# Patient Record
Sex: MALE | Race: WHITE | Employment: OTHER | URBAN - METROPOLITAN AREA
[De-identification: names, ages, dates, MRNs, and addresses within clinical notes are randomized per-mention and may not be internally consistent; named-entity substitution may affect disease eponyms.]

---

## 2017-02-02 ENCOUNTER — HOSPITAL ENCOUNTER (OUTPATIENT)
Dept: PREADMISSION TESTING | Age: 77
Discharge: HOME | End: 2017-02-02
Payer: MEDICARE

## 2017-02-02 VITALS
WEIGHT: 198 LBS | DIASTOLIC BLOOD PRESSURE: 75 MMHG | SYSTOLIC BLOOD PRESSURE: 126 MMHG | BODY MASS INDEX: 27.72 KG/M2 | HEART RATE: 74 BPM | TEMPERATURE: 97.6 F | HEIGHT: 71 IN

## 2017-02-02 LAB
ABO + RH BLD: NORMAL
ANION GAP BLD CALC-SCNC: 6 MMOL/L (ref 5–15)
APPEARANCE UR: CLEAR
BACTERIA URNS QL MICRO: NEGATIVE /HPF
BILIRUB UR QL: NEGATIVE
BLOOD GROUP ANTIBODIES SERPL: NORMAL
BUN SERPL-MCNC: 17 MG/DL (ref 6–20)
BUN/CREAT SERPL: 17 (ref 12–20)
CALCIUM SERPL-MCNC: 9 MG/DL (ref 8.5–10.1)
CHLORIDE SERPL-SCNC: 104 MMOL/L (ref 97–108)
CO2 SERPL-SCNC: 30 MMOL/L (ref 21–32)
COLOR UR: ABNORMAL
CREAT SERPL-MCNC: 0.99 MG/DL (ref 0.7–1.3)
EPITH CASTS URNS QL MICRO: ABNORMAL /LPF
ERYTHROCYTE [DISTWIDTH] IN BLOOD BY AUTOMATED COUNT: 15.2 % (ref 11.5–14.5)
EST. AVERAGE GLUCOSE BLD GHB EST-MCNC: 134 MG/DL
GLUCOSE SERPL-MCNC: 93 MG/DL (ref 65–100)
GLUCOSE UR STRIP.AUTO-MCNC: NEGATIVE MG/DL
HBA1C MFR BLD: 6.3 % (ref 4.2–6.3)
HCT VFR BLD AUTO: 46.4 % (ref 36.6–50.3)
HGB BLD-MCNC: 15.3 G/DL (ref 12.1–17)
HGB UR QL STRIP: NEGATIVE
HYALINE CASTS URNS QL MICRO: ABNORMAL /LPF (ref 0–5)
INR PPP: 1 (ref 0.9–1.1)
KETONES UR QL STRIP.AUTO: NEGATIVE MG/DL
LEUKOCYTE ESTERASE UR QL STRIP.AUTO: NEGATIVE
MCH RBC QN AUTO: 30.7 PG (ref 26–34)
MCHC RBC AUTO-ENTMCNC: 33 G/DL (ref 30–36.5)
MCV RBC AUTO: 93 FL (ref 80–99)
NITRITE UR QL STRIP.AUTO: NEGATIVE
PH UR STRIP: 6 [PH] (ref 5–8)
PLATELET # BLD AUTO: 226 K/UL (ref 150–400)
POTASSIUM SERPL-SCNC: 4.3 MMOL/L (ref 3.5–5.1)
PROT UR STRIP-MCNC: ABNORMAL MG/DL
PROTHROMBIN TIME: 10.3 SEC (ref 9–11.1)
RBC # BLD AUTO: 4.99 M/UL (ref 4.1–5.7)
RBC #/AREA URNS HPF: ABNORMAL /HPF (ref 0–5)
SODIUM SERPL-SCNC: 140 MMOL/L (ref 136–145)
SP GR UR REFRACTOMETRY: 1.02 (ref 1–1.03)
SPECIMEN EXP DATE BLD: NORMAL
UA: UC IF INDICATED,UAUC: ABNORMAL
UROBILINOGEN UR QL STRIP.AUTO: 1 EU/DL (ref 0.2–1)
WBC # BLD AUTO: 6.9 K/UL (ref 4.1–11.1)
WBC URNS QL MICRO: ABNORMAL /HPF (ref 0–4)

## 2017-02-02 PROCEDURE — 85610 PROTHROMBIN TIME: CPT | Performed by: ORTHOPAEDIC SURGERY

## 2017-02-02 PROCEDURE — 85027 COMPLETE CBC AUTOMATED: CPT | Performed by: ORTHOPAEDIC SURGERY

## 2017-02-02 PROCEDURE — 81001 URINALYSIS AUTO W/SCOPE: CPT | Performed by: ORTHOPAEDIC SURGERY

## 2017-02-02 PROCEDURE — 93005 ELECTROCARDIOGRAM TRACING: CPT

## 2017-02-02 PROCEDURE — 86900 BLOOD TYPING SEROLOGIC ABO: CPT | Performed by: ORTHOPAEDIC SURGERY

## 2017-02-02 PROCEDURE — 36415 COLL VENOUS BLD VENIPUNCTURE: CPT | Performed by: ORTHOPAEDIC SURGERY

## 2017-02-02 PROCEDURE — 80048 BASIC METABOLIC PNL TOTAL CA: CPT | Performed by: ORTHOPAEDIC SURGERY

## 2017-02-02 PROCEDURE — 83036 HEMOGLOBIN GLYCOSYLATED A1C: CPT | Performed by: ORTHOPAEDIC SURGERY

## 2017-02-02 RX ORDER — GABAPENTIN 300 MG/1
300 CAPSULE ORAL
COMMUNITY
End: 2017-05-22 | Stop reason: ALTCHOICE

## 2017-02-03 PROBLEM — R73.03 PREDIABETES: Status: ACTIVE | Noted: 2017-02-03

## 2017-02-03 LAB
ATRIAL RATE: 73 BPM
BACTERIA SPEC CULT: NORMAL
BACTERIA SPEC CULT: NORMAL
CALCULATED P AXIS, ECG09: 56 DEGREES
CALCULATED R AXIS, ECG10: -72 DEGREES
CALCULATED T AXIS, ECG11: 32 DEGREES
DIAGNOSIS, 93000: NORMAL
P-R INTERVAL, ECG05: 214 MS
Q-T INTERVAL, ECG07: 398 MS
QRS DURATION, ECG06: 124 MS
QTC CALCULATION (BEZET), ECG08: 438 MS
SERVICE CMNT-IMP: NORMAL
VENTRICULAR RATE, ECG03: 73 BPM

## 2017-02-21 ENCOUNTER — OFFICE VISIT (OUTPATIENT)
Dept: INTERNAL MEDICINE CLINIC | Age: 77
End: 2017-02-21

## 2017-02-21 VITALS
HEART RATE: 88 BPM | BODY MASS INDEX: 28.19 KG/M2 | OXYGEN SATURATION: 94 % | HEIGHT: 71 IN | WEIGHT: 201.4 LBS | DIASTOLIC BLOOD PRESSURE: 70 MMHG | RESPIRATION RATE: 16 BRPM | TEMPERATURE: 98.1 F | SYSTOLIC BLOOD PRESSURE: 120 MMHG

## 2017-02-21 DIAGNOSIS — M51.9 LUMBAR DISC DISEASE: ICD-10-CM

## 2017-02-21 DIAGNOSIS — I25.10 ATHEROSCLEROSIS OF NATIVE CORONARY ARTERY OF NATIVE HEART WITHOUT ANGINA PECTORIS: ICD-10-CM

## 2017-02-21 DIAGNOSIS — I10 ESSENTIAL HYPERTENSION, BENIGN: Primary | ICD-10-CM

## 2017-02-21 DIAGNOSIS — R73.03 PREDIABETES: ICD-10-CM

## 2017-02-21 DIAGNOSIS — Z71.89 ADVANCE DIRECTIVE DISCUSSED WITH PATIENT: ICD-10-CM

## 2017-02-21 NOTE — PATIENT INSTRUCTIONS
End of life planning discussed with patient. Patient states that they do have an advance directive and will provide a copy for our office at next visit.

## 2017-02-21 NOTE — PROGRESS NOTES
HISTORY OF PRESENT ILLNESS  Will Lozano is a 68 y.o. male. [facility] is seen today for a follow up of hypertension and other medical problems. He also needs preoperative medical clearance for upcoming back surgery. 1. Lumbar disc disease. He is seeing Dr. Shana Hernandez. He has nerve root compression. He has not improved with conservative treatment although he has had some benefit from Gabapentin. Surgery is scheduled for 3/1/17. 2. CAD native artery native heart without angina. Up to date with cardiology follow up. He apparently saw Dr. Angel cheung at [facility] and was cleared for the procedure. 3. Essential hypertension. Stable on current regimen. 4. Mixed hyperlipidemia, IFG. Up to date with labs. A1c will be part of his preop labs. Assessment:  He is at moderate increased risk for cardiac complications based on his history of coronary disease. He is clinically stable, however. He has had cardiology clearance, therefore, I feel the risks are acceptable. Recommendations:    1. Cardiology consult prn with any symptom. 2. Will review preop labs which are to be done this week. 3. Hospitalist consultation prn with any medical problems. 4. Monitor blood sugars as needed. 5. He will follow up with me prn, otherwise, as previously scheduled in three months. 6. Call if I may help with any post operative medical questions or problems.     MedDATA/gwo     - hold aspirin 7 days prior to surgery    Past Medical History:   Diagnosis Date    Arrhythmia     r/t caffeine    Arthritis     Basal cell carcinoma     BPH (benign prostatic hyperplasia)     CAD (coronary artery disease)     s/p PTCA '92    Calculus of kidney     Chronic pain     BACK    Elevated PSA     Hypercholesterolemia     Melanoma ([address]     MVP (mitral valve prolapse)     Other ill-defined conditions(799.89)     Prostatitis    Prediabetes     Seasonal allergies     Squamous cell carcinoma (HCC)     Systolic murmur     Thyroid disease     HYPOTHYROID    Vision impairment     R EYE, BLOOD CLOT ON RETINA     Past Surgical History:   Procedure Laterality Date    ENDOSCOPY, COLON, DIAGNOSTIC      due 12    HX CHOLECYSTECTOMY  1970    HX HEART CATHETERIZATION  1992    WITH BALLOON    HX LITHOTRIPSY      x2    HX ORTHOPAEDIC      elbow - fx right arm age 3 yrs     Current Outpatient Prescriptions   Medication Sig    gabapentin (NEURONTIN) 300 mg capsule Take 300 mg by mouth daily as needed.  folic acid-vit W5-ROJ F55 (FOLBIC) 2.5-25-2 mg tablet Take 1 Tab by mouth daily.  amLODIPine (NORVASC) 5 mg tablet Take 1 Tab by mouth daily. (Patient taking differently: Take 5 mg by mouth nightly.)    atorvastatin (LIPITOR) 40 mg tablet Take 1 Tab by mouth daily.  aspirin 81 mg chewable tablet Take 81 mg by mouth daily.  finasteride (PROSCAR) 5 mg tablet Take 5 mg by mouth every other day. HS    levothyroxine (SYNTHROID) 50 mcg tablet TAKE 1 TABLET BY MOUTH EVERY DAY BEFORE BREAKFAST    co-enzyme Q-10 (CO Q-10) 100 mg capsule Take 200 mg by mouth daily.  VIT A/VIT C/VIT E/ZINC/COPPER (PRESERVISION AREDS PO) Take 1 Cap by mouth two (2) times a day.  pneumococcal 13 ezekiel conj dip (PREVNAR 13, PF,) 0.5 mL syrg injection 0.5 mL by IntraMUSCular route PRIOR TO DISCHARGE for 1 dose. No current facility-administered medications for this visit. Allergies   Allergen Reactions    Codeine Rash    Pcn [Penicillins] Rash    Sulfa (Sulfonamide Antibiotics) Other (comments)     confusion     Social History     Social History    Marital status:      Spouse name: N/A    Number of children: N/A    Years of education: N/A     Occupational History    Not on file.      Social History Main Topics    Smoking status: Never Smoker    Smokeless tobacco: Never Used    Alcohol use No    Drug use: No    Sexual activity: Not on file     Other Topics Concern    Not on file     Social History Narrative     Family History Problem Relation Age of Onset    Heart Disease Mother      CHF    Heart Disease Father      CAD    Heart Disease Sister      CAD CABG    Lung Disease Sister     Heart Disease Brother      CAD    Pulmonary Fibrosis Brother     Heart Disease Brother      CAD    Stroke Brother 34     cerebral hemorrhage    Cancer Brother      LUNG    High Cholesterol Daughter     Anesth Problems Neg Hx          Review of Systems   Constitutional: Negative for weight loss. Respiratory: Negative. Cardiovascular: Negative for chest pain, palpitations, leg swelling and PND. Musculoskeletal: Positive for back pain. Negative for myalgias. Neurological: Negative for focal weakness. Physical Exam   Constitutional: He is oriented to person, place, and time. He appears well-developed and well-nourished. No distress. HENT:   Head: Normocephalic and atraumatic. Right Ear: External ear normal.   Left Ear: External ear normal.   Eyes: EOM are normal. Pupils are equal, round, and reactive to light. Right eye exhibits no discharge. Left eye exhibits no discharge. Neck: Normal range of motion. Neck supple. Carotid bruit is not present. No thyromegaly present. Cardiovascular: Normal rate, regular rhythm and intact distal pulses. Exam reveals no gallop and no friction rub. Murmur heard. Systolic murmur is present with a grade of 1/6   Pulmonary/Chest: Effort normal and breath sounds normal. No respiratory distress. He has no wheezes. He has no rales. Abdominal: Soft. Bowel sounds are normal. He exhibits no distension and no mass. There is no tenderness. There is no rebound and no guarding. Musculoskeletal: He exhibits no edema or tenderness. Full ortho-neuro per Dr Shana Hernandez   Lymphadenopathy:     He has no cervical adenopathy. Neurological: He is alert and oriented to person, place, and time. Skin: Skin is warm and dry. No rash noted. Psychiatric: He has a normal mood and affect.  His behavior is normal.   Nursing note and vitals reviewed. ASSESSMENT and Mauricio Kapoor was seen today for pre-op exam.    Diagnoses and all orders for this visit:    Essential hypertension, benign- Continue current regimen of prescription and / or OTC medications     Advance directive discussed with patient    Prediabetes- Diet and exercise     Atherosclerosis of native coronary artery of native heart without angina pectoris- See cardiologist as directed. Lumbar disc disease- See orthopedic surgeon as directed     This has been fully explained to the patient, who indicates understanding.

## 2017-02-21 NOTE — MR AVS SNAPSHOT
Visit Information Date & Time Provider Department Dept. Phone Encounter #  
 2/21/2017 11:00 AM Nuria Figueroa, [address] [place] [address] Internal Medicine [phone] [id number] Follow-up Instructions Return in about 3 months (around 5/21/2017). Upcoming Health Maintenance Date Due Pneumococcal 65+ High/Highest Risk (2 of 2 - PPSV23) 1/5/2011 MEDICARE YEARLY EXAM 7/7/2016 GLAUCOMA SCREENING Q2Y 4/1/2017 COLONOSCOPY 9/16/2025 DTaP/Tdap/Td series (2 - Td) 11/18/2026 Allergies as of 2/21/2017  Review Complete On: 2/21/2017 By: Nuria Figueroa MD  
  
 Severity Noted Reaction Type Reactions Codeine  11/24/2009    Rash Pcn [Penicillins]  11/24/2009    Rash  
 Sulfa (Sulfonamide Antibiotics)  11/24/2009    Other (comments)  
 confusion Current Immunizations  Reviewed on 11/14/2016 Name Date Pneumococcal Vaccine (Unspecified Type) 1/5/2006 Td 11/5/2014 Zoster Vaccine, Live 6/1/2014 Not reviewed this visit You Were Diagnosed With   
  
 Codes Comments Advance directive discussed with patient    -  Primary ICD-10-CM: Z71.89 ICD-9-CM: V65.49 Vitals BP Pulse Temp Resp Height(growth percentile) Weight(growth percentile) 120/70 (BP 1 Location: Right arm, BP Patient Position: Sitting) 88 98.1 °F (36.7 °C) (Oral) 16 5' 10.5\" (1.791 m) 201 lb 6.4 oz (91.4 kg) SpO2 BMI Smoking Status 94% 28.49 kg/m2 Never Smoker BMI and BSA Data Body Mass Index Body Surface Area  
 28.49 kg/m 2 2.13 m 2 Preferred Pharmacy Pharmacy Name Phone [id number] [place] [id number] [facility] [phone] Your Updated Medication List  
  
   
This list is accurate as of: 2/21/17 12:02 PM.  Always use your most recent med list. amLODIPine 5 mg tablet Commonly known as:  Melani Gables Take 1 Tab by mouth daily. aspirin 81 mg chewable tablet Take 81 mg by mouth daily. atorvastatin 40 mg tablet Commonly known as:  LIPITOR Take 1 Tab by mouth daily. co-enzyme Q-10 100 mg capsule Commonly known as:  CO Q-10 Take 200 mg by mouth daily. finasteride 5 mg tablet Commonly known as:  PROSCAR Take 5 mg by mouth every other day. HS  
  
 folic acid-vit C4-YFT F23 2.5-25-2 mg tablet Commonly known as:  FOLBIC Take 1 Tab by mouth daily. gabapentin 300 mg capsule Commonly known as:  NEURONTIN Take 300 mg by mouth daily as needed. levothyroxine 50 mcg tablet Commonly known as:  SYNTHROID  
TAKE 1 TABLET BY MOUTH EVERY DAY BEFORE BREAKFAST pneumococcal 13 ezekiel conj dip 0.5 mL Syrg injection Commonly known as:  PREVNAR 13 (PF)  
0.5 mL by IntraMUSCular route PRIOR TO DISCHARGE for 1 dose. PRESERVISION AREDS PO Take 1 Cap by mouth two (2) times a day. Follow-up Instructions Return in about 3 months (around 5/21/2017). Patient Instructions End of life planning discussed with patient. Patient states that they do have an advance directive and will provide a copy for our office at next visit. Introducing [facility] [place] [facility]! Dear Sommer Lomeli: 
Thank you for requesting a [employer] account. Our records indicate that you already have an active [employer] account. You can access your account anytime at [email or web address] Did you know that you can access your hospital and ER discharge instructions at any time in [employer]? You can also review all of your test results from your hospital stay or ER visit. Additional Information If you have questions, please visit the Frequently Asked Questions section of the [employer] website at [email or web address] Remember, [employer] is NOT to be used for urgent needs. For medical emergencies, dial 911. Now available from your iPhone and Android! Please provide this summary of care documentation to your next provider. Your primary care clinician is listed as SRINIVAS KEBEDE. If you have any questions after today's visit, please call [phone].

## 2017-02-22 PROBLEM — M51.9 LUMBAR DISC DISEASE: Status: ACTIVE | Noted: 2017-02-22

## 2017-03-01 ENCOUNTER — ANESTHESIA (OUTPATIENT)
Dept: SURGERY | Age: 77
End: 2017-03-01
Payer: MEDICARE

## 2017-03-01 ENCOUNTER — ANESTHESIA EVENT (OUTPATIENT)
Dept: SURGERY | Age: 77
End: 2017-03-01
Payer: MEDICARE

## 2017-03-01 ENCOUNTER — HOSPITAL ENCOUNTER (OUTPATIENT)
Age: 77
Setting detail: OBSERVATION
Discharge: HOME | End: 2017-03-02
Attending: ORTHOPAEDIC SURGERY | Admitting: ORTHOPAEDIC SURGERY
Payer: MEDICARE

## 2017-03-01 ENCOUNTER — APPOINTMENT (OUTPATIENT)
Dept: GENERAL RADIOLOGY | Age: 77
End: 2017-03-01
Attending: ORTHOPAEDIC SURGERY
Payer: MEDICARE

## 2017-03-01 LAB
ABO + RH BLD: NORMAL
BLOOD GROUP ANTIBODIES SERPL: NORMAL
GLUCOSE BLD STRIP.AUTO-MCNC: 101 MG/DL (ref 65–100)
SERVICE CMNT-IMP: ABNORMAL
SPECIMEN EXP DATE BLD: NORMAL

## 2017-03-01 PROCEDURE — 74011250636 HC RX REV CODE- 250/636: Performed by: ANESTHESIOLOGY

## 2017-03-01 PROCEDURE — 77030026438 HC STYL ET INTUB CARD -A: Performed by: ANESTHESIOLOGY

## 2017-03-01 PROCEDURE — 77030013079 HC BLNKT BAIR HGGR 3M -A: Performed by: ANESTHESIOLOGY

## 2017-03-01 PROCEDURE — 76060000035 HC ANESTHESIA 2 TO 2.5 HR: Performed by: ORTHOPAEDIC SURGERY

## 2017-03-01 PROCEDURE — 77030010507 HC ADH SKN DERMBND J&J -B: Performed by: ORTHOPAEDIC SURGERY

## 2017-03-01 PROCEDURE — 74011250636 HC RX REV CODE- 250/636: Performed by: ORTHOPAEDIC SURGERY

## 2017-03-01 PROCEDURE — 76000 FLUOROSCOPY <1 HR PHYS/QHP: CPT

## 2017-03-01 PROCEDURE — 77030018836 HC SOL IRR NACL ICUM -A: Performed by: ORTHOPAEDIC SURGERY

## 2017-03-01 PROCEDURE — 74011250637 HC RX REV CODE- 250/637: Performed by: ORTHOPAEDIC SURGERY

## 2017-03-01 PROCEDURE — 74011250636 HC RX REV CODE- 250/636

## 2017-03-01 PROCEDURE — 77030008684 HC TU ET CUF COVD -B: Performed by: ANESTHESIOLOGY

## 2017-03-01 PROCEDURE — 77030028271 HC SRGFL HEMSTAT MTRX KT J&J -C: Performed by: ORTHOPAEDIC SURGERY

## 2017-03-01 PROCEDURE — 74011000272 HC RX REV CODE- 272: Performed by: ORTHOPAEDIC SURGERY

## 2017-03-01 PROCEDURE — 76010000171 HC OR TIME 2 TO 2.5 HR INTENSV-TIER 1: Performed by: ORTHOPAEDIC SURGERY

## 2017-03-01 PROCEDURE — 77030004391 HC BUR FLUT MEDT -C: Performed by: ORTHOPAEDIC SURGERY

## 2017-03-01 PROCEDURE — 74011000250 HC RX REV CODE- 250: Performed by: ORTHOPAEDIC SURGERY

## 2017-03-01 PROCEDURE — 82962 GLUCOSE BLOOD TEST: CPT

## 2017-03-01 PROCEDURE — 36415 COLL VENOUS BLD VENIPUNCTURE: CPT | Performed by: NURSE PRACTITIONER

## 2017-03-01 PROCEDURE — 76210000006 HC OR PH I REC 0.5 TO 1 HR: Performed by: ORTHOPAEDIC SURGERY

## 2017-03-01 PROCEDURE — 77030011264 HC ELECTRD BLD EXT COVD -A: Performed by: ORTHOPAEDIC SURGERY

## 2017-03-01 PROCEDURE — 99218 HC RM OBSERVATION: CPT

## 2017-03-01 PROCEDURE — 77030003029 HC SUT VCRL J&J -B: Performed by: ORTHOPAEDIC SURGERY

## 2017-03-01 PROCEDURE — 77030014647 HC SEAL FBRN TISSL BAXT -D: Performed by: ORTHOPAEDIC SURGERY

## 2017-03-01 PROCEDURE — 77030003666 HC NDL SPINAL BD -A: Performed by: ORTHOPAEDIC SURGERY

## 2017-03-01 PROCEDURE — 74011250637 HC RX REV CODE- 250/637: Performed by: NURSE PRACTITIONER

## 2017-03-01 PROCEDURE — 86900 BLOOD TYPING SEROLOGIC ABO: CPT | Performed by: NURSE PRACTITIONER

## 2017-03-01 PROCEDURE — 77030031139 HC SUT VCRL2 J&J -A: Performed by: ORTHOPAEDIC SURGERY

## 2017-03-01 PROCEDURE — 74011000250 HC RX REV CODE- 250

## 2017-03-01 PROCEDURE — 77030032490 HC SLV COMPR SCD KNE COVD -B: Performed by: ORTHOPAEDIC SURGERY

## 2017-03-01 PROCEDURE — 77030019908 HC STETH ESOPH SIMS -A: Performed by: ANESTHESIOLOGY

## 2017-03-01 PROCEDURE — 77030020782 HC GWN BAIR PAWS FLX 3M -B

## 2017-03-01 PROCEDURE — 77030029099 HC BN WAX SSPC -A: Performed by: ORTHOPAEDIC SURGERY

## 2017-03-01 PROCEDURE — 77030012893

## 2017-03-01 RX ORDER — HYDROMORPHONE HYDROCHLORIDE 1 MG/ML
INJECTION, SOLUTION INTRAMUSCULAR; INTRAVENOUS; SUBCUTANEOUS AS NEEDED
Status: DISCONTINUED | OUTPATIENT
Start: 2017-03-01 | End: 2017-03-01 | Stop reason: HOSPADM

## 2017-03-01 RX ORDER — VANCOMYCIN HYDROCHLORIDE 1 G/20ML
INJECTION, POWDER, LYOPHILIZED, FOR SOLUTION INTRAVENOUS AS NEEDED
Status: DISCONTINUED | OUTPATIENT
Start: 2017-03-01 | End: 2017-03-01 | Stop reason: HOSPADM

## 2017-03-01 RX ORDER — FENTANYL CITRATE 50 UG/ML
50 INJECTION, SOLUTION INTRAMUSCULAR; INTRAVENOUS AS NEEDED
Status: DISCONTINUED | OUTPATIENT
Start: 2017-03-01 | End: 2017-03-01 | Stop reason: HOSPADM

## 2017-03-01 RX ORDER — FENTANYL CITRATE 50 UG/ML
25 INJECTION, SOLUTION INTRAMUSCULAR; INTRAVENOUS
Status: DISCONTINUED | OUTPATIENT
Start: 2017-03-01 | End: 2017-03-01 | Stop reason: HOSPADM

## 2017-03-01 RX ORDER — GABAPENTIN 300 MG/1
300 CAPSULE ORAL 3 TIMES DAILY
Status: DISCONTINUED | OUTPATIENT
Start: 2017-03-01 | End: 2017-03-02 | Stop reason: HOSPADM

## 2017-03-01 RX ORDER — AMLODIPINE BESYLATE 5 MG/1
5 TABLET ORAL DAILY
Status: DISCONTINUED | OUTPATIENT
Start: 2017-03-02 | End: 2017-03-01

## 2017-03-01 RX ORDER — PROPOFOL 10 MG/ML
INJECTION, EMULSION INTRAVENOUS AS NEEDED
Status: DISCONTINUED | OUTPATIENT
Start: 2017-03-01 | End: 2017-03-01 | Stop reason: HOSPADM

## 2017-03-01 RX ORDER — FENTANYL CITRATE 50 UG/ML
INJECTION, SOLUTION INTRAMUSCULAR; INTRAVENOUS AS NEEDED
Status: DISCONTINUED | OUTPATIENT
Start: 2017-03-01 | End: 2017-03-01 | Stop reason: HOSPADM

## 2017-03-01 RX ORDER — MIDAZOLAM HYDROCHLORIDE 1 MG/ML
1 INJECTION, SOLUTION INTRAMUSCULAR; INTRAVENOUS AS NEEDED
Status: DISCONTINUED | OUTPATIENT
Start: 2017-03-01 | End: 2017-03-01 | Stop reason: HOSPADM

## 2017-03-01 RX ORDER — FACIAL-BODY WIPES
10 EACH TOPICAL DAILY PRN
Status: DISCONTINUED | OUTPATIENT
Start: 2017-03-03 | End: 2017-03-02 | Stop reason: HOSPADM

## 2017-03-01 RX ORDER — AMLODIPINE BESYLATE 5 MG/1
5 TABLET ORAL
Status: DISCONTINUED | OUTPATIENT
Start: 2017-03-01 | End: 2017-03-02 | Stop reason: HOSPADM

## 2017-03-01 RX ORDER — SODIUM CHLORIDE 9 MG/ML
50 INJECTION, SOLUTION INTRAVENOUS CONTINUOUS
Status: DISCONTINUED | OUTPATIENT
Start: 2017-03-01 | End: 2017-03-01 | Stop reason: HOSPADM

## 2017-03-01 RX ORDER — HYDROMORPHONE HYDROCHLORIDE 1 MG/ML
0.2 INJECTION, SOLUTION INTRAMUSCULAR; INTRAVENOUS; SUBCUTANEOUS
Status: DISCONTINUED | OUTPATIENT
Start: 2017-03-01 | End: 2017-03-01 | Stop reason: HOSPADM

## 2017-03-01 RX ORDER — SODIUM CHLORIDE 9 MG/ML
125 INJECTION, SOLUTION INTRAVENOUS CONTINUOUS
Status: DISPENSED | OUTPATIENT
Start: 2017-03-01 | End: 2017-03-02

## 2017-03-01 RX ORDER — MIDAZOLAM HYDROCHLORIDE 1 MG/ML
INJECTION, SOLUTION INTRAMUSCULAR; INTRAVENOUS AS NEEDED
Status: DISCONTINUED | OUTPATIENT
Start: 2017-03-01 | End: 2017-03-01 | Stop reason: HOSPADM

## 2017-03-01 RX ORDER — ATORVASTATIN CALCIUM 40 MG/1
40 TABLET, FILM COATED ORAL
Status: DISCONTINUED | OUTPATIENT
Start: 2017-03-01 | End: 2017-03-02 | Stop reason: HOSPADM

## 2017-03-01 RX ORDER — ROCURONIUM BROMIDE 10 MG/ML
INJECTION, SOLUTION INTRAVENOUS AS NEEDED
Status: DISCONTINUED | OUTPATIENT
Start: 2017-03-01 | End: 2017-03-01 | Stop reason: HOSPADM

## 2017-03-01 RX ORDER — ATORVASTATIN CALCIUM 40 MG/1
40 TABLET, FILM COATED ORAL DAILY
Status: DISCONTINUED | OUTPATIENT
Start: 2017-03-02 | End: 2017-03-01

## 2017-03-01 RX ORDER — FINASTERIDE 5 MG/1
5 TABLET, FILM COATED ORAL EVERY OTHER DAY
Status: DISCONTINUED | OUTPATIENT
Start: 2017-03-01 | End: 2017-03-02 | Stop reason: HOSPADM

## 2017-03-01 RX ORDER — AMOXICILLIN 250 MG
1 CAPSULE ORAL 2 TIMES DAILY
Status: DISCONTINUED | OUTPATIENT
Start: 2017-03-01 | End: 2017-03-02 | Stop reason: HOSPADM

## 2017-03-01 RX ORDER — LIDOCAINE HYDROCHLORIDE 10 MG/ML
0.1 INJECTION, SOLUTION EPIDURAL; INFILTRATION; INTRACAUDAL; PERINEURAL AS NEEDED
Status: DISCONTINUED | OUTPATIENT
Start: 2017-03-01 | End: 2017-03-01 | Stop reason: HOSPADM

## 2017-03-01 RX ORDER — SODIUM CHLORIDE 0.9 % (FLUSH) 0.9 %
5-10 SYRINGE (ML) INJECTION AS NEEDED
Status: DISCONTINUED | OUTPATIENT
Start: 2017-03-01 | End: 2017-03-01 | Stop reason: HOSPADM

## 2017-03-01 RX ORDER — DIPHENHYDRAMINE HYDROCHLORIDE 50 MG/ML
12.5 INJECTION, SOLUTION INTRAMUSCULAR; INTRAVENOUS AS NEEDED
Status: DISCONTINUED | OUTPATIENT
Start: 2017-03-01 | End: 2017-03-01 | Stop reason: HOSPADM

## 2017-03-01 RX ORDER — BUPIVACAINE HYDROCHLORIDE AND EPINEPHRINE 5; 5 MG/ML; UG/ML
30 INJECTION, SOLUTION EPIDURAL; INTRACAUDAL; PERINEURAL ONCE
Status: COMPLETED | OUTPATIENT
Start: 2017-03-01 | End: 2017-03-01

## 2017-03-01 RX ORDER — POVIDONE-IODINE 10 %
SOLUTION, NON-ORAL TOPICAL AS NEEDED
Status: DISCONTINUED | OUTPATIENT
Start: 2017-03-01 | End: 2017-03-01 | Stop reason: HOSPADM

## 2017-03-01 RX ORDER — SODIUM CHLORIDE 0.9 % (FLUSH) 0.9 %
5-10 SYRINGE (ML) INJECTION EVERY 8 HOURS
Status: DISCONTINUED | OUTPATIENT
Start: 2017-03-02 | End: 2017-03-02 | Stop reason: HOSPADM

## 2017-03-01 RX ORDER — ACETAMINOPHEN 325 MG/1
650 TABLET ORAL EVERY 6 HOURS
Status: DISCONTINUED | OUTPATIENT
Start: 2017-03-01 | End: 2017-03-02 | Stop reason: HOSPADM

## 2017-03-01 RX ORDER — LEVOFLOXACIN 5 MG/ML
INJECTION, SOLUTION INTRAVENOUS AS NEEDED
Status: DISCONTINUED | OUTPATIENT
Start: 2017-03-01 | End: 2017-03-01 | Stop reason: HOSPADM

## 2017-03-01 RX ORDER — NALOXONE HYDROCHLORIDE 0.4 MG/ML
0.4 INJECTION, SOLUTION INTRAMUSCULAR; INTRAVENOUS; SUBCUTANEOUS AS NEEDED
Status: DISCONTINUED | OUTPATIENT
Start: 2017-03-01 | End: 2017-03-02 | Stop reason: HOSPADM

## 2017-03-01 RX ORDER — VANCOMYCIN 1.75 GRAM/500 ML IN 0.9 % SODIUM CHLORIDE INTRAVENOUS
1750 EVERY 12 HOURS
Status: COMPLETED | OUTPATIENT
Start: 2017-03-01 | End: 2017-03-02

## 2017-03-01 RX ORDER — OXYCODONE AND ACETAMINOPHEN 5; 325 MG/1; MG/1
1 TABLET ORAL AS NEEDED
Status: DISCONTINUED | OUTPATIENT
Start: 2017-03-01 | End: 2017-03-01 | Stop reason: HOSPADM

## 2017-03-01 RX ORDER — ONDANSETRON 2 MG/ML
4 INJECTION INTRAMUSCULAR; INTRAVENOUS AS NEEDED
Status: DISCONTINUED | OUTPATIENT
Start: 2017-03-01 | End: 2017-03-01 | Stop reason: HOSPADM

## 2017-03-01 RX ORDER — SODIUM CHLORIDE 0.9 % (FLUSH) 0.9 %
5-10 SYRINGE (ML) INJECTION AS NEEDED
Status: DISCONTINUED | OUTPATIENT
Start: 2017-03-01 | End: 2017-03-02 | Stop reason: HOSPADM

## 2017-03-01 RX ORDER — MIDAZOLAM HYDROCHLORIDE 1 MG/ML
0.5 INJECTION, SOLUTION INTRAMUSCULAR; INTRAVENOUS
Status: DISCONTINUED | OUTPATIENT
Start: 2017-03-01 | End: 2017-03-01 | Stop reason: HOSPADM

## 2017-03-01 RX ORDER — LEVOTHYROXINE SODIUM 50 UG/1
50 TABLET ORAL
Status: DISCONTINUED | OUTPATIENT
Start: 2017-03-02 | End: 2017-03-02 | Stop reason: HOSPADM

## 2017-03-01 RX ORDER — PHENYLEPHRINE HCL IN 0.9% NACL 0.4MG/10ML
SYRINGE (ML) INTRAVENOUS AS NEEDED
Status: DISCONTINUED | OUTPATIENT
Start: 2017-03-01 | End: 2017-03-01 | Stop reason: HOSPADM

## 2017-03-01 RX ORDER — SODIUM CHLORIDE, SODIUM LACTATE, POTASSIUM CHLORIDE, CALCIUM CHLORIDE 600; 310; 30; 20 MG/100ML; MG/100ML; MG/100ML; MG/100ML
75 INJECTION, SOLUTION INTRAVENOUS CONTINUOUS
Status: DISCONTINUED | OUTPATIENT
Start: 2017-03-01 | End: 2017-03-01 | Stop reason: HOSPADM

## 2017-03-01 RX ORDER — HYDROMORPHONE HCL IN 0.9% NACL 15 MG/30ML
PATIENT CONTROLLED ANALGESIA VIAL INTRAVENOUS CONTINUOUS
Status: DISCONTINUED | OUTPATIENT
Start: 2017-03-01 | End: 2017-03-02 | Stop reason: HOSPADM

## 2017-03-01 RX ORDER — ONDANSETRON 2 MG/ML
4 INJECTION INTRAMUSCULAR; INTRAVENOUS
Status: ACTIVE | OUTPATIENT
Start: 2017-03-01 | End: 2017-03-02

## 2017-03-01 RX ORDER — POLYETHYLENE GLYCOL 3350 17 G/17G
17 POWDER, FOR SOLUTION ORAL DAILY
Status: DISCONTINUED | OUTPATIENT
Start: 2017-03-02 | End: 2017-03-02 | Stop reason: HOSPADM

## 2017-03-01 RX ORDER — LIDOCAINE HYDROCHLORIDE 20 MG/ML
INJECTION, SOLUTION EPIDURAL; INFILTRATION; INTRACAUDAL; PERINEURAL AS NEEDED
Status: DISCONTINUED | OUTPATIENT
Start: 2017-03-01 | End: 2017-03-01 | Stop reason: HOSPADM

## 2017-03-01 RX ORDER — ONDANSETRON 2 MG/ML
INJECTION INTRAMUSCULAR; INTRAVENOUS AS NEEDED
Status: DISCONTINUED | OUTPATIENT
Start: 2017-03-01 | End: 2017-03-01 | Stop reason: HOSPADM

## 2017-03-01 RX ORDER — DIPHENHYDRAMINE HCL 12.5MG/5ML
12.5 ELIXIR ORAL
Status: ACTIVE | OUTPATIENT
Start: 2017-03-01 | End: 2017-03-02

## 2017-03-01 RX ORDER — SUCCINYLCHOLINE CHLORIDE 20 MG/ML
INJECTION INTRAMUSCULAR; INTRAVENOUS AS NEEDED
Status: DISCONTINUED | OUTPATIENT
Start: 2017-03-01 | End: 2017-03-01 | Stop reason: HOSPADM

## 2017-03-01 RX ORDER — DEXAMETHASONE SODIUM PHOSPHATE 4 MG/ML
INJECTION, SOLUTION INTRA-ARTICULAR; INTRALESIONAL; INTRAMUSCULAR; INTRAVENOUS; SOFT TISSUE AS NEEDED
Status: DISCONTINUED | OUTPATIENT
Start: 2017-03-01 | End: 2017-03-01 | Stop reason: HOSPADM

## 2017-03-01 RX ORDER — MORPHINE SULFATE 10 MG/ML
2 INJECTION, SOLUTION INTRAMUSCULAR; INTRAVENOUS
Status: DISCONTINUED | OUTPATIENT
Start: 2017-03-01 | End: 2017-03-01 | Stop reason: HOSPADM

## 2017-03-01 RX ORDER — VANCOMYCIN 1.75 GRAM/500 ML IN 0.9 % SODIUM CHLORIDE INTRAVENOUS
1750 ONCE
Status: COMPLETED | OUTPATIENT
Start: 2017-03-01 | End: 2017-03-01

## 2017-03-01 RX ORDER — OXYCODONE HYDROCHLORIDE 5 MG/1
5 TABLET ORAL
Status: DISCONTINUED | OUTPATIENT
Start: 2017-03-01 | End: 2017-03-02 | Stop reason: HOSPADM

## 2017-03-01 RX ORDER — LEVOFLOXACIN 5 MG/ML
500 INJECTION, SOLUTION INTRAVENOUS ONCE
Status: DISCONTINUED | OUTPATIENT
Start: 2017-03-01 | End: 2017-03-01

## 2017-03-01 RX ORDER — SODIUM CHLORIDE 0.9 % (FLUSH) 0.9 %
5-10 SYRINGE (ML) INJECTION EVERY 8 HOURS
Status: DISCONTINUED | OUTPATIENT
Start: 2017-03-01 | End: 2017-03-01 | Stop reason: HOSPADM

## 2017-03-01 RX ORDER — OXYCODONE HYDROCHLORIDE 5 MG/1
10 TABLET ORAL
Status: DISCONTINUED | OUTPATIENT
Start: 2017-03-01 | End: 2017-03-02 | Stop reason: HOSPADM

## 2017-03-01 RX ORDER — CYCLOBENZAPRINE HCL 10 MG
10 TABLET ORAL
Status: DISCONTINUED | OUTPATIENT
Start: 2017-03-01 | End: 2017-03-02 | Stop reason: HOSPADM

## 2017-03-01 RX ADMIN — Medication 80 MCG: at 11:54

## 2017-03-01 RX ADMIN — FINASTERIDE 5 MG: 5 TABLET, FILM COATED ORAL at 23:05

## 2017-03-01 RX ADMIN — LIDOCAINE HYDROCHLORIDE 60 MG: 20 INJECTION, SOLUTION EPIDURAL; INFILTRATION; INTRACAUDAL; PERINEURAL at 11:08

## 2017-03-01 RX ADMIN — ACETAMINOPHEN 650 MG: 325 TABLET, FILM COATED ORAL at 18:44

## 2017-03-01 RX ADMIN — VANCOMYCIN HYDROCHLORIDE 1750 MG: 10 INJECTION, POWDER, LYOPHILIZED, FOR SOLUTION INTRAVENOUS at 23:22

## 2017-03-01 RX ADMIN — ATORVASTATIN CALCIUM 40 MG: 40 TABLET, FILM COATED ORAL at 23:05

## 2017-03-01 RX ADMIN — LEVOFLOXACIN 500 MG: 5 INJECTION, SOLUTION INTRAVENOUS at 11:21

## 2017-03-01 RX ADMIN — Medication 80 MCG: at 11:51

## 2017-03-01 RX ADMIN — FENTANYL CITRATE 100 MCG: 50 INJECTION, SOLUTION INTRAMUSCULAR; INTRAVENOUS at 11:08

## 2017-03-01 RX ADMIN — Medication 40 MCG: at 12:16

## 2017-03-01 RX ADMIN — Medication 80 MCG: at 12:18

## 2017-03-01 RX ADMIN — ONDANSETRON 4 MG: 2 INJECTION INTRAMUSCULAR; INTRAVENOUS at 11:19

## 2017-03-01 RX ADMIN — SODIUM CHLORIDE, SODIUM LACTATE, POTASSIUM CHLORIDE, AND CALCIUM CHLORIDE: 600; 310; 30; 20 INJECTION, SOLUTION INTRAVENOUS at 12:51

## 2017-03-01 RX ADMIN — Medication: at 13:47

## 2017-03-01 RX ADMIN — ROCURONIUM BROMIDE 5 MG: 10 INJECTION, SOLUTION INTRAVENOUS at 11:08

## 2017-03-01 RX ADMIN — ACETAMINOPHEN 650 MG: 325 TABLET, FILM COATED ORAL at 23:05

## 2017-03-01 RX ADMIN — DOCUSATE SODIUM AND SENNOSIDES 1 TABLET: 8.6; 5 TABLET, FILM COATED ORAL at 18:46

## 2017-03-01 RX ADMIN — PROPOFOL 160 MG: 10 INJECTION, EMULSION INTRAVENOUS at 11:08

## 2017-03-01 RX ADMIN — Medication 80 MCG: at 11:37

## 2017-03-01 RX ADMIN — SUCCINYLCHOLINE CHLORIDE 140 MG: 20 INJECTION INTRAMUSCULAR; INTRAVENOUS at 11:09

## 2017-03-01 RX ADMIN — Medication 80 MCG: at 11:24

## 2017-03-01 RX ADMIN — VANCOMYCIN HYDROCHLORIDE 1750 MG: 10 INJECTION, POWDER, LYOPHILIZED, FOR SOLUTION INTRAVENOUS at 11:13

## 2017-03-01 RX ADMIN — AMLODIPINE BESYLATE 5 MG: 5 TABLET ORAL at 23:05

## 2017-03-01 RX ADMIN — HYDROMORPHONE HYDROCHLORIDE 0.25 MG: 1 INJECTION, SOLUTION INTRAMUSCULAR; INTRAVENOUS; SUBCUTANEOUS at 13:09

## 2017-03-01 RX ADMIN — SODIUM CHLORIDE, SODIUM LACTATE, POTASSIUM CHLORIDE, AND CALCIUM CHLORIDE 75 ML/HR: 600; 310; 30; 20 INJECTION, SOLUTION INTRAVENOUS at 10:17

## 2017-03-01 RX ADMIN — Medication 80 MCG: at 11:58

## 2017-03-01 RX ADMIN — FENTANYL CITRATE 25 MCG: 50 INJECTION, SOLUTION INTRAMUSCULAR; INTRAVENOUS at 13:11

## 2017-03-01 RX ADMIN — MIDAZOLAM HYDROCHLORIDE 2 MG: 1 INJECTION, SOLUTION INTRAMUSCULAR; INTRAVENOUS at 10:58

## 2017-03-01 RX ADMIN — DEXAMETHASONE SODIUM PHOSPHATE 8 MG: 4 INJECTION, SOLUTION INTRA-ARTICULAR; INTRALESIONAL; INTRAMUSCULAR; INTRAVENOUS; SOFT TISSUE at 11:19

## 2017-03-01 NOTE — BRIEF OP NOTE
BRIEF OPERATIVE NOTE    Date of Procedure: 3/1/2017   Preoperative Diagnosis: STENOSIS L5-S1  Postoperative Diagnosis: STENOSIS L5-S1    Procedure(s):  L5-S1 LEFT MICRODECOMPRESSION  Surgeon(s) and Role:      Lakisha Rodriguez MD - Primary       Nurse Practitioner: Issac Forman NP    Surgical Staff:  Circ-1: Richard Garcia RN  Circ-Relief: Freddy Payton RN  Scrub RN-1: Edith Kuo RN  Scrub RN-Relief: Lottie Jefferson RN  Nurse Practitioner: Issac Forman NP  Event Time In   Incision Start 1137   Incision Close      Anesthesia: General   Estimated Blood Loss: min  Specimens: * No specimens in log *   Findings: stenosis  Complications:none  Implants: * No implants in log *

## 2017-03-01 NOTE — PERIOP NOTES
TRANSFER - OUT REPORT:    Verbal report given to Radha MARTE on [employer]  being transferred to room 542 for routine post - op       Report consisted of patients Situation, Background, Assessment and   Recommendations(SBAR). Time Pre op antibiotic given:500 levaquin/ vancomycin  Anesthesia Stop time: 1500  Russell Present on Transfer to floor:no  Order for Russell on Chart:no    Information from the following report(s) SBAR, OR Summary, Intake/Output and MAR was reviewed with the receiving nurse. Opportunity for questions and clarification was provided. Is the patient on 02? YES       L/Min 2       Other     Is the patient on a monitor? NO    Is the nurse transporting with the patient? NO    Surgical Waiting Area notified of patient's transfer from PACU? YES      The following personal items collected during your admission accompanied patient upon transfer:   Dental Appliance:    Vision:    Hearing Aid:    Jewelry:    Clothing: Clothing: Other (comment) (clothing bag to pacu)  Other Valuables:  Other Valuables: Brace, Eyeglasses, Other (comment) (back brace, hearing aids, glasses to pacu)  Valuables sent to safe:

## 2017-03-01 NOTE — IP AVS SNAPSHOT
Current Discharge Medication List  
  
Take these medications at their scheduled times Dose & Instructions Dispensing Information Comments Morning Noon Evening Bedtime  
 amLODIPine 5 mg tablet Commonly known as:  Esperanza Malathi Your next dose is: Today, Tomorrow Other:  ____________ Dose:  5 mg Take 1 Tab by mouth daily. Quantity:  90 Tab Refills:  2  
     
   
   
   
  
 atorvastatin 40 mg tablet Commonly known as:  LIPITOR Your next dose is: Today, Tomorrow Other:  ____________ Dose:  40 mg Take 1 Tab by mouth daily. Quantity:  90 Tab Refills:  2 co-enzyme Q-10 100 mg capsule Commonly known as:  CO Q-10 Your next dose is: Today, Tomorrow Other:  ____________ Dose:  200 mg Take 200 mg by mouth daily. Refills:  0  
     
   
   
   
  
 finasteride 5 mg tablet Commonly known as:  PROSCAR Your next dose is: Today, Tomorrow Other:  ____________ Dose:  5 mg Take 5 mg by mouth every other day. HS Refills:  0  
     
   
   
   
  
 folic acid-vit C8-VDH Z37 2.5-25-2 mg tablet Commonly known as:  FOLBIC Your next dose is: Today, Tomorrow Other:  ____________ Dose:  1 Tab Take 1 Tab by mouth daily. Quantity:  90 Tab Refills:  2 PRESERVISION AREDS PO Your next dose is: Today, Tomorrow Other:  ____________ Dose:  1 Cap Take 1 Cap by mouth two (2) times a day. Refills:  0 Take these medications as needed Dose & Instructions Dispensing Information Comments Morning Noon Evening Bedtime  
 gabapentin 300 mg capsule Commonly known as:  NEURONTIN Your next dose is: Today, Tomorrow Other:  ____________ Dose:  300 mg Take 300 mg by mouth daily as needed. Refills:  0 oxyCODONE IR 5 mg immediate release tablet Commonly known as:  Marietta Rice Your next dose is: Today, Tomorrow Other:  ____________ Dose:  5-10 mg Take 1-2 Tabs by mouth every three (3) hours as needed. Max Daily Amount: 80 mg.  
 Quantity:  60 Tab Refills:  0 Take these medications as directed Dose & Instructions Dispensing Information Comments Morning Noon Evening Bedtime  
 levothyroxine 50 mcg tablet Commonly known as:  SYNTHROID Your next dose is: Today, Tomorrow Other:  ____________ TAKE 1 TABLET BY MOUTH EVERY DAY BEFORE BREAKFAST Quantity:  90 Tab Refills:  3 ASK your doctor about these medications Dose & Instructions Dispensing Information Comments Morning Noon Evening Bedtime  
 aspirin 81 mg chewable tablet Your next dose is: Today, Tomorrow Other:  ____________ Dose:  81 mg Take 81 mg by mouth daily. Refills:  0  
     
   
   
   
  
 pneumococcal 13 ezekiel conj dip 0.5 mL Syrg injection Commonly known as:  PREVNAR 13 (PF) Your next dose is: Today, Tomorrow Other:  ____________ Dose:  0.5 mL  
0.5 mL by IntraMUSCular route PRIOR TO DISCHARGE for 1 dose. Quantity:  1 Syringe Refills:  0 Where to Get Your Medications Information about where to get these medications is not yet available ! Ask your nurse or doctor about these medications  
  oxyCODONE IR 5 mg immediate release tablet

## 2017-03-01 NOTE — PROGRESS NOTES
TRANSFER - IN REPORT:    Verbal report received from FRANCISCO, [facility] (name) on creads  being received from PACU (unit) for routine post - op      Report consisted of patients Situation, Background, Assessment and   Recommendations(SBAR). Information from the following report(s) SBAR, Kardex, OR Summary, Intake/Output and MAR was reviewed with the receiving nurse. Opportunity for questions and clarification was provided. Assessment completed upon patients arrival to unit and care assumed. No bed in room. Called bedshop for bed. Will call PACU once bed arrives onto unit. Bed delivered into room. Patient to be transported from PACU to [facility]. Bedside and Verbal shift change report given to Minh (oncoming nurse) by Zoey Guerrero (offgoing nurse). Report included the following information SBAR, Kardex, OR Summary, Intake/Output and MAR.

## 2017-03-01 NOTE — IP AVS SNAPSHOT
[address] 
[phone] Patient: Roland Flores MRN: [id number] CSY:[birth year] You are allergic to the following Allergen Reactions Codeine Rash Pcn (Penicillins) Rash  
    
 Sulfa (Sulfonamide Antibiotics) Other (comments)  
 confusion Recent Documentation Height 1.791 m Emergency Contacts Name Discharge Info Relation Home Work Mobile Jovanna Sawyer DISCHARGE CAREGIVER [3] Spouse [3]   [phone] About your hospitalization You were admitted on:  March 1, 2017 You last received care in the:  [address] [place] [address] You were discharged on:  March 2, 2017 Unit phone number:  [phone] Why you were hospitalized Your primary diagnosis was:  Not on File Your diagnoses also included:  Lumbar Foraminal Stenosis Providers Seen During Your Hospitalizations Provider Role Specialty Primary office phone Clayton Frost MD Attending Provider Orthopedic Surgery [phone] Your Primary Care Physician (PCP) Primary Care Physician Office Phone Office Fax Trista Salomon [phone] Follow-up Information Follow up With Details Comments Contact Info Cisco Clement MD On 3/14/2017 For discharge follow up at 11:00AM  [address] [place] 
[phone] Your Appointments Tuesday March 14, 2017 11:00 AM EDT TRANSITIONAL CARE MANAGEMENT with Cisco Cleemnt MD  
[facility]) [address] [place]  
[phone] Current Discharge Medication List  
  
START taking these medications Dose & Instructions Dispensing Information Comments Morning Noon Evening Bedtime  
 oxyCODONE IR 5 mg immediate release tablet Commonly known as:  Darlene Curtis  
   
 Your next dose is: Today, Tomorrow Other:  _________ Dose:  5-10 mg Take 1-2 Tabs by mouth every three (3) hours as needed. Max Daily Amount: 80 mg.  
 Quantity:  60 Tab Refills:  0 CONTINUE these medications which have CHANGED Dose & Instructions Dispensing Information Comments Morning Noon Evening Bedtime  
 amLODIPine 5 mg tablet Commonly known as:  Rudene Post What changed:  when to take this Your next dose is: Today, Tomorrow Other:  _________ Dose:  5 mg Take 1 Tab by mouth daily. Quantity:  90 Tab Refills:  2 CONTINUE these medications which have NOT CHANGED Dose & Instructions Dispensing Information Comments Morning Noon Evening Bedtime  
 atorvastatin 40 mg tablet Commonly known as:  LIPITOR Your next dose is: Today, Tomorrow Other:  _________ Dose:  40 mg Take 1 Tab by mouth daily. Quantity:  90 Tab Refills:  2 co-enzyme Q-10 100 mg capsule Commonly known as:  CO Q-10 Your next dose is: Today, Tomorrow Other:  _________ Dose:  200 mg Take 200 mg by mouth daily. Refills:  0  
     
   
   
   
  
 finasteride 5 mg tablet Commonly known as:  PROSCAR Your next dose is: Today, Tomorrow Other:  _________ Dose:  5 mg Take 5 mg by mouth every other day. HS Refills:  0  
     
   
   
   
  
 folic acid-vit N2-BYR H14 2.5-25-2 mg tablet Commonly known as:  FOLBIC Your next dose is: Today, Tomorrow Other:  _________ Dose:  1 Tab Take 1 Tab by mouth daily. Quantity:  90 Tab Refills:  2  
     
   
   
   
  
 gabapentin 300 mg capsule Commonly known as:  NEURONTIN Your next dose is: Today, Tomorrow Other:  _________ Dose:  300 mg Take 300 mg by mouth daily as needed. Refills:  0 levothyroxine 50 mcg tablet Commonly known as:  SYNTHROID Your next dose is: Today, Tomorrow Other:  _________ TAKE 1 TABLET BY MOUTH EVERY DAY BEFORE BREAKFAST Quantity:  90 Tab Refills:  3 PRESERVISION AREDS PO Your next dose is: Today, Tomorrow Other:  _________ Dose:  1 Cap Take 1 Cap by mouth two (2) times a day. Refills:  0 ASK your doctor about these medications Dose & Instructions Dispensing Information Comments Morning Noon Evening Bedtime  
 aspirin 81 mg chewable tablet Your next dose is: Today, Tomorrow Other:  _________ Dose:  81 mg Take 81 mg by mouth daily. Refills:  0  
     
   
   
   
  
 pneumococcal 13 ezekiel conj dip 0.5 mL Syrg injection Commonly known as:  PREVNAR 13 (PF) Your next dose is: Today, Tomorrow Other:  _________ Dose:  0.5 mL  
0.5 mL by IntraMUSCular route PRIOR TO DISCHARGE for 1 dose. Quantity:  1 Syringe Refills:  0 Where to Get Your Medications Information on where to get these meds will be given to you by the nurse or doctor. ! Ask your nurse or doctor about these medications  
  oxyCODONE IR 5 mg immediate release tablet Discharge Instructions After Hospital Care Plan:  Discharge Instructions Lumbar Decompression Surgery Dr. Sylvain Baker Patient Name: Argelia Yeh Date of procedure: 3/1/2017 Date of discharge: 3/2/2017 Procedure: Procedure(s): 
L5-S1 LEFT MICRODECOMPRESSION   
 
PCP: Brandy Rivera MD 
 
Follow up appointments 
-Follow up with Dr. Sylvain Baker in 2 weeks. Call [phone] to make an appointment as soon as you get home from the hospital. 
 
[address] [place], [place] [address]y: _________________________   phone: ___________________ The agency will contact you to arrange dates/times for visits.   Please call them if you do not hear from them within 24 hours after you are discharged Physical therapy 3 times a week for 3 weeks Nursing-initial assessment and as needed When to call your Orthopaedic Surgeon: 
-Signs of infection-if your incision is red; continues to have drainage; drainage has a foul odor or if you have a persistent fever over 101 degrees for 24 hours 
-Nausea or vomiting, severe headache 
-Loss of bowel or bladder function, inability to urinate 
-Changes in sensation in your arms or legs (numbness, tingling, loss of color) -Increased weakness-greater than before your surgery 
-Severe pain or pain not relieved by medications 
-Signs of a blood clot in your leg-calf pain, tenderness, redness, swelling of lower leg When to call your Primary Care Physician: 
-Concerns about medical conditions such as diabetes, high blood pressure, asthma, congestive heart failure 
-Call if blood sugars are elevated, persistent headache or dizziness, coughing or congestion, constipation or diarrhea, burning with urination, abnormal heart rate When to call 911 and go to the nearest emergency room: 
-Acute onset of chest pain, shortness of breath, difficulty breathing Activity - You are going home a well person, be as active as possible. Your only exercise should be walking. Start with short frequent walks and increase your walking distance each day. 
-Limit the amount of time you sit to 20-30 minute intervals. Sitting for prolonged periods of time will be uncomfortable for you following surgery. 
-Do NOT lift anything over 5 pounds 
-Do NOT do any straining, twisting or bending 
-When you are in bed, you may lay on your back or on either side. Do NOT lie on your stomach Brace 
-If you have a back brace, you should wear your brace at all times when you are out of bed. Do not wear the brace while in bed or showering. 
-Remember to always wear a cotton t-shirt underneath your brace. -Do not bend or twist when your brace is off Diet 
-Resume usual diet; drink plenty of fluids; eat foods high in fiber 
-It is important to have regular bowel movements. Pain medications may cause constipation. You may want to take a stool softener (such as Senokot-S or Colace) to prevent constipation. 
-If constipation occurs, take a laxative (such as Dulcolax tablets, Milk of Magnesia, or a suppository). Laxatives should only be used if the above preventable measures have failed and you still have not had a bowel movement after three days Driving 
-You may not drive or return to work until instructed by your physician. However, you may ride in the car for short periods of time. Incision Care 
-You may take brief showers but do not run the water run directly onto the wound. After showering or bathing, remove the wet dressing and gently blot the wound dry with a soft towel. 
-Do not rub or apply any lotions or ointments to your incision site.  
-Do not soak or scrub your wound 
-Keep a dry dressing (ABD and paper tape) on your incision and have it changed daily for 14 days after surgery; more often if your incision is draining. Have your caregiver wash their hands thoroughly before changing your dressing. 
-You will have absorbable sutures and steristrips (white tape) on your incision. Leave the steristrips on until they fall off. Showering 
-You may shower in approximately 2 days after your surgery.   
-Leave the dressing on during your shower. Do NOT allow the water to run directly onto your dressing. Once you get out of the shower, put on a dry dressing. 
-Reminder- your brace can be removed while showering. Remember to not bend or twist while your brace is off.   
-Do not take a tub bath. Preventing blood clots 
-You have been given T.E.D. stockings to wear. Continue to wear these for 7 days after your discharge. Put them on in the morning and take them off at night. -They are used to increase your circulation and prevent blood clots from forming in your legs 
-T. E.D. stockings can be machine washed, temperature not to exceed 160° F (71°C) and machine dried for 15 to 20 minutes, temperature not to exceed 250° F (121°C). Pain management 
-Take pain medication as prescribed; decrease the amount you use as your pain lessens 
-Do not wait until you are in extreme pain to take your medication. 
-Avoid alcoholic beverages while taking pain medication Pain Medication Safety DO: 
-Read the Medication Guide  
-Take your medicine exactly as prescribed  
-Store your medicine away from children and in a safe place  
-Flush unused medicine down the toilet  
-Call your healthcare provider for medical advice about side effects. You may report side effects to FDA at 1-408-FDA-1591.  
-Please be aware that many medications contain Tylenol. We do not want you to over medicate so please read the information below as a guide. Do not take more than 4 Grams of Tylenol in a 24 hour period. (There are 1000 milligrams in one Gram) Percocet contains 325 mg of Tylenol per tablet (do not take more than 12 tablets in 24 hours) Lortab contains 500 mg of Tylenol per tablet (do not take more than 8 tablets in 24 hours) Norco contains 325 mg of Tylenol per tablet (do not take more than 12 tablets in 24 hours). DO NOT: 
-Do not give your medicine to others  
-Do not take medicine unless it was prescribed for you  
-Do not stop taking your medicine without talking to your healthcare provider -Do not break, chew, crush, dissolve, or inject your medicine. If you cannot  swallow your medicine whole, talk to your healthcare provider. 
-Do not drink alcohol while taking this medicine 
-Do not take anti-inflammatory medications or aspirin unless instructed by your   physician. **You can resume your aspirin on 3/4/17** Discharge Orders None [employer] Announcement We are excited to announce that we are making your provider's discharge notes available to you in [employer]. You will see these notes when they are completed and signed by the physician that discharged you from your recent hospital stay. If you have any questions or concerns about any information you see in [employer], please call the Health Information Department where you were seen or reach out to your Primary Care Provider for more information about your plan of care. Introducing [facility]! Dear Zohra Moser: 
Thank you for requesting a [employer] account. Our records indicate that you already have an active [employer] account. You can access your account anytime at [email or web address] Did you know that you can access your hospital and ER discharge instructions at any time in [employer]? You can also review all of your test results from your hospital stay or ER visit. Additional Information If you have questions, please visit the Frequently Asked Questions section of the [employer] website at [email or web address] Remember, [employer] is NOT to be used for urgent needs. For medical emergencies, dial 911. Now available from your iPhone and Android! General Information Please provide this summary of care documentation to your next provider. Patient Signature:  ____________________________________________________________ Date:  ____________________________________________________________  
  
Kandace Sleight  Provider Signature: ____________________________________________________________ Date:  ____________________________________________________________

## 2017-03-01 NOTE — OP NOTES
[address] [facility] [address]   OP NOTE       Name:  Lourdes Sanchez   MR#:  [id number]   :  1940   Account #:  [de-identified]    Surgery Date:  2017   Date of Adm:  2017       PREOPERATIVE DIAGNOSIS: Spinal stenosis, foraminal stenosis,   L5-S1, left-sided. 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 PROCEDURES PERFORMED:   1. Partial laminectomy, decompression, medial facetectomy,   foraminotomy at L5.   2. Sacral decompression with sacral laminectomy, left-sided, S1.   3. Use of operating microscope. COMPLICATIONS: None. SURGEON: Neftaly Haines MD    ASSISTANT: Vadim Matias NP    FINDINGS: Significant lateral recess foraminal stenosis and conjoined   nerve root at the left side at L5-S1. ESTIMATED BLOOD LOSS: Minimal.      COMPLICATIONS: None. SPECIMENS REMOVED: None. ANESTHESIA: General.    INDICATION FOR PROCEDURE: The patient is a pleasant male with   history of significant lateral recess and foraminal stenosis, left-sided   L5-S1. After discussing risks and benefits of surgery, he elected to   proceed. He understood the risks, including CSF leak, nerve damage,   infection, footdrop, continued pain, being no better, being worse,   bleeding, need for further surgery, continued pain, need for fusion,   instability, fracture and all other risks inherent to the procedure   including surgical site infection. He elected to proceed. DESCRIPTION OF PROCEDURE: The patient was laid prone on the   operating table, prepped and draped in normal fashion using alcohol   and DuraPrep. After general anesthesia was administered, the back   was prepped and draped in normal fashion. Incision was made, soft tissue dissected down to the L5-S1 interspace.    The high-speed bur was used to do a laminotomy on the left side and   laminectomy only on the left side with a high-speed kevin and Kerrison   punches. The L5 nerve root was seen and found, and it looked like in   the foramina, the S1 nerve root was coming off. It was tough to find the   S1 nerve roots since it was underneath the actual spur in the lateral   recess and foramina, so at this time we removed the sacrum and did a   sacral laminectomy of S1 around the pedicle of S1 and then followed   the S1 nerve root up. In this way, we could visualize as we   decompressed the lateral recess. Then the S1 nerve root was   decompressed all the way up the L5 nerve root, where they were   conjoined. At this point, a foraminotomy was done using foraminotomy   Kerrison punches until a large Harman ball could be placed out the   foramina. The Kerrison punch was used to decompress the lateral   recess and then also the S1 nerve root. The L5-S1 nerve root was   decompressed, the conjoined area was decompressed, and the thecal   sac was decompressed. The wounds were copiously irrigated. Meticulous hemostasis was   maintained. The wound was irrigated with Betadine and bacitracin   solution, and 500 mL of vancomycin was placed in the wound for   infection prophylaxis. A deep drain was placed. The fascia was closed   with #1 Vicryl, and skin was closed with 2-0 Vicryl, 3-0 Vicryl and   Dermabond. There was no complication of the procedure.      I, Dr. Derrek Redmond, did the procedure myself with the help of Kyle Peacock NP.        MD SUZANNE Zhang / YAAKOV   D:  03/01/2017   13:15   T:  03/01/2017   15:25   Job #:  [id number]

## 2017-03-01 NOTE — PROGRESS NOTES
Orthopedic Spine Progress Note  Post Op day: Day of Surgery    2017 1:49 PM     Jose Alberto Hardin    Vital Signs:    Patient Vitals for the past 8 hrs:   BP Temp Pulse Resp SpO2 Height Weight   17 1335 135/57 - 83 16 98 % - -   17 1330 127/65 - 86 13 97 % - -   17 1325 135/57 98.5 °F (36.9 °C) 90 11 95 % - -   17 0953 145/63 97.9 °F (36.6 °C) 76 18 97 % 5' 10.5\" (1.791 m) 89.8 kg (198 lb)     Temp (24hrs), Av.2 °F (36.8 °C), Min:97.9 °F (36.6 °C), Max:98.5 °F (36.9 °C)      Intake/Output:   0701 -  1900  In: 1000 [I.V.:1000]  Out: 100        Pain Control:   Pain Assessment  Pain Scale 1: Numeric (0 - 10)  Pain Intensity 1: 0    LAB:    No results for input(s): HCT, HGB, HCTEXT, HGBEXT in the last 72 hours. Lab Results   Component Value Date/Time    Sodium 140 2017 10:33 AM    Potassium 4.3 2017 10:33 AM    Chloride 104 2017 10:33 AM    CO2 30 2017 10:33 AM    Glucose 93 2017 10:33 AM    BUN 17 2017 10:33 AM    Creatinine 0.99 2017 10:33 AM    Calcium 9.0 2017 10:33 AM       Subjective:  Jose Alberto Hardin is a 68 y.o. male s/p a  Procedure(s):  L5-S1 LEFT MICRODECOMPRESSION   Procedure(s):  L5-S1 LEFT MICRODECOMPRESSION. Tolerating diet. Objective: General: alert, cooperative, no distress. Gastrointestinal:  Soft, non-tender. Neurological: Neurovascular exam within normal limits. Sensation stable. Motor: unchanged C5-T1 and L2-S1. Musculoskeletal:  Carly's sign negative in bilateral lower extremities. Calves soft, supple, non-tender upon palpation or with passive stretch. Skin: Incision - clean, dry and intact. No significant erythema or swelling. Dressing: clean, dry, and intact     PT/OT:   Gait:                      Assessment:    s/p Procedure(s):  L5-S1 LEFT MICRODECOMPRESSION    Active Problems:    * No active hospital problems. *       Plan:     1. Continue PT/OT  2.   Continue established methods of pain control  3. VTE Prophylaxes - TEDS &/or SCDs              Discharge To:   Home tomorrow    Signed By: Liu Morgan MD

## 2017-03-02 VITALS
HEART RATE: 75 BPM | OXYGEN SATURATION: 96 % | SYSTOLIC BLOOD PRESSURE: 152 MMHG | RESPIRATION RATE: 16 BRPM | WEIGHT: 198 LBS | HEIGHT: 71 IN | TEMPERATURE: 98.1 F | BODY MASS INDEX: 27.72 KG/M2 | DIASTOLIC BLOOD PRESSURE: 75 MMHG

## 2017-03-02 LAB
ANION GAP BLD CALC-SCNC: 6 MMOL/L (ref 5–15)
BUN SERPL-MCNC: 12 MG/DL (ref 6–20)
BUN/CREAT SERPL: 13 (ref 12–20)
CALCIUM SERPL-MCNC: 8.8 MG/DL (ref 8.5–10.1)
CHLORIDE SERPL-SCNC: 104 MMOL/L (ref 97–108)
CO2 SERPL-SCNC: 29 MMOL/L (ref 21–32)
CREAT SERPL-MCNC: 0.94 MG/DL (ref 0.7–1.3)
GLUCOSE SERPL-MCNC: 119 MG/DL (ref 65–100)
HGB BLD-MCNC: 14.1 G/DL (ref 12.1–17)
POTASSIUM SERPL-SCNC: 3.9 MMOL/L (ref 3.5–5.1)
SODIUM SERPL-SCNC: 139 MMOL/L (ref 136–145)

## 2017-03-02 PROCEDURE — G8987 SELF CARE CURRENT STATUS: HCPCS

## 2017-03-02 PROCEDURE — G8978 MOBILITY CURRENT STATUS: HCPCS

## 2017-03-02 PROCEDURE — G8988 SELF CARE GOAL STATUS: HCPCS

## 2017-03-02 PROCEDURE — 36415 COLL VENOUS BLD VENIPUNCTURE: CPT | Performed by: ORTHOPAEDIC SURGERY

## 2017-03-02 PROCEDURE — G8980 MOBILITY D/C STATUS: HCPCS

## 2017-03-02 PROCEDURE — 85018 HEMOGLOBIN: CPT | Performed by: ORTHOPAEDIC SURGERY

## 2017-03-02 PROCEDURE — G8989 SELF CARE D/C STATUS: HCPCS

## 2017-03-02 PROCEDURE — 97161 PT EVAL LOW COMPLEX 20 MIN: CPT

## 2017-03-02 PROCEDURE — 80048 BASIC METABOLIC PNL TOTAL CA: CPT | Performed by: ORTHOPAEDIC SURGERY

## 2017-03-02 PROCEDURE — 74011250637 HC RX REV CODE- 250/637: Performed by: ORTHOPAEDIC SURGERY

## 2017-03-02 PROCEDURE — 97116 GAIT TRAINING THERAPY: CPT

## 2017-03-02 PROCEDURE — 99218 HC RM OBSERVATION: CPT

## 2017-03-02 PROCEDURE — 77030012893

## 2017-03-02 PROCEDURE — G8979 MOBILITY GOAL STATUS: HCPCS

## 2017-03-02 PROCEDURE — 97165 OT EVAL LOW COMPLEX 30 MIN: CPT

## 2017-03-02 RX ORDER — OXYCODONE HYDROCHLORIDE 5 MG/1
5-10 TABLET ORAL
Qty: 60 TAB | Refills: 0 | Status: SHIPPED | OUTPATIENT
Start: 2017-03-02 | End: 2017-05-22 | Stop reason: ALTCHOICE

## 2017-03-02 RX ADMIN — LEVOTHYROXINE SODIUM 50 MCG: 50 TABLET ORAL at 07:05

## 2017-03-02 RX ADMIN — DOCUSATE SODIUM AND SENNOSIDES 1 TABLET: 8.6; 5 TABLET, FILM COATED ORAL at 08:23

## 2017-03-02 RX ADMIN — POLYETHYLENE GLYCOL 3350 17 G: 17 POWDER, FOR SOLUTION ORAL at 08:23

## 2017-03-02 RX ADMIN — ACETAMINOPHEN 650 MG: 325 TABLET, FILM COATED ORAL at 04:57

## 2017-03-02 RX ADMIN — GABAPENTIN 300 MG: 300 CAPSULE ORAL at 08:23

## 2017-03-02 NOTE — DISCHARGE INSTRUCTIONS
After Hospital Care Plan:  Discharge Instructions Lumbar Decompression Surgery  Dr. Arteaga [place]     Patient Name: Tresa Charles    Date of procedure: 3/1/2017      Date of discharge: 3/2/2017    Procedure: Procedure(s):  L5-S1 LEFT MICRODECOMPRESSION      PCP: Cory Cortez MD    Follow up appointments  -Follow up with Dr. Arteaga [place] in 2 weeks. Call [phone] to make an appointment as soon as you get home from the hospital.    [address] [place], [place] [address]y: _________________________   phone: ___________________  The agency will contact you to arrange dates/times for visits. Please call them if you do not hear from them within 24 hours after you are discharged  Physical therapy 3 times a week for 3 weeks  Nursing-initial assessment and as needed     When to call your Orthopaedic Surgeon:  -Signs of infection-if your incision is red; continues to have drainage; drainage has a foul odor or if you have a persistent fever over 101 degrees for 24 hours  -Nausea or vomiting, severe headache  -Loss of bowel or bladder function, inability to urinate  -Changes in sensation in your arms or legs (numbness, tingling, loss of color)  -Increased weakness-greater than before your surgery  -Severe pain or pain not relieved by medications  -Signs of a blood clot in your leg-calf pain, tenderness, redness, swelling of lower leg    When to call your Primary Care Physician:  -Concerns about medical conditions such as diabetes, high blood pressure, asthma, congestive heart failure  -Call if blood sugars are elevated, persistent headache or dizziness, coughing or congestion, constipation or diarrhea, burning with urination, abnormal heart rate    When to call 911 and go to the nearest emergency room:  -Acute onset of chest pain, shortness of breath, difficulty breathing    Activity  - You are going home a well person, be as active as possible. Your only exercise should be walking.   Start with short frequent walks and increase your walking distance each day.  -Limit the amount of time you sit to 20-30 minute intervals. Sitting for prolonged periods of time will be uncomfortable for you following surgery.  -Do NOT lift anything over 5 pounds  -Do NOT do any straining, twisting or bending  -When you are in bed, you may lay on your back or on either side. Do NOT lie on your stomach    Brace  -If you have a back brace, you should wear your brace at all times when you are out of bed. Do not wear the brace while in bed or showering.  -Remember to always wear a cotton t-shirt underneath your brace.  -Do not bend or twist when your brace is off    Diet  -Resume usual diet; drink plenty of fluids; eat foods high in fiber  -It is important to have regular bowel movements. Pain medications may cause constipation. You may want to take a stool softener (such as Senokot-S or Colace) to prevent constipation.  -If constipation occurs, take a laxative (such as Dulcolax tablets, Milk of Magnesia, or a suppository). Laxatives should only be used if the above preventable measures have failed and you still have not had a bowel movement after three days    Driving  -You may not drive or return to work until instructed by your physician. However, you may ride in the car for short periods of time. Incision Care  -You may take brief showers but do not run the water run directly onto the wound. After showering or bathing, remove the wet dressing and gently blot the wound dry with a soft towel.  -Do not rub or apply any lotions or ointments to your incision site.   -Do not soak or scrub your wound  -Keep a dry dressing (ABD and paper tape) on your incision and have it changed daily for 14 days after surgery; more often if your incision is draining. Have your caregiver wash their hands thoroughly before changing your dressing.  -You will have absorbable sutures and steristrips (white tape) on your incision.   Leave the steristrips on until they fall off.    Showering  -You may shower in approximately 2 days after your surgery.    -Leave the dressing on during your shower. Do NOT allow the water to run directly onto your dressing. Once you get out of the shower, put on a dry dressing.  -Reminder- your brace can be removed while showering. Remember to not bend or twist while your brace is off.    -Do not take a tub bath. Preventing blood clots  -You have been given T.E.D. stockings to wear. Continue to wear these for 7 days after your discharge. Put them on in the morning and take them off at night.    -They are used to increase your circulation and prevent blood clots from forming in your legs  -T. E.D. stockings can be machine washed, temperature not to exceed 160° F (71°C) and machine dried for 15 to 20 minutes, temperature not to exceed 250° F (121°C). Pain management  -Take pain medication as prescribed; decrease the amount you use as your pain lessens  -Do not wait until you are in extreme pain to take your medication.  -Avoid alcoholic beverages while taking pain medication    Pain Medication Safety  DO:  -Read the Medication Guide   -Take your medicine exactly as prescribed   -Store your medicine away from children and in a safe place   -Flush unused medicine down the toilet   -Call your healthcare provider for medical advice about side effects. You may report side effects to FDA at 7-115-FDA-0753.   -Please be aware that many medications contain Tylenol. We do not want you to over medicate so please read the information below as a guide. Do not take more than 4 Grams of Tylenol in a 24 hour period.   (There are 1000 milligrams in one Gram) Percocet contains 325 mg of Tylenol per tablet (do not take more than 12 tablets in 24 hours)  Lortab contains 500 mg of Tylenol per tablet (do not take more than 8 tablets in 24 hours)  Norco contains 325 mg of Tylenol per tablet (do not take more than 12 tablets in 24 hours). DO NOT:  -Do not give your medicine to others   -Do not take medicine unless it was prescribed for you   -Do not stop taking your medicine without talking to your healthcare provider   -Do not break, chew, crush, dissolve, or inject your medicine. If you cannot  swallow your medicine whole, talk to your healthcare provider.  -Do not drink alcohol while taking this medicine  -Do not take anti-inflammatory medications or aspirin unless instructed by your   physician.     **You can resume your aspirin on 3/4/17**

## 2017-03-02 NOTE — PROGRESS NOTES
physical Therapy EVALUATION/DISCHARGE  Patient: Roland Flores (64 y.o. male)  Date: 3/2/2017  Primary Diagnosis: STENOSIS  STENOSIS  Procedure(s) (LRB):  L5-S1 LEFT MICRODECOMPRESSION (Left) 1 Day Post-Op   Precautions:   Back precautions, corset back brace, limit sitting times to 30-45 minute periods  ASSESSMENT :  Based on the objective data described below, the patient presents with safe, independent functional mobility and balance following back surgery yesterday. Pt states he has no pain, was able to demonstrate modified independence with bed mobility, log roll transfers, and gait without need for ambulatory devices, demonstrating slow, stable gait over level surfaces (300') and steps (4). He required minimum assist to don his back brace; wife observed and can assist if needed at home. Pt was instructed in back, fall, general safety precautions, and car transfers, with his wife present. He is safe to return home today with wife's assistance. He does not require any DME or follow up PT services. Further skilled physical therapy is not indicated at this time. PLAN :  Discharge Recommendations: none  Further Equipment Recommendations for Discharge: none     SUBJECTIVE:   Patient rated pain at 0. Pt sitting up in chair without his back brace. Pt brought brace in. Worked with pt and his wife on proper method of donning back brace. Instructed in back precautions, fall, general safety precautions, and car transfers.     OBJECTIVE DATA SUMMARY:   HISTORY:    Past Medical History:   Diagnosis Date    Arrhythmia     r/t caffeine    Arthritis     Basal cell carcinoma     BPH (benign prostatic hyperplasia)     CAD (coronary artery disease)     s/p PTCA '92    Calculus of kidney     Chronic pain     BACK    Elevated PSA     Hypercholesterolemia     Lumbar foraminal stenosis     Melanoma ([address]     MVP (mitral valve prolapse)     Other ill-defined conditions(799.89)     Prostatitis    Prediabetes     Seasonal allergies     Squamous cell carcinoma (HCC)     Systolic murmur     Thyroid disease     HYPOTHYROID    Vision impairment     R EYE, BLOOD CLOT ON RETINA     Past Surgical History:   Procedure Laterality Date    ENDOSCOPY, COLON, DIAGNOSTIC      due 12    HX CHOLECYSTECTOMY  1970    HX HEART CATHETERIZATION  1992    WITH BALLOON    HX LITHOTRIPSY      x2    HX ORTHOPAEDIC      elbow - fx right arm age 3 yrs     Prior Level of Function/Home Situation: independent       Home Situation  Home Environment: Private residence  # Steps to Enter: 4  Rails to Enter: Yes  One/Two Story Residence: Two story, live on 1st floor  # of Interior Steps: 15  Lift Chair Available: No  Living Alone: No  Support Systems: Spouse/Significant Other/Partner  Patient Expects to be Discharged to[de-identified] Private residence  Current DME Used/Available at Home: Brace/Splint (reacher)  Tub or Shower Type: Shower    EXAMINATION/PRESENTATION/DECISION MAKING:   Critical Behavior:  Neurologic State: Alert, Appropriate for age  Orientation Level: Oriented X4  Cognition: Follows commands, Appropriate safety awareness, Appropriate for age attention/concentration, Appropriate decision making  Safety/Judgement: Awareness of environment, Fall prevention, Good awareness of safety precautions, Home safety, Insight into deficits    Strength:    Strength: Within functional limits                    Tone & Sensation:   Tone: Normal              Sensation: Intact              Range Of Motion:  AROM: Within functional limits           PROM: Within functional limits           Coordination:  Coordination: Within functional limits    Functional Mobility:  Bed Mobility:  Rolling: Independent  Supine to Sit: Modified independent  Sit to Supine: Modified independent  Scooting: Independent  Transfers:  Sit to Stand: Modified independent  Stand to Sit: Modified independent                  Donned back corset brace with minimum assist x 1.  Instructed in proper donning of brace. Wife present to observe. Balance:   Sitting: Intact  Standing: Intact  Ambulation/Gait Training:  Distance (ft): 300 Feet (ft)  Assistive Device: Brace/Splint;Gait belt  Ambulation - Level of Assistance: Contact guard assistance;Assist x1        Gait Abnormalities: Decreased step clearance  Right Side Weight Bearing: Full  Left Side Weight Bearing: Full  Base of Support: Narrowed     Speed/Gaby: Slow  Step Length: Right shortened;Left shortened                         Stairs:  Number of Stairs Trained: 4  Stairs - Level of Assistance: Contact guard assistance;Assist X1   Rail Use: Left          Functional Measure:  Tinetti test:    Sitting Balance: 1  Arises: 1  Attempts to Rise: 2  Immediate Standing Balance: 2  Standing Balance: 2  Nudged: 2  Eyes Closed: 1  Turn 360 Degrees - Continuous/Discontinuous: 1  Turn 360 Degrees - Steady/Unsteady: 1  Sitting Down: 1  Balance Score: 14  Indication of Gait: 1  R Step Length/Height: 1  L Step Length/Height: 1  R Foot Clearance: 1  L Foot Clearance: 1  Step Symmetry: 1  Step Continuity: 1  Path: 2  Trunk: 2  Walking Time: 1  Gait Score: 12  Total Score: 26       Tinetti Test and G-code impairment scale:  Percentage of Impairment CH    0%   CI    1-19% CJ    20-39% CK    40-59% CL    60-79% CM    80-99% CN     100%   Tinetti  Score 0-28 28 23-27 17-22 12-16 6-11 1-5 0       Tinetti Tool Score Risk of Falls  <19 = High Fall Risk  19-24 = Moderate Fall Risk  25-28 = Low Fall Risk  Tinetti ME. Performance-Oriented Assessment of Mobility Problems in Elderly Patients. [place] 66; [id number].  (Scoring Description: PT Bulletin Feb. 10, 1993)    Older adults: Familia Yin et al, 2009; n = 1000 [facility] elderly evaluated with ABC, ASPEN, ADL, and IADL)  · Mean ASPEN score for males aged 69-68 years = 26.21(3.40)  · Mean ASPEN score for females age 69-68 years = 25.16(4.30)  · Mean ASPEN score for males over 80 years = 23.29(6.02)  · Mean ASPEN score for females over 80 years = 17.20(8.32)       G codes: In compliance with CMSs Claims Based Outcome Reporting, the following G-code set was chosen for this patient based on their primary functional limitation being treated: The outcome measure chosen to determine the severity of the functional limitation was the Tinetti Balance Assessment Tool with a score of 26/28 which was correlated with the impairment scale. ? Mobility - Walking and Moving Around:     - CURRENT STATUS: CI - 1%-19% impaired, limited or restricted    - GOAL STATUS: CI - 1%-19% impaired, limited or restricted    - D/C STATUS:  CI - 1%-19% impaired, limited or restricted     Pain:  Pain Scale 1: Numeric (0 - 10)  Pain Intensity 1: 0     Activity Tolerance:   Good - vitals WNL, tolerated activity well, without complaints of pain. Please refer to the flowsheet for vital signs taken during this treatment. After treatment:   [x]   Patient left in no apparent distress sitting up in chair  []   Patient left in no apparent distress in bed  [x]   Call bell left within reach  [x]   Nursing notified  [x]   Wife present  []   Bed alarm activated    COMMUNICATION/EDUCATION:   Communication/Collaboration:  [x]   Fall prevention education was provided and the patient/caregiver indicated understanding. [x]   Patient/family have participated as able and agree with findings and recommendations. []   Patient is unable to participate in plan of care at this time.   Findings and recommendations were discussed with: Registered Nurse    Thank you for this referral.  Krish Romero, PT   Time Calculation: 30 mins

## 2017-03-02 NOTE — PROGRESS NOTES
Discharge instructions provide for pt and his wife, verbalizing understanding. Escorted off unit by volunteer via w/c, carrying all belongings.

## 2017-03-02 NOTE — PROGRESS NOTES
Occupational Therapy EVALUATION/discharge  Patient: Rebeka Beck (89 y.o. male)  Date: 3/2/2017  Primary Diagnosis: STENOSIS  STENOSIS  Procedure(s) (LRB):  L5-S1 LEFT MICRODECOMPRESSION (Left) 1 Day Post-Op   Precautions: quick draw brace,  Back    ASSESSMENT:   Based on the objective data described below, the patient presents with overall IND to [place] with self-care tasks s/p L5-S1 left mircodecompression POD 1. Pt edcuated on back precautions, sitting restriction, brace wear and log roll technique. Pt also provided education and training on AE (reacher, LH bath sponge) and home safety/modification. Pt will have wife assistance at discharge. Provided printed handout of education at bedside. Pt with no further acute OT needs; cleared to return home. Further skilled acute occupational therapy is not indicated at this time. Discharge Recommendations: None  Further Equipment Recommendations for Discharge: reacher, LH bath sponge      SUBJECTIVE:   Patient stated I need your reacher; mine broke.     OBJECTIVE DATA SUMMARY:   HISTORY:   Past Medical History:   Diagnosis Date    Arrhythmia     r/t caffeine    Arthritis     Basal cell carcinoma     BPH (benign prostatic hyperplasia)     CAD (coronary artery disease)     s/p PTCA '92    Calculus of kidney     Chronic pain     BACK    Elevated PSA     Hypercholesterolemia     Lumbar foraminal stenosis     [address] [address]     MVP (mitral valve prolapse)     Other ill-defined conditions(799.89)     Prostatitis    Prediabetes     Seasonal allergies     Squamous cell carcinoma (HCC)     Systolic murmur     Thyroid disease     HYPOTHYROID    Vision impairment     R EYE, BLOOD CLOT ON RETINA     Past Surgical History:   Procedure Laterality Date    ENDOSCOPY, COLON, DIAGNOSTIC      due 12    HX CHOLECYSTECTOMY  1970    HX HEART CATHETERIZATION  1992    WITH BALLOON    HX LITHOTRIPSY      x2    HX ORTHOPAEDIC      elbow - fx right arm age 3 yrs Prior Level of Function/Home Situation: Pt IND, lives with wife  Expanded or extensive additional review of patient history:     Home Situation  Home Environment: Private residence  # Steps to Enter: 4  One/Two Story Residence: Two story, live on 1st floor  # of Interior Steps: 15  Lift Chair Available: No  Living Alone: No  Support Systems: Spouse/Significant Other/Partner  Patient Expects to be Discharged to[de-identified] Private residence  Current DME Used/Available at Home: Brace/Splint (reacher)  Tub or Shower Type: Shower  [x]  Right hand dominant   []  Left hand dominant    EXAMINATION OF PERFORMANCE DEFICITS:  Cognitive/Behavioral Status:  Neurologic State: Alert; Appropriate for age  Orientation Level: Oriented X4  Cognition: Follows commands; Appropriate safety awareness; Appropriate for age attention/concentration; Appropriate decision making  Perception: Appears intact  Perseveration: No perseveration noted  Safety/Judgement: Awareness of environment; Fall prevention;Good awareness of safety precautions; Home safety; Insight into deficits  Skin: appears intact  Edema: none noted in BUEs  Vision/Perceptual:    Tracking: Able to track stimulus in all quadrants w/o difficulty                           Corrective Lenses: Glasses  Range of Motion:    AROM: Within functional limits  PROM: Within functional limits                    Strength:    Strength: Within functional limits              Coordination:  Coordination: Within functional limits  Fine Motor Skills-Upper: Left Intact; Right Intact    Gross Motor Skills-Upper: Left Intact; Right Intact  Tone & Sensation:    Tone: Normal  Sensation: Intact                      Balance:  Sitting: Intact  Standing: Intact    Functional Mobility and Transfers for ADLs:  Bed Mobility:  Supine to Sit: Modified independent  Sit to Supine: Modified independent    Transfers:  Sit to Stand: Modified independent  Stand to Sit: Modified independent  Toilet Transfer : Modified independent    ADL Assessment:  Feeding: Independent    Oral Facial Hygiene/Grooming: Independent    Bathing: Setup;Supervision; Additional time; Adaptive equipment    Upper Body Dressing: Modified independent    Lower Body Dressing: Setup;Supervision; Adaptive equipment; Additional time    Toileting: Modified independent; Adaptive equipment; Additional time                ADL Intervention and task modifications:                                     Cognitive Retraining  Safety/Judgement: Awareness of environment; Fall prevention;Good awareness of safety precautions; Home safety; Insight into deficits       Functional Measure:  Barthel Index:    Bathin  Bladder: 10  Bowels: 10  Groomin  Dressing: 10  Feeding: 10  Mobility: 15  Stairs: 10  Toilet Use: 10  Transfer (Bed to Chair and Back): 15  Total: 100       Barthel and G-code impairment scale:  Percentage of impairment CH  0% CI  1-19% CJ  20-39% CK  40-59% CL  60-79% CM  80-99% CN  100%   Barthel Score 0-100 100 99-80 79-60 59-40 20-39 1-19   0   Barthel Score 0-20 20 17-19 13-16 9-12 5-8 1-4 0      The Barthel ADL Index: Guidelines  1. The index should be used as a record of what a patient does, not as a record of what a patient could do. 2. The main aim is to establish degree of independence from any help, physical or verbal, however minor and for whatever reason. 3. The need for supervision renders the patient not independent. 4. A patient's performance should be established using the best available evidence. Asking the patient, friends/relatives and nurses are the usual sources, but direct observation and common sense are also important. However direct testing is not needed. 5. Usually the patient's performance over the preceding 24-48 hours is important, but occasionally longer periods will be relevant. 6. Middle categories imply that the patient supplies over 50 per cent of the effort. 7. Use of aids to be independent is allowed.     Medhat Patel., Barthel, D.W. (1965). Functional evaluation: the Barthel Index. [address] [facility] (14)2. LOS Fonseca, Roberto Telles., [place], [address] (1999). Measuring the change indisability after inpatient rehabilitation; comparison of the responsiveness of the Barthel Index and Functional Sharp Measure. Journal of Neurology, Neurosurgery, and Psychiatry, 66(4), 394-753. BRETT Arredondo, YENY Savage, & Ellen Smith M.A. (2004.) Assessment of post-stroke quality of life in cost-effectiveness studies: The usefulness of the Barthel Index and the EuroQoL-5D. Quality of Life Research, 13, 118-86         G codes: In compliance with CMSs Claims Based Outcome Reporting, the following G-code set was chosen for this patient based on their primary functional limitation being treated: The outcome measure chosen to determine the severity of the functional limitation was the barthel Index with a score of 100/100 which was correlated with the impairment scale. ? Self Care:     - CURRENT STATUS: CH - 0% impaired, limited or restricted    - GOAL STATUS: CH - 0% impaired, limited or restricted    - D/C STATUS:  CH - 0% impaired, limited or restricted     Occupational Therapy Evaluation Charge Determination   History Examination Decision-Making   LOW Complexity : Brief history review  LOW Complexity : 1-3 performance deficits relating to physical, cognitive , or psychosocial skils that result in activity limitations and / or participation restrictions  LOW Complexity : No comorbidities that affect functional and no verbal or physical assistance needed to complete eval tasks       Based on the above components, the patient evaluation is determined to be of the following complexity level: LOW   Pain:  Pain Scale 1: Numeric (0 - 10)  Pain Intensity 1: 0              Activity Tolerance:   VSS    Please refer to the flowsheet for vital signs taken during this treatment.   After treatment:   []  Patient left in no apparent distress sitting up in chair  [x]  Patient left in no apparent distress in bed  [x]  Call bell left within reach  [x]  Nursing notified  []  Caregiver present  []  Bed alarm activated    COMMUNICATION/EDUCATION:   Communication/Collaboration:  [x]      Home safety education was provided and the patient/caregiver indicated understanding. [x]      Patient/family have participated as able and agree with findings and recommendations. []      Patient is unable to participate in plan of care at this time.   Findings and recommendations were discussed with: Physical Therapist and Registered Nurse    Kassie Yañez OT  Time Calculation: 10 mins

## 2017-03-02 NOTE — PROGRESS NOTES
Care Management Interventions  PCP Verified by CM: Yes  Mode of Transport at Discharge: Other (see comment) (family)  Transition of Care Consult (CM Consult): Discharge Planning  MyChart Signup: No  Discharge Durable Medical Equipment: No  Physical Therapy Consult: Yes  Occupational Therapy Consult: Yes  Current Support Network: Lives with Spouse (Tray Cruz, [phone])  Confirm Follow Up Transport: Family  Plan discussed with Pt/Family/Caregiver: Yes  Discharge Location  Discharge Placement: Home    Chart reviewed for transitions of care, discussed patient during rounds. Patient was admitted for L5-S1 LEFT MICRODECOMPRESSION. He has been evaluated by therapy and they have no recommendations for home care. Cm met with patient to explain role and offer support. Patient confirmed that his wife will be providing transportation home. No other CM needs at this time.   [employer] , [place]

## 2017-03-02 NOTE — DISCHARGE SUMMARY
[address] [place] [address]   [address]  [phone]     Discharge Summary       PATIENT ID: Yaron Thompson  MRN: [id number]   YOB: 1940    DATE OF ADMISSION: 3/1/2017  8:47 AM    DATE OF DISCHARGE: 3/2/2017   PRIMARY CARE PROVIDER: Ozzie Velázquez MD     CONSULTATIONS: None    PROCEDURES/SURGERIES: Procedure(s):  L5-S1 LEFT MICRODECOMPRESSION    History of Present Illness:  Yaron Thompson is a pleasant 68 y.o. male with a prior medical history significant for coronary artery disease, mitral valve prolapse, hypercholesterolemia, BPH, hypothyroidism, basal cell carcinoma, and chronic lumbar stenosis. He presented to Dr. Che Casillas office with complaints of severe and worsening low back pain with radiation to his left leg. His imaging revealed significant lateral recess and foraminal stenosis, left-sided L5-S1. After failing conservative therapy and a discussion of the risks, benefits, alternatives, perioperative course, and potential complications of surgery, he consented to undergo a Procedure(s):  L5-S1 LEFT MICRODECOMPRESSION. Hospital Course:  Yaron Thompson tolerated the procedure well. He was transferred  to the recovery room in stable condition. After a brief stay the patient was then transferred to the Spinal Surgery Unit at [address] [place] [address].  On postoperative day #1, the dressing was clean and dry, he was neurovascularly intact. The patient was afebrile and vital signs were stable. Calves were soft and non-tender bilaterally. The patient was tolerating a regular diet, voiding, and making progress with physical therapy. His drain had 120 cc output overnight and was placed to gravity on the morning of postoperative day #1. His drain was monitored throughout the day and had 20 cc drained in an eight hour shift and his drain was subsequently removed.  Incision c/d/i without surrounding erythema or edema. He reported great improvement in his symptoms from pre-op. He denied any numbness, tingling or weakness. Hemoglobin prior to discharge were   Lab Results   Component Value Date/Time    HGB 14.1 03/02/2017 04:45 AM        Ko Sawyer made satisfactory progress with physical therapy and was discharged to Home in stable condition on postoperative day 1. He was provided with routine postoperative instructions and advised to follow up with Greta Argueta MD in 2 weeks following discharge from the hospital.        Coni Anna 442 / PLAN:      1.  1 Day Post-Op Procedure(s):  L5-S1 LEFT MICRODECOMPRESSION   - Pt made satisfactory progress with PT/OT, cleared for discharge home without any home health or DME needs   - Pain managed with tylenol.    - Tolerating diet without complaints of n/v   - VTE prophylaxis: TEDs & SCDs. Instructed patient to continue TEDs for 1 week following discharge from hospital. Encouraged mobility   -Encouraged Incentive spirometer    2. Coronary Artery Disease   - s/p PTCA in 1992   - Followed by Dr. Felicitas Dukes   - He has MVP as well Last echo showed EF 55-60% and mildly dilated left atrium.    - Continue statin. Can resume aspirin on POD#3    3. BPH    -home finasteride restarted. He is voiding without difficulty.      4. Hypothyroidism   - Continue synthroid         FOLLOW UP APPOINTMENTS:    Follow-up Information     Follow up With Details Comments [address] [place] [address] Janusz Leon MD On 3/14/2017 For discharge follow up at 11:00AM  [address]  [address] [facility]  [phone]             ADDITIONAL CARE RECOMMENDATIONS:      When to call your Orthopaedic Surgeon:  -Signs of infection-if your incision is red; continues to have drainage; drainage has a foul odor or if you have a persistent fever over 101 degrees for 24 hours  -Nausea or vomiting, severe headache  -Loss of bowel or bladder function, inability to urinate  -Changes in sensation in your arms or legs (numbness, tingling, loss of color)  -Increased weakness-greater than before your surgery  -Severe pain or pain not relieved by medications  -Signs of a blood clot in your leg-calf pain, tenderness, redness, swelling of lower leg    When to call your Primary Care Physician:  -Concerns about medical conditions such as diabetes, high blood pressure, asthma, congestive heart failure  -Call if blood sugars are elevated, persistent headache or dizziness, coughing or congestion, constipation or diarrhea, burning with urination, abnormal heart rate    When to call 911 and go to the nearest emergency room:  -Acute onset of chest pain, shortness of breath, difficulty breathing    Activity  - You are going home a well person, be as active as possible. Your only exercise should be walking. Start with short frequent walks and increase your walking distance each day.  -Limit the amount of time you sit to 20-30 minute intervals. Sitting for prolonged periods of time will be uncomfortable for you following surgery.  -Do NOT lift anything over 5 pounds  -Do NOT do any straining, twisting or bending  -When you are in bed, you may lay on your back or on either side. Do NOT lie on your stomach    Brace  -If you have a back brace, you should wear your brace at all times when you are out of bed. Do not wear the brace while in bed or showering.  -Remember to always wear a cotton t-shirt underneath your brace.  -Do not bend or twist when your brace is off    Diet  -Resume usual diet; drink plenty of fluids; eat foods high in fiber  -It is important to have regular bowel movements. Pain medications may cause constipation. You may want to take a stool softener (such as Senokot-S or Colace) to prevent constipation.  -If constipation occurs, take a laxative (such as Dulcolax tablets, Milk of Magnesia, or a suppository).   Laxatives should only be used if the above preventable measures have failed and you still have not had a bowel movement after three days    Driving  -You may not drive or return to work until instructed by your physician. However, you may ride in the car for short periods of time. Incision Care  -You may take brief showers but do not run the water run directly onto the wound. After showering or bathing, remove the wet dressing and gently blot the wound dry with a soft towel.  -Do not rub or apply any lotions or ointments to your incision site.   -Do not soak or scrub your wound  -Keep a dry dressing (ABD and paper tape) on your incision and have it changed daily for 14 days after surgery; more often if your incision is draining. Have your caregiver wash their hands thoroughly before changing your dressing.  -You will have absorbable sutures and steristrips (white tape) on your incision. Leave the steristrips on until they fall off. Showering  -You may shower in approximately 2 days after your surgery.    -Leave the dressing on during your shower. Do NOT allow the water to run directly onto your dressing. Once you get out of the shower, put on a dry dressing.  -Reminder- your brace can be removed while showering. Remember to not bend or twist while your brace is off.    -Do not take a tub bath. Preventing blood clots  -You have been given T.E.D. stockings to wear. Continue to wear these for 7 days after your discharge. Put them on in the morning and take them off at night.    -They are used to increase your circulation and prevent blood clots from forming in your legs  -T. E.D. stockings can be machine washed, temperature not to exceed 160° F (71°C) and machine dried for 15 to 20 minutes, temperature not to exceed 250° F (121°C).     Pain management  -Take pain medication as prescribed; decrease the amount you use as your pain lessens  -Do not wait until you are in extreme pain to take your medication.  -Avoid alcoholic beverages while taking pain medication    Pain Medication Safety  DO:  -Read the Medication Guide   -Take your medicine exactly as prescribed   -Store your medicine away from children and in a safe place   -Flush unused medicine down the toilet   -Call your healthcare provider for medical advice about side effects. You may report side effects to FDA at 0-356-FDA-6376.   -Please be aware that many medications contain Tylenol. We do not want you to over medicate so please read the information below as a guide. Do not take more than 4 Grams of Tylenol in a 24 hour period. (There are 1000 milligrams in one Gram)                                                                                                                                                                                                                                                                                                                                                                  Percocet contains 325 mg of Tylenol per tablet (do not take more than 12 tablets in 24 hours)  Lortab contains 500 mg of Tylenol per tablet (do not take more than 8 tablets in 24 hours)  Norco contains 325 mg of Tylenol per tablet (do not take more than 12 tablets in 24 hours). DO NOT:  -Do not give your medicine to others   -Do not take medicine unless it was prescribed for you   -Do not stop taking your medicine without talking to your healthcare provider   -Do not break, chew, crush, dissolve, or inject your medicine. If you cannot  swallow your medicine whole, talk to your healthcare provider.  -Do not drink alcohol while taking this medicine      DISCHARGE MEDICATIONS:  Current Discharge Medication List      START taking these medications    Details   oxyCODONE IR (ROXICODONE) 5 mg immediate release tablet Take 1-2 Tabs by mouth every three (3) hours as needed.  Max Daily Amount: 80 mg.  Qty: 60 Tab, Refills: 0         CONTINUE these medications which have NOT CHANGED    Details   gabapentin (NEURONTIN) 300 mg capsule Take 300 mg by mouth daily as needed. folic acid-vit R7-DQT Y92 (FOLBIC) 2.5-25-2 mg tablet Take 1 Tab by mouth daily. Qty: 90 Tab, Refills: 2      amLODIPine (NORVASC) 5 mg tablet Take 1 Tab by mouth daily. Qty: 90 Tab, Refills: 2      atorvastatin (LIPITOR) 40 mg tablet Take 1 Tab by mouth daily. Qty: 90 Tab, Refills: 2      aspirin 81 mg chewable tablet Take 81 mg by mouth daily. levothyroxine (SYNTHROID) 50 mcg tablet TAKE 1 TABLET BY MOUTH EVERY DAY BEFORE BREAKFAST  Qty: 90 Tab, Refills: 3      co-enzyme Q-10 (CO Q-10) 100 mg capsule Take 200 mg by mouth daily. VIT A/VIT C/VIT E/ZINC/COPPER (PRESERVISION AREDS PO) Take 1 Cap by mouth two (2) times a day. finasteride (PROSCAR) 5 mg tablet Take 5 mg by mouth every other day. HS      pneumococcal 13 ezekiel conj dip (PREVNAR 13, PF,) 0.5 mL syrg injection 0.5 mL by IntraMUSCular route PRIOR TO DISCHARGE for 1 dose. Qty: 1 Syringe, Refills: 0    Associated Diagnoses: Need for prophylactic vaccination against Streptococcus pneumoniae (pneumococcus)             PHYSICAL EXAMINATION AT DISCHARGE:  General: Pleasant, alert, cooperative, no distress. EENT: EOMI. Anicteric sclerae. Oral mucous moist, oropharynx benign. Resp: CTA bilaterally. No wheezing/rhonchi/rales. No accessory muscle use. CV: Regular rhythm, normal rate, no murmurs, gallops, rubs. No cyanosis or clubbing. No edema appreciated in the extremities. Gastrointestinal:  Soft, non-tender, non-distended. normoactive bowel sounds, no hepatosplenomegaly  Neurological: Follows commands. LOUIS. Speech clear. Sensation intact to light touch. Motor: unchanged C5-T1 and L2-S1. Musculoskeletal:  Calves soft, supple, non-tender upon palpation or with passive stretch. Psych: Good insight. Not anxious nor agitated. Skin: Good turgor. No rashes or lesions. Incision - clean, dry and intact. No significant erythema or swelling.       CHRONIC MEDICAL DIAGNOSES:  Problem List as of 3/2/2017  Date Reviewed: 2/21/2017          Codes Class Noted - Resolved    Lumbar foraminal stenosis (Chronic) ICD-10-CM: M99.83  ICD-9-CM: 724.02  Unknown - Present        Lumbar disc disease ICD-10-CM: M51.9  ICD-9-CM: 722.93  2/22/2017 - Present        Advance directive discussed with patient ICD-10-CM: Z71.89  ICD-9-CM: V65.49  2/21/2017 - Present        Prediabetes ICD-10-CM: R73.03  ICD-9-CM: 790.29  2/3/2017 - Present        BPH with urinary obstruction ICD-10-CM: N40.1, N13.8  ICD-9-CM: 600.01, 599.69  1/9/2016 - Present        Unspecified vitamin D deficiency ICD-10-CM: E55.9  ICD-9-CM: 268.9  6/19/2014 - Present        Hypothyroidism, acquired, autoimmune ICD-10-CM: E03.8  ICD-9-CM: 244.8  6/19/2014 - Present        Skin cancer ICD-10-CM: C44.90  ICD-9-CM: 173.90  5/8/2013 - Present        Macular degeneration ICD-10-CM: H35.30  ICD-9-CM: 362.50  3/19/2012 - Present        Unspecified hearing loss ICD-10-CM: H91.90  ICD-9-CM: 389.9  9/13/2010 - Present        Mixed hyperlipidemia ICD-10-CM: E78.2  ICD-9-CM: 272.2  12/1/2009 - Present        Essential hypertension, benign ICD-10-CM: I10  ICD-9-CM: 401.1  12/1/2009 - Present        Coronary atherosclerosis of native coronary artery ICD-10-CM: I25.10  ICD-9-CM: 414.01  12/1/2009 - Present        Calculus of kidney ICD-10-CM: N20.0  ICD-9-CM: 592.0  12/1/2009 - Present        Insomnia, unspecified ICD-10-CM: G47.00  ICD-9-CM: 780.52  12/1/2009 - Present        RESOLVED: Encounter for long-term (current) use of other medications ICD-10-CM: Z79.899  ICD-9-CM: V58.69  5/8/2013 - 2/3/2017        RESOLVED: IFG (impaired fasting glucose) ICD-10-CM: R73.01  ICD-9-CM: 790.21  3/19/2012 - 2/3/2017        RESOLVED: BPH without obstruction/lower urinary tract symptoms ICD-10-CM: N40.0  ICD-9-CM: 600.00  12/1/2009 - 1/9/2016        RESOLVED: Allergic rhinitis, cause unspecified ICD-10-CM: J30.9  ICD-9-CM: 477.9  12/1/2009 - 2/3/2017              Signed:   Damaris Aleman NP  3/2/2017  1:36 PM

## 2017-03-02 NOTE — PROGRESS NOTES
Orthopedic Spine Progress Note  Post Op day: 1 Day Post-Op    2017 8:11 AM     Guicho Acosta    Vital Signs:    Patient Vitals for the past 8 hrs:   BP Temp Pulse Resp SpO2   17 0441 114/57 98.4 °F (36.9 °C) 70 17 95 %     Temp (24hrs), Av.2 °F (36.8 °C), Min:97.9 °F (36.6 °C), Max:98.5 °F (36.9 °C)      Intake/Output:      1901 -  0700  In: 1250 [P.O.:100; I.V.:1150]  Out: 2375 [Urine:2150; Drains:125]    Pain Control:   Pain Assessment  Pain Scale 1: Numeric (0 - 10)  Pain Intensity 1: 0  Pain Location 1: Back  Pain Orientation 1: Lower, Posterior  Pain Description 1: Aching  Pain Intervention(s) 1: Encouraged PCA    LAB:    Recent Labs      17   0445   HGB  14.1     Lab Results   Component Value Date/Time    Sodium 139 2017 04:45 AM    Potassium 3.9 2017 04:45 AM    Chloride 104 2017 04:45 AM    CO2 29 2017 04:45 AM    Glucose 119 2017 04:45 AM    BUN 12 2017 04:45 AM    Creatinine 0.94 2017 04:45 AM    Calcium 8.8 2017 04:45 AM       Subjective:  Guicho Acosta is a 68 y.o. male s/p a  Procedure(s):  L5-S1 LEFT MICRODECOMPRESSION   Procedure(s):  L5-S1 LEFT MICRODECOMPRESSION. Tolerating diet. Objective: General: alert, cooperative, no distress. Gastrointestinal:  Soft, non-tender. Neurological: Neurovascular exam within normal limits. Sensation stable. Motor: unchanged C5-T1 and L2-S1. Musculoskeletal:  Carly's sign negative in bilateral lower extremities. Calves soft, supple, non-tender upon palpation or with passive stretch. Skin: Incision - clean, dry and intact. No significant erythema or swelling. Dressing: clean, dry, and intact     PT/OT:   Gait:                      Assessment:    s/p Procedure(s):  L5-S1 LEFT MICRODECOMPRESSION    Active Problems:    Lumbar foraminal stenosis ()         Plan:     1. Continue PT/OT  2. Continue established methods of pain control  3.   VTE Prophylaxes - TEDS &/or SCDs   4. Drain to gravity             Discharge To:   Home later today    Signed By: Simon Davis MD

## 2017-03-03 ENCOUNTER — PATIENT OUTREACH (OUTPATIENT)
Dept: INTERNAL MEDICINE CLINIC | Age: 77
End: 2017-03-03

## 2017-03-03 NOTE — PROGRESS NOTES
[address] Follow Up for [facility] Scheduled Admission from 3/1/17 - 3/2/17 for L5-S1 LEFT MICRODECOMPRESSION    Called pt and spoke with wife, on HIPPA. Verified him with 2 identifiers. Says he is doing well. Has only taken 1 percocet since coming home last evening. Denies constipation, BM on 3/2/17. Wound dressed. She was given some supplies to be able to redress incision. Will attempt a shower tomorrow. Is moving around fine. He has a f/u appt with Dr Alexys Falcon on 3/14/17 at 9:20am. The hospital scheduled a f/u appt with Dr Billy Love on 3/14 at 11:30. Pt had a pre-op with Dr Billy Love and had no complications during admission. Pt has a f/u appt scheduled in May as per Dr Richard Bella instructions. NN cancelled his TCM appt for 3/14/17.      Carolina Rowland NP Noted:    History of Present Illness: He presented to Dr. Dayne Sharma office with complaints of severe and worsening low back pain with radiation to his left leg. His imaging revealed significant lateral recess and foraminal stenosis, left-sided L5-S1. After failing conservative therapy and a discussion of the risks, benefits, alternatives, perioperative course, and potential complications of surgery, he consented to undergo a Procedure(s):  L5-S1 LEFT MICRODECOMPRESSION.     Hospital Course: 23[facility] tolerated the procedure well. He was transferred to the recovery room in stable condition. After a brief stay the patient was then transferred to the Spinal Surgery Unit at [address] [place] [address]. On postoperative day #1, the dressing was clean and dry, he was neurovascularly intact. The patient was afebrile and vital signs were stable. Calves were soft and non-tender bilaterally. The patient was tolerating a regular diet, voiding, and making progress with physical therapy. His drain had 120 cc output overnight and was placed to gravity on the morning of postoperative day #1.  His drain was monitored throughout the day and had 20 cc drained in an eight hour shift and his drain was subsequently removed. Incision c/d/i without surrounding erythema or edema. He reported great improvement in his symptoms from pre-op. He denied any numbness, tingling or weakness.     Hemoglobin prior to discharge were         Lab Results   Component Value Date/Time     HGB 14.1 03/02/2017 04:45 AM      Ko Sawyer made satisfactory progress with physical therapy and was discharged to Home in stable condition on postoperative day 1. He was provided with routine postoperative instructions and advised to follow up with Ashely May MD in 2 weeks following discharge from the hospital.         DISCHARGE ASSESSMENT / PLAN:       1. 1 Day Post-Op Procedure(s):  L5-S1 LEFT MICRODECOMPRESSION  - Pt made satisfactory progress with PT/OT, cleared for discharge home without any home health or DME needs  - Pain managed with tylenol.   - Tolerating diet without complaints of n/v  - VTE prophylaxis: TEDs & SCDs. Instructed patient to continue TEDs for 1 week following discharge from hospital. Encouraged mobility  -Encouraged Incentive spirometer     2. Coronary Artery Disease  - s/p PTCA in 1992  - Followed by Dr. Kassi Thompson  - He has MVP as well Last echo showed EF 55-60% and mildly dilated left atrium.   - Continue statin. Can resume aspirin on POD#3     3. BPH   -home finasteride restarted. He is voiding without difficulty.      4.  Hypothyroidism  - Continue synthroid            FOLLOW UP APPOINTMENTS:   Follow-up Information     Follow up With Details Comments Contact Info     Дмитрий Echeverria MD On 3/14/2017 For discharge follow up at 11:00AM  [address] [place], [place] [address]raudel Leon  [address] [address] [address]  [phone]

## 2017-03-06 ENCOUNTER — TELEPHONE (OUTPATIENT)
Dept: INTERNAL MEDICINE CLINIC | Age: 77
End: 2017-03-06

## 2017-03-06 DIAGNOSIS — H91.90 HEARING LOSS, UNSPECIFIED LATERALITY: Primary | ICD-10-CM

## 2017-03-06 NOTE — TELEPHONE ENCOUNTER
Brock Cartwright with [facility] Audiology Dept called requesting order for hearing evaluation to be done by Erin Camacho. Dr Shanita Leung has approved order. Fax to [phone]: Brock Cartwright. Confirmation received.

## 2017-05-22 ENCOUNTER — OFFICE VISIT (OUTPATIENT)
Dept: INTERNAL MEDICINE CLINIC | Age: 77
End: 2017-05-22

## 2017-05-22 VITALS
HEART RATE: 75 BPM | WEIGHT: 206.2 LBS | OXYGEN SATURATION: 98 % | DIASTOLIC BLOOD PRESSURE: 62 MMHG | RESPIRATION RATE: 18 BRPM | TEMPERATURE: 98 F | SYSTOLIC BLOOD PRESSURE: 130 MMHG | HEIGHT: 71 IN | BODY MASS INDEX: 28.87 KG/M2

## 2017-05-22 DIAGNOSIS — I10 ESSENTIAL HYPERTENSION, BENIGN: ICD-10-CM

## 2017-05-22 DIAGNOSIS — R73.01 IFG (IMPAIRED FASTING GLUCOSE): ICD-10-CM

## 2017-05-22 DIAGNOSIS — I25.10 ATHEROSCLEROSIS OF NATIVE CORONARY ARTERY OF NATIVE HEART WITHOUT ANGINA PECTORIS: ICD-10-CM

## 2017-05-22 DIAGNOSIS — R73.03 PREDIABETES: ICD-10-CM

## 2017-05-22 DIAGNOSIS — E06.3 HYPOTHYROIDISM, ACQUIRED, AUTOIMMUNE: ICD-10-CM

## 2017-05-22 DIAGNOSIS — E78.2 MIXED HYPERLIPIDEMIA: Primary | ICD-10-CM

## 2017-05-22 NOTE — MR AVS SNAPSHOT
Visit Information Date & Time Provider Department Dept. Phone Encounter #  
 5/22/2017  8:30 AM Dustin Rubio, [facility] Internal Medicine [phone] [id number] Follow-up Instructions Return in about 6 months (around 11/22/2017) for mwv. Upcoming Health Maintenance Date Due Pneumococcal 65+ High/Highest Risk (2 of 2 - PPSV23) 1/5/2011 MEDICARE YEARLY EXAM 7/7/2016 GLAUCOMA SCREENING Q2Y 4/1/2017 INFLUENZA AGE 9 TO ADULT 8/1/2017 COLONOSCOPY 9/16/2025 DTaP/Tdap/Td series (2 - Td) 11/18/2026 Allergies as of 5/22/2017  Review Complete On: 5/22/2017 By: Dustin Rubio MD  
  
 Severity Noted Reaction Type Reactions Codeine  11/24/2009    Rash Pcn [Penicillins]  11/24/2009    Rash  
 Sulfa (Sulfonamide Antibiotics)  11/24/2009    Other (comments)  
 confusion Current Immunizations  Reviewed on 11/14/2016 Name Date Pneumococcal Vaccine (Unspecified Type) 1/5/2006 Td 11/5/2014 Zoster Vaccine, Live 6/1/2014 Not reviewed this visit You Were Diagnosed With   
  
 Codes Comments Mixed hyperlipidemia    -  Primary ICD-10-CM: G68.3 ICD-9-CM: 272.2 Hypothyroidism, acquired, autoimmune     ICD-10-CM: E03.8 ICD-9-CM: 244.8 Prediabetes     ICD-10-CM: R73.03 
ICD-9-CM: 790.29 IFG (impaired fasting glucose)     ICD-10-CM: R73.01 
ICD-9-CM: 790.21 Essential hypertension, benign     ICD-10-CM: I10 
ICD-9-CM: 401.1 Atherosclerosis of native coronary artery of native heart without angina pectoris     ICD-10-CM: I25.10 ICD-9-CM: 414.01 Vitals BP Pulse Temp Resp Height(growth percentile) Weight(growth percentile) 130/62 (BP 1 Location: Right arm, BP Patient Position: Sitting) 75 98 °F (36.7 °C) (Oral) 18 5' 10.5\" (1.791 m) 206 lb 3.2 oz (93.5 kg) SpO2 BMI Smoking Status 98% 29.17 kg/m2 Never Smoker BMI and BSA Data  Body Mass Index Body Surface Area  
 29.17 kg/m 2 2.16 m 2  
 Preferred Pharmacy Pharmacy Name Phone [id number] [place] [facility] [place] [address] [phone] Your Updated Medication List  
  
   
This list is accurate as of: 5/22/17  9:12 AM.  Always use your most recent med list. amLODIPine 5 mg tablet Commonly known as:  Nataliangozi Maddox Take 1 Tab by mouth daily. aspirin 81 mg chewable tablet Take 81 mg by mouth daily. atorvastatin 40 mg tablet Commonly known as:  LIPITOR Take 1 Tab by mouth daily. co-enzyme Q-10 100 mg capsule Commonly known as:  CO Q-10 Take 200 mg by mouth daily. finasteride 5 mg tablet Commonly known as:  PROSCAR Take 5 mg by mouth every other day. HS  
  
 folic acid-vit P4-GDY N50 2.5-25-2 mg tablet Commonly known as:  FOLBIC Take 1 Tab by mouth daily. levothyroxine 50 mcg tablet Commonly known as:  SYNTHROID  
TAKE 1 TABLET BY MOUTH EVERY DAY BEFORE BREAKFAST pneumococcal 13 ezekiel conj dip 0.5 mL Syrg injection Commonly known as:  PREVNAR 13 (PF)  
0.5 mL by IntraMUSCular route PRIOR TO DISCHARGE for 1 dose. PRESERVISION AREDS PO Take 1 Cap by mouth two (2) times a day. Follow-up Instructions Return in about 6 months (around 11/22/2017) for mwv. Introducing [facility]! Dear Michael Sheikh: 
Thank you for requesting a [employer] account. Our records indicate that you already have an active [employer] account. You can access your account anytime at [email or web address] Did you know that you can access your hospital and ER discharge instructions at any time in [employer]? You can also review all of your test results from your hospital stay or ER visit. Additional Information If you have questions, please visit the Frequently Asked Questions section of the [employer] website at [email or web address] Remember, W[employer] is NOT to be used for urgent needs.  For medical emergencies, dial 911. Now available from your iPhone and Android! Please provide this summary of care documentation to your next provider. Your primary care clinician is listed as SRINIVAS KEBEDE. If you have any questions after today's visit, please call [phone].

## 2017-05-22 NOTE — PROGRESS NOTES
HISTORY OF PRESENT ILLNESS  Aris East is a 68 y.o. male. HPI Urbano Parker is seen today for follow up of hypertension and other problems. 1.  Hypertension. Stable on current regimen. 2.  CAD. Clinically stable. Follows up with cardiology as directed and is asymptomatic. 3.  IFG, hyperlipidemia, hypothyroidism. Due for all routine labs. 4.  Preventive medicine. He sees ophthalmology routinely, we will request records. He is due for Prevnar vaccine. Surgical history notable for having had lumbar diskectomy. The surgery went well and he feels much better. MedDATA/gwo         Review of Systems   Constitutional: Negative for weight loss. Respiratory: Negative. Cardiovascular: Negative for chest pain, palpitations, leg swelling and PND. Musculoskeletal: Negative for myalgias. Neurological: Negative for focal weakness. Physical Exam   Constitutional: No distress. Neck: Carotid bruit is not present. Cardiovascular: Normal rate and regular rhythm. Exam reveals no gallop and no friction rub. No murmur heard. Pulmonary/Chest: Effort normal and breath sounds normal. No respiratory distress. Musculoskeletal: He exhibits no edema. Nursing note and vitals reviewed. ASSESSMENT and PLAN  Ko was seen today for medication evaluation. Diagnoses and all orders for this visit:    Mixed hyperlipidemia  -     LIPID PANEL  -     CBC WITH AUTOMATED DIFF    Hypothyroidism, acquired, autoimmune  -     TSH 3RD GENERATION    Prediabetes  -     METABOLIC PANEL, COMPREHENSIVE    IFG (impaired fasting glucose)  -     HEMOGLOBIN A1C WITH EAG    Essential hypertension, benign- Continue current regimen of prescription and / or OTC medications     Atherosclerosis of native coronary artery of native heart without angina pectoris- See cardiologist as directed.

## 2017-05-31 ENCOUNTER — HOSPITAL ENCOUNTER (OUTPATIENT)
Dept: LAB | Age: 77
Discharge: HOME | End: 2017-05-31
Payer: MEDICARE

## 2017-05-31 PROCEDURE — 84443 ASSAY THYROID STIM HORMONE: CPT

## 2017-05-31 PROCEDURE — 80061 LIPID PANEL: CPT

## 2017-05-31 PROCEDURE — 85025 COMPLETE CBC W/AUTO DIFF WBC: CPT

## 2017-05-31 PROCEDURE — 83036 HEMOGLOBIN GLYCOSYLATED A1C: CPT

## 2017-05-31 PROCEDURE — 36415 COLL VENOUS BLD VENIPUNCTURE: CPT

## 2017-05-31 PROCEDURE — 80053 COMPREHEN METABOLIC PANEL: CPT

## 2017-06-01 LAB
ALBUMIN SERPL-MCNC: 3.9 G/DL (ref 3.5–4.8)
ALBUMIN/GLOB SERPL: 1.6 {RATIO} (ref 1.2–2.2)
ALP SERPL-CCNC: 126 IU/L (ref 39–117)
ALT SERPL-CCNC: 18 IU/L (ref 0–44)
AST SERPL-CCNC: 20 IU/L (ref 0–40)
BASOPHILS # BLD AUTO: 0 X10E3/UL (ref 0–0.2)
BASOPHILS NFR BLD AUTO: 1 %
BILIRUB SERPL-MCNC: 0.4 MG/DL (ref 0–1.2)
BUN SERPL-MCNC: 16 MG/DL (ref 8–27)
BUN/CREAT SERPL: 18 (ref 10–24)
CALCIUM SERPL-MCNC: 8.6 MG/DL (ref 8.6–10.2)
CHLORIDE SERPL-SCNC: 102 MMOL/L (ref 96–106)
CHOLEST SERPL-MCNC: 149 MG/DL (ref 100–199)
CO2 SERPL-SCNC: 22 MMOL/L (ref 18–29)
CREAT SERPL-MCNC: 0.89 MG/DL (ref 0.76–1.27)
EOSINOPHIL # BLD AUTO: 0.3 X10E3/UL (ref 0–0.4)
EOSINOPHIL NFR BLD AUTO: 3 %
ERYTHROCYTE [DISTWIDTH] IN BLOOD BY AUTOMATED COUNT: 14.5 % (ref 12.3–15.4)
EST. AVERAGE GLUCOSE BLD GHB EST-MCNC: 128 MG/DL
GLOBULIN SER CALC-MCNC: 2.5 G/DL (ref 1.5–4.5)
GLUCOSE SERPL-MCNC: 96 MG/DL (ref 65–99)
HBA1C MFR BLD: 6.1 % (ref 4.8–5.6)
HCT VFR BLD AUTO: 44.7 % (ref 37.5–51)
HDLC SERPL-MCNC: 60 MG/DL
HGB BLD-MCNC: 14.7 G/DL (ref 12.6–17.7)
IMM GRANULOCYTES # BLD: 0 X10E3/UL (ref 0–0.1)
IMM GRANULOCYTES NFR BLD: 0 %
LDLC SERPL CALC-MCNC: 75 MG/DL (ref 0–99)
LYMPHOCYTES # BLD AUTO: 1.5 X10E3/UL (ref 0.7–3.1)
LYMPHOCYTES NFR BLD AUTO: 18 %
MCH RBC QN AUTO: 29.4 PG (ref 26.6–33)
MCHC RBC AUTO-ENTMCNC: 32.9 G/DL (ref 31.5–35.7)
MCV RBC AUTO: 89 FL (ref 79–97)
MONOCYTES # BLD AUTO: 0.7 X10E3/UL (ref 0.1–0.9)
MONOCYTES NFR BLD AUTO: 8 %
NEUTROPHILS # BLD AUTO: 6 X10E3/UL (ref 1.4–7)
NEUTROPHILS NFR BLD AUTO: 70 %
PLATELET # BLD AUTO: 260 X10E3/UL (ref 150–379)
POTASSIUM SERPL-SCNC: 4.4 MMOL/L (ref 3.5–5.2)
PROT SERPL-MCNC: 6.4 G/DL (ref 6–8.5)
RBC # BLD AUTO: 5 X10E6/UL (ref 4.14–5.8)
SODIUM SERPL-SCNC: 142 MMOL/L (ref 134–144)
TRIGL SERPL-MCNC: 72 MG/DL (ref 0–149)
TSH SERPL DL<=0.005 MIU/L-ACNC: 2.88 UIU/ML (ref 0.45–4.5)
VLDLC SERPL CALC-MCNC: 14 MG/DL (ref 5–40)
WBC # BLD AUTO: 8.6 X10E3/UL (ref 3.4–10.8)

## 2017-08-23 ENCOUNTER — TELEPHONE (OUTPATIENT)
Dept: INTERNAL MEDICINE CLINIC | Age: 77
End: 2017-08-23

## 2017-08-23 NOTE — TELEPHONE ENCOUNTER
----- Message from [employer] sent at 8/23/2017  8:08 AM EDT -----  Regarding: acs         rx    Arlen Schuler  Male, 68 y.o., 1940  Weight:   206 lb 3.2 oz (93.5 kg)  Phone:   [phone]  PCP:   Linda Owens MD  MRN:   [id number]  MyChart: Active  Next Appt:   11/28/2017    Dr. Sunil Uribe   Received: Today      [facility] Office [facility]                 Pt is requesting a Rx to help treat for \"malaria\". Advised the pt about scheduling an appointment but pt declined. Best contact number [phone].              Attempted to call pt oat both numbers in chart. TCB.

## 2017-08-24 ENCOUNTER — TELEPHONE (OUTPATIENT)
Dept: INTERNAL MEDICINE CLINIC | Age: 77
End: 2017-08-24

## 2017-08-24 NOTE — TELEPHONE ENCOUNTER
----- Message from Francheska Chance sent at 8/23/2017  8:08 AM EDT -----  Regarding: acs         rx    Calvin Garcia  Male, 68 y.o., 1940  Weight:   206 lb 3.2 oz (93.5 kg)  Phone:   [phone]  PCP:   Jimbo Santos MD  MRN:   [id number]  MyChart: Active  Next Appt:   11/28/2017    Dr. Cece Lopez   Received: Today      [facility] [place]                 Pt is requesting a Rx to help treat for \"malaria\". Advised the pt about scheduling an appointment but pt declined. Best contact number [phone].          Pt not available - spoke with pt's wife Alcon Wood (on HIPPA) in regards to pt's call. She states pt is going on a mission trip to [facility] in November - wanted Rx and whatever vaccines he needed before leaving. He went to [facility] yesterday - he was given 2 Rx (antibiotic and something for diarrhea if needed), vaccines for Typhiod, Hep A & TDap. Pt will bring record of this at next visit.

## 2017-10-02 RX ORDER — LEVOTHYROXINE SODIUM 50 UG/1
TABLET ORAL
Qty: 90 TAB | Refills: 1 | Status: SHIPPED | OUTPATIENT
Start: 2017-10-02 | End: 2018-03-26 | Stop reason: SDUPTHER

## 2017-11-15 RX ORDER — FOLIC ACID-PYRIDOXINE-CYANOCOBALAMIN TAB 2.5-25-2 MG 2.5-25-2 MG
TAB ORAL
Qty: 90 TAB | Refills: 2 | Status: SHIPPED | OUTPATIENT
Start: 2017-11-15 | End: 2018-05-30

## 2017-11-15 RX ORDER — AMLODIPINE BESYLATE 5 MG/1
TABLET ORAL
Qty: 90 TAB | Refills: 2 | Status: SHIPPED | OUTPATIENT
Start: 2017-11-15 | End: 2018-09-18 | Stop reason: SDUPTHER

## 2017-11-21 RX ORDER — ATORVASTATIN CALCIUM 40 MG/1
TABLET, FILM COATED ORAL
Qty: 90 TAB | Refills: 2 | Status: SHIPPED | COMMUNITY
Start: 2017-11-21 | End: 2018-09-18 | Stop reason: SDUPTHER

## 2017-11-28 ENCOUNTER — OFFICE VISIT (OUTPATIENT)
Dept: INTERNAL MEDICINE CLINIC | Age: 77
End: 2017-11-28

## 2017-11-28 VITALS
BODY MASS INDEX: 27.85 KG/M2 | OXYGEN SATURATION: 97 % | DIASTOLIC BLOOD PRESSURE: 75 MMHG | WEIGHT: 205.6 LBS | HEART RATE: 73 BPM | RESPIRATION RATE: 18 BRPM | SYSTOLIC BLOOD PRESSURE: 138 MMHG | HEIGHT: 72 IN | TEMPERATURE: 97.6 F

## 2017-11-28 DIAGNOSIS — N13.8 BPH WITH URINARY OBSTRUCTION: ICD-10-CM

## 2017-11-28 DIAGNOSIS — E06.3 HYPOTHYROIDISM, ACQUIRED, AUTOIMMUNE: ICD-10-CM

## 2017-11-28 DIAGNOSIS — R73.01 IFG (IMPAIRED FASTING GLUCOSE): ICD-10-CM

## 2017-11-28 DIAGNOSIS — Z13.31 SCREENING FOR DEPRESSION: ICD-10-CM

## 2017-11-28 DIAGNOSIS — Z00.00 MEDICARE ANNUAL WELLNESS VISIT, SUBSEQUENT: ICD-10-CM

## 2017-11-28 DIAGNOSIS — Z12.5 PROSTATE CANCER SCREENING: ICD-10-CM

## 2017-11-28 DIAGNOSIS — I10 ESSENTIAL HYPERTENSION, BENIGN: ICD-10-CM

## 2017-11-28 DIAGNOSIS — Z23 ENCOUNTER FOR IMMUNIZATION: Primary | ICD-10-CM

## 2017-11-28 DIAGNOSIS — I25.10 ATHEROSCLEROSIS OF NATIVE CORONARY ARTERY OF NATIVE HEART WITHOUT ANGINA PECTORIS: ICD-10-CM

## 2017-11-28 DIAGNOSIS — E78.2 MIXED HYPERLIPIDEMIA: ICD-10-CM

## 2017-11-28 DIAGNOSIS — N40.1 BPH WITH URINARY OBSTRUCTION: ICD-10-CM

## 2017-11-28 NOTE — MR AVS SNAPSHOT
Visit Information Date & Time Provider Department Dept. Phone Encounter #  
 11/28/2017  9:00 AM Tan Bradford, [facility] Internal Medicine [phone] [id number] Follow-up Instructions Return in about 6 months (around 5/28/2018), or if symptoms worsen or fail to improve. Upcoming Health Maintenance Date Due Pneumococcal 65+ High/Highest Risk (2 of 2 - PPSV23) 1/5/2011 GLAUCOMA SCREENING Q2Y 10/19/2018 MEDICARE YEARLY EXAM 11/29/2018 COLONOSCOPY 9/16/2025 DTaP/Tdap/Td series (2 - Td) 11/18/2026 Allergies as of 11/28/2017  Review Complete On: 11/28/2017 By: Tan Bradford MD  
  
 Severity Noted Reaction Type Reactions Codeine  11/24/2009    Rash Pcn [Penicillins]  11/24/2009    Rash  
 Sulfa (Sulfonamide Antibiotics)  11/24/2009    Other (comments)  
 confusion Current Immunizations  Reviewed on 11/14/2016 Name Date Td 11/5/2014 ZZZ-RETIRED (DO NOT USE) Pneumococcal Vaccine (Unspecified Type) 1/5/2006 Zoster Vaccine, Live 6/1/2014 Not reviewed this visit You Were Diagnosed With   
  
 Codes Comments Encounter for immunization    -  Primary ICD-10-CM: H24 ICD-9-CM: V03.89 Prostate cancer screening     ICD-10-CM: Z12.5 ICD-9-CM: V76.44 IFG (impaired fasting glucose)     ICD-10-CM: R73.01 
ICD-9-CM: 790.21   
 BPH with urinary obstruction     ICD-10-CM: N40.1, N13.8 ICD-9-CM: 600.01, 599.69 Hypothyroidism, acquired, autoimmune     ICD-10-CM: E03.8 ICD-9-CM: 244.8 Mixed hyperlipidemia     ICD-10-CM: E78.2 ICD-9-CM: 272.2 Essential hypertension, benign     ICD-10-CM: I10 
ICD-9-CM: 401.1 Atherosclerosis of native coronary artery of native heart without angina pectoris     ICD-10-CM: I25.10 ICD-9-CM: 414.01 Medicare annual wellness visit, subsequent     ICD-10-CM: Z00.00 ICD-9-CM: V70.0 Screening for depression     ICD-10-CM: Z13.89 ICD-9-CM: V79.0 Vitals BP Pulse Temp Resp Height(growth percentile) Weight(growth percentile) 138/75 (BP 1 Location: Right arm, BP Patient Position: Sitting) 73 97.6 °F (36.4 °C) (Oral) 18 5' 11.5\" (1.816 m) 205 lb 9.6 oz (93.3 kg) SpO2 BMI Smoking Status 97% 28.28 kg/m2 Never Smoker BMI and BSA Data Body Mass Index Body Surface Area  
 28.28 kg/m 2 2.17 m 2 Preferred Pharmacy Pharmacy Name Phone [id number] [place] [address] [phone] Your Updated Medication List  
  
   
This list is accurate as of: 11/28/17  9:24 AM.  Always use your most recent med list. amLODIPine 5 mg tablet Commonly known as:  Alyse Spruce TAKE 1 TAB BY MOUTH DAILY. aspirin 81 mg chewable tablet Take 81 mg by mouth daily. atorvastatin 40 mg tablet Commonly known as:  LIPITOR  
TAKE 1 TAB BY MOUTH DAILY. co-enzyme Q-10 100 mg capsule Commonly known as:  CO Q-10 Take 200 mg by mouth daily. finasteride 5 mg tablet Commonly known as:  PROSCAR Take 5 mg by mouth every other day. HS  
  
 FOLBIC 2.5-25-2 mg tablet Generic drug:  folic acid-vit E4-VMA I24 TAKE 1 TABLET BY MOUTH DAILY. levothyroxine 50 mcg tablet Commonly known as:  SYNTHROID  
TAKE 1 TABLET BY MOUTH EVERY DAY BEFORE BREAKFAST * pneumococcal 13 ezekiel conj dip 0.5 mL Syrg injection Commonly known as:  PREVNAR 13 (PF)  
0.5 mL by IntraMUSCular route PRIOR TO DISCHARGE for 1 dose. * pneumococcal 13 ezekiel conj dip 0.5 mL Syrg injection Commonly known as:  PREVNAR-13  
0.5 mL by IntraMUSCular route once for 1 dose. PRESERVISION AREDS PO Take 1 Cap by mouth two (2) times a day. * Notice: This list has 2 medication(s) that are the same as other medications prescribed for you. Read the directions carefully, and ask your doctor or other care provider to review them with you. Prescriptions Printed Refills pneumococcal 13 ezekiel conj dip (PREVNAR-13) 0.5 mL syrg injection 0 Si.5 mL by IntraMUSCular route once for 1 dose. Class: Print Route: IntraMUSCular We Performed the Following CBC WITH AUTOMATED DIFF [34216 CPT(R)] Rosio 68 [[id number] \A Chronology [facility] [place] [facility]\""] HEMOGLOBIN A1C WITH EAG [11437 CPT(R)] LIPID PANEL [68456 CPT(R)] METABOLIC PANEL, COMPREHENSIVE [13018 CPT(R)] TSH 3RD GENERATION [76311 CPT(R)] Follow-up Instructions Return in about 6 months (around 2018), or if symptoms worsen or fail to improve. Patient Instructions Medicare Wellness Visit, Male The best way to live healthy is to have a healthy lifestyle by eating a well-balanced diet, exercising regularly, limiting alcohol and stopping smoking. Regular physical exams and screening tests are another way to keep healthy. Preventive exams provided by your health care provider can find health problems before they become diseases or illnesses. Preventive services including immunizations, screening tests, monitoring and exams can help you take care of your own health. All people over age 72 should have a pneumovax  and and a prevnar shot to prevent pneumonia. These are once in a lifetime unless you and your provider decide differently. All people over 65 should have a yearly flu shot and a tetanus vaccine every 10 years. Screening for diabetes mellitus with a blood sugar test should be done every year. Glaucoma is a disease of the eye due to increased ocular pressure that can lead to blindness and it should be done every year by an eye professional. 
 
Cardiovascular screening tests that check for elevated lipids (fatty part of blood) which can lead to heart disease and strokes should be done every 5 years.  
 
Colorectal screening that evaluates for blood or polyps in your colon should be done yearly as a stool test or every five years as a flexible sigmoidoscope or every 10 years as a colonoscopy up to age 76. Men up to age 76 may need a screening blood test for prostate cancer at certain intervals, depending on their personal and family history. This decision is between the patient and his provider. If you have been a smoker or had family history of abdominal aortic aneurysms, you and your provider may decide to schedule an ultrasound test of your aorta. Hepatitis C screening is also recommended for anyone born between [birth year] through [address]. A shingles vaccine is also recommended once in a lifetime after age 61. Your Medicare Wellness Exam is recommended annually. Here is a list of your current Health Maintenance items with a due date: 
Health Maintenance Due Topic Date Due  Pneumococcal Vaccine (2 of 2 - PPSV23) 01/05/2011 Introducing [facility]! Dear Olga Yoon: 
Thank you for requesting a [employer] account. Our records indicate that you already have an active [employer] account. You can access your account anytime at [email or web address] Did you know that you can access your hospital and ER discharge instructions at any time in [employer]? You can also review all of your test results from your hospital stay or ER visit. Additional Information If you have questions, please visit the Frequently Asked Questions section of the [employer]BT[employer] website at [email or web address] Remember, [employer] is NOT to be used for urgent needs. For medical emergencies, dial 911. Now available from your iPhone and Android! Please provide this summary of care documentation to your next provider. Your primary care clinician is listed as SRINIVAS KEBEDE. If you have any questions after today's visit, please call [phone].

## 2017-11-28 NOTE — PATIENT INSTRUCTIONS
recommended annually.     Here is a list of your current Health Maintenance items with a due date:  Health Maintenance Due   Topic Date Due    Pneumococcal Vaccine (2 of 2 - PPSV23) 01/05/2011

## 2017-11-28 NOTE — PROGRESS NOTES
This is a Subsequent Medicare Annual Wellness Exam (AWV) (Performed 12 months after IPPE or effective date of Medicare Part B enrollment)    I have reviewed the patient's medical history in detail and updated the computerized patient record. History     Past Medical History:   Diagnosis Date    Arrhythmia     r/t caffeine    Arthritis     Basal cell carcinoma     BPH (benign prostatic hyperplasia)     CAD (coronary artery disease)     s/p PTCA '92    Calculus of kidney     Chronic pain     BACK    Elevated PSA     Hypercholesterolemia     Lumbar foraminal stenosis     Melanoma ([address]     MVP (mitral valve prolapse)     Other ill-defined conditions(799.89)     Prostatitis    Prediabetes     Seasonal allergies     Squamous cell carcinoma     Systolic murmur     Thyroid disease     HYPOTHYROID    Vision impairment     R EYE, BLOOD CLOT ON RETINA      Past Surgical History:   Procedure Laterality Date    ENDOSCOPY, COLON, DIAGNOSTIC      due 12    HX CHOLECYSTECTOMY  1970    HX HEART CATHETERIZATION  1992    WITH BALLOON    HX LITHOTRIPSY      x2    HX ORTHOPAEDIC      elbow - fx right arm age 3 yrs     Current Outpatient Prescriptions   Medication Sig Dispense Refill    pneumococcal 13 ezekiel conj dip (PREVNAR-13) 0.5 mL syrg injection 0.5 mL by IntraMUSCular route once for 1 dose. 0.5 mL 0    atorvastatin (LIPITOR) 40 mg tablet TAKE 1 TAB BY MOUTH DAILY. 90 Tab 2    amLODIPine (NORVASC) 5 mg tablet TAKE 1 TAB BY MOUTH DAILY. 90 Tab 2    FOLBIC 2.5-25-2 mg tablet TAKE 1 TABLET BY MOUTH DAILY. 90 Tab 2    levothyroxine (SYNTHROID) 50 mcg tablet TAKE 1 TABLET BY MOUTH EVERY DAY BEFORE BREAKFAST 90 Tab 1    aspirin 81 mg chewable tablet Take 81 mg by mouth daily.  finasteride (PROSCAR) 5 mg tablet Take 5 mg by mouth every other day. HS      co-enzyme Q-10 (CO Q-10) 100 mg capsule Take 200 mg by mouth daily.       VIT A/VIT C/VIT E/ZINC/COPPER (PRESERVISION AREDS PO) Take 1 Cap by mouth two (2) times a day.  pneumococcal 13 ezekiel conj dip (PREVNAR 13, PF,) 0.5 mL syrg injection 0.5 mL by IntraMUSCular route PRIOR TO DISCHARGE for 1 dose. 1 Syringe 0     Allergies   Allergen Reactions    Codeine Rash    Pcn [Penicillins] Rash    Sulfa (Sulfonamide Antibiotics) Other (comments)     confusion     Family History   Problem Relation Age of Onset    Heart Disease Mother      CHF    Heart Disease Father      CAD    Heart Disease Sister      CAD CABG    Lung Disease Sister     Heart Disease Brother      CAD    Pulmonary Fibrosis Brother     Heart Disease Brother      CAD    Stroke Brother 34     cerebral hemorrhage    Cancer Brother      LUNG    High Cholesterol Daughter     Anesth Problems Neg Hx      Social History   Substance Use Topics    Smoking status: Never Smoker    Smokeless tobacco: Never Used    Alcohol use No     Patient Active Problem List   Diagnosis Code    Mixed hyperlipidemia E78.2    Essential hypertension, benign I10    Coronary atherosclerosis of native coronary artery I25.10    Calculus of kidney N20.0    Insomnia, unspecified G47.00    Unspecified hearing loss H91.90    Macular degeneration H35.30    Skin cancer C44.90    Unspecified vitamin D deficiency E55.9    Hypothyroidism, acquired, autoimmune E03.8    BPH with urinary obstruction N40.1, N13.8    Advance directive discussed with patient Z71.89    Lumbar disc disease M51.9    Lumbar foraminal stenosis M99.83    IFG (impaired fasting glucose) R73.01       Depression Risk Factor Screening:     PHQ over the last two weeks 11/28/2017   Little interest or pleasure in doing things Not at all   Feeling down, depressed or hopeless Not at all   Total Score PHQ 2 0     Alcohol Risk Factor Screening: You do not drink alcohol or very rarely. Functional Ability and Level of Safety:   Hearing Loss  The patient wears hearing aids.     Activities of Daily Living  The home contains: no safety equipment. Patient does total self care    Fall RiskFall Risk Assessment, last 12 mths 11/28/2017   Able to walk? Yes   Fall in past 12 months? No       Abuse Screen  Patient is not abused    Cognitive Screening   Evaluation of Cognitive Function:  Has your family/caregiver stated any concerns about your memory: no  Normal    Patient Care Team   Patient Care Team:  Padmaja Andrews MD as PCP - General Terry Liriano, RN as Ambulatory Care Navigator (Internal Medicine)  Felicita Hunt MD as Physician (Internal Medicine)  Walker Shaikh MD as Physician (Dermatology)  Devan Oro MD as Physician (Urology)  Elroy Bhatt MD as Surgeon (Orthopedic Surgery)  Janet Jones MD as Physician (Ophthalmology)  Poonam Tracey MD as Physician (Cardiology)  Davina Leo, RN as Ambulatory Care Navigator (Internal Medicine)  Stephen Dodson MD as Surgeon (Orthopedic Surgery)    Assessment/Plan   Education and counseling provided:  Are appropriate based on today's review and evaluation    Diagnoses and all orders for this visit:    1. Encounter for immunization  -     pneumococcal 13 ezekiel conj dip (PREVNAR-13) 0.5 mL syrg injection; 0.5 mL by IntraMUSCular route once for 1 dose. 2. Prostate cancer screening    3. IFG (impaired fasting glucose)  -     METABOLIC PANEL, COMPREHENSIVE  -     HEMOGLOBIN A1C WITH EAG    4. BPH with urinary obstruction    5. Hypothyroidism, acquired, autoimmune  -     TSH 3RD GENERATION    6. Mixed hyperlipidemia  -     CBC WITH AUTOMATED DIFF  -     LIPID PANEL    7. Essential hypertension, benign    8. Atherosclerosis of native coronary artery of native heart without angina pectoris    9. Medicare annual wellness visit, subsequent    10.  Screening for depression  -     Depression Screen Annual        Health Maintenance Due   Topic Date Due    Pneumococcal 65+ High/Highest Risk (2 of 2 - PPSV23) 01/05/2011

## 2017-11-28 NOTE — PROGRESS NOTES
HISTORY OF PRESENT ILLNESS  Bronson Hunter is a 68 y.o. male. HPI Macey Wilson is seen today for a complete physical examination, Medicare Wellness Visit and follow up of other problems. Preventive medicine. Fully reviewed today. He is due for a complete physical examination and routine screening laboratory studies. Also, due for Prevnar vaccine. He is up to date with colonoscopy, Tdap booster, Zostavax, Pneumovax, and urology follow up. For Medicare Wellness Visit, see attached note. Chronic medical problems are reviewed. 1. Hypertension. Stable on current regimen. 2. Hypothyroidism, hyperlipidemia, IFG. Due for routine labs. For statin medication, this represents high risk medication management. He denies any side effects. 3. CAD, BPH. Up to date with specialist follow up. Regarding medications. He wonders about discontinuing the prescription vitamin supplement, Folvic. I think this is probably based on earlier information that Folate replacement may be helpful in certain cases of coronary disease. This is not really part of standard care at this time so I gave him the okay to stop this. MedDATA/gwo     Current Outpatient Prescriptions   Medication Sig    atorvastatin (LIPITOR) 40 mg tablet TAKE 1 TAB BY MOUTH DAILY.  amLODIPine (NORVASC) 5 mg tablet TAKE 1 TAB BY MOUTH DAILY.  FOLBIC 2.5-25-2 mg tablet TAKE 1 TABLET BY MOUTH DAILY.  levothyroxine (SYNTHROID) 50 mcg tablet TAKE 1 TABLET BY MOUTH EVERY DAY BEFORE BREAKFAST    aspirin 81 mg chewable tablet Take 81 mg by mouth daily.  finasteride (PROSCAR) 5 mg tablet Take 5 mg by mouth every other day. HS    co-enzyme Q-10 (CO Q-10) 100 mg capsule Take 200 mg by mouth daily.  VIT A/VIT C/VIT E/ZINC/COPPER (PRESERVISION AREDS PO) Take 1 Cap by mouth two (2) times a day.  pneumococcal 13 ezekiel conj dip (PREVNAR 13, PF,) 0.5 mL syrg injection 0.5 mL by IntraMUSCular route PRIOR TO DISCHARGE for 1 dose.      No current facility-administered medications for this visit. Review of Systems   Constitutional: Negative for weight loss. Respiratory: Negative. Cardiovascular: Negative for chest pain, palpitations, leg swelling and PND. Gastrointestinal: Negative. Genitourinary: Positive for frequency. Negative for dysuria. Musculoskeletal: Negative for myalgias. Neurological: Negative for focal weakness. Physical Exam   Constitutional: He is oriented to person, place, and time. He appears well-developed and well-nourished. No distress. HENT:   Head: Normocephalic and atraumatic. Right Ear: Tympanic membrane, external ear and ear canal normal.   Left Ear: Tympanic membrane, external ear and ear canal normal.   Eyes: EOM are normal. Pupils are equal, round, and reactive to light. Right eye exhibits no discharge. Left eye exhibits no discharge. Neck: Normal range of motion. Neck supple. Carotid bruit is not present. No thyromegaly present. Cardiovascular: Normal rate, regular rhythm and intact distal pulses. Exam reveals no gallop and no friction rub. Murmur heard. Systolic murmur is present with a grade of 2/6   Pulmonary/Chest: Effort normal and breath sounds normal. No respiratory distress. He has no wheezes. He has no rales. Abdominal: Soft. Bowel sounds are normal. He exhibits no distension and no mass. There is no tenderness. There is no rebound and no guarding. Musculoskeletal: Normal range of motion. He exhibits no edema or tenderness. Lymphadenopathy:     He has no cervical adenopathy. Neurological: He is alert and oriented to person, place, and time. He has normal reflexes. Skin: Skin is warm and dry. No rash noted. Psychiatric: He has a normal mood and affect. His behavior is normal.   Nursing note and vitals reviewed. ASSESSMENT and PLAN  Diagnoses and all orders for this visit:    1.  Encounter for immunization  -     pneumococcal 13 ezekiel conj dip (PREVNAR-13) 0.5 mL syrg injection; 0.5 mL by IntraMUSCular route once for 1 dose. 2. Prostate cancer screening- See urologist as directed     3. IFG (impaired fasting glucose)  -     METABOLIC PANEL, COMPREHENSIVE  -     HEMOGLOBIN A1C WITH EAG    4. BPH with urinary obstruction- See urologist as directed     5. Hypothyroidism, acquired, autoimmune  -     TSH 3RD GENERATION    6. Mixed hyperlipidemia  -     CBC WITH AUTOMATED DIFF  -     LIPID PANEL    7. Essential hypertension, benign- Continue current regimen of prescription and / or OTC medications     8. Atherosclerosis of native coronary artery of native heart without angina pectoris- See cardiologist as directed. 9. Medicare annual wellness visit, subsequent    10.  Screening for depression  -     Depression Screen Annual

## 2017-11-29 ENCOUNTER — HOSPITAL ENCOUNTER (OUTPATIENT)
Dept: LAB | Age: 77
Discharge: HOME | End: 2017-11-29
Payer: MEDICARE

## 2017-11-29 PROCEDURE — 36415 COLL VENOUS BLD VENIPUNCTURE: CPT

## 2017-11-29 PROCEDURE — 80053 COMPREHEN METABOLIC PANEL: CPT

## 2017-11-29 PROCEDURE — 84443 ASSAY THYROID STIM HORMONE: CPT

## 2017-11-29 PROCEDURE — 80061 LIPID PANEL: CPT

## 2017-11-29 PROCEDURE — 83036 HEMOGLOBIN GLYCOSYLATED A1C: CPT

## 2017-11-29 PROCEDURE — 85025 COMPLETE CBC W/AUTO DIFF WBC: CPT

## 2017-11-30 LAB
ALBUMIN SERPL-MCNC: 4.2 G/DL (ref 3.5–4.8)
ALBUMIN/GLOB SERPL: 1.6 {RATIO} (ref 1.2–2.2)
ALP SERPL-CCNC: 109 IU/L (ref 39–117)
ALT SERPL-CCNC: 20 IU/L (ref 0–44)
AST SERPL-CCNC: 19 IU/L (ref 0–40)
BASOPHILS # BLD AUTO: 0 X10E3/UL (ref 0–0.2)
BASOPHILS NFR BLD AUTO: 1 %
BILIRUB SERPL-MCNC: 0.7 MG/DL (ref 0–1.2)
BUN SERPL-MCNC: 18 MG/DL (ref 8–27)
BUN/CREAT SERPL: 18 (ref 10–24)
CALCIUM SERPL-MCNC: 9.3 MG/DL (ref 8.6–10.2)
CHLORIDE SERPL-SCNC: 101 MMOL/L (ref 96–106)
CHOLEST SERPL-MCNC: 180 MG/DL (ref 100–199)
CO2 SERPL-SCNC: 24 MMOL/L (ref 18–29)
CREAT SERPL-MCNC: 0.98 MG/DL (ref 0.76–1.27)
EOSINOPHIL # BLD AUTO: 0.3 X10E3/UL (ref 0–0.4)
EOSINOPHIL NFR BLD AUTO: 4 %
ERYTHROCYTE [DISTWIDTH] IN BLOOD BY AUTOMATED COUNT: 14.6 % (ref 12.3–15.4)
EST. AVERAGE GLUCOSE BLD GHB EST-MCNC: 123 MG/DL
GFR SERPLBLD CREATININE-BSD FMLA CKD-EPI: 74 ML/MIN/1.73
GFR SERPLBLD CREATININE-BSD FMLA CKD-EPI: 86 ML/MIN/1.73
GLOBULIN SER CALC-MCNC: 2.6 G/DL (ref 1.5–4.5)
GLUCOSE SERPL-MCNC: 100 MG/DL (ref 65–99)
HBA1C MFR BLD: 5.9 % (ref 4.8–5.6)
HCT VFR BLD AUTO: 46.5 % (ref 37.5–51)
HDLC SERPL-MCNC: 59 MG/DL
HGB BLD-MCNC: 15.2 G/DL (ref 12.6–17.7)
IMM GRANULOCYTES # BLD: 0 X10E3/UL (ref 0–0.1)
IMM GRANULOCYTES NFR BLD: 0 %
LDLC SERPL CALC-MCNC: 98 MG/DL (ref 0–99)
LYMPHOCYTES # BLD AUTO: 2.2 X10E3/UL (ref 0.7–3.1)
LYMPHOCYTES NFR BLD AUTO: 32 %
MCH RBC QN AUTO: 29.5 PG (ref 26.6–33)
MCHC RBC AUTO-ENTMCNC: 32.7 G/DL (ref 31.5–35.7)
MCV RBC AUTO: 90 FL (ref 79–97)
MONOCYTES # BLD AUTO: 0.6 X10E3/UL (ref 0.1–0.9)
MONOCYTES NFR BLD AUTO: 9 %
NEUTROPHILS # BLD AUTO: 3.8 X10E3/UL (ref 1.4–7)
NEUTROPHILS NFR BLD AUTO: 54 %
PLATELET # BLD AUTO: 232 X10E3/UL (ref 150–379)
POTASSIUM SERPL-SCNC: 4.1 MMOL/L (ref 3.5–5.2)
PROT SERPL-MCNC: 6.8 G/DL (ref 6–8.5)
RBC # BLD AUTO: 5.15 X10E6/UL (ref 4.14–5.8)
SODIUM SERPL-SCNC: 141 MMOL/L (ref 134–144)
TRIGL SERPL-MCNC: 115 MG/DL (ref 0–149)
TSH SERPL DL<=0.005 MIU/L-ACNC: 2.88 UIU/ML (ref 0.45–4.5)
VLDLC SERPL CALC-MCNC: 23 MG/DL (ref 5–40)
WBC # BLD AUTO: 6.9 X10E3/UL (ref 3.4–10.8)

## 2018-03-26 RX ORDER — LEVOTHYROXINE SODIUM 50 UG/1
TABLET ORAL
Qty: 90 TAB | Refills: 1 | Status: SHIPPED | OUTPATIENT
Start: 2018-03-26 | End: 2018-06-04 | Stop reason: SDUPTHER

## 2018-05-23 ENCOUNTER — TELEPHONE (OUTPATIENT)
Dept: INTERNAL MEDICINE CLINIC | Age: 78
End: 2018-05-23

## 2018-05-23 DIAGNOSIS — E55.9 VITAMIN D DEFICIENCY: ICD-10-CM

## 2018-05-23 DIAGNOSIS — E06.3 HYPOTHYROIDISM, ACQUIRED, AUTOIMMUNE: ICD-10-CM

## 2018-05-23 DIAGNOSIS — I10 ESSENTIAL HYPERTENSION, BENIGN: ICD-10-CM

## 2018-05-23 DIAGNOSIS — E78.2 MIXED HYPERLIPIDEMIA: ICD-10-CM

## 2018-05-23 DIAGNOSIS — R73.01 IFG (IMPAIRED FASTING GLUCOSE): Primary | ICD-10-CM

## 2018-05-23 NOTE — TELEPHONE ENCOUNTER
Patient has an appt next Weds, 5/30. Is requesting lab orders be put at  for this Friday. Please let him know if ok.

## 2018-05-25 ENCOUNTER — HOSPITAL ENCOUNTER (OUTPATIENT)
Dept: LAB | Age: 78
Discharge: HOME | End: 2018-05-25
Payer: MEDICARE

## 2018-05-25 PROCEDURE — 85025 COMPLETE CBC W/AUTO DIFF WBC: CPT

## 2018-05-25 PROCEDURE — 82306 VITAMIN D 25 HYDROXY: CPT

## 2018-05-25 PROCEDURE — 80061 LIPID PANEL: CPT

## 2018-05-25 PROCEDURE — 36415 COLL VENOUS BLD VENIPUNCTURE: CPT

## 2018-05-25 PROCEDURE — 80048 BASIC METABOLIC PNL TOTAL CA: CPT

## 2018-05-25 PROCEDURE — 80076 HEPATIC FUNCTION PANEL: CPT

## 2018-05-25 PROCEDURE — 86376 MICROSOMAL ANTIBODY EACH: CPT

## 2018-05-25 PROCEDURE — 84443 ASSAY THYROID STIM HORMONE: CPT

## 2018-05-25 PROCEDURE — 83036 HEMOGLOBIN GLYCOSYLATED A1C: CPT

## 2018-05-26 LAB
25(OH)D3+25(OH)D2 SERPL-MCNC: 28.8 NG/ML (ref 30–100)
ALBUMIN SERPL-MCNC: 4 G/DL (ref 3.5–4.8)
ALP SERPL-CCNC: 112 IU/L (ref 39–117)
ALT SERPL-CCNC: 14 IU/L (ref 0–44)
AST SERPL-CCNC: 15 IU/L (ref 0–40)
BASOPHILS # BLD AUTO: 0 X10E3/UL (ref 0–0.2)
BASOPHILS NFR BLD AUTO: 1 %
BILIRUB DIRECT SERPL-MCNC: 0.18 MG/DL (ref 0–0.4)
BILIRUB SERPL-MCNC: 0.6 MG/DL (ref 0–1.2)
BUN SERPL-MCNC: 18 MG/DL (ref 8–27)
BUN/CREAT SERPL: 18 (ref 10–24)
CALCIUM SERPL-MCNC: 9.2 MG/DL (ref 8.6–10.2)
CHLORIDE SERPL-SCNC: 102 MMOL/L (ref 96–106)
CHOLEST SERPL-MCNC: 172 MG/DL (ref 100–199)
CO2 SERPL-SCNC: 26 MMOL/L (ref 18–29)
CREAT SERPL-MCNC: 0.98 MG/DL (ref 0.76–1.27)
EOSINOPHIL # BLD AUTO: 0.3 X10E3/UL (ref 0–0.4)
EOSINOPHIL NFR BLD AUTO: 5 %
ERYTHROCYTE [DISTWIDTH] IN BLOOD BY AUTOMATED COUNT: 15.1 % (ref 12.3–15.4)
EST. AVERAGE GLUCOSE BLD GHB EST-MCNC: 128 MG/DL
GFR SERPLBLD CREATININE-BSD FMLA CKD-EPI: 74 ML/MIN/1.73
GFR SERPLBLD CREATININE-BSD FMLA CKD-EPI: 85 ML/MIN/1.73
GLUCOSE SERPL-MCNC: 93 MG/DL (ref 65–99)
HBA1C MFR BLD: 6.1 % (ref 4.8–5.6)
HCT VFR BLD AUTO: 46.1 % (ref 37.5–51)
HDLC SERPL-MCNC: 58 MG/DL
HGB BLD-MCNC: 15.3 G/DL (ref 13–17.7)
IMM GRANULOCYTES # BLD: 0 X10E3/UL (ref 0–0.1)
IMM GRANULOCYTES NFR BLD: 1 %
LDLC SERPL CALC-MCNC: 85 MG/DL (ref 0–99)
LYMPHOCYTES # BLD AUTO: 2 X10E3/UL (ref 0.7–3.1)
LYMPHOCYTES NFR BLD AUTO: 31 %
MCH RBC QN AUTO: 29.7 PG (ref 26.6–33)
MCHC RBC AUTO-ENTMCNC: 33.2 G/DL (ref 31.5–35.7)
MCV RBC AUTO: 90 FL (ref 79–97)
MONOCYTES # BLD AUTO: 0.6 X10E3/UL (ref 0.1–0.9)
MONOCYTES NFR BLD AUTO: 9 %
NEUTROPHILS # BLD AUTO: 3.5 X10E3/UL (ref 1.4–7)
NEUTROPHILS NFR BLD AUTO: 53 %
PLATELET # BLD AUTO: 225 X10E3/UL (ref 150–379)
POTASSIUM SERPL-SCNC: 4.3 MMOL/L (ref 3.5–5.2)
PROT SERPL-MCNC: 6.9 G/DL (ref 6–8.5)
RBC # BLD AUTO: 5.15 X10E6/UL (ref 4.14–5.8)
SODIUM SERPL-SCNC: 143 MMOL/L (ref 134–144)
TRIGL SERPL-MCNC: 143 MG/DL (ref 0–149)
TSH SERPL DL<=0.005 MIU/L-ACNC: 4.51 UIU/ML (ref 0.45–4.5)
VLDLC SERPL CALC-MCNC: 29 MG/DL (ref 5–40)
WBC # BLD AUTO: 6.4 X10E3/UL (ref 3.4–10.8)

## 2018-05-30 ENCOUNTER — OFFICE VISIT (OUTPATIENT)
Dept: INTERNAL MEDICINE CLINIC | Age: 78
End: 2018-05-30

## 2018-05-30 VITALS
HEIGHT: 72 IN | OXYGEN SATURATION: 95 % | WEIGHT: 207.2 LBS | TEMPERATURE: 98.4 F | BODY MASS INDEX: 28.06 KG/M2 | RESPIRATION RATE: 18 BRPM | SYSTOLIC BLOOD PRESSURE: 142 MMHG | DIASTOLIC BLOOD PRESSURE: 74 MMHG | HEART RATE: 71 BPM

## 2018-05-30 DIAGNOSIS — I25.10 ATHEROSCLEROSIS OF NATIVE CORONARY ARTERY OF NATIVE HEART WITHOUT ANGINA PECTORIS: ICD-10-CM

## 2018-05-30 DIAGNOSIS — R73.01 IFG (IMPAIRED FASTING GLUCOSE): ICD-10-CM

## 2018-05-30 DIAGNOSIS — E55.9 VITAMIN D DEFICIENCY: ICD-10-CM

## 2018-05-30 DIAGNOSIS — E06.3 HYPOTHYROIDISM, ACQUIRED, AUTOIMMUNE: ICD-10-CM

## 2018-05-30 DIAGNOSIS — I10 ESSENTIAL HYPERTENSION, BENIGN: ICD-10-CM

## 2018-05-30 DIAGNOSIS — E78.2 MIXED HYPERLIPIDEMIA: Primary | ICD-10-CM

## 2018-05-30 RX ORDER — ACETAMINOPHEN 500 MG
2000 TABLET ORAL 2 TIMES DAILY
Qty: 30 CAP | Refills: 11
Start: 2018-05-30 | End: 2021-09-13

## 2018-05-30 NOTE — MR AVS SNAPSHOT
[address] 
[phone] Patient: Amanda Anglin MRN:  [id number] Visit Information Date & Time Provider Department Dept. Phone Encounter #  
 5/30/2018  8:05 AM Trinidad Marr MD [address] [address] Internal Medicine [phone] [id number] Follow-up Instructions Return in about 6 months (around 11/30/2018) for mwv. Upcoming Health Maintenance Date Due Influenza Age 5 to Adult 8/1/2018 MEDICARE YEARLY EXAM 11/29/2018 GLAUCOMA SCREENING Q2Y 5/9/2020 COLONOSCOPY 9/16/2025 DTaP/Tdap/Td series (2 - Td) 11/18/2026 Allergies as of 5/30/2018  Review Complete On: 5/30/2018 By: Trinidad Marr MD  
  
 Severity Noted Reaction Type Reactions Codeine  11/24/2009    Rash Pcn [Penicillins]  11/24/2009    Rash  
 Sulfa (Sulfonamide Antibiotics)  11/24/2009    Other (comments)  
 confusion Current Immunizations  Reviewed on 3/26/2018 Name Date Pneumococcal Conjugate (PCV-13) 3/20/2018 Td 11/5/2014 ZZZ-RETIRED (DO NOT USE) Pneumococcal Vaccine (Unspecified Type) 1/5/2006 Zoster Vaccine, Live 6/1/2014 Not reviewed this visit You Were Diagnosed With   
  
 Codes Comments Mixed hyperlipidemia    -  Primary ICD-10-CM: I93.9 ICD-9-CM: 272.2 IFG (impaired fasting glucose)     ICD-10-CM: R73.01 
ICD-9-CM: 790.21 Hypothyroidism, acquired, autoimmune     ICD-10-CM: E06.3 ICD-9-CM: 244.8 Vitamin D deficiency     ICD-10-CM: E55.9 ICD-9-CM: 268.9 Essential hypertension, benign     ICD-10-CM: I10 
ICD-9-CM: 401.1 Atherosclerosis of native coronary artery of native heart without angina pectoris     ICD-10-CM: I25.10 ICD-9-CM: 414.01 Vitals BP Pulse Temp Resp Height(growth percentile) Weight(growth percentile)  142/74 (BP 1 Location: Right arm, BP Patient Position: Sitting) 71 98.4 °F (36.9 °C) (Oral) 18 5' 11.5\" (1.816 m) 207 lb 3.2 oz (94 kg) SpO2 BMI Smoking Status 95% 28.5 kg/m2 Never Smoker BMI and BSA Data Body Mass Index Body Surface Area 28.5 kg/m 2 2.18 m 2 Preferred Pharmacy Pharmacy Name Phone [id number] [address] [facility] [place], [address] [phone] Your Updated Medication List  
  
   
This list is accurate as of 5/30/18  8:47 AM.  Always use your most recent med list. amLODIPine 5 mg tablet Commonly known as:  Gerardo Adán TAKE 1 TAB BY MOUTH DAILY. aspirin 81 mg chewable tablet Take 81 mg by mouth daily. atorvastatin 40 mg tablet Commonly known as:  LIPITOR  
TAKE 1 TAB BY MOUTH DAILY. Cholecalciferol (Vitamin D3) 2,000 unit Cap capsule Commonly known as:  VITAMIN D3 Take 2,000 Units by mouth two (2) times a day. co-enzyme Q-10 100 mg capsule Commonly known as:  CO Q-10 Take 200 mg by mouth daily. finasteride 5 mg tablet Commonly known as:  PROSCAR Take 5 mg by mouth every other day. HS  
  
 levothyroxine 50 mcg tablet Commonly known as:  SYNTHROID  
TAKE 1 TABLET BY MOUTH EVERY DAY BEFORE BREAKFAST PRESERVISION AREDS PO Take 1 Cap by mouth two (2) times a day. We Performed the Following CBC WITH AUTOMATED DIFF [57929 CPT(R)] HEMOGLOBIN A1C WITH EAG [45402 CPT(R)] LIPID PANEL [06998 CPT(R)] METABOLIC PANEL, COMPREHENSIVE [64693 CPT(R)] TSH 3RD GENERATION [31929 CPT(R)] TSH 3RD GENERATION [51169 CPT(R)] VITAMIN D, 25 HYDROXY [id number] CPT(R)] Follow-up Instructions Return in about 6 months (around 11/30/2018) for mwv. Introducing [facility]! Dear Андрей Pearson: 
Thank you for requesting a [employer] account. Our records indicate that you already have an active [employer] account. You can access your account anytime at [email or web address] Did you know that you can access your hospital and ER discharge instructions at any time in [employer] [facility]? You can also review all of your test results from your hospital stay or ER visit. Additional Information If you have questions, please visit the Frequently Asked Questions section of the [employer] website at [email or web address] Remember, [employer] is NOT to be used for urgent needs. For medical emergencies, dial 911. Now available from your iPhone and Android! Please provide this summary of care documentation to your next provider. Your primary care clinician is listed as SRINIVAS KEBEDE. If you have any questions after today's visit, please call [phone].

## 2018-05-30 NOTE — PROGRESS NOTES
HISTORY OF PRESENT ILLNESS  Ti Penny is a 66 y.o. male. HPI Armstead Sandhoff is seen today for follow up of hyperlipidemia and other problems. 1.  Right hand pain. He has stiffness in the morning. It sounds like arthritis. He declines any further workup of treatment. 2.  Hyperlipidemia, IFG, hypothyroidism. Reviewed labs. Cholesterol and sugar are fine. TSH is slightly elevated. 3.  CAD, BPH. He is up to date with specialist follow up. 4.  Preventive medicine. Up to date with eye exam. A Medicare Wellness Visit in six months. Review of  Systems:   Notable for decreased hearing, new hearing aids are on the way. He also has decreased vision and follows up closely with his eye doctor. Current Outpatient Prescriptions   Medication Sig    Cholecalciferol, Vitamin D3, (VITAMIN D3) 2,000 unit cap capsule Take 2,000 Units by mouth two (2) times a day.  levothyroxine (SYNTHROID) 50 mcg tablet TAKE 1 TABLET BY MOUTH EVERY DAY BEFORE BREAKFAST    atorvastatin (LIPITOR) 40 mg tablet TAKE 1 TAB BY MOUTH DAILY.  amLODIPine (NORVASC) 5 mg tablet TAKE 1 TAB BY MOUTH DAILY.  aspirin 81 mg chewable tablet Take 81 mg by mouth daily.  finasteride (PROSCAR) 5 mg tablet Take 5 mg by mouth every other day. HS    co-enzyme Q-10 (CO Q-10) 100 mg capsule Take 200 mg by mouth daily.  VIT A/VIT C/VIT E/ZINC/COPPER (PRESERVISION AREDS PO) Take 1 Cap by mouth two (2) times a day. No current facility-administered medications for this visit. Review of Systems   Constitutional: Negative for weight loss. HENT: Positive for hearing loss. Eyes: Positive for blurred vision. Respiratory: Negative. Cardiovascular: Negative for chest pain, palpitations, leg swelling and PND. Musculoskeletal: Negative for myalgias. Neurological: Negative for focal weakness. Physical Exam   Constitutional: No distress. Neck: Carotid bruit is not present. Cardiovascular: Normal rate and regular rhythm.   Exam reveals no gallop and no friction rub. No murmur heard. Pulmonary/Chest: Effort normal and breath sounds normal. No respiratory distress. Musculoskeletal: He exhibits no edema. Nursing note and vitals reviewed. ASSESSMENT and PLAN  Diagnoses and all orders for this visit:    1. Mixed hyperlipidemia  -     LIPID PANEL  -     CBC WITH AUTOMATED DIFF    2. IFG (impaired fasting glucose)  -     METABOLIC PANEL, COMPREHENSIVE  -     HEMOGLOBIN A1C WITH EAG    3. Hypothyroidism, acquired, autoimmune  -     TSH 3RD GENERATION- 3 mos lab only  -     TSH 3RD GENERATION    4. Vitamin D deficiency  -     VITAMIN D, 25 HYDROXY  -     Cholecalciferol, Vitamin D3, (VITAMIN D3) 2,000 unit cap capsule; Take 2,000 Units by mouth two (2) times a day. 5. Essential hypertension, benign- Continue current regimen of prescription and / or OTC medications     6. Atherosclerosis of native coronary artery of native heart without angina pectoris- See cardiologist as directed.

## 2018-05-31 LAB
SPECIMEN STATUS REPORT, ROLRST: NORMAL
THYROPEROXIDASE AB SERPL-ACNC: 36 IU/ML (ref 0–34)

## 2018-06-04 RX ORDER — LEVOTHYROXINE SODIUM 50 UG/1
TABLET ORAL
Qty: 112 TAB | Refills: 1 | Status: SHIPPED | OUTPATIENT
Start: 2018-06-04 | End: 2018-09-18 | Stop reason: SDUPTHER

## 2018-08-27 ENCOUNTER — HOSPITAL ENCOUNTER (OUTPATIENT)
Dept: LAB | Age: 78
Discharge: HOME | End: 2018-08-27
Payer: MEDICARE

## 2018-08-27 PROCEDURE — 84443 ASSAY THYROID STIM HORMONE: CPT

## 2018-08-27 PROCEDURE — 36415 COLL VENOUS BLD VENIPUNCTURE: CPT

## 2018-08-28 LAB — TSH SERPL DL<=0.005 MIU/L-ACNC: 2.12 UIU/ML (ref 0.45–4.5)

## 2018-09-19 RX ORDER — LEVOTHYROXINE SODIUM 50 UG/1
TABLET ORAL
Qty: 112 TAB | Refills: 0 | Status: SHIPPED | OUTPATIENT
Start: 2018-09-19 | End: 2018-12-16 | Stop reason: SDUPTHER

## 2018-09-19 RX ORDER — AMLODIPINE BESYLATE 5 MG/1
TABLET ORAL
Qty: 90 TAB | Refills: 2 | Status: SHIPPED | OUTPATIENT
Start: 2018-09-19 | End: 2019-06-20 | Stop reason: SDUPTHER

## 2018-09-19 RX ORDER — ATORVASTATIN CALCIUM 40 MG/1
TABLET, FILM COATED ORAL
Qty: 90 TAB | Refills: 2 | Status: SHIPPED | OUTPATIENT
Start: 2018-09-19 | End: 2019-06-28 | Stop reason: SDUPTHER

## 2018-11-30 ENCOUNTER — HOSPITAL ENCOUNTER (OUTPATIENT)
Dept: LAB | Age: 78
Discharge: HOME | End: 2018-11-30
Payer: MEDICARE

## 2018-11-30 PROCEDURE — 36415 COLL VENOUS BLD VENIPUNCTURE: CPT

## 2018-11-30 PROCEDURE — 82306 VITAMIN D 25 HYDROXY: CPT

## 2018-11-30 PROCEDURE — 80053 COMPREHEN METABOLIC PANEL: CPT

## 2018-11-30 PROCEDURE — 80061 LIPID PANEL: CPT

## 2018-11-30 PROCEDURE — 84443 ASSAY THYROID STIM HORMONE: CPT

## 2018-11-30 PROCEDURE — 83036 HEMOGLOBIN GLYCOSYLATED A1C: CPT

## 2018-11-30 PROCEDURE — 85025 COMPLETE CBC W/AUTO DIFF WBC: CPT

## 2018-12-01 LAB
25(OH)D3+25(OH)D2 SERPL-MCNC: 44.3 NG/ML (ref 30–100)
ALBUMIN SERPL-MCNC: 4.2 G/DL (ref 3.5–4.8)
ALBUMIN/GLOB SERPL: 1.8 {RATIO} (ref 1.2–2.2)
ALP SERPL-CCNC: 101 IU/L (ref 39–117)
ALT SERPL-CCNC: 15 IU/L (ref 0–44)
AST SERPL-CCNC: 16 IU/L (ref 0–40)
BASOPHILS # BLD AUTO: 0.1 X10E3/UL (ref 0–0.2)
BASOPHILS NFR BLD AUTO: 1 %
BILIRUB SERPL-MCNC: 0.5 MG/DL (ref 0–1.2)
BUN SERPL-MCNC: 28 MG/DL (ref 8–27)
BUN/CREAT SERPL: 24 (ref 10–24)
CALCIUM SERPL-MCNC: 9.2 MG/DL (ref 8.6–10.2)
CHLORIDE SERPL-SCNC: 106 MMOL/L (ref 96–106)
CHOLEST SERPL-MCNC: 180 MG/DL (ref 100–199)
CO2 SERPL-SCNC: 25 MMOL/L (ref 20–29)
CREAT SERPL-MCNC: 1.15 MG/DL (ref 0.76–1.27)
EOSINOPHIL # BLD AUTO: 0.3 X10E3/UL (ref 0–0.4)
EOSINOPHIL NFR BLD AUTO: 4 %
ERYTHROCYTE [DISTWIDTH] IN BLOOD BY AUTOMATED COUNT: 14.7 % (ref 12.3–15.4)
EST. AVERAGE GLUCOSE BLD GHB EST-MCNC: 126 MG/DL
GLOBULIN SER CALC-MCNC: 2.3 G/DL (ref 1.5–4.5)
GLUCOSE SERPL-MCNC: 96 MG/DL (ref 65–99)
HBA1C MFR BLD: 6 % (ref 4.8–5.6)
HCT VFR BLD AUTO: 44.6 % (ref 37.5–51)
HDLC SERPL-MCNC: 57 MG/DL
HGB BLD-MCNC: 15.1 G/DL (ref 13–17.7)
IMM GRANULOCYTES # BLD: 0 X10E3/UL (ref 0–0.1)
IMM GRANULOCYTES NFR BLD: 0 %
LDLC SERPL CALC-MCNC: 107 MG/DL (ref 0–99)
LYMPHOCYTES # BLD AUTO: 2.3 X10E3/UL (ref 0.7–3.1)
LYMPHOCYTES NFR BLD AUTO: 32 %
MCH RBC QN AUTO: 30.1 PG (ref 26.6–33)
MCHC RBC AUTO-ENTMCNC: 33.9 G/DL (ref 31.5–35.7)
MCV RBC AUTO: 89 FL (ref 79–97)
MONOCYTES # BLD AUTO: 0.6 X10E3/UL (ref 0.1–0.9)
MONOCYTES NFR BLD AUTO: 9 %
NEUTROPHILS # BLD AUTO: 3.8 X10E3/UL (ref 1.4–7)
NEUTROPHILS NFR BLD AUTO: 54 %
PLATELET # BLD AUTO: 212 X10E3/UL (ref 150–379)
POTASSIUM SERPL-SCNC: 4.4 MMOL/L (ref 3.5–5.2)
PROT SERPL-MCNC: 6.5 G/DL (ref 6–8.5)
RBC # BLD AUTO: 5.01 X10E6/UL (ref 4.14–5.8)
SODIUM SERPL-SCNC: 146 MMOL/L (ref 134–144)
TRIGL SERPL-MCNC: 81 MG/DL (ref 0–149)
TSH SERPL DL<=0.005 MIU/L-ACNC: 2.8 UIU/ML (ref 0.45–4.5)
VLDLC SERPL CALC-MCNC: 16 MG/DL (ref 5–40)
WBC # BLD AUTO: 7 X10E3/UL (ref 3.4–10.8)

## 2018-12-05 ENCOUNTER — OFFICE VISIT (OUTPATIENT)
Dept: INTERNAL MEDICINE CLINIC | Age: 78
End: 2018-12-05

## 2018-12-05 VITALS
TEMPERATURE: 97.6 F | RESPIRATION RATE: 16 BRPM | OXYGEN SATURATION: 96 % | BODY MASS INDEX: 28.17 KG/M2 | WEIGHT: 208 LBS | HEART RATE: 76 BPM | HEIGHT: 72 IN | SYSTOLIC BLOOD PRESSURE: 130 MMHG | DIASTOLIC BLOOD PRESSURE: 68 MMHG

## 2018-12-05 DIAGNOSIS — I10 ESSENTIAL HYPERTENSION, BENIGN: ICD-10-CM

## 2018-12-05 DIAGNOSIS — N40.1 BPH WITH URINARY OBSTRUCTION: ICD-10-CM

## 2018-12-05 DIAGNOSIS — E06.3 HYPOTHYROIDISM, ACQUIRED, AUTOIMMUNE: ICD-10-CM

## 2018-12-05 DIAGNOSIS — R73.01 IFG (IMPAIRED FASTING GLUCOSE): ICD-10-CM

## 2018-12-05 DIAGNOSIS — I25.10 ATHEROSCLEROSIS OF NATIVE CORONARY ARTERY OF NATIVE HEART WITHOUT ANGINA PECTORIS: ICD-10-CM

## 2018-12-05 DIAGNOSIS — E78.2 MIXED HYPERLIPIDEMIA: ICD-10-CM

## 2018-12-05 DIAGNOSIS — N13.8 BPH WITH URINARY OBSTRUCTION: ICD-10-CM

## 2018-12-05 DIAGNOSIS — Z23 ENCOUNTER FOR IMMUNIZATION: ICD-10-CM

## 2018-12-05 DIAGNOSIS — Z13.31 SCREENING FOR DEPRESSION: ICD-10-CM

## 2018-12-05 DIAGNOSIS — Z00.00 MEDICARE ANNUAL WELLNESS VISIT, SUBSEQUENT: Primary | ICD-10-CM

## 2018-12-05 NOTE — PATIENT INSTRUCTIONS
Medicare Wellness Visit, Male The best way to live healthy is to have a lifestyle where you eat a well-balanced diet, exercise regularly, limit alcohol use, and quit all forms of tobacco/nicotine, if applicable. Regular preventive services are another way to keep healthy. Preventive services (vaccines, screening tests, monitoring & exams) can help personalize your care plan, which helps you manage your own care. Screening tests can find health problems at the earliest stages, when they are easiest to treat. [address] [facility] follows the current, evidence-based guidelines published by the [facility] Mauricio Kerwin ([facility]) when recommending preventive services for our patients. Because we follow these guidelines, sometimes recommendations change over time as research supports it. (For example, a prostate screening blood test is no longer routinely recommended for men with no symptoms.) Of course, you and your doctor may decide to screen more often for some diseases, based on your risk and co-morbidities (chronic disease you are already diagnosed with). Preventive services for you include: - Medicare offers their members a free annual wellness visit, which is time for you and your primary care provider to discuss and plan for your preventive service needs. Take advantage of this benefit every year! 
-All adults over age 72 should receive the recommended pneumonia vaccines. Current USPSTF guidelines recommend a series of two vaccines for the best pneumonia protection.  
-All adults should have a flu vaccine yearly and an ECG.  All adults age 61 and older should receive a shingles vaccine once in their lifetime.   
-All adults age 38-68 who are overweight should have a diabetes screening test once every three years.  
-Other screening tests & preventive services for persons with diabetes include: an eye exam to screen for diabetic retinopathy, a kidney function test, a foot exam, and stricter control over your cholesterol.  
-Cardiovascular screening for adults with routine risk involves an electrocardiogram (ECG) at intervals determined by the provider.  
-Colorectal cancer screening should be done for adults age 54-65 with no increased risk factors for colorectal cancer. There are a number of acceptable methods of screening for this type of cancer. Each test has its own benefits and drawbacks. Discuss with your provider what is most appropriate for you during your annual wellness visit. The different tests include: colonoscopy (considered the best screening method), a fecal occult blood test, a fecal DNA test, and sigmoidoscopy. 
-All adults born between [facility] should be screened once for Hepatitis C. 
-An Abdominal Aortic Aneurysm (AAA) Screening is recommended for men age 73-68 who has ever smoked in their lifetime. Here is a list of your current Health Maintenance items (your personalized list of preventive services) with a due date: There are no preventive care reminders to display for this patient.

## 2018-12-05 NOTE — PROGRESS NOTES
HISTORY OF PRESENT ILLNESS Bree Templeton is a 66 y.o. male. JAK Hubbard is seen today for a Medicare Wellness Visit and to follow up of chronic problems. 1. Preventive medicine. He is due for the shingles vaccine. He is up to date with labs and other vaccinations. 2. Medicare Wellness Visit. See attached note. 3. Chronic problems are reviewed. 4. CAD, up to date with cardiology and not having any symptoms. He did well on a recent stress test. 
5. Hypertension. Stable on current regimen. 6. Hyperlipidemia, IFG, hypothyroidism. Reviewed labs fully. Continue current regimen. 7. BPH. Follows up routinely with urology. Symptoms stable overall. Current Outpatient Medications Medication Sig  varicella-zoster recombinant, PF, (SHINGRIX, PF,) 50 mcg/0.5 mL susr injection 0.5mL by IntraMUSCular route once now and then repeat in 2-6 months  amLODIPine (NORVASC) 5 mg tablet TAKE 1 TAB BY MOUTH DAILY.  levothyroxine (SYNTHROID) 50 mcg tablet TAKE 1 TABLET BY MOUTH EVERY DAY BEFORE BREAKFAST EXCEPT TAKE 2 TABS ON SATURDAY.  atorvastatin (LIPITOR) 40 mg tablet TAKE 1 TAB BY MOUTH DAILY.  Cholecalciferol, Vitamin D3, (VITAMIN D3) 2,000 unit cap capsule Take 2,000 Units by mouth two (2) times a day.  aspirin 81 mg chewable tablet Take 81 mg by mouth daily.  finasteride (PROSCAR) 5 mg tablet Take 5 mg by mouth every other day. HS  
 co-enzyme Q-10 (CO Q-10) 100 mg capsule Take 200 mg by mouth daily.  VIT A/VIT C/VIT E/ZINC/COPPER (PRESERVISION AREDS PO) Take 1 Cap by mouth two (2) times a day. No current facility-administered medications for this visit. Review of Systems Constitutional: Negative for weight loss. HENT: Positive for hearing loss. Respiratory: Negative. Cardiovascular: Negative for chest pain, palpitations, leg swelling and PND. Gastrointestinal: Negative. Genitourinary: Positive for frequency. Musculoskeletal: Negative for myalgias. Neurological: Negative for focal weakness. Physical Exam  
Constitutional: He is oriented to person, place, and time. He appears well-developed and well-nourished. No distress. HENT:  
Head: Normocephalic and atraumatic. Right Ear: External ear normal.  
Left Ear: External ear normal.  
Eyes: EOM are normal. Pupils are equal, round, and reactive to light. Right eye exhibits no discharge. Left eye exhibits no discharge. Neck: Normal range of motion. Neck supple. Carotid bruit is not present. No thyromegaly present. Cardiovascular: Normal rate, regular rhythm, normal heart sounds and intact distal pulses. Exam reveals no gallop and no friction rub. No murmur heard. Pulmonary/Chest: Effort normal and breath sounds normal. No respiratory distress. He has no wheezes. He has no rales. Abdominal: Soft. Bowel sounds are normal. He exhibits no distension and no mass. There is no tenderness. There is no rebound and no guarding. Musculoskeletal: Normal range of motion. He exhibits no edema or tenderness. Lymphadenopathy:  
  He has no cervical adenopathy. Neurological: He is alert and oriented to person, place, and time. He has normal reflexes. Skin: Skin is warm and dry. No rash noted. Psychiatric: He has a normal mood and affect. His behavior is normal.  
Nursing note and vitals reviewed. ASSESSMENT and PLAN Diagnoses and all orders for this visit: 
 
1. Medicare annual wellness visit, subsequent 2. Mixed hyperlipidemia -     METABOLIC PANEL, COMPREHENSIVE 
-     LIPID PANEL 
 
3. IFG (impaired fasting glucose) 
-     HEMOGLOBIN A1C WITH EAG 
 
4. Hypothyroidism, acquired, autoimmune 
-     TSH 3RD GENERATION 5. Essential hypertension, benign- Continue current regimen of prescription and / or OTC medications 6. Atherosclerosis of native coronary artery of native heart without angina pectoris- See cardiologist as directed. 7. BPH with urinary obstruction- See urologist as directed 8. Encounter for immunization 
-     varicella-zoster recombinant, PF, (SHINGRIX, PF,) 50 mcg/0.5 mL susr injection; 0.5mL by IntraMUSCular route once now and then repeat in 2-6 months 9. Screening for depression 
-     Rosio Platt

## 2018-12-16 RX ORDER — LEVOTHYROXINE SODIUM 50 UG/1
TABLET ORAL
Qty: 112 TAB | Refills: 0 | Status: SHIPPED | OUTPATIENT
Start: 2018-12-16 | End: 2019-03-18 | Stop reason: SDUPTHER

## 2019-01-08 ENCOUNTER — TELEPHONE (OUTPATIENT)
Dept: INTERNAL MEDICINE CLINIC | Age: 79
End: 2019-01-08

## 2019-01-08 ENCOUNTER — HOSPITAL ENCOUNTER (EMERGENCY)
Age: 79
Discharge: HOME | End: 2019-01-08
Attending: EMERGENCY MEDICINE
Payer: MEDICARE

## 2019-01-08 ENCOUNTER — APPOINTMENT (OUTPATIENT)
Dept: CT IMAGING | Age: 79
End: 2019-01-08
Attending: PHYSICIAN ASSISTANT
Payer: MEDICARE

## 2019-01-08 VITALS
WEIGHT: 202 LBS | BODY MASS INDEX: 28.28 KG/M2 | HEIGHT: 71 IN | OXYGEN SATURATION: 96 % | RESPIRATION RATE: 15 BRPM | HEART RATE: 96 BPM | TEMPERATURE: 97.9 F | DIASTOLIC BLOOD PRESSURE: 87 MMHG | SYSTOLIC BLOOD PRESSURE: 189 MMHG

## 2019-01-08 DIAGNOSIS — R33.9 URINARY RETENTION: Primary | ICD-10-CM

## 2019-01-08 DIAGNOSIS — N32.89 BLADDER SPASM: ICD-10-CM

## 2019-01-08 DIAGNOSIS — R31.9 HEMATURIA, UNSPECIFIED TYPE: ICD-10-CM

## 2019-01-08 LAB
ANION GAP BLD CALC-SCNC: 17 MMOL/L (ref 10–20)
APPEARANCE UR: ABNORMAL
BACTERIA URNS QL MICRO: NEGATIVE /HPF
BILIRUB UR QL CFM: NEGATIVE
BUN BLD-MCNC: 21 MG/DL (ref 9–20)
CA-I BLD-MCNC: 1.14 MMOL/L (ref 1.12–1.32)
CHLORIDE BLD-SCNC: 101 MMOL/L (ref 98–107)
CO2 BLD-SCNC: 24 MMOL/L (ref 21–32)
COLOR UR: ABNORMAL
CREAT BLD-MCNC: 0.7 MG/DL (ref 0.6–1.3)
EPITH CASTS URNS QL MICRO: ABNORMAL /LPF
GLUCOSE BLD-MCNC: 118 MG/DL (ref 65–100)
GLUCOSE UR STRIP.AUTO-MCNC: NEGATIVE MG/DL
HCT VFR BLD CALC: 45 % (ref 36.6–50.3)
HGB UR QL STRIP: ABNORMAL
KETONES UR QL STRIP.AUTO: 15 MG/DL
LEUKOCYTE ESTERASE UR QL STRIP.AUTO: ABNORMAL
NITRITE UR QL STRIP.AUTO: POSITIVE
PH UR STRIP: 6.5 [PH] (ref 5–8)
POTASSIUM BLD-SCNC: 3.9 MMOL/L (ref 3.5–5.1)
PROT UR STRIP-MCNC: >300 MG/DL
RBC #/AREA URNS HPF: >100 /HPF (ref 0–5)
SERVICE CMNT-IMP: ABNORMAL
SODIUM BLD-SCNC: 137 MMOL/L (ref 136–145)
SP GR UR REFRACTOMETRY: 1.02 (ref 1–1.03)
UR CULT HOLD, URHOLD: NORMAL
UROBILINOGEN UR QL STRIP.AUTO: 2 EU/DL (ref 0.2–1)
WBC URNS QL MICRO: ABNORMAL /HPF (ref 0–4)
YEAST BUDDING URNS QL: PRESENT

## 2019-01-08 PROCEDURE — 81001 URINALYSIS AUTO W/SCOPE: CPT

## 2019-01-08 PROCEDURE — 99283 EMERGENCY DEPT VISIT LOW MDM: CPT

## 2019-01-08 PROCEDURE — 51700 IRRIGATION OF BLADDER: CPT

## 2019-01-08 PROCEDURE — 80047 BASIC METABLC PNL IONIZED CA: CPT

## 2019-01-08 RX ORDER — HYDROCODONE BITARTRATE AND ACETAMINOPHEN 5; 325 MG/1; MG/1
1 TABLET ORAL
Qty: 20 TAB | Refills: 0 | Status: SHIPPED | OUTPATIENT
Start: 2019-01-08 | End: 2019-06-05 | Stop reason: ALTCHOICE

## 2019-01-08 NOTE — ED TRIAGE NOTES
Patient went to a urologist today and had a olivares placed with a drainage bag. Noticed that the urine has not been flowing well and he is having \" bladder spasms. \" Noticed blood in the urine yesterday and passed a clot in the middle of the night. Is supposed to have a CT at [facility] tomorrow

## 2019-01-08 NOTE — TELEPHONE ENCOUNTER
Patient's wife, Abad Belton called to report Mr. Laci Mccormack is having a flank pain, noticed blood in urine last night, passed a \"clot\" this morning. She requested a referral to urologist, advised he would need to come into the office to be evaluated. She states patient has been followed in the past by urology but they have since moved and wanted to know a local urologist. Jeremy  phone number to [facility] but  advised to schedule an appt or go to urgent care, she verbalized understanding.

## 2019-01-09 ENCOUNTER — APPOINTMENT (OUTPATIENT)
Dept: CT IMAGING | Age: 79
End: 2019-01-09
Attending: EMERGENCY MEDICINE
Payer: MEDICARE

## 2019-01-09 ENCOUNTER — HOSPITAL ENCOUNTER (EMERGENCY)
Age: 79
Discharge: HOME | End: 2019-01-09
Attending: EMERGENCY MEDICINE
Payer: MEDICARE

## 2019-01-09 VITALS
HEART RATE: 82 BPM | TEMPERATURE: 97.8 F | SYSTOLIC BLOOD PRESSURE: 173 MMHG | WEIGHT: 202 LBS | OXYGEN SATURATION: 94 % | BODY MASS INDEX: 28.28 KG/M2 | RESPIRATION RATE: 16 BRPM | HEIGHT: 71 IN | DIASTOLIC BLOOD PRESSURE: 71 MMHG

## 2019-01-09 DIAGNOSIS — R31.0 GROSS HEMATURIA: Primary | ICD-10-CM

## 2019-01-09 DIAGNOSIS — R33.9 URINARY RETENTION: ICD-10-CM

## 2019-01-09 PROCEDURE — 51700 IRRIGATION OF BLADDER: CPT

## 2019-01-09 PROCEDURE — 74011000258 HC RX REV CODE- 258: Performed by: RADIOLOGY

## 2019-01-09 PROCEDURE — 99283 EMERGENCY DEPT VISIT LOW MDM: CPT

## 2019-01-09 PROCEDURE — 74011636320 HC RX REV CODE- 636/320: Performed by: RADIOLOGY

## 2019-01-09 PROCEDURE — 74178 CT ABD&PLV WO CNTR FLWD CNTR: CPT

## 2019-01-09 PROCEDURE — 77030029179 HC BAG URIN DRNG SIMS -A

## 2019-01-09 RX ORDER — SODIUM CHLORIDE 0.9 % (FLUSH) 0.9 %
10 SYRINGE (ML) INJECTION
Status: COMPLETED | OUTPATIENT
Start: 2019-01-09 | End: 2019-01-09

## 2019-01-09 RX ADMIN — IOPAMIDOL 100 ML: 612 INJECTION, SOLUTION INTRAVENOUS at 07:23

## 2019-01-09 RX ADMIN — Medication 10 ML: at 07:23

## 2019-01-09 RX ADMIN — SODIUM CHLORIDE 100 ML: 900 INJECTION, SOLUTION INTRAVENOUS at 07:24

## 2019-01-09 NOTE — ED NOTES
The patient was discharged home by [facility] in stable condition. The patient is alert and oriented, in no respiratory distress and discharge vital signs obtained. The patient's diagnosis, condition and treatment were explained. The patient expressed understanding. Prescriptions given. A discharge plan has been developed. A  was not involved in the process. Aftercare instructions were given. Pt ambulatory out of the ED with family.

## 2019-01-09 NOTE — DISCHARGE INSTRUCTIONS
Patient Education        Blood in the Urine: Care Instructions  Your Care Instructions    Blood in the urine, or hematuria, may make the urine look red, brown, or pink. There may be blood every time you urinate or just from time to time. You cannot always see blood in the urine, but it will show up in a urine test.  Blood in the urine may be serious. It should always be checked by a doctor. Your doctor may recommend more tests, including an X-ray, a CT scan, or a cystoscopy (which lets a doctor look inside the urethra and bladder). Blood in the urine can be a sign of another problem. Common causes are bladder infections and kidney stones. An injury to your groin or your genital area can also cause bleeding in the urinary tract. Very hard exercise--such as running a marathon--can cause blood in the urine. Blood in the urine can also be a sign of kidney disease or cancer in the bladder or kidney. Many cases of blood in the urine are caused by a harmless condition that runs in families. This is called benign familial hematuria. It does not need any treatment. Sometimes your urine may look red or brown even though it does not contain blood. For example, not getting enough fluids (dehydration), taking certain medicines, or having a liver problem can change the color of your urine. Eating foods such as beets, rhubarb, or blackberries or foods with red food coloring can make your urine look red or pink. Follow-up care is a key part of your treatment and safety. Be sure to make and go to all appointments, and call your doctor if you are having problems. It's also a good idea to know your test results and keep a list of the medicines you take. When should you call for help? Call your doctor now or seek immediate medical care if:    · You have symptoms of a urinary infection. For example:  ? You have pus in your urine. ? You have pain in your back just below your rib cage. This is called flank pain. ?  You have a fever, chills, or body aches. ? It hurts to urinate. ? You have groin or belly pain.     · You have more blood in your urine.    Watch closely for changes in your health, and be sure to contact your doctor if:    · You have new urination problems.     · You do not get better as expected. Where can you learn more? Go to http://elio-cachorro.info/. Enter L966 in the search box to learn more about \"Blood in the Urine: Care Instructions. \"  Current as of: March 21, 2018  Content Version: 11.8  © 8947-4339 [employer]. Care instructions adapted under license by [employer] (which disclaims liability or warranty for this information). If you have questions about a medical condition or this instruction, always ask your healthcare professional. [address] any warranty or liability for your use of this information. Patient Education        Cystoscopy: Before Your Procedure  What is a cystoscopy? A cystoscopy is a procedure that lets a doctor look inside your bladder and urethra. The urethra is the tube that carries urine from the bladder to outside the body. The doctor uses a thin, lighted tool called a cystoscope. With this tool, he or she can look for kidney or bladder stones. The doctor can also look for tumors, bleeding, or infection. If you are in a clinic and you are awake, you will get gel to numb your urethra. This makes the procedure more comfortable. Then the doctor puts the tube into your urethra and moves it into your bladder. Next, the doctor fills your bladder with liquid. This helps him or her see better. It may cause you to feel pressure in your bladder area for a short time. If you are in the hospital, you may get medicine to make you sleep during the procedure. While you are asleep, the doctor can take samples of tissue. These will be checked for cancer and other problems. This is called a biopsy.  If you have a biopsy, you may have a small amount of blood in your urine for several days. You may also need a catheter. It's a tube that drains urine from your bladder. Your doctor will take it out at your follow-up visit. Follow-up care is a key part of your treatment and safety. Be sure to make and go to all appointments, and call your doctor if you are having problems. It's also a good idea to know your test results and keep a list of the medicines you take. What happens before the procedure?   Preparing for the procedure    · Understand exactly what procedure is planned, along with the risks, benefits, and other options. · Tell your doctors ALL the medicines, vitamins, supplements, and herbal remedies you take. Some of these can increase the risk of bleeding or interact with anesthesia.     · If you take blood thinners, such as warfarin (Coumadin), clopidogrel (Plavix), or aspirin, be sure to talk to your doctor. He or she will tell you if you should stop taking these medicines before your procedure. Make sure that you understand exactly what your doctor wants you to do.     · Your doctor will tell you which medicines to take or stop before your procedure. You may need to stop taking certain medicines a week or more before the procedure. So talk to your doctor as soon as you can.     · If you have an advance directive, let your doctor know. It may include a living will and a durable power of  for health care. Bring a copy to the hospital. If you don't have one, you may want to prepare one. It lets your doctor and loved ones know your health care wishes. Doctors advise that everyone prepare these papers before any type of surgery or procedure. Procedures can be stressful. This information will help you understand what you can expect. And it will help you safely prepare for your procedure. What happens on the day of the procedure? · Follow the instructions exactly about when to stop eating and drinking.  If you don't, your procedure may be canceled. If your doctor told you to take your medicines on the day of your procedure, take them with only a sip of water.     · Take a bath or shower before you come in for your procedure. Do not apply lotions, perfumes, deodorants, or nail polish.     · Take off all jewelry and piercings. And take out contact lenses, if you wear them.    At the hospital or surgery center   · Bring a picture ID.     · You will be asked to empty your bladder just before the procedure.     · You will be kept comfortable and safe by your anesthesia provider. The anesthesia may make you sleep. Or it may just numb the area being worked on.     · In most cases, the cystoscope is in the bladder for less than 10 minutes. But the entire test may take up to 45 minutes or longer. Going home   · Be sure you have someone to drive you home. Anesthesia and pain medicine make it unsafe for you to drive.     · You will be given more specific instructions about recovering from your procedure. They will cover things like diet, wound care, follow-up care, driving, and getting back to your normal routine. When should you call your doctor? · You have questions or concerns.     · You don't understand how to prepare for your procedure.     · You become ill before the procedure (such as fever, flu, or a cold).     · You need to reschedule or have changed your mind about having the procedure. Where can you learn more? Go to http://elio-cachorro.info/. Enter R443 in the search box to learn more about \"Cystoscopy: Before Your Procedure. \"  Current as of: March 21, 2018  Content Version: 11.8  © 8762-4959 [employer]. Care instructions adapted under license by "[employer]" (which disclaims liability or warranty for this information).  If you have questions about a medical condition or this instruction, always ask your healthcare professional. Joanie Brice disclaims any warranty or liability for your use of this information. Patient Education        Urinary Retention: Care Instructions  Your Care Instructions    Urinary retention means that you aren't able to urinate. In men, it is often caused by a blockage of the urinary tract from an enlarged prostate gland. In men and women, it can also be caused by an infection or nerve damage. Or it may be a side effect of a medicine. The doctor may have drained the urine from your bladder. If you still have problems passing urine, you may need to use a catheter at home. This is used to empty your bladder until the problem can be fixed. Your doctor may put a catheter in your bladder before you go home. If so, he or she will tell you when to come back to have the catheter removed. The doctor has checked you closely. But problems can develop later. If you notice any problems or new symptoms, get medical treatment right away. Follow-up care is a key part of your treatment and safety. Be sure to make and go to all appointments, and call your doctor if you are having problems. It's also a good idea to know your test results and keep a list of the medicines you take. How can you care for yourself at home? · Take your medicines exactly as prescribed. Call your doctor if you think you are having a problem with your medicine. You will get more details on the specific medicines your doctor prescribes. · Check with your doctor before you use any over-the-counter medicines. Many cold and allergy medicines, for example, can make this problem worse. Make sure your doctor knows all of the medicines, vitamins, supplements, and herbal remedies you take. · Spread out through the day the amount of fluid you drink. Do not drink a lot at bedtime. · Avoid alcohol and caffeine. · If you have been given a catheter, or if one is already in place, follow the instructions you were given. Always wash your hands before and after you handle the catheter.   When should you call for help? Call your doctor now or seek immediate medical care if:    · You cannot urinate at all, or it is getting harder to urinate.     · You have symptoms of a urinary tract infection. These may include:  ? Pain or burning when you urinate. ? A frequent need to urinate without being able to pass much urine. ? Pain in the flank, which is just below the rib cage and above the waist on either side of the back. ? Blood in your urine. ? A fever.    Watch closely for changes in your health, and be sure to contact your doctor if:    · You have any problems with your catheter.     · You do not get better as expected. Where can you learn more? Go to http://elio-cachorro.info/. Enter M244 in the search box to learn more about \"Urinary Retention: Care Instructions. \"  Current as of: March 21, 2018  Content Version: 11.8  © 3393-6945 [employer]. Care instructions adapted under license by [employer] (which disclaims liability or warranty for this information). If you have questions about a medical condition or this instruction, always ask your healthcare professional. [place] [address] any warranty or liability for your use of this information.

## 2019-01-09 NOTE — ED TRIAGE NOTES
Pt arrives ambulatory from home c/o low back pain 2-3 weeks, reports blood in urine starting Monday afternoon, pt reports he has not been able to void since 0500 yesterday morning; pt saw Dr. Celestina Reeder yesterday afternoon and had a olivares catheter placed, pt reports the catheter was leaking ~1830 last night and he went to [facility] ED where he was irrigated; he was discharged home and around 0400 this AM pt reports the catheter stopped flowing again with associated pain at the bladder; Dr. Celestina Reeder told the pt he had a kidney stone

## 2019-01-09 NOTE — ED PROVIDER NOTES
66 y.o. male with past medical history significant for BPH, urinary retention (followed by Dr. Avani Li urology; currently with olivares cath) presents with complaints of urinary retention and lower abdominal pain. The pt states that \"I saw Dr. Avani Li today and he placed a olivares catheter and scheduled me for a cystoscopy next week. I feel like I am unable to void and I noticed some blood in the urine bag. \"  The pt states that he is scheduled for CT tomorrow as an outpatient. Denies fever, nausea, vomiting, or testicular pain. There are no other acute medical complaints at this time. PCP: MD Jacque Jacob PA-C Past Medical History:  
Diagnosis Date  Arrhythmia   
 r/t caffeine  Arthritis  Basal cell carcinoma  BPH (benign prostatic hyperplasia)  CAD (coronary artery disease) s/p PTCA '92  
 Calculus of kidney  Chronic pain BACK  Elevated PSA  Hypercholesterolemia  Lumbar foraminal stenosis  Melanoma ([address]  MVP (mitral valve prolapse)  Other ill-defined conditions(799.89) Prostatitis  Prediabetes  Seasonal allergies  Squamous cell carcinoma  Systolic murmur  Thyroid disease HYPOTHYROID  Vision impairment   
 R EYE, BLOOD CLOT ON RETINA Past Surgical History:  
Procedure Laterality Date  ENDOSCOPY, COLON, DIAGNOSTIC    
 due [address] [facility] [address] [address] WITH BALLOON  
 HX LITHOTRIPSY    
 x2  
 HX ORTHOPAEDIC    
 elbow - fx right arm age 3 yrs Family History:  
Problem Relation Age of Onset  Heart Disease Mother CHF  Heart Disease Father CAD  Heart Disease Sister CAD CABG  Lung Disease Sister  Heart Disease Brother CAD  Pulmonary Fibrosis Brother  Heart Disease Brother CAD  Stroke Brother 29  
     cerebral hemorrhage  Cancer Brother      LUNG  
  High Cholesterol Daughter  Anesth Problems Neg Hx Social History Socioeconomic History  Marital status:  Spouse name: Not on file  Number of children: Not on file  Years of education: Not on file  Highest education level: Not on file Social Needs  Financial resource strain: Not on file  Food insecurity - worry: Not on file  Food insecurity - inability: Not on file  Transportation needs - medical: Not on file  Transportation needs - non-medical: Not on file Occupational History  Not on file Tobacco Use  Smoking status: Never Smoker  Smokeless tobacco: Never Used Substance and Sexual Activity  Alcohol use: No  
 Drug use: No  
 Sexual activity: Not on file Other Topics Concern  Not on file Social History Narrative  Not on file ALLERGIES: Codeine; Pcn [penicillins]; and Sulfa (sulfonamide antibiotics) Review of Systems Constitutional: Negative for chills, diaphoresis and fever. HENT: Negative for congestion, postnasal drip, rhinorrhea and sore throat. Eyes: Negative for photophobia, discharge, redness and visual disturbance. Respiratory: Negative for cough, chest tightness, shortness of breath and wheezing. Cardiovascular: Negative for chest pain, palpitations and leg swelling. Gastrointestinal: Positive for abdominal pain. Negative for abdominal distention, blood in stool, constipation, diarrhea, nausea and vomiting. Genitourinary: Positive for hematuria. Negative for difficulty urinating, dysuria, frequency and urgency. Musculoskeletal: Negative for arthralgias, back pain, joint swelling and myalgias. Skin: Negative for color change and rash. Neurological: Negative for dizziness, speech difficulty, weakness, light-headedness, numbness and headaches. Psychiatric/Behavioral: Negative for confusion. The patient is not nervous/anxious. All other systems reviewed and are negative. Vitals: 01/08/19 1849 BP: 189/87 Pulse: 96  
Resp: 15 Temp: 97.9 °F (36.6 °C) SpO2: 96% Weight: 91.6 kg (202 lb) Height: 5' 11\" (1.803 m) Physical Exam  
Constitutional: He is oriented to person, place, and time. He appears well-developed and well-nourished. No distress. HENT:  
Head: Normocephalic and atraumatic. Head is without raccoon's eyes, without Sawyer's sign and without laceration. Right Ear: Hearing, tympanic membrane, external ear and ear canal normal. No foreign bodies. Tympanic membrane is not bulging. No hemotympanum. Left Ear: Hearing, tympanic membrane, external ear and ear canal normal. No foreign bodies. Tympanic membrane is not bulging. No hemotympanum. Nose: Nose normal. No mucosal edema or rhinorrhea. Right sinus exhibits no maxillary sinus tenderness and no frontal sinus tenderness. Left sinus exhibits no maxillary sinus tenderness and no frontal sinus tenderness. Mouth/Throat: Uvula is midline, oropharynx is clear and moist and mucous membranes are normal. No tonsillar abscesses. Eyes: Conjunctivae and EOM are normal. Pupils are equal, round, and reactive to light. Right eye exhibits no discharge. Left eye exhibits no discharge. Neck: Normal range of motion. Neck supple. Cardiovascular: Normal rate, regular rhythm and normal heart sounds. Exam reveals no gallop and no friction rub. No murmur heard. Regular rate and rhythm. No murmurs, gallops, rubs, or clicks. Pulmonary/Chest: Effort normal and breath sounds normal. No respiratory distress. He has no wheezes. He has no rales. No stridor or wheezes. No accessory muscle usage. No nasal flaring. Breath Sounds equal bilaterally. Abdominal: Soft. Bowel sounds are normal. He exhibits no distension. There is no tenderness. There is no rebound and no guarding. No abdominal Bruits. No pulsatile mass. No abdominal scars. Active bowel sounds. Genitourinary: Genitourinary Comments: 22 gauge coude catheter in place. Large amount of blood present in urine bag. Musculoskeletal: Normal range of motion. He exhibits no edema, tenderness or deformity. Neurological: He is alert and oriented to person, place, and time. Skin: He is not diaphoretic. Nursing note and vitals reviewed. MDM Number of Diagnoses or Management Options Bladder spasm:  
Hematuria, unspecified type:  
Urinary retention:  
Diagnosis management comments: Pt reports relief of symptoms after flushing catheter and urine now draining into bag. Pt advised to keep outpatient CT appointment for tomorrow. Pt given strict return precautions. Vimal Stafford PA-C Procedures

## 2019-01-09 NOTE — DISCHARGE INSTRUCTIONS
Patient Education        Urinary Retention: Care Instructions  Your Care Instructions    Urinary retention means that you aren't able to urinate. In men, it is often caused by a blockage of the urinary tract from an enlarged prostate gland. In men and women, it can also be caused by an infection or nerve damage. Or it may be a side effect of a medicine. The doctor may have drained the urine from your bladder. If you still have problems passing urine, you may need to use a catheter at home. This is used to empty your bladder until the problem can be fixed. Your doctor may put a catheter in your bladder before you go home. If so, he or she will tell you when to come back to have the catheter removed. The doctor has checked you closely. But problems can develop later. If you notice any problems or new symptoms, get medical treatment right away. Follow-up care is a key part of your treatment and safety. Be sure to make and go to all appointments, and call your doctor if you are having problems. It's also a good idea to know your test results and keep a list of the medicines you take. How can you care for yourself at home? · Take your medicines exactly as prescribed. Call your doctor if you think you are having a problem with your medicine. You will get more details on the specific medicines your doctor prescribes. · Check with your doctor before you use any over-the-counter medicines. Many cold and allergy medicines, for example, can make this problem worse. Make sure your doctor knows all of the medicines, vitamins, supplements, and herbal remedies you take. · Spread out through the day the amount of fluid you drink. Do not drink a lot at bedtime. · Avoid alcohol and caffeine. · If you have been given a catheter, or if one is already in place, follow the instructions you were given. Always wash your hands before and after you handle the catheter. When should you call for help?   Call your doctor now or seek immediate medical care if:    · You cannot urinate at all, or it is getting harder to urinate.     · You have symptoms of a urinary tract infection. These may include:  ? Pain or burning when you urinate. ? A frequent need to urinate without being able to pass much urine. ? Pain in the flank, which is just below the rib cage and above the waist on either side of the back. ? Blood in your urine. ? A fever.    Watch closely for changes in your health, and be sure to contact your doctor if:    · You have any problems with your catheter.     · You do not get better as expected. Where can you learn more? Go to http://elio-cachorro.info/. Enter M244 in the search box to learn more about \"Urinary Retention: Care Instructions. \"  Current as of: March 21, 2018  Content Version: 11.8  © 5127-5493 [email or web address]. Care instructions adapted under license by [employer] (which disclaims liability or warranty for this information). If you have questions about a medical condition or this instruction, always ask your healthcare professional. [place] [address] any warranty or liability for your use of this information. Patient Education        Blood in the Urine: Care Instructions  Your Care Instructions    Blood in the urine, or hematuria, may make the urine look red, brown, or pink. There may be blood every time you urinate or just from time to time. You cannot always see blood in the urine, but it will show up in a urine test.  Blood in the urine may be serious. It should always be checked by a doctor. Your doctor may recommend more tests, including an X-ray, a CT scan, or a cystoscopy (which lets a doctor look inside the urethra and bladder). Blood in the urine can be a sign of another problem. Common causes are bladder infections and kidney stones. An injury to your groin or your genital area can also cause bleeding in the urinary tract.  Very hard exercise--such as running a marathon--can cause blood in the urine. Blood in the urine can also be a sign of kidney disease or cancer in the bladder or kidney. Many cases of blood in the urine are caused by a harmless condition that runs in families. This is called benign familial hematuria. It does not need any treatment. Sometimes your urine may look red or brown even though it does not contain blood. For example, not getting enough fluids (dehydration), taking certain medicines, or having a liver problem can change the color of your urine. Eating foods such as beets, rhubarb, or blackberries or foods with red food coloring can make your urine look red or pink. Follow-up care is a key part of your treatment and safety. Be sure to make and go to all appointments, and call your doctor if you are having problems. It's also a good idea to know your test results and keep a list of the medicines you take. When should you call for help? Call your doctor now or seek immediate medical care if:    · You have symptoms of a urinary infection. For example:  ? You have pus in your urine. ? You have pain in your back just below your rib cage. This is called flank pain. ? You have a fever, chills, or body aches. ? It hurts to urinate. ? You have groin or belly pain.     · You have more blood in your urine.    Watch closely for changes in your health, and be sure to contact your doctor if:    · You have new urination problems.     · You do not get better as expected. Where can you learn more? Go to http://elio-cachorro.info/. Enter D082 in the search box to learn more about \"Blood in the Urine: Care Instructions. \"  Current as of: March 21, 2018  Content Version: 11.8  © 0247-6153 [employer]. Care instructions adapted under license by [employer] (which disclaims liability or warranty for this information).  If you have questions about a medical condition or this instruction, always ask your healthcare professional. [place] [address] any warranty or liability for your use of this information. We hope that we have addressed all of your medical concerns. The examination and treatment you received in the Emergency Department were for an emergent problem and were not intended as complete care. It is important that you follow up with your healthcare provider(s) for ongoing care. If your symptoms worsen or do not improve as expected, and you are unable to reach your usual health care provider(s), you should return to the Emergency Department. Today's healthcare is undergoing tremendous change, and patient satisfaction surveys are one of the many tools to assess the quality of medical care. You may receive a survey from the [employer] organization regarding your experience in the Emergency Department. I hope that your experience has been completely positive, particularly the medical care that I provided. As such, please participate in the survey; anything less than excellent does not meet my expectations or intentions. [facility] and [address] [place] [address] participate in nationally recognized quality of care measures. If your blood pressure is greater than 120/80, as reported below, we urge that you seek medical care to address the potential of high blood pressure, commonly known as hypertension. Hypertension can be hereditary or can be caused by certain medical conditions, pain, stress, or \"white coat syndrome. \"       Please make an appointment with your health care provider(s) for follow up of your Emergency Department visit. VITALS:   Patient Vitals for the past 8 hrs:   Temp Pulse Resp BP SpO2   01/08/19 1849 97.9 °F (36.6 °C) 96 15 189/87 96 %          Thank you for allowing us to provide you with medical care today. We realize that you have many choices for your emergency care needs.   Please choose us in the future for any continued health care needs. Iwona Perry, [facility].   Office: [phone]            Recent Results (from the past 24 hour(s))   URINALYSIS W/MICROSCOPIC    Collection Time: 01/08/19  7:46 PM   Result Value Ref Range    Color RED      Appearance BLOODY (A) CLEAR      Specific gravity 1.023 1.003 - 1.030      pH (UA) 6.5 5.0 - 8.0      Protein >300 (A) NEG mg/dL    Glucose NEGATIVE  NEG mg/dL    Ketone 15 (A) NEG mg/dL    Blood LARGE (A) NEG      Urobilinogen 2.0 (H) 0.2 - 1.0 EU/dL    Nitrites POSITIVE (A) NEG      Leukocyte Esterase SMALL (A) NEG      WBC PENDING /hpf    RBC PENDING /hpf    Epithelial cells PENDING /lpf    Bacteria PENDING /hpf   URINE CULTURE HOLD SAMPLE    Collection Time: 01/08/19  7:46 PM   Result Value Ref Range    Urine culture hold        URINE ON HOLD IN MICROBIOLOGY DEPT FOR 3 DAYS. IF UNPRESERVED URINE IS SUBMITTED, IT CANNOT BE USED FOR ADDITIONAL TESTING AFTER 24 HRS, RECOLLECTION WILL BE REQUIRED. POC CHEM8    Collection Time: 01/08/19  7:47 PM   Result Value Ref Range    Calcium, ionized (POC) 1.14 1.12 - 1.32 mmol/L    Sodium (POC) 137 136 - 145 mmol/L    Potassium (POC) 3.9 3.5 - 5.1 mmol/L    Chloride (POC) 101 98 - 107 mmol/L    CO2 (POC) 24 21 - 32 mmol/L    Anion gap (POC) 17 10 - 20 mmol/L    Glucose (POC) 118 (H) 65 - 100 mg/dL    BUN (POC) 21 (H) 9 - 20 mg/dL    Creatinine (POC) 0.7 0.6 - 1.3 mg/dL    GFRAA, POC >60 >60 ml/min/1.73m2    GFRNA, POC >60 >60 ml/min/1.73m2    Hematocrit (POC) 45 36.6 - 50.3 %    Comment Notified RN or MD immediately by          No results found.

## 2019-01-09 NOTE — ED NOTES
470 manually irrigated olivares 4446 Dr. Courtney Parada @ bedside 
 
0948 after olivares irrigation, pt drained ~400ml pink/red urine on his own into leg bag

## 2019-01-09 NOTE — ED NOTES
Irrigated bladder through preexisting 22 fr coude catheter from the urology office, with an entire bottle of saline in 30-50 ml increments. Entire bottle amount was returned, with large clots initially and then with thinner amounts of clots. Less bloody drainage noted. Afterwards placed a fresh leg bag and the blood-tinged urine appears to be draining well. All \"bladder pain\" has gone now. Patient currently watching TV with his wife

## 2019-01-09 NOTE — ED PROVIDER NOTES
The patient is a 70-year-old male with a past medical history significant for arrhythmia, arthritis, BPH, coronary artery disease, chronic pain, elevated PSA, hypercholesterolemia, melanoma, mitral valve prolapse, recent diagnosis of hematuria and urinary retention, seen by Dr. Lavern Diaz of [place] [facility] who placed a 25 [place] catheter, presents to the ED with a complaint of intermittent episodes of abdominal cramp and bleeding around the catheter this evening. The patient states that his leg bag was last emptied approximately 3 hours ago and has not had urine output since then. The patient was seen at [place] ED for the same symptoms yesterday , and his Russell catheter was irrigated. The patient denies any headache, sore throat, fever, chest pain, shortness of breath, nausea, vomiting, diarrhea, constipation, dizziness, weakness, and numbness. Past Medical History:  
Diagnosis Date  Arrhythmia   
 r/t caffeine  Arthritis  Basal cell carcinoma  BPH (benign prostatic hyperplasia)  CAD (coronary artery disease) s/p PTCA '92  
 Calculus of kidney  Chronic pain BACK  Elevated PSA  Hypercholesterolemia  Lumbar foraminal stenosis  Melanoma ([address]  MVP (mitral valve prolapse)  Other ill-defined conditions(799.89) Prostatitis  Prediabetes  Seasonal allergies  Squamous cell carcinoma  Systolic murmur  Thyroid disease HYPOTHYROID  Vision impairment   
 R EYE, BLOOD CLOT ON RETINA Past Surgical History:  
Procedure Laterality Date  ENDOSCOPY, COLON, DIAGNOSTIC    
 due Alejandra 51 [address] WITH BALLOON  
 HX LITHOTRIPSY    
 x2  
 HX ORTHOPAEDIC    
 elbow - fx right arm age 3 yrs Family History:  
Problem Relation Age of Onset  Heart Disease Mother CHF  Heart Disease Father CAD  Heart Disease Sister CAD CABG  Lung Disease Sister  Heart Disease Brother CAD  Pulmonary Fibrosis Brother  Heart Disease Brother CAD  Stroke Brother 29  
     cerebral hemorrhage  Cancer Brother LUNG  
 High Cholesterol Daughter  Anesth Problems Neg Hx Social History Socioeconomic History  Marital status:  Spouse name: Not on file  Number of children: Not on file  Years of education: Not on file  Highest education level: Not on file Social Needs  Financial resource strain: Not on file  Food insecurity - worry: Not on file  Food insecurity - inability: Not on file  Transportation needs - medical: Not on file  Transportation needs - non-medical: Not on file Occupational History  Not on file Tobacco Use  Smoking status: Never Smoker  Smokeless tobacco: Never Used Substance and Sexual Activity  Alcohol use: No  
 Drug use: No  
 Sexual activity: Not on file Other Topics Concern  Not on file Social History Narrative  Not on file ALLERGIES: Codeine; Pcn [penicillins]; and Sulfa (sulfonamide antibiotics) Review of Systems All other systems reviewed and are negative. Vitals:  
 01/09/19 0550 BP: 174/77 Pulse: 77 Resp: 16 Temp: 98.3 °F (36.8 °C) SpO2: 96% Weight: 91.6 kg (202 lb) Height: 5' 11\" (1.803 m) Physical Exam  
Nursing note and vitals reviewed. CONSTITUTIONAL: Well-appearing; well-nourished; in no apparent distress HEAD: Normocephalic; atraumatic EYES: PERRL; EOM intact; conjunctiva and sclera are clear bilaterally. ENT: No rhinorrhea; normal pharynx with no tonsillar hypertrophy; mucous membranes pink/moist, no erythema, no exudate. NECK: Supple; non-tender; no cervical lymphadenopathy CARD: Normal S1, S2; no murmurs, rubs, or gallops. Regular rate and rhythm.  
RESP: Normal respiratory effort; breath sounds clear and equal bilaterally; no wheezes, rhonchi, or rales. ABD: Normal bowel sounds; non-distended; non-tender; no palpable organomegaly, no masses, no bruits. Back Exam: Normal inspection; no vertebral point tenderness, no CVA tenderness. Normal range of motion. EXT: Normal ROM in all four extremities; non-tender to palpation; no swelling or deformity; distal pulses are normal, no edema. SKIN: Warm; dry; no rash. NEURO:Alert and oriented x 3, coherent, JUANIS-XII grossly intact, sensory and motor are non-focal. 
 exam: circumcised; 25 Frenchangled Russell catheter in place; no penile/scrotal swelling, erythema, tenderness, or mass felt MDM Number of Diagnoses or Management Options Diagnosis management comments: Assessment: 75-year-old male with acute urinary retention and hematuria-suspect blocked Russell catheter. Plan: Lab/ IV fluid/ manually irrigate the Russell catheter/ CT scan of the abdomen and pelvis with and without contrast/ serial exam/ monitor and reevaluate Amount and/or Complexity of Data Reviewed Clinical lab tests: ordered and reviewed Tests in the radiology section of CPT®: ordered and reviewed Tests in the medicine section of CPT®: reviewed and ordered Discussion of test results with the performing providers: yes Decide to obtain previous medical records or to obtain history from someone other than the patient: yes Obtain history from someone other than the patient: yes Review and summarize past medical records: yes Discuss the patient with other providers: yes Independent visualization of images, tracings, or specimens: yes Risk of Complications, Morbidity, and/or Mortality Presenting problems: moderate Diagnostic procedures: moderate Management options: moderate Patient Progress Patient progress: stable Procedures PROGRESS NOTE: 
Pt has been reexamined by Julia Gabriel MD all available results have been reviewed with pt and any available family. The patient is doing much better. He had a bladder scan at approximately 180 cc of urinary residual. He was manually irrigated with approximately 1 L of saline till clear. The patient denies any further discomfort. Awaiting  CT scan of the abdomen and pelvis. Will continue to monitor reevaluate in an hour. Written by Kavin Monte MD,  6:40 AM 
 
CONSULT NOTE: 
Ozzie Archibald MD spoke with Dr. Max Rosen of Urology. Discussed patient's presentation, history, physical assessment, and available diagnostic results. Wants patient discharged home and will follow up in the office for outpatient reevaluation and work-up as deemed appropriate. Pt was given an appointment at 1:30 PM today Progress Note:  
Pt has been reexamined by Kavin Monte MD. Pt is feeling much better. Symptoms have improved. All available results have been reviewed with pt and any available family. Pt understands sx, dx, and tx in ED. Care plan has been outlined and questions have been answered. Pt is ready to go home. Will send home on Hematuria/ Blocked Russell catheter instruction. Outpatient referral with PCP/ Urology as needed. Written by Kavin Monte MD,9:00 AM 
 
.  
. Jimmy Hoover

## 2019-01-09 NOTE — ED NOTES
Pt ambulatory out of ED with discharge instructions and prescriptions in hand given by Dr. Diego Simpson; pt verbalized understanding of discharge paperwork and time allotted for questions. VSS. Pt alert and oriented.

## 2019-01-09 NOTE — ED NOTES
Assumed care, verbal and beside report received from [facility]. Pt resting comfortably in bed,  alert and oriented x 3 with no complaints at this time.

## 2019-01-09 NOTE — ED NOTES
CT cancelled per PA's orders. Urine still draining into leg bag. Wife assisted patient with getting his clothes on and we repositioned the leg bag upon standing. Slight discomfort in pelvis once upright.

## 2019-03-18 RX ORDER — LEVOTHYROXINE SODIUM 50 UG/1
TABLET ORAL
Qty: 112 TAB | Refills: 0 | Status: SHIPPED | OUTPATIENT
Start: 2019-03-18 | End: 2019-06-17 | Stop reason: SDUPTHER

## 2019-06-05 ENCOUNTER — OFFICE VISIT (OUTPATIENT)
Dept: INTERNAL MEDICINE CLINIC | Age: 79
End: 2019-06-05

## 2019-06-05 ENCOUNTER — HOSPITAL ENCOUNTER (OUTPATIENT)
Dept: LAB | Age: 79
Discharge: HOME | End: 2019-06-05
Payer: MEDICARE

## 2019-06-05 VITALS
HEART RATE: 71 BPM | HEIGHT: 71 IN | OXYGEN SATURATION: 95 % | RESPIRATION RATE: 18 BRPM | DIASTOLIC BLOOD PRESSURE: 70 MMHG | SYSTOLIC BLOOD PRESSURE: 130 MMHG | WEIGHT: 205.4 LBS | TEMPERATURE: 97.7 F | BODY MASS INDEX: 28.76 KG/M2

## 2019-06-05 DIAGNOSIS — R73.01 IFG (IMPAIRED FASTING GLUCOSE): ICD-10-CM

## 2019-06-05 DIAGNOSIS — E06.3 HYPOTHYROIDISM, ACQUIRED, AUTOIMMUNE: ICD-10-CM

## 2019-06-05 DIAGNOSIS — M79.641 PAIN IN BOTH HANDS: ICD-10-CM

## 2019-06-05 DIAGNOSIS — E78.2 MIXED HYPERLIPIDEMIA: Primary | ICD-10-CM

## 2019-06-05 DIAGNOSIS — M79.642 PAIN IN BOTH HANDS: ICD-10-CM

## 2019-06-05 DIAGNOSIS — I10 ESSENTIAL HYPERTENSION, BENIGN: ICD-10-CM

## 2019-06-05 DIAGNOSIS — I25.10 ATHEROSCLEROSIS OF NATIVE CORONARY ARTERY OF NATIVE HEART WITHOUT ANGINA PECTORIS: ICD-10-CM

## 2019-06-05 PROCEDURE — 84443 ASSAY THYROID STIM HORMONE: CPT

## 2019-06-05 PROCEDURE — 80053 COMPREHEN METABOLIC PANEL: CPT

## 2019-06-05 PROCEDURE — 83036 HEMOGLOBIN GLYCOSYLATED A1C: CPT

## 2019-06-05 PROCEDURE — 36415 COLL VENOUS BLD VENIPUNCTURE: CPT

## 2019-06-05 PROCEDURE — 80061 LIPID PANEL: CPT

## 2019-06-05 RX ORDER — DICLOFENAC SODIUM 10 MG/G
GEL TOPICAL
Qty: 100 G | Refills: 11 | Status: SHIPPED | OUTPATIENT
Start: 2019-06-05 | End: 2019-12-30

## 2019-06-05 RX ORDER — TAMSULOSIN HYDROCHLORIDE 0.4 MG/1
CAPSULE ORAL
Refills: 11 | COMMUNITY
Start: 2019-04-08 | End: 2022-06-22 | Stop reason: SDUPTHER

## 2019-06-05 NOTE — PROGRESS NOTES
HISTORY OF PRESENT ILLNESS  Piper Sánchez is a 78 y.o. male. HPI Subjective:  Perlita Edge is seen today for follow up of hypertension and other problems. 1. Hypertension, fair readings. Home average is much better, however. 2. BPH. He is back on treatment per urology. 3. Hyperlipidemia, IFG, hypothyroidism. Due for routine labs. Denies medication side effects. 4. CAD. He now sees Dr. Iglesia Parmar at [facility]. 5. Preventive medicine. Wellness visit in six months. Review of Systems:  Notable for 1/10 pain in the left ring finger. He has arthritic changes. Tylenol helps some, but does not get full relief. Reviewed with him the use of Voltaren Gel. But if his symptoms persist would refer to ortho for consideration of a steroid injection. Current Outpatient Medications   Medication Sig    tamsulosin (FLOMAX) 0.4 mg capsule TAKE 1 CAPSULE EVERY DAY 30 MINUTES AFTER THE SAME MEAL EACH DAY    diclofenac (VOLTAREN) 1 % gel Apply  to affected area four (4) times daily as needed for Pain.  levothyroxine (SYNTHROID) 50 mcg tablet TAKE 1 TABLET BY MOUTH EVERY DAY BEFORE BREAKFAST EXCEPT TAKE 2 TABS ON SATURDAY.  amLODIPine (NORVASC) 5 mg tablet TAKE 1 TAB BY MOUTH DAILY.  atorvastatin (LIPITOR) 40 mg tablet TAKE 1 TAB BY MOUTH DAILY.  Cholecalciferol, Vitamin D3, (VITAMIN D3) 2,000 unit cap capsule Take 2,000 Units by mouth two (2) times a day.  aspirin 81 mg chewable tablet Take 81 mg by mouth daily.  finasteride (PROSCAR) 5 mg tablet Take 5 mg by mouth daily. HS    co-enzyme Q-10 (CO Q-10) 100 mg capsule Take 200 mg by mouth daily.  VIT A/VIT C/VIT E/ZINC/COPPER (PRESERVISION AREDS PO) Take 1 Cap by mouth two (2) times a day.  varicella-zoster recombinant, PF, (SHINGRIX, PF,) 50 mcg/0.5 mL susr injection 0.5mL by IntraMUSCular route once now and then repeat in 2-6 months     No current facility-administered medications for this visit.           Review of Systems   Constitutional: Negative for weight loss. Respiratory: Negative. Cardiovascular: Negative for chest pain, palpitations, leg swelling and PND. Genitourinary: Positive for frequency. Musculoskeletal: Positive for joint pain. Negative for myalgias. Neurological: Negative for focal weakness. Physical Exam   Constitutional: No distress. Neck: Carotid bruit is not present. Cardiovascular: Normal rate and regular rhythm. Exam reveals no gallop and no friction rub. No murmur heard. Pulmonary/Chest: Effort normal and breath sounds normal. No respiratory distress. Musculoskeletal: He exhibits no edema. Nursing note and vitals reviewed. ASSESSMENT and PLAN  Diagnoses and all orders for this visit:    1. Mixed hyperlipidemia  -     METABOLIC PANEL, COMPREHENSIVE  -     LIPID PANEL  -     CBC WITH AUTOMATED DIFF    2. IFG (impaired fasting glucose)  -     HEMOGLOBIN A1C WITH EAG    3. Hypothyroidism, acquired, autoimmune  -     TSH 3RD GENERATION    4. Essential hypertension, benign- Continue current regimen of prescription and / or OTC medications     5. Atherosclerosis of native coronary artery of native heart without angina pectoris- See cardiologist as directed. 6. Pain in both hands  -     diclofenac (VOLTAREN) 1 % gel; Apply  to affected area four (4) times daily as needed for Pain.

## 2019-06-06 LAB
ALBUMIN SERPL-MCNC: 4.3 G/DL (ref 3.5–4.8)
ALBUMIN/GLOB SERPL: 1.7 {RATIO} (ref 1.2–2.2)
ALP SERPL-CCNC: 103 IU/L (ref 39–117)
ALT SERPL-CCNC: 25 IU/L (ref 0–44)
AST SERPL-CCNC: 23 IU/L (ref 0–40)
BILIRUB SERPL-MCNC: 0.6 MG/DL (ref 0–1.2)
BUN SERPL-MCNC: 22 MG/DL (ref 8–27)
BUN/CREAT SERPL: 21 (ref 10–24)
CALCIUM SERPL-MCNC: 9.1 MG/DL (ref 8.6–10.2)
CHLORIDE SERPL-SCNC: 103 MMOL/L (ref 96–106)
CHOLEST SERPL-MCNC: 174 MG/DL (ref 100–199)
CO2 SERPL-SCNC: 26 MMOL/L (ref 20–29)
CREAT SERPL-MCNC: 1.06 MG/DL (ref 0.76–1.27)
EST. AVERAGE GLUCOSE BLD GHB EST-MCNC: 128 MG/DL
GLOBULIN SER CALC-MCNC: 2.5 G/DL (ref 1.5–4.5)
GLUCOSE SERPL-MCNC: 90 MG/DL (ref 65–99)
HBA1C MFR BLD: 6.1 % (ref 4.8–5.6)
HDLC SERPL-MCNC: 58 MG/DL
LDLC SERPL CALC-MCNC: 94 MG/DL (ref 0–99)
POTASSIUM SERPL-SCNC: 4.5 MMOL/L (ref 3.5–5.2)
PROT SERPL-MCNC: 6.8 G/DL (ref 6–8.5)
SODIUM SERPL-SCNC: 140 MMOL/L (ref 134–144)
TRIGL SERPL-MCNC: 108 MG/DL (ref 0–149)
TSH SERPL DL<=0.005 MIU/L-ACNC: 2.13 UIU/ML (ref 0.45–4.5)
VLDLC SERPL CALC-MCNC: 22 MG/DL (ref 5–40)

## 2019-06-18 RX ORDER — LEVOTHYROXINE SODIUM 50 UG/1
TABLET ORAL
Qty: 112 TAB | Refills: 0 | Status: SHIPPED | OUTPATIENT
Start: 2019-06-18 | End: 2019-09-13 | Stop reason: SDUPTHER

## 2019-06-21 RX ORDER — AMLODIPINE BESYLATE 5 MG/1
TABLET ORAL
Qty: 90 TAB | Refills: 2 | Status: SHIPPED | OUTPATIENT
Start: 2019-06-21 | End: 2020-03-07 | Stop reason: SDUPTHER

## 2019-06-28 RX ORDER — ATORVASTATIN CALCIUM 40 MG/1
TABLET, FILM COATED ORAL
Qty: 90 TAB | Refills: 2 | Status: ON HOLD | OUTPATIENT
Start: 2019-06-28 | End: 2020-04-14

## 2019-09-13 RX ORDER — LEVOTHYROXINE SODIUM 50 UG/1
TABLET ORAL
Qty: 112 TAB | Refills: 0 | Status: SHIPPED | OUTPATIENT
Start: 2019-09-13 | End: 2019-12-09 | Stop reason: SDUPTHER

## 2019-12-09 RX ORDER — LEVOTHYROXINE SODIUM 50 UG/1
TABLET ORAL
Qty: 112 TAB | Refills: 0 | Status: SHIPPED | OUTPATIENT
Start: 2019-12-09 | End: 2020-03-08

## 2019-12-30 ENCOUNTER — OFFICE VISIT (OUTPATIENT)
Dept: INTERNAL MEDICINE CLINIC | Age: 79
End: 2019-12-30

## 2019-12-30 ENCOUNTER — HOSPITAL ENCOUNTER (OUTPATIENT)
Dept: LAB | Age: 79
Discharge: HOME | End: 2019-12-30
Payer: MEDICARE

## 2019-12-30 VITALS
RESPIRATION RATE: 16 BRPM | SYSTOLIC BLOOD PRESSURE: 115 MMHG | HEART RATE: 81 BPM | WEIGHT: 206.6 LBS | HEIGHT: 71 IN | TEMPERATURE: 98 F | BODY MASS INDEX: 28.92 KG/M2 | DIASTOLIC BLOOD PRESSURE: 65 MMHG | OXYGEN SATURATION: 97 %

## 2019-12-30 DIAGNOSIS — E06.3 HYPOTHYROIDISM, ACQUIRED, AUTOIMMUNE: ICD-10-CM

## 2019-12-30 DIAGNOSIS — Z00.00 MEDICARE ANNUAL WELLNESS VISIT, SUBSEQUENT: Primary | ICD-10-CM

## 2019-12-30 DIAGNOSIS — N13.8 BPH WITH URINARY OBSTRUCTION: ICD-10-CM

## 2019-12-30 DIAGNOSIS — R73.01 IFG (IMPAIRED FASTING GLUCOSE): ICD-10-CM

## 2019-12-30 DIAGNOSIS — E78.2 MIXED HYPERLIPIDEMIA: ICD-10-CM

## 2019-12-30 DIAGNOSIS — I10 ESSENTIAL HYPERTENSION, BENIGN: ICD-10-CM

## 2019-12-30 DIAGNOSIS — N40.1 BPH WITH URINARY OBSTRUCTION: ICD-10-CM

## 2019-12-30 DIAGNOSIS — I25.10 ATHEROSCLEROSIS OF NATIVE CORONARY ARTERY OF NATIVE HEART WITHOUT ANGINA PECTORIS: ICD-10-CM

## 2019-12-30 PROCEDURE — 85025 COMPLETE CBC W/AUTO DIFF WBC: CPT

## 2019-12-30 PROCEDURE — 80061 LIPID PANEL: CPT

## 2019-12-30 PROCEDURE — 36415 COLL VENOUS BLD VENIPUNCTURE: CPT

## 2019-12-30 PROCEDURE — 84443 ASSAY THYROID STIM HORMONE: CPT

## 2019-12-30 PROCEDURE — 80053 COMPREHEN METABOLIC PANEL: CPT

## 2019-12-30 PROCEDURE — 83036 HEMOGLOBIN GLYCOSYLATED A1C: CPT

## 2019-12-30 NOTE — PROGRESS NOTES
HISTORY OF PRESENT ILLNESS  Jose Alberto Hardin is a 78 y.o. male. HPI Subjective:  Maggie Katz is seen today for a wellness visit and follow up of chronic problems. 1. Medicare wellness visit. See attached note. He is due for labs, shingles vaccine and colonoscopy in 2020. He is up to date with urology follow up, pneumonia vaccines and a tetanus booster. He declines the flu vaccine. 2. Chronic problems are reviewed. a. CAD, clinically stable and up to date with cardiology follow up down at [facility].  b. Hypertension, stable on current regimen. c. Hyperlipidemia, IFG, hypothyroidism. He has had his labs drawn this morning. d. BPH, up to date with urology follow up, no recurrent symptoms. Review of Systems:  Notable for a 24 hour gastroenteritis type illness this past weekend. It has resolved without any specific treatment and he feels back to normal at this time. Current Outpatient Medications   Medication Sig    levothyroxine (SYNTHROID) 50 mcg tablet TAKE 1 TABLET BY MOUTH EVERY DAY BEFORE BREAKFAST EXCEPT TAKE 2 TABS ON SATURDAY.  atorvastatin (LIPITOR) 40 mg tablet TAKE 1 TAB BY MOUTH DAILY.  amLODIPine (NORVASC) 5 mg tablet TAKE 1 TAB BY MOUTH DAILY.  tamsulosin (FLOMAX) 0.4 mg capsule TAKE 1 CAPSULE EVERY DAY 30 MINUTES AFTER THE SAME MEAL EACH DAY    varicella-zoster recombinant, PF, (SHINGRIX, PF,) 50 mcg/0.5 mL susr injection 0.5mL by IntraMUSCular route once now and then repeat in 2-6 months (Patient taking differently: 0.5mL by IntraMUSCular route once now and then repeat in 2-6 months NEEDS 2ND.)    Cholecalciferol, Vitamin D3, (VITAMIN D3) 2,000 unit cap capsule Take 2,000 Units by mouth two (2) times a day.  aspirin 81 mg chewable tablet Take 81 mg by mouth daily.  finasteride (PROSCAR) 5 mg tablet Take 5 mg by mouth daily. HS    co-enzyme Q-10 (CO Q-10) 100 mg capsule Take 200 mg by mouth daily.     VIT A/VIT C/VIT E/ZINC/COPPER (PRESERVISION AREDS PO) Take 1 Cap by mouth two (2) times a day. No current facility-administered medications for this visit. Review of Systems   Constitutional: Negative for weight loss. HENT: Positive for hearing loss. Respiratory: Negative. Cardiovascular: Negative for chest pain, palpitations, leg swelling and PND. Gastrointestinal: Positive for diarrhea, nausea and vomiting. Genitourinary: Positive for frequency. Musculoskeletal: Negative for myalgias. Neurological: Negative for focal weakness. Physical Exam  Vitals signs and nursing note reviewed. Constitutional:       General: He is not in acute distress. Appearance: He is well-developed. HENT:      Head: Normocephalic and atraumatic. Right Ear: Tympanic membrane, ear canal and external ear normal.      Left Ear: Tympanic membrane, ear canal and external ear normal.   Eyes:      General:         Right eye: No discharge. Left eye: No discharge. Pupils: Pupils are equal, round, and reactive to light. Neck:      Musculoskeletal: Normal range of motion and neck supple. Thyroid: No thyromegaly. Vascular: No carotid bruit. Cardiovascular:      Rate and Rhythm: Normal rate and regular rhythm. Heart sounds: Normal heart sounds. No murmur. No friction rub. No gallop. Pulmonary:      Effort: Pulmonary effort is normal. No respiratory distress. Breath sounds: Normal breath sounds. No wheezing or rales. Abdominal:      General: Bowel sounds are normal. There is no distension. Palpations: Abdomen is soft. There is no mass. Tenderness: There is no tenderness. There is no guarding or rebound. Musculoskeletal: Normal range of motion. General: No tenderness. Lymphadenopathy:      Cervical: No cervical adenopathy. Skin:     General: Skin is warm and dry. Findings: No rash. Neurological:      Mental Status: He is alert and oriented to person, place, and time.       Deep Tendon Reflexes: Reflexes are normal and symmetric. Psychiatric:         Behavior: Behavior normal.         ASSESSMENT and PLAN  Diagnoses and all orders for this visit:    1. Medicare annual wellness visit, subsequent    2. Mixed hyperlipidemia  -     METABOLIC PANEL, COMPREHENSIVE  -     CBC WITH AUTOMATED DIFF  -     LIPID PANEL    3. IFG (impaired fasting glucose)  -     HEMOGLOBIN A1C WITH EAG    4. Hypothyroidism, acquired, autoimmune  -     TSH 3RD GENERATION    5. Essential hypertension, benign- Continue current regimen of prescription and / or OTC medications     6. Atherosclerosis of native coronary artery of native heart without angina pectoris- See cardiologist as directed.      7. BPH with urinary obstruction- See urologist as directed

## 2019-12-30 NOTE — PATIENT INSTRUCTIONS
Medicare Wellness Visit, Male    The best way to live healthy is to have a lifestyle where you eat a well-balanced diet, exercise regularly, limit alcohol use, and quit all forms of tobacco/nicotine, if applicable. Regular preventive services are another way to keep healthy. Preventive services (vaccines, screening tests, monitoring & exams) can help personalize your care plan, which helps you manage your own care. Screening tests can find health problems at the earliest stages, when they are easiest to treat. [facility] follows the current, evidence-based guidelines published by the [facility] Kerwin ([facility]) when recommending preventive services for our patients. Because we follow these guidelines, sometimes recommendations change over time as research supports it. (For example, a prostate screening blood test is no longer routinely recommended for men with no symptoms). Of course, you and your doctor may decide to screen more often for some diseases, based on your risk and co-morbidities (chronic disease you are already diagnosed with). Preventive services for you include:  - Medicare offers their members a free annual wellness visit, which is time for you and your primary care provider to discuss and plan for your preventive service needs. Take advantage of this benefit every year!  -All adults over age 72 should receive the recommended pneumonia vaccines. Current USPSTF guidelines recommend a series of two vaccines for the best pneumonia protection.   -All adults should have a flu vaccine yearly and tetanus vaccine every 10 years.  -All adults age 48 and older should receive the shingles vaccines (series of two vaccines).        -All adults age 38-68 who are overweight should have a diabetes screening test once every three years.   -Other screening tests & preventive services for persons with diabetes include: an eye exam to screen for diabetic retinopathy, a kidney function test, a foot exam, and stricter control over your cholesterol.   -Cardiovascular screening for adults with routine risk involves an electrocardiogram (ECG) at intervals determined by the provider.   -Colorectal cancer screening should be done for adults age 54-65 with no increased risk factors for colorectal cancer. There are a number of acceptable methods of screening for this type of cancer. Each test has its own benefits and drawbacks. Discuss with your provider what is most appropriate for you during your annual wellness visit. The different tests include: colonoscopy (considered the best screening method), a fecal occult blood test, a fecal DNA test, and sigmoidoscopy.  -All adults born between [facility] should be screened once for Hepatitis C.  -An Abdominal Aortic Aneurysm (AAA) Screening is recommended for men age 73-68 who has ever smoked in their lifetime.      Here is a list of your current Health Maintenance items (your personalized list of preventive services) with a due date:  Health Maintenance Due   Topic Date Due    Shingles Vaccine (1 of 2) 05/03/1990    Pneumococcal Vaccine (2 of 2 - PPSV23) 03/20/2019    Annual Well Visit  12/06/2019

## 2019-12-30 NOTE — PROGRESS NOTES
This is the Subsequent Medicare Annual Wellness Exam, performed 12 months or more after the Initial AWV or the last Subsequent AWV    I have reviewed the patient's medical history in detail and updated the computerized patient record. History     Patient Active Problem List   Diagnosis Code    Mixed hyperlipidemia E78.2    Essential hypertension, benign I10    Coronary atherosclerosis of native coronary artery I25.10    Calculus of kidney N20.0    Insomnia, unspecified G47.00    Unspecified hearing loss H91.90    Macular degeneration H35.30    Skin cancer C44.90    Vitamin D deficiency E55.9    Hypothyroidism, acquired, 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    BPH with urinary obstruction N40.1, N13.8    Advance directive discussed with patient Z71.89    Lumbar disc disease M51.9    Lumbar foraminal stenosis M48.061    IFG (impaired fasting glucose) R73.01     Past Medical History:   Diagnosis Date    Arrhythmia     r/t caffeine    Arthritis     Basal cell carcinoma     BPH (benign prostatic hyperplasia)     CAD (coronary artery disease)     s/p PTCA '92    Calculus of kidney     Chronic pain     BACK    Elevated PSA     Hypercholesterolemia     Lumbar foraminal stenosis     Melanoma ([address]     MVP (mitral valve prolapse)     Other ill-defined conditions(799.89)     Prostatitis    Prediabetes     Seasonal allergies     Squamous cell carcinoma     Systolic murmur     Thyroid disease     HYPOTHYROID    Vision impairment     R EYE, BLOOD CLOT ON RETINA      Past Surgical History:   Procedure Laterality Date    ENDOSCOPY, COLON, DIAGNOSTIC      due 12    HX CHOLECYSTECTOMY  1970    HX HEART CATHETERIZATION  1992    WITH BALLOON    HX LITHOTRIPSY      x2    HX ORTHOPAEDIC      elbow - fx right arm age 3 yrs     Current Outpatient Medications   Medication Sig Dispense Refill    levothyroxine (SYNTHROID) 50 mcg tablet TAKE 1 TABLET BY MOUTH EVERY DAY BEFORE BREAKFAST EXCEPT TAKE 2 TABS ON SATURDAY. 112 Tab 0    atorvastatin (LIPITOR) 40 mg tablet TAKE 1 TAB BY MOUTH DAILY. 90 Tab 2    amLODIPine (NORVASC) 5 mg tablet TAKE 1 TAB BY MOUTH DAILY. 90 Tab 2    tamsulosin (FLOMAX) 0.4 mg capsule TAKE 1 CAPSULE EVERY DAY 30 MINUTES AFTER THE SAME MEAL EACH DAY  11    varicella-zoster recombinant, PF, (SHINGRIX, PF,) 50 mcg/0.5 mL susr injection 0.5mL by IntraMUSCular route once now and then repeat in 2-6 months (Patient taking differently: 0.5mL by IntraMUSCular route once now and then repeat in 2-6 months NEEDS 2ND.) 0.5 mL 1    Cholecalciferol, Vitamin D3, (VITAMIN D3) 2,000 unit cap capsule Take 2,000 Units by mouth two (2) times a day. 30 Cap 11    aspirin 81 mg chewable tablet Take 81 mg by mouth daily.  finasteride (PROSCAR) 5 mg tablet Take 5 mg by mouth daily. HS      co-enzyme Q-10 (CO Q-10) 100 mg capsule Take 200 mg by mouth daily.  VIT A/VIT C/VIT E/ZINC/COPPER (PRESERVISION AREDS PO) Take 1 Cap by mouth two (2) times a day.        Allergies   Allergen Reactions    Codeine Rash    Pcn [Penicillins] Rash    Sulfa (Sulfonamide Antibiotics) Other (comments)     confusion       Family History   Problem Relation Age of Onset    Heart Disease Mother         CHF    Heart Disease Father         CAD    Heart Disease Sister         CAD CABG    Lung Disease Sister     Heart Disease Brother         CAD    Pulmonary Fibrosis Brother     Heart Disease Brother         CAD    Stroke Brother 34        cerebral hemorrhage    Cancer Brother         LUNG    High Cholesterol Daughter     Anesth Problems Neg Hx      Social History     Tobacco Use    Smoking status: Never Smoker    Smokeless tobacco: Never Used   Substance Use Topics    Alcohol use: No       Depression Risk Factor Screening:     3 most recent PHQ Screens 12/30/2019   Little interest or pleasure in doing things Not at all   Feeling down, depressed, irritable, or hopeless Not at all   Total Score PHQ 2 0       Alcohol Risk Factor Screening (MALE > 65): Do you average more 1 drink per night or more than 7 drinks a week: No    In the past three months have you have had more than 4 drinks containing alcohol on one occasion: No      Functional Ability and Level of Safety:   Hearing: The patient wears hearing aids. Activities of Daily Living: The home contains: no safety equipment. Patient does total self care    Ambulation: with no difficulty    Fall Risk:  Fall Risk Assessment, last 12 mths 6/5/2019   Able to walk? Yes   Fall in past 12 months? No       Abuse Screen:  Patient is not abused    Cognitive Screening   Has your family/caregiver stated any concerns about your memory: no      Patient Care Team   Patient Care Team:  Carolyn Marte MD as PCP - General  Carol[facility] Marte [facility] as PCP [facility]  Shruti Mauricio MD as Physician (Internal Medicine)  Sim Goode MD as Physician (Dermatology)  Fabiola Mckoy MD as Physician (Urology)  Brie Jimenez MD as Surgeon (Orthopedic Surgery)  Spencer Reddy MD as Physician (Ophthalmology)  Vinicius Horner MD as Physician (Cardiology)  Brandt Trejo MD as Surgeon (Orthopedic Surgery)    Assessment/Plan   Education and counseling provided:  Are appropriate based on today's review and evaluation    Diagnoses and all orders for this visit:    1.  Medicare annual wellness visit, subsequent        Health Maintenance Due   Topic Date Due    Shingrix Vaccine Age 49> (1 of 2) 05/03/1990    Pneumococcal 65+ years (2 of 2 - PPSV23) 03/20/2019    MEDICARE YEARLY EXAM  12/06/2019

## 2019-12-31 LAB
ALBUMIN SERPL-MCNC: 4.2 G/DL (ref 3.5–4.8)
ALBUMIN/GLOB SERPL: 1.9 {RATIO} (ref 1.2–2.2)
ALP SERPL-CCNC: 97 IU/L (ref 39–117)
ALT SERPL-CCNC: 16 IU/L (ref 0–44)
AST SERPL-CCNC: 19 IU/L (ref 0–40)
BASOPHILS # BLD AUTO: 0 X10E3/UL (ref 0–0.2)
BASOPHILS NFR BLD AUTO: 1 %
BILIRUB SERPL-MCNC: 0.7 MG/DL (ref 0–1.2)
BUN SERPL-MCNC: 19 MG/DL (ref 8–27)
BUN/CREAT SERPL: 17 (ref 10–24)
CALCIUM SERPL-MCNC: 9.3 MG/DL (ref 8.6–10.2)
CHLORIDE SERPL-SCNC: 103 MMOL/L (ref 96–106)
CHOLEST SERPL-MCNC: 188 MG/DL (ref 100–199)
CO2 SERPL-SCNC: 24 MMOL/L (ref 20–29)
CREAT SERPL-MCNC: 1.13 MG/DL (ref 0.76–1.27)
EOSINOPHIL # BLD AUTO: 0.2 X10E3/UL (ref 0–0.4)
EOSINOPHIL NFR BLD AUTO: 4 %
ERYTHROCYTE [DISTWIDTH] IN BLOOD BY AUTOMATED COUNT: 13.4 % (ref 12.3–15.4)
EST. AVERAGE GLUCOSE BLD GHB EST-MCNC: 126 MG/DL
GLOBULIN SER CALC-MCNC: 2.2 G/DL (ref 1.5–4.5)
GLUCOSE SERPL-MCNC: 106 MG/DL (ref 65–99)
HBA1C MFR BLD: 6 % (ref 4.8–5.6)
HCT VFR BLD AUTO: 45.3 % (ref 37.5–51)
HDLC SERPL-MCNC: 58 MG/DL
HGB BLD-MCNC: 15.4 G/DL (ref 13–17.7)
IMM GRANULOCYTES # BLD AUTO: 0 X10E3/UL (ref 0–0.1)
IMM GRANULOCYTES NFR BLD AUTO: 0 %
LDLC SERPL CALC-MCNC: 110 MG/DL (ref 0–99)
LYMPHOCYTES # BLD AUTO: 2 X10E3/UL (ref 0.7–3.1)
LYMPHOCYTES NFR BLD AUTO: 35 %
MCH RBC QN AUTO: 30.2 PG (ref 26.6–33)
MCHC RBC AUTO-ENTMCNC: 34 G/DL (ref 31.5–35.7)
MCV RBC AUTO: 89 FL (ref 79–97)
MONOCYTES # BLD AUTO: 0.5 X10E3/UL (ref 0.1–0.9)
MONOCYTES NFR BLD AUTO: 9 %
NEUTROPHILS # BLD AUTO: 2.9 X10E3/UL (ref 1.4–7)
NEUTROPHILS NFR BLD AUTO: 51 %
PLATELET # BLD AUTO: 227 X10E3/UL (ref 150–450)
POTASSIUM SERPL-SCNC: 4.2 MMOL/L (ref 3.5–5.2)
PROT SERPL-MCNC: 6.4 G/DL (ref 6–8.5)
RBC # BLD AUTO: 5.1 X10E6/UL (ref 4.14–5.8)
SODIUM SERPL-SCNC: 140 MMOL/L (ref 134–144)
TRIGL SERPL-MCNC: 102 MG/DL (ref 0–149)
TSH SERPL DL<=0.005 MIU/L-ACNC: 2.34 UIU/ML (ref 0.45–4.5)
VLDLC SERPL CALC-MCNC: 20 MG/DL (ref 5–40)
WBC # BLD AUTO: 5.7 X10E3/UL (ref 3.4–10.8)

## 2020-01-21 ENCOUNTER — DOCUMENTATION ONLY (OUTPATIENT)
Dept: INTERNAL MEDICINE CLINIC | Age: 80
End: 2020-01-21

## 2020-01-21 NOTE — PROGRESS NOTES
This is the Subsequent Medicare Annual Wellness Exam, performed 12 months or more after the Initial AWV or the last Subsequent AWV I have reviewed the patient's medical history in detail and updated the computerized patient record. History Past Medical History:  
Diagnosis Date  Arrhythmia   
 r/t caffeine  Arthritis  Basal cell carcinoma  BPH (benign prostatic hyperplasia)  CAD (coronary artery disease) s/p PTCA '92  
 Calculus of kidney  Chronic pain BACK  Elevated PSA  Hypercholesterolemia  Lumbar foraminal stenosis  Melanoma ([address]  MVP (mitral valve prolapse)  Other ill-defined conditions(799.89) Prostatitis  Prediabetes  Seasonal allergies  Squamous cell carcinoma  Systolic murmur  Thyroid disease HYPOTHYROID  Vision impairment   
 R EYE, BLOOD CLOT ON RETINA Past Surgical History:  
Procedure Laterality Date  ENDOSCOPY, COLON, DIAGNOSTIC    
 due Alejandra 51 [address] WITH BALLOON  
 HX LITHOTRIPSY    
 x2  
 HX ORTHOPAEDIC    
 elbow - fx right arm age 3 yrs Current Outpatient Medications Medication Sig Dispense Refill  varicella-zoster recombinant, PF, (SHINGRIX, PF,) 50 mcg/0.5 mL susr injection 0.5mL by IntraMUSCular route once now and then repeat in 2-6 months 0.5 mL 1  
 amLODIPine (NORVASC) 5 mg tablet TAKE 1 TAB BY MOUTH DAILY. 90 Tab 2  
 levothyroxine (SYNTHROID) 50 mcg tablet TAKE 1 TABLET BY MOUTH EVERY DAY BEFORE BREAKFAST EXCEPT TAKE 2 TABS ON SATURDAY. 112 Tab 0  
 atorvastatin (LIPITOR) 40 mg tablet TAKE 1 TAB BY MOUTH DAILY. 90 Tab 2  Cholecalciferol, Vitamin D3, (VITAMIN D3) 2,000 unit cap capsule Take 2,000 Units by mouth two (2) times a day. 30 Cap 11  
 aspirin 81 mg chewable tablet Take 81 mg by mouth daily.  finasteride (PROSCAR) 5 mg tablet Take 5 mg by mouth every other day.  HS    
 Blanco Hoover [facility]  MS3  1/21/2020  6:57 AM  co-enzyme Q-10 (CO Q-10) 100 mg capsule Take 200 mg by mouth daily.  VIT A/VIT C/VIT E/ZINC/COPPER (PRESERVISION AREDS PO) Take 1 Cap by mouth two (2) times a day. Allergies Allergen Reactions  Codeine Rash  Pcn [Penicillins] Rash  Sulfa (Sulfonamide Antibiotics) Other (comments)  
  confusion Family History Problem Relation Age of Onset  Heart Disease Mother CHF  Heart Disease Father CAD  Heart Disease Sister CAD CABG  Lung Disease Sister  Heart Disease Brother CAD  Pulmonary Fibrosis Brother  Heart Disease Brother CAD  Stroke Brother 29  
     cerebral hemorrhage  Cancer Brother LUNG  
 High Cholesterol Daughter  Anesth Problems Neg Hx Social History Tobacco Use  Smoking status: Never Smoker  Smokeless tobacco: Never Used Substance Use Topics  Alcohol use: No  
 
Patient Active Problem List  
Diagnosis Code  Mixed hyperlipidemia E78.2  
 Essential hypertension, benign I10  
 Coronary atherosclerosis of native coronary artery I25.10  Calculus of kidney N20.0  Insomnia, unspecified G47.00  
 Unspecified hearing loss H91.90  Macular degeneration H35.30  
 Skin cancer C44.90  Vitamin D deficiency E55.9  Hypothyroidism, acquired, 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  BPH with urinary obstruction N40.1, N13.8  Advance directive discussed with patient Z70.80  Lumbar disc disease M51.9  Lumbar foraminal stenosis M99.83  
 IFG (impaired fasting glucose) R73.01 Depression Risk Factor Screening: PHQ over the last two weeks 12/5/2018 Little interest or pleasure in doing things Not at all Feeling down, depressed, irritable, or hopeless Not at all Total Score PHQ 2 0 Alcohol Risk Factor Screening: You do not drink alcohol or very rarely. Functional Ability and Level of Safety:  
Hearing Loss The patient wears hearing aids. Activities of Daily Living The home contains: no safety equipment. Patient does total self care Fall Risk Fall Risk Assessment, last 12 mths 12/5/2018 Able to walk? Yes Fall in past 12 months? No  
 
 
Abuse Screen Patient is not abused Cognitive Screening Evaluation of Cognitive Function: 
Has your family/caregiver stated any concerns about your memory: no 
Normal 
 
Patient Care Team  
Patient Care Team: 
Gage Dick MD as PCP - Loraine Garza MD as Physician (Internal Medicine) Randi Price MD as Physician (Dermatology) Belinda Duarte MD as Physician (Urology) Alan Hawthorne MD as Surgeon (Orthopedic Surgery) Amy Helms MD as Physician (Ophthalmology) Dorcas Velazquez MD as Physician (Cardiology) Joe Lizarraga MD as Surgeon (Orthopedic Surgery) Assessment/Plan Education and counseling provided: 
Are appropriate based on today's review and evaluation Diagnoses and all orders for this visit: 
 
1. Mixed hyperlipidemia 2. IFG (impaired fasting glucose) 3. Hypothyroidism, acquired, autoimmune 4. Essential hypertension, benign 5. Atherosclerosis of native coronary artery of native heart without angina pectoris 6. Medicare annual wellness visit, subsequent 7. BPH with urinary obstruction 8. Encounter for immunization 
-     varicella-zoster recombinant, PF, (SHINGRIX, PF,) 50 mcg/0.5 mL susr injection; 0.5mL by IntraMUSCular route once now and then repeat in 2-6 months 9. Screening for depression 
-     Rosio Platt Other orders 
-     CBC WITH AUTOMATED DIFF 
-     METABOLIC PANEL, COMPREHENSIVE 
-     LIPID PANEL There are no preventive care reminders to display for this patient.

## 2020-03-07 RX ORDER — AMLODIPINE BESYLATE 5 MG/1
TABLET ORAL
Qty: 90 TAB | Refills: 2 | Status: SHIPPED | OUTPATIENT
Start: 2020-03-07 | End: 2020-04-02

## 2020-03-08 RX ORDER — LEVOTHYROXINE SODIUM 50 UG/1
TABLET ORAL
Qty: 112 TAB | Refills: 0 | Status: SHIPPED | OUTPATIENT
Start: 2020-03-08 | End: 2020-06-08

## 2020-03-25 ENCOUNTER — APPOINTMENT (OUTPATIENT)
Dept: VASCULAR SURGERY | Age: 80
DRG: 234 | End: 2020-03-25
Attending: NURSE PRACTITIONER
Payer: MEDICARE

## 2020-03-25 ENCOUNTER — TELEPHONE (OUTPATIENT)
Dept: INTERNAL MEDICINE CLINIC | Age: 80
End: 2020-03-25

## 2020-03-25 ENCOUNTER — HOSPITAL ENCOUNTER (INPATIENT)
Age: 80
LOS: 8 days | Discharge: HOME HEALTH | DRG: 234 | End: 2020-04-02
Attending: EMERGENCY MEDICINE | Admitting: INTERNAL MEDICINE
Payer: MEDICARE

## 2020-03-25 ENCOUNTER — APPOINTMENT (OUTPATIENT)
Dept: NON INVASIVE DIAGNOSTICS | Age: 80
DRG: 234 | End: 2020-03-25
Attending: NURSE PRACTITIONER
Payer: MEDICARE

## 2020-03-25 ENCOUNTER — APPOINTMENT (OUTPATIENT)
Dept: GENERAL RADIOLOGY | Age: 80
DRG: 234 | End: 2020-03-25
Attending: EMERGENCY MEDICINE
Payer: MEDICARE

## 2020-03-25 DIAGNOSIS — R07.9 CHEST PAIN, UNSPECIFIED: ICD-10-CM

## 2020-03-25 DIAGNOSIS — Z95.1 S/P CABG X 5: ICD-10-CM

## 2020-03-25 DIAGNOSIS — I21.4 NSTEMI (NON-ST ELEVATED MYOCARDIAL INFARCTION) (HCC): Primary | ICD-10-CM

## 2020-03-25 LAB
ALBUMIN SERPL-MCNC: 3.9 G/DL (ref 3.5–5)
ALBUMIN/GLOB SERPL: 1.2 {RATIO} (ref 1.1–2.2)
ALP SERPL-CCNC: 122 U/L (ref 45–117)
ALT SERPL-CCNC: 25 U/L (ref 12–78)
ANION GAP SERPL CALC-SCNC: 9 MMOL/L (ref 5–15)
APPEARANCE UR: CLEAR
ARTERIAL PATENCY WRIST A: YES
AST SERPL-CCNC: 18 U/L (ref 15–37)
ATRIAL RATE: 72 BPM
AV VELOCITY RATIO: 0.57
BACTERIA URNS QL MICRO: NEGATIVE /HPF
BASE EXCESS BLD CALC-SCNC: 1 MMOL/L
BASOPHILS # BLD: 0.1 K/UL (ref 0–0.1)
BASOPHILS NFR BLD: 1 % (ref 0–1)
BDY SITE: NORMAL
BILIRUB SERPL-MCNC: 0.6 MG/DL (ref 0.2–1)
BILIRUB UR QL: NEGATIVE
BNP SERPL-MCNC: 391 PG/ML
BUN SERPL-MCNC: 23 MG/DL (ref 6–20)
BUN/CREAT SERPL: 21 (ref 12–20)
CA-I BLD-SCNC: 1.26 MMOL/L (ref 1.12–1.32)
CALCIUM SERPL-MCNC: 8.7 MG/DL (ref 8.5–10.1)
CALCULATED P AXIS, ECG09: 56 DEGREES
CALCULATED R AXIS, ECG10: -72 DEGREES
CALCULATED T AXIS, ECG11: 82 DEGREES
CHLORIDE SERPL-SCNC: 105 MMOL/L (ref 97–108)
CHOLEST SERPL-MCNC: 161 MG/DL
CO2 SERPL-SCNC: 28 MMOL/L (ref 21–32)
COLOR UR: ABNORMAL
COMMENT, HOLDF: NORMAL
CREAT SERPL-MCNC: 1.07 MG/DL (ref 0.7–1.3)
DIAGNOSIS, 93000: NORMAL
DIFFERENTIAL METHOD BLD: ABNORMAL
ECHO AO ROOT DIAM: 3.46 CM
ECHO AV AREA PEAK VELOCITY: 2.2 CM2
ECHO AV MEAN GRADIENT: 7.2 MMHG
ECHO AV MEAN VELOCITY: 1.3 M/S
ECHO AV PEAK GRADIENT: 12 MMHG
ECHO AV PEAK VELOCITY: 173.29 CM/S
ECHO AV VTI: 44.57 CM
ECHO EST RA PRESSURE: 5 MMHG
ECHO LA AREA 4C: 15.2 CM2
ECHO LA MAJOR AXIS: 5.17 CM
ECHO LA TO AORTIC ROOT RATIO: 1.49
ECHO LA VOL 2C: 49.1 ML (ref 18–58)
ECHO LA VOL 4C: 39.89 ML (ref 18–58)
ECHO LA VOL BP: 50.09 ML (ref 18–58)
ECHO LA VOL/BSA BIPLANE: 23.43 ML/M2 (ref 16–28)
ECHO LA VOLUME INDEX A2C: 22.96 ML/M2 (ref 16–28)
ECHO LA VOLUME INDEX A4C: 18.66 ML/M2 (ref 16–28)
ECHO LV E' LATERAL VELOCITY: 11.65 CM/S
ECHO LV E' SEPTAL VELOCITY: 6.55 CM/S
ECHO LV INTERNAL DIMENSION DIASTOLIC: 5.27 CM (ref 4.2–5.9)
ECHO LV INTERNAL DIMENSION SYSTOLIC: 3.68 CM
ECHO LV IVSD: 1.25 CM (ref 0.6–1)
ECHO LV MASS 2D: 377.9 G (ref 88–224)
ECHO LV MASS INDEX 2D: 176.7 G/M2 (ref 49–115)
ECHO LV POSTERIOR WALL DIASTOLIC: 1.54 CM (ref 0.6–1)
ECHO LVOT DIAM: 2.21 CM
ECHO LVOT PEAK GRADIENT: 3.9 MMHG
ECHO LVOT PEAK VELOCITY: 98.12 CM/S
ECHO MV A VELOCITY: 75.92 CM/S
ECHO MV AREA PHT: 4.1 CM2
ECHO MV E DECELERATION TIME (DT): 184.5 MS
ECHO MV E VELOCITY: 80.26 CM/S
ECHO MV E/A RATIO: 1.06
ECHO MV E/E' LATERAL: 6.89
ECHO MV E/E' RATIO (AVERAGED): 9.57
ECHO MV E/E' SEPTAL: 12.25
ECHO MV PRESSURE HALF TIME (PHT): 53.5 MS
ECHO MV REGURGITANT PEAK GRADIENT: 66.5 MMHG
ECHO MV REGURGITANT PEAK VELOCITY: 407.77 CM/S
ECHO PULMONARY ARTERY SYSTOLIC PRESSURE (PASP): 20 MMHG
ECHO PV MAX VELOCITY: 107.2 CM/S
ECHO PV PEAK GRADIENT: 4.6 MMHG
ECHO RIGHT VENTRICULAR SYSTOLIC PRESSURE (RVSP): 20 MMHG
ECHO RV INTERNAL DIMENSION: 3.57 CM
ECHO RV TAPSE: 2.82 CM (ref 1.5–2)
ECHO TV REGURGITANT MAX VELOCITY: 193.67 CM/S
ECHO TV REGURGITANT PEAK GRADIENT: 15 MMHG
EOSINOPHIL # BLD: 0.3 K/UL (ref 0–0.4)
EOSINOPHIL NFR BLD: 4 % (ref 0–7)
EPITH CASTS URNS QL MICRO: ABNORMAL /LPF
ERYTHROCYTE [DISTWIDTH] IN BLOOD BY AUTOMATED COUNT: 13.4 % (ref 11.5–14.5)
GAS FLOW.O2 O2 DELIVERY SYS: NORMAL L/MIN
GLOBULIN SER CALC-MCNC: 3.2 G/DL (ref 2–4)
GLUCOSE SERPL-MCNC: 110 MG/DL (ref 65–100)
GLUCOSE UR STRIP.AUTO-MCNC: NEGATIVE MG/DL
HCO3 BLD-SCNC: 25.9 MMOL/L (ref 22–26)
HCT VFR BLD AUTO: 47.8 % (ref 36.6–50.3)
HDLC SERPL-MCNC: 62 MG/DL
HDLC SERPL: 2.6 {RATIO} (ref 0–5)
HGB BLD-MCNC: 15.3 G/DL (ref 12.1–17)
HGB UR QL STRIP: NEGATIVE
HYALINE CASTS URNS QL MICRO: ABNORMAL /LPF (ref 0–5)
IMM GRANULOCYTES # BLD AUTO: 0 K/UL (ref 0–0.04)
IMM GRANULOCYTES NFR BLD AUTO: 1 % (ref 0–0.5)
KETONES UR QL STRIP.AUTO: ABNORMAL MG/DL
LDLC SERPL CALC-MCNC: 83.8 MG/DL (ref 0–100)
LEUKOCYTE ESTERASE UR QL STRIP.AUTO: NEGATIVE
LIPID PROFILE,FLP: NORMAL
LVFS 2D: 30.14 %
LYMPHOCYTES # BLD: 3 K/UL (ref 0.8–3.5)
LYMPHOCYTES NFR BLD: 35 % (ref 12–49)
MAGNESIUM SERPL-MCNC: 2.2 MG/DL (ref 1.6–2.4)
MCH RBC QN AUTO: 29.7 PG (ref 26–34)
MCHC RBC AUTO-ENTMCNC: 32 G/DL (ref 30–36.5)
MCV RBC AUTO: 92.6 FL (ref 80–99)
MONOCYTES # BLD: 0.7 K/UL (ref 0–1)
MONOCYTES NFR BLD: 8 % (ref 5–13)
MV DEC SLOPE: 4.35
NEUTS SEG # BLD: 4.5 K/UL (ref 1.8–8)
NEUTS SEG NFR BLD: 51 % (ref 32–75)
NITRITE UR QL STRIP.AUTO: NEGATIVE
NRBC # BLD: 0 K/UL (ref 0–0.01)
NRBC BLD-RTO: 0 PER 100 WBC
O2/TOTAL GAS SETTING VFR VENT: 21 %
P-R INTERVAL, ECG05: 222 MS
PCO2 BLD: 42.3 MMHG (ref 35–45)
PH BLD: 7.39 [PH] (ref 7.35–7.45)
PH UR STRIP: 6.5 [PH] (ref 5–8)
PLATELET # BLD AUTO: 203 K/UL (ref 150–400)
PMV BLD AUTO: 9.1 FL (ref 8.9–12.9)
PO2 BLD: 83 MMHG (ref 80–100)
POTASSIUM SERPL-SCNC: 4.1 MMOL/L (ref 3.5–5.1)
PROT SERPL-MCNC: 7.1 G/DL (ref 6.4–8.2)
PROT UR STRIP-MCNC: ABNORMAL MG/DL
Q-T INTERVAL, ECG07: 412 MS
QRS DURATION, ECG06: 130 MS
QTC CALCULATION (BEZET), ECG08: 451 MS
RBC # BLD AUTO: 5.16 M/UL (ref 4.1–5.7)
RBC #/AREA URNS HPF: ABNORMAL /HPF (ref 0–5)
SAMPLES BEING HELD,HOLD: NORMAL
SAO2 % BLD: 96 % (ref 92–97)
SODIUM SERPL-SCNC: 142 MMOL/L (ref 136–145)
SP GR UR REFRACTOMETRY: 1.02
SPECIMEN TYPE: NORMAL
TOTAL RESP. RATE, ITRR: 20
TRIGL SERPL-MCNC: 76 MG/DL (ref ?–150)
TROPONIN I SERPL-MCNC: 0.09 NG/ML
TROPONIN I SERPL-MCNC: 4.89 NG/ML
TSH SERPL DL<=0.05 MIU/L-ACNC: 1.54 UIU/ML (ref 0.36–3.74)
UA: UC IF INDICATED,UAUC: ABNORMAL
UROBILINOGEN UR QL STRIP.AUTO: 1 EU/DL (ref 0.2–1)
VENTRICULAR RATE, ECG03: 72 BPM
VLDLC SERPL CALC-MCNC: 15.2 MG/DL
WBC # BLD AUTO: 8.6 K/UL (ref 4.1–11.1)
WBC URNS QL MICRO: ABNORMAL /HPF (ref 0–4)

## 2020-03-25 PROCEDURE — 4A023N7 MEASUREMENT OF CARDIAC SAMPLING AND PRESSURE, LEFT HEART, PERCUTANEOUS APPROACH: ICD-10-PCS | Performed by: INTERNAL MEDICINE

## 2020-03-25 PROCEDURE — 74011250637 HC RX REV CODE- 250/637: Performed by: EMERGENCY MEDICINE

## 2020-03-25 PROCEDURE — 77030027138 HC INCENT SPIROMETER -A

## 2020-03-25 PROCEDURE — 021309W BYPASS CORONARY ARTERY, FOUR OR MORE ARTERIES FROM AORTA WITH AUTOLOGOUS VENOUS TISSUE, OPEN APPROACH: ICD-10-PCS | Performed by: INTERNAL MEDICINE

## 2020-03-25 PROCEDURE — 77030010221 HC SPLNT WR POS TELE -B: Performed by: INTERNAL MEDICINE

## 2020-03-25 PROCEDURE — 84484 ASSAY OF TROPONIN QUANT: CPT

## 2020-03-25 PROCEDURE — 93458 L HRT ARTERY/VENTRICLE ANGIO: CPT | Performed by: INTERNAL MEDICINE

## 2020-03-25 PROCEDURE — 93880 EXTRACRANIAL BILAT STUDY: CPT

## 2020-03-25 PROCEDURE — 93922 UPR/L XTREMITY ART 2 LEVELS: CPT

## 2020-03-25 PROCEDURE — 71046 X-RAY EXAM CHEST 2 VIEWS: CPT

## 2020-03-25 PROCEDURE — 82803 BLOOD GASES ANY COMBINATION: CPT

## 2020-03-25 PROCEDURE — 74011636320 HC RX REV CODE- 636/320: Performed by: INTERNAL MEDICINE

## 2020-03-25 PROCEDURE — 81001 URINALYSIS AUTO W/SCOPE: CPT

## 2020-03-25 PROCEDURE — 74011250636 HC RX REV CODE- 250/636: Performed by: INTERNAL MEDICINE

## 2020-03-25 PROCEDURE — 93005 ELECTROCARDIOGRAM TRACING: CPT

## 2020-03-25 PROCEDURE — 77030013744: Performed by: INTERNAL MEDICINE

## 2020-03-25 PROCEDURE — 5A1221Z PERFORMANCE OF CARDIAC OUTPUT, CONTINUOUS: ICD-10-PCS | Performed by: INTERNAL MEDICINE

## 2020-03-25 PROCEDURE — 80053 COMPREHEN METABOLIC PANEL: CPT

## 2020-03-25 PROCEDURE — 93306 TTE W/DOPPLER COMPLETE: CPT

## 2020-03-25 PROCEDURE — 06BQ4ZZ EXCISION OF LEFT SAPHENOUS VEIN, PERCUTANEOUS ENDOSCOPIC APPROACH: ICD-10-PCS | Performed by: INTERNAL MEDICINE

## 2020-03-25 PROCEDURE — 36600 WITHDRAWAL OF ARTERIAL BLOOD: CPT

## 2020-03-25 PROCEDURE — 85025 COMPLETE CBC W/AUTO DIFF WBC: CPT

## 2020-03-25 PROCEDURE — 74011250636 HC RX REV CODE- 250/636: Performed by: NURSE PRACTITIONER

## 2020-03-25 PROCEDURE — 02100Z9 BYPASS CORONARY ARTERY, ONE ARTERY FROM LEFT INTERNAL MAMMARY, OPEN APPROACH: ICD-10-PCS | Performed by: INTERNAL MEDICINE

## 2020-03-25 PROCEDURE — 99285 EMERGENCY DEPT VISIT HI MDM: CPT

## 2020-03-25 PROCEDURE — B2111ZZ FLUOROSCOPY OF MULTIPLE CORONARY ARTERIES USING LOW OSMOLAR CONTRAST: ICD-10-PCS | Performed by: INTERNAL MEDICINE

## 2020-03-25 PROCEDURE — 84443 ASSAY THYROID STIM HORMONE: CPT

## 2020-03-25 PROCEDURE — C1894 INTRO/SHEATH, NON-LASER: HCPCS | Performed by: INTERNAL MEDICINE

## 2020-03-25 PROCEDURE — 74011250637 HC RX REV CODE- 250/637: Performed by: INTERNAL MEDICINE

## 2020-03-25 PROCEDURE — 77030004532 HC CATH ANGI DX IMP BSC -A: Performed by: INTERNAL MEDICINE

## 2020-03-25 PROCEDURE — 77030019569 HC BND COMPR RAD TERU -B: Performed by: INTERNAL MEDICINE

## 2020-03-25 PROCEDURE — 99152 MOD SED SAME PHYS/QHP 5/>YRS: CPT | Performed by: INTERNAL MEDICINE

## 2020-03-25 PROCEDURE — 74011250637 HC RX REV CODE- 250/637: Performed by: NURSE PRACTITIONER

## 2020-03-25 PROCEDURE — 80061 LIPID PANEL: CPT

## 2020-03-25 PROCEDURE — 36415 COLL VENOUS BLD VENIPUNCTURE: CPT

## 2020-03-25 PROCEDURE — 83735 ASSAY OF MAGNESIUM: CPT

## 2020-03-25 PROCEDURE — 65660000000 HC RM CCU STEPDOWN

## 2020-03-25 PROCEDURE — 74011000250 HC RX REV CODE- 250: Performed by: INTERNAL MEDICINE

## 2020-03-25 PROCEDURE — 74011000250 HC RX REV CODE- 250: Performed by: NURSE PRACTITIONER

## 2020-03-25 PROCEDURE — 83880 ASSAY OF NATRIURETIC PEPTIDE: CPT

## 2020-03-25 RX ORDER — PROTAMINE SULFATE 10 MG/ML
500 INJECTION, SOLUTION INTRAVENOUS ONCE
Status: DISPENSED | OUTPATIENT
Start: 2020-03-26 | End: 2020-03-26

## 2020-03-25 RX ORDER — CHLORHEXIDINE GLUCONATE 1.2 MG/ML
15 RINSE ORAL EVERY 12 HOURS
Status: DISCONTINUED | OUTPATIENT
Start: 2020-03-25 | End: 2020-03-26

## 2020-03-25 RX ORDER — SODIUM CHLORIDE 0.9 % (FLUSH) 0.9 %
5-40 SYRINGE (ML) INJECTION AS NEEDED
Status: DISCONTINUED | OUTPATIENT
Start: 2020-03-25 | End: 2020-03-25 | Stop reason: SDUPTHER

## 2020-03-25 RX ORDER — SODIUM CHLORIDE 0.9 % (FLUSH) 0.9 %
5-40 SYRINGE (ML) INJECTION AS NEEDED
Status: DISCONTINUED | OUTPATIENT
Start: 2020-03-25 | End: 2020-03-26

## 2020-03-25 RX ORDER — POTASSIUM CHLORIDE 29.8 MG/ML
20 INJECTION INTRAVENOUS ONCE
Status: DISPENSED | OUTPATIENT
Start: 2020-03-26 | End: 2020-03-26

## 2020-03-25 RX ORDER — MAGNESIUM SULFATE HEPTAHYDRATE 40 MG/ML
2 INJECTION, SOLUTION INTRAVENOUS ONCE
Status: ACTIVE | OUTPATIENT
Start: 2020-03-26 | End: 2020-03-26

## 2020-03-25 RX ORDER — PHENYLEPHRINE 10 MG/250 ML(40 MCG/ML)IN 0.9 % SOD.CHLORIDE INTRAVENOUS
10-100
Status: DISCONTINUED | OUTPATIENT
Start: 2020-03-26 | End: 2020-03-26 | Stop reason: HOSPADM

## 2020-03-25 RX ORDER — HEPARIN SODIUM 1000 [USP'U]/ML
INJECTION, SOLUTION INTRAVENOUS; SUBCUTANEOUS AS NEEDED
Status: DISCONTINUED | OUTPATIENT
Start: 2020-03-25 | End: 2020-03-25 | Stop reason: HOSPADM

## 2020-03-25 RX ORDER — SODIUM CHLORIDE 0.9 % (FLUSH) 0.9 %
5-40 SYRINGE (ML) INJECTION EVERY 8 HOURS
Status: DISCONTINUED | OUTPATIENT
Start: 2020-03-25 | End: 2020-03-26

## 2020-03-25 RX ORDER — LIDOCAINE HYDROCHLORIDE 10 MG/ML
INJECTION INFILTRATION; PERINEURAL AS NEEDED
Status: DISCONTINUED | OUTPATIENT
Start: 2020-03-25 | End: 2020-03-25 | Stop reason: HOSPADM

## 2020-03-25 RX ORDER — MIDAZOLAM HYDROCHLORIDE 1 MG/ML
INJECTION, SOLUTION INTRAMUSCULAR; INTRAVENOUS AS NEEDED
Status: DISCONTINUED | OUTPATIENT
Start: 2020-03-25 | End: 2020-03-25 | Stop reason: HOSPADM

## 2020-03-25 RX ORDER — CARVEDILOL 6.25 MG/1
6.25 TABLET ORAL 2 TIMES DAILY WITH MEALS
Status: DISCONTINUED | OUTPATIENT
Start: 2020-03-25 | End: 2020-03-26

## 2020-03-25 RX ORDER — ALBUMIN HUMAN 50 G/1000ML
25 SOLUTION INTRAVENOUS ONCE
Status: ACTIVE | OUTPATIENT
Start: 2020-03-26 | End: 2020-03-26

## 2020-03-25 RX ORDER — HEPARIN SODIUM 200 [USP'U]/100ML
INJECTION, SOLUTION INTRAVENOUS
Status: COMPLETED | OUTPATIENT
Start: 2020-03-25 | End: 2020-03-25

## 2020-03-25 RX ORDER — ATORVASTATIN CALCIUM 40 MG/1
40 TABLET, FILM COATED ORAL
Status: DISCONTINUED | OUTPATIENT
Start: 2020-03-25 | End: 2020-04-02 | Stop reason: HOSPADM

## 2020-03-25 RX ORDER — FENTANYL CITRATE 50 UG/ML
INJECTION, SOLUTION INTRAMUSCULAR; INTRAVENOUS AS NEEDED
Status: DISCONTINUED | OUTPATIENT
Start: 2020-03-25 | End: 2020-03-25 | Stop reason: HOSPADM

## 2020-03-25 RX ORDER — SODIUM CHLORIDE 0.9 % (FLUSH) 0.9 %
5-40 SYRINGE (ML) INJECTION EVERY 8 HOURS
Status: DISCONTINUED | OUTPATIENT
Start: 2020-03-25 | End: 2020-03-25 | Stop reason: SDUPTHER

## 2020-03-25 RX ORDER — CEFAZOLIN SODIUM/WATER 2 G/20 ML
2 SYRINGE (ML) INTRAVENOUS
Status: DISCONTINUED | OUTPATIENT
Start: 2020-03-26 | End: 2020-03-26

## 2020-03-25 RX ORDER — GUAIFENESIN 100 MG/5ML
162 LIQUID (ML) ORAL
Status: COMPLETED | OUTPATIENT
Start: 2020-03-25 | End: 2020-03-25

## 2020-03-25 RX ORDER — NITROGLYCERIN 20 MG/100ML
16.5 INJECTION INTRAVENOUS CONTINUOUS
Status: DISCONTINUED | OUTPATIENT
Start: 2020-03-26 | End: 2020-03-26

## 2020-03-25 RX ORDER — ENOXAPARIN SODIUM 100 MG/ML
1 INJECTION SUBCUTANEOUS EVERY 12 HOURS
Status: COMPLETED | OUTPATIENT
Start: 2020-03-25 | End: 2020-03-25

## 2020-03-25 RX ORDER — HEPARIN SOD,PORCINE/0.9 % NACL 30K/1000ML
50-1000 INTRAVENOUS SOLUTION INTRAVENOUS AS NEEDED
Status: DISCONTINUED | OUTPATIENT
Start: 2020-03-26 | End: 2020-03-26 | Stop reason: HOSPADM

## 2020-03-25 RX ORDER — GUAIFENESIN 100 MG/5ML
81 LIQUID (ML) ORAL DAILY
Status: DISCONTINUED | OUTPATIENT
Start: 2020-03-25 | End: 2020-03-26

## 2020-03-25 RX ORDER — MUPIROCIN 20 MG/G
OINTMENT TOPICAL 2 TIMES DAILY
Status: DISCONTINUED | OUTPATIENT
Start: 2020-03-25 | End: 2020-03-26

## 2020-03-25 RX ORDER — NITROGLYCERIN 0.4 MG/1
0.4 TABLET SUBLINGUAL
Status: DISCONTINUED | OUTPATIENT
Start: 2020-03-25 | End: 2020-03-26

## 2020-03-25 RX ORDER — DOBUTAMINE HYDROCHLORIDE 200 MG/100ML
0-10 INJECTION INTRAVENOUS
Status: DISCONTINUED | OUTPATIENT
Start: 2020-03-26 | End: 2020-03-28

## 2020-03-25 RX ADMIN — Medication 10 ML: at 21:21

## 2020-03-25 RX ADMIN — CARVEDILOL 6.25 MG: 6.25 TABLET, FILM COATED ORAL at 09:54

## 2020-03-25 RX ADMIN — ATORVASTATIN CALCIUM 40 MG: 40 TABLET, FILM COATED ORAL at 21:21

## 2020-03-25 RX ADMIN — ASPIRIN 81 MG 81 MG: 81 TABLET ORAL at 09:54

## 2020-03-25 RX ADMIN — CHLORHEXIDINE GLUCONATE 15 ML: 1.2 RINSE ORAL at 13:55

## 2020-03-25 RX ADMIN — Medication 10 ML: at 06:32

## 2020-03-25 RX ADMIN — CARVEDILOL 6.25 MG: 6.25 TABLET, FILM COATED ORAL at 17:41

## 2020-03-25 RX ADMIN — ENOXAPARIN SODIUM 90 MG: 100 INJECTION SUBCUTANEOUS at 06:32

## 2020-03-25 RX ADMIN — NITROGLYCERIN 0.4 MG: 0.4 TABLET, ORALLY DISINTEGRATING SUBLINGUAL at 02:35

## 2020-03-25 RX ADMIN — NITROGLYCERIN 0.4 MG: 0.4 TABLET, ORALLY DISINTEGRATING SUBLINGUAL at 04:27

## 2020-03-25 RX ADMIN — ASPIRIN 81 MG 162 MG: 81 TABLET ORAL at 02:35

## 2020-03-25 RX ADMIN — MUPIROCIN: 20 OINTMENT TOPICAL at 17:41

## 2020-03-25 RX ADMIN — ENOXAPARIN SODIUM 90 MG: 100 INJECTION SUBCUTANEOUS at 17:40

## 2020-03-25 RX ADMIN — CHLORHEXIDINE GLUCONATE 15 ML: 1.2 RINSE ORAL at 21:25

## 2020-03-25 NOTE — PROGRESS NOTES
Cardiac Surgery Care Coordinator-  Met with Joaquina Colon, Introduced role of the Cardiac Surgery Care Coordinator. Reviewed plan of care and began pre-op education. Discussed day of surgery expectations for the pt and family. Reinforced sternal precautions and keeping your move in the tube. Encouraged Joaquina Colon to verbalize and offered emotional support. Will place call to his wife and provide update. Spoke to Mrs Sandrita Gordon, provided update and encourage her to verbalize. She is unsure if she will be visiting before surgery tomorrow, she will discuss it with her children. Provided contact number and encouraged her to call with questions. Will update telephonically if she is not at the hospital tomorrow.   Marylee Buddy, RN

## 2020-03-25 NOTE — PROGRESS NOTES
Problem: Pressure Injury - Risk of  Goal: *Prevention of pressure injury  Description: Document Morris Scale and appropriate interventions in the flowsheet. Outcome: Progressing Towards Goal  Note: Pressure Injury Interventions:       Moisture Interventions: Absorbent underpads    Activity Interventions: Increase time out of bed, Pressure redistribution bed/mattress(bed type), PT/OT evaluation         Nutrition Interventions: Document food/fluid/supplement intake                     Problem: Patient Education: Go to Patient Education Activity  Goal: Patient/Family Education  Outcome: Progressing Towards Goal     Problem: Falls - Risk of  Goal: *Absence of Falls  Description: Document Danne Lobe Fall Risk and appropriate interventions in the flowsheet.   Outcome: Progressing Towards Goal  Note: Fall Risk Interventions:            Medication Interventions: Evaluate medications/consider consulting pharmacy, Teach patient to arise slowly                   Problem: Patient Education: Go to Patient Education Activity  Goal: Patient/Family Education  Outcome: Progressing Towards Goal     Problem: Patient Education: Go to Patient Education Activity  Goal: Patient/Family Education  Outcome: Progressing Towards Goal     Problem: Unstable angina/NSTEMI: Day of Admission/Day 1  Goal: Off Pathway (Use only if patient is Off Pathway)  Outcome: Progressing Towards Goal  Goal: Activity/Safety  Outcome: Progressing Towards Goal  Goal: Consults, if ordered  Outcome: Progressing Towards Goal  Goal: Diagnostic Test/Procedures  Outcome: Progressing Towards Goal  Goal: Nutrition/Diet  Outcome: Progressing Towards Goal  Goal: Discharge Planning  Outcome: Progressing Towards Goal  Goal: Medications  Outcome: Progressing Towards Goal  Goal: Respiratory  Outcome: Progressing Towards Goal  Goal: Treatments/Interventions/Procedures  Outcome: Progressing Towards Goal  Goal: Psychosocial  Outcome: Progressing Towards Goal  Goal: *Hemodynamically stable  Outcome: Progressing Towards Goal  Goal: *Optimal pain control at patient's stated goal  Outcome: Progressing Towards Goal  Goal: *Lungs clear or at baseline  Outcome: Progressing Towards Goal

## 2020-03-25 NOTE — ED PROVIDER NOTES
77-year-old male with history of coronary artery disease, high cholesterol, mitral valve prolapse presents with complaints of substernal chest pressure/tightness started 20 minutes prior to arrival.  Pressure started at rest, nonradiating. Denies any associated symptoms including nausea, vomiting, diaphoresis, shortness of breath. Denies any recent illness, cough, fever, chills, URI complaints. Denies any sick contacts. Patient reports he is eating well, going to the bathroom well. No other complaints. No pain medications taken prior to arrival.  Similar pain in 1992 when originally diagnosed with coronary artery disease. Patient reports last stress test 3 years ago was unremarkable. Denies tobacco use, drug use, alcohol use  [facility] [facility]    The history is provided by the patient.         Past Medical History:   Diagnosis Date    Arrhythmia     r/t caffeine    Arthritis     Basal cell carcinoma     BPH (benign prostatic hyperplasia)     CAD (coronary artery disease)     s/p PTCA '92    Calculus of kidney     Chronic pain     BACK    Elevated PSA     Hypercholesterolemia     Lumbar foraminal stenosis     Melanoma ([address]     MVP (mitral valve prolapse)     Other ill-defined conditions(799.89)     Prostatitis    Prediabetes     Seasonal allergies     Squamous cell carcinoma     Systolic murmur     Thyroid disease     HYPOTHYROID    Vision impairment     R EYE, BLOOD CLOT ON RETINA       Past Surgical History:   Procedure Laterality Date    ENDOSCOPY, COLON, DIAGNOSTIC      due 12    HX CHOLECYSTECTOMY  1970    HX HEART CATHETERIZATION  1992    WITH BALLOON    HX LITHOTRIPSY      x2    HX ORTHOPAEDIC      elbow - fx right arm age 3 yrs         Family History:   Problem Relation Age of Onset    Heart Disease Mother         CHF    Heart Disease Father         CAD    Heart Disease Sister         CAD CABG    Lung Disease Sister     Heart Disease Brother         CAD    Pulmonary Fibrosis Brother     Heart Disease Brother         CAD    Stroke Brother 34        cerebral hemorrhage    Cancer Brother         LUNG    High Cholesterol Daughter     Anesth Problems Neg Hx        Social History     Socioeconomic History    Marital status:      Spouse name: Not on file    Number of children: Not on file    Years of education: Not on file    Highest education level: Not on file   Occupational History    Not on file   Social Needs    Financial resource strain: Not on file    Food insecurity     Worry: Not on file     Inability: Not on file   [employer] needs     Medical: Not on file     Non-medical: Not on file   Tobacco Use    Smoking status: Never Smoker    Smokeless tobacco: Never Used   Substance and Sexual Activity    Alcohol use: No    Drug use: No    Sexual activity: Not on file   Lifestyle    Physical activity     Days per week: Not on file     Minutes per session: Not on file    Stress: Not on file   Relationships    Social connections     Talks on phone: Not on file     Gets together: Not on file     Attends Church service: Not on file     Active member of club or organization: Not on file     Attends meetings of clubs or organizations: Not on file     Relationship status: Not on file    Intimate partner violence     Fear of current or ex partner: Not on file     Emotionally abused: Not on file     Physically abused: Not on file     Forced sexual activity: Not on file   Other Topics Concern    Not on file   Social History Narrative    Not on file         ALLERGIES: Codeine; Pcn [penicillins]; and Sulfa (sulfonamide antibiotics)    Review of Systems   Constitutional: Negative for chills and fever. HENT: Negative for congestion, nosebleeds and sore throat. Eyes: Negative for pain and discharge. Respiratory: Negative for cough and shortness of breath. Cardiovascular: Positive for chest pain.  Negative for palpitations. Gastrointestinal: Negative for abdominal pain, constipation, nausea and vomiting. Genitourinary: Negative for decreased urine volume, dysuria, flank pain and urgency. Musculoskeletal: Negative for gait problem and myalgias. Skin: Negative for rash and wound. Neurological: Negative for seizures and syncope. Hematological: Does not bruise/bleed easily. Psychiatric/Behavioral: Negative for confusion, self-injury and suicidal ideas. Vitals:    03/25/20 0213   BP: 148/72   Pulse: 71   Resp: 17   Temp: 98 °F (36.7 °C)   SpO2: 97%   Weight: 93.9 kg (207 lb 0.2 oz)   Height: 5' 11\" (1.803 m)            Physical Exam  Vitals signs and nursing note reviewed. Constitutional:       Appearance: He is well-developed. HENT:      Head: Normocephalic and atraumatic. Eyes:      Pupils: Pupils are equal, round, and reactive to light. Neck:      Musculoskeletal: Normal range of motion and neck supple. Cardiovascular:      Rate and Rhythm: Normal rate and regular rhythm. Heart sounds: Normal heart sounds. Pulmonary:      Effort: Pulmonary effort is normal. No respiratory distress. Breath sounds: Normal breath sounds. No wheezing. Abdominal:      General: Bowel sounds are normal.      Palpations: Abdomen is soft. Tenderness: There is no abdominal tenderness. There is no guarding or rebound. Musculoskeletal: Normal range of motion. Skin:     General: Skin is warm and dry. Neurological:      Mental Status: He is alert and oriented to person, place, and time. Psychiatric:         Behavior: Behavior normal.          MDM  Number of Diagnoses or Management Options  Chest pain, unspecified type:   Diagnosis management comments: 19-year-old male with history of coronary artery disease presents with complaints of chest pressure x20 minutes.   Patient is well-appearing, no acute distress, hemodynamically stable, no respiratory distress, clear to auscultation bilaterally, normal room air oxygen saturation, speaking in complete sentences, no leg swelling. Plan-EKG, cardiac monitor, chest x-ray, CBC/CMP/cardiac enzymes, aspirin, pain control. Labs remarkable for trop 0.09  CXR unremarkable       Amount and/or Complexity of Data Reviewed  Clinical lab tests: ordered and reviewed  Tests in the radiology section of CPT®: ordered and reviewed  Independent visualization of images, tracings, or specimens: yes    Patient Progress  Patient progress: improved         Procedures      ED EKG interpretation:  Rhythm: SR with 1st degree block; and regular . Rate (approx.): 72; Axis: left axis deviation; P wave: prolonged; QRS interval: prolonged RBBB; ST/T wave: normal; no acute ischemia, unchanged from 2/2/17. This EKG was interpreted by Mitzi Vail MD,ED Provider. 3:01 AM  CP free after 1 NTG. Hospitalist Perfect Serve for Admission  3:21 AM    ED Room Number: SER07/07  Patient Name and age:  Chaim Hogan 78 y.o.  male  Working Diagnosis:   1. NSTEMI (non-ST elevated myocardial infarction) ([facility] [address]      COVID-19 Suspicion:  no  Readmission: no  Isolation Requirements:  no  Recommended Level of Care:  telemetry  Code Status:  Full Code  Department:[place] [facility] - [phone]    3:31 AM  Mitzi Vail MD spoke with Dr. Liz Damon, Consult for Hospitalist. Discussed available diagnostic tests and clinical findings. He/She is in agreement with care plans as outlined. He/she agreeable for admission.

## 2020-03-25 NOTE — PROGRESS NOTES
1530 
Bedside shift change report given to [address] [place], [place] [address]za (oncoming nurse) by Jossue Dimas RN (offgoing nurse). Report included the following information SBAR, Kardex, Intake/Output, MAR, Accordion, Recent Results and Cardiac Rhythm NSR, BBB, AVB 1st degree. Bedside shift change report given to 1812 Missy Vieira (oncoming nurse) by [address] [place] [address] Ad (offgoing nurse).  Report included the following information SBAR, Kardex, Intake/Output, MAR, Accordion, Recent Results and Cardiac Rhythm NSR to SB.

## 2020-03-25 NOTE — PROGRESS NOTES
Cardiac Cath Lab Procedure Area Arrival Note:    Eri Shultz arrived to Cardiac Cath Lab, Procedure Area. Patient identifiers verified with NAME and DATE OF BIRTH. Procedure verified with patient. Consent forms verified. Allergies verified. Patient informed of procedure and plan of care. Questions answered with review. Patient voiced understanding of procedure and plan of care. Patient on cardiac monitor, non-invasive blood pressure, SPO2 monitor. On room air and placed on O2 @ 2 lpm via NC.  IV of Normal saline on pump at 75 ml/hr. Patient status doing well without problems. Patient is A&Ox 4. Patient reports no pain. Patient medicated during procedure with orders obtained and verified by Dr. Kaylan Zamarripa. Refer to patients Cardiac Cath Lab PROCEDURE REPORT for vital signs, assessment, status, and response during procedure, printed at end of case. Printed report on chart or scanned into chart.

## 2020-03-25 NOTE — CARDIO/PULMONARY
Cardiac Rehab:  Valve educational folder to the bedside of [facility]. Added CABG and MI education. Mena Pastures in to see [facility] after Dr Jorge Alberto Gates consult and all education done by her. Will continue to follow to educate and enroll in CWP after discharge.  Christiano Leigh RN

## 2020-03-25 NOTE — PROGRESS NOTES
Received consult for ACS. No primary cardiologist on case - called unit to instruct them to consult CAV instead. Will notify attending.

## 2020-03-25 NOTE — PROGRESS NOTES
0730 Bedside shift change report given to Mela Saunders RN (oncoming nurse) by Giselle Singh RN (offgoing nurse). Report included the following information SBAR, Kardex, Intake/Output, MAR and Recent Results. 6711 Pt heart rhythm reviewed by RN. Pt noted to have a BBB and AVB 1st degree. 3693 RN notified of critical troponin 4.89. Dr. Trenton Valero at bedside and notified. 1020 Pt off unit to [facility] [place]. TRANSFER - OUT REPORT:    Verbal report given to [facility], RN (name) on [facility]  being transferred to [address] [place] [address] ([facility]) for ordered procedure       Report consisted of patients Situation, Background, Assessment and   Recommendations(SBAR). Information from the following report(s) SBAR, Kardex, Intake/Output, MAR and Recent Results was reviewed with the receiving nurse. Lines:   Peripheral IV 03/25/20 Left Antecubital (Active)   Site Assessment Clean, dry, & intact 3/25/2020  7:30 AM   Phlebitis Assessment 0 3/25/2020  7:30 AM   Infiltration Assessment 0 3/25/2020  7:30 AM   Dressing Status Clean, dry, & intact 3/25/2020  7:30 AM   Dressing Type Transparent 3/25/2020  7:30 AM   Hub Color/Line Status Pink;Capped 3/25/2020  7:30 AM   Action Taken Open ports on tubing capped 3/25/2020  7:30 AM   Alcohol Cap Used Yes 3/25/2020  7:30 AM        Opportunity for questions and clarification was provided. Patient transported with:   Registered Nurse     TRANSFER - IN REPORT:    Verbal report received from Cherylle Krabbe, [facility] (name) on [facility]  being received from [address] [place] [address] ([facility]) for routine progression of care      Report consisted of patients Situation, Background, Assessment and   Recommendations(SBAR). Information from the following report(s) Intake/Output, MAR and Recent Results was reviewed with the receiving nurse. Opportunity for questions and clarification was provided. Assessment completed upon patients arrival to unit and care assumed. 1440 Pt arrived to unit and tele confirmed with monitor tech. 1530 Bedside shift change report given to Yeimi Moser, RN and Anurag Richards RN (oncoming nurse) by Christian Jara RN (offgoing nurse). Report included the following information SBAR, Kardex, Intake/Output, MAR and Recent Results.

## 2020-03-25 NOTE — PROGRESS NOTES
Hospitalist Progress Note  Emiliano Barron MD  Answering service: [phone] from in house phone        Date of Service:  3/25/2020  NAME:  Monster Landeros  :  1940  MRN:  [id number]  PCP: Radha Guerrero MD    Chief Complaint:   Chief Complaint   Patient presents with    Chest Pain         Admission Summary:     Monster Landeros is a 78 y.o. male who presented with chest pain    Interval history / Subjective:   Patient seen for Follow up of CC:      Assessment & Plan:     1. NTEMI- given presentation concerned for ACS  - on Lovenox full dose  - plans for cardiac cath today  - trend Cardiac enzymes  - Cardiology consult noted, Dr. Natarajan Plan   - Continue with Lipitor.  - Carvedilol 6.25mg bid     2. HTN: uncontrolled bp. cont carvedilol 6.25mg bid. Continue with Amlodipine for now.     3. Hypothyroidism: pending TSH. Continue with same dose of Synthroid for now.     DVT prophy: on lovenox  Code status: full code    Care Plan discussed with: Patient/Family    Disposition: TBD    Hospital Problems  Date Reviewed: 2019          Codes Class Noted POA    NSTEMI (non-ST elevated myocardial infarction) [facility]) ICD-10-CM: I21.4  ICD-9-CM: 410.70  3/25/2020 Unknown                Review of Systems:   A comprehensive review of systems was negative. Physical Examination:     General appearance: alert, cooperative, no distress, appears stated age  Head: Normocephalic, without obvious abnormality, atraumatic  Throat: normal findings: buccal mucosa normal  Lungs: clear to auscultation bilaterally  Heart: regular rate and rhythm  Abdomen: soft, non-tender. Bowel sounds normal. No masses,  no organomegaly  Extremities: extremities normal, atraumatic, no cyanosis or edema  Pulses: 2+ and symmetric  Skin: Skin color, texture, turgor normal. No rashes or lesions  Neurologic: Grossly normal       Vital Signs:    Last 24hrs VS reviewed since prior progress note.  Most recent are:    Visit Vitals  /49 (BP 1 Location: Left arm, BP Patient Position: At rest)   Pulse 61   Temp 98 °F (36.7 °C)   Resp 20   Ht 5' 11\" (1.803 m)   Wt 93.7 kg (206 lb 9.1 oz)   SpO2 95%   BMI 28.81 kg/m²         Intake/Output Summary (Last 24 hours) at 3/25/2020 1330  Last data filed at 3/25/2020 0730  Gross per 24 hour   Intake 0 ml   Output 700 ml   Net -700 ml        Tmax:  Temp (24hrs), Av °F (36.7 °C), Min:97.8 °F (36.6 °C), Max:98.2 °F (36.8 °C)      Data Review:   Data reviewed by myself:  Xr Chest Pa Lat    Result Date: 3/25/2020  CLINICAL HISTORY: Chest pain INDICATION: Chest pain, coronary artery disease COMPARISON: None FINDINGS: PA and lateral views of the chest are obtained. The cardiopericardial silhouette is within normal limits. There is no pleural effusion, pneumothorax or focal consolidation present. IMPRESSION: No acute intrathoracic disease. No results found for: SDES  Lab Results   Component Value Date/Time    Culture result: MRSA NOT PRESENT 2017 09:45 AM    Culture result:  2017 09:45 AM         Screening of patient nares for MRSA is for surveillance purposes and, if positive, to facilitate isolation considerations in high risk settings. It is not intended for automatic decolonization interventions per se as regimens are not sufficiently effective to warrant routine use. Culture result: NO GROWTH 1 DAY 2016 08:26 AM     All Micro Results     None          Labs: reviewed by myself. Recent Labs     20   WBC 8.6   HGB 15.3   HCT 47.8        Recent Labs     20      K 4.1      CO2 28   BUN 23*   CREA 1.07   *   CA 8.7     Recent Labs     20   SGOT 18   ALT 25   *   TBILI 0.6   TP 7.1   ALB 3.9   GLOB 3.2     No results for input(s): INR, PTP, APTT, INREXT in the last 72 hours. No results for input(s): FE, TIBC, PSAT, FERR in the last 72 hours.    No results found for: FOL, RBCF   No results for input(s): PH, PCO2, PO2 in the last 72 hours.   Recent Labs     03/25/20  0900 03/25/20  0218   TROIQ 4.89* 0.09*     Lab Results   Component Value Date/Time    Cholesterol, total 188 12/30/2019 08:43 AM    HDL Cholesterol 58 12/30/2019 08:43 AM    LDL, calculated 110 (H) 12/30/2019 08:43 AM    Triglyceride 102 12/30/2019 08:43 AM    CHOL/HDL Ratio 3.0 09/10/2010 07:52 AM     Lab Results   Component Value Date/Time    Glucose (POC) 118 (H) 01/08/2019 07:47 PM    Glucose (POC) 101 (H) 03/01/2017 10:20 AM    Glucose (POC) 123 (H) 12/11/2014 09:43 AM     Lab Results   Component Value Date/Time    Color RED 01/08/2019 07:46 PM    Appearance BLOODY (A) 01/08/2019 07:46 PM    Specific gravity 1.023 01/08/2019 07:46 PM    Specific gravity 1.015 12/11/2014 10:50 AM    pH (UA) 6.5 01/08/2019 07:46 PM    Protein >300 (A) 01/08/2019 07:46 PM    Glucose NEGATIVE  01/08/2019 07:46 PM    Ketone 15 (A) 01/08/2019 07:46 PM    Bilirubin NEGATIVE  02/02/2017 10:33 AM    Urobilinogen 2.0 (H) 01/08/2019 07:46 PM    Nitrites POSITIVE (A) 01/08/2019 07:46 PM    Leukocyte Esterase SMALL (A) 01/08/2019 07:46 PM    Epithelial cells FEW 01/08/2019 07:46 PM    Bacteria NEGATIVE  01/08/2019 07:46 PM    WBC 20-50 01/08/2019 07:46 PM    RBC >100 (H) 01/08/2019 07:46 PM         Medications Reviewed:     Current Facility-Administered Medications   Medication Dose Route Frequency    nitroglycerin (NITROSTAT) tablet 0.4 mg  0.4 mg SubLINGual Q5MIN PRN    sodium chloride (NS) flush 5-40 mL  5-40 mL IntraVENous Q8H    sodium chloride (NS) flush 5-40 mL  5-40 mL IntraVENous PRN    aspirin chewable tablet 81 mg  81 mg Oral DAILY    [Held by provider] enoxaparin (LOVENOX) injection 90 mg  1 mg/kg SubCUTAneous Q12H    carvediloL (COREG) tablet 6.25 mg  6.25 mg Oral BID WITH MEALS    atorvastatin (LIPITOR) tablet 40 mg  40 mg Oral QHS     ______________________________________________________________________  EXPECTED LENGTH OF STAY: 1d 19h  ACTUAL LENGTH OF STAY:          0                 Opal Prater MD     Patient's emergency contacts:  Extended Emergency Contact Information  Primary Emergency Contact: [facility]  Address: [address] Phone: [phone]  Mobile Phone: [phone]  Relation: Spouse

## 2020-03-25 NOTE — PROGRESS NOTES
1530 
Bedside shift change report given to [address] [place] [address] (oncoming nurse) by Reji Chappell RN (offgoing nurse). Report included the following information SBAR, Kardex, Intake/Output, MAR, Accordion, Recent Results and Cardiac Rhythm NSR, BBB, AVB 1st degree.

## 2020-03-25 NOTE — PROCEDURES
Findings:  1)Severe native distal left main and 3 vessel CAD with cuprit lesion in distal RCA with MAYRA II flow  2)Heavy calcification throughout LAD, proximal RCA  And proximal LCx with ostial LCx aneurysm  3)HIgh syntax score with involvement of Left mainasa well as LAD/diagonal and proximal LCx/OM1 bifurcation involvement  4)LIkely moderate Aortic stenosis with pull back gradient of 25 mm Hg and normal LVEDP. However can not be differentiated from dynamic LVOT obstructions. Needs echo for further assessment,    Recommendation  1)Consideration for CABG with/without AVR  2)MAy reinitiate Heparin/Lovenox per protocol    Access: right radial no issues    Contrast 42 cc.

## 2020-03-25 NOTE — Clinical Note
Mallampati: Class IV - only hard palate visible. ASA: Class 3 - patient with severe systemic disease.

## 2020-03-25 NOTE — H&P
[address] [facility] Adult  Hospitalist Group  History and Physical    Primary Care Provider: Melburn Felty, MD  Date of Service:  3/25/2020    Subjective:     Zaynab Santamaria is a 78 y.o. male with past medical history significant for CAD, hyperlipidemia, hypothyroidism presented emergency room complaining of sudden onset of substernal chest pain/chest tightness that started tonight. Patient said that he was sleeping when he suddenly started feeling a heavy pressure in the center of his chest.  Pain did not radiate. He denies any associated symptoms such as shortness of breath, palpitation, cough, nausea, diaphoresis or epigastric pain. He does admit that in the last several weeks every time that he takes his dog out he experiencing intermittent chest discomfort/pressure which resolved with rest.  Denies dyspnea exertion or decreased exercise tolerance. Today in the emergency room he was found to have an elevated troponin was 0.09. No EKGs abnormality. Patient admitted for further evaluation. Patient received 2 doses of nitroglycerin in the emergency room with improvement of his chest discomfort. Review of Systems:  Review of Systems   Constitutional: Negative for chills, fever, malaise/fatigue and weight loss. HENT: Negative for congestion, ear discharge and hearing loss. Eyes: Negative for blurred vision and double vision. Respiratory: Negative for cough, sputum production, shortness of breath and wheezing. Cardiovascular: Positive for chest pain. Negative for palpitations, orthopnea, leg swelling and PND. Gastrointestinal: Negative for abdominal pain, constipation, diarrhea, melena, nausea and vomiting. Genitourinary: Negative for dysuria, frequency and urgency. Musculoskeletal: Negative for back pain, joint pain and myalgias. Skin: Negative for itching and rash.    Neurological: Negative for dizziness, sensory change, speech change, focal weakness, weakness and headaches. Endo/Heme/Allergies: Negative for polydipsia. Does not bruise/bleed easily. Past Medical History:   Diagnosis Date    Arrhythmia     r/t caffeine    Arthritis     Basal cell carcinoma     BPH (benign prostatic hyperplasia)     CAD (coronary artery disease)     s/p PTCA '92    Calculus of kidney     Chronic pain     BACK    Elevated PSA     Hypercholesterolemia     Lumbar foraminal stenosis     Melanoma ([facility] [address]     MVP (mitral valve prolapse)     Other ill-defined conditions(799.89)     Prostatitis    Prediabetes     Seasonal allergies     Squamous cell carcinoma     Systolic murmur     Thyroid disease     HYPOTHYROID    Vision impairment     R EYE, BLOOD CLOT ON RETINA      Past Surgical History:   Procedure Laterality Date    ENDOSCOPY, COLON, DIAGNOSTIC      due 12    HX CHOLECYSTECTOMY  1970    HX HEART CATHETERIZATION  1992    WITH BALLOON    HX LITHOTRIPSY      x2    HX ORTHOPAEDIC      elbow - fx right arm age 3 yrs     Prior to Admission medications    Medication Sig Start Date End Date Taking? Authorizing Provider   levothyroxine (SYNTHROID) 50 mcg tablet TAKE 1 TABLET BY MOUTH EVERY DAY BEFORE BREAKFAST EXCEPT TAKE 2 TABS ON SATURDAY. 3/8/20  Yes Jennifer Rodriguez MD   amLODIPine (NORVASC) 5 mg tablet TAKE 1 TAB BY MOUTH DAILY. 3/7/20  Yes Jennifer Rodriguez MD   atorvastatin (LIPITOR) 40 mg tablet TAKE 1 TAB BY MOUTH DAILY. 6/28/19  Yes Jennifer Rodriguez MD   tamsulosin (FLOMAX) 0.4 mg capsule TAKE 1 CAPSULE EVERY DAY 30 MINUTES AFTER THE SAME MEAL EACH DAY 4/8/19  Yes Provider, Historical   Cholecalciferol, Vitamin D3, (VITAMIN D3) 2,000 unit cap capsule Take 2,000 Units by mouth two (2) times a day. 5/30/18  Yes Gilma Samuels MD   aspirin 81 mg chewable tablet Take 81 mg by mouth daily. Yes Elvia Katz MD   finasteride (PROSCAR) 5 mg tablet Take 5 mg by mouth daily.  HS   Yes Provider, Historical   co-enzyme Q-10 (CO Q-10) 100 mg capsule Take 200 mg by mouth daily. Yes Provider, Historical   VIT A/VIT C/VIT E/ZINC/COPPER (PRESERVISION AREDS PO) Take 1 Cap by mouth two (2) times a day. Yes Provider, Historical   varicella-zoster recombinant, PF, (SHINGRIX, PF,) 50 mcg/0.5 mL susr injection 0.5mL by IntraMUSCular route once now and then repeat in 2-6 months  Patient taking differently: 0.5mL by IntraMUSCular route once now and then repeat in 2-6 months NEEDS 2ND. 12/5/18   Doris Rodriguez MD     Allergies   Allergen Reactions    Codeine Rash    Pcn [Penicillins] Rash    Sulfa (Sulfonamide Antibiotics) Other (comments)     confusion      Family History   Problem Relation Age of Onset    Heart Disease Mother         CHF    Heart Disease Father         CAD    Heart Disease Sister         CAD CABG    Lung Disease Sister     Heart Disease Brother         CAD    Pulmonary Fibrosis Brother     Heart Disease Brother         CAD    Stroke Brother 34        cerebral hemorrhage    Cancer Brother         LUNG    High Cholesterol Daughter     Anesth Problems Neg Hx         SOCIAL HISTORY:  Patient resides at home with wife. Patient ambulates independently  Smoking history: Never  Alcohol history: None         Objective:       Physical Exam:   Patient Vitals for the past 12 hrs:   Temp Pulse Resp BP SpO2   03/25/20 0458 98.1 °F (36.7 °C) 74 18 153/52 97 %   03/25/20 0418  76 22 150/77 96 %   03/25/20 0400 98.2 °F (36.8 °C) 67 17 141/72 96 %   03/25/20 0345  70 13 146/70 95 %   03/25/20 0330  74 19 147/77 95 %   03/25/20 0310  80 16 163/90 94 %   03/25/20 0250  72 14  94 %   03/25/20 0238  73 15  95 %   03/25/20 0235  72  140/77    03/25/20 0213 98 °F (36.7 °C) 71 17 148/72 97 %     GEN APPEARANCE: Patient resting in bed in NAD  HEENT: Conjunctiva Clear  CVS: RRR, S1, S2; 2/6 systolic murmur. LUNGS: CTAB; No Wheezes;  No Rhonchi: No rales  ABD: Soft; No TTP; + Normoactive BS  EXT: WWP, no edema   Skin exam: No gross lesions noted on exposed skin surfaces  MENTAL STATUS: Answers questions appropriately, responds to commands. NEURO: no focal deficit      Data Review:   Recent Results (from the past 24 hour(s))   CBC WITH AUTOMATED DIFF    Collection Time: 03/25/20  2:18 AM   Result Value Ref Range    WBC 8.6 4.1 - 11.1 K/uL    RBC 5.16 4.10 - 5.70 M/uL    HGB 15.3 12.1 - 17.0 g/dL    HCT 47.8 36.6 - 50.3 %    MCV 92.6 80.0 - 99.0 FL    MCH 29.7 26.0 - 34.0 PG    MCHC 32.0 30.0 - 36.5 g/dL    RDW 13.4 11.5 - 14.5 %    PLATELET 847 355 - 193 K/uL    MPV 9.1 8.9 - 12.9 FL    NRBC 0.0 0  WBC    ABSOLUTE NRBC 0.00 0.00 - 0.01 K/uL    NEUTROPHILS 51 32 - 75 %    LYMPHOCYTES 35 12 - 49 %    MONOCYTES 8 5 - 13 %    EOSINOPHILS 4 0 - 7 %    BASOPHILS 1 0 - 1 %    IMMATURE GRANULOCYTES 1 (H) 0.0 - 0.5 %    ABS. NEUTROPHILS 4.5 1.8 - 8.0 K/UL    ABS. LYMPHOCYTES 3.0 0.8 - 3.5 K/UL    ABS. MONOCYTES 0.7 0.0 - 1.0 K/UL    ABS. EOSINOPHILS 0.3 0.0 - 0.4 K/UL    ABS. BASOPHILS 0.1 0.0 - 0.1 K/UL    ABS. IMM. GRANS. 0.0 0.00 - 0.04 K/UL    DF AUTOMATED     METABOLIC PANEL, COMPREHENSIVE    Collection Time: 03/25/20  2:18 AM   Result Value Ref Range    Sodium 142 136 - 145 mmol/L    Potassium 4.1 3.5 - 5.1 mmol/L    Chloride 105 97 - 108 mmol/L    CO2 28 21 - 32 mmol/L    Anion gap 9 5 - 15 mmol/L    Glucose 110 (H) 65 - 100 mg/dL    BUN 23 (H) 6 - 20 MG/DL    Creatinine 1.07 0.70 - 1.30 MG/DL    BUN/Creatinine ratio 21 (H) 12 - 20      GFR est AA >60 >60 ml/min/1.73m2    GFR est non-AA >60 >60 ml/min/1.73m2    Calcium 8.7 8.5 - 10.1 MG/DL    Bilirubin, total 0.6 0.2 - 1.0 MG/DL    ALT (SGPT) 25 12 - 78 U/L    AST (SGOT) 18 15 - 37 U/L    Alk.  phosphatase 122 (H) 45 - 117 U/L    Protein, total 7.1 6.4 - 8.2 g/dL    Albumin 3.9 3.5 - 5.0 g/dL    Globulin 3.2 2.0 - 4.0 g/dL    A-G Ratio 1.2 1.1 - 2.2     TROPONIN I    Collection Time: 03/25/20  2:18 AM   Result Value Ref Range    Troponin-I, Qt. 0.09 (H) <0.05 ng/mL   SAMPLES BEING HELD    Collection Time: 03/25/20  2:18 AM   Result Value Ref Range    SAMPLES BEING HELD BLUE,RED     COMMENT        Add-on orders for these samples will be processed based on acceptable specimen integrity and analyte stability, which may vary by analyte. Xr Chest Pa Lat    Result Date: 3/25/2020  IMPRESSION: No acute intrathoracic disease. Assessment:     Active Problems:    NSTEMI (non-ST elevated myocardial infarction) ([facility] [address] (3/25/2020)        Plan:     1. NTEMI- given presentation concerned for ACS  - Start lovenox 1mg/kg q12 hours  - trend Cardiac enzymes  - Cardiology consult  - Check lipid panel, THS  - Continue with Lipitor.  - Add carvedilol 6.25mg bid      2. HTN: uncontrolled bp. Will start carvedilol 6.25mg bid. Continue with Amlodipine for now. 3. Hypothyroidism: check TSH now. Continue with same dose of Synthroid for now. DVT prophy: on lovenox  Code status: full code    FUNCTIONAL STATUS PRIOR TO HOSPITALIZATION: independent.      Signed By: Marilee Olivas MD     March 25, 2020

## 2020-03-25 NOTE — PROGRESS NOTES
0730: Bedside and Verbal shift change report given to [address] [place] [address] Connector (oncoming nurse) by Trena Singh RN (offgoing nurse). Report included the following information SBAR, Kardex, Intake/Output, MAR, Recent Results, Med Rec Status and Cardiac Rhythm NSR.

## 2020-03-25 NOTE — ED NOTES
Report given to [facility]. At time of transport patient rates pain 4/10, ED MD notified, 1 additional Nitro given prior to leaving unit. Patient off the floor in NAD, VSS.

## 2020-03-25 NOTE — PROGRESS NOTES
1930: Bedside shift change report given to [employer] Faison (oncoming nurse) by Millicent Sheridan (offgoing nurse). Report included the following information SBAR, Intake/Output, MAR, Accordion, Recent Results, Med Rec Status and Cardiac Rhythm NSR, BBB, 1st AVB. 2015: RT at bedside for PFTs, ABGs    2020: PFT results are as follows per RT Diana:   FEV-1 Actual 1.58L   FEV-1 Predicated 3.02L   FVC 2.41L   Patient Position: Sitting up in bed   Patient Effort: Poor    2200: Pre-Op CHG bath performed    0745: Bedside shift change report given to Abi Cheatham (oncoming nurse) by [employer] Hung (offgoing nurse). Report included the following information SBAR, Intake/Output, MAR, Accordion, Recent Results, Med Rec Status and Cardiac Rhythm NSR, BBB, 1st AVB w/ PVCs.

## 2020-03-25 NOTE — TELEPHONE ENCOUNTER
Darlin Pollack [phone] from [facility]     He is having open heart surgery tomorrow

## 2020-03-25 NOTE — CONSULTS
[facility] [place]: Luis Alberto Clark  [phone]  Requesting/referring provider: Dr. Clifford Patiño MD   Reason for Consult: chest pain, elevated troponin    HPI: Deborah Whittington, a 78y.o. year-old who presents for evaluation of chest pain. Has PMH of CAD (PTCA ), HLD, HTN, hypothyroidism, mitral valve prolapse, prediabetes. He presented with sudden onset of substernal chest pain/chest tightness that started last night. He was a   sleeping when he suddenly started feeling a heavy pressure in the center of his chest. No radiation of pain. No associated SOB, palpitations, cough, nausea or diaphoresis. He also admitted that in the last several weeks every time that he takes his dog out he experiences intermittent chest discomfort/pressure which resolved with rest.  Denied VANN. Initial troponin 0.09 but this AM troponin 4.89. EKG with NSR with bifasicular block and ST depression in  Anterior leads. .  Patient received 2 doses of nitroglycerin in the emergency room with improvement of his chest discomfort. This a.m. on interview, he continues to c/o substernal chest pressure. SH:never smoker, no ETOH  FH: father  age 79 had CAD, sister had CABG and is , 2 brothers with CAD    Pt with chest pain this am and several weeks of mild anginal sx. Ongoing pain now but better than at presentation. Ngt responsive. Hx angioplasty 30 years ago, no trips to cath lab since then. \Followed by Nirav Amador. Assessment/Plan:  1. NSTEMI:  Troponin rising, now 4.89. EKG with ST depression anterior leads. Presented with chest pain and recent exertional chest discomfort typical for ACS. Continues to have chest pain. Did receive 1 dose lovenox this a.m and aspirin. Will proceed this a.m.with [facility] today with Dr. Sergio Merritt (Dr. Sergio Merritt aware of lovenox dose at 6AM). Pt is agreeable to [facility]. Continue coreg,   Resume atorvastatin. 2.Hx of CAD s/p PTCA . Follows with [facility]. ASA, statin, BB   3.  Hx of mitral valve prolapse  4. Hx of prediabetes: A1C 6  12/2019. Will recheck  5. Hx of HTN: bp controlled this a.m.: continue coreg. Home Amlodipine on hold   6. Hx of hypothyroidism: TSH 2.34 12/2019. On synthroid PTA. Soc no tob no etoh  He  has a past medical history of Arrhythmia, Arthritis, Basal cell carcinoma, BPH (benign prostatic hyperplasia), CAD (coronary artery disease), Calculus of kidney, Chronic pain, Elevated PSA, Hypercholesterolemia, Lumbar foraminal stenosis, Melanoma ([address] MVP (mitral valve prolapse), Other ill-defined conditions(799.89), Prediabetes, Seasonal allergies, Squamous cell carcinoma, Systolic murmur, Thyroid disease, and Vision impairment. He also has no past medical history of Adverse effect of anesthesia or Unspecified sleep apnea. Cardiovascular ROS: +Chest pain at rest and with exertion  Respiratory ROS: no cough, shortness of breath, or wheezing  Neurological ROS: no TIA or stroke symptoms  All other systems negative except as above. PE  Vitals:    03/25/20 0454 03/25/20 0458 03/25/20 0708 03/25/20 0903   BP:  153/52 126/49    Pulse:  74 (!) 57 62   Resp:  18 16    Temp:  98.1 °F (36.7 °C) 97.8 °F (36.6 °C)    SpO2:  97% 97%    Weight: 206 lb 9.1 oz (93.7 kg)      Height: 5' 11\" (1.803 m)       Body mass index is 28.81 kg/m².    General appearance - alert, well appearing, and in no distress  Mental status - affect appropriate to mood  Eyes - sclera anicteric, moist mucous membranes  Neck - supple, no significant adenopathy  Lymphatics - no  lymphadenopathy  Chest - clear to auscultation, no wheezes, rales or rhonchi  Heart - normal rate, regular rhythm, normal S1, S2, no murmurs, rubs, clicks or gallops  Abdomen - soft, nontender, nondistended, no masses or organomegaly  Back exam -   Neurological - Alert, LOUIS, no focal deficit  Musculoskeletal - no muscular tenderness noted, LOUIS  Extremities - peripheral pulses normal, no pedal edema  Skin - normal coloration  no rashes    Recent Labs:  Lab Results   Component Value Date/Time    Cholesterol, total 188 12/30/2019 08:43 AM    HDL Cholesterol 58 12/30/2019 08:43 AM    LDL, calculated 110 (H) 12/30/2019 08:43 AM    Triglyceride 102 12/30/2019 08:43 AM    CHOL/HDL Ratio 3.0 09/10/2010 07:52 AM     Lab Results   Component Value Date/Time    Creatinine (POC) 0.7 01/08/2019 07:47 PM    Creatinine 1.07 03/25/2020 02:18 AM     Lab Results   Component Value Date/Time    BUN 23 (H) 03/25/2020 02:18 AM    BUN (POC) 21 (H) 01/08/2019 07:47 PM     Lab Results   Component Value Date/Time    Potassium 4.1 03/25/2020 02:18 AM     Lab Results   Component Value Date/Time    Hemoglobin A1c 6.0 (H) 12/30/2019 08:43 AM     Lab Results   Component Value Date/Time    Hemoglobin (POC) 16.0 12/11/2014 09:43 AM    HGB 15.3 03/25/2020 02:18 AM     Lab Results   Component Value Date/Time    PLATELET 423 90/05/6607 02:18 AM       Reviewed:  Past Medical History:   Diagnosis Date    Arrhythmia     r/t caffeine    Arthritis     Basal cell carcinoma     BPH (benign prostatic hyperplasia)     CAD (coronary artery disease)     s/p PTCA '92    Calculus of kidney     Chronic pain     BACK    Elevated PSA     Hypercholesterolemia     Lumbar foraminal stenosis     Melanoma ([address]     MVP (mitral valve prolapse)     Other ill-defined conditions(799.89)     Prostatitis    Prediabetes     Seasonal allergies     Squamous cell carcinoma     Systolic murmur     Thyroid disease     HYPOTHYROID    Vision impairment     R EYE, BLOOD CLOT ON RETINA     Social History     Tobacco Use   Smoking Status Never Smoker   Smokeless Tobacco Never Used     Social History     Substance and Sexual Activity   Alcohol Use No     Allergies   Allergen Reactions    Codeine Rash    Pcn [Penicillins] Rash    Sulfa (Sulfonamide Antibiotics) Other (comments)     confusion       Current Facility-Administered Medications   Medication Dose Route Frequency    nitroglycerin (NITROSTAT) tablet 0.4 mg  0.4 mg SubLINGual Q5MIN PRN    sodium chloride (NS) flush 5-40 mL  5-40 mL IntraVENous Q8H    sodium chloride (NS) flush 5-40 mL  5-40 mL IntraVENous PRN    aspirin chewable tablet 81 mg  81 mg Oral DAILY    enoxaparin (LOVENOX) injection 90 mg  1 mg/kg SubCUTAneous Q12H    carvediloL (COREG) tablet 6.25 mg  6.25 mg Oral BID WITH MEALS       Vera Linares.  Hyun, BRENNAN  [facility] [place]  [address], [address], [address]

## 2020-03-25 NOTE — ED NOTES
TRANSFER - OUT REPORT:    Verbal report given to Mauricio Suresh RN(name) on [employer]  being transferred to CVSU(unit) for routine progression of care       Report consisted of patients Situation, Background, Assessment and   Recommendations(SBAR). Information from the following report(s) SBAR, Kardex, ED Summary, MAR, Recent Results and Cardiac Rhythm NSR was reviewed with the receiving nurse. Lines:   Peripheral IV 03/25/20 Left Antecubital (Active)   Site Assessment Clean, dry, & intact 3/25/2020  2:17 AM   Phlebitis Assessment 0 3/25/2020  2:17 AM   Infiltration Assessment 0 3/25/2020  2:17 AM   Dressing Status Clean, dry, & intact 3/25/2020  2:17 AM   Dressing Type Tape;Transparent 3/25/2020  2:17 AM   Hub Color/Line Status Pink;Flushed;Patent 3/25/2020  2:17 AM        Opportunity for questions and clarification was provided.       Patient transported with:   Monitor   [facility]

## 2020-03-25 NOTE — PROGRESS NOTES
Reason for Admission:  Patient presented with substernal chest pain/tightness- Cardiology Consultation                     RUR Score:  8% risk of re-admission                    Plan for utilizing home health:  CM offered [facility] for possible NSTEMI if patient qualifies- the patient wants to hold at this time, awaiting to see what his medical POC will be. CM will offer upon discharge once POC identified         PCP: First and Last name:  Dr. Sydney Martinez    Name of Practice: [address] [id number] Internal Medicine, [facility]    Are you a current patient: Yes/No: Yes   Approximate date of last visit: Patient sees PCP approximately every six months- he thinks his last visit was in December                     Current Advanced Directive/Advance Care Plan: Full Code, not on file                          Transition of Care Plan:   Home, possible H2H     The CM met with the patient at bedside in order to introduce the role of CM and assess for patient needs. The patient lives in two-story home with his wife and dog, verified demographics and insurance information, however, the patient does not know if his Medicare A & B is primary of [employer] [facility] PPO- CM sent request to [facility] team to investigate, both he and his wife are retired- anticipate Medicare A & B is primary. The patient endorses being independent with ADLs and mobility, denied use of DME in the home. The patient works part-time completing taxes, denied difficulty affording or accessing medications, has prescription coverage and utilizes [facility] on [address] for prescriptions. The patient's spouse will transport home. The CM offered [facility]- the patient wants to hold decision at this time until his medical POC further determined. CM will follow for transitions of care. Horacio Essex, MSW    15:46 p.m.- CM noted patient to undergo CABG, anticipate home health needs s/p CABG, PT/OT evaluations.      Care Management Interventions  PCP Verified by CM: Yes(Patient verified PCP as Dr. Rosales [place] )  Mode of Transport at Discharge:  Other (see comment)(Patient's spouse will transport home )  Transition of Care Consult (CM Consult): Discharge Planning  MyChart Signup: Yes  Discharge Durable Medical Equipment: No  Health Maintenance Reviewed: Yes  Physical Therapy Consult: No  Occupational Therapy Consult: No  Speech Therapy Consult: No  Current Support Network: Own Home, Lives with Spouse  Confirm Follow Up Transport: Family  Discharge Location  Discharge Placement: Home

## 2020-03-25 NOTE — CONSULTS
[facility]   History and Physical    Subjective:      Javi Chan is a 78 y.o. male who was referred for cardiac evaluation. He has a PMH significant for CAD (previous PTCA) with NSTEMI on this admission, HTN, HLD, Hypothyroid, mitral valve prolapse, Pre-diabetes (A1c of 6 in December), arthritis, BPH. He reports a sensation of chest pressure over the last couple of weeks when he walked the dog. This pressure resolved with rest. Denies accompanied shortness of breath, N/V, diaphoresis, syncope. Last night he developed this chest pressure which he noted as severe and unrelenting. The pressure was substernal and he had his wife bring him to the [place] ED around 2am. His troponin elevated to 4.89 and he was taken to the Cath Lab this morning with Dr. Epi Mcneill. He was found to have 3-vessel CAD. Dr. Rae Callahan was asked to consult for consideration of Coronary Artery Bypass +/- AVR. No smoking or ETOH history. Father  at 71 of CAD and brother  at 72 with CAD. Cardiac Testing    Cardiac catheterization 20 :  Conclusion     Findings:  1)Severe native distal left main and 3 vessel CAD with cuprit lesion in distal RCA with MAYRA II flow  2)Heavy calcification throughout LAD, proximal RCA  And proximal LCx with ostial LCx aneurysm  3)HIgh syntax score with involvement of Left mainasa well as LAD/diagonal and proximal LCx/OM1 bifurcation involvement  4)LIkely moderate Aortic stenosis with pull back gradient of 25 mm Hg and normal LVEDP     Recommendation  1)Consideration for CABG/AVR  2)MAy reinitiate Heparin/Lovenox per protocol     Access: right radial no issues     Contrast 42 cc. Coronary Findings     Diagnostic   Dominance: Right   Left Main   Ost LM to Ost LAD lesion 60% stenosed. .   Left Anterior Descending   Ost LAD to Prox LAD lesion 70% stenosed. .   Prox LAD to Mid LAD lesion 90% stenosed. .   Mid LAD lesion 80% stenosed. Rosario Presume LAD lesion 70% stenosed.  .   Left Circumflex   Prox Cx lesion 90% stenosed. .   Right Coronary Artery   Ost RCA lesion 60% stenosed. .   Dist RCA lesion 95% stenosed. .   Intervention     No interventions have been documented. ECHO: Ordered    Past Medical History:   Diagnosis Date    Arrhythmia     r/t caffeine    Arthritis     Basal cell carcinoma     BPH (benign prostatic hyperplasia)     CAD (coronary artery disease)     s/p PTCA '92    Calculus of kidney     Chronic pain     BACK    Elevated PSA     Hypercholesterolemia     Lumbar foraminal stenosis     Melanoma ([address]     MVP (mitral valve prolapse)     Other ill-defined conditions(799.89)     Prostatitis    Prediabetes     Seasonal allergies     Squamous cell carcinoma     Systolic murmur     Thyroid disease     HYPOTHYROID    Vision impairment     R EYE, BLOOD CLOT ON RETINA     Past Surgical History:   Procedure Laterality Date    ENDOSCOPY, COLON, DIAGNOSTIC      due 12    HX CHOLECYSTECTOMY  1970    HX HEART CATHETERIZATION  1992    WITH BALLOON    HX LITHOTRIPSY      x2    HX ORTHOPAEDIC      elbow - fx right arm age 3 yrs      Social History     Tobacco Use    Smoking status: Never Smoker    Smokeless tobacco: Never Used   Substance Use Topics    Alcohol use: No      Family History   Problem Relation Age of Onset    Heart Disease Mother         CHF    Heart Disease Father         CAD    Heart Disease Sister         CAD CABG    Lung Disease Sister     Heart Disease Brother         CAD    Pulmonary Fibrosis Brother     Heart Disease Brother         CAD    Stroke Brother 34        cerebral hemorrhage    Cancer Brother         LUNG    High Cholesterol Daughter     Anesth Problems Neg Hx      Prior to Admission medications    Medication Sig Start Date End Date Taking?  Authorizing Provider   levothyroxine (SYNTHROID) 50 mcg tablet TAKE 1 TABLET BY MOUTH EVERY DAY BEFORE BREAKFAST EXCEPT TAKE 2 TABS ON SATURDAY. 3/8/20  Yes Sally Rodriguez MD   amLODIPine (NORVASC) 5 mg tablet TAKE 1 TAB BY MOUTH DAILY. 3/7/20  Yes Franky Rodriguez MD   atorvastatin (LIPITOR) 40 mg tablet TAKE 1 TAB BY MOUTH DAILY. 6/28/19  Yes Franky Rodriguez MD   tamsulosin (FLOMAX) 0.4 mg capsule TAKE 1 CAPSULE EVERY DAY 30 MINUTES AFTER THE SAME MEAL EACH DAY 4/8/19  Yes Provider, Historical   Cholecalciferol, Vitamin D3, (VITAMIN D3) 2,000 unit cap capsule Take 2,000 Units by mouth two (2) times a day. 5/30/18  Yes Crispin Leon MD   aspirin 81 mg chewable tablet Take 81 mg by mouth daily. Yes Elvia Katz MD   finasteride (PROSCAR) 5 mg tablet Take 5 mg by mouth daily. HS   Yes Provider, Historical   co-enzyme Q-10 (CO Q-10) 100 mg capsule Take 200 mg by mouth daily. Yes Provider, Historical   VIT A/VIT C/VIT E/ZINC/COPPER (PRESERVISION AREDS PO) Take 1 Cap by mouth two (2) times a day. Yes Provider, Historical   varicella-zoster recombinant, PF, (SHINGRIX, PF,) 50 mcg/0.5 mL susr injection 0.5mL by IntraMUSCular route once now and then repeat in 2-6 months  Patient taking differently: 0.5mL by IntraMUSCular route once now and then repeat in 2-6 months NEEDS 2ND. 12/5/18   Franky Rodriguez MD       Allergies   Allergen Reactions    Codeine Rash    Pcn [Penicillins] Rash    Sulfa (Sulfonamide Antibiotics) Other (comments)     confusion       Review of Systems:   Consititutional: Denies fever or chills. Eyes:  +glasses  ENT:  Denies hearing or swallowing difficulty. CV: +chest pressure, +HTN, +HLD  Resp: Denies dyspnea, productive cough. : Denies dialysis or kidney problems. +BPH  GI: Denies ulcers, esophageal strictures, liver problems. M/S: Denies joint or bone problems, or implanted artificial hardware. Skin: Denies varicose veins, edema. Neuro: Denies strokes, or TIAs. Psych: Denies anxiety or depression. Endocrine: +hypothyroid, +pre-diabetic  Heme/Lymphatic: Denies easy bruising or lymphedema.      Objective:     VS:     Visit Vitals  /59 (BP 1 Location: Left arm, BP Patient Position: At rest)   Pulse 62   Temp 98 °F (36.7 °C)   Resp 20   Ht 5' 11\" (1.803 m)   Wt 206 lb 9.1 oz (93.7 kg)   SpO2 95%   BMI 28.81 kg/m²         Physical Exam:    General appearance: alert, cooperative, no distress  Head: normocephalic, without obvious abnormality; atraumatic  Eyes: conjunctivae/corneas clear; EOM's intact. Nose: nares normal; no drainage. Neck: no carotid bruit and no JVD  Lungs: clear to auscultation bilaterally  Heart: regular rate and rhythm; +murmur  Abdomen: soft, non-tender; bowel sounds normal  Extremities: moves all extremities; no weakness. Skin: Skin color normal; No varicose veins or edema. Neurologic: Grossly normal      Labs:   Recent Labs     03/25/20  0218   WBC 8.6   HGB 15.3   HCT 47.8         K 4.1   BUN 23*   CREA 1.07   *       Diagnostics:   PA and lateral:   CXR Results  (Last 48 hours)               03/25/20 0255  XR CHEST PA LAT Final result    Impression:  IMPRESSION: No acute intrathoracic disease. Narrative:  CLINICAL HISTORY: Chest pain    INDICATION: Chest pain, coronary artery disease       COMPARISON: None       FINDINGS:    PA and lateral views of the chest are obtained. The cardiopericardial silhouette is within normal limits. There is no pleural   effusion, pneumothorax or focal consolidation present. Carotid doppler: ordered    PFTS-FEV1: ordered    EKG: Sinus rhythm with 1st degree AV block with occasional premature ventricular   complexes   Right bundle branch block   Left anterior fascicular block   ** Bifascicular block **     Assessment:     Active Problems:    NSTEMI (non-ST elevated myocardial infarction) ([facility] [address] (3/25/2020)        Plan:   The risk and benefit of surgery were reviewed with patient and family and all questions answered and the patient wishes to proceed. Risk include infection, bleeding, stroke, heart attack, irregular heart rhythm, kidney failure and death.  The patient was given instructions    Surgery is scheduled for TBD. STS Adult Cardiac Surgery Database Version 2.9 RISK SCORES Procedure: Isolated CAB CALCULATE   Risk of Mortality:  1.677%    Renal Failure:  1.022%    Permanent Stroke:  0.870%    Prolonged Ventilation:  5.519%    DSW Infection:  0.144%    Reoperation:  2.304%    Morbidity or Mortality:  8.959%    Short Length of Stay:  51.853%    Long Length of Stay:  2.941%       STS Adult Cardiac Surgery Database Version 2.9 RISK SCORES Procedure: AVR + CAB CALCULATE   Risk of Mortality:  2.362%    Renal Failure:  2.620%    Permanent Stroke:  2.542%    Prolonged Ventilation:  10.633%    DSW Infection:  0.176%    Reoperation:  5.090%    Morbidity or Mortality:  17.425%    Short Length of Stay:  28.195%    Long Length of Stay:  6.849%         Treatment Plan:      1. CAD with NSTEMI (Troponin of 0.09 and 4.89) on this admission. Previous PTCA in 1992 unknown vessel. Now with severe 3-vessel disease. Will need CAB with Dr. Christin Mercado. Cardiac surgery orders and education started. 2. Aortic stenosis:  Echo ordered. Per cardiac cath, this is likely moderate with pull back gradient of 25 mmHg and normal LVEDP. Continue ASA, Statin. Dr. Christin Mercado to review to see if AV should be replaced. 3. HTN:  Controlled on current medications. Continue Coreg    4. HLD:  Continue Statin    5. Pre-diabetes:  A1c in December of 6. Repeat A1C pending. 6. Hypothyroid:  Continue Synthroid. TSH of 2.34 in December    7. Mitral valve prolapse/MR: Echo pending to assess severity    8. BPH: Continue flomax and finasteride    9. Arthritis/Chronic back pain: No current treatment    Further surgical planning per Dr. Miguel Aguero By: Taras Vance NP     March 25, 2020        Saw patient, agree with above  Risk of morbidity and mortality - high  Medical decision making - high complexity    1. CAD with NSTEMI (Troponin of 0.09 and 4.89) on this admission. Previous PTCA    2.  Aortic stenosis:  Echo ordered. Per cardiac cath, this is likely moderate with pull back gradient of 25 mmHg and normal LVEDP. Continue ASA, Statin. Dr. Caridad Glaser to review to see if AV should be replaced. 3. HTN:  Controlled on current medications. Continue Coreg    4. HLD:  Continue Statin    5. Pre-diabetes:  A1c in December of 6. Repeat A1C pending. 6. Hypothyroid:  Continue Synthroid. TSH of 2.34 in December    7. Mitral valve prolapse/MR: Echo pending to assess severity    8. BPH: Continue flomax and finasteride    9.  Arthritis/Chronic back pain: No current treatment

## 2020-03-25 NOTE — PROGRESS NOTES
Consult received NSTEMI with rising troponin and continued chest pain. Proceed to cath lab this a.m. with Dr. Sonia Adames.

## 2020-03-25 NOTE — PROGRESS NOTES
TRANSFER - OUT REPORT:    Verbal report given to Ayleen Whipple RN(name) on Havsjo Delikatesser  being transferred to [facility](unit) for routine progression of care       Report consisted of patients Situation, Background, Assessment and   Recommendations(SBAR). Information from the following report(s) Procedure Summary, Intake/Output, MAR, Accordion, Recent Results, Med Rec Status and Cardiac Rhythm SR was reviewed with the receiving nurse. Lines:   Peripheral IV 03/25/20 Left Antecubital (Active)   Site Assessment Clean, dry, & intact 3/25/2020  7:30 AM   Phlebitis Assessment 0 3/25/2020  7:30 AM   Infiltration Assessment 0 3/25/2020  7:30 AM   Dressing Status Clean, dry, & intact 3/25/2020  7:30 AM   Dressing Type Transparent 3/25/2020  7:30 AM   Hub Color/Line Status Pink;Capped 3/25/2020  7:30 AM   Action Taken Open ports on tubing capped 3/25/2020  7:30 AM   Alcohol Cap Used Yes 3/25/2020  7:30 AM        Opportunity for questions and clarification was provided. Patient transported with:   Monitor  Registered Nurse     [id number] pt transferred via stretcher to room 452, rec'd by Ayleen Whipple RN right radial cath site D&I.   Pt OOB to chair  Set up for lunch

## 2020-03-26 ENCOUNTER — ANESTHESIA (OUTPATIENT)
Dept: CARDIOTHORACIC SURGERY | Age: 80
DRG: 234 | End: 2020-03-26
Payer: MEDICARE

## 2020-03-26 ENCOUNTER — APPOINTMENT (OUTPATIENT)
Dept: GENERAL RADIOLOGY | Age: 80
DRG: 234 | End: 2020-03-26
Attending: PHYSICIAN ASSISTANT
Payer: MEDICARE

## 2020-03-26 ENCOUNTER — ANESTHESIA EVENT (OUTPATIENT)
Dept: CARDIOTHORACIC SURGERY | Age: 80
DRG: 234 | End: 2020-03-26
Payer: MEDICARE

## 2020-03-26 ENCOUNTER — HOSPITAL ENCOUNTER (OUTPATIENT)
Dept: NON INVASIVE DIAGNOSTICS | Age: 80
Discharge: HOME | End: 2020-03-26
Attending: THORACIC SURGERY (CARDIOTHORACIC VASCULAR SURGERY)

## 2020-03-26 PROBLEM — Z95.1 S/P CABG X 5: Status: ACTIVE | Noted: 2020-03-26

## 2020-03-26 LAB
ABO + RH BLD: NORMAL
ADMINISTERED INITIALS, ADMINIT: NORMAL
ALBUMIN SERPL-MCNC: 3.2 G/DL (ref 3.5–5)
ALBUMIN/GLOB SERPL: 1.5 {RATIO} (ref 1.1–2.2)
ALP SERPL-CCNC: 72 U/L (ref 45–117)
ALT SERPL-CCNC: 28 U/L (ref 12–78)
ANION GAP SERPL CALC-SCNC: 6 MMOL/L (ref 5–15)
ANION GAP SERPL CALC-SCNC: 6 MMOL/L (ref 5–15)
APTT PPP: 32 SEC (ref 22.1–32)
APTT PPP: 50 SEC (ref 22.1–32)
ARTERIAL PATENCY WRIST A: ABNORMAL
AST SERPL-CCNC: 63 U/L (ref 15–37)
BASE DEFICIT BLD-SCNC: 1 MMOL/L
BASOPHILS # BLD: 0 K/UL (ref 0–0.1)
BASOPHILS # BLD: 0.1 K/UL (ref 0–0.1)
BASOPHILS NFR BLD: 0 % (ref 0–1)
BASOPHILS NFR BLD: 1 % (ref 0–1)
BDY SITE: ABNORMAL
BILIRUB SERPL-MCNC: 1 MG/DL (ref 0.2–1)
BLD PROD TYP BPU: NORMAL
BLD PROD TYP BPU: NORMAL
BLOOD GROUP ANTIBODIES SERPL: NORMAL
BPU ID: NORMAL
BPU ID: NORMAL
BUN SERPL-MCNC: 15 MG/DL (ref 6–20)
BUN SERPL-MCNC: 16 MG/DL (ref 6–20)
BUN/CREAT SERPL: 15 (ref 12–20)
BUN/CREAT SERPL: 16 (ref 12–20)
CA-I BLD-SCNC: 1.21 MMOL/L (ref 1.12–1.32)
CALCIUM SERPL-MCNC: 8.1 MG/DL (ref 8.5–10.1)
CALCIUM SERPL-MCNC: 8.4 MG/DL (ref 8.5–10.1)
CHLORIDE SERPL-SCNC: 107 MMOL/L (ref 97–108)
CHLORIDE SERPL-SCNC: 111 MMOL/L (ref 97–108)
CO2 SERPL-SCNC: 24 MMOL/L (ref 21–32)
CO2 SERPL-SCNC: 26 MMOL/L (ref 21–32)
CREAT SERPL-MCNC: 0.93 MG/DL (ref 0.7–1.3)
CREAT SERPL-MCNC: 1.06 MG/DL (ref 0.7–1.3)
CROSSMATCH RESULT,%XM: NORMAL
CROSSMATCH RESULT,%XM: NORMAL
D50 ADMINISTERED, D50ADM: 0 ML
D50 ORDER, D50ORD: 0 ML
DIFFERENTIAL METHOD BLD: ABNORMAL
DIFFERENTIAL METHOD BLD: NORMAL
EOSINOPHIL # BLD: 0.1 K/UL (ref 0–0.4)
EOSINOPHIL # BLD: 0.3 K/UL (ref 0–0.4)
EOSINOPHIL NFR BLD: 1 % (ref 0–7)
EOSINOPHIL NFR BLD: 3 % (ref 0–7)
ERYTHROCYTE [DISTWIDTH] IN BLOOD BY AUTOMATED COUNT: 13.5 % (ref 11.5–14.5)
ERYTHROCYTE [DISTWIDTH] IN BLOOD BY AUTOMATED COUNT: 13.8 % (ref 11.5–14.5)
EST. AVERAGE GLUCOSE BLD GHB EST-MCNC: 123 MG/DL
GAS FLOW.O2 O2 DELIVERY SYS: ABNORMAL L/MIN
GAS FLOW.O2 SETTING OXYMISER: 14 BPM
GLOBULIN SER CALC-MCNC: 2.2 G/DL (ref 2–4)
GLSCOM COMMENTS: NORMAL
GLUCOSE BLD STRIP.AUTO-MCNC: 123 MG/DL (ref 65–100)
GLUCOSE BLD STRIP.AUTO-MCNC: 124 MG/DL (ref 65–100)
GLUCOSE BLD STRIP.AUTO-MCNC: 125 MG/DL (ref 65–100)
GLUCOSE BLD STRIP.AUTO-MCNC: 129 MG/DL (ref 65–100)
GLUCOSE SERPL-MCNC: 125 MG/DL (ref 65–100)
GLUCOSE SERPL-MCNC: 138 MG/DL (ref 65–100)
GLUCOSE, GLC: 112 MG/DL
GLUCOSE, GLC: 120 MG/DL
GLUCOSE, GLC: 123 MG/DL
GLUCOSE, GLC: 124 MG/DL
GLUCOSE, GLC: 125 MG/DL
GLUCOSE, GLC: 129 MG/DL
GLUCOSE, GLC: 132 MG/DL
HBA1C MFR BLD: 5.9 % (ref 4–5.6)
HCO3 BLD-SCNC: 25 MMOL/L (ref 22–26)
HCT VFR BLD AUTO: 38.9 % (ref 36.6–50.3)
HCT VFR BLD AUTO: 43 % (ref 36.6–50.3)
HGB BLD-MCNC: 12.6 G/DL (ref 12.1–17)
HGB BLD-MCNC: 13.9 G/DL (ref 12.1–17)
HIGH TARGET, HITG: 130 MG/DL
HIGH TARGET, HITG: 140 MG/DL
IMM GRANULOCYTES # BLD AUTO: 0 K/UL (ref 0–0.04)
IMM GRANULOCYTES # BLD AUTO: 0.1 K/UL (ref 0–0.04)
IMM GRANULOCYTES NFR BLD AUTO: 0 % (ref 0–0.5)
IMM GRANULOCYTES NFR BLD AUTO: 1 % (ref 0–0.5)
INR PPP: 1.1 (ref 0.9–1.1)
INR PPP: 1.2 (ref 0.9–1.1)
INSULIN ADMINSTERED, INSADM: 1.6 UNITS/HOUR
INSULIN ADMINSTERED, INSADM: 1.8 UNITS/HOUR
INSULIN ADMINSTERED, INSADM: 1.9 UNITS/HOUR
INSULIN ADMINSTERED, INSADM: 1.9 UNITS/HOUR
INSULIN ADMINSTERED, INSADM: 2 UNITS/HOUR
INSULIN ADMINSTERED, INSADM: 2.1 UNITS/HOUR
INSULIN ADMINSTERED, INSADM: 2.2 UNITS/HOUR
INSULIN ORDER, INSORD: 1.6 UNITS/HOUR
INSULIN ORDER, INSORD: 1.8 UNITS/HOUR
INSULIN ORDER, INSORD: 1.9 UNITS/HOUR
INSULIN ORDER, INSORD: 1.9 UNITS/HOUR
INSULIN ORDER, INSORD: 2 UNITS/HOUR
INSULIN ORDER, INSORD: 2.1 UNITS/HOUR
INSULIN ORDER, INSORD: 2.2 UNITS/HOUR
LOW TARGET, LOT: 100 MG/DL
LOW TARGET, LOT: 95 MG/DL
LYMPHOCYTES # BLD: 0.8 K/UL (ref 0.8–3.5)
LYMPHOCYTES # BLD: 1.8 K/UL (ref 0.8–3.5)
LYMPHOCYTES NFR BLD: 17 % (ref 12–49)
LYMPHOCYTES NFR BLD: 6 % (ref 12–49)
MAGNESIUM SERPL-MCNC: 2.4 MG/DL (ref 1.6–2.4)
MCH RBC QN AUTO: 29.8 PG (ref 26–34)
MCH RBC QN AUTO: 29.9 PG (ref 26–34)
MCHC RBC AUTO-ENTMCNC: 32.3 G/DL (ref 30–36.5)
MCHC RBC AUTO-ENTMCNC: 32.4 G/DL (ref 30–36.5)
MCV RBC AUTO: 92 FL (ref 80–99)
MCV RBC AUTO: 92.5 FL (ref 80–99)
MINUTES UNTIL NEXT BG, NBG: 60 MIN
MONOCYTES # BLD: 0.7 K/UL (ref 0–1)
MONOCYTES # BLD: 1 K/UL (ref 0–1)
MONOCYTES NFR BLD: 10 % (ref 5–13)
MONOCYTES NFR BLD: 5 % (ref 5–13)
MULTIPLIER, MUL: 0.03
NEUTS SEG # BLD: 11.3 K/UL (ref 1.8–8)
NEUTS SEG # BLD: 7.1 K/UL (ref 1.8–8)
NEUTS SEG NFR BLD: 69 % (ref 32–75)
NEUTS SEG NFR BLD: 87 % (ref 32–75)
NRBC # BLD: 0 K/UL (ref 0–0.01)
NRBC # BLD: 0 K/UL (ref 0–0.01)
NRBC BLD-RTO: 0 PER 100 WBC
NRBC BLD-RTO: 0 PER 100 WBC
O2/TOTAL GAS SETTING VFR VENT: 50 %
ORDER INITIALS, ORDINIT: NORMAL
PCO2 BLD: 47 MMHG (ref 35–45)
PEEP RESPIRATORY: 10 CMH2O
PH BLD: 7.33 [PH] (ref 7.35–7.45)
PIP ISTAT,IPIP: 23
PLATELET # BLD AUTO: 119 K/UL (ref 150–400)
PLATELET # BLD AUTO: 179 K/UL (ref 150–400)
PMV BLD AUTO: 9.2 FL (ref 8.9–12.9)
PMV BLD AUTO: 9.3 FL (ref 8.9–12.9)
PO2 BLD: 40 MMHG (ref 80–100)
POTASSIUM SERPL-SCNC: 3.8 MMOL/L (ref 3.5–5.1)
POTASSIUM SERPL-SCNC: 3.8 MMOL/L (ref 3.5–5.1)
PROT SERPL-MCNC: 5.4 G/DL (ref 6.4–8.2)
PROTHROMBIN TIME: 10.9 SEC (ref 9–11.1)
PROTHROMBIN TIME: 11.8 SEC (ref 9–11.1)
RBC # BLD AUTO: 4.23 M/UL (ref 4.1–5.7)
RBC # BLD AUTO: 4.65 M/UL (ref 4.1–5.7)
SAO2 % BLD: 70 % (ref 92–97)
SERVICE CMNT-IMP: ABNORMAL
SODIUM SERPL-SCNC: 139 MMOL/L (ref 136–145)
SODIUM SERPL-SCNC: 141 MMOL/L (ref 136–145)
SPECIMEN EXP DATE BLD: NORMAL
SPECIMEN TYPE: ABNORMAL
STATUS OF UNIT,%ST: NORMAL
STATUS OF UNIT,%ST: NORMAL
THERAPEUTIC RANGE,PTTT: ABNORMAL SECS (ref 58–77)
THERAPEUTIC RANGE,PTTT: NORMAL SECS (ref 58–77)
TOTAL RESP. RATE, ITRR: 14
UNIT DIVISION, %UDIV: 0
UNIT DIVISION, %UDIV: 0
VENTILATION MODE VENT: ABNORMAL
VT SETTING VENT: 550 ML
WBC # BLD AUTO: 10.2 K/UL (ref 4.1–11.1)
WBC # BLD AUTO: 13.1 K/UL (ref 4.1–11.1)

## 2020-03-26 PROCEDURE — C9132 KCENTRA, PER I.U.: HCPCS | Performed by: THORACIC SURGERY (CARDIOTHORACIC VASCULAR SURGERY)

## 2020-03-26 PROCEDURE — 77030002986 HC SUT PROL J&J -A: Performed by: THORACIC SURGERY (CARDIOTHORACIC VASCULAR SURGERY)

## 2020-03-26 PROCEDURE — 77030018729 HC ELECTRD DEFIB PAD CARD -B

## 2020-03-26 PROCEDURE — 74011000250 HC RX REV CODE- 250: Performed by: NURSE ANESTHETIST, CERTIFIED REGISTERED

## 2020-03-26 PROCEDURE — 77030006689 HC BLD OPHTH BVR BD -A: Performed by: THORACIC SURGERY (CARDIOTHORACIC VASCULAR SURGERY)

## 2020-03-26 PROCEDURE — 77030008771 HC TU NG SALEM SUMP -A: Performed by: ANESTHESIOLOGY

## 2020-03-26 PROCEDURE — 77030013862 HC PNCH AORT GENZ -B: Performed by: THORACIC SURGERY (CARDIOTHORACIC VASCULAR SURGERY)

## 2020-03-26 PROCEDURE — 77030010819: Performed by: THORACIC SURGERY (CARDIOTHORACIC VASCULAR SURGERY)

## 2020-03-26 PROCEDURE — 77030018835 HC SOL IRR LR ICUM -A: Performed by: THORACIC SURGERY (CARDIOTHORACIC VASCULAR SURGERY)

## 2020-03-26 PROCEDURE — 93355 ECHO TRANSESOPHAGEAL (TEE): CPT | Performed by: THORACIC SURGERY (CARDIOTHORACIC VASCULAR SURGERY)

## 2020-03-26 PROCEDURE — 65610000003 HC RM ICU SURGICAL

## 2020-03-26 PROCEDURE — 83036 HEMOGLOBIN GLYCOSYLATED A1C: CPT

## 2020-03-26 PROCEDURE — 85730 THROMBOPLASTIN TIME PARTIAL: CPT

## 2020-03-26 PROCEDURE — 77030026918 HC ADMN ST IV BLD BD -A

## 2020-03-26 PROCEDURE — 77030041524 HC SEALNT FIBRN VITASEAL J&J -C: Performed by: THORACIC SURGERY (CARDIOTHORACIC VASCULAR SURGERY)

## 2020-03-26 PROCEDURE — 83735 ASSAY OF MAGNESIUM: CPT

## 2020-03-26 PROCEDURE — 77030041076 HC DRSG AG OPTICELL MDII -A: Performed by: THORACIC SURGERY (CARDIOTHORACIC VASCULAR SURGERY)

## 2020-03-26 PROCEDURE — 82962 GLUCOSE BLOOD TEST: CPT

## 2020-03-26 PROCEDURE — 77030022199 HC SYS HARV VESL GTNG -G1: Performed by: THORACIC SURGERY (CARDIOTHORACIC VASCULAR SURGERY)

## 2020-03-26 PROCEDURE — 77030003020 HC SUT TICRN COVD -A: Performed by: THORACIC SURGERY (CARDIOTHORACIC VASCULAR SURGERY)

## 2020-03-26 PROCEDURE — 77030041244 HC CBL PACE EXT TEMP REMG -B: Performed by: THORACIC SURGERY (CARDIOTHORACIC VASCULAR SURGERY)

## 2020-03-26 PROCEDURE — 36415 COLL VENOUS BLD VENIPUNCTURE: CPT

## 2020-03-26 PROCEDURE — 77030005402 HC CATH RAD ART LN KT TELE -B

## 2020-03-26 PROCEDURE — 77030012390 HC DRN CHST BTL GTNG -B: Performed by: THORACIC SURGERY (CARDIOTHORACIC VASCULAR SURGERY)

## 2020-03-26 PROCEDURE — 74011250636 HC RX REV CODE- 250/636: Performed by: NURSE ANESTHETIST, CERTIFIED REGISTERED

## 2020-03-26 PROCEDURE — 74011250636 HC RX REV CODE- 250/636: Performed by: ANESTHESIOLOGY

## 2020-03-26 PROCEDURE — 85610 PROTHROMBIN TIME: CPT

## 2020-03-26 PROCEDURE — 74011250636 HC RX REV CODE- 250/636: Performed by: THORACIC SURGERY (CARDIOTHORACIC VASCULAR SURGERY)

## 2020-03-26 PROCEDURE — 74011000258 HC RX REV CODE- 258: Performed by: NURSE ANESTHETIST, CERTIFIED REGISTERED

## 2020-03-26 PROCEDURE — 80053 COMPREHEN METABOLIC PANEL: CPT

## 2020-03-26 PROCEDURE — P9047 ALBUMIN (HUMAN), 25%, 50ML: HCPCS

## 2020-03-26 PROCEDURE — 77030007667 HC INSRT SUT HLD MEDT -B: Performed by: THORACIC SURGERY (CARDIOTHORACIC VASCULAR SURGERY)

## 2020-03-26 PROCEDURE — 82803 BLOOD GASES ANY COMBINATION: CPT

## 2020-03-26 PROCEDURE — C1713 ANCHOR/SCREW BN/BN,TIS/BN: HCPCS | Performed by: THORACIC SURGERY (CARDIOTHORACIC VASCULAR SURGERY)

## 2020-03-26 PROCEDURE — 77030010605 HC BLWR MR MAL MEDT -B: Performed by: THORACIC SURGERY (CARDIOTHORACIC VASCULAR SURGERY)

## 2020-03-26 PROCEDURE — 77030040922 HC BLNKT HYPOTHRM STRY -A

## 2020-03-26 PROCEDURE — 77030014008 HC SPNG HEMSTAT J&J -C: Performed by: THORACIC SURGERY (CARDIOTHORACIC VASCULAR SURGERY)

## 2020-03-26 PROCEDURE — 86900 BLOOD TYPING SEROLOGIC ABO: CPT

## 2020-03-26 PROCEDURE — 77030019702 HC WRP THER MENM -C: Performed by: THORACIC SURGERY (CARDIOTHORACIC VASCULAR SURGERY)

## 2020-03-26 PROCEDURE — 77030002987 HC SUT PROL J&J -B: Performed by: THORACIC SURGERY (CARDIOTHORACIC VASCULAR SURGERY)

## 2020-03-26 PROCEDURE — 77030011640 HC PAD GRND REM COVD -A: Performed by: THORACIC SURGERY (CARDIOTHORACIC VASCULAR SURGERY)

## 2020-03-26 PROCEDURE — 77030020256 HC SOL INJ NACL 0.9%  500ML: Performed by: THORACIC SURGERY (CARDIOTHORACIC VASCULAR SURGERY)

## 2020-03-26 PROCEDURE — 71045 X-RAY EXAM CHEST 1 VIEW: CPT

## 2020-03-26 PROCEDURE — 77030040392 HC DRSG OPTIFOAM MDII -A

## 2020-03-26 PROCEDURE — C1751 CATH, INF, PER/CENT/MIDLINE: HCPCS

## 2020-03-26 PROCEDURE — 77030010938 HC CLP LIG TELE -A: Performed by: THORACIC SURGERY (CARDIOTHORACIC VASCULAR SURGERY)

## 2020-03-26 PROCEDURE — 77030003010 HC SUT SURG STL J&J -B: Performed by: THORACIC SURGERY (CARDIOTHORACIC VASCULAR SURGERY)

## 2020-03-26 PROCEDURE — 77030018846 HC SOL IRR STRL H20 ICUM -A: Performed by: THORACIC SURGERY (CARDIOTHORACIC VASCULAR SURGERY)

## 2020-03-26 PROCEDURE — P9045 ALBUMIN (HUMAN), 5%, 250 ML: HCPCS | Performed by: NURSE ANESTHETIST, CERTIFIED REGISTERED

## 2020-03-26 PROCEDURE — 74011000272 HC RX REV CODE- 272

## 2020-03-26 PROCEDURE — 77030040504 HC DRN WND MDII -B: Performed by: THORACIC SURGERY (CARDIOTHORACIC VASCULAR SURGERY)

## 2020-03-26 PROCEDURE — 74011250636 HC RX REV CODE- 250/636

## 2020-03-26 PROCEDURE — 77030013798 HC KT TRNSDUC PRSSR EDWD -B: Performed by: THORACIC SURGERY (CARDIOTHORACIC VASCULAR SURGERY)

## 2020-03-26 PROCEDURE — 77030008684 HC TU ET CUF COVD -B: Performed by: ANESTHESIOLOGY

## 2020-03-26 PROCEDURE — 74011000250 HC RX REV CODE- 250: Performed by: NURSE PRACTITIONER

## 2020-03-26 PROCEDURE — 74011000250 HC RX REV CODE- 250: Performed by: THORACIC SURGERY (CARDIOTHORACIC VASCULAR SURGERY)

## 2020-03-26 PROCEDURE — 74011000258 HC RX REV CODE- 258: Performed by: PHYSICIAN ASSISTANT

## 2020-03-26 PROCEDURE — 74011000250 HC RX REV CODE- 250

## 2020-03-26 PROCEDURE — 74011250636 HC RX REV CODE- 250/636: Performed by: PHYSICIAN ASSISTANT

## 2020-03-26 PROCEDURE — 77030002996 HC SUT SLK J&J -A: Performed by: THORACIC SURGERY (CARDIOTHORACIC VASCULAR SURGERY)

## 2020-03-26 PROCEDURE — 74011000636 HC RX REV CODE- 636: Performed by: THORACIC SURGERY (CARDIOTHORACIC VASCULAR SURGERY)

## 2020-03-26 PROCEDURE — 77030041238 HC TBNG INSUF MDII -A: Performed by: THORACIC SURGERY (CARDIOTHORACIC VASCULAR SURGERY)

## 2020-03-26 PROCEDURE — 77030006247 HC LD PCMKR MYOCRD BPLR TEMP MEDT -B: Performed by: THORACIC SURGERY (CARDIOTHORACIC VASCULAR SURGERY)

## 2020-03-26 PROCEDURE — 74011250637 HC RX REV CODE- 250/637: Performed by: PHYSICIAN ASSISTANT

## 2020-03-26 PROCEDURE — 77030006920 HC BLD STRNL SAW STRY -B: Performed by: THORACIC SURGERY (CARDIOTHORACIC VASCULAR SURGERY)

## 2020-03-26 PROCEDURE — 76010000117 HC CV SURG 5.5 TO 6 HR: Performed by: THORACIC SURGERY (CARDIOTHORACIC VASCULAR SURGERY)

## 2020-03-26 PROCEDURE — 74011250637 HC RX REV CODE- 250/637: Performed by: INTERNAL MEDICINE

## 2020-03-26 PROCEDURE — 74011000258 HC RX REV CODE- 258: Performed by: THORACIC SURGERY (CARDIOTHORACIC VASCULAR SURGERY)

## 2020-03-26 PROCEDURE — 77030020263 HC SOL INJ SOD CL0.9% LFCR 1000ML: Performed by: THORACIC SURGERY (CARDIOTHORACIC VASCULAR SURGERY)

## 2020-03-26 PROCEDURE — P9035 PLATELET PHERES LEUKOREDUCED: HCPCS

## 2020-03-26 PROCEDURE — 76060000042 HC ANESTHESIA 5.5 TO 6 HR: Performed by: THORACIC SURGERY (CARDIOTHORACIC VASCULAR SURGERY)

## 2020-03-26 PROCEDURE — C9113 INJ PANTOPRAZOLE SODIUM, VIA: HCPCS | Performed by: PHYSICIAN ASSISTANT

## 2020-03-26 PROCEDURE — 94002 VENT MGMT INPAT INIT DAY: CPT

## 2020-03-26 PROCEDURE — 77030037878 HC DRSG MEPILEX >48IN BORD MOLN -B: Performed by: THORACIC SURGERY (CARDIOTHORACIC VASCULAR SURGERY)

## 2020-03-26 PROCEDURE — 74011000250 HC RX REV CODE- 250: Performed by: PHYSICIAN ASSISTANT

## 2020-03-26 PROCEDURE — 77030002933 HC SUT MCRYL J&J -A: Performed by: THORACIC SURGERY (CARDIOTHORACIC VASCULAR SURGERY)

## 2020-03-26 PROCEDURE — P9045 ALBUMIN (HUMAN), 5%, 250 ML: HCPCS | Performed by: PHYSICIAN ASSISTANT

## 2020-03-26 PROCEDURE — 30283B1 TRANSFUSION OF NONAUTOLOGOUS 4-FACTOR PROTHROMBIN COMPLEX CONCENTRATE INTO VEIN, PERCUTANEOUS APPROACH: ICD-10-PCS | Performed by: THORACIC SURGERY (CARDIOTHORACIC VASCULAR SURGERY)

## 2020-03-26 PROCEDURE — 77030002978 HC SUT POLY TELE -A: Performed by: THORACIC SURGERY (CARDIOTHORACIC VASCULAR SURGERY)

## 2020-03-26 PROCEDURE — 74011000272 HC RX REV CODE- 272: Performed by: THORACIC SURGERY (CARDIOTHORACIC VASCULAR SURGERY)

## 2020-03-26 PROCEDURE — 36430 TRANSFUSION BLD/BLD COMPNT: CPT

## 2020-03-26 PROCEDURE — 86923 COMPATIBILITY TEST ELECTRIC: CPT

## 2020-03-26 PROCEDURE — 77030010507 HC ADH SKN DERMBND J&J -B: Performed by: THORACIC SURGERY (CARDIOTHORACIC VASCULAR SURGERY)

## 2020-03-26 PROCEDURE — 77030015409 HC CATH URETH INT BARD -D: Performed by: THORACIC SURGERY (CARDIOTHORACIC VASCULAR SURGERY)

## 2020-03-26 PROCEDURE — 77030002973 HC SUT PLEDG CV SFT OVL TELE -B: Performed by: THORACIC SURGERY (CARDIOTHORACIC VASCULAR SURGERY)

## 2020-03-26 PROCEDURE — 74011636637 HC RX REV CODE- 636/637: Performed by: NURSE ANESTHETIST, CERTIFIED REGISTERED

## 2020-03-26 PROCEDURE — 77030019579 HC CBL PACE DISP REMG -B: Performed by: THORACIC SURGERY (CARDIOTHORACIC VASCULAR SURGERY)

## 2020-03-26 PROCEDURE — 77030026438 HC STYL ET INTUB CARD -A: Performed by: ANESTHESIOLOGY

## 2020-03-26 PROCEDURE — 85025 COMPLETE CBC W/AUTO DIFF WBC: CPT

## 2020-03-26 PROCEDURE — 77030018836 HC SOL IRR NACL ICUM -A: Performed by: THORACIC SURGERY (CARDIOTHORACIC VASCULAR SURGERY)

## 2020-03-26 PROCEDURE — 77030010389 HC WRE ATR PACE AEMC -B: Performed by: THORACIC SURGERY (CARDIOTHORACIC VASCULAR SURGERY)

## 2020-03-26 RX ORDER — SODIUM CHLORIDE 0.9 % (FLUSH) 0.9 %
5-40 SYRINGE (ML) INJECTION EVERY 8 HOURS
Status: DISCONTINUED | OUTPATIENT
Start: 2020-03-26 | End: 2020-03-26 | Stop reason: HOSPADM

## 2020-03-26 RX ORDER — INSULIN GLARGINE 100 [IU]/ML
1-50 INJECTION, SOLUTION SUBCUTANEOUS
Status: ACTIVE | OUTPATIENT
Start: 2020-03-26 | End: 2020-03-27

## 2020-03-26 RX ORDER — OXYCODONE HYDROCHLORIDE 5 MG/1
5 TABLET ORAL AS NEEDED
Status: DISCONTINUED | OUTPATIENT
Start: 2020-03-26 | End: 2020-03-26 | Stop reason: HOSPADM

## 2020-03-26 RX ORDER — POTASSIUM CHLORIDE 29.8 MG/ML
20 INJECTION INTRAVENOUS
Status: ACTIVE | OUTPATIENT
Start: 2020-03-26 | End: 2020-03-27

## 2020-03-26 RX ORDER — HEPARIN SODIUM 1000 [USP'U]/ML
INJECTION, SOLUTION INTRAVENOUS; SUBCUTANEOUS AS NEEDED
Status: DISCONTINUED | OUTPATIENT
Start: 2020-03-26 | End: 2020-03-26 | Stop reason: HOSPADM

## 2020-03-26 RX ORDER — PANTOPRAZOLE SODIUM 40 MG/1
40 TABLET, DELAYED RELEASE ORAL
Status: DISCONTINUED | OUTPATIENT
Start: 2020-03-27 | End: 2020-04-02 | Stop reason: HOSPADM

## 2020-03-26 RX ORDER — MAGNESIUM SULFATE 100 %
4 CRYSTALS MISCELLANEOUS AS NEEDED
Status: DISCONTINUED | OUTPATIENT
Start: 2020-03-26 | End: 2020-04-02 | Stop reason: HOSPADM

## 2020-03-26 RX ORDER — NALOXONE HYDROCHLORIDE 0.4 MG/ML
0.4 INJECTION, SOLUTION INTRAMUSCULAR; INTRAVENOUS; SUBCUTANEOUS AS NEEDED
Status: DISCONTINUED | OUTPATIENT
Start: 2020-03-26 | End: 2020-03-28

## 2020-03-26 RX ORDER — ALBUTEROL SULFATE 0.83 MG/ML
2.5 SOLUTION RESPIRATORY (INHALATION)
Status: DISCONTINUED | OUTPATIENT
Start: 2020-03-26 | End: 2020-03-28

## 2020-03-26 RX ORDER — MORPHINE SULFATE 10 MG/ML
2 INJECTION, SOLUTION INTRAMUSCULAR; INTRAVENOUS
Status: DISCONTINUED | OUTPATIENT
Start: 2020-03-26 | End: 2020-03-26

## 2020-03-26 RX ORDER — SODIUM CHLORIDE 9 MG/ML
INJECTION, SOLUTION INTRAVENOUS
Status: DISCONTINUED | OUTPATIENT
Start: 2020-03-26 | End: 2020-03-26 | Stop reason: HOSPADM

## 2020-03-26 RX ORDER — DIPHENHYDRAMINE HYDROCHLORIDE 50 MG/ML
12.5 INJECTION, SOLUTION INTRAMUSCULAR; INTRAVENOUS AS NEEDED
Status: DISCONTINUED | OUTPATIENT
Start: 2020-03-26 | End: 2020-03-26 | Stop reason: HOSPADM

## 2020-03-26 RX ORDER — CEFAZOLIN SODIUM 1 G/3ML
INJECTION, POWDER, FOR SOLUTION INTRAMUSCULAR; INTRAVENOUS AS NEEDED
Status: DISCONTINUED | OUTPATIENT
Start: 2020-03-26 | End: 2020-03-26 | Stop reason: HOSPADM

## 2020-03-26 RX ORDER — DIPHENHYDRAMINE HYDROCHLORIDE 50 MG/ML
25 INJECTION, SOLUTION INTRAMUSCULAR; INTRAVENOUS
Status: DISCONTINUED | OUTPATIENT
Start: 2020-03-26 | End: 2020-04-02 | Stop reason: HOSPADM

## 2020-03-26 RX ORDER — SODIUM CHLORIDE, SODIUM LACTATE, POTASSIUM CHLORIDE, CALCIUM CHLORIDE 600; 310; 30; 20 MG/100ML; MG/100ML; MG/100ML; MG/100ML
INJECTION, SOLUTION INTRAVENOUS
Status: DISCONTINUED | OUTPATIENT
Start: 2020-03-26 | End: 2020-03-26 | Stop reason: HOSPADM

## 2020-03-26 RX ORDER — HYDROMORPHONE HYDROCHLORIDE 1 MG/ML
1 INJECTION, SOLUTION INTRAMUSCULAR; INTRAVENOUS; SUBCUTANEOUS
Status: DISCONTINUED | OUTPATIENT
Start: 2020-03-26 | End: 2020-03-28

## 2020-03-26 RX ORDER — BACITRACIN 500 UNIT/G
1 PACKET (EA) TOPICAL AS NEEDED
Status: DISCONTINUED | OUTPATIENT
Start: 2020-03-26 | End: 2020-03-28

## 2020-03-26 RX ORDER — LIDOCAINE HYDROCHLORIDE 20 MG/ML
INJECTION, SOLUTION EPIDURAL; INFILTRATION; INTRACAUDAL; PERINEURAL AS NEEDED
Status: DISCONTINUED | OUTPATIENT
Start: 2020-03-26 | End: 2020-03-26 | Stop reason: HOSPADM

## 2020-03-26 RX ORDER — FENTANYL CITRATE 50 UG/ML
50 INJECTION, SOLUTION INTRAMUSCULAR; INTRAVENOUS AS NEEDED
Status: DISCONTINUED | OUTPATIENT
Start: 2020-03-26 | End: 2020-03-26 | Stop reason: HOSPADM

## 2020-03-26 RX ORDER — OXYCODONE HYDROCHLORIDE 5 MG/1
10 TABLET ORAL
Status: DISCONTINUED | OUTPATIENT
Start: 2020-03-26 | End: 2020-04-02 | Stop reason: HOSPADM

## 2020-03-26 RX ORDER — AMIODARONE HYDROCHLORIDE 200 MG/1
400 TABLET ORAL EVERY 12 HOURS
Status: DISCONTINUED | OUTPATIENT
Start: 2020-03-27 | End: 2020-04-02

## 2020-03-26 RX ORDER — ONDANSETRON 2 MG/ML
4 INJECTION INTRAMUSCULAR; INTRAVENOUS AS NEEDED
Status: DISCONTINUED | OUTPATIENT
Start: 2020-03-26 | End: 2020-03-26 | Stop reason: HOSPADM

## 2020-03-26 RX ORDER — SODIUM CHLORIDE 9 MG/ML
25 INJECTION, SOLUTION INTRAVENOUS CONTINUOUS
Status: DISCONTINUED | OUTPATIENT
Start: 2020-03-26 | End: 2020-03-26 | Stop reason: HOSPADM

## 2020-03-26 RX ORDER — ONDANSETRON 2 MG/ML
4 INJECTION INTRAMUSCULAR; INTRAVENOUS
Status: DISCONTINUED | OUTPATIENT
Start: 2020-03-26 | End: 2020-04-02 | Stop reason: HOSPADM

## 2020-03-26 RX ORDER — MIDAZOLAM HYDROCHLORIDE 1 MG/ML
0.5 INJECTION, SOLUTION INTRAMUSCULAR; INTRAVENOUS
Status: DISCONTINUED | OUTPATIENT
Start: 2020-03-26 | End: 2020-03-26 | Stop reason: HOSPADM

## 2020-03-26 RX ORDER — PROTAMINE SULFATE 10 MG/ML
INJECTION, SOLUTION INTRAVENOUS AS NEEDED
Status: DISCONTINUED | OUTPATIENT
Start: 2020-03-26 | End: 2020-03-26 | Stop reason: HOSPADM

## 2020-03-26 RX ORDER — TAMSULOSIN HYDROCHLORIDE 0.4 MG/1
0.4 CAPSULE ORAL DAILY
Status: DISCONTINUED | OUTPATIENT
Start: 2020-03-27 | End: 2020-04-02 | Stop reason: HOSPADM

## 2020-03-26 RX ORDER — LIDOCAINE HYDROCHLORIDE 10 MG/ML
0.1 INJECTION, SOLUTION EPIDURAL; INFILTRATION; INTRACAUDAL; PERINEURAL AS NEEDED
Status: DISCONTINUED | OUTPATIENT
Start: 2020-03-26 | End: 2020-03-26 | Stop reason: HOSPADM

## 2020-03-26 RX ORDER — CEFAZOLIN SODIUM/WATER 2 G/20 ML
2 SYRINGE (ML) INTRAVENOUS
Status: DISCONTINUED | OUTPATIENT
Start: 2020-03-26 | End: 2020-03-26 | Stop reason: HOSPADM

## 2020-03-26 RX ORDER — HYDROMORPHONE HYDROCHLORIDE 1 MG/ML
0.5 INJECTION, SOLUTION INTRAMUSCULAR; INTRAVENOUS; SUBCUTANEOUS
Status: DISCONTINUED | OUTPATIENT
Start: 2020-03-26 | End: 2020-03-28

## 2020-03-26 RX ORDER — MELATONIN
2000 2 TIMES DAILY
Status: DISCONTINUED | OUTPATIENT
Start: 2020-03-26 | End: 2020-04-02 | Stop reason: HOSPADM

## 2020-03-26 RX ORDER — ACETAMINOPHEN 325 MG/1
650 TABLET ORAL ONCE
Status: DISCONTINUED | OUTPATIENT
Start: 2020-03-26 | End: 2020-03-26 | Stop reason: HOSPADM

## 2020-03-26 RX ORDER — SODIUM CHLORIDE 0.9 % (FLUSH) 0.9 %
5-40 SYRINGE (ML) INJECTION EVERY 8 HOURS
Status: DISCONTINUED | OUTPATIENT
Start: 2020-03-26 | End: 2020-03-28

## 2020-03-26 RX ORDER — FINASTERIDE 5 MG/1
5 TABLET, FILM COATED ORAL DAILY
Status: DISCONTINUED | OUTPATIENT
Start: 2020-03-27 | End: 2020-04-02 | Stop reason: HOSPADM

## 2020-03-26 RX ORDER — ALBUMIN HUMAN 50 G/1000ML
12.5 SOLUTION INTRAVENOUS
Status: COMPLETED | OUTPATIENT
Start: 2020-03-26 | End: 2020-03-27

## 2020-03-26 RX ORDER — ROCURONIUM BROMIDE 10 MG/ML
INJECTION, SOLUTION INTRAVENOUS AS NEEDED
Status: DISCONTINUED | OUTPATIENT
Start: 2020-03-26 | End: 2020-03-26 | Stop reason: HOSPADM

## 2020-03-26 RX ORDER — SODIUM CHLORIDE, SODIUM LACTATE, POTASSIUM CHLORIDE, CALCIUM CHLORIDE 600; 310; 30; 20 MG/100ML; MG/100ML; MG/100ML; MG/100ML
125 INJECTION, SOLUTION INTRAVENOUS CONTINUOUS
Status: DISCONTINUED | OUTPATIENT
Start: 2020-03-26 | End: 2020-03-26 | Stop reason: HOSPADM

## 2020-03-26 RX ORDER — SODIUM CHLORIDE, SODIUM LACTATE, POTASSIUM CHLORIDE, CALCIUM CHLORIDE 600; 310; 30; 20 MG/100ML; MG/100ML; MG/100ML; MG/100ML
25 INJECTION, SOLUTION INTRAVENOUS CONTINUOUS
Status: DISCONTINUED | OUTPATIENT
Start: 2020-03-26 | End: 2020-03-26 | Stop reason: HOSPADM

## 2020-03-26 RX ORDER — DEXTROSE MONOHYDRATE 100 MG/ML
0-250 INJECTION, SOLUTION INTRAVENOUS AS NEEDED
Status: DISCONTINUED | OUTPATIENT
Start: 2020-03-26 | End: 2020-03-28

## 2020-03-26 RX ORDER — INSULIN LISPRO 100 [IU]/ML
INJECTION, SOLUTION INTRAVENOUS; SUBCUTANEOUS
Status: DISCONTINUED | OUTPATIENT
Start: 2020-03-27 | End: 2020-03-29

## 2020-03-26 RX ORDER — SODIUM CHLORIDE 0.9 % (FLUSH) 0.9 %
5-40 SYRINGE (ML) INJECTION EVERY 8 HOURS
Status: DISCONTINUED | OUTPATIENT
Start: 2020-03-26 | End: 2020-04-02 | Stop reason: HOSPADM

## 2020-03-26 RX ORDER — MIDAZOLAM HYDROCHLORIDE 1 MG/ML
1 INJECTION, SOLUTION INTRAMUSCULAR; INTRAVENOUS AS NEEDED
Status: DISCONTINUED | OUTPATIENT
Start: 2020-03-26 | End: 2020-03-26 | Stop reason: HOSPADM

## 2020-03-26 RX ORDER — ACETAMINOPHEN 325 MG/1
650 TABLET ORAL EVERY 4 HOURS
Status: DISCONTINUED | OUTPATIENT
Start: 2020-03-26 | End: 2020-03-27

## 2020-03-26 RX ORDER — CHLORHEXIDINE GLUCONATE 1.2 MG/ML
10 RINSE ORAL EVERY 12 HOURS
Status: DISCONTINUED | OUTPATIENT
Start: 2020-03-26 | End: 2020-04-02 | Stop reason: HOSPADM

## 2020-03-26 RX ORDER — DOBUTAMINE HYDROCHLORIDE 200 MG/100ML
INJECTION INTRAVENOUS
Status: DISCONTINUED | OUTPATIENT
Start: 2020-03-26 | End: 2020-03-26 | Stop reason: HOSPADM

## 2020-03-26 RX ORDER — SODIUM CHLORIDE 9 MG/ML
250 INJECTION, SOLUTION INTRAVENOUS AS NEEDED
Status: DISCONTINUED | OUTPATIENT
Start: 2020-03-26 | End: 2020-03-28

## 2020-03-26 RX ORDER — CEFAZOLIN SODIUM/WATER 2 G/20 ML
2 SYRINGE (ML) INTRAVENOUS EVERY 6 HOURS
Status: COMPLETED | OUTPATIENT
Start: 2020-03-27 | End: 2020-03-28

## 2020-03-26 RX ORDER — SUFENTANIL CITRATE 50 UG/ML
INJECTION EPIDURAL; INTRAVENOUS
Status: DISCONTINUED | OUTPATIENT
Start: 2020-03-26 | End: 2020-03-26 | Stop reason: HOSPADM

## 2020-03-26 RX ORDER — SUFENTANIL CITRATE 50 UG/ML
INJECTION EPIDURAL; INTRAVENOUS AS NEEDED
Status: DISCONTINUED | OUTPATIENT
Start: 2020-03-26 | End: 2020-03-26 | Stop reason: HOSPADM

## 2020-03-26 RX ORDER — PROPOFOL 10 MG/ML
INJECTION, EMULSION INTRAVENOUS AS NEEDED
Status: DISCONTINUED | OUTPATIENT
Start: 2020-03-26 | End: 2020-03-26 | Stop reason: HOSPADM

## 2020-03-26 RX ORDER — SUCCINYLCHOLINE CHLORIDE 20 MG/ML
INJECTION INTRAMUSCULAR; INTRAVENOUS AS NEEDED
Status: DISCONTINUED | OUTPATIENT
Start: 2020-03-26 | End: 2020-03-26 | Stop reason: HOSPADM

## 2020-03-26 RX ORDER — MUPIROCIN 20 MG/G
OINTMENT TOPICAL 2 TIMES DAILY
Status: COMPLETED | OUTPATIENT
Start: 2020-03-26 | End: 2020-03-31

## 2020-03-26 RX ORDER — INSULIN LISPRO 100 [IU]/ML
INJECTION, SOLUTION INTRAVENOUS; SUBCUTANEOUS
Status: DISCONTINUED | OUTPATIENT
Start: 2020-03-26 | End: 2020-03-30

## 2020-03-26 RX ORDER — FENTANYL CITRATE 50 UG/ML
25 INJECTION, SOLUTION INTRAMUSCULAR; INTRAVENOUS
Status: DISCONTINUED | OUTPATIENT
Start: 2020-03-26 | End: 2020-03-26 | Stop reason: HOSPADM

## 2020-03-26 RX ORDER — PAPAVERINE HYDROCHLORIDE 30 MG/ML
INJECTION INTRAMUSCULAR; INTRAVENOUS AS NEEDED
Status: DISCONTINUED | OUTPATIENT
Start: 2020-03-26 | End: 2020-03-26 | Stop reason: HOSPADM

## 2020-03-26 RX ORDER — GUAIFENESIN 100 MG/5ML
81 LIQUID (ML) ORAL DAILY
Status: DISCONTINUED | OUTPATIENT
Start: 2020-03-27 | End: 2020-03-31

## 2020-03-26 RX ORDER — FACIAL-BODY WIPES
10 EACH TOPICAL DAILY PRN
Status: DISCONTINUED | OUTPATIENT
Start: 2020-03-26 | End: 2020-03-28

## 2020-03-26 RX ORDER — PHENYLEPHRINE HCL IN 0.9% NACL 0.4MG/10ML
SYRINGE (ML) INTRAVENOUS AS NEEDED
Status: DISCONTINUED | OUTPATIENT
Start: 2020-03-26 | End: 2020-03-26 | Stop reason: HOSPADM

## 2020-03-26 RX ORDER — LEVOTHYROXINE SODIUM 100 UG/1
100 TABLET ORAL
Status: DISCONTINUED | OUTPATIENT
Start: 2020-03-28 | End: 2020-04-02 | Stop reason: HOSPADM

## 2020-03-26 RX ORDER — SODIUM CHLORIDE 9 MG/ML
9 INJECTION, SOLUTION INTRAVENOUS CONTINUOUS
Status: DISCONTINUED | OUTPATIENT
Start: 2020-03-26 | End: 2020-03-28

## 2020-03-26 RX ORDER — POTASSIUM CHLORIDE 29.8 MG/ML
20 INJECTION INTRAVENOUS ONCE
Status: COMPLETED | OUTPATIENT
Start: 2020-03-26 | End: 2020-03-26

## 2020-03-26 RX ORDER — AMOXICILLIN 250 MG
1 CAPSULE ORAL 2 TIMES DAILY
Status: DISCONTINUED | OUTPATIENT
Start: 2020-03-27 | End: 2020-04-02 | Stop reason: HOSPADM

## 2020-03-26 RX ORDER — SODIUM CHLORIDE 450 MG/100ML
10 INJECTION, SOLUTION INTRAVENOUS CONTINUOUS
Status: DISCONTINUED | OUTPATIENT
Start: 2020-03-26 | End: 2020-03-28

## 2020-03-26 RX ORDER — MAGNESIUM SULFATE 1 G/100ML
1 INJECTION INTRAVENOUS AS NEEDED
Status: DISCONTINUED | OUTPATIENT
Start: 2020-03-26 | End: 2020-03-28

## 2020-03-26 RX ORDER — DIPHENHYDRAMINE HCL 25 MG
25 CAPSULE ORAL
Status: DISCONTINUED | OUTPATIENT
Start: 2020-03-26 | End: 2020-04-02 | Stop reason: HOSPADM

## 2020-03-26 RX ORDER — SODIUM CHLORIDE 0.9 % (FLUSH) 0.9 %
5-40 SYRINGE (ML) INJECTION AS NEEDED
Status: DISCONTINUED | OUTPATIENT
Start: 2020-03-26 | End: 2020-03-26 | Stop reason: HOSPADM

## 2020-03-26 RX ORDER — HEPARIN SODIUM 1000 [USP'U]/ML
2000 INJECTION, SOLUTION INTRAVENOUS; SUBCUTANEOUS ONCE
Status: DISCONTINUED | OUTPATIENT
Start: 2020-03-26 | End: 2020-03-26 | Stop reason: HOSPADM

## 2020-03-26 RX ORDER — MORPHINE SULFATE 2 MG/ML
INJECTION, SOLUTION INTRAMUSCULAR; INTRAVENOUS AS NEEDED
Status: DISCONTINUED | OUTPATIENT
Start: 2020-03-26 | End: 2020-03-26 | Stop reason: HOSPADM

## 2020-03-26 RX ORDER — PAPAVERINE HYDROCHLORIDE 30 MG/ML
10 INJECTION INTRAMUSCULAR; INTRAVENOUS ONCE
Status: DISCONTINUED | OUTPATIENT
Start: 2020-03-26 | End: 2020-03-26 | Stop reason: HOSPADM

## 2020-03-26 RX ORDER — POLYETHYLENE GLYCOL 3350 17 G/17G
17 POWDER, FOR SOLUTION ORAL DAILY
Status: DISCONTINUED | OUTPATIENT
Start: 2020-03-27 | End: 2020-04-02 | Stop reason: HOSPADM

## 2020-03-26 RX ORDER — SODIUM CHLORIDE 0.9 % (FLUSH) 0.9 %
5-40 SYRINGE (ML) INJECTION AS NEEDED
Status: DISCONTINUED | OUTPATIENT
Start: 2020-03-26 | End: 2020-03-28

## 2020-03-26 RX ORDER — LANOLIN ALCOHOL/MO/W.PET/CERES
3 CREAM (GRAM) TOPICAL
Status: DISCONTINUED | OUTPATIENT
Start: 2020-03-26 | End: 2020-04-02 | Stop reason: HOSPADM

## 2020-03-26 RX ORDER — LEVOTHYROXINE SODIUM 50 UG/1
50 TABLET ORAL
Status: DISCONTINUED | OUTPATIENT
Start: 2020-03-27 | End: 2020-04-02 | Stop reason: HOSPADM

## 2020-03-26 RX ORDER — LANOLIN ALCOHOL/MO/W.PET/CERES
400 CREAM (GRAM) TOPICAL 2 TIMES DAILY
Status: DISCONTINUED | OUTPATIENT
Start: 2020-03-27 | End: 2020-04-02 | Stop reason: HOSPADM

## 2020-03-26 RX ORDER — ALBUMIN HUMAN 50 G/1000ML
SOLUTION INTRAVENOUS AS NEEDED
Status: DISCONTINUED | OUTPATIENT
Start: 2020-03-26 | End: 2020-03-26 | Stop reason: HOSPADM

## 2020-03-26 RX ORDER — MIDAZOLAM HYDROCHLORIDE 1 MG/ML
1 INJECTION, SOLUTION INTRAMUSCULAR; INTRAVENOUS
Status: DISCONTINUED | OUTPATIENT
Start: 2020-03-26 | End: 2020-03-28

## 2020-03-26 RX ORDER — OXYCODONE HYDROCHLORIDE 5 MG/1
5 TABLET ORAL
Status: DISCONTINUED | OUTPATIENT
Start: 2020-03-26 | End: 2020-04-02 | Stop reason: HOSPADM

## 2020-03-26 RX ORDER — HYDROMORPHONE HYDROCHLORIDE 1 MG/ML
0.2 INJECTION, SOLUTION INTRAMUSCULAR; INTRAVENOUS; SUBCUTANEOUS
Status: DISCONTINUED | OUTPATIENT
Start: 2020-03-26 | End: 2020-03-26 | Stop reason: HOSPADM

## 2020-03-26 RX ADMIN — SODIUM CHLORIDE 2 MG/HR: 900 INJECTION, SOLUTION INTRAVENOUS at 23:58

## 2020-03-26 RX ADMIN — ASPIRIN 81 MG 81 MG: 81 TABLET ORAL at 09:06

## 2020-03-26 RX ADMIN — CARVEDILOL 6.25 MG: 6.25 TABLET, FILM COATED ORAL at 09:06

## 2020-03-26 RX ADMIN — SODIUM CHLORIDE: 900 INJECTION, SOLUTION INTRAVENOUS at 14:33

## 2020-03-26 RX ADMIN — DEXMEDETOMIDINE HYDROCHLORIDE 0.8 MCG/KG/HR: 100 INJECTION, SOLUTION, CONCENTRATE INTRAVENOUS at 22:50

## 2020-03-26 RX ADMIN — MUPIROCIN: 20 OINTMENT TOPICAL at 09:07

## 2020-03-26 RX ADMIN — PROTAMINE SULFATE 200 MG: 10 INJECTION, SOLUTION INTRAVENOUS at 18:34

## 2020-03-26 RX ADMIN — ALBUMIN (HUMAN) 12.5 G: 12.5 INJECTION, SOLUTION INTRAVENOUS at 20:54

## 2020-03-26 RX ADMIN — SODIUM CHLORIDE 1.8 UNITS/HR: 9 INJECTION, SOLUTION INTRAVENOUS at 18:33

## 2020-03-26 RX ADMIN — ROCURONIUM BROMIDE 40 MG: 10 SOLUTION INTRAVENOUS at 14:46

## 2020-03-26 RX ADMIN — PROTHROMBIN, COAGULATION FACTOR VII HUMAN, COAGULATION FACTOR IX HUMAN, COAGULATION FACTOR X HUMAN, PROTEIN C, PROTEIN S HUMAN, AND WATER 1133 INT'L UNITS: KIT at 22:38

## 2020-03-26 RX ADMIN — PROTAMINE SULFATE 50 MG: 10 INJECTION, SOLUTION INTRAVENOUS at 22:38

## 2020-03-26 RX ADMIN — SUFENTANIL CITRATE 0.3 MCG/KG/HR: 50 INJECTION EPIDURAL; INTRAVENOUS at 14:44

## 2020-03-26 RX ADMIN — SODIUM CHLORIDE 9 ML/HR: 900 INJECTION, SOLUTION INTRAVENOUS at 20:15

## 2020-03-26 RX ADMIN — MIDAZOLAM 2 MG: 1 INJECTION INTRAMUSCULAR; INTRAVENOUS at 12:29

## 2020-03-26 RX ADMIN — Medication 10 ML: at 21:04

## 2020-03-26 RX ADMIN — FENTANYL CITRATE 50 MCG: 50 INJECTION INTRAMUSCULAR; INTRAVENOUS at 12:29

## 2020-03-26 RX ADMIN — SUFENTANIL CITRATE 30 MCG: 50 INJECTION EPIDURAL; INTRAVENOUS at 14:36

## 2020-03-26 RX ADMIN — ROCURONIUM BROMIDE 10 MG: 10 SOLUTION INTRAVENOUS at 14:36

## 2020-03-26 RX ADMIN — SODIUM CHLORIDE 10 ML/HR: 450 INJECTION, SOLUTION INTRAVENOUS at 20:15

## 2020-03-26 RX ADMIN — SUFENTANIL CITRATE 20 MCG: 50 INJECTION EPIDURAL; INTRAVENOUS at 18:57

## 2020-03-26 RX ADMIN — POTASSIUM CHLORIDE 20 MEQ: 400 INJECTION, SOLUTION INTRAVENOUS at 21:25

## 2020-03-26 RX ADMIN — AMIODARONE HYDROCHLORIDE 1 MG/MIN: 50 INJECTION, SOLUTION INTRAVENOUS at 18:01

## 2020-03-26 RX ADMIN — ROCURONIUM BROMIDE 50 MG: 10 SOLUTION INTRAVENOUS at 16:01

## 2020-03-26 RX ADMIN — SODIUM CHLORIDE, POTASSIUM CHLORIDE, SODIUM LACTATE AND CALCIUM CHLORIDE: 600; 310; 30; 20 INJECTION, SOLUTION INTRAVENOUS at 14:33

## 2020-03-26 RX ADMIN — Medication 10 ML: at 06:27

## 2020-03-26 RX ADMIN — CHLORHEXIDINE GLUCONATE 15 ML: 1.2 RINSE ORAL at 09:07

## 2020-03-26 RX ADMIN — PHENYLEPHRINE HYDROCHLORIDE 160 MCG/MIN: 10 INJECTION INTRAVENOUS at 14:39

## 2020-03-26 RX ADMIN — MUPIROCIN: 20 OINTMENT TOPICAL at 21:05

## 2020-03-26 RX ADMIN — LIDOCAINE HYDROCHLORIDE 100 MG: 20 INJECTION, SOLUTION EPIDURAL; INFILTRATION; INTRACAUDAL; PERINEURAL at 14:36

## 2020-03-26 RX ADMIN — PHENYLEPHRINE HYDROCHLORIDE 80 MCG: 10 INJECTION INTRAVENOUS at 14:36

## 2020-03-26 RX ADMIN — SUFENTANIL CITRATE 20 MCG: 50 INJECTION EPIDURAL; INTRAVENOUS at 16:15

## 2020-03-26 RX ADMIN — Medication 4 MG: at 19:43

## 2020-03-26 RX ADMIN — ALBUMIN (HUMAN) 12.5 G: 12.5 INJECTION, SOLUTION INTRAVENOUS at 23:20

## 2020-03-26 RX ADMIN — SODIUM CHLORIDE 10 G/HR: 900 INJECTION, SOLUTION INTRAVENOUS at 14:54

## 2020-03-26 RX ADMIN — PHENYLEPHRINE HYDROCHLORIDE 40 MCG/MIN: 10 INJECTION INTRAVENOUS at 16:44

## 2020-03-26 RX ADMIN — SODIUM CHLORIDE 40 MG: 9 INJECTION INTRAMUSCULAR; INTRAVENOUS; SUBCUTANEOUS at 21:04

## 2020-03-26 RX ADMIN — SUFENTANIL CITRATE 10 MCG: 50 INJECTION EPIDURAL; INTRAVENOUS at 18:55

## 2020-03-26 RX ADMIN — SUCCINYLCHOLINE CHLORIDE 140 MG: 20 INJECTION, SOLUTION INTRAMUSCULAR; INTRAVENOUS at 14:36

## 2020-03-26 RX ADMIN — HYDROMORPHONE HYDROCHLORIDE 1 MG: 1 INJECTION, SOLUTION INTRAMUSCULAR; INTRAVENOUS; SUBCUTANEOUS at 20:59

## 2020-03-26 RX ADMIN — CEFAZOLIN 2 G: 330 INJECTION, POWDER, FOR SOLUTION INTRAMUSCULAR; INTRAVENOUS at 19:09

## 2020-03-26 RX ADMIN — ALBUMIN (HUMAN) 250 ML: 12.5 INJECTION, SOLUTION INTRAVENOUS at 19:49

## 2020-03-26 RX ADMIN — CEFAZOLIN 2 G: 330 INJECTION, POWDER, FOR SOLUTION INTRAMUSCULAR; INTRAVENOUS at 16:10

## 2020-03-26 RX ADMIN — PROPOFOL 80 MG: 10 INJECTION, EMULSION INTRAVENOUS at 14:36

## 2020-03-26 RX ADMIN — DESMOPRESSIN ACETATE 28 MCG: 4 SOLUTION INTRAVENOUS at 18:59

## 2020-03-26 RX ADMIN — DEXMEDETOMIDINE HYDROCHLORIDE 0.6 MCG/KG/HR: 100 INJECTION, SOLUTION, CONCENTRATE INTRAVENOUS at 18:01

## 2020-03-26 RX ADMIN — DOBUTAMINE IN DEXTROSE 2 MCG/KG/MIN: 200 INJECTION, SOLUTION INTRAVENOUS at 18:46

## 2020-03-26 RX ADMIN — ROCURONIUM BROMIDE 20 MG: 10 SOLUTION INTRAVENOUS at 17:35

## 2020-03-26 RX ADMIN — HEPARIN SODIUM 37000 UNITS: 1000 INJECTION, SOLUTION INTRAVENOUS; SUBCUTANEOUS at 16:54

## 2020-03-26 NOTE — ANESTHESIA PREPROCEDURE EVALUATION
Relevant Problems   No relevant active problems       Anesthetic History   No history of anesthetic complications            Review of Systems / Medical History  Patient summary reviewed, nursing notes reviewed and pertinent labs reviewed    Pulmonary  Within defined limits                 Neuro/Psych   Within defined limits           Cardiovascular  Within defined limits  Hypertension  Valvular problems/murmurs: aortic stenosis  Angina    Dysrhythmias   CAD    Exercise tolerance: <4 METS     GI/Hepatic/Renal  Within defined limits              Endo/Other  Within defined limits    Hypothyroidism  Arthritis and cancer     Other Findings              Physical Exam    Airway  Mallampati: III  TM Distance: 4 - 6 cm  Neck ROM: normal range of motion   Mouth opening: Normal     Cardiovascular  Regular rate and rhythm,  S1 and S2 normal,  no murmur, click, rub, or gallop             Dental  No notable dental hx       Pulmonary  Breath sounds clear to auscultation               Abdominal  GI exam deferred       Other Findings            Anesthetic Plan    ASA: 4  Anesthesia type: general    Monitoring Plan: Arterial line, BIS, [place], CVP and WILMER    Post procedure ventilation   Induction: Intravenous  Anesthetic plan and risks discussed with: Patient

## 2020-03-26 NOTE — PROGRESS NOTES
Transitions of Care: 8% risk of re-admission                     -Patient is to go to OR today for CABG    -PT/OT following CABG when appropriate   -Cardiac Surgery follow-up    The patient is to go to OR today for CABG with Cardiac Surgery Team. The patient is independent at baseline, lives with his wife. The CM will continue to follow and evaluate for needs s/p CABG, PT/OT when appropriate.  SAMANTHA Finn

## 2020-03-26 NOTE — ANESTHESIA PROCEDURE NOTES
WILMER        Procedure Details: probe placement, image aquisition & interpretation    Risks and benefits discussed with the patient and plans are to proceed    Procedure Note    Performed by: Brianna Reddy MD  Authorized by: Jamir iSmmons MD       Indications: assessment of ascending aorta and assessment of surgical repair  Modalities: CF, CWD, 2D, PWD  Probe Type: epiaortic and multiplane  Insertion: atraumatic  Patient Status: intubated and sedated    Echocardiographic and Doppler Measurements   Aorta  Size  Diam(cm)  Dissection PlaqueThick(mm)  Plaque Mobile    Ascending normal  No 0-3 No    Arch normal  No 0-3 No    Descending normal  No 0-3 No          Valves  Annulus  Stenosis  Area/Grad  Regurg  Leaflet   Morph  Leaflet   Motion    Aortic normal none mean grad = 6mmhg 1+ normal normal    Mitral normal none  2+ normal prolapse, Post    Tricuspid normal none  1+ normal normal          Atria  Size  SEC (smoke)  Thrombus  Tumor  Device    Rt Atrium normal No No No Yes    Lt Atrium normal No No  No     Interatrial Septum Morphology: normal    Interventricular Septum Morphology: normal    Ventricle  Cavity Size  Cavity Dimension Hypertrophy  Thrombus  Gloal FXN  EF    RV normal  No no normal     LV normal   No normal 55-60%       Regional Function  (1 = normal, 2 = mildly hypokinetic, 3 = severely hypokinetic, 4 = akinetic, 5 = dyskinetic) LAV - [place]   ME LAV = 0  ME LAV = 90  ME LAV = 130   Basal Sept:1 Basal Ant:1 Basal Post:1   Mid Sept:1 Mid Ant:1 Mid Post:1   Apical Sept:1 Apical Ant:1 Basal Ant Sept:1   Basal Lat:1 Basal Inf:1 Mid Ant Sept:1   Mid Lat:1 Mid Inf:1    Apical Lat:1 Apical Inf:1        Pericardium: normal    Post Intervention Follow-up Study         Valve  Function  Regurgitation  Area    Aortic       Mitral       Tricuspid       Prosthetic          Comments: Pre: P2 prolapse with anteriorly directed eccentric jet    Epiaortic:    All findings were discussed in detail with Dr. Ruy Juares.

## 2020-03-26 NOTE — PERIOP NOTES
Lesley See updated with Surgery start time and reason for delay of surgery start. . Will continue to update.

## 2020-03-26 NOTE — PERIOP NOTES
Patient arrived to the operating room via stretcher. Patient identity verified with patients name and date of birth consistent with arm band. Patient stated surgical procedure consistent with surgical consent. All wheels locked and patient transferred to the OR table with assistance of four staff members. Avinash Woodard MTRE warming machine on bed. Temperature set at 37 C and adjusted as needed. Safety strap across upper body until time of positioning and after drapes removed prior to transfer. After the induction of anesthesia and intubation placement of a 16fr temperature olivares catheter was attempted without success . At this time Dr. Ninoska March was notified and the Urologist on call was contacted. Dr. Kristin Zavala placed a 16 fr. Councill catheter. After placement urine appeared blood tinged. Urine output and temperature monitored during case by all providers. Patient positioned with assistance of all providers. Head resting in proper alignment on gel doughnut. Gel Shoulder roll placed. Arms tucked as sides. Each arm was wrapped in one step arm protectors. All pressure points/IV lines/stopcocks padded with foam. Thumbs in proper alignment and ulnar padding in place. Picket fence foot positioner used during prep.

## 2020-03-26 NOTE — PERIOP NOTES
TRANSFER - OUT REPORT:    Verbal report given to [employer] on Ronny Ta  being transferred to [facility] for routine post - op       Report consisted of patients Situation, Background, Assessment and   Recommendations(SBAR). Information from the following report(s) OR Summary and Procedure Summary was reviewed with the receiving nurse. Lines:   Double Lumen 03/26/20 Right (Active)       Shania Payor Triple Right Neck (Active)       Peripheral IV 03/25/20 Left Antecubital (Active)   Phlebitis Assessment 0 3/25/2020  8:05 PM   Infiltration Assessment 0 3/25/2020  8:05 PM   Dressing Status Clean, dry, & intact 3/25/2020  8:05 PM   Dressing Type Transparent;Tape 3/25/2020  8:05 PM   Hub Color/Line Status Pink;Capped 3/26/2020  3:47 AM   Action Taken Open ports on tubing capped 3/25/2020  4:31 PM   Alcohol Cap Used Yes 3/26/2020  3:47 AM        Opportunity for questions and clarification was provided. Patient transported with TOM DAVIS [address] INTUBATED SEDATED MONITORED AND ON O2 @10 liters.

## 2020-03-26 NOTE — PROGRESS NOTES
Cardiac Surgery Care Coordinator-  Met with Kesha Ayala and his wife, reviewed role of the Cardiac Surgery Care Coordinator. Reviewed plan of care and began pre-op education. Discussed day of surgery expectations for the pt and family. Instructed pt on the proper use of the incentive spirometer, he is able to pull 2300cc with good effort. Reviewed material in the CABG educational folder including Cardiac Surgery Pathway, sent home the discharge education booklet with Mrs Radames Bradshaw. She stated she would like to get phone updates during the surgery. Reinforced sternal precautions and keeping your move in the tube. Encouraged Kesha Ayala and his wife to verbalize and offered emotional support. 1130- Met with Mr Radames Bradshaw in pre-op holding, reinforced plan of care and encouraged him to verbalize. 1315- Placed call to Mrs. Sawyer, provided update and encouraged her to verbalize. Will continue to update via phone. Marty Glaser RN    1232- Placed call to Mrs Radames Bradshaw, provided update from the OR. Will continue to follow     1630- Placed call to Mrs Radames Bradshaw, provided update from the OR. Reviewed plan of care and offered emotional support.  Marty Glaser RN

## 2020-03-26 NOTE — PROGRESS NOTES
0730 Bedside shift change report given to Sally Juarez RN (oncoming nurse) by Farzaneh Cervantes RN (offgoing nurse). Report included the following information SBAR, Kardex, Intake/Output, MAR and Recent Results. 9524 2nd CHG bath performed. TRANSFER - OUT REPORT:    Verbal report given to Kirstin Javier RN (name) on [employer]  being transferred to OR (unit) for ordered procedure       Report consisted of patients Situation, Background, Assessment and   Recommendations(SBAR). Information from the following report(s) SBAR, Kardex, Intake/Output, MAR and Recent Results was reviewed with the receiving nurse. Lines:   Peripheral IV 03/25/20 Left Antecubital (Active)   Phlebitis Assessment 0 3/25/2020  8:05 PM   Infiltration Assessment 0 3/25/2020  8:05 PM   Dressing Status Clean, dry, & intact 3/25/2020  8:05 PM   Dressing Type Transparent;Tape 3/25/2020  8:05 PM   Hub Color/Line Status Pink;Capped 3/26/2020  3:47 AM   Action Taken Open ports on tubing capped 3/25/2020  4:31 PM   Alcohol Cap Used Yes 3/26/2020  3:47 AM        Opportunity for questions and clarification was provided.       Patient transported with:   Monitor  Registered Nurse

## 2020-03-26 NOTE — ANESTHESIA PROCEDURE NOTES
Central Line and [place] catheter Placement    Start time: 3/26/2020 12:11 PM  End time: 3/26/2020 12:24 PM  Performed by: Brielle Guaman MD  Authorized by: Antonia Mulligan MD     Indications: vascular access, central pressure monitoring and need for vasopressors  Preanesthetic Checklist: patient identified, risks and benefits discussed, anesthesia consent, patient being monitored and timeout performed      Pre-procedure: All elements of maximal sterile barrier technique followed?  Yes    2% Chlorhexidine for cutaneous antisepsis, Hand hygiene performed prior to catheter insertion and Ultrasound guidance    Sterile Ultrasound Technique followed?: Yes            Procedure:   Prep:  Chlorhexidine    Orientation:  Right  Patient position:  Trendelenburg  Catheter type:  Double lumen  Catheter size:  9 Fr  Catheter length:  16 cm  Number of attempts:  1  Successful placement: Yes      Assessment:   Post-procedure:  Catheter secured and sterile dressing applied  Assessment:  Blood return through all ports  Insertion:  Uncomplicated  Patient tolerance:  Patient tolerated the procedure well with no immediate complications  8fr CCO PAC

## 2020-03-26 NOTE — BRIEF OP NOTE
BRIEF OP NOTE    Pre-Op Diagnosis: CAD    Post-Op Diagnosis: CAD      Procedure:   CABG x 5, LIMA to LAD, RSVG to Diag-RI-OM, RSVG to RCA  Left Greater Saphenous Vein EVH    Surgeon: Susanne Carrizales MD    Assistant(s): TOM Zhang    Anesthesia: General     Infusions/Support: Precedex, insulin, lillian, dobut    Estimated Blood Loss: 700    Cell Saver: 750    Specimens: * No specimens in log *    Drains and pacing wires: 2 atrial wires, 1 bipolar ventricular wire, 3 nina drains    Complications: none    Findings: CAD    Implants: * No implants in log *

## 2020-03-26 NOTE — ROUTINE PROCESS
TRANSFER - IN REPORT: 
 
Verbal report received from Sean Rogers, [facility] (name) on Ruddy Magaña  being received from Room 452(unit) for routine progression of care Report consisted of patients Situation, Background, Assessment and  
Recommendations(SBAR). Information from the following report(s) SBAR, Kardex, Procedure Summary, Intake/Output, MAR, Recent Results and Cardiac Rhythm NSR to SB was reviewed with the receiving nurse. Opportunity for questions and clarification was provided. Assessment completed upon patients arrival to unit and care assumed.

## 2020-03-27 ENCOUNTER — APPOINTMENT (OUTPATIENT)
Dept: GENERAL RADIOLOGY | Age: 80
DRG: 234 | End: 2020-03-27
Attending: PHYSICIAN ASSISTANT
Payer: MEDICARE

## 2020-03-27 LAB
ADMINISTERED INITIALS, ADMINIT: NORMAL
ALBUMIN SERPL-MCNC: 3.6 G/DL (ref 3.5–5)
ALBUMIN SERPL-MCNC: 3.7 G/DL (ref 3.5–5)
ALBUMIN/GLOB SERPL: 1.8 {RATIO} (ref 1.1–2.2)
ALBUMIN/GLOB SERPL: 1.9 {RATIO} (ref 1.1–2.2)
ALP SERPL-CCNC: 58 U/L (ref 45–117)
ALP SERPL-CCNC: 63 U/L (ref 45–117)
ALT SERPL-CCNC: 25 U/L (ref 12–78)
ALT SERPL-CCNC: 25 U/L (ref 12–78)
ANION GAP SERPL CALC-SCNC: 5 MMOL/L (ref 5–15)
ANION GAP SERPL CALC-SCNC: 6 MMOL/L (ref 5–15)
APTT PPP: 26.2 SEC (ref 22.1–32)
APTT PPP: 27.8 SEC (ref 22.1–32)
ARTERIAL PATENCY WRIST A: ABNORMAL
ARTERIAL PATENCY WRIST A: ABNORMAL
AST SERPL-CCNC: 58 U/L (ref 15–37)
AST SERPL-CCNC: 67 U/L (ref 15–37)
ATRIAL RATE: 97 BPM
BASE DEFICIT BLD-SCNC: 2 MMOL/L
BASE DEFICIT BLD-SCNC: 3 MMOL/L
BDY SITE: ABNORMAL
BDY SITE: ABNORMAL
BILIRUB SERPL-MCNC: 1.1 MG/DL (ref 0.2–1)
BILIRUB SERPL-MCNC: 1.1 MG/DL (ref 0.2–1)
BLD PROD TYP BPU: NORMAL
BPU ID: NORMAL
BUN SERPL-MCNC: 16 MG/DL (ref 6–20)
BUN SERPL-MCNC: 16 MG/DL (ref 6–20)
BUN/CREAT SERPL: 17 (ref 12–20)
BUN/CREAT SERPL: 18 (ref 12–20)
CA-I BLD-SCNC: 1.18 MMOL/L (ref 1.12–1.32)
CA-I BLD-SCNC: 1.2 MMOL/L (ref 1.12–1.32)
CALCIUM SERPL-MCNC: 8.1 MG/DL (ref 8.5–10.1)
CALCIUM SERPL-MCNC: 8.1 MG/DL (ref 8.5–10.1)
CALCULATED P AXIS, ECG09: 36 DEGREES
CALCULATED R AXIS, ECG10: -90 DEGREES
CALCULATED T AXIS, ECG11: 32 DEGREES
CHLORIDE SERPL-SCNC: 112 MMOL/L (ref 97–108)
CHLORIDE SERPL-SCNC: 113 MMOL/L (ref 97–108)
CO2 SERPL-SCNC: 23 MMOL/L (ref 21–32)
CO2 SERPL-SCNC: 24 MMOL/L (ref 21–32)
CREAT SERPL-MCNC: 0.89 MG/DL (ref 0.7–1.3)
CREAT SERPL-MCNC: 0.96 MG/DL (ref 0.7–1.3)
D50 ADMINISTERED, D50ADM: 0 ML
D50 ORDER, D50ORD: 0 ML
DIAGNOSIS, 93000: NORMAL
ERYTHROCYTE [DISTWIDTH] IN BLOOD BY AUTOMATED COUNT: 13.5 % (ref 11.5–14.5)
ERYTHROCYTE [DISTWIDTH] IN BLOOD BY AUTOMATED COUNT: 13.8 % (ref 11.5–14.5)
FIBRINOGEN PPP-MCNC: 303 MG/DL (ref 200–475)
GAS FLOW.O2 O2 DELIVERY SYS: ABNORMAL L/MIN
GAS FLOW.O2 O2 DELIVERY SYS: ABNORMAL L/MIN
GAS FLOW.O2 SETTING OXYMISER: 14 BPM
GLOBULIN SER CALC-MCNC: 1.9 G/DL (ref 2–4)
GLOBULIN SER CALC-MCNC: 2 G/DL (ref 2–4)
GLSCOM COMMENTS: NORMAL
GLUCOSE BLD STRIP.AUTO-MCNC: 106 MG/DL (ref 65–100)
GLUCOSE BLD STRIP.AUTO-MCNC: 109 MG/DL (ref 65–100)
GLUCOSE BLD STRIP.AUTO-MCNC: 111 MG/DL (ref 65–100)
GLUCOSE BLD STRIP.AUTO-MCNC: 115 MG/DL (ref 65–100)
GLUCOSE BLD STRIP.AUTO-MCNC: 115 MG/DL (ref 65–100)
GLUCOSE BLD STRIP.AUTO-MCNC: 117 MG/DL (ref 65–100)
GLUCOSE BLD STRIP.AUTO-MCNC: 119 MG/DL (ref 65–100)
GLUCOSE BLD STRIP.AUTO-MCNC: 130 MG/DL (ref 65–100)
GLUCOSE BLD STRIP.AUTO-MCNC: 136 MG/DL (ref 65–100)
GLUCOSE BLD STRIP.AUTO-MCNC: 137 MG/DL (ref 65–100)
GLUCOSE BLD STRIP.AUTO-MCNC: 140 MG/DL (ref 65–100)
GLUCOSE BLD STRIP.AUTO-MCNC: 153 MG/DL (ref 65–100)
GLUCOSE BLD STRIP.AUTO-MCNC: 178 MG/DL (ref 65–100)
GLUCOSE BLD STRIP.AUTO-MCNC: 195 MG/DL (ref 65–100)
GLUCOSE BLD STRIP.AUTO-MCNC: 87 MG/DL (ref 65–100)
GLUCOSE BLD STRIP.AUTO-MCNC: 89 MG/DL (ref 65–100)
GLUCOSE BLD STRIP.AUTO-MCNC: 91 MG/DL (ref 65–100)
GLUCOSE BLD STRIP.AUTO-MCNC: 97 MG/DL (ref 65–100)
GLUCOSE BLD STRIP.AUTO-MCNC: 98 MG/DL (ref 65–100)
GLUCOSE SERPL-MCNC: 119 MG/DL (ref 65–100)
GLUCOSE SERPL-MCNC: 123 MG/DL (ref 65–100)
GLUCOSE, GLC: 106 MG/DL
GLUCOSE, GLC: 109 MG/DL
GLUCOSE, GLC: 111 MG/DL
GLUCOSE, GLC: 115 MG/DL
GLUCOSE, GLC: 115 MG/DL
GLUCOSE, GLC: 117 MG/DL
GLUCOSE, GLC: 119 MG/DL
GLUCOSE, GLC: 130 MG/DL
GLUCOSE, GLC: 136 MG/DL
GLUCOSE, GLC: 137 MG/DL
GLUCOSE, GLC: 140 MG/DL
GLUCOSE, GLC: 153 MG/DL
GLUCOSE, GLC: 178 MG/DL
GLUCOSE, GLC: 195 MG/DL
GLUCOSE, GLC: 87 MG/DL
GLUCOSE, GLC: 91 MG/DL
GLUCOSE, GLC: 97 MG/DL
GLUCOSE, GLC: 98 MG/DL
HCO3 BLD-SCNC: 22.6 MMOL/L (ref 22–26)
HCO3 BLD-SCNC: 23 MMOL/L (ref 22–26)
HCT VFR BLD AUTO: 29.2 % (ref 36.6–50.3)
HCT VFR BLD AUTO: 31.7 % (ref 36.6–50.3)
HCT VFR BLD AUTO: 33.8 % (ref 36.6–50.3)
HGB BLD-MCNC: 10.2 G/DL (ref 12.1–17)
HGB BLD-MCNC: 10.8 G/DL (ref 12.1–17)
HGB BLD-MCNC: 9.5 G/DL (ref 12.1–17)
HIGH TARGET, HITG: 130 MG/DL
INR PPP: 1.1 (ref 0.9–1.1)
INR PPP: 1.1 (ref 0.9–1.1)
INSULIN ADMINSTERED, INSADM: 1.1 UNITS/HOUR
INSULIN ADMINSTERED, INSADM: 1.1 UNITS/HOUR
INSULIN ADMINSTERED, INSADM: 1.4 UNITS/HOUR
INSULIN ADMINSTERED, INSADM: 1.5 UNITS/HOUR
INSULIN ADMINSTERED, INSADM: 1.5 UNITS/HOUR
INSULIN ADMINSTERED, INSADM: 1.7 UNITS/HOUR
INSULIN ADMINSTERED, INSADM: 1.7 UNITS/HOUR
INSULIN ADMINSTERED, INSADM: 1.8 UNITS/HOUR
INSULIN ADMINSTERED, INSADM: 1.9 UNITS/HOUR
INSULIN ADMINSTERED, INSADM: 2.1 UNITS/HOUR
INSULIN ADMINSTERED, INSADM: 2.3 UNITS/HOUR
INSULIN ADMINSTERED, INSADM: 2.3 UNITS/HOUR
INSULIN ADMINSTERED, INSADM: 2.4 UNITS/HOUR
INSULIN ADMINSTERED, INSADM: 3.3 UNITS/HOUR
INSULIN ADMINSTERED, INSADM: 4 UNITS/HOUR
INSULIN ADMINSTERED, INSADM: 4.7 UNITS/HOUR
INSULIN ADMINSTERED, INSADM: 5.6 UNITS/HOUR
INSULIN ADMINSTERED, INSADM: 6.8 UNITS/HOUR
INSULIN ORDER, INSORD: 1.1 UNITS/HOUR
INSULIN ORDER, INSORD: 1.1 UNITS/HOUR
INSULIN ORDER, INSORD: 1.4 UNITS/HOUR
INSULIN ORDER, INSORD: 1.5 UNITS/HOUR
INSULIN ORDER, INSORD: 1.5 UNITS/HOUR
INSULIN ORDER, INSORD: 1.7 UNITS/HOUR
INSULIN ORDER, INSORD: 1.7 UNITS/HOUR
INSULIN ORDER, INSORD: 1.8 UNITS/HOUR
INSULIN ORDER, INSORD: 1.9 UNITS/HOUR
INSULIN ORDER, INSORD: 2.1 UNITS/HOUR
INSULIN ORDER, INSORD: 2.3 UNITS/HOUR
INSULIN ORDER, INSORD: 2.3 UNITS/HOUR
INSULIN ORDER, INSORD: 2.4 UNITS/HOUR
INSULIN ORDER, INSORD: 3.3 UNITS/HOUR
INSULIN ORDER, INSORD: 4 UNITS/HOUR
INSULIN ORDER, INSORD: 4.7 UNITS/HOUR
INSULIN ORDER, INSORD: 5.6 UNITS/HOUR
INSULIN ORDER, INSORD: 6.8 UNITS/HOUR
LEFT ABI: 1.26
LEFT ANTERIOR TIBIAL: 169 MMHG
LEFT ARM BP: 132 MMHG
LEFT CCA DIST DIAS: 9.9 CM/S
LEFT CCA DIST SYS: 57 CM/S
LEFT CCA PROX DIAS: 11.3 CM/S
LEFT CCA PROX SYS: 65.2 CM/S
LEFT ECA DIAS: 0 CM/S
LEFT ECA SYS: 122.8 CM/S
LEFT ICA DIST DIAS: 14.6 CM/S
LEFT ICA DIST SYS: 64.2 CM/S
LEFT ICA MID DIAS: 15.7 CM/S
LEFT ICA MID SYS: 64.1 CM/S
LEFT ICA PROX DIAS: 16.8 CM/S
LEFT ICA PROX SYS: 70.7 CM/S
LEFT ICA/CCA SYS: 1.24
LEFT POSTERIOR TIBIAL: 166 MMHG
LEFT TBI: 1.09
LEFT TOE PRESSURE: 146 MMHG
LEFT VERTEBRAL DIAS: 8.04 CM/S
LEFT VERTEBRAL SYS: 38.7 CM/S
LOW TARGET, LOT: 95 MG/DL
MAGNESIUM SERPL-MCNC: 2.2 MG/DL (ref 1.6–2.4)
MAGNESIUM SERPL-MCNC: 2.3 MG/DL (ref 1.6–2.4)
MCH RBC QN AUTO: 29.7 PG (ref 26–34)
MCH RBC QN AUTO: 29.9 PG (ref 26–34)
MCHC RBC AUTO-ENTMCNC: 32.2 G/DL (ref 30–36.5)
MCHC RBC AUTO-ENTMCNC: 32.5 G/DL (ref 30–36.5)
MCV RBC AUTO: 91.8 FL (ref 80–99)
MCV RBC AUTO: 92.2 FL (ref 80–99)
MINUTES UNTIL NEXT BG, NBG: 120 MIN
MINUTES UNTIL NEXT BG, NBG: 120 MIN
MINUTES UNTIL NEXT BG, NBG: 60 MIN
MULTIPLIER, MUL: 0.03
MULTIPLIER, MUL: 0.04
MULTIPLIER, MUL: 0.04
MULTIPLIER, MUL: 0.05
MULTIPLIER, MUL: 0.06
MULTIPLIER, MUL: 0.06
NRBC # BLD: 0 K/UL (ref 0–0.01)
NRBC # BLD: 0 K/UL (ref 0–0.01)
NRBC BLD-RTO: 0 PER 100 WBC
NRBC BLD-RTO: 0 PER 100 WBC
O2/TOTAL GAS SETTING VFR VENT: 0.4 %
O2/TOTAL GAS SETTING VFR VENT: 50 %
ORDER INITIALS, ORDINIT: NORMAL
P-R INTERVAL, ECG05: 220 MS
PCO2 BLD: 39.8 MMHG (ref 35–45)
PCO2 BLD: 40.7 MMHG (ref 35–45)
PEEP RESPIRATORY: 5 CMH2O
PEEP RESPIRATORY: 5 CMH2O
PH BLD: 7.35 [PH] (ref 7.35–7.45)
PH BLD: 7.37 [PH] (ref 7.35–7.45)
PIP ISTAT,IPIP: 19
PLATELET # BLD AUTO: 117 K/UL (ref 150–400)
PLATELET # BLD AUTO: 124 K/UL (ref 150–400)
PMV BLD AUTO: 9.2 FL (ref 8.9–12.9)
PMV BLD AUTO: 9.6 FL (ref 8.9–12.9)
PO2 BLD: 109 MMHG (ref 80–100)
PO2 BLD: 35 MMHG (ref 80–100)
POTASSIUM SERPL-SCNC: 4.1 MMOL/L (ref 3.5–5.1)
POTASSIUM SERPL-SCNC: 4.2 MMOL/L (ref 3.5–5.1)
PRESSURE SUPPORT SETTING VENT: 5 CMH2O
PROT SERPL-MCNC: 5.6 G/DL (ref 6.4–8.2)
PROT SERPL-MCNC: 5.6 G/DL (ref 6.4–8.2)
PROTHROMBIN TIME: 11 SEC (ref 9–11.1)
PROTHROMBIN TIME: 11.2 SEC (ref 9–11.1)
Q-T INTERVAL, ECG07: 396 MS
QRS DURATION, ECG06: 118 MS
QTC CALCULATION (BEZET), ECG08: 502 MS
RBC # BLD AUTO: 3.18 M/UL (ref 4.1–5.7)
RBC # BLD AUTO: 3.44 M/UL (ref 4.1–5.7)
RIGHT ABI: 1.37
RIGHT ANTERIOR TIBIAL: 147 MMHG
RIGHT ARM BP: 134 MMHG
RIGHT CCA DIST DIAS: 9.9 CM/S
RIGHT CCA DIST SYS: 63.1 CM/S
RIGHT CCA PROX DIAS: 7.7 CM/S
RIGHT CCA PROX SYS: 76.1 CM/S
RIGHT ECA DIAS: 0 CM/S
RIGHT ECA SYS: 123.3 CM/S
RIGHT ICA DIST DIAS: 9 CM/S
RIGHT ICA DIST SYS: 33.4 CM/S
RIGHT ICA MID DIAS: 21.2 CM/S
RIGHT ICA MID SYS: 63.1 CM/S
RIGHT ICA PROX DIAS: 21.2 CM/S
RIGHT ICA PROX SYS: 72.9 CM/S
RIGHT ICA/CCA SYS: 1.2
RIGHT POSTERIOR TIBIAL: 183 MMHG
RIGHT TBI: 1.07
RIGHT TOE PRESSURE: 144 MMHG
RIGHT VERTEBRAL DIAS: 7.29 CM/S
RIGHT VERTEBRAL SYS: 29.8 CM/S
SAO2 % BLD: 64 % (ref 92–97)
SAO2 % BLD: 98 % (ref 92–97)
SERVICE CMNT-IMP: ABNORMAL
SERVICE CMNT-IMP: NORMAL
SODIUM SERPL-SCNC: 141 MMOL/L (ref 136–145)
SODIUM SERPL-SCNC: 142 MMOL/L (ref 136–145)
SPECIMEN TYPE: ABNORMAL
SPECIMEN TYPE: ABNORMAL
STATUS OF UNIT,%ST: NORMAL
THERAPEUTIC RANGE,PTTT: ABNORMAL SECS (ref 58–77)
THERAPEUTIC RANGE,PTTT: NORMAL SECS (ref 58–77)
TOTAL RESP. RATE, ITRR: 12
TOTAL RESP. RATE, ITRR: 16
UNIT DIVISION, %UDIV: 0
VENTILATION MODE VENT: ABNORMAL
VENTILATION MODE VENT: ABNORMAL
VENTRICULAR RATE, ECG03: 97 BPM
VT SETTING VENT: 550 ML
WBC # BLD AUTO: 10.3 K/UL (ref 4.1–11.1)
WBC # BLD AUTO: 11.8 K/UL (ref 4.1–11.1)

## 2020-03-27 PROCEDURE — 93005 ELECTROCARDIOGRAM TRACING: CPT

## 2020-03-27 PROCEDURE — 74011250636 HC RX REV CODE- 250/636: Performed by: THORACIC SURGERY (CARDIOTHORACIC VASCULAR SURGERY)

## 2020-03-27 PROCEDURE — 74011000258 HC RX REV CODE- 258: Performed by: PHYSICIAN ASSISTANT

## 2020-03-27 PROCEDURE — 85018 HEMOGLOBIN: CPT

## 2020-03-27 PROCEDURE — 97161 PT EVAL LOW COMPLEX 20 MIN: CPT

## 2020-03-27 PROCEDURE — 71045 X-RAY EXAM CHEST 1 VIEW: CPT

## 2020-03-27 PROCEDURE — P9045 ALBUMIN (HUMAN), 5%, 250 ML: HCPCS | Performed by: PHYSICIAN ASSISTANT

## 2020-03-27 PROCEDURE — 85610 PROTHROMBIN TIME: CPT

## 2020-03-27 PROCEDURE — 74011250637 HC RX REV CODE- 250/637: Performed by: PHYSICIAN ASSISTANT

## 2020-03-27 PROCEDURE — 97530 THERAPEUTIC ACTIVITIES: CPT

## 2020-03-27 PROCEDURE — 82803 BLOOD GASES ANY COMBINATION: CPT

## 2020-03-27 PROCEDURE — 80053 COMPREHEN METABOLIC PANEL: CPT

## 2020-03-27 PROCEDURE — 97535 SELF CARE MNGMENT TRAINING: CPT

## 2020-03-27 PROCEDURE — 36415 COLL VENOUS BLD VENIPUNCTURE: CPT

## 2020-03-27 PROCEDURE — 83735 ASSAY OF MAGNESIUM: CPT

## 2020-03-27 PROCEDURE — 85730 THROMBOPLASTIN TIME PARTIAL: CPT

## 2020-03-27 PROCEDURE — 82962 GLUCOSE BLOOD TEST: CPT

## 2020-03-27 PROCEDURE — 74011636637 HC RX REV CODE- 636/637: Performed by: PHYSICIAN ASSISTANT

## 2020-03-27 PROCEDURE — 65610000003 HC RM ICU SURGICAL

## 2020-03-27 PROCEDURE — P9045 ALBUMIN (HUMAN), 5%, 250 ML: HCPCS | Performed by: THORACIC SURGERY (CARDIOTHORACIC VASCULAR SURGERY)

## 2020-03-27 PROCEDURE — 74011250636 HC RX REV CODE- 250/636: Performed by: PHYSICIAN ASSISTANT

## 2020-03-27 PROCEDURE — 74011000250 HC RX REV CODE- 250: Performed by: PHYSICIAN ASSISTANT

## 2020-03-27 PROCEDURE — 85027 COMPLETE CBC AUTOMATED: CPT

## 2020-03-27 PROCEDURE — 97165 OT EVAL LOW COMPLEX 30 MIN: CPT

## 2020-03-27 RX ORDER — CARVEDILOL 6.25 MG/1
6.25 TABLET ORAL 2 TIMES DAILY WITH MEALS
Status: DISCONTINUED | OUTPATIENT
Start: 2020-03-27 | End: 2020-03-28

## 2020-03-27 RX ORDER — ALBUMIN HUMAN 50 G/1000ML
12.5 SOLUTION INTRAVENOUS ONCE
Status: COMPLETED | OUTPATIENT
Start: 2020-03-27 | End: 2020-03-27

## 2020-03-27 RX ORDER — METOPROLOL TARTRATE 25 MG/1
12.5 TABLET, FILM COATED ORAL EVERY 12 HOURS
Status: DISCONTINUED | OUTPATIENT
Start: 2020-03-27 | End: 2020-03-27

## 2020-03-27 RX ORDER — ACETAMINOPHEN 325 MG/1
650 TABLET ORAL EVERY 4 HOURS
Status: DISCONTINUED | OUTPATIENT
Start: 2020-03-27 | End: 2020-04-02 | Stop reason: HOSPADM

## 2020-03-27 RX ADMIN — TAMSULOSIN HYDROCHLORIDE 0.4 MG: 0.4 CAPSULE ORAL at 16:11

## 2020-03-27 RX ADMIN — MELATONIN 2 TABLET: at 08:37

## 2020-03-27 RX ADMIN — ASPIRIN 81 MG 81 MG: 81 TABLET ORAL at 08:37

## 2020-03-27 RX ADMIN — SENNOSIDES AND DOCUSATE SODIUM 1 TABLET: 8.6; 5 TABLET ORAL at 18:35

## 2020-03-27 RX ADMIN — ALBUMIN (HUMAN) 12.5 G: 12.5 INJECTION, SOLUTION INTRAVENOUS at 01:23

## 2020-03-27 RX ADMIN — Medication 10 ML: at 13:35

## 2020-03-27 RX ADMIN — POLYETHYLENE GLYCOL 3350 17 G: 17 POWDER, FOR SOLUTION ORAL at 08:37

## 2020-03-27 RX ADMIN — SODIUM CHLORIDE 10 ML/HR: 450 INJECTION, SOLUTION INTRAVENOUS at 11:09

## 2020-03-27 RX ADMIN — SENNOSIDES AND DOCUSATE SODIUM 1 TABLET: 8.6; 5 TABLET ORAL at 08:37

## 2020-03-27 RX ADMIN — CHLORHEXIDINE GLUCONATE 10 ML: 1.2 RINSE ORAL at 20:33

## 2020-03-27 RX ADMIN — HYDROMORPHONE HYDROCHLORIDE 0.5 MG: 1 INJECTION, SOLUTION INTRAMUSCULAR; INTRAVENOUS; SUBCUTANEOUS at 04:16

## 2020-03-27 RX ADMIN — ATORVASTATIN CALCIUM 40 MG: 40 TABLET, FILM COATED ORAL at 20:31

## 2020-03-27 RX ADMIN — Medication 2 G: at 13:39

## 2020-03-27 RX ADMIN — ACETAMINOPHEN 650 MG: 325 TABLET ORAL at 20:30

## 2020-03-27 RX ADMIN — Medication 2 G: at 00:33

## 2020-03-27 RX ADMIN — MELATONIN 2 TABLET: at 18:35

## 2020-03-27 RX ADMIN — Medication 1 TABLET: at 08:37

## 2020-03-27 RX ADMIN — DEXMEDETOMIDINE HYDROCHLORIDE 0.5 MCG/KG/HR: 100 INJECTION, SOLUTION, CONCENTRATE INTRAVENOUS at 04:20

## 2020-03-27 RX ADMIN — Medication 10 ML: at 05:12

## 2020-03-27 RX ADMIN — HYDROMORPHONE HYDROCHLORIDE 1 MG: 1 INJECTION, SOLUTION INTRAMUSCULAR; INTRAVENOUS; SUBCUTANEOUS at 02:53

## 2020-03-27 RX ADMIN — OXYCODONE 5 MG: 5 TABLET ORAL at 23:17

## 2020-03-27 RX ADMIN — MUPIROCIN: 20 OINTMENT TOPICAL at 08:31

## 2020-03-27 RX ADMIN — Medication 2 G: at 07:16

## 2020-03-27 RX ADMIN — SODIUM CHLORIDE 9 ML/HR: 900 INJECTION, SOLUTION INTRAVENOUS at 11:09

## 2020-03-27 RX ADMIN — FINASTERIDE 5 MG: 5 TABLET, FILM COATED ORAL at 20:31

## 2020-03-27 RX ADMIN — DOBUTAMINE HYDROCHLORIDE 2 MCG/KG/MIN: 200 INJECTION INTRAVENOUS at 13:35

## 2020-03-27 RX ADMIN — ACETAMINOPHEN 650 MG: 325 TABLET ORAL at 13:39

## 2020-03-27 RX ADMIN — Medication 10 ML: at 21:20

## 2020-03-27 RX ADMIN — MELATONIN 3 MG: at 23:17

## 2020-03-27 RX ADMIN — ACETAMINOPHEN 650 MG: 325 TABLET ORAL at 08:37

## 2020-03-27 RX ADMIN — LEVOTHYROXINE SODIUM 50 MCG: 0.1 TABLET ORAL at 08:45

## 2020-03-27 RX ADMIN — ACETAMINOPHEN 650 MG: 325 TABLET ORAL at 18:35

## 2020-03-27 RX ADMIN — CHLORHEXIDINE GLUCONATE 10 ML: 1.2 RINSE ORAL at 08:32

## 2020-03-27 RX ADMIN — OXYCODONE 5 MG: 5 TABLET ORAL at 09:07

## 2020-03-27 RX ADMIN — ALBUMIN (HUMAN) 12.5 G: 12.5 INJECTION, SOLUTION INTRAVENOUS at 05:25

## 2020-03-27 RX ADMIN — Medication 2 G: at 18:37

## 2020-03-27 RX ADMIN — CARVEDILOL 6.25 MG: 6.25 TABLET, FILM COATED ORAL at 10:11

## 2020-03-27 RX ADMIN — MAGNESIUM OXIDE TAB 400 MG (241.3 MG ELEMENTAL MG) 400 MG: 400 (241.3 MG) TAB at 18:36

## 2020-03-27 RX ADMIN — OXYCODONE 5 MG: 5 TABLET ORAL at 16:11

## 2020-03-27 RX ADMIN — MUPIROCIN: 20 OINTMENT TOPICAL at 18:36

## 2020-03-27 RX ADMIN — SODIUM CHLORIDE 2.1 UNITS/HR: 9 INJECTION, SOLUTION INTRAVENOUS at 13:55

## 2020-03-27 RX ADMIN — PANTOPRAZOLE SODIUM 40 MG: 40 TABLET, DELAYED RELEASE ORAL at 08:37

## 2020-03-27 NOTE — PROGRESS NOTES
Cardiac Surgery Care Coordinator-  Met with [address] jerome Taveras. Reviewed plan of care and discussed goals for the day. [address] Taveras has a good understanding of his plan for the day. Reinforced sternal precautions and encouraged continued use of the incentive spirometer. Will continue to follow for educational and emotional needs. 0900- Placed call to Mrs Radha Gilbert, provided update and encouraged her to verbalize. She stated she will be in later today. 1200- Mrs Radha Gilbert to the bedside, reviewed plan of care and encouraged her to verbalize. She is without questions at this time. She will call for updates.   Manuel Flanagan RN

## 2020-03-27 NOTE — CONSULTS
Requesting Provider: aLw Crook MD - Reason for Consultation: \"Gross hematuria, BPH, difficult cath insertion\"  Pre-existing [facility] Patient:   No                Patient: Eri Shultz MRN: [id number]  SSN: [id number]    YOB: 1940  Age: 78 y.o. Sex: male     Location: [address] [place] [address]       Code Status: Full Code   PCP: Crispin Leon MD  - [phone]   Emergency Contact:  Primary Emergency Contact: Pushpa Bah, Home Phone: [phone]   Race/Rastafari/Language: WHITE OR Stone Ramos / Arvind Satish / Carlos Martínez   Payor: Payor: Diaz Ish / Plan: Avuba Jose y / Product Type: Medicare /    Prior Admission Data: 1/9/19 [facility] EMERGENCY DEPT     Hospitalized:  Hospital Day: 3 - Admitted 3/25/2020  2:05 AM   POD # 1 Day Post-Op Procedure(s):  CORONARY ARTERY BYPASS GRAFTING X 5   WITH LEFT LEG EVH AND LIMA HARVESTING. CARDIOPULMONARY BYPASS. TRANSESOPHAGEAL ECHOCARDIOGRAM AND EPI AORTIC ULTRASOUND BY DR. Magdy Abdul. by Law Crook MD - Blood Loss: * No values recorded between 3/26/2020  4:16 PM and 3/26/2020  8:07 PM * 5 Hr 55 Min 48 Sec     CONSULTANTS  IP CONSULT TO CARDIOLOGY  IP CONSULT TO CARDIAC SURGERY  IP CONSULT TO UROLOGY   ADMISSION DIAGNOSES    ICD-10-CM ICD-9-CM   1. NSTEMI (non-ST elevated myocardial infarction) ([facility] [address] I21.4 410.70   2. Chest pain, unspecified R07.9 786.50         Assessment/Plan:       Gross hematuria, likely secondary to difficult catheter insertion  Hx of BPH  - irrigate olivares PRN  - monitor HGB  - continue daily flomax and finasteride  - have patient f/u with [facility] post discharge     CC: Chest Pain   HPI: [facility] was consulted regarding gross hematuria, BPH, and difficult cath insertion. Patient had olivares placed yesterday in the OR by Dr. Paul Bowden prior to undergoing a CORONARY ARTERY BYPASS GRAFTING X 5. This is a known patient of his at [facility]. Last OV note below.  Patient reports no gross hematuria prior to cardiac procedure yesterday. Hx of gross hematuria in the past that he saw Dr. Ortiz Bains for regarding with no episodes since. Currently on daily ASA    100.1, vss  WBC: wnl  Hgb: 10.2  Cr: wnl  UA: clear  Russell catheter in place draining clear dom UA with minimal blood noted in tubing. Slight old blood noted around the meatus. No active bleeding noted. Irrigated catheter with 100cc of NS. No resistance and produced one small clot. Good UOP: 1825cc  +finasteride, flomax    (01/09/19) CT abd/pelv wwo: IMPRESSION:  1. Large irregular filling defect in the bladder with no early enhancement. This  most likely represents clot, but malignancy cannot be entirely excluded. There  is a 2 mm stone in the left side of the bladder. There is mild left  hydroureteronephrosis likely related to this. No evidence of a ureteral stone. There is bilateral nonobstructive nephrolithiasis in the kidneys. 2. Status post cholecystectomy. 3. Diverticulosis coli. 4. Fat-containing left inguinal hernia.      ==last OV note below===    Alexandro Almendarez is a 78year old male who presents today for \"BPH\". He returns for follow-up. Mr. Meenakshi Louis is here for a six month followup. He is on tamsulosin for BPH, history of hematuria with a negative workup. Hd is alos taking finasteride. No urologicchanges. PAST MEDICAL HISTORY:    Allergies: PENICILLIN (PENICILLIN V POTASSIUM) (Critical)  CODEINE (Critical)  SULFA (SULFADIAZINE) (Critical)  DENIES: Latex, Shellfish, X-Ray Dye, Iodine.      Medications: ICAPS AREDS 2 ORAL CAPSULE (MULTIPLE VITAMINS-MINERALS) 2x daily; Route: ORAL  LEVO-T 50 MCG ORAL TABLET (LEVOTHYROXINE SODIUM) daily; Route: ORAL  AMLODIPINE BESYLATE 5 MG ORAL TABLET (AMLODIPINE BESYLATE) daily; Route: ORAL  FLOMAX 0.4 MG ORAL CAPSULE (TAMSULOSIN HCL) 1 cap every day 30 minutes after the same meal each day; Route: ORAL  VITAMIN D TABLET (CHOLECALCIFEROL TABS) Daily; Route: ORAL  COQ10 CAPSULE (COENZYME Q10 CAPS) Daily; Route: ORAL  ASPIRIN ADULT LOW STRENGTH 81 MG ORAL TABLET CHEWABLE (ASPIRIN) Daily; Route: ORAL  FINASTERIDE 5 MG ORAL TABLET (FINASTERIDE) Daily; Route: ORAL  LIPITOR TABLET (ATORVASTATIN CALCIUM TABS) Daily; Route: ORAL    Problems: BPH w/o urinary obstruction (ICD-600.00) (OIW80-L64.0)  Gross hematuria (ICD-599.71) (IUV04-J34.0)  Retention, urine (ICD-788.20) (YCJ11-A01.9)  Calculus, kidney (ICD-592.0) (RJS85-X04.0)    Illnesses:     Surgeries:       Family History: Kidney Stones. DENIES: Prostate cancer, Kidney cancer, Kidney disease. Social History: . Smoking status: never smoker. Does not drink alcohol. System Review: DENIES: Unexplained Weight Loss, Dry Eyes, Dry Mouth, Leg Swelling, Shortness of Breath, Constipation, Involuntary Urine Loss, Lower Extremity Weakness, Dry Skin, Difficulty Walking, Psychiatric Problems, Impaired Sex Drive, Easy Bleeding, Rash. URINALYSIS from Voided specimen  Urine Dip: pH: 6.0, Bld: Neg, LE: Neg, Nit: Neg, Prot: +, Ket: Neg, Gluc: Neg  Urine Micro not done    IMPRESSION:    1. BPH stable. PLAN: Continue Flomax and finasteride. Followup in a year with a bladder scan.      cc:   Carol Machado MD  Today's Services  E&M Service, Urinalysis Dipstick    Upcoming Orders  Return Office Visit - with Saurav Craig MD in 1 year  Bladder Scan, UA        Electronically signed by Johnie Lomeli on 2019 at 8:54 PM    Electronically signed by Saurav Craig MD on 2019 at 9:02 PM  ________________________________________________________________________        Temp (24hrs), Av.4 °F (36.3 °C), Min:78.1 °F (25.6 °C), Max:101 °F (38.3 °C)    Urinary Status: Hematuria, Russell  Creatinine   Date/Time Value Ref Range Status   2020 03:49 AM 0.96 0.70 - 1.30 MG/DL Final   2020 12:18 AM 0.89 0.70 - 1.30 MG/DL Final   2020 08:25 PM 0.93 0.70 - 1.30 MG/DL Final   2020 12:13 AM 1.06 0.70 - 1.30 MG/DL Final   2020 02:18 AM 1. 07 0.70 - 1.30 MG/DL Final     Current Antimicrobial Therapy (168h ago, onward)    Ordered     Start Stop    03/26/20 2014  ceFAZolin (ANCEF) 2 g/20 mL in sterile water IV syringe  2 g,   IntraVENous,   EVERY 6 HOURS     Note to Pharmacy:  Last dose not to exceed 48 hours from surgery end time. Pharmacy may adjust per renal function. 03/27/20 0100 03/28/20 1259        Key Anti-Platelet Anticoagulant Meds             aspirin 81 mg chewable tablet (Taking) Take 81 mg by mouth daily.         Diet: DIET CLEAR LIQUID - % Diet Eaten: (100% applessauce)     Labs     Lab Results   Component Value Date/Time    WBC 10.3 03/27/2020 03:49 AM    HCT 31.7 (L) 03/27/2020 03:49 AM    PLATELET 782 (L) 24/14/5238 03:49 AM    Sodium 141 03/27/2020 03:49 AM    Potassium 4.1 03/27/2020 03:49 AM    Chloride 112 (H) 03/27/2020 03:49 AM    CO2 23 03/27/2020 03:49 AM    BUN 16 03/27/2020 03:49 AM    Creatinine 0.96 03/27/2020 03:49 AM    Glucose 119 (H) 03/27/2020 03:49 AM    Calcium 8.1 (L) 03/27/2020 03:49 AM    Magnesium 2.2 03/27/2020 03:49 AM    INR 1.1 03/27/2020 03:49 AM     UA:   Lab Results   Component Value Date/Time    Color YELLOW/STRAW 03/25/2020 05:56 PM    Appearance CLEAR 03/25/2020 05:56 PM    Specific gravity 1.020 03/25/2020 05:56 PM    Specific gravity 1.023 01/08/2019 07:46 PM    pH (UA) 6.5 03/25/2020 05:56 PM    Protein TRACE (A) 03/25/2020 05:56 PM    Glucose NEGATIVE  03/25/2020 05:56 PM    Ketone TRACE (A) 03/25/2020 05:56 PM    Bilirubin NEGATIVE  03/25/2020 05:56 PM    Urobilinogen 1.0 03/25/2020 05:56 PM    Nitrites NEGATIVE  03/25/2020 05:56 PM    Leukocyte Esterase NEGATIVE  03/25/2020 05:56 PM    Epithelial cells FEW 03/25/2020 05:56 PM    Bacteria NEGATIVE  03/25/2020 05:56 PM    WBC 0-4 03/25/2020 05:56 PM    RBC 0-5 03/25/2020 05:56 PM     Imaging     Results for orders placed during the hospital encounter of 01/09/19   CT ABD PELV W WO CONT    Narrative EXAM: CT ABD PELV W WO CONT    INDICATION: Hematuria    COMPARISON: 12/11/2014     CONTRAST: 100 mL of Isovue-300. TECHNIQUE:   Before and after the uneventful intravenous administration of contrast, thin  axial images were obtained through the abdomen and pelvis. Coronal and sagittal  reconstructions were generated. Oral contrast was not administered. CT dose  reduction was achieved through use of a standardized protocol tailored for this  examination and automatic exposure control for dose modulation. FINDINGS:   LUNG BASES: Clear. INCIDENTALLY IMAGED HEART AND MEDIASTINUM: Coronary atherosclerotic  calcifications are present. Heart is normal size. LIVER: No mass or biliary dilatation. GALLBLADDER: Status post cholecystectomy. SPLEEN: No mass. PANCREAS: No mass or ductal dilatation. ADRENALS: Unremarkable. KIDNEYS: No mass, calculus, or hydronephrosis. STOMACH: Unremarkable. SMALL BOWEL: No dilatation or wall thickening. COLON: No dilatation or wall thickening. Multiple diverticula are seen in the  colon. APPENDIX: Unremarkable. PERITONEUM: No ascites or pneumoperitoneum. RETROPERITONEUM: No lymphadenopathy or aortic aneurysm. REPRODUCTIVE ORGANS: The prostate and seminal vesicles are unremarkable. URINARY BLADDER: A Russell catheter is in place. Several locules of gas lateral  related to this. There is a 3.2 x 5.5 cm irregular filling defect in the  dependent portion of the bladder. There is no early enhancement. On the later  phases, the density measurement is not reliable given the adjacent high-density  excreted contrast. Small diverticula are seen in the bladder along the right  wall. BONES: No destructive bone lesion. ADDITIONAL COMMENTS: A left inguinal fat-containing hernia present. Impression IMPRESSION:    1. Large irregular filling defect in the bladder with no early enhancement. This  most likely represents clot, but malignancy cannot be entirely excluded. There  is a 2 mm stone in the left side of the bladder.  There is mild left  hydroureteronephrosis likely related to this. No evidence of a ureteral stone. There is bilateral nonobstructive nephrolithiasis in the kidneys. 2. Status post cholecystectomy. 3. Diverticulosis coli. 4. Fat-containing left inguinal hernia.        US Results (most recent):  Results from Abstract encounter on 10/17/13   US RETROPERITONEUM COMP       Cultures     All Micro Results     None           Past History: (Complete 2+/3 areas)     Allergies   Allergen Reactions    Codeine Rash    Pcn [Penicillins] Rash    Sulfa (Sulfonamide Antibiotics) Other (comments)     confusion      Current Facility-Administered Medications   Medication Dose Route Frequency    acetaminophen (TYLENOL) tablet 650 mg  650 mg Oral Q4H    carvediloL (COREG) tablet 6.25 mg  6.25 mg Oral BID WITH MEALS    cholecalciferol (VITAMIN D3) (1000 Units /25 mcg) tablet 2 Tab  2,000 Units Oral BID    finasteride (PROSCAR) tablet 5 mg  5 mg Oral DAILY    levothyroxine (SYNTHROID) tablet 50 mcg  50 mcg Oral Once per day on Sun Mon Tue Wed Thu Fri    tamsulosin (FLOMAX) capsule 0.4 mg  0.4 mg Oral DAILY    mv. min cmb#[id number] (AQUADEKS) chewable tablet 1 Tab  1 Tab Oral DAILY    alteplase (CATHFLO) 1 mg in sterile water (preservative free) 1 mL injection  1 mg InterCATHeter PRN    bacitracin 500 unit/gram packet 1 Packet  1 Packet Topical PRN    sodium chloride (NS) flush 5-40 mL  5-40 mL IntraVENous Q8H    sodium chloride (NS) flush 5-40 mL  5-40 mL IntraVENous PRN    0.45% sodium chloride infusion  10 mL/hr IntraVENous CONTINUOUS    0.9% sodium chloride infusion  9 mL/hr IntraVENous CONTINUOUS    oxyCODONE IR (ROXICODONE) tablet 5 mg  5 mg Oral Q4H PRN    oxyCODONE IR (ROXICODONE) tablet 10 mg  10 mg Oral Q4H PRN    naloxone (NARCAN) injection 0.4 mg  0.4 mg IntraVENous PRN    mupirocin (BACTROBAN) 2 % ointment   Both Nostrils BID    ceFAZolin (ANCEF) 2 g/20 mL in sterile water IV syringe  2 g IntraVENous Q6H    [Held by provider] amiodarone (CORDARONE) tablet 400 mg  400 mg Oral Q12H    ondansetron (ZOFRAN) injection 4 mg  4 mg IntraVENous Q4H PRN    albuterol (PROVENTIL VENTOLIN) nebulizer solution 2.5 mg  2.5 mg Nebulization Q4H PRN    aspirin chewable tablet 81 mg  81 mg Oral DAILY    midazolam (VERSED) injection 1 mg  1 mg IntraVENous Q1H PRN    chlorhexidine (PERIDEX) 0.12 % mouthwash 10 mL  10 mL Oral Q12H    magnesium oxide (MAG-OX) tablet 400 mg  400 mg Oral BID    calcium chloride 1 g in 0.9% sodium chloride 100 mL IVPB  1 g IntraVENous PRN    bisacodyL (DULCOLAX) suppository 10 mg  10 mg Rectal DAILY PRN    senna-docusate (PERICOLACE) 8.6-50 mg per tablet 1 Tab  1 Tab Oral BID    polyethylene glycol (MIRALAX) packet 17 g  17 g Oral DAILY    ELECTROLYTE REPLACEMENT NOTE: Nurse to review Serum Potassium and Magnesuim levels and Initiate Electrolyte Replacement Protocol as needed  1 Each Other PRN    magnesium sulfate 1 g/100 ml IVPB (premix or compounded)  1 g IntraVENous PRN    insulin regular (NOVOLIN R, HUMULIN R) 100 Units in 0.9% sodium chloride 100 mL infusion  1-50 Units/hr IntraVENous TITRATE    glucose chewable tablet 16 g  4 Tab Oral PRN    glucagon (GLUCAGEN) injection 1 mg  1 mg IntraMUSCular PRN    insulin lispro (HUMALOG) injection   SubCUTAneous TIDAC    insulin lispro (HUMALOG) injection   SubCUTAneous AC&HS    insulin glargine (LANTUS) injection 1-50 Units  1-50 Units SubCUTAneous ONCE PRN    dextrose 10% infusion 0-250 mL  0-250 mL IntraVENous PRN    diphenhydrAMINE (BENADRYL) capsule 25 mg  25 mg Oral Q6H PRN    diphenhydrAMINE (BENADRYL) injection 25 mg  25 mg IntraVENous Q6H PRN    melatonin tablet 3 mg  3 mg Oral QHS PRN    HYDROmorphone (PF) (DILAUDID) injection 1 mg  1 mg IntraVENous Q2H PRN    HYDROmorphone (PF) (DILAUDID) injection 0.5 mg  0.5 mg IntraVENous Q2H PRN    pantoprazole (PROTONIX) tablet 40 mg  40 mg Oral ACB    potassium chloride 20 mEq in 50 ml IVPB 20 mEq IntraVENous Q2H PRN    potassium chloride 20 mEq in 50 ml IVPB  20 mEq IntraVENous Q2H PRN    sodium chloride (NS) flush 5-40 mL  5-40 mL IntraVENous Q8H    [START ON 3/28/2020] levothyroxine (SYNTHROID) tablet 100 mcg  100 mcg Oral every Saturday    0.9% sodium chloride infusion 250 mL  250 mL IntraVENous PRN    atorvastatin (LIPITOR) tablet 40 mg  40 mg Oral QHS    DOBUTamine (DOBUTREX) 500 mg/250 mL (2,000 mcg/mL) infusion  0-10 mcg/kg/min IntraVENous TITRATE    dexmedeTOMidine (PRECEDEX) 400 mcg in 0.9% sodium chloride 100 mL infusion  0.1-0.9 mcg/kg/hr IntraVENous TITRATE    PHENYLephrine (MALINA-SYNEPHRINE) 30 mg in 0.9% sodium chloride 250 mL infusion   mcg/min IntraVENous TITRATE    niCARdipine (CARDENE) 25 mg in 0.9% sodium chloride 250 mL infusion  0-15 mg/hr IntraVENous TITRATE    amiodarone (CORDARONE) 375 mg/250 mL D5W infusion  0.5-1 mg/min IntraVENous CONTINUOUS    Prior to Admission medications    Medication Sig Start Date End Date Taking? Authorizing Provider   levothyroxine (SYNTHROID) 50 mcg tablet TAKE 1 TABLET BY MOUTH EVERY DAY BEFORE BREAKFAST EXCEPT TAKE 2 TABS ON SATURDAY. 3/8/20  Yes Toy Rodriguez MD   amLODIPine (NORVASC) 5 mg tablet TAKE 1 TAB BY MOUTH DAILY. 3/7/20  Yes Toy Rodriguez MD   atorvastatin (LIPITOR) 40 mg tablet TAKE 1 TAB BY MOUTH DAILY. 6/28/19  Yes Toy Rodriguez MD   tamsulosin (FLOMAX) 0.4 mg capsule TAKE 1 CAPSULE EVERY DAY 30 MINUTES AFTER THE SAME MEAL EACH DAY 4/8/19  Yes Provider, Historical   Cholecalciferol, Vitamin D3, (VITAMIN D3) 2,000 unit cap capsule Take 2,000 Units by mouth two (2) times a day. 5/30/18  Yes Juliette Garza MD   aspirin 81 mg chewable tablet Take 81 mg by mouth daily. Yes Sterling, MD Elvia   finasteride (PROSCAR) 5 mg tablet Take 5 mg by mouth daily. HS   Yes Provider, Historical   co-enzyme Q-10 (CO Q-10) 100 mg capsule Take 200 mg by mouth daily.    Yes Provider, Historical   VIT A/VIT C/VIT E/ZINC/COPPER (PRESERVISION AREDS PO) Take 1 Cap by mouth two (2) times a day. Yes Provider, Historical   varicella-zoster recombinant, PF, (SHINGRIX, PF,) 50 mcg/0.5 mL susr injection 0.5mL by IntraMUSCular route once now and then repeat in 2-6 months  Patient taking differently: 0.5mL by IntraMUSCular route once now and then repeat in 2-6 months NEEDS 2ND. 12/5/18   Crispin Leon MD        PMHx:  has a past medical history of Arrhythmia, Arthritis, Basal cell carcinoma, BPH (benign prostatic hyperplasia), CAD (coronary artery disease), Calculus of kidney, Chronic pain, Elevated PSA, Hypercholesterolemia, Lumbar foraminal stenosis, Melanoma ([address] MVP (mitral valve prolapse), Other ill-defined conditions(799.89), Prediabetes, Seasonal allergies, Squamous cell carcinoma, Systolic murmur, Thyroid disease, and Vision impairment. He also has no past medical history of Adverse effect of anesthesia or Unspecified sleep apnea. PSurgHx:  has a past surgical history that includes hx cholecystectomy (1970); endoscopy, colon, diagnostic; hx orthopaedic; hx heart catheterization (1992); hx lithotripsy; and hx coronary artery bypass graft (03/26/2020). PSocHx:  reports that he has never smoked. He has never used smokeless tobacco. He reports that he does not drink alcohol or use drugs.    ROS:  (Complete - 10 systems) - DENIES: Weightloss (Constitutional), Dry mouth (ENMT), Chest pain (CV), SOB (Respiratory), Constipation (GI), Weakness (MS), Pallor (Skin), TIA Sx (Neuro), Confusion (Psych), Easy bruising (Heme)    Physical Exam: (Comprehesive - 8+ 1995 Systems)     (1) Constitutional:  FIO2: FIO2 (%): 50 % on SpO2: O2 Sat (%): 96 %  O2 Device: Nasal cannula O2 Flow Rate (L/min): (S) 6 l/min  Patient Vitals for the past 24 hrs:   BP Temp Pulse Resp SpO2 Height Weight   03/27/20 0930   92 27 96 %     03/27/20 0925 118/57  90 27 97 %     03/27/20 0920 (!) 88/31  90 23 91 %     03/27/20 0900   85 26 98 %     03/27/20 0830   82 21 98 %     03/27/20 0800  (!) 101 °F (38.3 °C) 81 23 95 %     03/27/20 0700  (!) 101 °F (38.3 °C) 95 8 98 %     03/27/20 0600  100.4 °F (38 °C) 91 14 96 %     03/27/20 0500  (!) 100.6 °F (38.1 °C) 100 18 98 % 5' 11\" (1.803 m) 93.8 kg (206 lb 12.7 oz)   03/27/20 0400  (!) 100.7 °F (38.2 °C) 93 14 99 %     03/27/20 0300  (!) 100.7 °F (38.2 °C) (!) 101 14 99 %     03/27/20 0200  100.4 °F (38 °C) 93 14 96 %     03/27/20 0100  100.2 °F (37.9 °C) (!) 106 18 98 %     03/27/20 0041   99 14 100 %     03/27/20 0030   96 14 100 %     03/27/20 0000  99 °F (37.2 °C) 93 14 100 %     03/26/20 2345  98.7 °F (37.1 °C) 84 14 100 %     03/26/20 2330  98.3 °F (36.8 °C) 83 14 99 %     03/26/20 2315  98.1 °F (36.7 °C) 93 14 99 %     03/26/20 2300  98 °F (36.7 °C) 80 17 99 %     03/26/20 2245  97.6 °F (36.4 °C) 87 14 98 %     03/26/20 2230  97.3 °F (36.3 °C) 87 14 98 %     03/26/20 2215  96.7 °F (35.9 °C) 84 14 97 %     03/26/20 2200  96.5 °F (35.8 °C) 87 14 96 %     03/26/20 2145  96.1 °F (35.6 °C) 86 14 96 %     03/26/20 2130  95.9 °F (35.5 °C) 89 14 95 %     03/26/20 2115  95.6 °F (35.3 °C) 86 14 95 %     03/26/20 2100  95.3 °F (35.2 °C) 80 14 95 %     03/26/20 2045  95.5 °F (35.3 °C) 79 14 93 %     03/26/20 2031 133/50  79 12 93 %     03/26/20 2030  95.6 °F (35.3 °C) 73 12 94 %     03/26/20 2029   74 12 94 %     03/26/20 2015  (!) 78.1 °F (25.6 °C) 72 12 95 %     03/26/20 1345 108/63  (!) 56 18 96 %     03/26/20 1300 112/64  64 18 97 %     03/26/20 1255 110/63  (!) 59 18 97 %     03/26/20 1250 112/60  (!) 59 18 97 %     03/26/20 1240 109/60  60 18 98 %     03/26/20 1237 111/67  60 18 96 %     03/26/20 1124 113/75 97.8 °F (36.6 °C) 63 17 94 % 5' 11\" (1.803 m) 91.4 kg (201 lb 8 oz)       Date 03/26/20 0700 - 03/27/20 0659 03/27/20 0700 - 03/28/20 0659   Shift 8527-2574 9673-6029 24 Hour Total 4513-6966 1319-7124 24 Hour Total   INTAKE   P.O.    300  300     P. O.    300  300   I. V.(mL/kg/hr)  8847. 7(3.1) 3485.7(1.5) 113.7  113.7     Precedex Volume  163.5 163.5 0  0     Cardene Volume  79 79 20  20     Insulin Volume  18.2 18.2 4.9  4.9     DOBUTamine Volume  160.4 160.4 31.8  31.8     Phenylephrine Volume  19.4 19.4        Volume (human prothrombin complex [facility]) 4-factor 1,133 Int'l Units (KCENTRA) infusion)  40 40        Volume (lactated Ringers infusion)  1100 1100        Volume (0.9% sodium chloride infusion)  600 600        Volume (0.45% sodium chloride infusion)  97.5 97.5 30  30     Volume (0.9% sodium chloride infusion)  87.8 87.8 27  27     Volume (potassium chloride 20 mEq in 50 ml IVPB)  50 50        Volume (protamine 50 mg in 0.9% sodium chloride 50 mL IVPB)  50 50        Volume (albumin human 5% (BUMINATE) solution 12.5 g)  250 250        Volume (albumin human 5% (BUMINATE) solution 12.5 g)  750 750        Volume (ceFAZolin (ANCEF) 2 g/20 mL in sterile water IV syringe)  20 20      Blood  450 450        Volume (TRANSFUSE PLATELETS)  509 707      Other  30 30        Irrigation Volume Input (mL) (Urinary Catheter 03/26/20 Other (Comment); [place])  30 30      Shift Total(mL/kg)  [id number]. 7(42.3) 3965. 7(42.3) 413.7(4.4)  413.7(4.4)   OUTPUT   Urine(mL/kg/hr) 750(0.7) 1075(1) 1825(0.8) 125  125     Urine Output         Urine Output (mL) (Urinary Catheter 03/26/20 Other (Comment); [place])  775 775 125  125   Emesis/NG output  0 0        Output (ml) ([REMOVED] Orogastric Tube 03/26/20)  0 0      Drains  1310 1310 80  80     Output (ml) (Uma Meo #3 03/26/20 Anterior Chest)  1310 1310 80  80   Shift Total(mL/kg) 750(8.2) 2385(25.4) 3135(33.4) 205(2.2)  205(2.2)   NET -750 1580.7 830.7 208.7  208.7   Weight (kg) 91.4 93.8 93.8 93.8 93.8 93.8      (2) ENMT:   moist mucous membranes, normal sinuses   (3) Respiratory:  breathing easily, no distress, chest tube in place   (4) GI:  no abdominal masses, tenderness   (5) :   olivares catheter in place draining clear dom UA with minimal blood noted in tubing. Slight old blood noted around the meatus.  No active bleeding noted   (6) Lymphatic:  no adenopathy, neck supple   (7) Muscloskeletal:  no gross deformity, normal ROM   (8) Skin:  no rash, warm & dry   (9) Neuro:  no focal deficits, normal speech      Signed By: Kristi Roberts NP  - March 27, 2020

## 2020-03-27 NOTE — PROGRESS NOTES
ANGELY:      -Home with home health SN and  ? PT/OT - met with patient and FOC obtained and referral made to [address] for SN    - Patient likely to transfer out of [facility] to [facility] over the weekend with an anticipated discharge possibly on Monday    - Family to transport    - Follow-up with Cardiac surgery    SAMANTHA Mena

## 2020-03-27 NOTE — DIABETES MGMT
[facility]  CLINICAL NURSE SPECIALIST CONSULT  [facility] DI[facility]    Presentation   Manuel Dockery is a 78 y.o. male admitted  for CABG x5 POD1. Current clinical course has been uncomplicated. PMH: CAD/BPH/pre-diabetes/ hypothyroid    Patient has known PRE-diabetes: A1C-5.9%  Consulted by Provider for advanced diabetes nursing assessment and care, specifically related to   [x] Transitioning off Farzad Deter   [x] Inpatient management strategy  [] Home management assessment  [] Survival skill education    Diabetes-related medical history  Acute complications  NONE  Neurological complications  NONE  Microvascular disease  NONE  Macrovascular disease  CAD  Other associated conditions     hypothyroid    Diabetes medication history: NONE  Drug class Currently in use Discontinued Never used   Biguanide      DDP-4 inhibitor       Sulfonylurea      Thiazolidinedione      GLP-1 RA      SGLT-2 inhibitors      Basal insulin      Bolus insulin        Subjective   Mr. Teodora Bustillos is sitting up in bed using his IS successfully. Spoke with patient and wife re: his diabetes and the wife stated he was above 6 % recently. Patient reports the following home diabetes self-care practices:  Eating pattern  []  Physical activity pattern    Monitoring pattern-doesn't check    Taking medications pattern: N/A  [] Consistent administration  [] Affordable    Social determinants of health impacting diabetes self-management practices   Concerned that you need to know more about how to stay healthy with diabetes    Objective   Physical exam  General Alert, oriented and in no acute distress. Conversant and cooperative. Vital Signs   Visit Vitals  /47   Pulse 92   Temp 100.1 °F (37.8 °C)   Resp 22   Ht 5' 11\" (1.803 m)   Wt 93.8 kg (206 lb 12.7 oz)   SpO2 98%   BMI 28.84 kg/m²   . Skin  Warm and dry. Heart   Regular rate and rhythm.  No murmurs, rubs or gallops  Lungs  Clear without rales or rhonchi  Extremities Diabetic foot exam: deferred; prediabetic. Laboratory  Lab Results   Component Value Date/Time    Hemoglobin A1c 5.9 (H) 03/26/2020 12:13 AM     Lab Results   Component Value Date/Time    LDL, calculated 83.8 03/25/2020 09:00 AM     Lab Results   Component Value Date/Time    Creatinine (POC) 0.7 01/08/2019 07:47 PM    Creatinine 0.96 03/27/2020 03:49 AM     Lab Results   Component Value Date/Time    Sodium 141 03/27/2020 03:49 AM    Potassium 4.1 03/27/2020 03:49 AM    Chloride 112 (H) 03/27/2020 03:49 AM    CO2 23 03/27/2020 03:49 AM    Anion gap 6 03/27/2020 03:49 AM    Glucose 119 (H) 03/27/2020 03:49 AM    BUN 16 03/27/2020 03:49 AM    Creatinine 0.96 03/27/2020 03:49 AM    BUN/Creatinine ratio 17 03/27/2020 03:49 AM    GFR est AA >60 03/27/2020 03:49 AM    GFR est non-AA >60 03/27/2020 03:49 AM    Calcium 8.1 (L) 03/27/2020 03:49 AM    Bilirubin, total 1.1 (H) 03/27/2020 03:49 AM    AST (SGOT) 67 (H) 03/27/2020 03:49 AM    Alk. phosphatase 58 03/27/2020 03:49 AM    Protein, total 5.6 (L) 03/27/2020 03:49 AM    Albumin 3.7 03/27/2020 03:49 AM    Globulin 1.9 (L) 03/27/2020 03:49 AM    A-G Ratio 1.9 03/27/2020 03:49 AM    ALT (SGPT) 25 03/27/2020 03:49 AM     Lab Results   Component Value Date/Time    ALT (SGPT) 25 03/27/2020 03:49 AM       Blood glucose pattern        Assessment and Plan   Nursing Diagnosis Risk for unstable blood glucose pattern   Nursing Intervention Domain 7974 Decision-making Support   Nursing Interventions Examined current inpatient diabetes control   Explored factors facilitating and impeding inpatient management  Identified self-management practices impeding diabetes control  Explored corrective strategies with patient and responsible inpatient provider   Informed patient of rational for insulin strategy while hospitalized  Instructed patient in     Evaluation   Mr. Giuliano Hernandez with pre- diabetes, has achieved inpatient blood glucose target of 100-180mg/dl via insulin drip.   Inpatient blood glucose management has been impacted by  [] Kidney dysfunction  [x] Erratic meal consumption  [] Glucocorticoid use  [x]  _cardiac surgery__________________      Recommendations   Recommend:  Continue insulin infusion per protocol.     When transitioning off insulin infusion tomorrow, POC Q6 hours  IF blood glucose reaches over 200, initiate hyperglycemia management with:    INITIATE Basal insulin   O 0.1 to 0.2  Units/kg/D=10 to 18units    Corrective insulin  O Normal sensitivity        Referral   [] Behavioral health services  [] Inpatient nutrition services  [] Pharmacy services for medication management  [] Diabetes Self-Management Training through [facility] Diabetes [facility] (Phone [phone] to schedule appointment)    Billing Code(s)     [x] 77915 IP subsequent hospital care - 45 minutes      DOE Pace  Access via [employer] [place] [phone]

## 2020-03-27 NOTE — PROGRESS NOTES
[facility] ICU Progress Note    Admit Date: 3/25/2020  POD:  1 Day Post-Op    Procedure:  Procedure(s):  CORONARY ARTERY BYPASS GRAFTING X 5   WITH LEFT LEG EVH AND LIMA HARVESTING. CARDIOPULMONARY BYPASS. TRANSESOPHAGEAL ECHOCARDIOGRAM AND EPI AORTIC ULTRASOUND BY DR. Brenden Delgado. Subjective:   Pt seen with Dr. Johana Keane. Extubated this morning, no complaints currently. Lying in bed. Tmax 101 F, 6 L NC. NSR 80s. On insulin, dobutamine 2, cardene 2. Objective:   Vitals:  Blood pressure 133/50, pulse 81, temperature (!) 101 °F (38.3 °C), resp. rate 23, height 5' 11\" (1.803 m), weight 206 lb 12.7 oz (93.8 kg), SpO2 95 %. Temp (24hrs), Av.4 °F (36.3 °C), Min:78.1 °F (25.6 °C), Max:101 °F (38.3 °C)    EKG/Rhythm: NSR 97, 1st degree AV block. RBBB    Extubation Date / Time:  at 0705    CT Output: 1310 mL (680 mL)    Oxygen Therapy:  Oxygen Therapy  O2 Sat (%): 95 % (20 0800)  Pulse via Oximetry: 82 beats per minute (20 0800)  O2 Device: Nasal cannula (20 0800)  O2 Flow Rate (L/min): 6 l/min (20 0800)  FIO2 (%): 50 % (20 0041)    CXR:   CXR Results  (Last 48 hours)               20 0539  XR CHEST PORT Final result    Impression:  IMPRESSION: No significant interval change. Narrative:  CLINICAL HISTORY: Chest pain    INDICATION: Chest pain, coronary artery disease       COMPARISON: 3/26/2020       FINDINGS:    AP portable semiupright view of the chest is obtained at 4:16 AM. ET tube and NG   tube unchanged. Cheney-La catheter unchanged. Mediastinal and pleural drains. Right basilar atelectasis and effusion not changed. Chronic deformity of the   right clavicle.            20  XR CHEST PORT Final result    Impression:  IMPRESSION: Status post median sternotomy. Small right pleural effusion.        Narrative:  EXAM: XR CHEST PORT       INDICATION: postop heart       COMPARISON: 3/25/2020       FINDINGS: A portable AP radiograph of the chest was obtained at 2007 hours. There is interval median sternotomy. There is an endotracheal tube terminating   at the thoracic inlet, an NG tube tip in the stomach, a pulmonary arterial line   terminating in the right main pulmonary artery, and mediastinal-left chest   tubes. There is a small right pleural effusion. No pneumothorax is shown. Patchy   left basilar atelectasis is noted with otherwise clear lungs. Cardiac,   mediastinal and hilar contours appear stable. Admission Weight: Last Weight   Weight: 207 lb 0.2 oz (93.9 kg) Weight: 206 lb 12.7 oz (93.8 kg)     Intake / Output / Drain:  Current Shift:  07 -  1900  In: 26.3 [I.V.:26.3]  Out: 45 [Urine:25; Drains:20]  Last 24 hrs.:     Intake/Output Summary (Last 24 hours) at 3/27/2020 0827  Last data filed at 3/27/2020 0800  Gross per 24 hour   Intake 4044.66 ml   Output 3240 ml   Net 804.66 ml       EXAM:  General:  Lying in bed, no distress                                         Lungs:   Clear to auscultation bilaterally. Incision:  Dressing c/d/i   Heart:  Regular rate and rhythm, S1, S2 normal, no murmur, click, rub or gallop. Abdomen:   Soft, non-tender. Bowel sounds hypoactive. No masses,  No organomegaly. Extremities:  No edema. PPP. Neurologic:  Gross motor and sensory apparatus intact. Labs:   Recent Labs     20  0655  20  0349   WBC  --   --  10.3   HGB  --   --  10.2*   HCT  --   --  31.7*   PLT  --   --  117*   NA  --   --  141   K  --   --  4.1   BUN  --   --  16   CREA  --   --  0.96   GLU  --   --  119*   GLUCPOC 115*   < >  --    INR  --   --  1.1    < > = values in this interval not displayed. Assessment:     Principal Problem:    S/P CABG x 5 (3/26/2020)      Overview: x 5, LIMA to LAD, RSVG to Diag-RI-OM, RSVG to RCA    Active Problems:    NSTEMI (non-ST elevated myocardial infarction) ([facility] [address] (3/25/2020)         Plan/Recommendations/Medical Decision Makin.  CAD with NSTEMI on this admission s/p CABG: On ASA, statin. Will need Plavix, hold today due to high CT output overnight. Hold BB for hypotension.     2. Atelectasis/leukocytosis: Tmax 101F. Encourage IS. Wean O2 for SpO2 > 92%.      3. Hx HTN: Low this AM. Hold BB. Wean dobutamine for CI > 2, MAPs > 65.      4. HLD:  Continue Statin    5. Anemia secondary to acute blood loss: Monitor H/H, CT output     5. Pre-diabetes: A1c 5.9. Insulin gtt per protocol.      6. Hypothyroid:  Continue Synthroid. TSH of 2.34 in December     7. Mitral valve prolapse/MR/AS: All mild on intra-operative WILMER.      8. BPH: Continue flomax and finasteride. Keep olivares, consult urology for management/irrigation.     9. Arthritis/Chronic back pain: No current treatment    10. Intra-op bradycardia/first degree AV block: [facility] [place]. Hold BB today. Dispo: PT/OT. Wean dobutamine for CI >2, MAP > 65. Remain in CVICU today.      Signed By: TOM Watkins

## 2020-03-27 NOTE — PROGRESS NOTES
Problem: Mobility Impaired (Adult and Pediatric)  Goal: *Acute Goals and Plan of Care (Insert Text)  Description: FUNCTIONAL STATUS PRIOR TO ADMISSION: Patient was independent and active without use of DME. When asked, he reported no falls in the last 12 months. HOME SUPPORT PRIOR TO ADMISSION: The patient lived with his wife but did not require assist.    Physical Therapy Goals  Initiated 3/27/2020  1. Patient will move from supine to sit and sit to supine , scoot up and down and roll side to side in bed with independence within 5 days. 2.  Patient will perform sit to/from stand with independence within 5 days. 3.  Patient will ambulate 300 feet with least restrictive assistive device and independence within 5 days. 4.  Patient will ascend/descend 4 stairs with one handrail(s) with modified independence within 5 days. 5.  Patient will perform cardiac exercises per protocol with independence within 5 days. 6.  Patient will verbally recall and functionally demonstrate mindful-based movements (\"move in the tube\") principles without cues within 5 days. Outcome: Progressing Towards Goal   PHYSICAL THERAPY EVALUATION  Patient: Deborah Whittington (26 y.o. male)  Date: 3/27/2020  Primary Diagnosis: NSTEMI (non-ST elevated myocardial infarction) ([facility] [address] [I21.4]  Procedure(s) (LRB):  CORONARY ARTERY BYPASS GRAFTING X 5   WITH LEFT LEG EVH AND LIMA HARVESTING. CARDIOPULMONARY BYPASS. TRANSESOPHAGEAL ECHOCARDIOGRAM AND EPI AORTIC ULTRASOUND BY DR. Louis Moore. (N/A) 1 Day Post-Op   Precautions:   Fall, Sternal      ASSESSMENT  Based on the objective data described below, the patient presents with generalized weakness, impaired sitting balance and decreased activity tolerance (low BP at rest that dropped after supine to sit but pt not symptomatic) s/p a CABG x 5 POD 1.  Per art line his supine resting BP was 109/50 and BP was taken with the BP cuff in sitting his BP was 88/31, he was returned to supine and in supine his BP was 118/57. He was on 6 liters of 02 during eval and SVO2 dropped with activity (55-36). Pt was educated about move in the tube concept and verbalized understanding and with verbal and tactile cues adhered to it with supine <> sit. Overall he was moving fairly well for mobilizing for the first time since surgery and anticipate steady gains allowing for discharge to home with family. Current Level of Function Impacting Discharge (mobility/balance): mod to max assist X 2. Functional Outcome Measure: The patient scored 1/28 on the Tinetti outcome measure which is indicative of high fall risk. .      Other factors to consider for discharge: NSTEMI     Patient will benefit from skilled therapy intervention to address the above noted impairments. PLAN :  Recommendations and Planned Interventions: bed mobility training, transfer training, gait training, therapeutic exercises, patient and family training/education and therapeutic activities      Frequency/Duration: Patient will be followed by physical therapy:  daily to address goals.     Recommendation for discharge: (in order for the patient to meet his/her long term goals)  Physical therapy at least 2 days/week in the home AND ensure assist and/or supervision for safety with mobility    This discharge recommendation:  A follow-up discussion with the attending provider and/or case management is planned    IF patient discharges home will need the following DME: to be determined (TBD)         SUBJECTIVE:   Patient stated I felt ok, when his BP was dropping in siting at the EOB    OBJECTIVE DATA SUMMARY:   Consult received, chart reviewed, pt cleared by nursing  HISTORY:    Past Medical History:   Diagnosis Date    Arrhythmia     r/t caffeine    Arthritis     Basal cell carcinoma     BPH (benign prostatic hyperplasia)     CAD (coronary artery disease)     s/p PTCA '92    Calculus of kidney     Chronic pain     BACK    Elevated PSA     Hypercholesterolemia     Lumbar foraminal stenosis     Melanoma (HCC)     MVP (mitral valve prolapse)     Other ill-defined conditions(799.89)     Prostatitis    Prediabetes     Seasonal allergies     Squamous cell carcinoma     Systolic murmur     Thyroid disease     HYPOTHYROID    Vision impairment     R EYE, BLOOD CLOT ON RETINA     Past Surgical History:   Procedure Laterality Date    ENDOSCOPY, COLON, DIAGNOSTIC      due 12    HX CHOLECYSTECTOMY  1970    HX CORONARY ARTERY BYPASS GRAFT  03/26/2020    x 5, LIMA to LAD, RSVG to Diag-RI-OM, RSVG to RCA    HX HEART [place]    HX LITHOTRIPSY      x2    HX ORTHOPAEDIC      elbow - fx right arm age 3 yrs       Personal factors and/or comorbidities impacting plan of care: pre diabetes     Home Situation  Home Environment: Private residence  # Steps to Enter: 4  Rails to Enter: Yes  Hand Rails : Bilateral  One/Two Story Residence: Two story, live on 1st floor  Living Alone: No  Support Systems: Spouse/Significant Other/Partner  Patient Expects to be Discharged to[de-identified] Private residence  Current DME Used/Available at Home: Cane, straight  Tub or Shower Type: Shower    EXAMINATION/PRESENTATION/DECISION MAKING:   Critical Behavior:  Neurologic State: Alert  Orientation Level: Oriented X4  Cognition: Appropriate for age attention/concentration  Safety/Judgement: Decreased insight into deficits  Hearing:   Auditory  Auditory Impairment: Hearing aid(s)  Hearing Aids/Status: Bilateral  Skin:  refer to MD and nursing notes  Edema: none noted  Range Of Motion:  AROM: Generally decreased, functional                       Strength:    Strength: Generally decreased, functional                    Tone & Sensation:   Tone: Normal              Sensation: Intact               Coordination:  Coordination: Generally decreased, functional  Vision:   Corrective Lenses: Glasses  Functional Mobility:  Bed Mobility:     Supine to Sit: Maximum assistance;Assist x2  Sit to Supine: Maximum assistance;Assist x2  Scooting: Moderate assistance;Assist x2; Additional time  Transfers:  Sit to Stand: (did not attempt)                          Balance:   Sitting: Impaired; Without support  Sitting - Static: Fair (occasional)  Sitting - Dynamic: Poor (constant support)  Standing: (did not attempt 2* vitals)  Ambulation/Gait Training:                                                      Not assessed   Stairs: Therapeutic Exercises:   Pursed lip breathing     Functional Measure:  Tinetti test:    Sitting Balance: 1(inferred)  Arises: 0  Attempts to Rise: 0  Immediate Standing Balance: 0  Standing Balance: 0  Nudged: 0  Eyes Closed: 0  Turn 360 Degrees - Continuous/Discontinuous: 0  Turn 360 Degrees - Steady/Unsteady: 0  Sitting Down: 0  Balance Score: 1 Balance total score  Indication of Gait: 0  R Step Length/Height: 0  L Step Length/Height: 0  R Foot Clearance: 0  L Foot Clearance: 0  Step Symmetry: 0  Step Continuity: 0  Path: 0  Trunk: 0  Walking Time: 0  Gait Score: 0 Gait total score  Total Score: 1/28 Overall total score         Tinetti Tool Score Risk of Falls  <19 = High Fall Risk  19-24 = Moderate Fall Risk  25-28 = Low Fall Risk  Tinetti ME. Performance-Oriented Assessment of Mobility Problems in Elderly Patients. Matthews 66; [id number].  (Scoring Description: PT Bulletin Feb. 10, 1993)    Older adults: Dre Gray et al, 2009; n = 1000 [facility] elderly evaluated with ABC, ASPEN, ADL, and IADL)  · Mean ASPEN score for males aged 69-68 years = 26.21(3.40)  · Mean ASPEN score for females age 69-68 years = 25.16(4.30)  · Mean ASPEN score for males over 80 years = 23.29(6.02)  · Mean ASPEN score for females over 80 years = 17.20(8.32)           Physical Therapy Evaluation Charge Determination   History Examination Presentation Decision-Making   MEDIUM  Complexity : 1-2 comorbidities / personal factors will impact the outcome/ POC  HIGH Complexity : 4+ Standardized tests and measures addressing body structure, function, activity limitation and / or participation in recreation  MEDIUM Complexity : Evolving with changing characteristics  LOW Complexity : FOTO score of       Based on the above components, the patient evaluation is determined to be of the following complexity level: LOW     Pain Rating:  None rated    Activity Tolerance:   Fair and see assessment   Please refer to the flowsheet for vital signs taken during this treatment. After treatment patient left in no apparent distress:   Supine in bed, Heels elevated for pressure relief, Patient positioned in right sidelying for pressure relief, Call bell within reach, Side rails x 3, and nurse present     COMMUNICATION/EDUCATION:   The patients plan of care was discussed with: Occupational therapist and Registered nurse. Fall prevention education was provided and the patient/caregiver indicated understanding., Patient/family have participated as able in goal setting and plan of care. , and Patient/family agree to work toward stated goals and plan of care.     Thank you for this referral.  Ernie Kearns   Time Calculation: 25 mins

## 2020-03-27 NOTE — PROGRESS NOTES
0745:Bedside shift change report given to Dee Dee Dove (oncoming nurse) by Marlee Mejia (offgoing nurse). Report included the following information SBAR, Kardex, ED Summary, OR Summary, Procedure Summary, Intake/Output, MAR, Accordion, Med Rec Status, Cardiac Rhythm NSR , 1st av block, BBB, Alarm Parameters , Quality Measures and Dual Neuro Assessment. 0815: STAND passed. 0900: Pt spoke with wife via phone. Urology NP called- updated to status per RN- stated she would come and assess olivares catheter. 0920: PT and RN at , working with patient to ambulate to chair. Upon sitting at side of bed, pt asymptomatic, but hypotension noted and decrease in SV02. Temporary increase in NC 02. NIBP checked. MAP 48. Pt replaced supine in bed. Upon supine, VSS, will monitor. 1145: Urology NP at - assessed urine/olivares. Irrigated olivares cathter with approx 60 ml saline. 1 small clot formation noted- removed. NP stated no additional needs at this time. 1700- pt hemodynamics stable off DOBUTamine; de-lined    1830: Pt able to ambulate few steps to sit in chair. Hemodynamics stable with this ambulation. Eating while up in chair-(no flatus pass - remains on clear liquid diet). 2000:Bedside shift change report given to Marlee Mejia (oncoming nurse) by Dee Dee Dove (offgoing nurse). Report included the following information SBAR, Intake/Output, MAR, Recent Results, Med Rec Status, Cardiac Rhythm NSR 1st av block, BBB, Alarm Parameters , Quality Measures and Dual Neuro Assessment.

## 2020-03-27 NOTE — ANESTHESIA POSTPROCEDURE EVALUATION
Post-Anesthesia Evaluation and Assessment    Patient: Bronson Hunter MRN: [id number]  SSN: [id number]    YOB: 1940  Age: 78 y.o. Sex: male      I have evaluated the patient and they are stable and ready for discharge from the PACU. Cardiovascular Function/Vital Signs  Visit Vitals  /50   Pulse 91   Temp 38 °C (100.4 °F)   Resp 14   Ht 5' 11\" (1.803 m)   Wt 93.8 kg (206 lb 12.7 oz)   SpO2 96%   BMI 28.84 kg/m²       Patient is status post General anesthesia for Procedure(s):  CORONARY ARTERY BYPASS GRAFTING X 5   WITH LEFT LEG EVH AND LIMA HARVESTING. CARDIOPULMONARY BYPASS. TRANSESOPHAGEAL ECHOCARDIOGRAM AND EPI AORTIC ULTRASOUND BY DR. Omid Timmons. .    Nausea/Vomiting: None    Postoperative hydration reviewed and adequate. Pain:  Pain Scale 1: Adult Nonverbal Pain Scale (03/27/20 0600)  Pain Intensity 1: 0 (03/26/20 1124)   Managed    Neurological Status:   Neuro  Neurologic State: Eyes open to voice; Eyes open to pain (03/27/20 0000)  Orientation Level: Unable to verbalize (03/27/20 0000)  Cognition: Follows commands(squeezes hands and opens eyes ) (03/27/20 0000)  Speech: Intubated (03/26/20 2015)  Assessment L Pupil: Round;Sluggish (03/26/20 2015)  Size L Pupil (mm): 2 (03/26/20 2015)  Assessment R Pupil: Round;Sluggish (03/26/20 2015)  Size R Pupil (mm): 2 (03/26/20 2015)  LUE Motor Response: Purposeful (03/27/20 0000)  LLE Motor Response: No movement to any stimulus (03/26/20 2015)  RUE Motor Response: Purposeful (03/27/20 0000)  RLE Motor Response: No movement to any stimulus (03/26/20 2015)   Sedated    Mental Status, Level of Consciousness: Sedated    Pulmonary Status:   O2 Device: Ventilator (03/27/20 0600)   Adequate oxygenation and airway patent    Complications related to anesthesia: None    Post-anesthesia assessment completed.      Signed By: Geno Curling, MD     March 27, 2020              Procedure(s):  CORONARY ARTERY BYPASS GRAFTING X 5   WITH LEFT LEG EVH AND LIMA HARVESTING. CARDIOPULMONARY BYPASS. TRANSESOPHAGEAL ECHOCARDIOGRAM AND EPI AORTIC ULTRASOUND BY DR. Surendra Parson. [facility]    <BSHSIANPOST>    Vitals Value Taken Time   BP     Temp     Pulse 94 3/27/2020  6:16 AM   Resp 17 3/27/2020  6:16 AM   SpO2 96 % 3/27/2020  6:16 AM   Vitals shown include unvalidated device data.

## 2020-03-27 NOTE — PROGRESS NOTES
Orders received, chart reviewed and patient evaluated by physical therapy. Pending progression with skilled acute physical therapy, recommend:  Physical therapy at least 2 days/week in the home and to be determined. Recommend with nursing patient to complete as able in order to maintain strength, endurance and independence: OOB to chair 3x/day as tolerated, was orthostatic first time supine to sit and was not symptomatic, had to return to supine to manage drop in BP. Thank you for your assistance. Full evaluation to follow.  Jake العلي, PT

## 2020-03-27 NOTE — OP NOTES
[facility]  OPERATIVE REPORT    Name:  Betty Bui  MR#:  [id number]  :  1940  ACCOUNT #:  [de-identified]  DATE OF SERVICE:  2020    PREOPERATIVE DIAGNOSES:  Inability to place Russell catheter, benign prostatic hypertrophy. POSTOPERATIVE DIAGNOSES:  Inability to place Russell catheter, benign prostatic hypertrophy. PROCEDURE PERFORMED:  Intraoperative Russell catheter placement. SURGEON:  Saurav Craig MD    ASSISTANT:  OR staff. ANESTHESIA:  General.    COMPLICATIONS:  None. SPECIMENS REMOVED:  None. IMPLANTS:  None. ESTIMATED BLOOD LOSS:  0.    INDICATIONS:  The patient underwent general anesthesia to undergo cardiac surgery, nursing staff unable to place Russell. Urology was consulted intraoperatively. DESCRIPTION OF PROCEDURE:  The patient lying supine under general anesthesia, sterilely prepped and draped of the genitalia. Followed by instillation of sterile lubricant transurethrally, I placed a 16-Frisian Russell catheter without difficulty and it was connected to urinary drainage bag after visualizing clear dom urine. No complications.       Gibran Hernandez MD RN/BRIGETTE_GRNCURLYK_PIERCE/BRIGETTE_TOBY_DENNY  D:  2020 12:08  T:  2020 15:36  JOB #:  [id number]

## 2020-03-27 NOTE — PROGRESS NOTES
Problem: Pressure Injury - Risk of  Goal: *Prevention of pressure injury  Description: Document Morris Scale and appropriate interventions in the flowsheet. Outcome: Progressing Towards Goal  Note: Pressure Injury Interventions:  Sensory Interventions: Assess changes in LOC, Assess need for specialty bed, Discuss PT/OT consult with provider, Float heels, Check visual cues for pain, Keep linens dry and wrinkle-free, Maintain/enhance activity level, Minimize linen layers, Monitor skin under medical devices, Pad between skin to skin, Pressure redistribution bed/mattress (bed type), Sit a 90-degree angle/use footstool if needed    Moisture Interventions: Absorbent underpads, Internal/External urinary devices, Maintain skin hydration (lotion/cream), Minimize layers, Moisture barrier    Activity Interventions: Pressure redistribution bed/mattress(bed type)    Mobility Interventions: Float heels, Pressure redistribution bed/mattress (bed type), PT/OT evaluation, Turn and reposition approx. every two hours(pillow and wedges)    Nutrition Interventions: Document food/fluid/supplement intake    Friction and Shear Interventions: Lift sheet                Problem: Discharge Planning  Goal: *Discharge to safe environment  Description: See CM notes. Margert Dakin, MSW   Outcome: Progressing Towards Goal     Problem: Falls - Risk of  Goal: *Absence of Falls  Description: Document Sukumar Sol Fall Risk and appropriate interventions in the flowsheet. Outcome: Progressing Towards Goal  Note: Fall Risk Interventions:            Medication Interventions: Evaluate medications/consider consulting pharmacy    Elimination Interventions:  Toileting schedule/hourly rounds              Problem: Non-Violent Restraints  Goal: *Removal from restraints as soon as assessed to be safe  Outcome: Progressing Towards Goal  Goal: *No harm/injury to patient while restraints in use  Outcome: Progressing Towards Goal  Goal: *Patient's dignity will be maintained  Outcome: Progressing Towards Goal  Goal: Non-violent Restaints:Standard Interventions  Outcome: Progressing Towards Goal  Goal: Non-violent Restraints:Patient Interventions  Outcome: Progressing Towards Goal     Problem: Ventilator Management  Goal: *Adequate oxygenation and ventilation  Outcome: Progressing Towards Goal  Goal: *Patient maintains clear airway/free of aspiration  Outcome: Progressing Towards Goal  Goal: *Absence of infection signs and symptoms  Outcome: Progressing Towards Goal  Goal: *Normal spontaneous ventilation  Outcome: Progressing Towards Goal

## 2020-03-27 NOTE — PROGRESS NOTES
2015: Pt arrived on unit. TRANSFER - IN REPORT:  Verbal report received from Narendra Grvoer, [facility], CRNA(name) on [employer]  being received from OR(unit) for routine post - op    Report consisted of patients Situation, Background, Assessment and   Recommendations(SBAR). Information from the following report(s) SBAR, Kardex, OR Summary, Procedure Summary, Intake/Output, MAR, Accordion, Recent Results, Med Rec Status, Cardiac Rhythm NSR w/ PVCs and Alarm Parameters  was reviewed with the receiving nurse. Opportunity for questions and clarification was provided. Assessment completed upon patients arrival to unit and care assumed. CI > 2; SBP < 120 s/t fragility of heart tissue. 2025: CXR completed at bedside, no changes needed per Angelina Degroot  2029: CT output 70 ml in 15 minutes- increased PEEP to 10 per Angelina Degroot  2034: Tidal volume changed to 550 by Avani Cane, RT based on pt's weight. 2045: Placed warmer on patient. 2121: Paged Dr. Ninoska March to update on patient status. Pt continues to have high chest tube output (50-90 ml q15m), PTT 50, platelets 885, CI 7.2-24, increased PEEP to 10, started warming patient d/t hypothermia (95.3)  2143: Spoke with Dr. Ninoska March and updated on above. Orders received for 50 mg protamine, K-centra ( call pharmacy for dosing), and platelets. 2200: Platelet transfusion initiated. 2300: Turned patient to R side, pt responding to commands at this time. 2320: Second albumin given d/t CVP 5, CI 1.5; MAP 61  2347: Phenylephrine off -   0000: Pt remains intubated as he continue to ooze out of his CT (30-50 ml q15m); pt remains on PEEP 10. Had to start nicardipine as SVR/SBP elevated with increased dobutamine. 0245: CT drainage has slowed. Decreased PEEP to 8. Will continue to monitor. 0400: CT bleeding has slowed. Decreased PEEP to 5. Will continue to monitor. 0500: FiO2 decreased to 40% and vent half rated. Will continue to monitor.    0: Spoke with Dr. Ninoska March and updated on patient status- low PAD, low CVP, and Hgb (10.2). Orders received for another albumin (4th total). 3037: Pt placed on CPAP. Will continue to monitor. 6544: Pt unable to tolerate CPAP setting. Returned to 1/2 rate (7 BPM). 36: Dr. Todd Caraballo at bedside. Updated on patient status. Relayed information given by wife last night in regards to history of bleeding olivares last year that required frequent irrigation and remained in place for over a week. Dr. Todd Caraballo does not want olivares removed. 2176: Placed on SBT  0700: ABG obtained. 2404: Extubated to 6L NC. AO x 4, following commands, and strong productive cough post extubation. 0745: Bedside shift change report given to Faheem Warner RN (oncoming nurse) by Corwin Robles RN (offgoing nurse). Report included the following information SBAR, Kardex, OR Summary, Procedure Summary, Intake/Output, MAR, Accordion, Recent Results, Med Rec Status and Cardiac Rhythm NSR w/ BBB and 1st degree AVB.

## 2020-03-27 NOTE — PROGRESS NOTES
Problem: Self Care Deficits Care Plan (Adult)  Goal: *Acute Goals and Plan of Care (Insert Text)  Description: FUNCTIONAL STATUS PRIOR TO ADMISSION: Patient was independent and active without use of DME.     HOME SUPPORT: The patient lived with wife but did not require assist.    Occupational Therapy Goals  Initiated 3/27/2020  1. Patient will perform ADLs standing 5 mins without fatigue or LOB with supervision/set-up within 5 day(s). 2.  Patient will perform lower body ADLs with supervision/set-up within 5 day(s). 3.  Patient will perform gathering ADL items high and low 2/2 with supervision/set-up within 5 day(s). 4.  Patient will perform toilet transfers with supervision/set-up within 5 day(s). 5.  Patient will perform all aspects of toileting with supervision/set-up within 5 day(s). 6.  Patient will participate in cardiac/sternal upper extremity therapeutic exercise/activities to increase independence with ADLs with supervision/set-up for 5 minutes within 5 day(s). Outcome: Progressing Towards Goal   OCCUPATIONAL THERAPY EVALUATION  Patient: Joaquina Colon (55 y.o. male)  Date: 3/27/2020  Primary Diagnosis: NSTEMI (non-ST elevated myocardial infarction) ([facility] [address] [I21.4]  Procedure(s) (LRB):  CORONARY ARTERY BYPASS GRAFTING X 5   WITH LEFT LEG EVH AND LIMA HARVESTING. CARDIOPULMONARY BYPASS. TRANSESOPHAGEAL ECHOCARDIOGRAM AND EPI AORTIC ULTRASOUND BY DR. Peter Houston. (N/A) 1 Day Post-Op   Precautions: Fall, Sternal    ASSESSMENT  Based on the objective data described below, the patient presents with generalized weakness, decreased endurance (6L NC O2), impaired sitting balance, and decreased activity tolerance s/p CABG x 5 POD 1 with patient's BP labile (MAPs 82-48 seated EOB) and SVO2 dropping with activity (55-36). Patient demonstrated fair understanding of PLB strategy for energy conservation and of \"move in the tube\" precautions, however patient could use reinforcement.  Patient also verbally educated on importance for BUE cardiac exercises to prevent scar tissue formation however patient did not perform exercises today due to low MAP/SVO2 drop respectively. Anticipate good functional progression in therapy and ability for patient to return home with [facility] services. Patient is verbalizing and is demonstrating understanding of mindful-based movements (\"move in the tube\") principles of keeping UEs proximal to ribcage to prevent lateral pull on the sternum during load-bearing activities with verbal cues required for compliance. Current Level of Function Impacting Discharge (ADLs/self-care): MOD A-MAX A for LB ADLs     Functional Outcome Measure: The patient scored 40/100 on the Barthel Index outcome measure which is indicative of 60% ADL impairment (however patient limited by lines & leads, recent extubation, and vital signs at this time). Other factors to consider for discharge: lives with spouse     Patient will benefit from skilled therapy intervention to address the above noted impairments. PLAN :  Recommendations and Planned Interventions: self care training, functional mobility training, therapeutic exercise, balance training, therapeutic activities, endurance activities, patient education, home safety training, and family training/education    Frequency/Duration: Patient will be followed by occupational therapy 5 times a week to address goals. Recommendation for discharge: (in order for the patient to meet his/her long term goals)  Occupational therapy at least 2 days/week in the home AND ensure assist and/or supervision for safety with IADL tasks     This discharge recommendation:  Has been made in collaboration with the attending provider and/or case management    IF patient discharges home will need the following DME: TBD       SUBJECTIVE:   Patient stated I feel fine (however patient's MAP 48-patient with asymptomatic hypotension).     OBJECTIVE DATA SUMMARY:   HISTORY:   Past Medical History:   Diagnosis Date    Arrhythmia     r/t caffeine    Arthritis     Basal cell carcinoma     BPH (benign prostatic hyperplasia)     CAD (coronary artery disease)     s/p PTCA '92    Calculus of kidney     Chronic pain     BACK    Elevated PSA     Hypercholesterolemia     Lumbar foraminal stenosis     Melanoma (HCC)     MVP (mitral valve prolapse)     Other ill-defined conditions(799.89)     Prostatitis    Prediabetes     Seasonal allergies     Squamous cell carcinoma     Systolic murmur     Thyroid disease     HYPOTHYROID    Vision impairment     R EYE, BLOOD CLOT ON RETINA     Past Surgical History:   Procedure Laterality Date    ENDOSCOPY, COLON, DIAGNOSTIC      due 12    HX CHOLECYSTECTOMY  1970    HX CORONARY ARTERY BYPASS GRAFT  03/26/2020    x 5, LIMA to LAD, RSVG to Diag-RI-OM, RSVG to RCA    HX [address]    HX LITHOTRIPSY      x2    HX ORTHOPAEDIC      elbow - fx right arm age 3 yrs       Expanded or extensive additional review of patient history:     Home Situation  Home Environment: Private residence  # Steps to Enter: 4  Rails to Enter: Yes  Hand Rails : Bilateral  One/Two Story Residence: Two story, live on 1st floor  Living Alone: No  Support Systems: Spouse/Significant Other/Partner  Patient Expects to be Discharged to[de-identified] Private residence  Current DME Used/Available at Home: Cane, straight  Tub or Shower Type: Shower    EXAMINATION OF PERFORMANCE DEFICITS:  Cognitive/Behavioral Status:  Neurologic State: Alert  Orientation Level: Oriented X4  Cognition: Appropriate for age attention/concentration  Perception: Appears intact  Perseveration: No perseveration noted  Safety/Judgement: Decreased insight into deficits    Skin: chest tube, olivares, temporary pacer, [place], additional lines & leads    Edema: BLE    Hearing:   Auditory  Auditory Impairment: Hearing aid(s)  Hearing Aids/Status: Bilateral    Vision/Perceptual:                                Corrective Lenses: Glasses    Range of Motion:  BUE  AROM: Generally decreased, functional                         Strength:  BUE  Strength: Generally decreased, functional                Coordination:  Coordination: Generally decreased, functional  Fine Motor Skills-Upper: Left Intact; Right Intact    Gross Motor Skills-Upper: Left Intact; Right Intact    Tone & Sensation:  BUE  Tone: Normal  Sensation: Intact                      Balance:  Sitting: Impaired; Without support  Sitting - Static: Fair (occasional)  Sitting - Dynamic: Poor (constant support)  Standing: (did not attempt 2* vitals)    Functional Mobility and Transfers for ADLs:  Bed Mobility:  Supine to Sit: Maximum assistance;Assist x2; Additional time  Sit to Supine: Maximum assistance;Assist x2; Additional time  Scooting: Moderate assistance;Assist x2; Additional time    Transfers:  Sit to Stand: (did not attempt)    ADL Assessment:  Feeding: Setup;Supervision(infer in bed)    Oral Facial Hygiene/Grooming: Setup;Supervision(infer in bed)    Bathing: Moderate assistance(infer A for BLE and posterior periarea)    Upper Body Dressing: Setup;Supervision(infer 2* BUE ROM and cues for bimanual overhead reaching)    Lower Body Dressing: Moderate assistance(A for R sock)    Toileting: Maximum assistance(olivares; infer A for bowel pericare and clothing management)                ADL Intervention and task modifications:                           Lower Body Dressing Assistance  Socks: Moderate assistance         Cognitive Retraining  Safety/Judgement: Decreased insight into deficits    Patient instructed and educated on mindful movement principles based on Move in The Tube concept to include maintaining bilateral elbows close to rib cage when performing any load-bearing activity such as getting in/out of bed, pushing up from a chair, opening a door, or lifting a box.   Patient was given a handout with diagrams of each correct/incorrect method of performing each of the above tasks. Patient instructed on the ability to utilize upper extremities outside the tube when doing any non-load bearing activity such as washing hair/body, brushing teeth, retrieving clothing items, or scratching your back. Patient encouraged to also perform upper extremity exercises \"outside of the tube\" to prevent scar tissue formation around sternal incision site. Patient instructed no asymmetrical reaching over head to ensure B UEs when shoulders >90* i.e. reaching in cabinets and dressing. Instruction on upper body dressing techniques of over head, then arms through to decrease pain and unilateral shoulder flexion >90*. Instruction on the benefits of utilizing B UEs during functional tasks i.e. opening the fridge, stepping into the tub. May have to adjust home setup to increase ease with items closer to waist height to prevent deep bending and the automatic  of asymmetrical UE WB/pushing for stabilization during bending. Benefit to don clothing tailor sitting and don all clothing while sitting prior to standing. Patient demonstrated lower body dressing seated with Moderate Assistance. Increase activity tolerance for home, work, and sexual intercourse by pacing self with increasing the arm exercises, sitting duration, frequency OOB, walking, standing, and ADLs. Instructed and indicated understanding of s/s of too much activity, how to respond to s/s safely. Functional Measure:  Barthel Index:    Bathin  Bladder: 0  Bowels: 5  Groomin  Dressin  Feeding: 10  Mobility: 0  Stairs: 0  Toilet Use: 5  Transfer (Bed to Chair and Back): 10  Total: 40/100        The Barthel ADL Index: Guidelines  1. The index should be used as a record of what a patient does, not as a record of what a patient could do. 2. The main aim is to establish degree of independence from any help, physical or verbal, however minor and for whatever reason.   3. The need for supervision renders the patient not independent. 4. A patient's performance should be established using the best available evidence. Asking the patient, friends/relatives and nurses are the usual sources, but direct observation and common sense are also important. However direct testing is not needed. 5. Usually the patient's performance over the preceding 24-48 hours is important, but occasionally longer periods will be relevant. 6. Middle categories imply that the patient supplies over 50 per cent of the effort. 7. Use of aids to be independent is allowed. Elaine Freedman., Barthel, D.W. (1943). Functional evaluation: the Barthel Index. [address] [facility] (14)2. Sindi Graham kimberly АЛЕКСАНДР RodríguezF, Zeyad Carbone., Pastor Vang., [place], [address] (1999). Measuring the change indisability after inpatient rehabilitation; comparison of the responsiveness of the Barthel Index and Functional [place] Measure. Journal of Neurology, Neurosurgery, and Psychiatry, 66(4), 464-676. David Solis NStaceyJ.BRITTANY, YENY Savage, & Ean Maloney M.A. (2004.) Assessment of post-stroke quality of life in cost-effectiveness studies: The usefulness of the Barthel Index and the EuroQoL-5D. Quality of Life Research, 15, 420-81        Occupational Therapy Evaluation Charge Determination   History Examination Decision-Making   LOW Complexity : Brief history review  MEDIUM Complexity : 3-5 performance deficits relating to physical, cognitive , or psychosocial skils that result in activity limitations and / or participation restrictions MEDIUM Complexity : Patient may present with comorbidities that affect occupational performnce.  Miniml to moderate modification of tasks or assistance (eg, physical or verbal ) with assesment(s) is necessary to enable patient to complete evaluation       Based on the above components, the patient evaluation is determined to be of the following complexity level: LOW     Activity Tolerance:   Fair and requires rest breaks; hypotension  Please refer to the flowsheet for vital signs taken during this treatment. After treatment patient left in no apparent distress:    Supine in bed and Call bell within reach    COMMUNICATION/EDUCATION:   The patients plan of care was discussed with: Physical therapist and Registered nurse. Home safety education was provided and the patient/caregiver indicated understanding., Patient/family have participated as able in goal setting and plan of care. , and Patient/family agree to work toward stated goals and plan of care. This patients plan of care is appropriate for delegation to [facility].     Thank you for this referral.  Lizet Cortez  Time Calculation: 29 mins

## 2020-03-28 ENCOUNTER — APPOINTMENT (OUTPATIENT)
Dept: GENERAL RADIOLOGY | Age: 80
DRG: 234 | End: 2020-03-28
Attending: PHYSICIAN ASSISTANT
Payer: MEDICARE

## 2020-03-28 LAB
ADMINISTERED INITIALS, ADMINIT: NORMAL
ALBUMIN SERPL-MCNC: 3.3 G/DL (ref 3.5–5)
ALBUMIN/GLOB SERPL: 1.3 {RATIO} (ref 1.1–2.2)
ALP SERPL-CCNC: 56 U/L (ref 45–117)
ALT SERPL-CCNC: 19 U/L (ref 12–78)
ANION GAP SERPL CALC-SCNC: 3 MMOL/L (ref 5–15)
AST SERPL-CCNC: 52 U/L (ref 15–37)
BILIRUB SERPL-MCNC: 0.7 MG/DL (ref 0.2–1)
BUN SERPL-MCNC: 24 MG/DL (ref 6–20)
BUN/CREAT SERPL: 25 (ref 12–20)
CALCIUM SERPL-MCNC: 8.6 MG/DL (ref 8.5–10.1)
CARB RATIO, CHOR: 15
CARB RATIO, CHOR: 15
CARBOHYDRATE EATEN, CHO: 60 G
CARBOHYDRATE EATEN, CHO: 60 G
CHLORIDE SERPL-SCNC: 109 MMOL/L (ref 97–108)
CO2 SERPL-SCNC: 27 MMOL/L (ref 21–32)
CREAT SERPL-MCNC: 0.97 MG/DL (ref 0.7–1.3)
D50 ADMINISTERED, D50ADM: 0 ML
D50 ORDER, D50ORD: 0 ML
ERYTHROCYTE [DISTWIDTH] IN BLOOD BY AUTOMATED COUNT: 13.7 % (ref 11.5–14.5)
GLOBULIN SER CALC-MCNC: 2.5 G/DL (ref 2–4)
GLSCOM COMMENTS: NORMAL
GLUCOSE BLD STRIP.AUTO-MCNC: 101 MG/DL (ref 65–100)
GLUCOSE BLD STRIP.AUTO-MCNC: 101 MG/DL (ref 65–100)
GLUCOSE BLD STRIP.AUTO-MCNC: 107 MG/DL (ref 65–100)
GLUCOSE BLD STRIP.AUTO-MCNC: 108 MG/DL (ref 65–100)
GLUCOSE BLD STRIP.AUTO-MCNC: 115 MG/DL (ref 65–100)
GLUCOSE BLD STRIP.AUTO-MCNC: 117 MG/DL (ref 65–100)
GLUCOSE BLD STRIP.AUTO-MCNC: 129 MG/DL (ref 65–100)
GLUCOSE BLD STRIP.AUTO-MCNC: 133 MG/DL (ref 65–100)
GLUCOSE BLD STRIP.AUTO-MCNC: 143 MG/DL (ref 65–100)
GLUCOSE BLD STRIP.AUTO-MCNC: 144 MG/DL (ref 65–100)
GLUCOSE BLD STRIP.AUTO-MCNC: 164 MG/DL (ref 65–100)
GLUCOSE BLD STRIP.AUTO-MCNC: 172 MG/DL (ref 65–100)
GLUCOSE BLD STRIP.AUTO-MCNC: 182 MG/DL (ref 65–100)
GLUCOSE BLD STRIP.AUTO-MCNC: 91 MG/DL (ref 65–100)
GLUCOSE BLD STRIP.AUTO-MCNC: 92 MG/DL (ref 65–100)
GLUCOSE BLD STRIP.AUTO-MCNC: 93 MG/DL (ref 65–100)
GLUCOSE BLD STRIP.AUTO-MCNC: 94 MG/DL (ref 65–100)
GLUCOSE BLD STRIP.AUTO-MCNC: 98 MG/DL (ref 65–100)
GLUCOSE BLD STRIP.AUTO-MCNC: 99 MG/DL (ref 65–100)
GLUCOSE BLD STRIP.AUTO-MCNC: 99 MG/DL (ref 65–100)
GLUCOSE SERPL-MCNC: 90 MG/DL (ref 65–100)
GLUCOSE, GLC: 101 MG/DL
GLUCOSE, GLC: 101 MG/DL
GLUCOSE, GLC: 107 MG/DL
GLUCOSE, GLC: 108 MG/DL
GLUCOSE, GLC: 115 MG/DL
GLUCOSE, GLC: 117 MG/DL
GLUCOSE, GLC: 129 MG/DL
GLUCOSE, GLC: 133 MG/DL
GLUCOSE, GLC: 143 MG/DL
GLUCOSE, GLC: 144 MG/DL
GLUCOSE, GLC: 164 MG/DL
GLUCOSE, GLC: 172 MG/DL
GLUCOSE, GLC: 182 MG/DL
GLUCOSE, GLC: 91 MG/DL
GLUCOSE, GLC: 92 MG/DL
GLUCOSE, GLC: 93 MG/DL
GLUCOSE, GLC: 94 MG/DL
GLUCOSE, GLC: 98 MG/DL
GLUCOSE, GLC: 99 MG/DL
GLUCOSE, GLC: 99 MG/DL
HCT VFR BLD AUTO: 28.6 % (ref 36.6–50.3)
HGB BLD-MCNC: 9.3 G/DL (ref 12.1–17)
HIGH TARGET, HITG: 130 MG/DL
INSULIN ADMINSTERED, INSADM: 1 UNITS/HOUR
INSULIN ADMINSTERED, INSADM: 1 UNITS/HOUR
INSULIN ADMINSTERED, INSADM: 1.2 UNITS/HOUR
INSULIN ADMINSTERED, INSADM: 1.4 UNITS/HOUR
INSULIN ADMINSTERED, INSADM: 1.7 UNITS/HOUR
INSULIN ADMINSTERED, INSADM: 1.8 UNITS/HOUR
INSULIN ADMINSTERED, INSADM: 1.9 UNITS/HOUR
INSULIN ADMINSTERED, INSADM: 1.9 UNITS/HOUR
INSULIN ADMINSTERED, INSADM: 2 UNITS/HOUR
INSULIN ADMINSTERED, INSADM: 2 UNITS/HOUR
INSULIN ADMINSTERED, INSADM: 2.3 UNITS/HOUR
INSULIN ADMINSTERED, INSADM: 2.6 UNITS/HOUR
INSULIN ADMINSTERED, INSADM: 3.2 UNITS/HOUR
INSULIN ADMINSTERED, INSADM: 3.3 UNITS/HOUR
INSULIN ADMINSTERED, INSADM: 3.7 UNITS/HOUR
INSULIN ADMINSTERED, INSADM: 4.8 UNITS/HOUR
INSULIN ADMINSTERED, INSADM: 5 UNITS/HOUR
INSULIN ADMINSTERED, INSADM: 5 UNITS/HOUR
INSULIN BOLUS ADMINISTERED, INSBOLADM: 4 UNITS/HOUR
INSULIN BOLUS ADMINISTERED, INSBOLADM: 4 UNITS/HOUR
INSULIN BOLUS ORDERED, INSBOLORD: 4 UNITS/HOUR
INSULIN BOLUS ORDERED, INSBOLORD: 4 UNITS/HOUR
INSULIN ORDER, INSORD: 1 UNITS/HOUR
INSULIN ORDER, INSORD: 1 UNITS/HOUR
INSULIN ORDER, INSORD: 1.2 UNITS/HOUR
INSULIN ORDER, INSORD: 1.4 UNITS/HOUR
INSULIN ORDER, INSORD: 1.7 UNITS/HOUR
INSULIN ORDER, INSORD: 1.8 UNITS/HOUR
INSULIN ORDER, INSORD: 1.9 UNITS/HOUR
INSULIN ORDER, INSORD: 1.9 UNITS/HOUR
INSULIN ORDER, INSORD: 2 UNITS/HOUR
INSULIN ORDER, INSORD: 2 UNITS/HOUR
INSULIN ORDER, INSORD: 2.3 UNITS/HOUR
INSULIN ORDER, INSORD: 2.6 UNITS/HOUR
INSULIN ORDER, INSORD: 3.2 UNITS/HOUR
INSULIN ORDER, INSORD: 3.3 UNITS/HOUR
INSULIN ORDER, INSORD: 3.7 UNITS/HOUR
INSULIN ORDER, INSORD: 4.8 UNITS/HOUR
INSULIN ORDER, INSORD: 5 UNITS/HOUR
INSULIN ORDER, INSORD: 5 UNITS/HOUR
LOW TARGET, LOT: 95 MG/DL
MAGNESIUM SERPL-MCNC: 2.3 MG/DL (ref 1.6–2.4)
MCH RBC QN AUTO: 30.1 PG (ref 26–34)
MCHC RBC AUTO-ENTMCNC: 32.5 G/DL (ref 30–36.5)
MCV RBC AUTO: 92.6 FL (ref 80–99)
MINUTES UNTIL NEXT BG, NBG: 120 MIN
MINUTES UNTIL NEXT BG, NBG: 60 MIN
MULTIPLIER, MUL: 0.03
MULTIPLIER, MUL: 0.03
MULTIPLIER, MUL: 0.04
MULTIPLIER, MUL: 0.05
MULTIPLIER, MUL: 0.06
MULTIPLIER, MUL: 0.06
MULTIPLIER, MUL: 0.07
MULTIPLIER, MUL: 0.07
NRBC # BLD: 0 K/UL (ref 0–0.01)
NRBC BLD-RTO: 0 PER 100 WBC
ORDER INITIALS, ORDINIT: NORMAL
PLATELET # BLD AUTO: 120 K/UL (ref 150–400)
PMV BLD AUTO: 9.8 FL (ref 8.9–12.9)
POTASSIUM SERPL-SCNC: 3.9 MMOL/L (ref 3.5–5.1)
PROT SERPL-MCNC: 5.8 G/DL (ref 6.4–8.2)
RBC # BLD AUTO: 3.09 M/UL (ref 4.1–5.7)
SERVICE CMNT-IMP: ABNORMAL
SERVICE CMNT-IMP: NORMAL
SODIUM SERPL-SCNC: 139 MMOL/L (ref 136–145)
WBC # BLD AUTO: 16.1 K/UL (ref 4.1–11.1)

## 2020-03-28 PROCEDURE — 74011250637 HC RX REV CODE- 250/637: Performed by: PHYSICIAN ASSISTANT

## 2020-03-28 PROCEDURE — 74011636637 HC RX REV CODE- 636/637: Performed by: THORACIC SURGERY (CARDIOTHORACIC VASCULAR SURGERY)

## 2020-03-28 PROCEDURE — 82962 GLUCOSE BLOOD TEST: CPT

## 2020-03-28 PROCEDURE — 83735 ASSAY OF MAGNESIUM: CPT

## 2020-03-28 PROCEDURE — 80053 COMPREHEN METABOLIC PANEL: CPT

## 2020-03-28 PROCEDURE — 71045 X-RAY EXAM CHEST 1 VIEW: CPT

## 2020-03-28 PROCEDURE — 85027 COMPLETE CBC AUTOMATED: CPT

## 2020-03-28 PROCEDURE — 74011636637 HC RX REV CODE- 636/637: Performed by: PHYSICIAN ASSISTANT

## 2020-03-28 PROCEDURE — 65660000001 HC RM ICU INTERMED STEPDOWN

## 2020-03-28 PROCEDURE — 97110 THERAPEUTIC EXERCISES: CPT

## 2020-03-28 PROCEDURE — 74011250636 HC RX REV CODE- 250/636: Performed by: PHYSICIAN ASSISTANT

## 2020-03-28 PROCEDURE — 36415 COLL VENOUS BLD VENIPUNCTURE: CPT

## 2020-03-28 PROCEDURE — 97530 THERAPEUTIC ACTIVITIES: CPT

## 2020-03-28 RX ORDER — INSULIN GLARGINE 100 [IU]/ML
22 INJECTION, SOLUTION SUBCUTANEOUS ONCE
Status: COMPLETED | OUTPATIENT
Start: 2020-03-28 | End: 2020-03-28

## 2020-03-28 RX ORDER — CLOPIDOGREL BISULFATE 75 MG/1
75 TABLET ORAL DAILY
Status: DISCONTINUED | OUTPATIENT
Start: 2020-03-28 | End: 2020-04-02 | Stop reason: HOSPADM

## 2020-03-28 RX ORDER — POTASSIUM CHLORIDE 750 MG/1
20 TABLET, FILM COATED, EXTENDED RELEASE ORAL
Status: COMPLETED | OUTPATIENT
Start: 2020-03-28 | End: 2020-03-28

## 2020-03-28 RX ORDER — LANOLIN ALCOHOL/MO/W.PET/CERES
1 CREAM (GRAM) TOPICAL
Status: DISCONTINUED | OUTPATIENT
Start: 2020-03-29 | End: 2020-04-02 | Stop reason: HOSPADM

## 2020-03-28 RX ORDER — SODIUM CHLORIDE 0.9 % (FLUSH) 0.9 %
5-40 SYRINGE (ML) INJECTION AS NEEDED
Status: DISCONTINUED | OUTPATIENT
Start: 2020-03-28 | End: 2020-04-02 | Stop reason: HOSPADM

## 2020-03-28 RX ORDER — CARVEDILOL 3.12 MG/1
3.12 TABLET ORAL 2 TIMES DAILY WITH MEALS
Status: DISCONTINUED | OUTPATIENT
Start: 2020-03-28 | End: 2020-03-29

## 2020-03-28 RX ORDER — SODIUM CHLORIDE 0.9 % (FLUSH) 0.9 %
5-40 SYRINGE (ML) INJECTION EVERY 8 HOURS
Status: DISCONTINUED | OUTPATIENT
Start: 2020-03-28 | End: 2020-04-02 | Stop reason: HOSPADM

## 2020-03-28 RX ADMIN — ACETAMINOPHEN 650 MG: 325 TABLET ORAL at 19:02

## 2020-03-28 RX ADMIN — Medication 2 G: at 07:04

## 2020-03-28 RX ADMIN — ATORVASTATIN CALCIUM 40 MG: 40 TABLET, FILM COATED ORAL at 20:50

## 2020-03-28 RX ADMIN — Medication 1 TABLET: at 08:43

## 2020-03-28 RX ADMIN — Medication 10 ML: at 07:04

## 2020-03-28 RX ADMIN — PANTOPRAZOLE SODIUM 40 MG: 40 TABLET, DELAYED RELEASE ORAL at 07:04

## 2020-03-28 RX ADMIN — MUPIROCIN: 20 OINTMENT TOPICAL at 17:59

## 2020-03-28 RX ADMIN — ASPIRIN 81 MG 81 MG: 81 TABLET ORAL at 08:43

## 2020-03-28 RX ADMIN — POLYETHYLENE GLYCOL 3350 17 G: 17 POWDER, FOR SOLUTION ORAL at 08:41

## 2020-03-28 RX ADMIN — CLOPIDOGREL BISULFATE 75 MG: 75 TABLET ORAL at 13:21

## 2020-03-28 RX ADMIN — MELATONIN 2 TABLET: at 17:52

## 2020-03-28 RX ADMIN — MAGNESIUM OXIDE TAB 400 MG (241.3 MG ELEMENTAL MG) 400 MG: 400 (241.3 MG) TAB at 08:43

## 2020-03-28 RX ADMIN — MAGNESIUM OXIDE TAB 400 MG (241.3 MG ELEMENTAL MG) 400 MG: 400 (241.3 MG) TAB at 17:51

## 2020-03-28 RX ADMIN — POTASSIUM CHLORIDE 20 MEQ: 750 TABLET, FILM COATED, EXTENDED RELEASE ORAL at 08:42

## 2020-03-28 RX ADMIN — MELATONIN 2 TABLET: at 08:43

## 2020-03-28 RX ADMIN — CARVEDILOL 3.12 MG: 3.12 TABLET, FILM COATED ORAL at 17:51

## 2020-03-28 RX ADMIN — SENNOSIDES AND DOCUSATE SODIUM 1 TABLET: 8.6; 5 TABLET ORAL at 08:43

## 2020-03-28 RX ADMIN — FINASTERIDE 5 MG: 5 TABLET, FILM COATED ORAL at 20:50

## 2020-03-28 RX ADMIN — ACETAMINOPHEN 650 MG: 325 TABLET ORAL at 01:07

## 2020-03-28 RX ADMIN — LEVOTHYROXINE SODIUM 100 MCG: 0.1 TABLET ORAL at 07:04

## 2020-03-28 RX ADMIN — Medication 10 ML: at 20:50

## 2020-03-28 RX ADMIN — ACETAMINOPHEN 650 MG: 325 TABLET ORAL at 05:09

## 2020-03-28 RX ADMIN — Medication 10 ML: at 07:12

## 2020-03-28 RX ADMIN — SENNOSIDES AND DOCUSATE SODIUM 1 TABLET: 8.6; 5 TABLET ORAL at 17:52

## 2020-03-28 RX ADMIN — CHLORHEXIDINE GLUCONATE 10 ML: 1.2 RINSE ORAL at 09:53

## 2020-03-28 RX ADMIN — TAMSULOSIN HYDROCHLORIDE 0.4 MG: 0.4 CAPSULE ORAL at 17:51

## 2020-03-28 RX ADMIN — Medication 10 ML: at 20:51

## 2020-03-28 RX ADMIN — CHLORHEXIDINE GLUCONATE 10 ML: 1.2 RINSE ORAL at 20:53

## 2020-03-28 RX ADMIN — ACETAMINOPHEN 650 MG: 325 TABLET ORAL at 08:43

## 2020-03-28 RX ADMIN — MUPIROCIN: 20 OINTMENT TOPICAL at 09:53

## 2020-03-28 RX ADMIN — Medication 10 ML: at 15:17

## 2020-03-28 RX ADMIN — INSULIN GLARGINE 22 UNITS: 100 INJECTION, SOLUTION SUBCUTANEOUS at 19:51

## 2020-03-28 RX ADMIN — INSULIN LISPRO 4 UNITS: 100 INJECTION, SOLUTION INTRAVENOUS; SUBCUTANEOUS at 13:21

## 2020-03-28 RX ADMIN — Medication 10 ML: at 15:12

## 2020-03-28 RX ADMIN — ACETAMINOPHEN 650 MG: 325 TABLET ORAL at 13:02

## 2020-03-28 RX ADMIN — Medication 2 G: at 01:07

## 2020-03-28 RX ADMIN — INSULIN LISPRO 4 UNITS: 100 INJECTION, SOLUTION INTRAVENOUS; SUBCUTANEOUS at 08:42

## 2020-03-28 NOTE — PROGRESS NOTES
Faculty or Preceptor Review of Student Work    3/28/2020  - Shift times - 1500 to 2000    The RN documentation of patient care for FAD ? IO has been reviewed and approved. All medications have been administered under the direct supervision of the preceptor.     Filiberto Torres

## 2020-03-28 NOTE — PROGRESS NOTES
1500:     TRANSFER - IN REPORT:    Verbal report received from [facility] RN(name) on [employer]  being received from CVICU (unit) for routine progression of care      Report consisted of patients Situation, Background, Assessment and   Recommendations(SBAR). Information from the following report(s) SBAR, Kardex, Intake/Output, Recent Results, Med Rec Status and Cardiac Rhythm NSR, AVB 1st Degree was reviewed with the receiving nurse. Opportunity for questions and clarification was provided. Assessment completed upon patients arrival to unit and care assumed. 1510: Chest tube 1840mL. Patient arrived to unit on 2L O2 via NC, oxygen saturation was >95%. Patient was moved to room air and monitored, oxygen saturation dropped to 88-89%. Placed patient on 1L O2 NC, O2 saturation remained >95. Will continue to monitor patient condition. 1930: Bedside and Verbal shift change report given to 1161 Satya Vieira (oncoming nurse) by Lucas Patterson RN (offgoing nurse). Report included the following information SBAR, Kardex, Intake/Output, Recent Results, Med Rec Status and Cardiac Rhythm NSR, AVB 1st degree.

## 2020-03-28 NOTE — PROGRESS NOTES
1945: Bedside shift change report given to Adela Mendoza RN (oncoming nurse) by Ricardo Patterson RN (offgoing nurse). Report included the following information SBAR, Kardex, Procedure Summary, Intake/Output, MAR, Accordion, Recent Results, Med Rec Status, and Cardiac Rhythm NSR w/ BBB and 1st degree AVB . 2000: Assumed care of patient resting quietly in chair, AO x 4, VSS, no complaints of pain. 2145: Pt returned to bed. Up with 2.   0000: No change to previous assessment. 0400: No change  0420: Radiology techinician called about possible pneumothorax in R lung. 0423: Spoke with Dr. Ga Powell and informed about CXR. He reviewed film and stated no concern for pneumo. 0700: OOB to chair. 0800: Bedside shift change report given to Thai High RN (oncoming nurse) by Adela Mendoza RN (offgoing nurse). Report included the following information SBAR, Kardex, Procedure Summary, Intake/Output, MAR, Accordion, Recent Results, Med Rec Status and Cardiac Rhythm NSR w/ BBB and 1st degree AVB. Problem: Pressure Injury - Risk of  Goal: *Prevention of pressure injury  Description: Document Morris Scale and appropriate interventions in the flowsheet.   Outcome: Progressing Towards Goal  Note: Pressure Injury Interventions:  Sensory Interventions: Assess changes in LOC, Assess need for specialty bed, Float heels, Discuss PT/OT consult with provider, Keep linens dry and wrinkle-free, Maintain/enhance activity level, Minimize linen layers, Monitor skin under medical devices    Moisture Interventions: Absorbent underpads, Internal/External urinary devices, Limit adult briefs, Minimize layers, Moisture barrier, Maintain skin hydration (lotion/cream)    Activity Interventions: Assess need for specialty bed, Increase time out of bed, Pressure redistribution bed/mattress(bed type), PT/OT evaluation    Mobility Interventions: Assess need for specialty bed, Float heels, Pressure redistribution bed/mattress (bed type), PT/OT evaluation    Nutrition Interventions: Document food/fluid/supplement intake    Friction and Shear Interventions: Lift sheet                Problem: Falls - Risk of  Goal: *Absence of Falls  Description: Document Serg Fall Risk and appropriate interventions in the flowsheet.   Outcome: Progressing Towards Goal  Note: Fall Risk Interventions:  Mobility Interventions: Communicate number of staff needed for ambulation/transfer, OT consult for ADLs, Patient to call before getting OOB, PT Consult for mobility concerns, PT Consult for assist device competence, Strengthening exercises (ROM-active/passive), Utilize walker, cane, or other assistive device, Utilize gait belt for transfers/ambulation         Medication Interventions: Evaluate medications/consider consulting pharmacy, Patient to call before getting OOB, Teach patient to arise slowly    Elimination Interventions: Call light in reach, Patient to call for help with toileting needs, Stay With Me (per policy), Toilet paper/wipes in reach, Toileting schedule/hourly rounds              Problem: Unstable Angina/NSTEMI: Discharge Outcomes  Goal: *Hemodynamically stable  Outcome: Progressing Towards Goal  Goal: *Stable cardiac rhythm  Outcome: Progressing Towards Goal  Goal: *Lungs clear or at baseline  Outcome: Progressing Towards Goal  Goal: *Optimal pain control at patient's stated goal  Outcome: Progressing Towards Goal     Problem: CABG: Post-Op Day 1  Goal: Off Pathway (Use only if patient is Off Pathway)  Outcome: Resolved/Met  Goal: Activity/Safety  Outcome: Resolved/Met  Goal: Consults, if ordered  Outcome: Resolved/Met  Goal: Diagnostic Test/Procedures  Outcome: Resolved/Met  Goal: Nutrition/Diet  Outcome: Resolved/Met  Goal: Medications  Outcome: Resolved/Met  Goal: Respiratory  Outcome: Resolved/Met  Goal: Treatments/Interventions/Procedures  Outcome: Resolved/Met  Goal: Psychosocial  Outcome: Resolved/Met  Goal: *Hemodynamically stable without vasoactive medications  Outcome: Resolved/Met  Goal: *Lungs clear or at baseline  Outcome: Resolved/Met  Goal: *Mechanical ventilation discontinued  Outcome: Resolved/Met  Goal: *Optimal pain control at patient's stated goal  Outcome: Resolved/Met  Goal: *Demonstrates progressive activity  Outcome: Resolved/Met  Goal: *Tolerating diet  Outcome: Resolved/Met  Goal: *Level of consciousness returns to baseline  Outcome: Resolved/Met

## 2020-03-28 NOTE — PROGRESS NOTES
[facility] ICU Progress Note    Admit Date: 3/25/2020  POD:  2 Day Post-Op    Procedure:  Procedure(s):  CORONARY ARTERY BYPASS GRAFTING X 5   WITH LEFT LEG EVH AND LIMA HARVESTING. CARDIOPULMONARY BYPASS. TRANSESOPHAGEAL ECHOCARDIOGRAM AND EPI AORTIC ULTRASOUND BY DR. Brittany West. Subjective:   Pt seen with Dr. Adonis Thomas Off gtts. On 3LNC.  NSR. Feels well. Objective:   Vitals:  Blood pressure 103/56, pulse 84, temperature 98.1 °F (36.7 °C), resp. rate 26, height 5' 11\" (1.803 m), weight 206 lb 11.2 oz (93.8 kg), SpO2 97 %. Temp (24hrs), Av.2 °F (37.3 °C), Min:97.6 °F (36.4 °C), Max:100.7 °F (38.2 °C)    EKG/Rhythm: NSR 70s    CT Output: 140 mL (420 mL)    Oxygen Therapy:  Oxygen Therapy  O2 Sat (%): 97 % (20 0800)  Pulse via Oximetry: 92 beats per minute (20 1715)  O2 Device: Nasal cannula (20 0800)  O2 Flow Rate (L/min): 3 l/min (20 0800)  FIO2 (%): 50 % (20 0041)    CXR:   CXR Results  (Last 48 hours)               20 0418  XR CHEST PORT Final result    Impression:  IMPRESSION:       Interval diminished lung lines with increased right basilar atelectasis and   increased right-sided effusion. Removal of ET tube, NG tube and [place] catheter. Narrative:  CLINICAL HISTORY: Chest pain    INDICATION: Chest pain, coronary artery disease       COMPARISON: 3/27/2020       FINDINGS:    AP portable semiupright view of the chest is obtained at 3:35 AM. ET tube and NG   tube have been removed. Diminished lung volumes. Increased right basilar   atelectasis and effusion. . [place] catheter removed. Mediastinal and pleural   drains. Right basilar atelectasis and effusion not changed. Chronic deformity of   the right clavicle. 20 0539  XR CHEST PORT Final result    Impression:  IMPRESSION: No significant interval change.                Narrative:  CLINICAL HISTORY: Chest pain    INDICATION: Chest pain, coronary artery disease       COMPARISON: 3/26/2020 FINDINGS:    AP portable semiupright view of the chest is obtained at 4:16 AM. ET tube and NG   tube unchanged. [place] catheter unchanged. Mediastinal and pleural drains. Right basilar atelectasis and effusion not changed. Chronic deformity of the   right clavicle.            03/26/20 2032  XR CHEST PORT Final result    Impression:  IMPRESSION: Status post median sternotomy. Small right pleural effusion. Narrative:  EXAM: XR CHEST PORT       INDICATION: postop heart       COMPARISON: 3/25/2020       FINDINGS: A portable AP radiograph of the chest was obtained at 2007 hours. There is interval median sternotomy. There is an endotracheal tube terminating   at the thoracic inlet, an NG tube tip in the stomach, a pulmonary arterial line   terminating in the right main pulmonary artery, and mediastinal-left chest   tubes. There is a small right pleural effusion. No pneumothorax is shown. Patchy   left basilar atelectasis is noted with otherwise clear lungs. Cardiac,   mediastinal and hilar contours appear stable. Admission Weight: Last Weight   Weight: 207 lb 0.2 oz (93.9 kg) Weight: 206 lb 11.2 oz (93.8 kg)     Intake / Output / Drain:  Current Shift: 03/28 0701 - 03/28 1900  In: 31.2 [I.V.:31.2]  Out: 30 [Urine:30]  Last 24 hrs.:     Intake/Output Summary (Last 24 hours) at 3/28/2020 0934  Last data filed at 3/28/2020 0800  Gross per 24 hour   Intake 939.06 ml   Output 1285 ml   Net -345.94 ml       EXAM:  General:  NAD      Lungs:   Clear to auscultation bilaterally. Incision:  Dressing c/d/i   Heart:  Regular rate and rhythm, S1, S2 normal, no murmur, click, rub or gallop. Abdomen:   Soft, non-tender. Bowel sounds hypoactive. No masses,  No organomegaly. Extremities:  No edema. PPP. Neurologic:  Gross motor and sensory apparatus intact.      Labs:   Recent Labs     03/28/20  0803  03/28/20  0417  03/27/20  1020   WBC  --   --  16.1*  --  11.8*   HGB  --   --  9.3*  --  9.5* HCT  --   --  28.6*  --  29.2*   PLT  --   --  120*  --  124*   NA  --   --  139  --   --    K  --   --  3.9  --   --    BUN  --   --  24*  --   --    CREA  --   --  0.97  --   --    GLU  --   --  90  --   --    GLUCPOC 94   < >  --    < >  --    INR  --   --   --   --  1.1    < > = values in this interval not displayed. 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 CAD with NSTEMI on this admission s/p CABG: On ASA, statin. Start plavix. Start BB this evening (Hold for SBP<90 and HR <60)     2. Atelectasis/leukocytosis: Encourage IS. Wean O2 for SpO2 > 92%.      3. Hx HTN: BB     4. HLD:  Continue Statin    5. Anemia secondary to acute blood loss: Monitor H/H, CT output     5. Pre-diabetes: A1c 5.9. Insulin gtt per protocol.      6. Hypothyroid:  Continue Synthroid. TSH of 2.34 in December     7. Mitral valve prolapse/MR/AS: All mild on intra-operative WILMER.      8. BPH: Continue flomax and finasteride. Keep olivares, consult urology for management/irrigation.     9. Arthritis/Chronic back pain: No current treatment    10. Intra-op bradycardia/first degree AV block: Hold amio per Birgit Knight. Restart BB. Dispo: PT/OT. Transfer [facility].       Signed By: TOM Novoa

## 2020-03-29 ENCOUNTER — APPOINTMENT (OUTPATIENT)
Dept: GENERAL RADIOLOGY | Age: 80
DRG: 234 | End: 2020-03-29
Attending: PHYSICIAN ASSISTANT
Payer: MEDICARE

## 2020-03-29 LAB
ANION GAP SERPL CALC-SCNC: 4 MMOL/L (ref 5–15)
APTT PPP: 32.8 SEC (ref 22.1–32)
BUN SERPL-MCNC: 30 MG/DL (ref 6–20)
BUN/CREAT SERPL: 33 (ref 12–20)
CALCIUM SERPL-MCNC: 8.7 MG/DL (ref 8.5–10.1)
CHLORIDE SERPL-SCNC: 105 MMOL/L (ref 97–108)
CO2 SERPL-SCNC: 27 MMOL/L (ref 21–32)
CREAT SERPL-MCNC: 0.9 MG/DL (ref 0.7–1.3)
ERYTHROCYTE [DISTWIDTH] IN BLOOD BY AUTOMATED COUNT: 13.8 % (ref 11.5–14.5)
GLUCOSE BLD STRIP.AUTO-MCNC: 132 MG/DL (ref 65–100)
GLUCOSE BLD STRIP.AUTO-MCNC: 154 MG/DL (ref 65–100)
GLUCOSE BLD STRIP.AUTO-MCNC: 164 MG/DL (ref 65–100)
GLUCOSE BLD STRIP.AUTO-MCNC: 224 MG/DL (ref 65–100)
GLUCOSE SERPL-MCNC: 134 MG/DL (ref 65–100)
HCT VFR BLD AUTO: 26.4 % (ref 36.6–50.3)
HGB BLD-MCNC: 8.5 G/DL (ref 12.1–17)
INR PPP: 1 (ref 0.9–1.1)
MAGNESIUM SERPL-MCNC: 2.3 MG/DL (ref 1.6–2.4)
MCH RBC QN AUTO: 29.7 PG (ref 26–34)
MCHC RBC AUTO-ENTMCNC: 32.2 G/DL (ref 30–36.5)
MCV RBC AUTO: 92.3 FL (ref 80–99)
NRBC # BLD: 0 K/UL (ref 0–0.01)
NRBC BLD-RTO: 0 PER 100 WBC
PLATELET # BLD AUTO: 147 K/UL (ref 150–400)
PMV BLD AUTO: 9.9 FL (ref 8.9–12.9)
POTASSIUM SERPL-SCNC: 4.4 MMOL/L (ref 3.5–5.1)
PROTHROMBIN TIME: 10.6 SEC (ref 9–11.1)
RBC # BLD AUTO: 2.86 M/UL (ref 4.1–5.7)
SERVICE CMNT-IMP: ABNORMAL
SODIUM SERPL-SCNC: 136 MMOL/L (ref 136–145)
THERAPEUTIC RANGE,PTTT: ABNORMAL SECS (ref 58–77)
WBC # BLD AUTO: 15.2 K/UL (ref 4.1–11.1)

## 2020-03-29 PROCEDURE — 82962 GLUCOSE BLOOD TEST: CPT

## 2020-03-29 PROCEDURE — 65660000001 HC RM ICU INTERMED STEPDOWN

## 2020-03-29 PROCEDURE — 74011250637 HC RX REV CODE- 250/637: Performed by: PHYSICIAN ASSISTANT

## 2020-03-29 PROCEDURE — 74011636637 HC RX REV CODE- 636/637: Performed by: PHYSICIAN ASSISTANT

## 2020-03-29 PROCEDURE — 85730 THROMBOPLASTIN TIME PARTIAL: CPT

## 2020-03-29 PROCEDURE — 36415 COLL VENOUS BLD VENIPUNCTURE: CPT

## 2020-03-29 PROCEDURE — 71045 X-RAY EXAM CHEST 1 VIEW: CPT

## 2020-03-29 PROCEDURE — 80048 BASIC METABOLIC PNL TOTAL CA: CPT

## 2020-03-29 PROCEDURE — 83735 ASSAY OF MAGNESIUM: CPT

## 2020-03-29 PROCEDURE — 97116 GAIT TRAINING THERAPY: CPT

## 2020-03-29 PROCEDURE — 85027 COMPLETE CBC AUTOMATED: CPT

## 2020-03-29 PROCEDURE — 97530 THERAPEUTIC ACTIVITIES: CPT

## 2020-03-29 PROCEDURE — 85610 PROTHROMBIN TIME: CPT

## 2020-03-29 RX ORDER — CARVEDILOL 6.25 MG/1
6.25 TABLET ORAL 2 TIMES DAILY WITH MEALS
Status: DISCONTINUED | OUTPATIENT
Start: 2020-03-29 | End: 2020-04-01

## 2020-03-29 RX ADMIN — MELATONIN 2 TABLET: at 17:26

## 2020-03-29 RX ADMIN — SENNOSIDES AND DOCUSATE SODIUM 1 TABLET: 8.6; 5 TABLET ORAL at 17:26

## 2020-03-29 RX ADMIN — LEVOTHYROXINE SODIUM 50 MCG: 0.1 TABLET ORAL at 06:48

## 2020-03-29 RX ADMIN — Medication 10 ML: at 11:52

## 2020-03-29 RX ADMIN — CARVEDILOL 6.25 MG: 6.25 TABLET, FILM COATED ORAL at 08:34

## 2020-03-29 RX ADMIN — CARVEDILOL 6.25 MG: 6.25 TABLET, FILM COATED ORAL at 17:26

## 2020-03-29 RX ADMIN — CHLORHEXIDINE GLUCONATE 10 ML: 1.2 RINSE ORAL at 22:02

## 2020-03-29 RX ADMIN — PANTOPRAZOLE SODIUM 40 MG: 40 TABLET, DELAYED RELEASE ORAL at 06:19

## 2020-03-29 RX ADMIN — INSULIN LISPRO 3 UNITS: 100 INJECTION, SOLUTION INTRAVENOUS; SUBCUTANEOUS at 11:52

## 2020-03-29 RX ADMIN — ACETAMINOPHEN 650 MG: 325 TABLET ORAL at 03:15

## 2020-03-29 RX ADMIN — POLYETHYLENE GLYCOL 3350 17 G: 17 POWDER, FOR SOLUTION ORAL at 08:34

## 2020-03-29 RX ADMIN — MUPIROCIN: 20 OINTMENT TOPICAL at 17:29

## 2020-03-29 RX ADMIN — ATORVASTATIN CALCIUM 40 MG: 40 TABLET, FILM COATED ORAL at 21:56

## 2020-03-29 RX ADMIN — MELATONIN 2 TABLET: at 08:34

## 2020-03-29 RX ADMIN — CHLORHEXIDINE GLUCONATE 10 ML: 1.2 RINSE ORAL at 08:36

## 2020-03-29 RX ADMIN — SENNOSIDES AND DOCUSATE SODIUM 1 TABLET: 8.6; 5 TABLET ORAL at 08:34

## 2020-03-29 RX ADMIN — CLOPIDOGREL BISULFATE 75 MG: 75 TABLET ORAL at 08:34

## 2020-03-29 RX ADMIN — MAGNESIUM OXIDE TAB 400 MG (241.3 MG ELEMENTAL MG) 400 MG: 400 (241.3 MG) TAB at 17:26

## 2020-03-29 RX ADMIN — FINASTERIDE 5 MG: 5 TABLET, FILM COATED ORAL at 21:56

## 2020-03-29 RX ADMIN — Medication 10 ML: at 22:00

## 2020-03-29 RX ADMIN — Medication 10 ML: at 06:49

## 2020-03-29 RX ADMIN — FERROUS SULFATE TAB 325 MG (65 MG ELEMENTAL FE) 325 MG: 325 (65 FE) TAB at 08:35

## 2020-03-29 RX ADMIN — INSULIN LISPRO 2 UNITS: 100 INJECTION, SOLUTION INTRAVENOUS; SUBCUTANEOUS at 17:26

## 2020-03-29 RX ADMIN — TAMSULOSIN HYDROCHLORIDE 0.4 MG: 0.4 CAPSULE ORAL at 17:27

## 2020-03-29 RX ADMIN — MUPIROCIN: 20 OINTMENT TOPICAL at 08:36

## 2020-03-29 RX ADMIN — Medication 10 ML: at 06:18

## 2020-03-29 RX ADMIN — ASPIRIN 81 MG 81 MG: 81 TABLET ORAL at 08:35

## 2020-03-29 RX ADMIN — MAGNESIUM OXIDE TAB 400 MG (241.3 MG ELEMENTAL MG) 400 MG: 400 (241.3 MG) TAB at 08:34

## 2020-03-29 RX ADMIN — Medication 1 TABLET: at 08:35

## 2020-03-29 NOTE — PROGRESS NOTES
Bedside and Verbal shift change report given to [facility] (oncoming nurse) by Carmella Willett RN (offgoing nurse). Report included the following information SBAR, Kardex, Procedure Summary, Intake/Output, MAR, Recent Results and Cardiac Rhythm NSR 1st degree. Cardiac Surgery Shift Summary:    1. I/O's: Intake:   Meals: Greater than 75% of each meal (excellent)     Output: Russell catheter in place   Has patient had BM since surgery? No     2. Oxygen Requirements: WDL (on room air)    3. Daily Weights (weight change in 24 hours): No significant change in weight (0 - 2 lbs.)    4. Medication Administration: No variations to medication administration             Bedside and Verbal shift change report given to Rufina Bullock (oncoming nurse) by Neymar Uriarte RN (offgoing nurse). Report included the following information SBAR, Kardex, Procedure Summary, Intake/Output, MAR, Recent Results and Cardiac Rhythm NSR 1st degree.

## 2020-03-29 NOTE — PROGRESS NOTES
1930: Bedside shift change report received by Danny Jc (offgoing nurse). Report included the following information SBAR, Kardex, Procedure Summary, Intake/Output, MAR, Recent Results, and Cardiac Rhythm NSR .     0700:   Cardiac Surgery Shift Summary:    1. I/O's:   Output: Russell catheter in place   Has patient had BM since surgery? No     2. Oxygen Requirements: Patient on 1 L O2 (stable)    3. Daily Weights (weight change in 24 hours): No significant change in weight (0 - 2 lbs.)    4. Medication Administration: No variations to medication administration     0730: Bedside shift change report given to [facility] (oncoming nurse). Report included the following information SBAR, Kardex, Procedure Summary, Intake/Output, MAR, Recent Results and Cardiac Rhythm NSR.       ----------------  Care Plan 2008  Problem: Pressure Injury - Risk of  Goal: *Prevention of pressure injury  Description: Document Morris Scale and appropriate interventions in the flowsheet. Outcome: Progressing Towards Goal  Note: Pressure Injury Interventions:  Sensory Interventions: Assess changes in LOC, Assess need for specialty bed, Float heels, Discuss PT/OT consult with provider, Keep linens dry and wrinkle-free, Maintain/enhance activity level, Minimize linen layers, Monitor skin under medical devices    Moisture Interventions: Absorbent underpads, Internal/External urinary devices, Limit adult briefs, Minimize layers, Moisture barrier, Maintain skin hydration (lotion/cream)    Activity Interventions: Increase time out of bed, Pressure redistribution bed/mattress(bed type), PT/OT evaluation    Mobility Interventions: HOB 30 degrees or less, Pressure redistribution bed/mattress (bed type), PT/OT evaluation, Turn and reposition approx.  every two hours(pillow and wedges)    Nutrition Interventions: Document food/fluid/supplement intake    Friction and Shear Interventions: Lift sheet                Problem: Falls - Risk of  Goal: *Absence of Falls  Description: Document Hood Romero Fall Risk and appropriate interventions in the flowsheet.   Outcome: Progressing Towards Goal  Note: Fall Risk Interventions:  Mobility Interventions: Communicate number of staff needed for ambulation/transfer, OT consult for ADLs, Patient to call before getting OOB, PT Consult for mobility concerns         Medication Interventions: Evaluate medications/consider consulting pharmacy, Patient to call before getting OOB, Teach patient to arise slowly    Elimination Interventions: Call light in reach, Patient to call for help with toileting needs, Stay With Me (per policy), Toileting schedule/hourly rounds              Problem: CABG: Post-Op Day 2/Transfer Day  Goal: Activity/Safety  Outcome: Progressing Towards Goal  Note: Up with x 1 assist  Goal: Diagnostic Test/Procedures  Outcome: Progressing Towards Goal  Goal: Nutrition/Diet  Outcome: Progressing Towards Goal  Note: Tolerating  Goal: Medications  Outcome: Progressing Towards Goal  Note: See MAR  Goal: Discharge Planning  Outcome: Progressing Towards Goal  Goal: Respiratory  Outcome: Progressing Towards Goal  Note: See assessment  Goal: Treatments/Interventions/Procedures  Outcome: Progressing Towards Goal  Goal: Psychosocial  Outcome: Progressing Towards Goal  Note: Calm, cooperative, pleasant  Goal: *Hemodynamically stable without vasoactive medications  Outcome: Progressing Towards Goal  Goal: *Lungs clear or at baseline  Outcome: Progressing Towards Goal  Goal: *Optimal pain control at patient's stated goal  Outcome: Progressing Towards Goal  Note: Denies pain at present    Goal: *Demonstrates progressive activity  Outcome: Progressing Towards Goal  Goal: *Tolerating diet  Outcome: Progressing Towards Goal

## 2020-03-29 NOTE — PROGRESS NOTES
Problem: Pressure Injury - Risk of  Goal: *Prevention of pressure injury  Description: Document Morris Scale and appropriate interventions in the flowsheet. Outcome: Progressing Towards Goal  Note: Pressure Injury Interventions:  Sensory Interventions: Assess changes in LOC, Assess need for specialty bed, Float heels, Discuss PT/OT consult with provider, Keep linens dry and wrinkle-free, Maintain/enhance activity level, Minimize linen layers, Monitor skin under medical devices    Moisture Interventions: Absorbent underpads, Internal/External urinary devices, Limit adult briefs, Minimize layers, Moisture barrier, Maintain skin hydration (lotion/cream)    Activity Interventions: Chair cushion, Increase time out of bed, Pressure redistribution bed/mattress(bed type), PT/OT evaluation    Mobility Interventions: Chair cushion, Float heels, HOB 30 degrees or less, Pressure redistribution bed/mattress (bed type), PT/OT evaluation    Nutrition Interventions: Document food/fluid/supplement intake    Friction and Shear Interventions: Lift sheet                Problem: Falls - Risk of  Goal: *Absence of Falls  Description: Document Serg Fall Risk and appropriate interventions in the flowsheet.   Outcome: Progressing Towards Goal  Note: Fall Risk Interventions:  Mobility Interventions: Communicate number of staff needed for ambulation/transfer, OT consult for ADLs, Patient to call before getting OOB, PT Consult for mobility concerns, PT Consult for assist device competence, Utilize gait belt for transfers/ambulation         Medication Interventions: Evaluate medications/consider consulting pharmacy    Elimination Interventions: Call light in reach, Patient to call for help with toileting needs              Problem: CABG: Post-Op Day 3  Goal: Activity/Safety  Outcome: Progressing Towards Goal  Goal: Consults, if ordered  Outcome: Progressing Towards Goal  Goal: Diagnostic Test/Procedures  Outcome: Progressing Towards Goal  Goal: Nutrition/Diet  Outcome: Progressing Towards Goal  Goal: Discharge Planning  Outcome: Progressing Towards Goal  Goal: Medications  Outcome: Progressing Towards Goal  Goal: Respiratory  Outcome: Progressing Towards Goal  Goal: Treatments/Interventions/Procedures  Outcome: Progressing Towards Goal  Goal: Psychosocial  Outcome: Progressing Towards Goal  Goal: *Hemodynamically stable  Outcome: Progressing Towards Goal  Goal: *Lungs clear or at baseline  Outcome: Progressing Towards Goal  Goal: *Optimal pain control at patient's stated goal  Outcome: Progressing Towards Goal  Goal: *Incisions without signs and symptoms of wound complication  Outcome: Progressing Towards Goal  Goal: *Demonstrates progressive activity  Outcome: Progressing Towards Goal  Goal: *Tolerating diet  Outcome: Progressing Towards Goal

## 2020-03-29 NOTE — PROGRESS NOTES
[facility] ICU Progress Note    Admit Date: 3/25/2020  POD:  4 Day Post-Op    Procedure:  Procedure(s):  CORONARY ARTERY BYPASS GRAFTING X 5   WITH LEFT LEG EVH AND LIMA HARVESTING. CARDIOPULMONARY BYPASS. TRANSESOPHAGEAL ECHOCARDIOGRAM AND EPI AORTIC ULTRASOUND BY DR. Jesusita Richard. Subjective:   Pt seen with Dr. Cristina Martines. On RA.  NSR. Feels well. Objective:   Vitals:  Blood pressure 126/54, pulse 94, temperature 98.9 °F (37.2 °C), resp. rate 19, height 5' 11\" (1.803 m), weight 205 lb 14.6 oz (93.4 kg), SpO2 94 %. Temp (24hrs), Av.3 °F (36.8 °C), Min:97.9 °F (36.6 °C), Max:98.9 °F (37.2 °C)    EKG/Rhythm: NSR 90s    CT Output: 80 mL     Oxygen Therapy:  Oxygen Therapy  O2 Sat (%): 94 % (20)  Pulse via Oximetry: 92 beats per minute (20 1715)  O2 Device: Nasal cannula (20)  O2 Flow Rate (L/min): 1 l/min (20)  FIO2 (%): 50 % (20 0041)    CXR:   CXR Results  (Last 48 hours)               20 0418  XR CHEST PORT Final result    Impression:  IMPRESSION:       Interval diminished lung lines with increased right basilar atelectasis and   increased right-sided effusion. Removal of ET tube, NG tube and [place] catheter. Narrative:  CLINICAL HISTORY: Chest pain    INDICATION: Chest pain, coronary artery disease       COMPARISON: 3/27/2020       FINDINGS:    AP portable semiupright view of the chest is obtained at 3:35 AM. ET tube and NG   tube have been removed. Diminished lung volumes. Increased right basilar   atelectasis and effusion. . [place] catheter removed. Mediastinal and pleural   drains. Right basilar atelectasis and effusion not changed. Chronic deformity of   the right clavicle. Admission Weight: Last Weight   Weight: 207 lb 0.2 oz (93.9 kg) Weight: 205 lb 14.6 oz (93.4 kg)     Intake / Output / Drain:  Current Shift: No intake/output data recorded.   Last 24 hrs.:     Intake/Output Summary (Last 24 hours) at 3/29/2020 1133  Last data filed at 3/29/2020 0312  Gross per 24 hour   Intake 978.3 ml   Output 910 ml   Net 68.3 ml       EXAM:  General:  NAD      Lungs:   Clear to auscultation bilaterally. Incision:  Dressing c/d/i   Heart:  Regular rate and rhythm, S1, S2 normal, no murmur, click, rub or gallop. Abdomen:   Soft, non-tender. Bowel sounds hypoactive. No masses,  No organomegaly. Extremities:  No edema. PPP. Neurologic:  Gross motor and sensory apparatus intact. 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 CAD with NSTEMI on this admission s/p CABG: On ASA, statin. Start plavix. On BB (Hold for SBP<90 and HR <60)     2. Atelectasis/leukocytosis: Encourage IS. Wean O2 for SpO2 > 92%.      3. Hx HTN: BB     4. HLD:  Continue Statin    5. Anemia secondary to acute blood loss: Monitor H/H     5. Pre-diabetes: A1c 5.9. Insulin gtt per protocol.      6. Hypothyroid:  Continue Synthroid. TSH of 2.34 in December     7. Mitral valve prolapse/MR/AS: All mild on intra-operative WILMER.      8. BPH: Continue flomax and finasteride. Keep olivares, consult urology for management/irrigation.     9. Arthritis/Chronic back pain: No current treatment    10. Intra-op bradycardia/first degree AV block: Hold amio per [employer]. On BB. Dispo: PT/OT. Potential DC home with olivares and urology appointment tomorrow. CTx3 removed without issue.       Signed By: TOM Vazquez

## 2020-03-29 NOTE — PROGRESS NOTES
Problem: Mobility Impaired (Adult and Pediatric)  Goal: *Acute Goals and Plan of Care (Insert Text)  Description: FUNCTIONAL STATUS PRIOR TO ADMISSION: Patient was independent and active without use of DME. When asked, he reported no falls in the last 12 months. HOME SUPPORT PRIOR TO ADMISSION: The patient lived with his wife but did not require assist.    Physical Therapy Goals  Initiated 3/27/2020  1. Patient will move from supine to sit and sit to supine , scoot up and down and roll side to side in bed with independence within 5 days. 2.  Patient will perform sit to/from stand with independence within 5 days. 3.  Patient will ambulate 300 feet with least restrictive assistive device and independence within 5 days. 4.  Patient will ascend/descend 4 stairs with one handrail(s) with modified independence within 5 days. 5.  Patient will perform cardiac exercises per protocol with independence within 5 days. 6.  Patient will verbally recall and functionally demonstrate mindful-based movements (\"move in the tube\") principles without cues within 5 days. Outcome: Progressing Towards Goal    PHYSICAL THERAPY TREATMENT  Patient: Eri Shultz (68 y.o. male)  Date: 3/29/2020  Diagnosis: NSTEMI (non-ST elevated myocardial infarction) ([facility] [address] [I21.4]   S/P CABG x 5  Procedure(s) (LRB):  CORONARY ARTERY BYPASS GRAFTING X 5   WITH LEFT LEG EVH AND LIMA HARVESTING. CARDIOPULMONARY BYPASS. TRANSESOPHAGEAL ECHOCARDIOGRAM AND EPI AORTIC ULTRASOUND BY DR. Magdy Abdul. (N/A) 3 Days Post-Op  Precautions: Fall, Sternal (Move in the Tube)  Chart, physical therapy assessment, plan of care and goals were reviewed. ASSESSMENT  Patient continues with skilled PT services and is progressing towards goals. Patient slightly dyspneic with activity, though SpO2 > 96% on room air (therapist directed standing rest breaks for pursed-lip breathing implementation and for appropriate pacing).  Patient progressed his gait tolerance to 140 ft - fair steadiness, shortened stride length, limited use of reciprocal arm swing (improved with cuing). Initiated stair training - encouraged step-to pattern for pacing and fall prevention and use of handrails for balance only (open palm). Tried to problem solve through getting into/out of a 36 inch high bed - patient unable to stand on toes and lift buttocks up to edge of bed. Patient does have a step-stool, however, deem unsafe to utilize at this time based on need for handrail utilization. Therefore, patient does have a [mechanical] recliner and will sleep in initially. Reviewed duration of adherence to [facility] in the Tube,\" driving restrictions, how to open/close bathroom doors and car doors. Current Level of Function Impacting Discharge (mobility/balance): CGA    Other factors to consider for discharge: Lives with wife, New sternotomy          PLAN :  Patient continues to benefit from skilled intervention to address the above impairments. Continue treatment per established plan of care. to address goals. Recommendation for discharge: (in order for the patient to meet his/her long term goals)  Physical therapy at least 2 days/week in the home AND ensure assist and/or supervision for safety with community reintegration and stair negotiation into home     This discharge recommendation:  Has been made in collaboration with the attending provider and/or case management    IF patient discharges home will need the following DME: none       SUBJECTIVE:   Patient stated Did you hear that I'm leaving tomorrow?     OBJECTIVE DATA SUMMARY:   Critical Behavior:  Neurologic State: Alert  Orientation Level: Oriented X4  Cognition: Appropriate decision making, Appropriate for age attention/concentration, Appropriate safety awareness, Follows commands  Safety/Judgement: Decreased insight into deficits  Functional Mobility Training:    Transfers:  Sit to Stand: Contact guard assistance  Stand to Sit: Stand-by assistance    Balance:  Sitting: Intact; Without support  Standing: Impaired; Without support  Standing - Static: Good  Standing - Dynamic : Good;Fair  Ambulation/Gait Training:  Distance (ft): 140 Feet (ft)  Assistive Device: Gait belt  Ambulation - Level of Assistance: Assist x1;Contact guard assistance        Gait Abnormalities: Decreased step clearance; Altered arm swing        Base of Support: Narrowed  Stance: Weight shift  Speed/Gaby: Slow  Step Length: Right shortened;Left shortened    Stairs:  X 4 steps  Stairs - Level of Assistance: Contact guard assistance(Encouraged step-to pattern)   Rail Use: Both(Open palm, for balance only)    Activity Tolerance:   Fair and observed SOB with activity  Please refer to the flowsheet for vital signs taken during this treatment. After treatment patient left in no apparent distress:   Sitting in chair and Call bell within reach    COMMUNICATION/COLLABORATION:   The patients plan of care was discussed with: Physical therapist and Registered nurse.      Juleen Angelucci, PT, DPT   Time Calculation: 25 mins

## 2020-03-30 ENCOUNTER — APPOINTMENT (OUTPATIENT)
Dept: GENERAL RADIOLOGY | Age: 80
DRG: 234 | End: 2020-03-30
Attending: THORACIC SURGERY (CARDIOTHORACIC VASCULAR SURGERY)
Payer: MEDICARE

## 2020-03-30 ENCOUNTER — HOME HEALTH ADMISSION (OUTPATIENT)
Dept: HOME HEALTH SERVICES | Facility: HOME HEALTH | Age: 80
End: 2020-03-30
Payer: MEDICARE

## 2020-03-30 LAB
ANION GAP SERPL CALC-SCNC: 6 MMOL/L (ref 5–15)
BUN SERPL-MCNC: 28 MG/DL (ref 6–20)
BUN/CREAT SERPL: 29 (ref 12–20)
CALCIUM SERPL-MCNC: 8.7 MG/DL (ref 8.5–10.1)
CHLORIDE SERPL-SCNC: 103 MMOL/L (ref 97–108)
CO2 SERPL-SCNC: 29 MMOL/L (ref 21–32)
CREAT SERPL-MCNC: 0.96 MG/DL (ref 0.7–1.3)
ERYTHROCYTE [DISTWIDTH] IN BLOOD BY AUTOMATED COUNT: 14 % (ref 11.5–14.5)
GLUCOSE BLD STRIP.AUTO-MCNC: 135 MG/DL (ref 65–100)
GLUCOSE BLD STRIP.AUTO-MCNC: 137 MG/DL (ref 65–100)
GLUCOSE BLD STRIP.AUTO-MCNC: 147 MG/DL (ref 65–100)
GLUCOSE SERPL-MCNC: 139 MG/DL (ref 65–100)
HCT VFR BLD AUTO: 26.8 % (ref 36.6–50.3)
HGB BLD-MCNC: 8.5 G/DL (ref 12.1–17)
MAGNESIUM SERPL-MCNC: 2.4 MG/DL (ref 1.6–2.4)
MCH RBC QN AUTO: 29.4 PG (ref 26–34)
MCHC RBC AUTO-ENTMCNC: 31.7 G/DL (ref 30–36.5)
MCV RBC AUTO: 92.7 FL (ref 80–99)
NRBC # BLD: 0 K/UL (ref 0–0.01)
NRBC BLD-RTO: 0 PER 100 WBC
PLATELET # BLD AUTO: 206 K/UL (ref 150–400)
PMV BLD AUTO: 9.5 FL (ref 8.9–12.9)
POTASSIUM SERPL-SCNC: 4.2 MMOL/L (ref 3.5–5.1)
RBC # BLD AUTO: 2.89 M/UL (ref 4.1–5.7)
SERVICE CMNT-IMP: ABNORMAL
SODIUM SERPL-SCNC: 138 MMOL/L (ref 136–145)
WBC # BLD AUTO: 13.2 K/UL (ref 4.1–11.1)

## 2020-03-30 PROCEDURE — 97535 SELF CARE MNGMENT TRAINING: CPT

## 2020-03-30 PROCEDURE — 74011250637 HC RX REV CODE- 250/637: Performed by: PHYSICIAN ASSISTANT

## 2020-03-30 PROCEDURE — 65660000001 HC RM ICU INTERMED STEPDOWN

## 2020-03-30 PROCEDURE — 82962 GLUCOSE BLOOD TEST: CPT

## 2020-03-30 PROCEDURE — 71046 X-RAY EXAM CHEST 2 VIEWS: CPT

## 2020-03-30 PROCEDURE — 83735 ASSAY OF MAGNESIUM: CPT

## 2020-03-30 PROCEDURE — 97530 THERAPEUTIC ACTIVITIES: CPT

## 2020-03-30 PROCEDURE — 36415 COLL VENOUS BLD VENIPUNCTURE: CPT

## 2020-03-30 PROCEDURE — 97116 GAIT TRAINING THERAPY: CPT

## 2020-03-30 PROCEDURE — 85027 COMPLETE CBC AUTOMATED: CPT

## 2020-03-30 PROCEDURE — 94760 N-INVAS EAR/PLS OXIMETRY 1: CPT

## 2020-03-30 PROCEDURE — 74011636637 HC RX REV CODE- 636/637: Performed by: PHYSICIAN ASSISTANT

## 2020-03-30 PROCEDURE — 74011250637 HC RX REV CODE- 250/637: Performed by: NURSE PRACTITIONER

## 2020-03-30 PROCEDURE — 74011250636 HC RX REV CODE- 250/636: Performed by: NURSE PRACTITIONER

## 2020-03-30 PROCEDURE — 80048 BASIC METABOLIC PNL TOTAL CA: CPT

## 2020-03-30 RX ORDER — LANOLIN ALCOHOL/MO/W.PET/CERES
325 CREAM (GRAM) TOPICAL
Qty: 30 TAB | Refills: 0 | Status: SHIPPED | OUTPATIENT
Start: 2020-03-31 | End: 2020-05-01 | Stop reason: ALTCHOICE

## 2020-03-30 RX ORDER — AMOXICILLIN 250 MG
1 CAPSULE ORAL DAILY
Status: SHIPPED | COMMUNITY
Start: 2020-03-30 | End: 2020-06-18

## 2020-03-30 RX ORDER — POTASSIUM CHLORIDE 750 MG/1
20 TABLET, FILM COATED, EXTENDED RELEASE ORAL
Status: COMPLETED | OUTPATIENT
Start: 2020-03-30 | End: 2020-03-30

## 2020-03-30 RX ORDER — OXYCODONE HYDROCHLORIDE 5 MG/1
5 TABLET ORAL
Qty: 30 TAB | Refills: 0 | Status: SHIPPED | OUTPATIENT
Start: 2020-03-30 | End: 2020-04-04

## 2020-03-30 RX ORDER — ACETAMINOPHEN 325 MG/1
650 TABLET ORAL
Status: SHIPPED | COMMUNITY
Start: 2020-03-30

## 2020-03-30 RX ORDER — FUROSEMIDE 10 MG/ML
40 INJECTION INTRAMUSCULAR; INTRAVENOUS ONCE
Status: COMPLETED | OUTPATIENT
Start: 2020-03-30 | End: 2020-03-30

## 2020-03-30 RX ORDER — CLOPIDOGREL BISULFATE 75 MG/1
75 TABLET ORAL DAILY
Qty: 30 TAB | Refills: 5 | Status: SHIPPED | OUTPATIENT
Start: 2020-03-31 | End: 2020-05-14 | Stop reason: SDUPTHER

## 2020-03-30 RX ORDER — CARVEDILOL 6.25 MG/1
6.25 TABLET ORAL 2 TIMES DAILY WITH MEALS
Qty: 60 TAB | Refills: 1 | Status: SHIPPED | OUTPATIENT
Start: 2020-03-30 | End: 2020-04-01

## 2020-03-30 RX ADMIN — SENNOSIDES AND DOCUSATE SODIUM 1 TABLET: 8.6; 5 TABLET ORAL at 17:13

## 2020-03-30 RX ADMIN — MAGNESIUM OXIDE TAB 400 MG (241.3 MG ELEMENTAL MG) 400 MG: 400 (241.3 MG) TAB at 17:13

## 2020-03-30 RX ADMIN — DIPHENHYDRAMINE HYDROCHLORIDE 25 MG: 25 CAPSULE ORAL at 00:02

## 2020-03-30 RX ADMIN — Medication 10 ML: at 08:23

## 2020-03-30 RX ADMIN — CHLORHEXIDINE GLUCONATE 10 ML: 1.2 RINSE ORAL at 09:06

## 2020-03-30 RX ADMIN — CLOPIDOGREL BISULFATE 75 MG: 75 TABLET ORAL at 09:05

## 2020-03-30 RX ADMIN — CHLORHEXIDINE GLUCONATE 10 ML: 1.2 RINSE ORAL at 21:37

## 2020-03-30 RX ADMIN — MAGNESIUM OXIDE TAB 400 MG (241.3 MG ELEMENTAL MG) 400 MG: 400 (241.3 MG) TAB at 09:05

## 2020-03-30 RX ADMIN — ACETAMINOPHEN 650 MG: 325 TABLET ORAL at 07:19

## 2020-03-30 RX ADMIN — POTASSIUM CHLORIDE 20 MEQ: 750 TABLET, FILM COATED, EXTENDED RELEASE ORAL at 11:55

## 2020-03-30 RX ADMIN — MELATONIN 2 TABLET: at 17:13

## 2020-03-30 RX ADMIN — INSULIN LISPRO 2 UNITS: 100 INJECTION, SOLUTION INTRAVENOUS; SUBCUTANEOUS at 11:55

## 2020-03-30 RX ADMIN — TAMSULOSIN HYDROCHLORIDE 0.4 MG: 0.4 CAPSULE ORAL at 17:12

## 2020-03-30 RX ADMIN — Medication 1 TABLET: at 09:05

## 2020-03-30 RX ADMIN — PANTOPRAZOLE SODIUM 40 MG: 40 TABLET, DELAYED RELEASE ORAL at 07:19

## 2020-03-30 RX ADMIN — Medication 10 ML: at 07:25

## 2020-03-30 RX ADMIN — CARVEDILOL 6.25 MG: 6.25 TABLET, FILM COATED ORAL at 09:05

## 2020-03-30 RX ADMIN — MUPIROCIN: 20 OINTMENT TOPICAL at 09:13

## 2020-03-30 RX ADMIN — FERROUS SULFATE TAB 325 MG (65 MG ELEMENTAL FE) 325 MG: 325 (65 FE) TAB at 09:05

## 2020-03-30 RX ADMIN — Medication 10 ML: at 11:55

## 2020-03-30 RX ADMIN — Medication 10 ML: at 15:01

## 2020-03-30 RX ADMIN — SENNOSIDES AND DOCUSATE SODIUM 1 TABLET: 8.6; 5 TABLET ORAL at 09:05

## 2020-03-30 RX ADMIN — CARVEDILOL 6.25 MG: 6.25 TABLET, FILM COATED ORAL at 17:12

## 2020-03-30 RX ADMIN — ASPIRIN 81 MG 81 MG: 81 TABLET ORAL at 09:05

## 2020-03-30 RX ADMIN — Medication 10 ML: at 21:35

## 2020-03-30 RX ADMIN — FINASTERIDE 5 MG: 5 TABLET, FILM COATED ORAL at 21:35

## 2020-03-30 RX ADMIN — MUPIROCIN: 20 OINTMENT TOPICAL at 17:15

## 2020-03-30 RX ADMIN — POLYETHYLENE GLYCOL 3350 17 G: 17 POWDER, FOR SOLUTION ORAL at 09:05

## 2020-03-30 RX ADMIN — LEVOTHYROXINE SODIUM 50 MCG: 0.1 TABLET ORAL at 08:26

## 2020-03-30 RX ADMIN — MELATONIN 2 TABLET: at 09:05

## 2020-03-30 RX ADMIN — ATORVASTATIN CALCIUM 40 MG: 40 TABLET, FILM COATED ORAL at 21:35

## 2020-03-30 RX ADMIN — FUROSEMIDE 40 MG: 10 INJECTION, SOLUTION INTRAMUSCULAR; INTRAVENOUS at 11:55

## 2020-03-30 NOTE — DIABETES MGMT
SHIVAM MAURICIO  CLINICAL NURSE SPECIALIST CONSULT  PROGRAM FOR DIABETES HEALTH    FOLLOW-UP NOTE    Presentation   Libby Valencia is a 78 y.o. male admitted  for CABG x5 POD4. Current clinical course has been uncomplicated. PMH: CAD/BPH/pre-diabetes/ hypothyroid    Subjective   Mr. Giuliano Hernandez is sitting up in his chair reading the newspaper. States he is aware he has been a 'borderline diabetic' for some time.   Objective   Physical exam  General Alert, oriented and in no acute distress. Conversant and cooperative. Vital Signs   Visit Vitals  /46 (BP 1 Location: Left arm, BP Patient Position: At rest;Sitting)   Pulse 89   Temp 98.6 °F (37 °C)   Resp 18   Ht 5' 11\" (1.803 m)   Wt 94.4 kg (208 lb 1.8 oz)   SpO2 98%   BMI 29.03 kg/m²   . Laboratory  Lab Results   Component Value Date/Time    Hemoglobin A1c 5.9 (H) 03/26/2020 12:13 AM     Lab Results   Component Value Date/Time    LDL, calculated 83.8 03/25/2020 09:00 AM     Lab Results   Component Value Date/Time    Creatinine (POC) 0.7 01/08/2019 07:47 PM    Creatinine 0.96 03/30/2020 05:20 AM     Lab Results   Component Value Date/Time    Sodium 138 03/30/2020 05:20 AM    Potassium 4.2 03/30/2020 05:20 AM    Chloride 103 03/30/2020 05:20 AM    CO2 29 03/30/2020 05:20 AM    Anion gap 6 03/30/2020 05:20 AM    Glucose 139 (H) 03/30/2020 05:20 AM    BUN 28 (H) 03/30/2020 05:20 AM    Creatinine 0.96 03/30/2020 05:20 AM    BUN/Creatinine ratio 29 (H) 03/30/2020 05:20 AM    GFR est AA >60 03/30/2020 05:20 AM    GFR est non-AA >60 03/30/2020 05:20 AM    Calcium 8.7 03/30/2020 05:20 AM    Bilirubin, total 0.7 03/28/2020 04:17 AM    AST (SGOT) 52 (H) 03/28/2020 04:17 AM    Alk.  phosphatase 56 03/28/2020 04:17 AM    Protein, total 5.8 (L) 03/28/2020 04:17 AM    Albumin 3.3 (L) 03/28/2020 04:17 AM    Globulin 2.5 03/28/2020 04:17 AM    A-G Ratio 1.3 03/28/2020 04:17 AM    ALT (SGPT) 19 03/28/2020 04:17 AM     Lab Results   Component Value Date/Time    ALT (SGPT) 19 03/28/2020 04:17 AM       Blood glucose pattern      Assessment and Plan   Nursing Diagnosis Risk for unstable blood glucose pattern   Nursing Intervention Domain 5860 Decision-making Support   Nursing Interventions Examined current inpatient diabetes control   Explored factors facilitating and impeding inpatient management     Evaluation   Mr. Singh Zuniga, with Type 2 pre- diabetes, has achieved inpatient blood glucose target of 100-180mg/dl. He has required minimal amount of correctional insulin since coming off the insulin drip on Friday 3/28/20. BG trends are consistently <200. Since A1C 5.9% he is considered pre-diabetic. Recommendations/Discharge Planning   1. Continue  SSI coverage only. 2.  Discharge:  Per previous PCP note re: A1C (Jan. 2020) diet and exercise were recommended for him. CONTINUE with this plan as his diet and activity will be tightly managed post cardiac surgery. This should keep his A1C at or below 5.9%. Diabetes Management Team to sign off at this point as patient BG remains stable. Please re-consult us if patient needs change. Thank you for including us in his care.        Billing Code(s)     [x] [id number] IP subsequent hospital care - 15 minutes    DOE Cho  Access via [facility]Lists [facility] in the [place] States[facility] [phone]

## 2020-03-30 NOTE — PROGRESS NOTES
Problem: Self Care Deficits Care Plan (Adult)  Goal: *Acute Goals and Plan of Care (Insert Text)  Description: FUNCTIONAL STATUS PRIOR TO ADMISSION: Patient was independent and active without use of DME.     HOME SUPPORT: The patient lived with wife but did not require assist.    Occupational Therapy Goals  Initiated 3/27/2020  1. Patient will perform ADLs standing 5 mins without fatigue or LOB with supervision/set-up within 5 day(s). 2.  Patient will perform lower body ADLs with supervision/set-up within 5 day(s). 3.  Patient will perform gathering ADL items high and low 2/2 with supervision/set-up within 5 day(s). 4.  Patient will perform toilet transfers with supervision/set-up within 5 day(s). 5.  Patient will perform all aspects of toileting with supervision/set-up within 5 day(s). 6.  Patient will participate in cardiac/sternal upper extremity therapeutic exercise/activities to increase independence with ADLs with supervision/set-up for 5 minutes within 5 day(s). Outcome: Progressing Towards Goal   OCCUPATIONAL THERAPY TREATMENT  Patient: Guillermo Russo (23 y.o. male)  Date: 3/30/2020  Diagnosis: [facility]   S/P CABG x 5  Procedure(s) (LRB):  CORONARY ARTERY BYPASS GRAFTING X 5   WITH LEFT LEG EVH AND LIMA HARVESTING. CARDIOPULMONARY BYPASS. TRANSESOPHAGEAL ECHOCARDIOGRAM AND EPI AORTIC ULTRASOUND BY DR. Margot Shipman. (N/A) 4 Days Post-Op  Precautions: Fall, Sternal  Chart, occupational therapy assessment, plan of care, and goals were reviewed. ASSESSMENT  Patient continues with skilled OT services and is progressing towards goals. Patient continues to present with generalized weakness and decreased endurance however is demonstrating good understanding of \"move in the tube\" precautions. Patient able to tolerate standing at the sink for grooming tasks, functional reach high/low, and LB ADLs seated in chair today x CGA.  Patient also completed BUE cardiac exercises seated in chair. Continue to recommend [address] [place] [address] post discharge to maximize patient safety and independence with ADL transfers and tasks. Patient is verbalizing and is demonstrating understanding of mindful-based movements (\"move in the tube\") principles of keeping UEs proximal to ribcage to prevent lateral pull on the sternum during load-bearing activities with verbal and tactile cues required for compliance. Current Level of Function Impacting Discharge (ADLs): CGA    Other factors to consider for discharge: good family support         PLAN :  Patient continues to benefit from skilled intervention to address the above impairments. Continue treatment per established plan of care. to address goals. Recommend with staff: OOB x 3 to chair; BUE cardiac exercises     Recommend next OT session: energy conservation techniques    Recommendation for discharge: (in order for the patient to meet his/her long term goals)  Occupational therapy at least 2 days/week in the home     This discharge recommendation:  Has been made in collaboration with the attending provider and/or case management    IF patient discharges home will need the following DME: TBD       SUBJECTIVE:   Patient stated My bed is 36 inches.     OBJECTIVE DATA SUMMARY:   Cognitive/Behavioral Status:  Neurologic State: Alert  Orientation Level: Oriented X4  Cognition: Appropriate for age attention/concentration  Perception: Appears intact  Perseveration: No perseveration noted  Safety/Judgement: Decreased insight into deficits    Functional Mobility and Transfers for ADLs:    Transfers:  Sit to Stand: Contact guard assistance;Assist x1          Balance:  Sitting: Intact; With support  Standing: Impaired  Standing - Static: Fair  Standing - Dynamic : Fair    ADL Intervention:       Grooming  Brushing Teeth: Contact guard assistance                   Lower Body Dressing Assistance  Socks: Contact guard assistance; Compensatory technique training         Cognitive Retraining  Safety/Judgement: Decreased insight into deficits    Patient instructed and educated on mindful movement principles based on Move in The Tube concept to include maintaining bilateral elbows close to rib cage when performing any load-bearing activity such as getting in/out of bed, pushing up from a chair, opening a door, or lifting a box. Patient was given a handout with diagrams of each correct/incorrect method of performing each of the above tasks. Patient instructed on the ability to utilize upper extremities outside the tube when doing any non-load bearing activity such as washing hair/body, brushing teeth, retrieving clothing items, or scratching your back. Patient encouraged to also perform upper extremity exercises \"outside of the tube\" to prevent scar tissue formation around sternal incision site. Patient instructed in detail about activities to heed with caution, allowing pain to be the guide. These activities include but are not limited to: mowing the lawn, riding a bike, walking a dog, lifting a child, workshop hobbies, golfing, sexual activity, vacuuming, fishing, scrubbing the floors, and moving furniture. Patient was given the [address] in the [place] handout to describe each of these activities in detail. Patient instructed no asymmetrical reaching over head to ensure B UEs when shoulders >90* i.e. reaching in cabinets and dressing. Instruction on upper body dressing techniques of over head, then arms through to decrease pain and unilateral shoulder flexion >90*. Instruction on the benefits of utilizing B UEs during functional tasks i.e. opening the fridge, stepping into the tub. Instruction if continued pain at home with shoulder IR for BM hygiene can use wet wipes and toilet tongs PRN. Avoid valsalva maneuvers.   May have to adjust home setup to increase ease with items closer to waist height to prevent deep bending and the automatic  of asymmetrical UE WB/pushing for stabilization during bending. Benefit to don clothing tailor sitting and don all clothing while sitting prior to standing. Patient demonstrated lower body dressing seated with Contact guard assistance. Increase activity tolerance for home, work, and sexual intercourse by pacing self with increasing the arm exercises, sitting duration, frequency OOB, walking, standing, and ADLs. Instructed and indicated understanding of s/s of too much activity, how to respond to s/s safely. Therapeutic Exercises:   Patient instructed on the benefits and demonstrated cardiac exercises while seated with Contact guard assistance. Instructed and indicated understanding on how to progress reps, sets against gravity, pacing through progressive muscle strengthening standing based on surgeon clearance for more weight in prep for basic and instrumental ADLs. Instruction on the use of household items in place of weights as needed.     CARDIAC   EXERCISE    Sets    Reps    Active  Active Assist    Passive  Self ROM    Comments    Shoulder flexion  1  5   [x]                            []                             []                             []                                Shoulder abduction  1  5  [x]                             []                             []                             []                                Scapular elevation  1  5  [x]                             []                              []                             []                                Scapular retraction  1  5  [x]                             []                             []                             []                                Trunk rotation  1  5  [x]                             []                             []                             []                                Trunk sidebending  1  5  [x]                             []                              []                             [] Activity Tolerance:   Fair and requires rest breaks  Please refer to the flowsheet for vital signs taken during this treatment. After treatment patient left in no apparent distress:   Sitting in chair and Call bell within reach    COMMUNICATION/COLLABORATION:   The patients plan of care was discussed with: Physical therapist and Registered nurse.      Rachael Lauren  Time Calculation: 23 mins

## 2020-03-30 NOTE — PROGRESS NOTES
0730: Bedside shift change report given to Zully and Kitty (oncoming nurse) by Gilford Goon. (offgoing nurse). Report included the following information SBAR, Kardex, MAR, Recent Results and Cardiac Rhythm NSR.     1640: Pt left unit for Xray with transport    1710: Pt returned to unit from Xray with transport. Placed back on monitor and confirmed with monitor tech. 1930: Bedside shift change report given to Samira Mckeon (oncoming nurse) by [address] and Kitty (offgoing nurse). Report included the following information SBAR, Kardex, MAR, Recent Results and Cardiac Rhythm NSR with 1st degree block. Problem: Pressure Injury - Risk of  Goal: *Prevention of pressure injury  Description: Document Morris Scale and appropriate interventions in the flowsheet. Outcome: Progressing Towards Goal  Note: Pressure Injury Interventions:  Sensory Interventions: Assess changes in LOC, Assess need for specialty bed, Keep linens dry and wrinkle-free, Minimize linen layers    Moisture Interventions: Absorbent underpads, Minimize layers    Activity Interventions: PT/OT evaluation, Increase time out of bed    Mobility Interventions: Pressure redistribution bed/mattress (bed type), PT/OT evaluation    Nutrition Interventions: Document food/fluid/supplement intake    Friction and Shear Interventions: Minimize layers, Lift sheet                Problem: Patient Education: Go to Patient Education Activity  Goal: Patient/Family Education  Outcome: Progressing Towards Goal     Problem: Falls - Risk of  Goal: *Absence of Falls  Description: Document Serg Fall Risk and appropriate interventions in the flowsheet.   Outcome: Progressing Towards Goal  Note: Fall Risk Interventions:  Mobility Interventions: PT Consult for mobility concerns, Patient to call before getting OOB, Communicate number of staff needed for ambulation/transfer         Medication Interventions: Evaluate medications/consider consulting pharmacy, Teach patient to arise slowly, Patient to call before getting OOB    Elimination Interventions: Call light in reach, Patient to call for help with toileting needs              Problem: Patient Education: Go to Patient Education Activity  Goal: Patient/Family Education  Outcome: Progressing Towards Goal     Problem: Patient Education: Go to Patient Education Activity  Goal: Patient/Family Education  Outcome: Progressing Towards Goal     Problem: Patient Education: Go to Patient Education Activity  Goal: Patient/Family Education  Outcome: Progressing Towards Goal     Problem: CABG: Post-Op Day 4  Goal: Off Pathway (Use only if patient is Off Pathway)  Outcome: Progressing Towards Goal  Goal: Activity/Safety  Outcome: Progressing Towards Goal  Goal: Diagnostic Test/Procedures  Outcome: Progressing Towards Goal  Goal: Nutrition/Diet  Outcome: Progressing Towards Goal  Goal: Medications  Outcome: Progressing Towards Goal  Goal: Discharge Planning  Outcome: Progressing Towards Goal  Goal: Respiratory  Outcome: Progressing Towards Goal  Goal: Treatments/Interventions/Procedures  Outcome: Progressing Towards Goal  Goal: Psychosocial  Outcome: Progressing Towards Goal  Goal: *Hemodynamically stable  Outcome: Progressing Towards Goal  Goal: *Lungs clear or at baseline  Outcome: Progressing Towards Goal  Goal: *Optimal pain control at patient's stated goal  Outcome: Progressing Towards Goal  Goal: *Incisions without signs and symptoms of wound complication  Outcome: Progressing Towards Goal  Goal: *Demonstrates progressive activity  Outcome: Progressing Towards Goal  Goal: *Tolerating diet  Outcome: Progressing Towards Goal     Problem: Patient Education: Go to Patient Education Activity  Goal: Patient/Family Education  Outcome: Progressing Towards Goal     Problem: Infection - Risk of, Urinary Catheter-Associated Urinary Tract Infection  Goal: *Absence of infection signs and symptoms  Outcome: Progressing Towards Goal

## 2020-03-30 NOTE — CARDIO/PULMONARY
Cardiac Rehab: CABG education folder at bedside. Met with Edita Sawyer to review cardiac surgery post discharge instructions and to discuss participation in the Cardiac Rehab Program.  
 
Educated using teach back method. Reviewed the use of bear for sternal support, daily weight and temperature monitoring, showering restrictions, signs and symptoms of infection at surgery sites, daily walking and arm exercises, and use of incentive spirometer. Discussed Heart Healthy/Low Sodium (2000 mg.) diet. Gave red reminder bracelet. Discussed purpose of bracelet, duration of wear, and when to call surgeons office. Discussed Cardiac Rehab Program format, benefits, and encouraged participation when cardiac rehab is back open and COVID concerns are resolved. He verbalizes understanding. Cardiac Rehab Program information is on the AVS. Referral has been sent. General questions answered. [address] [facility] verbalized understanding.

## 2020-03-30 NOTE — PROGRESS NOTES
Problem: Mobility Impaired (Adult and Pediatric)  Goal: *Acute Goals and Plan of Care (Insert Text)  Description: FUNCTIONAL STATUS PRIOR TO ADMISSION: Patient was independent and active without use of DME. When asked, he reported no falls in the last 12 months. HOME SUPPORT PRIOR TO ADMISSION: The patient lived with his wife but did not require assist.    Physical Therapy Goals  Initiated 3/27/2020  1. Patient will move from supine to sit and sit to supine , scoot up and down and roll side to side in bed with independence within 5 days. 2.  Patient will perform sit to/from stand with independence within 5 days. 3.  Patient will ambulate 300 feet with least restrictive assistive device and independence within 5 days. 4.  Patient will ascend/descend 4 stairs with one handrail(s) with modified independence within 5 days. 5.  Patient will perform cardiac exercises per protocol with independence within 5 days. 6.  Patient will verbally recall and functionally demonstrate mindful-based movements (\"move in the tube\") principles without cues within 5 days. Outcome: Progressing Towards Goal    PHYSICAL THERAPY TREATMENT  Patient: Sidra Austin (41 y.o. male)  Date: 3/30/2020  Diagnosis: NSTEMI (non-ST elevated myocardial infarction) ([facility] [address] [I21.4]   S/P CABG x 5  Procedure(s) (LRB):  CORONARY ARTERY BYPASS GRAFTING X 5   WITH LEFT LEG EVH AND LIMA HARVESTING. CARDIOPULMONARY BYPASS. TRANSESOPHAGEAL ECHOCARDIOGRAM AND EPI AORTIC ULTRASOUND BY DR. Peter Joel. (N/A) 4 Days Post-Op  Precautions: Fall, Sternal  Chart, physical therapy assessment, plan of care and goals were reviewed. ASSESSMENT  Patient continues with skilled PT services and is progressing towards goals. With utilization of faded-feedback, patient demonstrated approximately 90% adherence to functional mindful-based movement principles during last 1/3 of session.  Patient can now ambulate > household distances with standby assist and intermittent external pacing cues. Patient demonstrated (with minimal verbal cuing) ability to symmetrically reach over shoulder height for items < 5 lbs and ability to squat down to open low drawers (very little trunk flexion/deep bending noted). At this time, patient is safe to discharge home with supervision from wife/family. Current Level of Function Impacting Discharge (mobility/balance): Supervision     Other factors to consider for discharge: New sternotomy, Wife able to provide supervision, 36 inch bed height (will sleep in mechanical recliner until HHPT assists in addressing)         PLAN :  Patient continues to benefit from skilled intervention to address the above impairments. Continue treatment per established plan of care. to address goals. Recommendation for discharge: (in order for the patient to meet his/her long term goals)  Physical therapy at least 2 days/week in the home AND ensure assist and/or supervision for safety with community reintegration and household mobility as needed      This discharge recommendation:  Has been made in collaboration with the attending provider and/or case management    IF patient discharges home will need the following DME: none       SUBJECTIVE:   Patient stated I typically walk my dog on a leash, I guess my wife will have to walk him because, actually, he walks me!     OBJECTIVE DATA SUMMARY:   Critical Behavior:  Neurologic State: Alert  Orientation Level: Oriented X4  Cognition: Appropriate for age attention/concentration  Safety/Judgement: Decreased insight into deficits  Functional Mobility Training:    Transfers:  Sit to Stand: Contact guard assistance;Assist x1  Stand to Sit: Contact guard assistance;Assist x1;Additional time    Balance:  Sitting: Intact; With support  Standing: Impaired  Standing - Static: Fair  Standing - Dynamic : Fair  Ambulation/Gait Training:  Distance (ft): 165 Feet (ft)  Assistive Device: Gait belt  Ambulation - Level of Assistance: Stand-by assistance        Gait Abnormalities: Decreased step clearance        Base of Support: Narrowed  Stance: Weight shift  Speed/Gaby: Slow  Step Length: Right shortened;Left shortened      Therapeutic Exercises:   Patient participated in various functional strengthening and dynamic balance activities to include: several sit <> stand transfers and multi-level reaching tasks to facilitate weight-shift of COG outside SHAMIR (above shoulder height reaching [emphasized bi-manual/symmetrical], squatting for object pick-up from floor) all w/o major LOB. Activity Tolerance:   Good, SpO2 94% on room air with activity/exertion  Please refer to the flowsheet for vital signs taken during this treatment. After treatment patient left in no apparent distress:   Sitting in chair and Call bell within reach    COMMUNICATION/COLLABORATION:   The patients plan of care was discussed with: Occupational therapist, Registered nurse, and Case management.      Laura Carter PT, DPT   Time Calculation: 27 mins

## 2020-03-30 NOTE — PROCEDURES
CSS Procedure Note: PW cutting    Patient in bed. Pacing wire insertion sites cleaned with Chlorhexadine. Sutures removed. Bipolar ventricular wire X 1 cut at the skin with gentle traction applied. Atrial wires X 2 given gentle traction in order to cut at skin but wires pulled out easily. Patient to remain in bed X 1 hour. Patient tolerated the procedure well.

## 2020-03-30 NOTE — PROGRESS NOTES
Cardiac Surgery Care Coordinator- Met with Vandana Littlejohn  reviewed plan of care and discussed upcoming discharge date. Reinforced sternal precautions and encouraged continued use of the incentive spirometer. Began discharge teaching and encouraged him to verbalize. Reviewed goals for the day and discussed the  importance of increased activity and continued activity after discharge. Reviewed importance of wearing the red reminder bracelet and when to call MD. Will continue to follow for educational and emotional needs. Will place call to his wife for an update.  Harry High RN

## 2020-03-30 NOTE — PROGRESS NOTES
1930: Bedside and Verbal shift change report given to Muriel Busby RN (oncoming nurse) by Celena Pino RN (offgoing nurse). Report included the following information SBAR, Kardex, Procedure Summary, Intake/Output, MAR, Accordion, Recent Results, and Med Rec Status. 0730: Bedside and Verbal shift change report given to [facility], RN and Sheila Holliday RN (oncoming nurse) by Muriel Busby RN (offgoing nurse). Report included the following information SBAR, Kardex, OR Summary, Procedure Summary, Intake/Output, MAR, Accordion, Recent Results and Med Rec Status. Problem: Pressure Injury - Risk of  Goal: *Prevention of pressure injury  Description: Document Morris Scale and appropriate interventions in the flowsheet. Outcome: Progressing Towards Goal  Note: Pressure Injury Interventions:  Sensory Interventions: Assess changes in LOC, Assess need for specialty bed, Float heels, Discuss PT/OT consult with provider, Keep linens dry and wrinkle-free, Maintain/enhance activity level, Minimize linen layers, Monitor skin under medical devices    Moisture Interventions: Absorbent underpads, Internal/External urinary devices, Limit adult briefs, Minimize layers, Moisture barrier, Maintain skin hydration (lotion/cream)    Activity Interventions: Pressure redistribution bed/mattress(bed type), Increase time out of bed    Mobility Interventions: Pressure redistribution bed/mattress (bed type), PT/OT evaluation    Nutrition Interventions: Document food/fluid/supplement intake    Friction and Shear Interventions: Lift sheet                Problem: Falls - Risk of  Goal: *Absence of Falls  Description: Document Serg Fall Risk and appropriate interventions in the flowsheet.   Outcome: Progressing Towards Goal  Note: Fall Risk Interventions:  Mobility Interventions: Communicate number of staff needed for ambulation/transfer, Patient to call before getting OOB         Medication Interventions: Evaluate medications/consider consulting pharmacy, Patient to call before getting OOB, Teach patient to arise slowly    Elimination Interventions: Call light in reach, Patient to call for help with toileting needs              Problem: Unstable Angina/NSTEMI: Discharge Outcomes  Goal: *Lungs clear or at baseline  Outcome: Progressing Towards Goal  Goal: *Optimal pain control at patient's stated goal  Outcome: Progressing Towards Goal     Problem: CABG: Post-Op Day 5  Goal: Activity/Safety  Outcome: Progressing Towards Goal     Problem: CABG: Discharge Outcomes  Goal: *Lungs clear or at baseline  Outcome: Progressing Towards Goal  Goal: *Optimal pain control at patient's stated goal  Outcome: Progressing Towards Goal

## 2020-03-30 NOTE — DISCHARGE SUMMARY
[facility] Discharge Summary     Patient ID:  Ronny Ta  [id number]  89 y.o.  1940    Admit date: 3/25/2020    Discharge date: 4/2/2020     Admitting Physician: Amanda Marroquin MD     Referring Cardiologist:  Dr. Adama Sorto    PCP:  Radha Guerrero MD    Admitting Diagnoses: CAD, NSTEMI    Discharge Diagnoses:     Hospital Problems  Date Reviewed: 3/26/2020          Codes Class Noted POA    * (Principal) S/P CABG x 5 ICD-10-CM: Z95.1  ICD-9-CM: V45.81  3/26/2020 Unknown    Overview Signed 3/26/2020  7:49 PM by TOM Adam     x 5, LIMA to LAD, RSVG to Diag-RI-OM, RSVG to RCA             NSTEMI (non-ST elevated myocardial infarction) [facility]) ICD-10-CM: I21.4  ICD-9-CM: 410.70  3/25/2020 Unknown              Discharged Condition: stable    Disposition: home, see patient instructions for treatment and plan    Procedures for this admission:  Procedure(s):  CORONARY ARTERY BYPASS GRAFTING X 5   WITH LEFT LEG EVH AND LIMA HARVESTING. CARDIOPULMONARY BYPASS. TRANSESOPHAGEAL ECHOCARDIOGRAM AND EPI AORTIC ULTRASOUND BY DR. Brittany West. Discharge Medications:      My Medications      START taking these medications      Instructions Each Dose to Equal Morning Noon Evening Bedtime   acetaminophen 325 mg tablet  Commonly known as:  TYLENOL    Your last dose was: Your next dose is: Take 2 Tabs by mouth every four (4) hours as needed for Pain. 650 mg                 apixaban 5 mg tablet  Commonly known as:  ELIQUIS    Your last dose was: Your next dose is: Take 1 Tab by mouth every twelve (12) hours for 60 days. 5 mg                 clopidogreL 75 mg Tab  Commonly known as:  PLAVIX    Your last dose was: Your next dose is: Take 1 Tab by mouth daily. 75 mg                 ferrous sulfate 325 mg (65 mg iron) tablet    Your last dose was: Your next dose is: Take 1 Tab by mouth daily (with breakfast) for 30 days.    325 mg                 metoprolol tartrate 25 mg tablet  Commonly known as:  LOPRESSOR    Your last dose was: Your next dose is: Take 1.5 Tabs by mouth every twelve (12) hours for 60 days. 37.5 mg                 oxyCODONE IR 5 mg immediate release tablet  Commonly known as:  ROXICODONE    Your last dose was: Your next dose is: Take 1 Tab by mouth every four (4) hours as needed for Pain for up to 5 days. Max Daily Amount: 30 mg.   5 mg                 senna-docusate 8.6-50 mg per tablet  Commonly known as:  PERICOLACE    Your last dose was: Your next dose is: Take 1 Tab by mouth two (2) times a day. 1 Tab                    CHANGE how you take these medications      Instructions Each Dose to Equal Morning Noon Evening Bedtime   varicella-zoster recombinant (PF) 50 mcg/0.5 mL Susr injection  Commonly known as: Shingrix (PF)  What changed:  additional instructions    Your last dose was: Your next dose is:          0.5mL by IntraMUSCular route once now and then repeat in 2-6 months                     CONTINUE taking these medications      Instructions Each Dose to Equal Morning Noon Evening Bedtime   atorvastatin 40 mg tablet  Commonly known as:  LIPITOR    Your last dose was: Your next dose is:          TAKE 1 TAB BY MOUTH DAILY. cholecalciferol (2,000 UNITS /50 MCG) Cap capsule  Commonly known as:  VITAMIN D3    Your last dose was: Your next dose is: Take 2,000 Units by mouth two (2) times a day. 2,000 Units                 co-enzyme Q-10 100 mg capsule  Commonly known as:  CO Q-10    Your last dose was: Your next dose is: Take 200 mg by mouth daily. 200 mg                 finasteride 5 mg tablet  Commonly known as:  PROSCAR    Your last dose was: Your next dose is: Take 5 mg by mouth daily. HS   5 mg                 levothyroxine 50 mcg tablet  Commonly known as:  SYNTHROID    Your last dose was:       Your next dose is:          TAKE 1 TABLET BY MOUTH EVERY DAY BEFORE BREAKFAST EXCEPT TAKE 2 TABS ON SATURDAY. PRESERVISION AREDS PO    Your last dose was: Your next dose is: Take 1 Cap by mouth two (2) times a day. 1 Cap                 tamsulosin 0.4 mg capsule  Commonly known as:  FLOMAX    Your last dose was: Your next dose is:          TAKE 1 CAPSULE EVERY DAY 30 MINUTES AFTER THE SAME MEAL EACH DAY                     STOP taking these medications    amLODIPine 5 mg tablet  Commonly known as:  NORVASC        aspirin 81 mg chewable tablet              Where to Get Your Medications      These medications were sent to [facility]/pharmacy #[id number]- [place], [address] [place], [place] [address]judson Dimas, [address] [facility] [address]    Phone:  [phone]   · apixaban 5 mg tablet  · clopidogreL 75 mg Tab  · ferrous sulfate 325 mg (65 mg iron) tablet  · metoprolol tartrate 25 mg tablet  · oxyCODONE IR 5 mg immediate release tablet         HPI:  Copied from consult note dated 3/25/20:    Joaquina Colon is a 78 y.o. male who was referred for cardiac evaluation. He has a PMH significant for CAD (previous PTCA) with NSTEMI on this admission, HTN, HLD, Hypothyroid, mitral valve prolapse, Pre-diabetes (A1c of 6 in December), arthritis, BPH. He reports a sensation of chest pressure over the last couple of weeks when he walked the dog. This pressure resolved with rest. Denies accompanied shortness of breath, N/V, diaphoresis, syncope. Last night he developed this chest pressure which he noted as severe and unrelenting. The pressure was substernal and he had his wife bring him to the [place] ED around 2am. His troponin elevated to 4.89 and he was taken to the Cath Lab this morning with Dr. Deja Long. He was found to have 3-vessel CAD. Dr. Tiana Montero was asked to consult for consideration of Coronary Artery Bypass +/- AVR.       No smoking or ETOH history.  Father  at 71 of CAD and brother  at 72 with CAD.      Cardiac Testing     Cardiac catheterization 03/25/20 :  Conclusion      Findings:  1)Severe native distal left main and 3 vessel CAD with cuprit lesion in distal RCA with MAYRA II flow  2)Heavy calcification throughout LAD, proximal RCA  And proximal LCx with ostial LCx aneurysm  3)HIgh syntax score with involvement of Left mainasa well as LAD/diagonal and proximal LCx/OM1 bifurcation involvement  4)LIkely moderate Aortic stenosis with pull back gradient of 25 mm Hg and normal LVEDP     Recommendation  1)Consideration for CABG/AVR  2)MAy reinitiate Heparin/Lovenox per protocol     Access: right radial no issues     Contrast 42 cc. Coronary Findings      Diagnostic   Dominance: Right   Left Main   Ost LM to Ost LAD lesion 60% stenosed. .   Left Anterior Descending   Ost LAD to Prox LAD lesion 70% stenosed. .   Prox LAD to Mid LAD lesion 90% stenosed. .   Mid LAD lesion 80% stenosed. Nava Asa LAD lesion 70% stenosed. .   Left Circumflex   Prox Cx lesion 90% stenosed. .   Right Coronary Artery   Ost RCA lesion 60% stenosed. .   Dist RCA lesion 95% stenosed. .   Intervention      No interventions have been documented.        Hospital Course: Libby Valencia was taken to the OR on 3/25/20 for a Coronary artery bypass X 5 (LIMA-LAD, AFGC-Sbhj-YR-OM, RSVG-RCA) with endovascular vein harvest of the LLE with Dr. Dean Paez. He was taken from the OR to the CVICU in stable condition with the following drips:  Precedex, Insulin, Damien-synephrine, Dobutamine. When he was hemodynamically stable, he was transferred to the CVSU. He will, be discharged to home with his family and Home health, home PT/OT. He was felt stable for discharge on POD # 8 with the following assessment and plan. 1. CAD with NSTEMI on this admission s/p CABG: On statin, plavix (no ASA with Eliquis and Plavix). On BB (Hold for SBP<90 and HR <60)     2. Atelectasis/leukocytosis-improving: Encourage IS.      3. Hx HTN: BB, diurese     4.  HLD:  Continue Statin     5.  Anemia: Postoperative secondary to acute blood loss. Currently above transfusion threshold. Continue Iron. Monitor daily CBC.      5. Pre-diabetes: A1c 5.9. AccuCheck AC & HS with SSI     6. Hypothyroid:  Continue Synthroid. TSH of 2.34 in December     7. Mitral valve prolapse/MR/AS: All mild on intra-operative WILMER.      8. BPH: Continue flomax and finasteride. Keep olivares,appreciate Urology consult. Has been voiding since olivares removal. Will check PVR this am     9. Arthritis/Chronic back pain: No current treatment     10. Intra-op bradycardia/first degree AV block: Hold amio per Valentino Faster. On BB.       11. Atrial fibrillation on 3/31: Has been in [place] [facility] since 10 am 3/31. Continue BB, Eliquis     Dispo: PT/OT/Cardiac Rehab. Case management for discharge planning. DC home with home health, home PT/OT later today if HR under better control and no issue with PVR. Referral to outpatient cardiac rehab made. Discharge Vital Signs:   Visit Vitals  /65 (BP 1 Location: Right arm, BP Patient Position: At rest)   Pulse (!) 103   Temp 98 °F (36.7 °C)   Resp 16   Ht 5' 11\" (1.803 m)   Wt 202 lb 6.1 oz (91.8 kg)   SpO2 95%   BMI 28.23 kg/m²       Labs:   Recent Labs     04/02/20  0819 04/02/20  0416  03/30/20  1636   WBC 9.4  --    < >  --    HGB 8.5*  --    < >  --    HCT 26.8*  --    < >  --      --    < >  --    NA  --  136   < >  --    K  --  3.8   < >  --    BUN  --  21*   < >  --    CREA  --  0.87   < >  --    GLU  --  118*   < >  --    GLUCPOC  --   --   --  135*    < > = values in this interval not displayed. Diagnostics:   CXR 3/30/20: IMPRESSION:  1. Minimal bibasilar opacities, similar to comparison. 2. Blunted costophrenic angle on the left suggesting a small amount of pleural fluid and/or scarring. Patient Instructions/Follow Up Care:  Discharge instructions were reviewed with the patient and family present. Questions were also answered at this time. Prescriptions and medications were reviewed.  The patient has a follow up appointment with the Nurse Practitioner or [de-identified] Assistant by phone on 4/6/20 at 11am and with Dr. Gabriella Abbott on 4/28/20 at 1pm. The patient was also instructed to follow up with his primary care physician as needed. The patient and family were encouraged to call with any questions or concerns.        Signed:  Zenovia Goldberg, NP  4/2/2020  3:20 PM

## 2020-03-30 NOTE — PROGRESS NOTES
[facility] ICU Progress Note    Admit Date: 3/25/2020  POD:  5 Day Post-Op    Procedure:  Procedure(s):  CORONARY ARTERY BYPASS GRAFTING X 5   WITH LEFT LEG EVH AND LIMA HARVESTING. CARDIOPULMONARY BYPASS. TRANSESOPHAGEAL ECHOCARDIOGRAM AND EPI AORTIC ULTRASOUND BY DR. Jese Diaz. Subjective:   Pt seen with Dr. Gabriella Abbott. Was on a little oxygen earlier at 2L but now back on room air, Tmax 99.2. NSR. Russell remains due to inability to void. Objective:   Vitals:  Blood pressure 117/46, pulse 89, temperature 98.6 °F (37 °C), resp. rate 18, height 5' 11\" (1.803 m), weight 208 lb 1.8 oz (94.4 kg), SpO2 98 %. Temp (24hrs), Av.8 °F (37.1 °C), Min:98.3 °F (36.8 °C), Max:99.2 °F (37.3 °C)    EKG/Rhythm: NSR 80s    Oxygen Therapy:  Room air    CXR:   CXR Results  (Last 48 hours)               20 0918  XR CHEST PORT Final result    Impression:  IMPRESSION:   Stable bibasilar atelectasis and pleural effusion right greater than left. Chest   and mediastinal tubes have been removed. .  . Narrative:  INDICATION:  post op open heart        EXAM: Chest single view. COMPARISON: 3/28/2020. FINDINGS: A single frontal view of the chest at 0916 hours shows stable   bibasilar atelectasis right greater than left. Stable small left pleural   effusion and moderate right pleural effusion. . Chest and mediastinal tubes have   been removed. The heart, mediastinum and pulmonary vasculature are stable with   cardiomegaly status post median sternotomy. .  The bony thorax is unremarkable   for age. .               Admission Weight: Last Weight   Weight: 207 lb 0.2 oz (93.9 kg) Weight: 208 lb 1.8 oz (94.4 kg)     Intake / Output / Drain:  Current Shift: No intake/output data recorded. Last 24 hrs.:     Intake/Output Summary (Last 24 hours) at 3/30/2020 0932  Last data filed at 3/30/2020 0500  Gross per 24 hour   Intake 510 ml   Output 975 ml   Net -465 ml       EXAM:  General:  NAD, up in the chair.       Lungs:   Clear to auscultation bilaterally. Incision:  Without significant erythema, drainage, or dehiscence. Heart:  Regular rate and rhythm, S1, S2 normal, no murmur, click, rub or gallop. Abdomen:   Soft, non-tender. Bowel sounds hypoactive. No masses,  No organomegaly. +BM   Extremities:  + 1 edema LLE. PPP. Neurologic:  Gross motor and sensory apparatus intact. Labs:   Recent Labs     20  0723 20  0520  20  0319   WBC  --  13.2*  --  15.2*   HGB  --  8.5*  --  8.5*   HCT  --  26.8*  --  26.4*   PLT  --  206  --  147*   NA  --  138  --  136   K  --  4.2  --  4.4   BUN  --  28*  --  30*   CREA  --  0.96  --  0.90   GLU  --  139*  --  134*   GLUCPOC 137*  --    < >  --    INR  --   --   --  1.0    < > = values in this interval not displayed. Assessment:     Principal Problem:    S/P CABG x 5 (3/26/2020)      Overview: x 5, LIMA to LAD, RSVG to Diag-RI-OM, RSVG to RCA    Active Problems:    NSTEMI (non-ST elevated myocardial infarction) ([facility] [address] (3/25/2020)         Plan/Recommendations/Medical Decision Makin. CAD with NSTEMI on this admission s/p CABG: On ASA, statin, plavix. On BB (Hold for SBP<90 and HR <60)     2. Atelectasis/leukocytosis-improving: Encourage IS. Wean O2 for SpO2 > 92%.      3. Hx HTN: BB, diurese     4. HLD:  Continue Statin    5. Anemia secondary to acute blood loss: Monitor H/H     5. Pre-diabetes: A1c 5.9. AccuCheck AC & HS with SSI     6. Hypothyroid:  Continue Synthroid. TSH of 2.34 in December     7. Mitral valve prolapse/MR/AS: All mild on intra-operative WILMER.      8. BPH: Continue flomax and finasteride. Keep olivares,appreciate Urology consult. Plan to DC patient with olivares in place and follow up with Dr. Laurance Hatchet as an outpatient in 7-10 days.     9. Arthritis/Chronic back pain: No current treatment    10. Intra-op bradycardia/first degree AV block: Hold amio per [place]. On BB. Dispo: PT/OT/Cardiac Rehab. Case management for discharge planning.  Tentative plan for home with home health, home PT/OT in the next 24-48 hours.     Signed By: Alexsandra Smith NP

## 2020-03-31 LAB
ANION GAP SERPL CALC-SCNC: 4 MMOL/L (ref 5–15)
ATRIAL RATE: 136 BPM
BUN SERPL-MCNC: 27 MG/DL (ref 6–20)
BUN/CREAT SERPL: 33 (ref 12–20)
CALCIUM SERPL-MCNC: 8.6 MG/DL (ref 8.5–10.1)
CALCULATED R AXIS, ECG10: -81 DEGREES
CALCULATED T AXIS, ECG11: 57 DEGREES
CHLORIDE SERPL-SCNC: 103 MMOL/L (ref 97–108)
CO2 SERPL-SCNC: 29 MMOL/L (ref 21–32)
CREAT SERPL-MCNC: 0.83 MG/DL (ref 0.7–1.3)
DIAGNOSIS, 93000: NORMAL
ERYTHROCYTE [DISTWIDTH] IN BLOOD BY AUTOMATED COUNT: 14.1 % (ref 11.5–14.5)
GLUCOSE SERPL-MCNC: 128 MG/DL (ref 65–100)
HCT VFR BLD AUTO: 27.1 % (ref 36.6–50.3)
HGB BLD-MCNC: 8.6 G/DL (ref 12.1–17)
MAGNESIUM SERPL-MCNC: 2.2 MG/DL (ref 1.6–2.4)
MCH RBC QN AUTO: 29.1 PG (ref 26–34)
MCHC RBC AUTO-ENTMCNC: 31.7 G/DL (ref 30–36.5)
MCV RBC AUTO: 91.6 FL (ref 80–99)
NRBC # BLD: 0.03 K/UL (ref 0–0.01)
NRBC BLD-RTO: 0.3 PER 100 WBC
PLATELET # BLD AUTO: 221 K/UL (ref 150–400)
PMV BLD AUTO: 9.4 FL (ref 8.9–12.9)
POTASSIUM SERPL-SCNC: 3.6 MMOL/L (ref 3.5–5.1)
Q-T INTERVAL, ECG07: 334 MS
QRS DURATION, ECG06: 128 MS
QTC CALCULATION (BEZET), ECG08: 489 MS
RBC # BLD AUTO: 2.96 M/UL (ref 4.1–5.7)
SODIUM SERPL-SCNC: 136 MMOL/L (ref 136–145)
VENTRICULAR RATE, ECG03: 129 BPM
WBC # BLD AUTO: 9.9 K/UL (ref 4.1–11.1)

## 2020-03-31 PROCEDURE — 74011250637 HC RX REV CODE- 250/637: Performed by: PHYSICIAN ASSISTANT

## 2020-03-31 PROCEDURE — 83735 ASSAY OF MAGNESIUM: CPT

## 2020-03-31 PROCEDURE — 97116 GAIT TRAINING THERAPY: CPT

## 2020-03-31 PROCEDURE — 93005 ELECTROCARDIOGRAM TRACING: CPT

## 2020-03-31 PROCEDURE — 97110 THERAPEUTIC EXERCISES: CPT

## 2020-03-31 PROCEDURE — 36415 COLL VENOUS BLD VENIPUNCTURE: CPT

## 2020-03-31 PROCEDURE — 80048 BASIC METABOLIC PNL TOTAL CA: CPT

## 2020-03-31 PROCEDURE — 85027 COMPLETE CBC AUTOMATED: CPT

## 2020-03-31 PROCEDURE — 65660000001 HC RM ICU INTERMED STEPDOWN

## 2020-03-31 RX ADMIN — Medication 10 ML: at 21:38

## 2020-03-31 RX ADMIN — DIPHENHYDRAMINE HYDROCHLORIDE 25 MG: 25 CAPSULE ORAL at 22:15

## 2020-03-31 RX ADMIN — Medication 10 ML: at 06:54

## 2020-03-31 RX ADMIN — Medication 10 ML: at 15:17

## 2020-03-31 RX ADMIN — Medication 1 TABLET: at 08:06

## 2020-03-31 RX ADMIN — MUPIROCIN: 20 OINTMENT TOPICAL at 08:15

## 2020-03-31 RX ADMIN — LEVOTHYROXINE SODIUM 50 MCG: 0.1 TABLET ORAL at 06:55

## 2020-03-31 RX ADMIN — ATORVASTATIN CALCIUM 40 MG: 40 TABLET, FILM COATED ORAL at 21:31

## 2020-03-31 RX ADMIN — CHLORHEXIDINE GLUCONATE 10 ML: 1.2 RINSE ORAL at 21:36

## 2020-03-31 RX ADMIN — FERROUS SULFATE TAB 325 MG (65 MG ELEMENTAL FE) 325 MG: 325 (65 FE) TAB at 08:06

## 2020-03-31 RX ADMIN — ACETAMINOPHEN 650 MG: 325 TABLET ORAL at 03:00

## 2020-03-31 RX ADMIN — Medication 10 ML: at 21:37

## 2020-03-31 RX ADMIN — FINASTERIDE 5 MG: 5 TABLET, FILM COATED ORAL at 21:31

## 2020-03-31 RX ADMIN — TAMSULOSIN HYDROCHLORIDE 0.4 MG: 0.4 CAPSULE ORAL at 15:17

## 2020-03-31 RX ADMIN — MAGNESIUM OXIDE TAB 400 MG (241.3 MG ELEMENTAL MG) 400 MG: 400 (241.3 MG) TAB at 17:23

## 2020-03-31 RX ADMIN — APIXABAN 5 MG: 5 TABLET, FILM COATED ORAL at 21:31

## 2020-03-31 RX ADMIN — PANTOPRAZOLE SODIUM 40 MG: 40 TABLET, DELAYED RELEASE ORAL at 06:54

## 2020-03-31 RX ADMIN — APIXABAN 5 MG: 5 TABLET, FILM COATED ORAL at 10:17

## 2020-03-31 RX ADMIN — CLOPIDOGREL BISULFATE 75 MG: 75 TABLET ORAL at 08:06

## 2020-03-31 RX ADMIN — MELATONIN 2 TABLET: at 08:06

## 2020-03-31 RX ADMIN — CHLORHEXIDINE GLUCONATE 10 ML: 1.2 RINSE ORAL at 08:17

## 2020-03-31 RX ADMIN — MAGNESIUM OXIDE TAB 400 MG (241.3 MG ELEMENTAL MG) 400 MG: 400 (241.3 MG) TAB at 08:06

## 2020-03-31 RX ADMIN — CARVEDILOL 6.25 MG: 6.25 TABLET, FILM COATED ORAL at 08:07

## 2020-03-31 RX ADMIN — CARVEDILOL 6.25 MG: 6.25 TABLET, FILM COATED ORAL at 17:23

## 2020-03-31 RX ADMIN — MELATONIN 2 TABLET: at 17:23

## 2020-03-31 RX ADMIN — ASPIRIN 81 MG 81 MG: 81 TABLET ORAL at 08:07

## 2020-03-31 NOTE — PROGRESS NOTES
Problem: Self Care Deficits Care Plan (Adult)  Goal: *Acute Goals and Plan of Care (Insert Text)  Description: FUNCTIONAL STATUS PRIOR TO ADMISSION: Patient was independent and active without use of DME.     HOME SUPPORT: The patient lived with wife but did not require assist.    Occupational Therapy Goals  Initiated 3/27/2020  1. Patient will perform ADLs standing 5 mins without fatigue or LOB with supervision/set-up within 5 day(s). 2.  Patient will perform lower body ADLs with supervision/set-up within 5 day(s). 3.  Patient will perform gathering ADL items high and low 2/2 with supervision/set-up within 5 day(s). 4.  Patient will perform toilet transfers with supervision/set-up within 5 day(s). 5.  Patient will perform all aspects of toileting with supervision/set-up within 5 day(s). 6.  Patient will participate in cardiac/sternal upper extremity therapeutic exercise/activities to increase independence with ADLs with supervision/set-up for 5 minutes within 5 day(s). - Met at Independent level; discharged   Outcome: Progressing Towards Goal    OCCUPATIONAL THERAPY TREATMENT  Patient: Monster Landeros (5 y.o. male)  Date: 3/31/2020  Diagnosis: NSTEMI (non-ST elevated myocardial infarction) ([facility] [address] [I21.4]   S/P CABG x 5  Procedure(s) (LRB):  CORONARY ARTERY BYPASS GRAFTING X 5   WITH LEFT LEG EVH AND LIMA HARVESTING. CARDIOPULMONARY BYPASS. TRANSESOPHAGEAL ECHOCARDIOGRAM AND EPI AORTIC ULTRASOUND BY DR. Brittany West. (N/A) 5 Days Post-Op  Precautions: Fall, Sternal  Chart, occupational therapy assessment, plan of care, and goals were reviewed. ASSESSMENT  Patient continues with skilled OT services and is progressing towards goals. Pt making good progress with skilled OT engagement and has reached Independent level with UE therapeutic exercises; associated goal met and discharged.   Pt reports he hopes to return home tomorrow morning, with OT believing patient safe for transition to home environment 2/2 current level of functional independence as well as having PRN ADL/IADL assist from wife; however, will continue to follow during acute rehab stay to maximize safe functional independence prior to discharge from hospital.    Patient is verbalizing and is demonstrating understanding of mindful-based movements (\"move in the tube\") principles of keeping UEs proximal to ribcage to prevent lateral pull on the sternum during load-bearing activities with no cues required for compliance. Current Level of Function Impacting Discharge (ADLs): Supervision    Other factors to consider for discharge: HR         PLAN :  Patient continues to benefit from skilled intervention to address the above impairments. Continue treatment per established plan of care. to address goals. Recommend with staff: OOB meals, active ADL engagement    Recommend next OT session: full ADL session    Recommendation for discharge: (in order for the patient to meet his/her long term goals)  No skilled occupational therapy/ follow up rehabilitation needs identified at this time. This discharge recommendation:  Has not yet been discussed the attending provider and/or case management    IF patient discharges home will need the following DME: shower chair       SUBJECTIVE:   Patient stated I hope I get home tomorrow.     OBJECTIVE DATA SUMMARY:   Cognitive/Behavioral Status:  Neurologic State: Alert; Appropriate for age  Orientation Level: Oriented X4  Cognition: Appropriate decision making; Appropriate for age attention/concentration; Appropriate safety awareness; Follows commands    Functional Mobility and Transfers for ADLs:  Balance:  Sitting: Intact  Standing: Intact    ADL Intervention:  Patient instructed and educated on mindful movement principles based on Move in The Tube concept to include maintaining bilateral elbows close to rib cage when performing any load-bearing activity such as getting in/out of bed, pushing up from a chair, opening a door, or lifting a box. Patient was given a handout with diagrams of each correct/incorrect method of performing each of the above tasks. Patient instructed on the ability to utilize upper extremities outside the tube when doing any non-load bearing activity such as washing hair/body, brushing teeth, retrieving clothing items, or scratching your back. Patient encouraged to also perform upper extremity exercises \"outside of the tube\" to prevent scar tissue formation around sternal incision site. Patient instructed in detail about activities to heed with caution, allowing pain to be the guide. These activities include but are not limited to: mowing the lawn, riding a bike, walking a dog, lifting a child, workshop hobbies, golfing, sexual activity, vacuuming, fishing, scrubbing the floors, and moving furniture. Patient was given the [address] in the [place] handout to describe each of these activities in detail. Patient instructed no asymmetrical reaching over head to ensure B UEs when shoulders >90* i.e. reaching in cabinets and dressing. Instruction on upper body dressing techniques of over head, then arms through to decrease pain and unilateral shoulder flexion >90*. Instruction on the benefits of utilizing B UEs during functional tasks i.e. opening the fridge, stepping into the tub. Instruction if continued pain at home with shoulder IR for BM hygiene can use wet wipes and toilet tongs PRN. Avoid valsalva maneuvers. May have to adjust home setup to increase ease with items closer to waist height to prevent deep bending and the automatic  of asymmetrical UE WB/pushing for stabilization during bending. Benefit to don clothing tailor sitting and don all clothing while sitting prior to standing. Patient demonstrated lower body dressing seated in armchair with Supervision.  Instruction and indicated understanding on the benefits of loose clothing throughout to accommodate for post surgical swelling, decreased ROM and increased pain. Instruction and indicated understanding the technique of pull over shirt versus front open clothing. Increase activity tolerance for home, work, and sexual intercourse by pacing self with increasing the arm exercises, sitting duration, frequency OOB, walking, standing, and ADLs. Instructed and indicated understanding of s/s of too much activity, how to respond to s/s safely. Therapeutic Exercises:   Patient instructed on the benefits and demonstrated cardiac exercises while seated in armchair with [place]. Instructed and indicated understanding on how to progress reps, sets against gravity, pacing through progressive muscle strengthening standing based on surgeon clearance for more weight in prep for basic and instrumental ADLs. Instruction on the use of household items in place of weights as needed.     CARDIAC   EXERCISE    Sets    Reps    Active  Active Assist    Passive  Self ROM    Comments    Shoulder flexion  1  5   [x]                            []                             []                             []                                Shoulder abduction  1  5  [x]                             []                             []                             []                                Scapular elevation  1  5  [x]                             []                              []                             []                                Scapular retraction  1  5  [x]                             []                             []                             []                                Trunk rotation  1  5  [x]                             []                             []                             []                                Trunk sidebending  1  5  [x]                             []                              []                             []                                          Pain:  No c/o pain    Activity Tolerance:   Fair and requires rest breaks  Please refer to the flowsheet for vital signs taken during this treatment. After treatment patient left in no apparent distress:   Sitting in chair and Bed / chair alarm activated    COMMUNICATION/COLLABORATION:   The patients plan of care was discussed with: Physical therapist and Registered nurse.      Jamel Espinoza OT  Time Calculation: 17 mins

## 2020-03-31 NOTE — PROGRESS NOTES
Transitions of Care: 16% risk of re-admission      -[facility] has accepted for home health skilled nursing, PT/OT   -Family to transport   -Cardiac Surgery follow-up    The CM notified Kelly Mason, Liaison with [facility], of patient's discharge today. Patient's spouse will transport home. Information on AVS. SAMANTHA Quintana    9:27 a.m.- CM updated by Cardiac Surgery PA- patient went into Afib, will not discharge today, likely tomorrow 4/1- CM notified [facility], also placed free 30-day Eliquis card on patient's chart, will make RN aware.  SAMANTHA Quintana

## 2020-03-31 NOTE — PROGRESS NOTES
CSS Progress Note    Admit Date: 3/25/2020  POD:  6 Day Post-Op    Procedure:  Procedure(s):  CORONARY ARTERY BYPASS GRAFTING X 5   WITH LEFT LEG EVH AND LIMA HARVESTING. CARDIOPULMONARY BYPASS. TRANSESOPHAGEAL ECHOCARDIOGRAM AND EPI AORTIC ULTRASOUND BY DR. Dilia Garcia. Subjective:   Pt seen with Dr. Willie Escobar. RA. NSRStacey Russell remains due to inability to void. Objective:   Vitals:  Blood pressure 128/58, pulse 88, temperature 98.6 °F (37 °C), resp. rate 18, height (P) 5' 11\" (1.803 m), weight (P) 203 lb 11.3 oz (92.4 kg), SpO2 96 %. Temp (24hrs), Av.4 °F (36.9 °C), Min:98 °F (36.7 °C), Max:98.6 °F (37 °C)    EKG/Rhythm: NSR 80s    Oxygen Therapy:  Room air    CXR:   CXR Results  (Last 48 hours)               20 1658  XR CHEST PA LAT Final result    Impression:  IMPRESSION:   1. Minimal bibasilar opacities, similar to comparison. 2. Blunted costophrenic angle on the left suggesting a small amount of pleural   fluid and/or scarring. Narrative:  INDICATION: . post op open heart   Additional history:   COMPARISON: Previous chest xray, 3/29/2020   Limitations: Motion limited lateral exam   FINDINGS: PA and lateral view of the chest.    .   Lines/tubes/surgical: Cardiac monitor leads overly the patient. Heart/mediastinum: Status post median sternotomy. Lungs/pleura: Minimal bibasilar opacities. Blunted left costophrenic angle. No   visualized pleural effusion or pneumothorax. Additional Comments: None. Heath Confer 20 0918  XR CHEST PORT Final result    Impression:  IMPRESSION:   Stable bibasilar atelectasis and pleural effusion right greater than left. Chest   and mediastinal tubes have been removed. .  . Narrative:  INDICATION:  post op open heart        EXAM: Chest single view. COMPARISON: 3/28/2020. FINDINGS: A single frontal view of the chest at 0916 hours shows stable   bibasilar atelectasis right greater than left.  Stable small left pleural   effusion and moderate right pleural effusion. . Chest and mediastinal tubes have   been removed. The heart, mediastinum and pulmonary vasculature are stable with   cardiomegaly status post median sternotomy. .  The bony thorax is unremarkable   for age. .               Admission Weight: Last Weight   Weight: 207 lb 0.2 oz (93.9 kg) Weight: (P) 203 lb 11.3 oz (92.4 kg)     Intake / Output / Drain:  Current Shift: No intake/output data recorded. Last 24 hrs.:     Intake/Output Summary (Last 24 hours) at 3/31/2020 0737  Last data filed at 3/31/2020 0302  Gross per 24 hour   Intake 1210 ml   Output 2175 ml   Net -965 ml       EXAM:  General:  NAD, up in the chair. Lungs:   Clear to auscultation bilaterally. Incision:  Without significant erythema, drainage, or dehiscence. Heart:  Regular rate and rhythm, S1, S2 normal, no murmur, click, rub or gallop. Abdomen:   Soft, non-tender. Bowel sounds hypoactive. No masses,  No organomegaly. +BM   Extremities:  + 1 edema LLE. PPP. Neurologic:  Gross motor and sensory apparatus intact. Labs:   Recent Labs     20  0312 20  1636  20  0319   WBC 9.9  --    < > 15.2*   HGB 8.6*  --    < > 8.5*   HCT 27.1*  --    < > 26.4*     --    < > 147*     --    < > 136   K 3.6  --    < > 4.4   BUN 27*  --    < > 30*   CREA 0.83  --    < > 0.90   *  --    < > 134*   GLUCPOC  --  135*   < >  --    INR  --   --   --  1.0    < > = values in this interval not displayed. Assessment:     Principal Problem:    S/P CABG x 5 (3/26/2020)      Overview: x 5, LIMA to LAD, RSVG to Diag-RI-OM, RSVG to RCA    Active Problems:    NSTEMI (non-ST elevated myocardial infarction) ([facility] [address] (3/25/2020)         Plan/Recommendations/Medical Decision Makin. CAD with NSTEMI on this admission s/p CABG: On ASA, statin, plavix. On BB (Hold for SBP<90 and HR <60)     2. Atelectasis/leukocytosis-improving: Encourage IS.      3. Hx HTN: BBmichaele     4.  HLD:  Continue Statin    5. Anemia secondary to acute blood loss: Monitor H/H     5. Pre-diabetes: A1c 5.9. AccuCheck AC & HS with SSI     6. Hypothyroid:  Continue Synthroid. TSH of 2.34 in December     7. Mitral valve prolapse/MR/AS: All mild on intra-operative WILMER.      8. BPH: Continue flomax and finasteride. Keep olivares,appreciate Urology consult. Plan to DC patient with olivares in place and follow up with Dr. Juliana Etienne as an outpatient in 7-10 days.     9. Arthritis/Chronic back pain: No current treatment    10. Intra-op bradycardia/first degree AV block: Hold amio per Felisa Morris. On BB. Dispo: PT/OT/Cardiac Rehab. Case management for discharge planning. DC home with home health, home PT/OT. Will call for follow up with [facility]. Update:  Patient went into a fib 120s, BP stable. Will monitor for one more day to be sure patient remains stable and asymptomatic. If continues rate controlled, will DC home. Also will switch ASA to eliquis.       Signed By: TOM Good

## 2020-03-31 NOTE — PROGRESS NOTES
Cardiac Surgery Care Coordinator- Placed call to Mr Richard Pacheco wife, reviewed discharge instructions and encouraged her to verbalize. Update- Discharge cancelled, placed follow up call to Mrs Melvin benavides. 1430- Met with Mr Melvin benavides, reviewed plan of care and discussed goals for the day and for discharge. Assisted Mr Melvin benavides with ambulation, ambulated in the zarco. Met with PT would finished the walk. Will continue to follow for educational and emotional support.  Ankur Howard RN

## 2020-03-31 NOTE — PROGRESS NOTES
Problem: Mobility Impaired (Adult and Pediatric)  Goal: *Acute Goals and Plan of Care (Insert Text)  Description: FUNCTIONAL STATUS PRIOR TO ADMISSION: Patient was independent and active without use of DME. When asked, he reported no falls in the last 12 months. HOME SUPPORT PRIOR TO ADMISSION: The patient lived with his wife but did not require assist.    Physical Therapy Goals  Initiated 3/27/2020  1. Patient will move from supine to sit and sit to supine , scoot up and down and roll side to side in bed with independence within 5 days. 2.  Patient will perform sit to/from stand with independence within 5 days. 3.  Patient will ambulate 300 feet with least restrictive assistive device and independence within 5 days. 4.  Patient will ascend/descend 4 stairs with one handrail(s) with modified independence within 5 days. 5.  Patient will perform cardiac exercises per protocol with independence within 5 days. 6.  Patient will verbally recall and functionally demonstrate mindful-based movements (\"move in the tube\") principles without cues within 5 days. Outcome: Progressing Towards Goal   PHYSICAL THERAPY TREATMENT  Patient: Jacki Gasca (56 y.o. male)  Date: 3/31/2020  Diagnosis: NSTEMI (non-ST elevated myocardial infarction) ([facility] [address] [I21.4]   S/P CABG x 5  Procedure(s) (LRB):  CORONARY ARTERY BYPASS GRAFTING X 5   WITH LEFT LEG EVH AND LIMA HARVESTING. CARDIOPULMONARY BYPASS. TRANSESOPHAGEAL ECHOCARDIOGRAM AND EPI AORTIC ULTRASOUND BY DR. Jessica Howard. (N/A) 5 Days Post-Op  Precautions: Fall, Sternal  Chart, physical therapy assessment, plan of care and goals were reviewed. ASSESSMENT  Patient continues with skilled PT services and is progressing towards goals. Patient received ambulating in hallway with cardiac surgery co- RN. Noted mild VANN and required standing rest breaks for coughing (1 + productive).  Patient educated on importance of mobility to facilitate lung clearance, improvement in balance and prevent further weakness. Patient did require seated rest break prior to stair training and between gait trial and standing UE exercises. Min cues for UE exercise technique but balance intact and patient with recall of 5/6. Patient is verbalizing and is demonstrating understanding of mindful-based movements (\"move in the tube\") principles of keeping UEs proximal to ribcage to prevent lateral pull on the sternum during load-bearing activities with verbal cues required for compliance. Current Level of Function Impacting Discharge (mobility/balance): supervision    Other factors to consider for discharge: new sternotomy, supportive wife         PLAN :  Patient continues to benefit from skilled intervention to address the above impairments. Continue treatment per established plan of care. to address goals. Recommendation for discharge: (in order for the patient to meet his/her long term goals)  Physical therapy at least 2 days/week in the home     This discharge recommendation:  Has been made in collaboration with the attending provider and/or case management    IF patient discharges home will need the following DME: none       SUBJECTIVE:   Patient stated That was my first time.  re: ambulation in hallway    OBJECTIVE DATA SUMMARY:   Patient mobilized on continuous portable monitor/telemetry.   Critical Behavior:  Neurologic State: Alert, Appropriate for age  Orientation Level: Oriented X4  Cognition: Appropriate decision making, Appropriate for age attention/concentration, Appropriate safety awareness, Follows commands  Safety/Judgement: Decreased insight into deficits  Functional Mobility Training:  Transfers:  Sit to Stand: Stand-by assistance  Stand to Sit: Stand-by assistance  Balance:  Sitting: Intact  Standing: Intact  Ambulation/Gait Training:  Distance (ft): 200 Feet (ft)  Assistive Device: Gait belt  Ambulation - Level of Assistance: Stand-by assistance  Gait Abnormalities: Decreased step clearance  Base of Support: Narrowed  Speed/Gaby: Slow  Step Length: Right shortened;Left shortened  Stairs:  Stairs - Level of Assistance: Contact guard assistance  Rail Use: Left     Cardiac diagnosis intervention:  Patient instructed and educated on mindful movement principles based on Move in [place] concept to include maintaining bilateral elbows close to rib cage when performing any load-bearing activity such as getting in/out of bed, pushing up from a chair, opening a door, or lifting a box. Patient was given a handout with diagrams of each correct/incorrect method of performing each of the above tasks. Therapeutic Exercises:   Patient instructed on the benefits and demonstrated cardiac exercises while standing with Stand-by assistance and Setup. Instructed and indicated understanding on how to progress reps, sets against gravity, pacing through progressive muscle strengthening standing based on surgeon clearance for more weight in prep for basic and instrumental ADLs. Instruction on the use of household items in place of weights as needed.      CARDIAC  EXERCISE   Sets   Reps   Active Active Assist   Passive Self ROM   Comments   Shoulder flexion 1 5 [x]                                            []                                            []                                            []                                               Shoulder abduction 1      5 [x]                                            []                                            []                                            []                                               Scapular elevation 1 5 [x]                                            []                                            []                                            []                                               Scapular retraction 1 5 [x]                                            []                                            [] []                                               Trunk rotation 1 5 [x]                                            []                                            []                                            []                                               Trunk sidebending 1 5 [x]                                            []                                            []                                            []                                                  []                                            []                                            []                                            []                                                   Pain Rating:  Sore around incision    Activity Tolerance:   Good and observed SOB with activity  Please refer to the flowsheet for vital signs taken during this treatment. After treatment patient left in no apparent distress:   Sitting in chair and Call bell within reach    COMMUNICATION/COLLABORATION:   The patients plan of care was discussed with: Registered nurse.      Fan Werner PT, DPT   Time Calculation: 23 mins

## 2020-03-31 NOTE — PROGRESS NOTES
0730: Bedside shift change report given to Chanelle (oncoming nurse) by Sveta Vo (offgoing nurse). Report included the following information SBAR, Kardex, MAR, Recent Results and Cardiac Rhythm NSR [id number] degree block. 0920: Pt's rhythm changed to A fib with hr in the 120s. Pt asymptomatic and vitals stable. EKG obtained. Cardiac surgery team made aware. Will continue to monitor. 1000: Pt in NSR with BBB, vitals stable. 1415: Pt ambulated, sustained NSR    1930: Bedside shift change report given to Coni Kennedy (oncoming nurse) by Livia Butt (offgoing nurse). Report included the following information SBAR, Kardex, MAR, Recent Results and Cardiac Rhythm NSR with BBB. Problem: Pressure Injury - Risk of  Goal: *Prevention of pressure injury  Description: Document Morris Scale and appropriate interventions in the flowsheet. Outcome: Progressing Towards Goal  Note: Pressure Injury Interventions:  Sensory Interventions: Assess need for specialty bed, Discuss PT/OT consult with provider, Keep linens dry and wrinkle-free, Minimize linen layers, Maintain/enhance activity level    Moisture Interventions: Absorbent underpads, Internal/External urinary devices, Minimize layers    Activity Interventions: Increase time out of bed, PT/OT evaluation    Mobility Interventions: Chair cushion, Assess need for specialty bed, Pressure redistribution bed/mattress (bed type), PT/OT evaluation    Nutrition Interventions: Document food/fluid/supplement intake, Offer support with meals,snacks and hydration    Friction and Shear Interventions: Minimize layers, Lift sheet                Problem: Patient Education: Go to Patient Education Activity  Goal: Patient/Family Education  Outcome: Progressing Towards Goal     Problem: Falls - Risk of  Goal: *Absence of Falls  Description: Document Antonio Milton Fall Risk and appropriate interventions in the flowsheet.   Outcome: Progressing Towards Goal  Note: Fall Risk Interventions:  Mobility Interventions: Patient to call before getting OOB         Medication Interventions: Patient to call before getting OOB, Teach patient to arise slowly    Elimination Interventions: Call light in reach, Toilet paper/wipes in reach              Problem: Patient Education: Go to Patient Education Activity  Goal: Patient/Family Education  Outcome: Progressing Towards Goal     Problem: Patient Education: Go to Patient Education Activity  Goal: Patient/Family Education  Outcome: Progressing Towards Goal

## 2020-03-31 NOTE — PROGRESS NOTES
Cardiac Surgery Shift Summary:    1. I/O's: Intake:   Meals: 25-50% of each meal (fair)   PO intake:  03363     Output: Urinary retention requiring bladder scan and straight cath (comment required) followed by urology; olivares output:  2500   Has patient had BM since surgery? Yes    2. Oxygen Requirements: WDL (on room air)    3. Daily Weights (weight change in 24 hours): Weight loss (comment) -2.0kg    4.  Medication Administration: No variations to medication administration

## 2020-04-01 LAB
ATRIAL RATE: 61 BPM
CALCULATED R AXIS, ECG10: -80 DEGREES
CALCULATED T AXIS, ECG11: 51 DEGREES
DIAGNOSIS, 93000: NORMAL
MAGNESIUM SERPL-MCNC: 2.4 MG/DL (ref 1.6–2.4)
Q-T INTERVAL, ECG07: 366 MS
QRS DURATION, ECG06: 124 MS
QTC CALCULATION (BEZET), ECG08: 510 MS
VENTRICULAR RATE, ECG03: 117 BPM

## 2020-04-01 PROCEDURE — 97116 GAIT TRAINING THERAPY: CPT

## 2020-04-01 PROCEDURE — 74011250637 HC RX REV CODE- 250/637: Performed by: PHYSICIAN ASSISTANT

## 2020-04-01 PROCEDURE — 97110 THERAPEUTIC EXERCISES: CPT

## 2020-04-01 PROCEDURE — 36415 COLL VENOUS BLD VENIPUNCTURE: CPT

## 2020-04-01 PROCEDURE — 65660000001 HC RM ICU INTERMED STEPDOWN

## 2020-04-01 PROCEDURE — 74011250637 HC RX REV CODE- 250/637: Performed by: NURSE PRACTITIONER

## 2020-04-01 PROCEDURE — 97535 SELF CARE MNGMENT TRAINING: CPT

## 2020-04-01 PROCEDURE — 93005 ELECTROCARDIOGRAM TRACING: CPT

## 2020-04-01 PROCEDURE — 83735 ASSAY OF MAGNESIUM: CPT

## 2020-04-01 RX ORDER — METOPROLOL TARTRATE 25 MG/1
25 TABLET, FILM COATED ORAL EVERY 12 HOURS
Status: DISCONTINUED | OUTPATIENT
Start: 2020-04-01 | End: 2020-04-02 | Stop reason: HOSPADM

## 2020-04-01 RX ADMIN — Medication 10 ML: at 21:03

## 2020-04-01 RX ADMIN — SENNOSIDES AND DOCUSATE SODIUM 1 TABLET: 8.6; 5 TABLET ORAL at 08:59

## 2020-04-01 RX ADMIN — MAGNESIUM OXIDE TAB 400 MG (241.3 MG ELEMENTAL MG) 400 MG: 400 (241.3 MG) TAB at 17:41

## 2020-04-01 RX ADMIN — FERROUS SULFATE TAB 325 MG (65 MG ELEMENTAL FE) 325 MG: 325 (65 FE) TAB at 08:59

## 2020-04-01 RX ADMIN — Medication 1 TABLET: at 08:58

## 2020-04-01 RX ADMIN — LEVOTHYROXINE SODIUM 50 MCG: 0.1 TABLET ORAL at 09:08

## 2020-04-01 RX ADMIN — METOPROLOL TARTRATE 25 MG: 25 TABLET, FILM COATED ORAL at 21:03

## 2020-04-01 RX ADMIN — Medication 10 ML: at 05:24

## 2020-04-01 RX ADMIN — CHLORHEXIDINE GLUCONATE 10 ML: 1.2 RINSE ORAL at 09:00

## 2020-04-01 RX ADMIN — CARVEDILOL 6.25 MG: 6.25 TABLET, FILM COATED ORAL at 08:59

## 2020-04-01 RX ADMIN — CLOPIDOGREL BISULFATE 75 MG: 75 TABLET ORAL at 08:59

## 2020-04-01 RX ADMIN — Medication 10 ML: at 14:42

## 2020-04-01 RX ADMIN — PANTOPRAZOLE SODIUM 40 MG: 40 TABLET, DELAYED RELEASE ORAL at 06:41

## 2020-04-01 RX ADMIN — TAMSULOSIN HYDROCHLORIDE 0.4 MG: 0.4 CAPSULE ORAL at 17:41

## 2020-04-01 RX ADMIN — MELATONIN 2 TABLET: at 08:59

## 2020-04-01 RX ADMIN — CHLORHEXIDINE GLUCONATE 10 ML: 1.2 RINSE ORAL at 21:04

## 2020-04-01 RX ADMIN — MAGNESIUM OXIDE TAB 400 MG (241.3 MG ELEMENTAL MG) 400 MG: 400 (241.3 MG) TAB at 08:59

## 2020-04-01 RX ADMIN — APIXABAN 5 MG: 5 TABLET, FILM COATED ORAL at 08:59

## 2020-04-01 RX ADMIN — ATORVASTATIN CALCIUM 40 MG: 40 TABLET, FILM COATED ORAL at 21:03

## 2020-04-01 RX ADMIN — APIXABAN 5 MG: 5 TABLET, FILM COATED ORAL at 21:03

## 2020-04-01 RX ADMIN — METOPROLOL TARTRATE 25 MG: 25 TABLET, FILM COATED ORAL at 14:41

## 2020-04-01 RX ADMIN — MELATONIN 2 TABLET: at 17:41

## 2020-04-01 RX ADMIN — FINASTERIDE 5 MG: 5 TABLET, FILM COATED ORAL at 21:03

## 2020-04-01 RX ADMIN — SENNOSIDES AND DOCUSATE SODIUM 1 TABLET: 8.6; 5 TABLET ORAL at 17:41

## 2020-04-01 NOTE — PROGRESS NOTES
Problem: Self Care Deficits Care Plan (Adult)  Goal: *Acute Goals and Plan of Care (Insert Text)  Description: FUNCTIONAL STATUS PRIOR TO ADMISSION: Patient was independent and active without use of DME.     HOME SUPPORT: The patient lived with wife but did not require assist.    Occupational Therapy Goals  Initiated 3/27/2020  1. Patient will perform ADLs standing 5 mins without fatigue or LOB with supervision/set-up within 5 day(s). 2.  Patient will perform lower body ADLs with supervision/set-up within 5 day(s). 3.  Patient will perform gathering ADL items high and low 2/2 with supervision/set-up within 5 day(s). 4.  Patient will perform toilet transfers with supervision/set-up within 5 day(s). 5.  Patient will perform all aspects of toileting with supervision/set-up within 5 day(s). 6.  Patient will participate in cardiac/sternal upper extremity therapeutic exercise/activities to increase independence with ADLs with supervision/set-up for 5 minutes within 5 day(s). Outcome: Progressing Towards Goal   OCCUPATIONAL THERAPY TREATMENT/DISCHARGE  Patient: Manuel Dockery (37 y.o. male)  Date: 4/1/2020  Diagnosis: [facility]non[facility] [I21.4]   S/P CABG x 5  Procedure(s) (LRB):  CORONARY ARTERY BYPASS GRAFTING X 5   WITH LEFT LEG EVH AND LIMA HARVESTING. CARDIOPULMONARY BYPASS. TRANSESOPHAGEAL ECHOCARDIOGRAM AND EPI AORTIC ULTRASOUND BY DR. Renzo Ortiz. (N/A) 6 Days Post-Op  Precautions: Fall, Sternal  Chart, occupational therapy assessment, plan of care, and goals were reviewed. ASSESSMENT  Patient continues with skilled OT services and has progressed towards goals. Patient has met all goals at this time and requires supervision for all ADL transfers and tasks at this time. Patient has good compliance with all \"move in the tube\" sternal precautions and has good understanding of bimanual overhead reaching and to avoid deep bending at this time.  OT educated patient on shower chair for energy conservation due to patient limited by elevated HR in session (108-126 bpm). Patient safe to discharge home with wife when medically stable for discharge with no further OT needs. Current Level of Function (ADLs/self-care): supervision-independent    Other factors to consider for discharge: supportive spouse         PLAN :  Rationale for discharge: Goals achieved  Recommend with staff: OOB x 3 to chair; BUE cardiac exercises   Recommendation for discharge: (in order for the patient to meet his/her long term goals)  No skilled occupational therapy/ follow up rehabilitation needs identified at this time. This discharge recommendation:  Has been made in collaboration with the attending provider and/or case management    IF patient discharges home will need the following DME:TBD       SUBJECTIVE:   Patient stated You tricked me last time.     OBJECTIVE DATA SUMMARY:   Cognitive/Behavioral Status:  Neurologic State: Alert  Orientation Level: Oriented X4  Cognition: Appropriate for age attention/concentration  Perception: Appears intact  Perseveration: No perseveration noted  Safety/Judgement: Decreased insight into deficits; Decreased awareness of need for safety;Decreased awareness of need for assistance    Functional Mobility and Transfers for ADLs:  Bed Mobility:  Supine to Sit: Supervision    Transfers:  Sit to Stand: Supervision  Functional Transfers  Shower Transfer: (educated patient on shower chair)       Balance:  Sitting: Intact; Without support  Standing: Impaired; Without support  Standing - Static: Good  Standing - Dynamic : Fair    ADL Intervention:       Grooming  Washing Hands: Independent  Brushing Teeth: Independent  Brushing/Combing Hair: Independent                   Lower Body Dressing Assistance  Socks: Supervision    Toileting  Toileting Assistance: Modified independent    Cognitive Retraining  Safety/Judgement: Decreased insight into deficits; Decreased awareness of need for safety;Decreased awareness of need for assistance    Therapeutic Exercises:   Patient instructed on the benefits and demonstrated cardiac exercises while seated with Supervision. Instructed and indicated understanding on how to progress reps, sets against gravity, working up to 5 lbs, standing and so on based on surgeon clearance for more weight in prep for basic and instrumental ADLs. Instruction on the use of household items in place of weights as needed. CARDIAC   EXERCISE    Sets    Reps    Active  Active Assist    Passive  Self ROM    Comments    Shoulder flexion  1  5   [x]                            []                             []                             []                                Shoulder abduction  1  5  [x]                             []                             []                             []                                Scapular elevation  1  5  [x]                             []                              []                             []                                Scapular retraction  1  5  [x]                             []                             []                             []                                Trunk rotation  1  5  [x]                             []                             []                             []                                Trunk sidebending  1  5  [x]                             []                              []                             []                                         Activity Tolerance:   Fair, requires rest breaks, and observed SOB with activity  Please refer to the flowsheet for vital signs taken during this treatment. After treatment patient left in no apparent distress:   Sitting in chair, Call bell within reach, and Caregiver / family present    COMMUNICATION/COLLABORATION:   The patients plan of care was discussed with: Physical therapist and Registered nurse.      Ivania Lauren  Time Calculation: 26 mins

## 2020-04-01 NOTE — PROGRESS NOTES
Problem: Pressure Injury - Risk of  Goal: *Prevention of pressure injury  Description: Document Morris Scale and appropriate interventions in the flowsheet. Outcome: Progressing Towards Goal  Note: Pressure Injury Interventions:  Sensory Interventions: Assess need for specialty bed, Discuss PT/OT consult with provider, Keep linens dry and wrinkle-free, Minimize linen layers, Maintain/enhance activity level    Moisture Interventions: Absorbent underpads, Internal/External urinary devices, Minimize layers    Activity Interventions: Increase time out of bed, Pressure redistribution bed/mattress(bed type), PT/OT evaluation    Mobility Interventions: HOB 30 degrees or less, Pressure redistribution bed/mattress (bed type), PT/OT evaluation    Nutrition Interventions: Document food/fluid/supplement intake    Friction and Shear Interventions: Minimize layers, Lift sheet                Problem: Patient Education: Go to Patient Education Activity  Goal: Patient/Family Education  Outcome: Progressing Towards Goal     Problem: Discharge Planning  Goal: *Discharge to safe environment  Description: See CM notes. SAMANTHA Rodrigez   Outcome: Progressing Towards Goal     Problem: Falls - Risk of  Goal: *Absence of Falls  Description: Document Ish Hu Fall Risk and appropriate interventions in the flowsheet.   Outcome: Progressing Towards Goal  Note: Fall Risk Interventions:  Mobility Interventions: Patient to call before getting OOB         Medication Interventions: Evaluate medications/consider consulting pharmacy, Patient to call before getting OOB, Teach patient to arise slowly    Elimination Interventions: Call light in reach, Patient to call for help with toileting needs              Problem: Patient Education: Go to Patient Education Activity  Goal: Patient/Family Education  Outcome: Progressing Towards Goal     Problem: Patient Education: Go to Patient Education Activity  Goal: Patient/Family Education  Outcome: Progressing Towards Goal     Problem: Patient Education: Go to Patient Education Activity  Goal: Patient/Family Education  Outcome: Progressing Towards Goal

## 2020-04-01 NOTE — PROGRESS NOTES
NUTRITION  Pt seen for:     [x] LOS    [x] PO intake           RECOMMENDATIONS:   Continue Cardiac diet    Interventions:  None at this time. SUBJECTIVE/OBJECTIVE:   Information obtained from:   EMR/Staff/pt    Pt admitted with CABG x 5. POD#7. Pt assessed for LOS. Pt eating well. Weight stable. Likely d/c tomorrow? No complaints of n/v, c/d. No hx of chew/swallow difficulties. BG slightly elevated. A1C 5.9, acceptable for age. Hgb 8.6. Past Medical History:   Diagnosis Date    Arrhythmia     r/t caffeine    Arthritis     Basal cell carcinoma     BPH (benign prostatic hyperplasia)     CAD (coronary artery disease)     s/p PTCA '92    Calculus of kidney     Chronic pain     BACK    Elevated PSA     Hypercholesterolemia     Lumbar foraminal stenosis     Melanoma ([address]     MVP (mitral valve prolapse)     Other ill-defined conditions(799.89)     Prostatitis    Prediabetes     Seasonal allergies     Squamous cell carcinoma     Systolic murmur     Thyroid disease     HYPOTHYROID    Vision impairment     R EYE, BLOOD CLOT ON RETINA        Diet:  Cardiac  Supplements: none  Intake: [x] Good      Patient Vitals for the past 100 hrs:   % Diet Eaten   04/01/20 0747 80 %   03/31/20 0806 80 %   03/30/20 1325 100 %   03/30/20 0850 100 %   03/29/20 1731 75 %   03/29/20 1110 50 %   03/29/20 0841 100 %   03/28/20 1300 50 %       Weight Changes:     [x] Stable  Wt Readings from Last 10 Encounters:   04/01/20 92 kg (202 lb 13.2 oz)   12/30/19 93.7 kg (206 lb 9.6 oz)   06/05/19 93.2 kg (205 lb 6.4 oz)   01/09/19 91.6 kg (202 lb)   01/08/19 91.6 kg (202 lb)   12/05/18 94.3 kg (208 lb)   05/30/18 94 kg (207 lb 3.2 oz)   11/28/17 93.3 kg (205 lb 9.6 oz)   05/22/17 93.5 kg (206 lb 3.2 oz)   03/01/17 89.8 kg (198 lb)       Nutrition Problems:  [x]  Increased nutrition for wound healing    Nutrition Diagnosis:   1. Increased nutrient needs related to increased protein needs as evidenced by s/p CABG, wound healing. Estimated Nutrition Needs:   Kcals/day: 1938 Kcals/day  Protein: 92 g(-110g/day(1.0-1.2g/kg))  Fluid: 1950 ml  Based On:  [address]  Weight Used: Actual wt(92 kg)    PLAN:     [x] Added or adjusted supplements/snacks  [x] Continue current diet  [x] [address]

## 2020-04-01 NOTE — PROGRESS NOTES
CSS Progress Note    Admit Date: 3/25/2020  POD:  7 Day Post-Op    Procedure:  Procedure(s):  CORONARY ARTERY BYPASS GRAFTING X 5   WITH LEFT LEG EVH AND LIMA HARVESTING. CARDIOPULMONARY BYPASS. TRANSESOPHAGEAL ECHOCARDIOGRAM AND EPI AORTIC ULTRASOUND BY DR. Beltran Grey. Subjective:   Pt seen with Dr. Anat Alvarez. RA. Has remained in SR since 10 am yesterday. Dr. Jack Mullen would like Russell out this morning prior to discharge. Objective:   Vitals:  Blood pressure 122/59, pulse 88, temperature 98.5 °F (36.9 °C), resp. rate 16, height 5' 11\" (1.803 m), weight 202 lb 13.2 oz (92 kg), SpO2 97 %. Temp (24hrs), Av.7 °F (37.1 °C), Min:97.8 °F (36.6 °C), Max:99.7 °F (37.6 °C)    EKG/Rhythm: NSR 80s    Oxygen Therapy:  Room air    CXR:   CXR Results  (Last 48 hours)               20 1658  XR CHEST PA LAT Final result    Impression:  IMPRESSION:   1. Minimal bibasilar opacities, similar to comparison. 2. Blunted costophrenic angle on the left suggesting a small amount of pleural   fluid and/or scarring. Narrative:  INDICATION: . post op open heart   Additional history:   COMPARISON: Previous chest xray, 3/29/2020   Limitations: Motion limited lateral exam   FINDINGS: PA and lateral view of the chest.    .   Lines/tubes/surgical: Cardiac monitor leads overly the patient. Heart/mediastinum: Status post median sternotomy. Lungs/pleura: Minimal bibasilar opacities. Blunted left costophrenic angle. No   visualized pleural effusion or pneumothorax. Additional Comments: None. .           Admission Weight: Last Weight   Weight: 207 lb 0.2 oz (93.9 kg) Weight: 202 lb 13.2 oz (92 kg)     Intake / Output / Drain:  Current Shift: No intake/output data recorded. Last 24 hrs.:     Intake/Output Summary (Last 24 hours) at 2020 0974  Last data filed at 2020 0349  Gross per 24 hour   Intake 940 ml   Output 1175 ml   Net -235 ml       EXAM:  General:  NAD, up in the chair.       Lungs:   Clear to auscultation bilaterally. Incision:  Without significant erythema, drainage, or dehiscence. Heart:  Regular rate and rhythm, S1, S2 normal, no murmur, click, rub or gallop. Abdomen:   Soft, non-tender. Bowel sounds hypoactive. No masses,  No organomegaly. +BM   Extremities:  + 1 edema LLE. PPP. Neurologic:  Gross motor and sensory apparatus intact. Labs:   Recent Labs     20  0312 20  1636   WBC 9.9  --    HGB 8.6*  --    HCT 27.1*  --      --      --    K 3.6  --    BUN 27*  --    CREA 0.83  --    *  --    GLUCPOC  --  135*        Assessment:     Principal Problem:    S/P CABG x 5 (3/26/2020)      Overview: x 5, LIMA to LAD, RSVG to Diag-RI-OM, RSVG to RCA    Active Problems:    NSTEMI (non-ST elevated myocardial infarction) ([facility] (3/25/2020)         Plan/Recommendations/Medical Decision Makin. CAD with NSTEMI on this admission s/p CABG: On statin, plavix (no ASA with Eliquis and Plavix). On BB (Hold for SBP<90 and HR <60)     2. Atelectasis/leukocytosis-improving: Encourage IS.      3. Hx HTN: BB, diurese     4. HLD:  Continue Statin    5. Anemia: Postoperative secondary to acute blood loss. Currently above transfusion threshold. Continue Iron. Monitor daily CBC.      5. Pre-diabetes: A1c 5.9. AccuCheck AC & HS with SSI     6. Hypothyroid:  Continue Synthroid. TSH of 2.34 in December     7. Mitral valve prolapse/MR/AS: All mild on intra-operative WILMER.      8. BPH: Continue flomax and finasteride. Keep olivares,appreciate Urology consult. Dr. Anand Vieira would like the olivares out this am prior to discharge.     9. Arthritis/Chronic back pain: No current treatment    10. Intra-op bradycardia/first degree AV block: Hold amio per Patricia Galvez. On BB. 11. Atrial fibrillation on 3/31: Has been in [facility] since 10 am 3/31. Continue BB, Eliquis    Dispo: PT/OT/Cardiac Rehab. Case management for discharge planning.  DC home with home health, home PT/OT later today if able to void after olivares removed.       Signed By: Talya Santamaria, NP

## 2020-04-01 NOTE — PROGRESS NOTES
0730 Bedside shift change report given to Kwaku Daly RN (oncoming nurse) by Jocy RN and Austin Munoz RN  (offgoing nurse). Report included the following information SBAR, Kardex, Procedure Summary, Intake/Output, MAR and Recent Results. 0915 Russell removed. 1045 Pt voided 50 ml of blood-tinged urine. HR sustaining in 110s after ambulating in hallway with OT.    1130 Post void bladder scan shows 56ml. 1440 Pts HR sustaining 110s-120s. Metoprolol 25mg q12h ordered. First dose administered. 1515  Pts HR 80s-90s. 1930 Bedside shift change report given to Jocy, RN and Austin Munoz RN (oncoming nurse) by Kwaku Daly RN (offgoing nurse). Report included the following information SBAR, Kardex, Intake/Output, MAR and Recent Results.

## 2020-04-01 NOTE — PROGRESS NOTES
1930: Bedside shift change report given to [facility] (oncoming nurse) by Jacqueline Henriquez RN (offgoing nurse). Report included the following information SBAR, Kardex, Intake/Output, MAR, and Recent Results. 2000: Patient walked 150 ft with a tech and tolerated the activity well    0730: Bedside shift change report given to Marco A Gutierrez (oncoming nurse) by Jocy RN (offgoing nurse). Report included the following information SBAR, Kardex, Intake/Output, MAR and Recent Results. Problem: Pressure Injury - Risk of  Goal: *Prevention of pressure injury  Description: Document Morris Scale and appropriate interventions in the flowsheet. Outcome: Progressing Towards Goal  Note: Pressure Injury Interventions:  Sensory Interventions: Assess need for specialty bed, Discuss PT/OT consult with provider, Keep linens dry and wrinkle-free, Minimize linen layers, Maintain/enhance activity level    Moisture Interventions: Absorbent underpads, Internal/External urinary devices, Minimize layers    Activity Interventions: Increase time out of bed    Mobility Interventions: Chair cushion    Nutrition Interventions: Document food/fluid/supplement intake    Friction and Shear Interventions: Minimize layers, Lift sheet                Problem: Patient Education: Go to Patient Education Activity  Goal: Patient/Family Education  Outcome: Progressing Towards Goal     Problem: Falls - Risk of  Goal: *Absence of Falls  Description: Document Lexy Robertson Fall Risk and appropriate interventions in the flowsheet.   Outcome: Progressing Towards Goal  Note: Fall Risk Interventions:  Mobility Interventions: Patient to call before getting OOB         Medication Interventions: Patient to call before getting OOB, Teach patient to arise slowly    Elimination Interventions: Call light in reach, Toileting schedule/hourly rounds              Problem: Patient Education: Go to Patient Education Activity  Goal: Patient/Family Education  Outcome: Progressing Towards Goal     Problem: CABG: Post-Op Day 5  Goal: Activity/Safety  Outcome: Progressing Towards Goal  Goal: Diagnostic Test/Procedures  Outcome: Progressing Towards Goal  Goal: Nutrition/Diet  Outcome: Progressing Towards Goal  Goal: Discharge Planning  Outcome: Progressing Towards Goal  Goal: Medications  Outcome: Progressing Towards Goal  Goal: Respiratory  Outcome: Progressing Towards Goal  Goal: Treatments/Interventions/Procedures  Outcome: Progressing Towards Goal  Goal: Psychosocial  Outcome: Progressing Towards Goal

## 2020-04-01 NOTE — PROGRESS NOTES
Problem: Mobility Impaired (Adult and Pediatric)  Goal: *Acute Goals and Plan of Care (Insert Text)  Description: FUNCTIONAL STATUS PRIOR TO ADMISSION: Patient was independent and active without use of DME. When asked, he reported no falls in the last 12 months. HOME SUPPORT PRIOR TO ADMISSION: The patient lived with his wife but did not require assist.    Physical Therapy Goals:    Weekly reassessment completed 4/1/2020 and goals remain appropriate as patient is largely at a supervision-level. .     Initiated 3/27/2020  1. Patient will move from supine to sit and sit to supine, scoot up and down and roll side to side in bed with independence within 5 days. 2.  Patient will perform sit to/from stand with independence within 5 days. 3.  Patient will ambulate 300 feet with least restrictive assistive device and independence within 5 days. 4.  Patient will ascend/descend 4 stairs with one handrail(s) with modified independence within 5 days. 5.  Patient will perform cardiac exercises per protocol with independence within 5 days. 6.  Patient will verbally recall and functionally demonstrate mindful-based movements (\"move in the tube\") principles without cues within 5 days. Outcome: Progressing Towards Goal     PHYSICAL THERAPY TREATMENT  Patient: Joseluis Diaz (89 y.o. male)  Date: 4/1/2020  Diagnosis: NSTEMI (non-ST elevated myocardial infarction) ([facility] [place] [address] [I21.4]   S/P CABG x 5  Procedure(s) (LRB):  CORONARY ARTERY BYPASS GRAFTING X 5   WITH LEFT LEG EVH AND LIMA HARVESTING. CARDIOPULMONARY BYPASS. TRANSESOPHAGEAL ECHOCARDIOGRAM AND EPI AORTIC ULTRASOUND BY DR. Dilia Garcia. (N/A) 6 Days Post-Op  Precautions: Fall, Sternal  Chart, physical therapy assessment, plan of care and goals were reviewed. ASSESSMENT  Patient continues with skilled PT services and is progressing towards goals. At this time, patient is safe to discharge home with HHPT services and wife's support for community reintegration. Patient is now implementing self-initiated standing rest breaks during functional tasks in order to limit dyspnea. Patient practiced opening/closing door with adherence to mindful-based movement principles and practiced over-shoulder reaching (symmetrical) into high shelves. Patient is largely at a supervision level (no physical assist needed for bed mobility, functional transfers, ambulation, or stair negotiation). Current Level of Function Impacting Discharge (mobility/balance): Supervision-level    Other factors to consider for discharge: Sternotomy, Elevate HR today (120s to 126s)         PLAN :  Patient continues to benefit from skilled intervention to address the above impairments. Continue treatment per established plan of care. to address goals. Recommendation for discharge: (in order for the patient to meet his/her long term goals)  Physical therapy at least 2 days/week in the home AND ensure assist and/or supervision for safety with community reintegration     This discharge recommendation:  Has been made in collaboration with the attending provider and/or case management    IF patient discharges home will need the following DME: none       SUBJECTIVE:   Patient stated You all make it easier to stay here a day longer.     OBJECTIVE DATA SUMMARY:   Critical Behavior:  Neurologic State: Alert  Orientation Level: Oriented X4  Cognition: Appropriate for age attention/concentration  Safety/Judgement: Decreased insight into deficits, Decreased awareness of need for safety, Decreased awareness of need for assistance  Functional Mobility Training:  Bed Mobility:  Supine to Sit: Supervision    Transfers:  Sit to Stand: Supervision  Stand to Sit: Supervision       Balance:  Sitting: Intact; Without support  Standing: Intact  Standing - Static: Good  Standing - Dynamic : Fair  Ambulation/Gait Training:  Distance (ft): 200 Feet (ft)  Assistive Device: Gait belt  Ambulation - Level of Assistance: Stand-by assistance                 Base of Support: Narrowed     Speed/Gaby: Slow  Step Length: Right shortened;Left shortened    Pain Ratin/10 sternotomy soreness     Activity Tolerance:   Good  Please refer to the flowsheet for vital signs taken during this treatment. After treatment patient left in no apparent distress:   Sitting in chair and Call bell within reach    COMMUNICATION/COLLABORATION:   The patients plan of care was discussed with: Occupational therapist and Registered nurse.      Avani Hussein, PT, DPT   Time Calculation: 16 mins

## 2020-04-02 VITALS
OXYGEN SATURATION: 95 % | TEMPERATURE: 98 F | BODY MASS INDEX: 28.33 KG/M2 | RESPIRATION RATE: 16 BRPM | HEIGHT: 71 IN | WEIGHT: 202.38 LBS | SYSTOLIC BLOOD PRESSURE: 125 MMHG | HEART RATE: 103 BPM | DIASTOLIC BLOOD PRESSURE: 65 MMHG

## 2020-04-02 LAB
ANION GAP SERPL CALC-SCNC: 7 MMOL/L (ref 5–15)
BUN SERPL-MCNC: 21 MG/DL (ref 6–20)
BUN/CREAT SERPL: 24 (ref 12–20)
CALCIUM SERPL-MCNC: 8.5 MG/DL (ref 8.5–10.1)
CHLORIDE SERPL-SCNC: 102 MMOL/L (ref 97–108)
CO2 SERPL-SCNC: 27 MMOL/L (ref 21–32)
CREAT SERPL-MCNC: 0.87 MG/DL (ref 0.7–1.3)
ERYTHROCYTE [DISTWIDTH] IN BLOOD BY AUTOMATED COUNT: 14.5 % (ref 11.5–14.5)
GLUCOSE SERPL-MCNC: 118 MG/DL (ref 65–100)
HCT VFR BLD AUTO: 26.8 % (ref 36.6–50.3)
HGB BLD-MCNC: 8.5 G/DL (ref 12.1–17)
MAGNESIUM SERPL-MCNC: 2.5 MG/DL (ref 1.6–2.4)
MCH RBC QN AUTO: 29.3 PG (ref 26–34)
MCHC RBC AUTO-ENTMCNC: 31.7 G/DL (ref 30–36.5)
MCV RBC AUTO: 92.4 FL (ref 80–99)
NRBC # BLD: 0 K/UL (ref 0–0.01)
NRBC BLD-RTO: 0 PER 100 WBC
PLATELET # BLD AUTO: 320 K/UL (ref 150–400)
PMV BLD AUTO: 9 FL (ref 8.9–12.9)
POTASSIUM SERPL-SCNC: 3.8 MMOL/L (ref 3.5–5.1)
RBC # BLD AUTO: 2.9 M/UL (ref 4.1–5.7)
SODIUM SERPL-SCNC: 136 MMOL/L (ref 136–145)
WBC # BLD AUTO: 9.4 K/UL (ref 4.1–11.1)

## 2020-04-02 PROCEDURE — 74011250637 HC RX REV CODE- 250/637: Performed by: PHYSICIAN ASSISTANT

## 2020-04-02 PROCEDURE — 36415 COLL VENOUS BLD VENIPUNCTURE: CPT

## 2020-04-02 PROCEDURE — 85027 COMPLETE CBC AUTOMATED: CPT

## 2020-04-02 PROCEDURE — 83735 ASSAY OF MAGNESIUM: CPT

## 2020-04-02 PROCEDURE — 80048 BASIC METABOLIC PNL TOTAL CA: CPT

## 2020-04-02 PROCEDURE — 74011250637 HC RX REV CODE- 250/637: Performed by: NURSE PRACTITIONER

## 2020-04-02 RX ORDER — METOPROLOL TARTRATE 25 MG/1
37.5 TABLET, FILM COATED ORAL EVERY 12 HOURS
Qty: 90 TAB | Refills: 1 | Status: ON HOLD | OUTPATIENT
Start: 2020-04-02 | End: 2020-04-14 | Stop reason: SDUPTHER

## 2020-04-02 RX ADMIN — Medication 10 ML: at 06:34

## 2020-04-02 RX ADMIN — METOPROLOL TARTRATE 25 MG: 25 TABLET, FILM COATED ORAL at 08:26

## 2020-04-02 RX ADMIN — CHLORHEXIDINE GLUCONATE 10 ML: 1.2 RINSE ORAL at 08:35

## 2020-04-02 RX ADMIN — Medication 1 TABLET: at 08:25

## 2020-04-02 RX ADMIN — MAGNESIUM OXIDE TAB 400 MG (241.3 MG ELEMENTAL MG) 400 MG: 400 (241.3 MG) TAB at 08:26

## 2020-04-02 RX ADMIN — FERROUS SULFATE TAB 325 MG (65 MG ELEMENTAL FE) 325 MG: 325 (65 FE) TAB at 08:25

## 2020-04-02 RX ADMIN — CLOPIDOGREL BISULFATE 75 MG: 75 TABLET ORAL at 08:26

## 2020-04-02 RX ADMIN — DIPHENHYDRAMINE HYDROCHLORIDE 25 MG: 25 CAPSULE ORAL at 02:07

## 2020-04-02 RX ADMIN — APIXABAN 5 MG: 5 TABLET, FILM COATED ORAL at 08:25

## 2020-04-02 RX ADMIN — LEVOTHYROXINE SODIUM 50 MCG: 0.1 TABLET ORAL at 06:43

## 2020-04-02 RX ADMIN — MELATONIN 2 TABLET: at 08:25

## 2020-04-02 RX ADMIN — PANTOPRAZOLE SODIUM 40 MG: 40 TABLET, DELAYED RELEASE ORAL at 06:34

## 2020-04-02 NOTE — PROGRESS NOTES
CSS Progress Note    Admit Date: 3/25/2020  POD:  8 Day Post-Op    Procedure:  Procedure(s):  CORONARY ARTERY BYPASS GRAFTING X 5   WITH LEFT LEG EVH AND LIMA HARVESTING. CARDIOPULMONARY BYPASS. TRANSESOPHAGEAL ECHOCARDIOGRAM AND EPI AORTIC ULTRASOUND BY DR. Marielos Amaya. Subjective:   Pt seen with Dr. Birgit Knight. RA. Has remained in SR with HR under better control since switching to Metoprolol yesterday. Russell out yesterday and has been voiding. Will check PVR this am.     Objective:   Vitals:  Blood pressure 135/55, pulse (!) 107, temperature 98.5 °F (36.9 °C), resp. rate 16, height 5' 11\" (1.803 m), weight 202 lb 6.1 oz (91.8 kg), SpO2 96 %. Temp (24hrs), Av.4 °F (36.9 °C), Min:98.2 °F (36.8 °C), Max:98.7 °F (37.1 °C)    EKG/Rhythm: NSR 90-100s    Oxygen Therapy:  Room air    CXR 3/30/20: IMPRESSION:  1. Minimal bibasilar opacities, similar to comparison. 2. Blunted costophrenic angle on the left suggesting a small amount of pleural  fluid and/or scarring.       Admission Weight: Last Weight   Weight: 207 lb 0.2 oz (93.9 kg) Weight: 202 lb 6.1 oz (91.8 kg)     Intake / Output / Drain:  Current Shift: No intake/output data recorded. Last 24 hrs.:     Intake/Output Summary (Last 24 hours) at 2020 0926  Last data filed at 2020 0409  Gross per 24 hour   Intake 690 ml   Output 600 ml   Net 90 ml       EXAM:  General:  NAD, up in the chair. Lungs:   Clear to auscultation bilaterally. Incision:  Without significant erythema, drainage, or dehiscence. Heart:  Regular rate and rhythm, S1, S2 normal, no murmur, click, rub or gallop. Abdomen:   Soft, non-tender. Bowel sounds hypoactive. No masses,  No organomegaly. +BM   Extremities:  trace edema LLE. PPP. Neurologic:  Gross motor and sensory apparatus intact.      Labs:   Recent Labs     20  0819 20  0416  20  1636   WBC 9.4  --    < >  --    HGB 8.5*  --    < >  --    HCT 26.8*  --    < >  --      --    < >  --    NA  -- 136   < >  --    K  --  3.8   < >  --    BUN  --  21*   < >  --    CREA  --  0.87   < >  --    GLU  --  118*   < >  --    GLUCPOC  --   --   --  135*    < > = values in this interval not displayed. Assessment:     Principal Problem:    S/P CABG x 5 (3/26/2020)      Overview: x 5, LIMA to LAD, RSVG to Diag-RI-OM, RSVG to RCA    Active Problems:    NSTEMI (non-ST elevated myocardial infarction) ([facility] [address] (3/25/2020)         Plan/Recommendations/Medical Decision Makin. CAD with NSTEMI on this admission s/p CABG: On statin, plavix (no ASA with Eliquis and Plavix). On BB (Hold for SBP<90 and HR <60)     2. Atelectasis/leukocytosis-improving: Encourage IS.      3. Hx HTN: BB, diurese     4. HLD:  Continue Statin    5. Anemia: Postoperative secondary to acute blood loss. Currently above transfusion threshold. Continue Iron. Monitor daily CBC.      5. Pre-diabetes: A1c 5.9. AccuCheck AC & HS with SSI     6. Hypothyroid:  Continue Synthroid. TSH of 2.34 in December     7. Mitral valve prolapse/MR/AS: All mild on intra-operative WILMER.      8. BPH: Continue flomax and finasteride. Keep olivares,appreciate Urology consult. Has been voiding since olivares removal. Will check PVR this am     9. Arthritis/Chronic back pain: No current treatment    10. Intra-op bradycardia/first degree AV block: Hold amio per [employer]. On BB. 11. Atrial fibrillation on 3/31: Has been in [facility] since 10 am 3/31. Continue BB, Eliquis    Dispo: PT/OT/Cardiac Rehab. Case management for discharge planning. DC home with home health, home PT/OT later today if HR under better control and no issue with PVR.     Signed By: Josep Wong NP

## 2020-04-02 NOTE — PROGRESS NOTES
1930:Bedside shift change report given to [facility] (oncoming nurse) by Basilio Hawley RN (offgoing nurse). Report included the following information SBAR, Kardex, Intake/Output, MAR, and Recent Results. 0730: Bedside shift change report given to Marco A Gutierrez (oncoming nurse) by Jocy RN (offgoing nurse). Report included the following information SBAR, Kardex, Intake/Output, MAR and Recent Results. Problem: Pressure Injury - Risk of  Goal: *Prevention of pressure injury  Description: Document Morris Scale and appropriate interventions in the flowsheet. Outcome: Progressing Towards Goal  Note: Pressure Injury Interventions:  Sensory Interventions: Assess changes in LOC    Moisture Interventions: Absorbent underpads    Activity Interventions: Increase time out of bed    Mobility Interventions: HOB 30 degrees or less, Pressure redistribution bed/mattress (bed type), PT/OT evaluation    Nutrition Interventions: Document food/fluid/supplement intake    Friction and Shear Interventions: Lift sheet, Minimize layers                Problem: Patient Education: Go to Patient Education Activity  Goal: Patient/Family Education  Outcome: Progressing Towards Goal     Problem: Falls - Risk of  Goal: *Absence of Falls  Description: Document Serg Fall Risk and appropriate interventions in the flowsheet.   Outcome: Progressing Towards Goal  Note: Fall Risk Interventions:  Mobility Interventions: Patient to call before getting OOB         Medication Interventions: Patient to call before getting OOB, Teach patient to arise slowly    Elimination Interventions: Call light in reach, Toileting schedule/hourly rounds              Problem: Patient Education: Go to Patient Education Activity  Goal: Patient/Family Education  Outcome: Progressing Towards Goal     Problem: CABG: Post-Op Day 5  Goal: Activity/Safety  Outcome: Progressing Towards Goal  Goal: Diagnostic Test/Procedures  Outcome: Progressing Towards Goal  Goal: Nutrition/Diet  Outcome: Progressing Towards Goal  Goal: Discharge Planning  Outcome: Progressing Towards Goal  Goal: Medications  Outcome: Progressing Towards Goal  Goal: Respiratory  Outcome: Progressing Towards Goal  Goal: Treatments/Interventions/Procedures  Outcome: Progressing Towards Goal  Goal: Psychosocial  Outcome: Progressing Towards Goal

## 2020-04-02 NOTE — DISCHARGE INSTRUCTIONS
Patient Education        Cardiac Surgery Specialist     [place] [address]                       [address] [address]  Office- [phone]  Fax- [phone]       Office- [phone]  Fax- [phone]  _____________________________________________________________  Dr. Ren Moyer [facility]   Lamberto Whelan, BISHOP Ivory PA-C, PA-C, PA-C Bernard Horton, AGPCNP  ______________________________________________________________    Name:[place] ANGÉLICA Sawyer     Surgery & Date: Procedure(s):  CORONARY ARTERY BYPASS GRAFTING X 5   WITH LEFT LEG EVH AND LIMA HARVESTING. CARDIOPULMONARY BYPASS. TRANSESOPHAGEAL ECHOCARDIOGRAM AND EPI AORTIC ULTRASOUND BY DR. Marielos Amaya. Discharge Date:  04/02/20     MEDICATIONS:  Please refer to your After Visit Summary for your medication list. If you do not have a prescription for a new medication, you may purchase the medication over the counter. DO NOT TAKE ANY MEDICATIONS THAT ARE NOT ON THIS LIST      INSTRUCTIONS:  1. NO SMOKING OR TOBACCO PRODUCTS  2. Follow all the instructions in your discharge book  3. You may shower. Wash all incisions twice daily with mild soap and water. No lotions, ointments or powder. 4. Call the office immediately for any redness, swelling, or drainage from your incision. 5. Take your temperature daily and call for a temperature of 101 degrees or higher or for any symptoms that make you think you have and infection. 6. Weigh yourself each morning. Call if you gain more than 5 pounds in 48 hours. 7. Use the incentive spirometer 6-8 times a day-10 breaths each time.   Use a pillow or your bear to splint your breastbone when coughing or sneezing. 8. If you feel your breast bone clicking or popping, notify the office immediately. 9. Walk several hundred feet several times daily. DIET  Eat an American Heart Association diet. If you are having trouble with your appetite, eat what you can. Try eating small, frequent meals throughout the day. ACTIVITY  1. NO DRIVING--you will be evaluated to drive at your follow up visit. 2. Increase your activity by walking several times a day. Stay out of bed most of the day. 3. When sitting, keep your legs elevated. 4. You may ride in a car, but you must get out every hour and walk around. If you ride in a car with an airbag that can not be switched off, put the seat ALL the way back or ride in the back seat. 5. Any load bearing activity can be performed as long as it can be performed \"in the tube\". You can reach \"out of the tube\" when performing activities that don't require heavy lifting. Let pain be your guide, your pain level will keep you from doing anything extreme. FOLLOW UP  1. Your first follow up appointment will be by phone on 4/6/20 at [address] office is located in [address] [place] [address]. Your second follow up appointment will be in four weeks, on 4/28/20 at 1pm. Please call our office at [phone] if you are unable to make either one of these appointments. 2. You will be receiving a call before your 5 day appointment to begin cardiac rehab. They are located in the [address] [place] [address] on [facility]. Their phone number is [phone]. Please call if you have not been contacted 2-3 weeks after discharge from the hospital.  3. We will make an appointment with your cardiologist at your last appointment. 4. Consult you primary care physician regarding your influenza &   pneumovax vaccines. 5.   Please bring all medications with you to your appointment.     Signature:___________________________________________________           [facility] follow up appointment    Dr. Natali Mathews  Phone     Appointment:   [facility] nurse will contact you with void trial appointment

## 2020-04-02 NOTE — PROGRESS NOTES
Transitions of Care: 18% risk of re-admission      -Patient to discharge home today with [facility] PT/OT/Skilled RN   -Family to transport   -Cardiac Surgery follow-up    The CM spoke with Cardiac Surgery NP- patient anticipated to discharge today, [facility] is aware of patient's anticipated discharge today, information on AVS. The patient's family will transport home, Eliquis free-30-day savings card on patient's hard chart, RN aware.  SAMANTHA Thomas

## 2020-04-02 NOTE — PROGRESS NOTES
0730 Bedside shift change report given to Jossue Dimas RN (oncoming nurse) by Lety Howell RN and ESTUARDO Sandra (offgoing nurse). Report included the following information SBAR, Kardex, Intake/Output, MAR and Recent Results. 1400 PIVs and tele removed for pt discharge. I have reviewed discharge instructions with the patient and spouse. The patient and spouse verbalized understanding. Discharge medications reviewed with patient and spouse and appropriate educational materials and side effects teaching were provided.

## 2020-04-03 ENCOUNTER — TELEPHONE (OUTPATIENT)
Dept: INTERNAL MEDICINE CLINIC | Age: 80
End: 2020-04-03

## 2020-04-03 ENCOUNTER — HOME CARE VISIT (OUTPATIENT)
Dept: HOME HEALTH SERVICES | Facility: HOME HEALTH | Age: 80
End: 2020-04-03
Payer: MEDICARE

## 2020-04-03 ENCOUNTER — HOME CARE VISIT (OUTPATIENT)
Dept: SCHEDULING | Facility: HOME HEALTH | Age: 80
End: 2020-04-03
Payer: MEDICARE

## 2020-04-03 ENCOUNTER — TELEPHONE (OUTPATIENT)
Dept: CASE MANAGEMENT | Age: 80
End: 2020-04-03

## 2020-04-03 VITALS
RESPIRATION RATE: 18 BRPM | TEMPERATURE: 98.4 F | SYSTOLIC BLOOD PRESSURE: 120 MMHG | OXYGEN SATURATION: 97 % | HEART RATE: 91 BPM | DIASTOLIC BLOOD PRESSURE: 62 MMHG

## 2020-04-03 PROCEDURE — G0299 HHS/HOSPICE OF RN EA 15 MIN: HCPCS

## 2020-04-03 PROCEDURE — 3331090002 HH PPS REVENUE DEBIT

## 2020-04-03 PROCEDURE — 3331090001 HH PPS REVENUE CREDIT

## 2020-04-03 PROCEDURE — 400013 HH SOC

## 2020-04-03 NOTE — TELEPHONE ENCOUNTER
Cardiac Surgery Discharge - Follow up call placed to [facility]. Spoke to Mrs Melvin benavides,  Reviewed plan of care after discharge and encouraged them to verbalize. Discussed precautions and reviewed medications, they are without questions regarding medications. Encouraged continued use of the incentive spirometer. Confirmed virtual follow up appts and reinforced importance of wearing red reminder bracelet. They are without questions or concerns.  Jacki Zavala RN

## 2020-04-03 NOTE — TELEPHONE ENCOUNTER
Spoke with wife Anne Ramirez) patient had open heart surgery last Thursday. Discharged on yesterday. Seen my [facility] cardiology in the past. Seen by Dr Rosa Isela Montelongo in hospital. Appt schedule with Dr Todd Caraballo, surgeon on 04/06/2020.

## 2020-04-03 NOTE — TELEPHONE ENCOUNTER
Pt was discharge from  [facility] on  March 26 for open heart surgery and was told to set up  [facility] appt can some call pt to let them know what type of appt we can sched  You can contact pt's wife [phone]

## 2020-04-03 NOTE — PROGRESS NOTES
Cardiac Surgery Care Coordinator-  Met with Chaim Hogan, reviewed plan of care and discharge instructions. Reinforced move in the tube, sternal precautions and continued use of the incentive spirometer. Reviewed the importance of daily temp and weight monitoring, discussed incisional care and reviewed signs and symptoms of infection. Red reminder bracelet on right wrist, reviewed purpose of the bracelet and when to call the MD. Using the teach back method reviewed new medications to include, tylenol, eliquis, plavix, ferrous, metoprolol, oxycodone and pericolace. Reminded pt of virtual appts and encouraged participation in the Cardiac Wellness and rehab program once they are reopened. Encouraged Chaim Hogan to verbalize and emotional support given. Chaim Hogan is without questions or concerns at this time. [facility] call to Mrs Clifford Mane reviewed discharge instructions and encouraged her to verbalize. She is without questions or concerns at this time. Will follow up with a phone call tomorrow.  Dante Hunt RN

## 2020-04-04 PROCEDURE — 3331090002 HH PPS REVENUE DEBIT

## 2020-04-04 PROCEDURE — 3331090001 HH PPS REVENUE CREDIT

## 2020-04-05 PROCEDURE — 3331090002 HH PPS REVENUE DEBIT

## 2020-04-05 PROCEDURE — 3331090001 HH PPS REVENUE CREDIT

## 2020-04-05 NOTE — OP NOTES
[address]  OPERATIVE REPORT    Name:  Chago Moore  MR#:  [id number]  :  1940  ACCOUNT #:  [de-identified]  DATE OF SERVICE:  2020    PREOPERATIVE DIAGNOSES:  1. Ejection fraction 60%. 2.  New York Heart Association Class II acute-on-chronic diastolic heart failure, starting approximately 2 days prior to surgery (note the patient presented with dyspnea on exertion and an NT-proBNP of 391, all consistent with New York Heart Association Class II acute-on-chronic diastolic heart failure starting about 2 days prior to surgery). 3.  Symptoms on admission:  A.  Non-ST-elevation myocardial infarction. B.  Congestive heart failure. 4.  Symptoms at surgery:  A. Unstable angina. B.  Congestive heart failure. 5.  Previous myocardial infarction 2 days prior to surgery. 6.  Prior cardiac arrhythmia (note the patient had episodes of second-degree heart block prior to surgery). 7.  Hypertension. 8.  Mild liver disease (note the patient presented with congestive hepatopathy likely due to some diastolic dysfunction. This is evidenced in his AST elevation to the 60-70 range). 9.  Three-vessel coronary artery disease. 10.  60% left main stenosis. 11.  70% proximal left anterior descending stenosis. 12.  Urgent coronary artery bypass grafting due to severe disease. 13.  New York Heart Association Class II acute-on-chronic diastolic heart failure, starting about 2 days prior to surgery. Previous percutaneous coronary intervention many years ago. Preoperative echocardiogram showed moderate mitral regurgitation and moderate aortic stenosis. POSTOPERATIVE DIAGNOSES:  1. Ejection fraction 60%.   2.  New York Heart Association Class II acute-on-chronic diastolic heart failure, starting approximately 2 days prior to surgery (note the patient presented with dyspnea on exertion and an NT-proBNP of 391, all consistent with New York Heart Association Class II acute-on-chronic diastolic heart failure starting about 2 days prior to surgery). 3.  Symptoms on admission:  A.  Non-ST-elevation myocardial infarction. B.  Congestive heart failure. 4.  Symptoms at surgery:  A. Unstable angina. B.  Congestive heart failure. 5.  Previous myocardial infarction 2 days prior to surgery. 6.  Prior cardiac arrhythmia (note the patient had episodes of second-degree heart block prior to surgery). 7.  Hypertension. 8.  Mild liver disease (note the patient presented with congestive hepatopathy likely due to some diastolic dysfunction. This is evidenced in his AST elevation to the 60-70 range). 9.  Three-vessel coronary artery disease. 10.  60% left main stenosis. 11.  70% proximal left anterior descending stenosis. 12.  Urgent coronary artery bypass grafting due to severe disease. 13.  New York Heart Association Class II acute-on-chronic diastolic heart failure, starting about 2 days prior to surgery. Previous percutaneous coronary intervention many years ago. Preoperative echocardiogram showed moderate mitral regurgitation and moderate aortic stenosis. PROCEDURES PERFORMED:  1. Coronary artery bypass grafting x5 (left internal mammary artery to left anterior descending; saphenous vein graft to diagonal to ramus intermedius to obtuse marginal; saphenous vein graft to right coronary artery). 2.  Endoscopic vein harvest of the left lower extremity. SURGEON:  Janes Rueda MD    ASSISTANT:  TOM Peralta; ; BISHOP assistance was needed due to difficulty of procedure, dissection, and identification of pertinent anatomy throughout the case       ANESTHESIA:  General endotracheal anesthesia. COMPLICATIONS:  None. SPECIMENS REMOVED:  None. IMPLANTS:  None. ESTIMATED BLOOD LOSS:  100 mL. INDICATIONS:  The patient is a very pleasant 66-year-old gentleman, recently diagnosed with multivessel coronary artery disease and some congestive heart failure, likely diastolic in origin.   He is now being brought to the operating room to undergo coronary artery bypass grafting. PROCEDURE:  The patient was brought to the operating room, had a right radial A-line placed without complications, a [place] catheter was placed without complications, general endotracheal anesthesia was used without complications. Chest, abdomen, and lower extremities were prepped and draped in usual sterile fashion. A midline incision was made on the patient's sternum. Cautery dissection was used to dissect down to the level of sternal bone and the sternal bone with a sagittal saw and the left pleural space was entered. During this portion of the procedure, endoscopic vein harvesting was performed of the left lower extremity without complications. Once the left pleural space was entered, the left internal mammary artery was carefully dissected off the chest wall, taking care to place the proximal clips on each portion of the internal mammary artery branch. Once this was completely dissected off the chest wall, an appropriate dose of heparin was given. Three minutes passed. Two large distal clips were placed and the artery was cut off the chest wall. There was good flow at this stage. Next, the pericardium was opened and pursestring sutures were applied. Once the ACT was above 460, performed an epiaortic ultrasound, which did not identify any significant atherosclerotic plaque in the ascending aorta. We then placed 2 pursestring sutures in the ascending aorta and we cannulated with a normal aortic perfuser. This was de-aired and hooked up to bypass circuit. I then made a small V in the pericardium and matured the left internal mammary artery to a length of appropriate size up to the left anterior descending artery. I then placed a pursestring suture in the right atrium and placed a 2-stage venous cannula. This was de-aired and hooked up to the bypass circuit.   We then went on cardiopulmonary bypass, placed an antegrade cardioplegia needle, de-aired the cardioplegia line, hooked up, pumped down, was cross-clamped, pumped it back up again. We gave 750 mL of initial cardioplegia; however, required subsequently 400 mL. We then tilted the heart up and dissected out the OM vessel with a 69 blade. We decided to further extend the arteriotomy with forward and reverse Piña. We then performed the saphenous vein graft to OM anastomosis using 7-0 Prolene suture. We then made a similar anastomosis to the ramus intermedius and diagonal vessels. We then brought the vein graft to length to appropriate size and similarly cut it. We then dissected out the right coronary artery with a 69 blade. We decided to then further extend the arteriotomy with forward and reverse Piña. We then performed a saphenous vein graft to right coronary anastomosis using 7-0 Prolene suture. We then measured the vein graft to length to appropriate size and similarly cut it. We then dissected out the left anterior descending artery with the 69 blade. We decided to further extend the arteriotomy with forward and reverse Piña. We then performed the LIMA to LAD anastomosis with 8-0 Prolene suture. We then made 2 small nicks in the ascending aorta, used 4-punch x2, performed proximal anastomosis in the right and left side grafts. Care was taken to de-air the ascending aorta before tying it down. We then brought the head of the bed up, pumped it down with the cross-clamp, pumped it back up again. Once the temperature reached 36.2, we successfully came off the coronary artery bypass. We placed A and V wires, AC locators and Talat drains. Wires were used to close the sternum and Vicryl suture was used to close the subcutaneous tissue. I was present for the entire procedure.       MD HUAN Pringle/[id number]  D:  04/05/2020 12:35  T:  04/05/2020 17:41  JOB #:  [id number]

## 2020-04-06 ENCOUNTER — HOME CARE VISIT (OUTPATIENT)
Dept: HOME HEALTH SERVICES | Facility: HOME HEALTH | Age: 80
End: 2020-04-06
Payer: MEDICARE

## 2020-04-06 ENCOUNTER — OFFICE VISIT (OUTPATIENT)
Dept: CARDIOLOGY CLINIC | Age: 80
End: 2020-04-06

## 2020-04-06 ENCOUNTER — HOME CARE VISIT (OUTPATIENT)
Dept: SCHEDULING | Facility: HOME HEALTH | Age: 80
End: 2020-04-06
Payer: MEDICARE

## 2020-04-06 VITALS
OXYGEN SATURATION: 98 % | HEART RATE: 100 BPM | BODY MASS INDEX: 27.62 KG/M2 | DIASTOLIC BLOOD PRESSURE: 64 MMHG | SYSTOLIC BLOOD PRESSURE: 128 MMHG | WEIGHT: 198 LBS | TEMPERATURE: 98.2 F

## 2020-04-06 DIAGNOSIS — Z95.1 S/P CABG X 5: Primary | ICD-10-CM

## 2020-04-06 PROCEDURE — G0299 HHS/HOSPICE OF RN EA 15 MIN: HCPCS

## 2020-04-06 PROCEDURE — G0151 HHCP-SERV OF PT,EA 15 MIN: HCPCS

## 2020-04-06 PROCEDURE — 3331090002 HH PPS REVENUE DEBIT

## 2020-04-06 PROCEDURE — 3331090001 HH PPS REVENUE CREDIT

## 2020-04-06 NOTE — PROGRESS NOTES
Patient: Porsha Nowak   Age: 78 y.o. Patient Care Team:  Kole Graves MD as PCP - General  Kole Graves MD as PCP - [facility] Empaneled Provider  Fidel Witt MD as Physician (Internal Medicine)  Deloris Mckenzie MD as Physician (Dermatology)  Vandana Dow MD as Physician (Urology)  Faina Preston MD as Surgeon (Orthopedic Surgery)  Isa Rockwell MD as Physician (Ophthalmology)  Cecy Mckeon MD as Physician (Cardiology)  Juan Manuel Briones MD as Surgeon (Orthopedic Surgery)  Maria Victoria Andrews MD (Cardiology)  Kole Graves MD (Internal Medicine)    Diagnosis: The encounter diagnosis was S/P CABG x 5. 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 Location of provider was [facility] office. Purpose of call: postop f/u    Content of discussion: Pt is doing well since discharge last week. He had his Russell removed prior to d/c and is urinating well on his own. He is increasing activity. Does c/o of some fatigue after he takes his pills. No sig issues with pain, has only taken Tylenol recently.  He does note some drainage from his CT sites which has steadily improved. No SOB, edema or palpitations. Current Medications:   Current Outpatient Medications   Medication Sig Dispense Refill    apixaban (ELIQUIS) 5 mg tablet Take 1 Tab by mouth every twelve (12) hours for 60 days. 60 Tab 1    metoprolol tartrate (LOPRESSOR) 25 mg tablet Take 1.5 Tabs by mouth every twelve (12) hours for 60 days. 90 Tab 1    acetaminophen (TYLENOL) 325 mg tablet Take 2 Tabs by mouth every four (4) hours as needed for Pain.  clopidogreL (PLAVIX) 75 mg tab Take 1 Tab by mouth daily. 30 Tab 5    ferrous sulfate 325 mg (65 mg iron) tablet Take 1 Tab by mouth daily (with breakfast) for 30 days. 30 Tab 0    senna-docusate (PERICOLACE) 8.6-50 mg per tablet Take 1 Tab by mouth two (2) times a day.  levothyroxine (SYNTHROID) 50 mcg tablet TAKE 1 TABLET BY MOUTH EVERY DAY BEFORE BREAKFAST EXCEPT TAKE 2 TABS ON SATURDAY. 112 Tab 0    atorvastatin (LIPITOR) 40 mg tablet TAKE 1 TAB BY MOUTH DAILY. 90 Tab 2    tamsulosin (FLOMAX) 0.4 mg capsule TAKE 1 CAPSULE BY MOUTH EVERY DAY 30 MINUTES AFTER THE SAME MEAL EACH DAY  11    Cholecalciferol, Vitamin D3, (VITAMIN D3) 2,000 unit cap capsule Take 2,000 Units by mouth two (2) times a day. 30 Cap 11    finasteride (PROSCAR) 5 mg tablet Take 5 mg by mouth daily. HS      co-enzyme Q-10 (CO Q-10) 100 mg capsule Take 200 mg by mouth daily.  VIT A/VIT C/VIT E/ZINC/COPPER (PRESERVISION AREDS PO) Take 1 Cap by mouth two (2) times a day. Vitals: There were no vitals taken for this visit. Allergies: is allergic to codeine; pcn [penicillins]; and sulfa (sulfonamide antibiotics). Physical Exam:  Not performed, telehealth visit     Assessment/Plan:   1.  CAD with NSTEMI on this admission s/p CABG: On statin, BB, plavix (no ASA with Eliquis and Plavix)     2. Hx HTN: on BB     3.  HLD:  Continue Statin     4. Anemia: Postoperative secondary to acute blood loss. Cont iron x 30 days     5. Pre-diabetes: A1c 5.9. F/u with PCP     6. Hypothyroid:  Continue Synthroid.      7. Mitral valve prolapse/MR/AS: All mild on intra-operative WILMER. No intervention performed     8. BPH: Continue flomax and finasteride.      9. Arthritis/Chronic back pain: No current treatment     10. Intra-op bradycardia/first degree AV block: Hold amio per [facility]. Tolerating BB.       11. Atrial fibrillation on 3/31: Continue BB, Eliquis     Time spent: 17 min    Pt is ready to begin cardiac rehab.      Rehab - can begin once services resume  Walking: yes  Glucometer: n/a

## 2020-04-07 ENCOUNTER — VIRTUAL VISIT (OUTPATIENT)
Dept: INTERNAL MEDICINE CLINIC | Age: 80
End: 2020-04-07

## 2020-04-07 VITALS — SYSTOLIC BLOOD PRESSURE: 132 MMHG | DIASTOLIC BLOOD PRESSURE: 60 MMHG

## 2020-04-07 DIAGNOSIS — Z95.1 HX OF CABG: ICD-10-CM

## 2020-04-07 DIAGNOSIS — I10 ESSENTIAL HYPERTENSION, BENIGN: ICD-10-CM

## 2020-04-07 DIAGNOSIS — I25.700 CORONARY ARTERY DISEASE INVOLVING CORONARY BYPASS GRAFT OF NATIVE HEART WITH UNSTABLE ANGINA PECTORIS (HCC): Primary | ICD-10-CM

## 2020-04-07 DIAGNOSIS — E78.2 MIXED HYPERLIPIDEMIA: ICD-10-CM

## 2020-04-07 DIAGNOSIS — D64.9 POSTOPERATIVE ANEMIA: ICD-10-CM

## 2020-04-07 DIAGNOSIS — E06.3 HYPOTHYROIDISM, ACQUIRED, AUTOIMMUNE: ICD-10-CM

## 2020-04-07 PROCEDURE — 3331090002 HH PPS REVENUE DEBIT

## 2020-04-07 PROCEDURE — 3331090001 HH PPS REVENUE CREDIT

## 2020-04-07 NOTE — PROGRESS NOTES
HISTORY OF PRESENT ILLNESS  Libby Valencia is a 78 y.o. male. HPI This is a real-time audio/video visit brought to you by our sponsors, the fine folks at [facility] and [employer]. "[employer]     Subjective:  Zara Rich is seen today for hospital follow up for recent admission with unstable angina and subsequent bypass surgery. 1. CAD, atrial fibrillation. He was admitted from 03/25 - 04/02/20 at [facility]. He had been developing exertional chest pain and then about 1:00 a.m. on the day of admission he developed persistent, more severe pain and went to the emergency room. He underwent cardiac cath early that morning and was found to be a candidate for bypass surgery. Only complication of the surgery has been some postop a-fib and anemia. He did not require transfusion. He is taking iron. 2. Hypertension, stable on current regimen. 3. Hypothyroidism. Up to date with labs. 4. BPH, clinically stable and he follows up with urology. Social  History:  Notable for him participating with PT and OT for deconditioning. I think this is appropriate. Medications:  Fully reviewed and reconciled. His wife, Gilma Samuels, has concerns about the vitamins as he is going on a protein drink at the direction of the surgeon's office. This apparently has quite a vitamins in it. I have given them the okay to hold off on the vitamins for a month or so till he no longer requires the protein drink. Of note, we will have the CBC drawn by the home health nurse. 4/7/20 dictation   Received: 2 days ago   Message Contents   Nancy Goncalves MD             Subjective: Erika Quispe is seen today for hospital follow up for recent admission with unstable angina and subsequent bypass surgery.      1. CAD, atrial fibrillation. Aliya Oseguera was admitted from 03/25 - 04/02/20 at [facility] had been developing exertional chest pain and then about 1:00 a.m. on the day of admission he developed persistent, more severe pain and went to the emergency room. Bakari Roland underwent cardiac cath early that morning and was found to be a candidate for bypass surgery.  Only complication of the surgery has been some postop a-fib and anemia.  He did not require transfusion. Bakari Roland is taking iron. 2. Hypertension, stable on current regimen. 3. Hypothyroidism.  Up to date with labs. 4. BPH, clinically stable and he follows up with urology. Social  History:  Notable for him participating with PT and OT for deconditioning.  I think this is appropriate. Medications:  Fully reviewed and reconciled.  His wife, Frannie Taylor, has concerns about the vitamins as he is going on a protein drink at the direction of the surgeon's office.  This apparently has quite a vitamins in it.  I have given them the okay to hold off on the vitamins for a month or so till he no longer requires the protein drink. Of note, we will have the CBC drawn by the home health nurse. Current Outpatient Medications   Medication Sig    apixaban (ELIQUIS) 5 mg tablet Take 1 Tab by mouth every twelve (12) hours for 60 days.  metoprolol tartrate (LOPRESSOR) 25 mg tablet Take 1.5 Tabs by mouth every twelve (12) hours for 60 days.  acetaminophen (TYLENOL) 325 mg tablet Take 2 Tabs by mouth every four (4) hours as needed for Pain.  clopidogreL (PLAVIX) 75 mg tab Take 1 Tab by mouth daily.  ferrous sulfate 325 mg (65 mg iron) tablet Take 1 Tab by mouth daily (with breakfast) for 30 days.  senna-docusate (PERICOLACE) 8.6-50 mg per tablet Take 1 Tab by mouth daily.  levothyroxine (SYNTHROID) 50 mcg tablet TAKE 1 TABLET BY MOUTH EVERY DAY BEFORE BREAKFAST EXCEPT TAKE 2 TABS ON SATURDAY.  atorvastatin (LIPITOR) 40 mg tablet TAKE 1 TAB BY MOUTH DAILY.     tamsulosin (FLOMAX) 0.4 mg capsule TAKE 1 CAPSULE BY MOUTH EVERY DAY 30 MINUTES AFTER THE SAME MEAL EACH DAY    Cholecalciferol, Vitamin D3, (VITAMIN D3) 2,000 unit cap capsule Take 2,000 Units by mouth two (2) times a day.  finasteride (PROSCAR) 5 mg tablet Take 5 mg by mouth daily. HS    co-enzyme Q-10 (CO Q-10) 100 mg capsule Take 200 mg by mouth daily.  VIT A/VIT C/VIT E/ZINC/COPPER (PRESERVISION AREDS PO) Take 1 Cap by mouth two (2) times a day. No current facility-administered medications for this visit. Review of Systems   Constitutional: Positive for malaise/fatigue. Negative for chills, fever and weight loss. Respiratory: Negative. Negative for cough. Cardiovascular: Negative for chest pain, palpitations, leg swelling and PND. Gastrointestinal: Negative for blood in stool, diarrhea and melena. Genitourinary: Positive for frequency. Musculoskeletal: Negative for myalgias. Neurological: Negative for focal weakness. All other systems reviewed and are negative. Physical Exam  Vitals signs and nursing note reviewed. Constitutional:       Appearance: He is not ill-appearing. Pulmonary:      Effort: No respiratory distress. Skin:     General: Skin is warm and dry. Coloration: Skin is pale. Neurological:      Mental Status: He is alert. Psychiatric:         Behavior: Behavior normal.         ASSESSMENT and PLAN  Diagnoses and all orders for this visit:    1. Coronary artery disease involving coronary bypass graft of native heart with unstable angina pectoris ([facility] [address]  -     REFERRAL TO CARDIOLOGY    2. Mixed hyperlipidemia- Continue current regimen of prescription and / or OTC medications     3. Hypothyroidism, acquired, autoimmune- Continue current regimen of prescription and / or OTC medications     4. Essential hypertension, benign- Continue current regimen of prescription and / or OTC medications     5. Hx of CABG- See surgeon as discussed/directed     6.  Postoperative anemia  -     CBC WITH AUTOMATED DIFF    Plan was reviewed with patient and family, understanding expressed

## 2020-04-08 ENCOUNTER — HOME CARE VISIT (OUTPATIENT)
Dept: SCHEDULING | Facility: HOME HEALTH | Age: 80
End: 2020-04-08
Payer: MEDICARE

## 2020-04-08 ENCOUNTER — HOME CARE VISIT (OUTPATIENT)
Dept: HOME HEALTH SERVICES | Facility: HOME HEALTH | Age: 80
End: 2020-04-08
Payer: MEDICARE

## 2020-04-08 ENCOUNTER — TELEPHONE (OUTPATIENT)
Dept: CARDIOLOGY CLINIC | Age: 80
End: 2020-04-08

## 2020-04-08 VITALS
HEART RATE: 88 BPM | OXYGEN SATURATION: 98 % | SYSTOLIC BLOOD PRESSURE: 122 MMHG | DIASTOLIC BLOOD PRESSURE: 64 MMHG | TEMPERATURE: 98.1 F

## 2020-04-08 VITALS — SYSTOLIC BLOOD PRESSURE: 115 MMHG | DIASTOLIC BLOOD PRESSURE: 65 MMHG | HEART RATE: 105 BPM | OXYGEN SATURATION: 97 %

## 2020-04-08 PROCEDURE — G0299 HHS/HOSPICE OF RN EA 15 MIN: HCPCS

## 2020-04-08 PROCEDURE — G0152 HHCP-SERV OF OT,EA 15 MIN: HCPCS

## 2020-04-08 PROCEDURE — 3331090001 HH PPS REVENUE CREDIT

## 2020-04-08 PROCEDURE — 3331090002 HH PPS REVENUE DEBIT

## 2020-04-08 NOTE — TELEPHONE ENCOUNTER
HR on monitor 104, BP bit elevated 155/80's. Pt asymptomatic. [facility] visiting today, will check vitals. Cont to monitor. Would advise coming to ED only if HR remains > 120 or pt symptomatic (dizzy, lightheaded).

## 2020-04-09 VITALS
OXYGEN SATURATION: 97 % | HEART RATE: 94 BPM | TEMPERATURE: 98 F | SYSTOLIC BLOOD PRESSURE: 125 MMHG | DIASTOLIC BLOOD PRESSURE: 68 MMHG

## 2020-04-09 PROCEDURE — 3331090001 HH PPS REVENUE CREDIT

## 2020-04-09 PROCEDURE — 3331090002 HH PPS REVENUE DEBIT

## 2020-04-10 ENCOUNTER — HOME CARE VISIT (OUTPATIENT)
Dept: HOME HEALTH SERVICES | Facility: HOME HEALTH | Age: 80
End: 2020-04-10
Payer: MEDICARE

## 2020-04-10 ENCOUNTER — HOME CARE VISIT (OUTPATIENT)
Dept: SCHEDULING | Facility: HOME HEALTH | Age: 80
End: 2020-04-10
Payer: MEDICARE

## 2020-04-10 PROCEDURE — 3331090001 HH PPS REVENUE CREDIT

## 2020-04-10 PROCEDURE — G0300 HHS/HOSPICE OF LPN EA 15 MIN: HCPCS

## 2020-04-10 PROCEDURE — 3331090002 HH PPS REVENUE DEBIT

## 2020-04-11 PROCEDURE — 3331090002 HH PPS REVENUE DEBIT

## 2020-04-11 PROCEDURE — 3331090001 HH PPS REVENUE CREDIT

## 2020-04-12 ENCOUNTER — HOSPITAL ENCOUNTER (INPATIENT)
Age: 80
LOS: 1 days | Discharge: HOME HEALTH | DRG: 281 | End: 2020-04-14
Attending: THORACIC SURGERY (CARDIOTHORACIC VASCULAR SURGERY) | Admitting: THORACIC SURGERY (CARDIOTHORACIC VASCULAR SURGERY)
Payer: MEDICARE

## 2020-04-12 VITALS
HEART RATE: 105 BPM | RESPIRATION RATE: 18 BRPM | DIASTOLIC BLOOD PRESSURE: 62 MMHG | TEMPERATURE: 98.1 F | OXYGEN SATURATION: 97 % | WEIGHT: 196.5 LBS | SYSTOLIC BLOOD PRESSURE: 142 MMHG | BODY MASS INDEX: 27.41 KG/M2

## 2020-04-12 DIAGNOSIS — I48.91 ATRIAL FIBRILLATION, UNSPECIFIED TYPE (HCC): ICD-10-CM

## 2020-04-12 PROBLEM — I47.1 SUSTAINED SVT (HCC): Status: ACTIVE | Noted: 2020-04-12

## 2020-04-12 PROBLEM — I45.10 RBBB: Status: ACTIVE | Noted: 2020-04-12

## 2020-04-12 PROBLEM — R42 DIZZINESS: Status: ACTIVE | Noted: 2020-04-12

## 2020-04-12 LAB
ALBUMIN SERPL-MCNC: 3.1 G/DL (ref 3.5–5)
ALBUMIN/GLOB SERPL: 0.7 {RATIO} (ref 1.1–2.2)
ALP SERPL-CCNC: 114 U/L (ref 45–117)
ALT SERPL-CCNC: 20 U/L (ref 12–78)
ANION GAP SERPL CALC-SCNC: 6 MMOL/L (ref 5–15)
APTT PPP: 26.4 SEC (ref 22.1–32)
AST SERPL-CCNC: 28 U/L (ref 15–37)
ATRIAL RATE: 147 BPM
BASOPHILS # BLD: 0.1 K/UL (ref 0–0.1)
BASOPHILS NFR BLD: 1 % (ref 0–1)
BILIRUB SERPL-MCNC: 0.7 MG/DL (ref 0.2–1)
BNP SERPL-MCNC: 3270 PG/ML
BUN SERPL-MCNC: 18 MG/DL (ref 6–20)
BUN/CREAT SERPL: 17 (ref 12–20)
CALCIUM SERPL-MCNC: 9 MG/DL (ref 8.5–10.1)
CALCULATED R AXIS, ECG10: -80 DEGREES
CALCULATED T AXIS, ECG11: 74 DEGREES
CHLORIDE SERPL-SCNC: 106 MMOL/L (ref 97–108)
CO2 SERPL-SCNC: 26 MMOL/L (ref 21–32)
CREAT SERPL-MCNC: 1.09 MG/DL (ref 0.7–1.3)
DIAGNOSIS, 93000: NORMAL
DIFFERENTIAL METHOD BLD: ABNORMAL
EOSINOPHIL # BLD: 0.2 K/UL (ref 0–0.4)
EOSINOPHIL NFR BLD: 2 % (ref 0–7)
ERYTHROCYTE [DISTWIDTH] IN BLOOD BY AUTOMATED COUNT: 15.2 % (ref 11.5–14.5)
GLOBULIN SER CALC-MCNC: 4.6 G/DL (ref 2–4)
GLUCOSE SERPL-MCNC: 123 MG/DL (ref 65–100)
HCT VFR BLD AUTO: 31.6 % (ref 36.6–50.3)
HGB BLD-MCNC: 9.4 G/DL (ref 12.1–17)
IMM GRANULOCYTES # BLD AUTO: 0.1 K/UL (ref 0–0.04)
IMM GRANULOCYTES NFR BLD AUTO: 1 % (ref 0–0.5)
INR PPP: 1.2 (ref 0.9–1.1)
LACTATE SERPL-SCNC: 1.8 MMOL/L (ref 0.4–2)
LYMPHOCYTES # BLD: 1 K/UL (ref 0.8–3.5)
LYMPHOCYTES NFR BLD: 11 % (ref 12–49)
MAGNESIUM SERPL-MCNC: 2.4 MG/DL (ref 1.6–2.4)
MCH RBC QN AUTO: 28.2 PG (ref 26–34)
MCHC RBC AUTO-ENTMCNC: 29.7 G/DL (ref 30–36.5)
MCV RBC AUTO: 94.9 FL (ref 80–99)
MONOCYTES # BLD: 0.8 K/UL (ref 0–1)
MONOCYTES NFR BLD: 9 % (ref 5–13)
NEUTS SEG # BLD: 6.6 K/UL (ref 1.8–8)
NEUTS SEG NFR BLD: 76 % (ref 32–75)
NRBC # BLD: 0 K/UL (ref 0–0.01)
NRBC BLD-RTO: 0 PER 100 WBC
PLATELET # BLD AUTO: 585 K/UL (ref 150–400)
PMV BLD AUTO: 8.3 FL (ref 8.9–12.9)
POTASSIUM SERPL-SCNC: 4.3 MMOL/L (ref 3.5–5.1)
PROT SERPL-MCNC: 7.7 G/DL (ref 6.4–8.2)
PROTHROMBIN TIME: 11.9 SEC (ref 9–11.1)
Q-T INTERVAL, ECG07: 322 MS
QRS DURATION, ECG06: 118 MS
QTC CALCULATION (BEZET), ECG08: 520 MS
RBC # BLD AUTO: 3.33 M/UL (ref 4.1–5.7)
SODIUM SERPL-SCNC: 138 MMOL/L (ref 136–145)
T4 SERPL-MCNC: 10.5 UG/DL (ref 4.5–12.1)
THERAPEUTIC RANGE,PTTT: NORMAL SECS (ref 58–77)
TSH SERPL DL<=0.05 MIU/L-ACNC: 1.36 UIU/ML (ref 0.36–3.74)
VENTRICULAR RATE, ECG03: 157 BPM
WBC # BLD AUTO: 8.7 K/UL (ref 4.1–11.1)

## 2020-04-12 PROCEDURE — 83735 ASSAY OF MAGNESIUM: CPT

## 2020-04-12 PROCEDURE — 83880 ASSAY OF NATRIURETIC PEPTIDE: CPT

## 2020-04-12 PROCEDURE — 74011000258 HC RX REV CODE- 258: Performed by: NURSE PRACTITIONER

## 2020-04-12 PROCEDURE — 36415 COLL VENOUS BLD VENIPUNCTURE: CPT

## 2020-04-12 PROCEDURE — 84443 ASSAY THYROID STIM HORMONE: CPT

## 2020-04-12 PROCEDURE — 85610 PROTHROMBIN TIME: CPT

## 2020-04-12 PROCEDURE — 74011250637 HC RX REV CODE- 250/637: Performed by: SPECIALIST

## 2020-04-12 PROCEDURE — 84436 ASSAY OF TOTAL THYROXINE: CPT

## 2020-04-12 PROCEDURE — 85025 COMPLETE CBC W/AUTO DIFF WBC: CPT

## 2020-04-12 PROCEDURE — 74011250636 HC RX REV CODE- 250/636: Performed by: NURSE PRACTITIONER

## 2020-04-12 PROCEDURE — 99218 HC RM OBSERVATION: CPT

## 2020-04-12 PROCEDURE — 3331090002 HH PPS REVENUE DEBIT

## 2020-04-12 PROCEDURE — 80053 COMPREHEN METABOLIC PANEL: CPT

## 2020-04-12 PROCEDURE — 93005 ELECTROCARDIOGRAM TRACING: CPT

## 2020-04-12 PROCEDURE — 83605 ASSAY OF LACTIC ACID: CPT

## 2020-04-12 PROCEDURE — 86900 BLOOD TYPING SEROLOGIC ABO: CPT

## 2020-04-12 PROCEDURE — 3331090001 HH PPS REVENUE CREDIT

## 2020-04-12 PROCEDURE — 96374 THER/PROPH/DIAG INJ IV PUSH: CPT

## 2020-04-12 PROCEDURE — 74011250637 HC RX REV CODE- 250/637: Performed by: NURSE PRACTITIONER

## 2020-04-12 PROCEDURE — 85730 THROMBOPLASTIN TIME PARTIAL: CPT

## 2020-04-12 RX ORDER — LANOLIN ALCOHOL/MO/W.PET/CERES
325 CREAM (GRAM) TOPICAL
Status: DISCONTINUED | OUTPATIENT
Start: 2020-04-13 | End: 2020-04-14 | Stop reason: HOSPADM

## 2020-04-12 RX ORDER — TAMSULOSIN HYDROCHLORIDE 0.4 MG/1
0.4 CAPSULE ORAL DAILY
Status: DISCONTINUED | OUTPATIENT
Start: 2020-04-12 | End: 2020-04-14 | Stop reason: HOSPADM

## 2020-04-12 RX ORDER — SODIUM CHLORIDE 0.9 % (FLUSH) 0.9 %
5-40 SYRINGE (ML) INJECTION AS NEEDED
Status: DISCONTINUED | OUTPATIENT
Start: 2020-04-12 | End: 2020-04-14 | Stop reason: HOSPADM

## 2020-04-12 RX ORDER — ATORVASTATIN CALCIUM 40 MG/1
40 TABLET, FILM COATED ORAL
Status: DISCONTINUED | OUTPATIENT
Start: 2020-04-12 | End: 2020-04-14 | Stop reason: HOSPADM

## 2020-04-12 RX ORDER — METOPROLOL TARTRATE 50 MG/1
50 TABLET ORAL EVERY 12 HOURS
Status: DISCONTINUED | OUTPATIENT
Start: 2020-04-12 | End: 2020-04-14 | Stop reason: HOSPADM

## 2020-04-12 RX ORDER — LEVOTHYROXINE SODIUM 50 UG/1
50 TABLET ORAL
Status: DISCONTINUED | OUTPATIENT
Start: 2020-04-13 | End: 2020-04-14 | Stop reason: HOSPADM

## 2020-04-12 RX ORDER — FINASTERIDE 5 MG/1
5 TABLET, FILM COATED ORAL
Status: DISCONTINUED | OUTPATIENT
Start: 2020-04-12 | End: 2020-04-14 | Stop reason: HOSPADM

## 2020-04-12 RX ORDER — CLOPIDOGREL BISULFATE 75 MG/1
75 TABLET ORAL DAILY
Status: DISCONTINUED | OUTPATIENT
Start: 2020-04-12 | End: 2020-04-14 | Stop reason: HOSPADM

## 2020-04-12 RX ORDER — AMIODARONE HYDROCHLORIDE 200 MG/1
200 TABLET ORAL 3 TIMES DAILY
Status: DISCONTINUED | OUTPATIENT
Start: 2020-04-12 | End: 2020-04-14 | Stop reason: HOSPADM

## 2020-04-12 RX ORDER — SODIUM CHLORIDE 0.9 % (FLUSH) 0.9 %
5-40 SYRINGE (ML) INJECTION EVERY 8 HOURS
Status: DISCONTINUED | OUTPATIENT
Start: 2020-04-12 | End: 2020-04-14 | Stop reason: HOSPADM

## 2020-04-12 RX ORDER — AMOXICILLIN 250 MG
1 CAPSULE ORAL DAILY
Status: DISCONTINUED | OUTPATIENT
Start: 2020-04-12 | End: 2020-04-14 | Stop reason: HOSPADM

## 2020-04-12 RX ORDER — METOPROLOL TARTRATE 25 MG/1
37.5 TABLET, FILM COATED ORAL EVERY 12 HOURS
Status: DISCONTINUED | OUTPATIENT
Start: 2020-04-12 | End: 2020-04-12

## 2020-04-12 RX ORDER — ACETAMINOPHEN 325 MG/1
650 TABLET ORAL
Status: DISCONTINUED | OUTPATIENT
Start: 2020-04-12 | End: 2020-04-14 | Stop reason: HOSPADM

## 2020-04-12 RX ORDER — LEVOTHYROXINE SODIUM 50 UG/1
50 TABLET ORAL
Status: DISCONTINUED | OUTPATIENT
Start: 2020-04-13 | End: 2020-04-12 | Stop reason: SDUPTHER

## 2020-04-12 RX ORDER — LEVOTHYROXINE SODIUM 100 UG/1
100 TABLET ORAL
Status: DISCONTINUED | OUTPATIENT
Start: 2020-04-18 | End: 2020-04-14 | Stop reason: HOSPADM

## 2020-04-12 RX ADMIN — Medication 10 ML: at 17:37

## 2020-04-12 RX ADMIN — TAMSULOSIN HYDROCHLORIDE 0.4 MG: 0.4 CAPSULE ORAL at 11:36

## 2020-04-12 RX ADMIN — Medication 10 ML: at 21:26

## 2020-04-12 RX ADMIN — AMIODARONE HYDROCHLORIDE 150 MG: 50 INJECTION, SOLUTION INTRAVENOUS at 12:33

## 2020-04-12 RX ADMIN — METOPROLOL TARTRATE 37.5 MG: 25 TABLET, FILM COATED ORAL at 11:36

## 2020-04-12 RX ADMIN — METOPROLOL TARTRATE 50 MG: 50 TABLET, FILM COATED ORAL at 21:24

## 2020-04-12 RX ADMIN — AMIODARONE HYDROCHLORIDE 200 MG: 200 TABLET ORAL at 17:37

## 2020-04-12 RX ADMIN — FINASTERIDE 5 MG: 5 TABLET, FILM COATED ORAL at 21:23

## 2020-04-12 RX ADMIN — APIXABAN 5 MG: 5 TABLET, FILM COATED ORAL at 11:36

## 2020-04-12 RX ADMIN — AMIODARONE HYDROCHLORIDE 200 MG: 200 TABLET ORAL at 21:23

## 2020-04-12 RX ADMIN — ATORVASTATIN CALCIUM 40 MG: 40 TABLET, FILM COATED ORAL at 21:24

## 2020-04-12 RX ADMIN — CLOPIDOGREL BISULFATE 75 MG: 75 TABLET ORAL at 11:36

## 2020-04-12 RX ADMIN — APIXABAN 5 MG: 5 TABLET, FILM COATED ORAL at 21:23

## 2020-04-12 RX ADMIN — SENNOSIDES AND DOCUSATE SODIUM 1 TABLET: 8.6; 5 TABLET ORAL at 11:37

## 2020-04-12 NOTE — H&P
CSS   History and Physical    Subjective:      Chaim Hogan is a 78 y.o. male who was referred for cardiac evaluation. He has a PMH significant for CAD (previous PTCA) with NSTEMI on this admission, HTN, HLD, Hypothyroid, mitral valve prolapse, Pre-diabetes (A1c of 6 in December), arthritis, BPH.  He reports a sensation of chest pressure over the last couple of weeks when he walked the dog. This pressure resolved with rest. Denies accompanied shortness of breath, N/V, diaphoresis, syncope. Last night he developed this chest pressure which he noted as severe and unrelenting.  The pressure was substernal and he had his wife bring him to the [place] ED around 2am. His troponin elevated to 4.89 and he was taken to the Cath Lab this morning with Dr. Saba Jurado. He was found to have 3-vessel CAD. Dr. Ashley Collins was asked to consult for consideration of Coronary Artery Bypass +/- AVR.       No smoking or ETOH history. Father  at 71 of CAD and brother  at 72 with CAD.      Update:  Chaim Hogan was taken to the OR on 3/25/20 for a Coronary artery bypass X 5 (LIMA-LAD, ZYGE-Sqms-BW-OM, RSVG-RCA) with endovascular vein harvest of the LLE with Dr. Ashley Collins. He was taken from the OR to the CVICU in stable condition with the following drips:  Precedex, Insulin, Damien-synephrine, Dobutamine. When he was hemodynamically stable, he was transferred to the CVSU. He will, be discharged to home with his family and Home health, home PT/OT. He was felt stable for discharge on POD # 8 with the following assessment and plan. Pt called from home and reports significant fatigue, persistent tachycardia (-130s) and lower blood pressures. Pt denies CP, SOB, dizziness/lightheadedness, worsenign LE edema, bleeding, fever/chills/aches, N/V/diarrhea. Will admit pt for observation, some concern for arrhythmias d/t postop course, see below for specifics.       Cardiac Testing     Cardiac catheterization 20 :  Conclusion    Findings:  1)Severe native distal left main and 3 vessel CAD with cuprit lesion in distal RCA with MAYRA II flow  2)Heavy calcification throughout LAD, proximal RCA  And proximal LCx with ostial LCx aneurysm  3)HIgh syntax score with involvement of Left mainasa well as LAD/diagonal and proximal LCx/OM1 bifurcation involvement  4)LIkely moderate Aortic stenosis with pull back gradient of 25 mm Hg and normal LVEDP     Recommendation  1)Consideration for CABG/AVR  2)MAy reinitiate Heparin/Lovenox per protocol     Access: right radial no issues     Contrast 42 cc. Coronary Findings      Diagnostic   Dominance: Right   Left Main   Ost LM to Ost LAD lesion 60% stenosed. .   Left Anterior Descending   Ost LAD to Prox LAD lesion 70% stenosed. .   Prox LAD to Mid LAD lesion 90% stenosed. .   Mid LAD lesion 80% stenosed. [place] LAD lesion 70% stenosed. .   Left Circumflex   Prox Cx lesion 90% stenosed. .   Right Coronary Artery   Ost RCA lesion 60% stenosed. .   Dist RCA lesion 95% stenosed.  .       Past Medical History:   Diagnosis Date    Arrhythmia     r/t caffeine    Arthritis     Basal cell carcinoma     BPH (benign prostatic hyperplasia)     CAD (coronary artery disease)     s/p PTCA '92    Calculus of kidney     Chronic pain     BACK    Elevated PSA     Hypercholesterolemia     Lumbar foraminal stenosis     [facility]an[facility] [address]     MVP (mitral valve prolapse)     Other ill-defined conditions(799.89)     Prostatitis    Prediabetes     Seasonal allergies     Squamous cell carcinoma     Systolic murmur     Thyroid disease     HYPOTHYROID    Vision impairment     R EYE, BLOOD CLOT ON RETINA     Past Surgical History:   Procedure Laterality Date    ENDOSCOPY, COLON, DIAGNOSTIC      due 12    HX CHOLECYSTECTOMY  1970    HX CORONARY ARTERY BYPASS GRAFT  03/26/2020    x 5, LIMA to LAD, RSVG to Diag-RI-OM, RSVG to RCA    HX HEART CATHETERIZATION  1992    WITH BALLOON    HX LITHOTRIPSY      x2    HX ORTHOPAEDIC      elbow - fx right arm age 3 yrs      Social History     Tobacco Use    Smoking status: Never Smoker    Smokeless tobacco: Never Used   Substance Use Topics    Alcohol use: No      Family History   Problem Relation Age of Onset    Heart Disease Mother         CHF    Heart Disease Father         CAD    Heart Disease Sister         CAD CABG    Lung Disease Sister     Heart Disease Brother         CAD    Pulmonary Fibrosis Brother     Heart Disease Brother         CAD    Stroke Brother 34        cerebral hemorrhage    Cancer Brother         LUNG    High Cholesterol Daughter     Anesth Problems Neg Hx      Prior to Admission medications    Medication Sig Start Date End Date Taking? Authorizing Provider   apixaban (ELIQUIS) 5 mg tablet Take 1 Tab by mouth every twelve (12) hours for 60 days. 4/2/20 6/1/20  Agustin Miles NP   metoprolol tartrate (LOPRESSOR) 25 mg tablet Take 1.5 Tabs by mouth every twelve (12) hours for 60 days. 4/2/20 6/1/20  Agustin Miles NP   acetaminophen (TYLENOL) 325 mg tablet Take 2 Tabs by mouth every four (4) hours as needed for Pain. 3/30/20   Agustin Miles NP   clopidogreL (PLAVIX) 75 mg tab Take 1 Tab by mouth daily. 3/31/20   Agustin Miles NP   ferrous sulfate 325 mg (65 mg iron) tablet Take 1 Tab by mouth daily (with breakfast) for 30 days. 3/31/20 4/30/20  Agustin Miles NP   senna-docusate (PERICOLACE) 8.6-50 mg per tablet Take 1 Tab by mouth daily. 3/30/20   Agustin Miles NP   levothyroxine (SYNTHROID) 50 mcg tablet TAKE 1 TABLET BY MOUTH EVERY DAY BEFORE BREAKFAST EXCEPT TAKE 2 TABS ON SATURDAY. 3/8/20   Rachael Rodriguez MD   atorvastatin (LIPITOR) 40 mg tablet TAKE 1 TAB BY MOUTH DAILY.  6/28/19   Rachael Rodriguez MD   tamsulosin (FLOMAX) 0.4 mg capsule TAKE 1 CAPSULE BY MOUTH EVERY DAY 30 MINUTES AFTER THE SAME MEAL EACH DAY 4/8/19   Provider, Historical   Cholecalciferol, Vitamin D3, (VITAMIN D3) 2,000 unit cap capsule Take 2,000 Units by mouth two (2) times a day. 5/30/18   Michelle Rodriguez MD   finasteride (PROSCAR) 5 mg tablet Take 5 mg by mouth daily. HS    Provider, Historical   co-enzyme Q-10 (CO Q-10) 100 mg capsule Take 200 mg by mouth daily. Provider, Historical   VIT A/VIT C/VIT E/ZINC/COPPER (PRESERVISION AREDS PO) Take 1 Cap by mouth two (2) times a day. Provider, Historical       Allergies   Allergen Reactions    Codeine Rash    Pcn [Penicillins] Rash    Sulfa (Sulfonamide Antibiotics) Other (comments)     confusion       Review of Systems:  Pertinent positives in HPI   Consititutional: Denies fever or chills. Eyes:  Denies use of glasses or vision problems(cataracts). ENT:  Denies hearing or swallowing difficulty. CV: Denies CP, claudication, HTN. Resp: Denies dyspnea, productive cough. : Denies dialysis or kidney problems. GI: Denies ulcers, esophageal strictures, liver problems. M/S: Denies joint or bone problems, or implanted artificial hardware. Skin: Denies varicose veins, edema. Neuro: Denies strokes, or TIAs. Psych: Denies anxiety or depression. Endocrine: Denies thyroid problems or diabetes. Heme/Lymphatic: Denies easy bruising or lymphedema. Objective:     VS:   Visit Vitals  /62 (BP 1 Location: Left arm, BP Patient Position: At rest)   Pulse (!) 112   Temp 97.8 °F (36.6 °C)   Resp 18   Wt 198 lb 10.2 oz (90.1 kg)   SpO2 98%   BMI 27.70 kg/m²         Physical Exam:    General appearance: alert, cooperative, no distress  Head: normocephalic, without obvious abnormality; atraumatic  Eyes: conjunctivae/corneas clear; EOM's intact. Nose: nares normal; no drainage. Neck: no carotid bruit and no JVD  Lungs: clear to auscultation bilaterally  Heart: regular rate and rhythm; ++grade 3/6 systolic murmur  Abdomen: soft, non-tender; bowel sounds normal  Extremities: moves all extremities; no weakness. Skin: Skin color normal; No varicose veins or edema.   Neurologic: Grossly normal Labs: No results for input(s): WBC, HGB, HCT, PLT, NA, K, BUN, CREA, GLU, GLUCPOC, INR, HGBEXT, HCTEXT, PLTEXT, INREXT, HGBEXT, HCTEXT, PLTEXT, INREXT in the last 72 hours. No lab exists for component: GLPOC    Assessment:     Active Problems:    Dizziness (4/12/2020)        Plan:     1. Intra-op bradycardia/2nd deg block/first degree AV block preop & Postop A fib on 3/31: Continue BB, Eliquis. EP consult. Obtain 12 lead EKG--showing ST 150s w/ R BBB, labs. Will give amio bolus cautiously.       2.  CAD with NSTEMI on this admission s/p CABG: On statin, plavix (no ASA with Eliquis and Plavix). On BB (Hold for SBP<90 and HR <60)     3. Hx HTN: BB, diurese     4.  HLD:  Continue Statin     5. Anemia: Postoperative secondary to acute blood loss.  Continue Iron. Monitor     6. Pre-diabetes: A1c 5.9.      7. Hypothyroid:  Continue Synthroid. Recheck tsh/t4     7. Mitral valve prolapse/MR/AS: All mild on intra-operative WILMER. Systolic murmur 3/6 audible on exam       8. BPH: Continue flomax and finasteride.      9. Arthritis/Chronic back pain: No current treatment         Signed By: Bran Hogue NP     April 12, 2020      Saw patient, agree with above  Risk of morbidity and mortality - high  Medical decision making - high complexity    1. Intra-op bradycardia/2nd deg block/first degree AV block preop & Postop A fib on 3/31: Continue BB, Eliquis. EP consult. Obtain 12 lead EKG--showing ST 150s w/ R BBB, labs. Will give amio bolus cautiously.       2.  CAD with NSTEMI on this admission s/p CABG: On statin, plavix (no ASA with Eliquis and Plavix). On BB (Hold for SBP<90 and HR <60)     3. Hx HTN: BB, diurese     4.  HLD:  Continue Statin     5. Anemia: Postoperative secondary to acute blood loss.  Continue Iron. Monitor     6. Pre-diabetes: A1c 5.9.      7. Hypothyroid:  Continue Synthroid. Recheck tsh/t4     7. Mitral valve prolapse/MR/AS: All mild on intra-operative WILMER.   Systolic murmur 3/6 audible on exam       8. BPH: Continue flomax and finasteride.      9.  Arthritis/Chronic back pain: No current treatment

## 2020-04-12 NOTE — PROGRESS NOTES
1020: Patient was admitted and settled into the room. Assessment and vitals were done and patient was connected to monitor   1930: Bedside shift change report given to [address] [place] [address] (oncoming nurse) by Jocy RN (offgoing nurse). Report included the following information SBAR, Kardex, Intake/Output, MAR and Recent Results. Problem: Falls - Risk of  Goal: *Absence of Falls  Description: Document Thora Bare Fall Risk and appropriate interventions in the flowsheet. Outcome: Progressing Towards Goal  Note: Fall Risk Interventions:                                Problem: Patient Education: Go to Patient Education Activity  Goal: Patient/Family Education  Outcome: Progressing Towards Goal     Problem: Pressure Injury - Risk of  Goal: *Prevention of pressure injury  Description: Document Morris Scale and appropriate interventions in the flowsheet.   Outcome: Progressing Towards Goal  Note: Pressure Injury Interventions:                                            Problem: Patient Education: Go to Patient Education Activity  Goal: Patient/Family Education  Outcome: Progressing Towards Goal

## 2020-04-12 NOTE — PROGRESS NOTES
Faculty or Preceptor Review of Student Work    4/12/2020  - Shift times - 4330 to 2000    The RN documentation of patient care for [email or web address] has been reviewed and approved. All medications have been administered under the direct supervision of the preceptor.     Daphney Duong

## 2020-04-12 NOTE — CONSULTS
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 Kain Everett to see Monday. He appears to have atrial tachyarrhythmia present since CABG, rate faster at present, he has minimal symptoms. Nilesh Wong MD  [phone]  Castro Pratt is a 78 y.o. male who presented 4/12/2020 with complaints of rapid heart rate and dizziness. The symptoms began approximately 1 day ago. He has a history of cardiac disease including CAD and CABG x 5 on 3/25/2020. Examination by the attending physician suggested the possibility of SVT. This was noted on post op EKG's but read as sinus tach with abnormal P wave. His HR was faster at home today. At present the patient is unchanged since initial presentation. Therapy thus far has included amiodarone ordered as IV bolus not yet given. Cardiology has been consulted to assist in the management of this patient.     Current Facility-Administered Medications   Medication Dose Route Frequency    sodium chloride (NS) flush 5-40 mL  5-40 mL IntraVENous Q8H    sodium chloride (NS) flush 5-40 mL  5-40 mL IntraVENous PRN    acetaminophen (TYLENOL) tablet 650 mg  650 mg Oral Q4H PRN    apixaban (ELIQUIS) tablet 5 mg  5 mg Oral Q12H    atorvastatin (LIPITOR) tablet 40 mg  40 mg Oral QHS    clopidogreL (PLAVIX) tablet 75 mg  75 mg Oral DAILY    [START ON 4/13/2020] ferrous sulfate tablet 325 mg  325 mg Oral DAILY WITH BREAKFAST    finasteride (PROSCAR) tablet 5 mg  5 mg Oral QHS    senna-docusate (PERICOLACE) 8.6-50 mg per tablet 1 Tab  1 Tab Oral DAILY    tamsulosin (FLOMAX) capsule 0.4 mg  0.4 mg Oral DAILY    amiodarone (CORDARONE) 150 mg in dextrose 5% 100 mL bolus infusion  150 mg IntraVENous ONCE    [START ON 4/13/2020] levothyroxine (SYNTHROID) tablet 50 mcg  50 mcg Oral Once per day on Sun Mon Tue Wed Thu Fri    [START ON 4/18/2020] levothyroxine (SYNTHROID) tablet 100 mcg  100 mcg Oral every Saturday    metoprolol tartrate (LOPRESSOR) tablet 50 mg  50 mg Oral Q12H    amiodarone (CORDARONE) tablet 200 mg  200 mg Oral TID     Past Medical History:   Diagnosis Date    Arrhythmia     r/t caffeine    Arthritis     Basal cell carcinoma     BPH (benign prostatic hyperplasia)     CAD (coronary artery disease)     s/p PTCA '92    Calculus of kidney     Chronic pain     BACK    Elevated PSA     Hypercholesterolemia     Lumbar foraminal stenosis     Melanoma ([facility] [address]     MVP (mitral valve prolapse)     Other ill-defined conditions(799.89)     Prostatitis    Prediabetes     Seasonal allergies     Squamous cell carcinoma     Systolic murmur     Thyroid disease     HYPOTHYROID    Vision impairment     R EYE, BLOOD CLOT ON RETINA     Patient Active Problem List   Diagnosis Code    Mixed hyperlipidemia E78.2    Essential hypertension, benign I10    Coronary atherosclerosis of native coronary artery I25.10    Calculus of kidney N20.0    Insomnia, unspecified G47.00    Unspecified hearing loss H91.90    Macular degeneration H35.30    Skin cancer C44.90    Vitamin D deficiency E55.9    Hypothyroidism, acquired, 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    BPH with urinary obstruction N40.1, N13.8    Advance directive discussed with patient Z71.89    Lumbar disc disease M51.9    Lumbar foraminal stenosis M48.061    IFG (impaired fasting glucose) R73.01    NSTEMI (non-ST elevated myocardial infarction) ([facility]) I21.4    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    Dizziness R42    Sustained SVT ([facility]) I47.1    RBBB I45.10     Allergies   Allergen Reactions    Codeine Rash    Pcn [Penicillins] Rash    Sulfa (Sulfonamide Antibiotics) Other (comments)     confusion     Social History     Tobacco Use    Smoking status: Never Smoker    Smokeless tobacco: Never Used   Substance Use Topics    Alcohol use: No    Drug use: No     Family History   Problem Relation Age of Onset    Heart Disease Mother         CHF    Heart Disease Father         CAD    Heart Disease Sister         CAD CABG    Lung Disease Sister     Heart Disease Brother         CAD    Pulmonary Fibrosis Brother     Heart Disease Brother         CAD    Stroke Brother 34        cerebral hemorrhage    Cancer Brother         LUNG    High Cholesterol Daughter     Anesth Problems Neg Hx        Review of Symptoms:  A comprehensive review of systems was negative except for that written in the HPI. Objective:      Visit Vitals  /62 (BP 1 Location: Left arm, BP Patient Position: At rest)   Pulse (!) 112   Temp 97.8 °F (36.6 °C)   Resp 18   Wt 90.1 kg (198 lb 10.2 oz)   SpO2 98%   BMI 27.70 kg/m²      Physical Exam    Visit Vitals  /62 (BP 1 Location: Left arm, BP Patient Position: At rest)   Pulse (!) 112   Temp 97.8 °F (36.6 °C)   Resp 18   Wt 90.1 kg (198 lb 10.2 oz)   SpO2 98%   BMI 27.70 kg/m²     General Appearance:  Well developed, well nourished,alert and oriented x 3,  individual in no acute distress. Ears/Nose/Mouth/Throat:   Hearing grossly normal.         Neck: Supple. Chest:   Lungs clear to auscultation bilaterally.    Cardiovascular:  Regular rate and rhythm, S1, S2 normal, 1/6 murmur. Abdomen:   Soft, non-tender, bowel sounds are active. Extremities: No edema bilaterally. Skin: Warm and dry. Cardiographics    Telemetry: SVT  ECG: RBBB, SVT  Echocardiogram: Not done, recent echo showed normal LV function, minimal aortic stenosis, mild MR    Labs:   Recent Results (from the past 24 hour(s))   METABOLIC PANEL, COMPREHENSIVE    Collection Time: 04/12/20 11:27 AM   Result Value Ref Range    Sodium 138 136 - 145 mmol/L    Potassium 4.3 3.5 - 5.1 mmol/L    Chloride 106 97 - 108 mmol/L    CO2 26 21 - 32 mmol/L    Anion gap 6 5 - 15 mmol/L    Glucose 123 (H) 65 - 100 mg/dL    BUN 18 6 - 20 MG/DL    Creatinine 1.09 0.70 - 1.30 MG/DL    BUN/Creatinine ratio 17 12 - 20      GFR est AA >60 >60 ml/min/1.73m2    GFR est non-AA >60 >60 ml/min/1.73m2    Calcium 9.0 8.5 - 10.1 MG/DL    Bilirubin, total 0.7 0.2 - 1.0 MG/DL    ALT (SGPT) 20 12 - 78 U/L    AST (SGOT) 28 15 - 37 U/L    Alk. phosphatase 114 45 - 117 U/L    Protein, total 7.7 6.4 - 8.2 g/dL    Albumin 3.1 (L) 3.5 - 5.0 g/dL    Globulin 4.6 (H) 2.0 - 4.0 g/dL    A-G Ratio 0.7 (L) 1.1 - 2.2     CBC WITH AUTOMATED DIFF    Collection Time: 04/12/20 11:27 AM   Result Value Ref Range    WBC 8.7 4.1 - 11.1 K/uL    RBC 3.33 (L) 4.10 - 5.70 M/uL    HGB 9.4 (L) 12.1 - 17.0 g/dL    HCT 31.6 (L) 36.6 - 50.3 %    MCV 94.9 80.0 - 99.0 FL    MCH 28.2 26.0 - 34.0 PG    MCHC 29.7 (L) 30.0 - 36.5 g/dL    RDW 15.2 (H) 11.5 - 14.5 %    PLATELET 577 (H) 539 - 400 K/uL    MPV 8.3 (L) 8.9 - 12.9 FL    NRBC 0.0 0  WBC    ABSOLUTE NRBC 0.00 0.00 - 0.01 K/uL    NEUTROPHILS 76 (H) 32 - 75 %    LYMPHOCYTES 11 (L) 12 - 49 %    MONOCYTES 9 5 - 13 %    EOSINOPHILS 2 0 - 7 %    BASOPHILS 1 0 - 1 %    IMMATURE GRANULOCYTES 1 (H) 0.0 - 0.5 %    ABS. NEUTROPHILS 6.6 1.8 - 8.0 K/UL    ABS. LYMPHOCYTES 1.0 0.8 - 3.5 K/UL    ABS. MONOCYTES 0.8 0.0 - 1.0 K/UL    ABS. EOSINOPHILS 0.2 0.0 - 0.4 K/UL    ABS. BASOPHILS 0.1 0.0 - 0.1 K/UL    ABS. IMM. Joseline Hoda. 0.1 (H) 0.00 - 0.04 K/UL    DF AUTOMATED     MAGNESIUM    Collection Time: 04/12/20 11:27 AM   Result Value Ref Range    Magnesium 2.4 1.6 - 2.4 mg/dL   LACTIC ACID    Collection Time: 04/12/20 11:27 AM   Result Value Ref Range    Lactic acid 1.8 0.4 - 2.0 MMOL/L   NT-PRO BNP    Collection Time: 04/12/20 11:27 AM   Result Value Ref Range    NT pro-BNP 3,270 (H) <450 PG/ML   PROTHROMBIN TIME + INR    Collection Time: 04/12/20 11:27 AM   Result Value Ref Range    INR 1.2 (H) 0.9 - 1.1      Prothrombin time 11.9 (H) 9.0 - 11.1 sec   PTT    Collection Time: 04/12/20 11:27 AM   Result Value Ref Range    aPTT 26.4 22.1 - 32.0 sec    aPTT, therapeutic range     58.0 - 77.0 SECS   TYPE & SCREEN    Collection Time: 04/12/20 11:27 AM   Result Value Ref Range    Crossmatch Expiration 04/15/2020     ABO/Rh(D) Shahram Prieto POSITIVE     Antibody screen PENDING        Kedar Renee MD

## 2020-04-13 ENCOUNTER — HOME CARE VISIT (OUTPATIENT)
Dept: HOME HEALTH SERVICES | Facility: HOME HEALTH | Age: 80
End: 2020-04-13
Payer: MEDICARE

## 2020-04-13 LAB
ABO + RH BLD: NORMAL
BLD PROD TYP BPU: NORMAL
BLOOD GROUP ANTIBODIES SERPL: NORMAL
BPU ID: NORMAL
CROSSMATCH RESULT,%XM: NORMAL
SPECIMEN EXP DATE BLD: NORMAL
STATUS OF UNIT,%ST: NORMAL
UNIT DIVISION, %UDIV: 0

## 2020-04-13 PROCEDURE — 3331090001 HH PPS REVENUE CREDIT

## 2020-04-13 PROCEDURE — 74011250637 HC RX REV CODE- 250/637: Performed by: SPECIALIST

## 2020-04-13 PROCEDURE — 92960 CARDIOVERSION ELECTRIC EXT: CPT | Performed by: INTERNAL MEDICINE

## 2020-04-13 PROCEDURE — 99152 MOD SED SAME PHYS/QHP 5/>YRS: CPT | Performed by: INTERNAL MEDICINE

## 2020-04-13 PROCEDURE — 3331090002 HH PPS REVENUE DEBIT

## 2020-04-13 PROCEDURE — 74011250637 HC RX REV CODE- 250/637: Performed by: NURSE PRACTITIONER

## 2020-04-13 PROCEDURE — 99218 HC RM OBSERVATION: CPT

## 2020-04-13 PROCEDURE — 74011250636 HC RX REV CODE- 250/636: Performed by: INTERNAL MEDICINE

## 2020-04-13 PROCEDURE — 77030039046 HC PAD DEFIB RADIOTRNSPNT CNMD -B: Performed by: INTERNAL MEDICINE

## 2020-04-13 PROCEDURE — 5A2204Z RESTORATION OF CARDIAC RHYTHM, SINGLE: ICD-10-PCS | Performed by: INTERNAL MEDICINE

## 2020-04-13 RX ORDER — MIDAZOLAM HYDROCHLORIDE 1 MG/ML
4 INJECTION, SOLUTION INTRAMUSCULAR; INTRAVENOUS ONCE
Status: ACTIVE | OUTPATIENT
Start: 2020-04-13 | End: 2020-04-13

## 2020-04-13 RX ORDER — FENTANYL CITRATE 50 UG/ML
100 INJECTION, SOLUTION INTRAMUSCULAR; INTRAVENOUS ONCE
Status: ACTIVE | OUTPATIENT
Start: 2020-04-13 | End: 2020-04-13

## 2020-04-13 RX ORDER — MIDAZOLAM HYDROCHLORIDE 1 MG/ML
INJECTION, SOLUTION INTRAMUSCULAR; INTRAVENOUS AS NEEDED
Status: DISCONTINUED | OUTPATIENT
Start: 2020-04-13 | End: 2020-04-13 | Stop reason: HOSPADM

## 2020-04-13 RX ORDER — FENTANYL CITRATE 50 UG/ML
INJECTION, SOLUTION INTRAMUSCULAR; INTRAVENOUS AS NEEDED
Status: DISCONTINUED | OUTPATIENT
Start: 2020-04-13 | End: 2020-04-13 | Stop reason: HOSPADM

## 2020-04-13 RX ADMIN — METOPROLOL TARTRATE 50 MG: 50 TABLET, FILM COATED ORAL at 08:33

## 2020-04-13 RX ADMIN — FERROUS SULFATE TAB 325 MG (65 MG ELEMENTAL FE) 325 MG: 325 (65 FE) TAB at 08:33

## 2020-04-13 RX ADMIN — FINASTERIDE 5 MG: 5 TABLET, FILM COATED ORAL at 21:25

## 2020-04-13 RX ADMIN — APIXABAN 5 MG: 5 TABLET, FILM COATED ORAL at 08:33

## 2020-04-13 RX ADMIN — TAMSULOSIN HYDROCHLORIDE 0.4 MG: 0.4 CAPSULE ORAL at 08:35

## 2020-04-13 RX ADMIN — LEVOTHYROXINE SODIUM 50 MCG: 0.05 TABLET ORAL at 07:57

## 2020-04-13 RX ADMIN — APIXABAN 5 MG: 5 TABLET, FILM COATED ORAL at 21:25

## 2020-04-13 RX ADMIN — METOPROLOL TARTRATE 50 MG: 50 TABLET, FILM COATED ORAL at 21:25

## 2020-04-13 RX ADMIN — Medication 10 ML: at 15:26

## 2020-04-13 RX ADMIN — AMIODARONE HYDROCHLORIDE 200 MG: 200 TABLET ORAL at 21:25

## 2020-04-13 RX ADMIN — AMIODARONE HYDROCHLORIDE 200 MG: 200 TABLET ORAL at 17:10

## 2020-04-13 RX ADMIN — Medication 10 ML: at 07:57

## 2020-04-13 RX ADMIN — AMIODARONE HYDROCHLORIDE 200 MG: 200 TABLET ORAL at 08:33

## 2020-04-13 RX ADMIN — SENNOSIDES AND DOCUSATE SODIUM 1 TABLET: 8.6; 5 TABLET ORAL at 08:33

## 2020-04-13 RX ADMIN — Medication 10 ML: at 21:27

## 2020-04-13 RX ADMIN — ATORVASTATIN CALCIUM 40 MG: 40 TABLET, FILM COATED ORAL at 21:25

## 2020-04-13 RX ADMIN — CLOPIDOGREL BISULFATE 75 MG: 75 TABLET ORAL at 08:33

## 2020-04-13 NOTE — PROGRESS NOTES
Acute hypotension  Acute dyspnea  A-fib with RVR    Called emergently by patient's wife from home about patient not doing well    Emergently admitted    EKG show A-fib with RVR    Low BP in 90's    Gave fluid bolus    Gave amio bolus    EP consult    Went over issues with patient    Addendum:    Patient responding to therapy       Intake/Output Summary (Last 24 hours) at 4/13/2020 1641  Last data filed at 4/13/2020 1313  Gross per 24 hour   Intake 540 ml   Output    Net 540 ml     Visit Vitals  BP (!) 111/37 (BP 1 Location: Left arm, BP Patient Position: At rest)   Pulse 94   Temp 98.2 °F (36.8 °C)   Resp 16   Wt 90 kg (198 lb 6.6 oz)   SpO2 98%   BMI 27.67 kg/m²       Risk of morbidity and mortality - high  Medical decision making - high complexity    Total critical care time - 30 minutes (CPT 06979)     I personally spent the above critical care time. This is time spent at this critically ill patient's bedside / unit / floor actively involved in patient care as well as the coordination of care and discussions with the patient's family. This does not include any procedural time which has been billed separately.

## 2020-04-13 NOTE — PROGRESS NOTES
[facility] FLOOR Progress Note    Admit Date: 2020     S/p CABG 3/25/20    Subjective:   Pt seen with Dr. Guillermo Parson. Denies complaints on RA. HR Improved 80-90s. Objective:     Visit Vitals  /68 (BP 1 Location: Right arm, BP Patient Position: At rest)   Pulse (!) 118   Temp 98.2 °F (36.8 °C)   Resp 16   Wt 198 lb 6.6 oz (90 kg)   SpO2 95%   BMI 27.67 kg/m²     Temp (24hrs), Av.8 °F (36.6 °C), Min:97.1 °F (36.2 °C), Max:98.3 °F (36.8 °C)      Last 24hr Input/Output:    Intake/Output Summary (Last 24 hours) at 2020 0850  Last data filed at 2020 0837  Gross per 24 hour   Intake 540 ml   Output    Net 540 ml        EKG/Rhythm: SR     Admission Weight: Last Weight   Weight: 198 lb 10.2 oz (90.1 kg) Weight: 198 lb 6.6 oz (90 kg)       EXAM:  General: No obvious distress    Lungs:   Clear to auscultation bilaterally. Incision:  No erythema, drainage or swelling. Heart:  Regular rate and rhythm, S1, S2 normal, no murmur, click, rub or gallop. Abdomen:   Soft, non-tender. Bowel sounds normal. No masses,  No organomegaly. Extremities:  No edema. PPP   Neurologic:  Gross motor and sensory apparatus intact. Activity: ad erik     Diet:  Cardiac     Lab Data Reviewed:   Recent Labs     20  1127   WBC 8.7   HGB 9.4*   HCT 31.6*   *      K 4.3   BUN 18   CREA 1.09   *   INR 1.2*         Assessment:     Active Problems:    Dizziness (2020)      Sustained SVT ([address] (2020)      RBBB (2020)             Plan/Recommendations/Medical Decision Makin. Intra-op bradycardia/2nd deg block/first degree AV block preop & Postop A fib on 3/31: Continue BB-increased to 50 mg PO BID, Eliquis. EP consult--DR. Camacho plans to cardiovert today. Cards added PO amio 200 mg TID. Rate improved today. BP stable. 12 lead EKG--showing ST 150s w/ R BBB, labs.      2.  CAD with NSTEMI on this admission s/p CABG: On statin, plavix (no ASA with Eliquis and Plavix).  On BB (Hold for SBP<90 and HR <60)     3. Hx HTN: BB,     4.  HLD:  Continue Statin     5. Anemia: Postoperative secondary to acute blood loss.  Continue Iron. Monitor     6. Pre-diabetes: A1c 5.9.      7. Hypothyroid:  Continue Synthroid. Thyroid labs ok.      7. Mitral valve prolapse/MR/AS: All mild on intra-operative WILMER. Systolic murmur 3/6 audible on exam       8. BPH: Continue flomax and finasteride.      9. Arthritis/Chronic back pain: No current treatment     Dispo: OOB as tolerated. No PT/OT needed. Hopefully stable for d/c within next 24 hrs.       Signed By: Kyree Leyva NP

## 2020-04-13 NOTE — CONSULTS
Cardiac Electrophysiology Hospital Consultation Note   REFERRING PROVIDER: Dr Jocelyn Wise:      Shruti James is a 78 y.o. patient who is seen for evaluation of atrial tachycardia/atrial flutter with rapid ventricular rate despite amiodarone and metoprolol  He felt symptomatic and was readmitted one day ago  He had atrial fibrillation post CABG (5 vessel bypass 3/25/2020)  He had NSTEMI after chest pain and was admitted and that led to cardiac cath and CABG  He was given amiodarone and metoprolol during the past admission but amiodarone was stopped due to bradycardia with first and second degree av block  He was discharged home with eliquis and plavix and metoprolol    During this admission ECG showed atrial tachycardia or atypical atrial flutter with RVR and 2:1 AV conduction, RBBB LAFB  Previous echo had reported normal LVEF     Patient Active Problem List   Diagnosis Code    Mixed hyperlipidemia E78.2    Essential hypertension, benign I10    Coronary atherosclerosis of native coronary artery I25.10    Calculus of kidney N20.0    Insomnia, unspecified G47.00    Unspecified hearing loss H91.90    Macular degeneration H35.30    Skin cancer C44.90    Vitamin D deficiency E55.9    Hypothyroidism, acquired, 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    Dizziness R42    Sustained SVT ([facility]) I47.1    RBBB I45.10     Current Facility-Administered Medications   Medication Dose Route Frequency Provider Last Rate Last Dose    midazolam (VERSED) injection 4 mg  4 mg IntraVENous Eva Guillen MD        fentaNYL citrate (PF) injection 100 mcg  100 mcg IntraVENous Eva Guillen MD        sodium chloride (NS) flush 5-40 mL  5-40 mL IntraVENous Q8H Bala DIAZ, NP   10 mL at 04/13/20 0757    sodium chloride (NS) flush 5-40 mL  5-40 mL IntraVENous PRN Cuco Hernandes, NP        acetaminophen (TYLENOL) tablet 650 mg  650 mg Oral Q4H PRN Cuco Hernandes, NP        apixaban [facility]) tablet 5 mg  5 mg Oral Q12H Bala DIAZ, NP   5 mg at 04/13/20 2659    atorvastatin (LIPITOR) tablet 40 mg  40 mg Oral QHS Bala DIAZ, NP   40 mg at 04/12/20 2124    clopidogreL (PLAVIX) tablet 75 mg  75 mg Oral DAILY Bala DIAZ, NP   75 mg at 04/13/20 6911    ferrous sulfate tablet 325 mg  325 mg Oral DAILY WITH Gio Query EMILY, NP   325 mg at 04/13/20 1830    finasteride (PROSCAR) tablet 5 mg  5 mg Oral QHS Bala DIAZ, NP   5 mg at 04/12/20 2123    senna-docusate (PERICOLACE) 8.6-50 mg per tablet 1 Tab  1 Tab Oral DAILY Cuco Hernandes, NP   1 Tab at 04/13/20 9747    tamsulosin (FLOMAX) capsule 0.4 mg  0.4 mg Oral DAILY Bala DIAZ, NP   0.4 mg at 04/13/20 0780    levothyroxine (SYNTHROID) tablet 50 mcg  50 mcg Oral Once per day on Sun Mon Tue Wed Thu Fri Bala DIAZ NP   50 mcg at 04/13/20 0757    [START ON 4/18/2020] levothyroxine (SYNTHROID) tablet 100 mcg  100 mcg Oral every Saturday Cuco Hernandes, NP        metoprolol tartrate (LOPRESSOR) tablet 50 mg  50 mg Oral Q12H Valeta Route, MD   50 mg at 04/13/20 2545    amiodarone (CORDARONE) tablet 200 mg  200 mg Oral TID Valeta Route, MD   200 mg at 04/13/20 4967     Allergies   Allergen Reactions    Codeine Rash    Pcn [Penicillins] Rash    Sulfa (Sulfonamide Antibiotics) Other (comments)     confusion     Past Medical History:   Diagnosis Date    Arrhythmia     r/t caffeine    Arthritis     Basal cell carcinoma     BPH (benign prostatic hyperplasia)     CAD (coronary artery disease)     s/p PTCA '92    Calculus of kidney     Chronic pain     BACK    Elevated PSA     Hypercholesterolemia     Lumbar foraminal stenosis     Melanoma ([facility] [address]     MVP (mitral valve prolapse)     Other ill-defined conditions(799.89)     Prostatitis    Prediabetes     Seasonal allergies     Squamous cell carcinoma     Systolic murmur     Thyroid disease     HYPOTHYROID    Vision impairment     R EYE, BLOOD CLOT ON RETINA     Past Surgical History:   Procedure Laterality Date    ENDOSCOPY, COLON, DIAGNOSTIC      due 15    HX CHOLECYSTECTOMY  1970    HX CORONARY ARTERY BYPASS GRAFT  03/26/2020    x 5, LIMA to LAD, RSVG to Diag-RI-OM, RSVG to RCA    HX HEART [facility] HX LITHOTRIPSY      x2    HX ORTHOPAEDIC      elbow - fx right arm age 3 yrs     Family History   Problem Relation Age of Onset    Heart Disease Mother         CHF    Heart Disease Father         CAD    Heart Disease Sister         CAD CABG    Lung Disease Sister     Heart Disease Brother         CAD    Pulmonary Fibrosis Brother     Heart Disease Brother         CAD    Stroke Brother 34        cerebral hemorrhage    Cancer Brother         LUNG    High Cholesterol Daughter     Anesth Problems Neg Hx      Social History     Tobacco Use    Smoking status: Never Smoker    Smokeless tobacco: Never Used   Substance Use Topics    Alcohol use: No        Review of Systems:   Constitutional: Negative for fever, chills, weight loss, + malaise/fatigue with rapid rate. HEENT: Negative for nosebleeds, vision changes. Respiratory: Negative for cough, hemoptysis  Cardiovascular: Negative for chest pain, + palpitations, no orthopnea, claudication, leg swelling, syncope, and PND. Gastrointestinal: Negative for nausea, vomiting, diarrhea, blood in stool and melena. Genitourinary: Negative for dysuria, and hematuria. Musculoskeletal: Negative for myalgias, arthralgia. Skin: Negative for rash. Heme: Does not bleed or bruise easily.    Neurological: Negative for speech change and focal weakness     Objective: Visit Vitals  /47   Pulse 92   Temp 98.2 °F (36.8 °C)   Resp 18   Wt 198 lb 6.6 oz (90 kg)   SpO2 94%   BMI 27.67 kg/m²      Physical Exam:   Constitutional: well-developed and well-nourished. No respiratory distress. Head: Normocephalic and atraumatic. Eyes: Pupils are equal, round  ENT: hearing normal  Neck: supple. No JVD present. Cardiovascular: was faster rate this am 120-130 bpm, regular rhythm. Exam reveals no gallop and no friction rub. No murmur heard. Pulmonary/Chest: Effort normal and breath sounds normal. No wheezes. Abdominal: Soft, no tenderness. Musculoskeletal: no edema. Neurological: alert, oriented. Skin: Skin is warm and dry  Psychiatric: normal mood and affect. Behavior is normal. Judgment and thought content normal.         Assessment/Plan:   Atrial tachycardia/atypical atrial flutter with rapid ventricular rate  Hx of CAD NSTEMI and CABG  RBBB  LAFB    I have discussed with him and he talked to his wife about risk and benefit of electrical cardioversion. He was symptomatic and is now loaded with amiodarone 200 mg TID and metoprolol 50 mg bid so he is at risk of recurrent second and third degree av block and would need dual chamber pacemaker should higher dose of medications are used  He has been on eliquis for 3 weeks  I discussed with Dr Johana Keane and he agrees  The patient agrees  He had appt to follow up with Dr William Baer. Dr Booker Garcia had done consult first during last admission and referred to Dr William Baer for cardiac cath. Dr Johana Keane is currently following up with the patient until CABG wound care is over and he will follow up in [facility]  Dose of amiodarone should be decreased to 200 mg every day over time and labs/chest xray/vision exam should be checked on regular basis if he stays on amiodarone but I think 3-6 months post CABG it can be stopped to see if this is related to post op CABG only.   He should remain on eliquis indefinitely  If needed, I will see him again in clinic    Thank you for involving me in this patient's care and please call with further concerns or questions. Uraih Pelaez M.D.   Electrophysiology/Cardiology  [facility]  [address] [place] [address]                                [phone]

## 2020-04-13 NOTE — ROUTINE PROCESS
Cardiac Cath Lab Procedure Area Arrival Note: 
 
Guillermo Russo. Patient identifiers verified with NAME and DATE OF BIRTH. Procedure verified with patient. Consent forms verified. Allergies verified. Patient informed of procedure and plan of care. Questions answered with review. Patient voiced understanding of procedure and plan of care. Patient on cardiac monitor, non-invasive blood pressure, SPO2 monitor. On  or O2 @ 2 lpm via nasal canula. Patient status doing well without problems. Patient is A&Ox 4. Patient reports no pain. Patient medicated during procedure with orders obtained and verified by Dr. Zara Galarza. Refer to patients Cardiac Cath Lab PROCEDURE REPORT for vital signs, assessment, status, and response during procedure, printed at end of case. Printed report on chart or scanned into chart.

## 2020-04-13 NOTE — PROGRESS NOTES
1045: EMILY Pittman RN at bedside to assist with bedside cardioversion. 1850: pt converted back to fast 2:1 feliberto, Dr Johnie Hadley made aware via communication from EMILY Pittman RN, no new orders at this time. Problem: Falls - Risk of  Goal: *Absence of Falls  Description: Document Shorty Metz Fall Risk and appropriate interventions in the flowsheet. Outcome: Progressing Towards Goal  Note: Fall Risk Interventions:            Medication Interventions: Evaluate medications/consider consulting pharmacy                   Problem: Patient Education: Go to Patient Education Activity  Goal: Patient/Family Education  Outcome: Progressing Towards Goal     Problem: Pressure Injury - Risk of  Goal: *Prevention of pressure injury  Description: Document Morris Scale and appropriate interventions in the flowsheet.   Outcome: Progressing Towards Goal  Note: Pressure Injury Interventions:                                            Problem: Patient Education: Go to Patient Education Activity  Goal: Patient/Family Education  Outcome: Progressing Towards Goal     Problem: Arrhythmia Pathway (Adult)  Goal: *Absence of arrhythmia  Outcome: Progressing Towards Goal     Problem: Patient Education: Go to Patient Education Activity  Goal: Patient/Family Education  Outcome: Progressing Towards Goal

## 2020-04-13 NOTE — PROGRESS NOTES
Pt went back into 2:1 a flutter rhythm. HR int he 120s. Dr. Mia Bernal notified via text. Responded \"he should convert again later himself\".

## 2020-04-13 NOTE — PROGRESS NOTES
I spoke to his wife on the phone post cardioversion   He will complete his follow up with Dr Valentino Faster post op      Future Appointments   Date Time Provider Wanda Rosita   4/13/2020  2:30 PM ESTUARDO Santa   4/15/2020 To Be Determined ESTUARDO Santa   4/28/2020  1:00 PM Nuria Shaw MD [facility]   6/29/2020  8:05 AM Heather Rodriguez MD [id number] [facility]     Dr Parish Jones has intended to follow up with him after he is done with Dr Valentino Faster

## 2020-04-13 NOTE — PROGRESS NOTES
0730: Bedside and Verbal shift change report given to Britney Vines  (oncoming nurse) by Cristela Jara (offgoing nurse). Report included the following information SBAR, Kardex, Procedure Summary, Intake/Output, MAR, Recent Results and Cardiac Rhythm SVT/NSR. Problem: Falls - Risk of  Goal: *Absence of Falls  Description: Document Sukumar Arevalo Fall Risk and appropriate interventions in the flowsheet. 4/13/2020 0237 by Bernadette Banuelos RN  Outcome: Progressing Towards Goal  Note: Fall Risk Interventions:     Medication Interventions: Evaluate medications/consider consulting pharmacy, Patient to call before getting OOB, Teach patient to arise slowly     Up ad erik with steady gait. Call bell and personal effects within reach. Bed locked and in low position. Non skid footwear in place. 4/13/2020 0215 by Bernadette Banuelos RN  Outcome: Progressing Towards Goal  Note: Fall Risk Interventions:     Medication Interventions: Evaluate medications/consider consulting pharmacy, Patient to call before getting OOB, Teach patient to arise slowly     Problem: Pressure Injury - Risk of  Goal: *Prevention of pressure injury  Description: Document Morris Scale and appropriate interventions in the flowsheet.   Outcome: Progressing Towards Goal  Note: Pressure Injury Interventions:     Turns self at appropriate intervals; skin assessed Q4H; blood glucose controlled       Problem: Arrhythmia Pathway (Adult)  Goal: *Absence of arrhythmia  Outcome: Progressing Towards Goal  Note: Converted to NSR

## 2020-04-14 ENCOUNTER — HOME CARE VISIT (OUTPATIENT)
Dept: HOME HEALTH SERVICES | Facility: HOME HEALTH | Age: 80
End: 2020-04-14
Payer: MEDICARE

## 2020-04-14 ENCOUNTER — TELEPHONE (OUTPATIENT)
Dept: INTERNAL MEDICINE CLINIC | Age: 80
End: 2020-04-14

## 2020-04-14 VITALS
HEART RATE: 99 BPM | WEIGHT: 197.53 LBS | OXYGEN SATURATION: 94 % | TEMPERATURE: 98.6 F | RESPIRATION RATE: 16 BRPM | BODY MASS INDEX: 27.55 KG/M2 | SYSTOLIC BLOOD PRESSURE: 124 MMHG | DIASTOLIC BLOOD PRESSURE: 53 MMHG

## 2020-04-14 LAB
ALBUMIN SERPL-MCNC: 2.8 G/DL (ref 3.5–5)
ALBUMIN/GLOB SERPL: 0.7 {RATIO} (ref 1.1–2.2)
ALP SERPL-CCNC: 109 U/L (ref 45–117)
ALT SERPL-CCNC: 15 U/L (ref 12–78)
ANION GAP SERPL CALC-SCNC: 5 MMOL/L (ref 5–15)
AST SERPL-CCNC: 9 U/L (ref 15–37)
BILIRUB SERPL-MCNC: 0.7 MG/DL (ref 0.2–1)
BNP SERPL-MCNC: 2596 PG/ML
BUN SERPL-MCNC: 12 MG/DL (ref 6–20)
BUN/CREAT SERPL: 12 (ref 12–20)
CALCIUM SERPL-MCNC: 9 MG/DL (ref 8.5–10.1)
CHLORIDE SERPL-SCNC: 106 MMOL/L (ref 97–108)
CO2 SERPL-SCNC: 27 MMOL/L (ref 21–32)
CREAT SERPL-MCNC: 1.03 MG/DL (ref 0.7–1.3)
ERYTHROCYTE [DISTWIDTH] IN BLOOD BY AUTOMATED COUNT: 15.1 % (ref 11.5–14.5)
GLOBULIN SER CALC-MCNC: 3.9 G/DL (ref 2–4)
GLUCOSE SERPL-MCNC: 109 MG/DL (ref 65–100)
HCT VFR BLD AUTO: 33.6 % (ref 36.6–50.3)
HGB BLD-MCNC: 9.8 G/DL (ref 12.1–17)
MAGNESIUM SERPL-MCNC: 2.4 MG/DL (ref 1.6–2.4)
MCH RBC QN AUTO: 27.5 PG (ref 26–34)
MCHC RBC AUTO-ENTMCNC: 29.2 G/DL (ref 30–36.5)
MCV RBC AUTO: 94.1 FL (ref 80–99)
NRBC # BLD: 0 K/UL (ref 0–0.01)
NRBC BLD-RTO: 0 PER 100 WBC
PLATELET # BLD AUTO: 539 K/UL (ref 150–400)
PMV BLD AUTO: 8.2 FL (ref 8.9–12.9)
POTASSIUM SERPL-SCNC: 3.6 MMOL/L (ref 3.5–5.1)
PROT SERPL-MCNC: 6.7 G/DL (ref 6.4–8.2)
RBC # BLD AUTO: 3.57 M/UL (ref 4.1–5.7)
SODIUM SERPL-SCNC: 138 MMOL/L (ref 136–145)
WBC # BLD AUTO: 7.3 K/UL (ref 4.1–11.1)

## 2020-04-14 PROCEDURE — 83735 ASSAY OF MAGNESIUM: CPT

## 2020-04-14 PROCEDURE — 99218 HC RM OBSERVATION: CPT

## 2020-04-14 PROCEDURE — 65660000001 HC RM ICU INTERMED STEPDOWN

## 2020-04-14 PROCEDURE — 3331090002 HH PPS REVENUE DEBIT

## 2020-04-14 PROCEDURE — 36415 COLL VENOUS BLD VENIPUNCTURE: CPT

## 2020-04-14 PROCEDURE — 74011250637 HC RX REV CODE- 250/637: Performed by: NURSE PRACTITIONER

## 2020-04-14 PROCEDURE — 74011250637 HC RX REV CODE- 250/637: Performed by: SPECIALIST

## 2020-04-14 PROCEDURE — 83880 ASSAY OF NATRIURETIC PEPTIDE: CPT

## 2020-04-14 PROCEDURE — 85027 COMPLETE CBC AUTOMATED: CPT

## 2020-04-14 PROCEDURE — 3331090001 HH PPS REVENUE CREDIT

## 2020-04-14 PROCEDURE — 80053 COMPREHEN METABOLIC PANEL: CPT

## 2020-04-14 RX ORDER — METOPROLOL TARTRATE 25 MG/1
50 TABLET, FILM COATED ORAL EVERY 12 HOURS
Qty: 120 TAB | Refills: 1 | Status: SHIPPED | OUTPATIENT
Start: 2020-04-14 | End: 2020-04-20 | Stop reason: SDUPTHER

## 2020-04-14 RX ORDER — ATORVASTATIN CALCIUM 40 MG/1
TABLET, FILM COATED ORAL
Qty: 90 TAB | Refills: 2 | Status: SHIPPED | OUTPATIENT
Start: 2020-04-14 | End: 2020-11-25

## 2020-04-14 RX ORDER — AMIODARONE HYDROCHLORIDE 200 MG/1
TABLET ORAL
Qty: 100 TAB | Refills: 0 | Status: SHIPPED | OUTPATIENT
Start: 2020-04-14 | End: 2020-05-05 | Stop reason: SDUPTHER

## 2020-04-14 RX ADMIN — APIXABAN 5 MG: 5 TABLET, FILM COATED ORAL at 08:59

## 2020-04-14 RX ADMIN — CLOPIDOGREL BISULFATE 75 MG: 75 TABLET ORAL at 08:58

## 2020-04-14 RX ADMIN — Medication 10 ML: at 06:58

## 2020-04-14 RX ADMIN — LEVOTHYROXINE SODIUM 50 MCG: 0.05 TABLET ORAL at 06:53

## 2020-04-14 RX ADMIN — TAMSULOSIN HYDROCHLORIDE 0.4 MG: 0.4 CAPSULE ORAL at 08:58

## 2020-04-14 RX ADMIN — METOPROLOL TARTRATE 50 MG: 50 TABLET, FILM COATED ORAL at 08:58

## 2020-04-14 RX ADMIN — FERROUS SULFATE TAB 325 MG (65 MG ELEMENTAL FE) 325 MG: 325 (65 FE) TAB at 08:59

## 2020-04-14 RX ADMIN — AMIODARONE HYDROCHLORIDE 200 MG: 200 TABLET ORAL at 08:59

## 2020-04-14 NOTE — PROGRESS NOTES
CSS FLOOR Progress Note    Admit Date: 2020     S/p CABG 3/25/20    Subjective:   Pt seen with Dr. Rae Callahan. Denies complaints on RA. HR Improved 80-90s. Limited a flutter overnight 110s, self converted. Objective:     Visit Vitals  /53 (BP 1 Location: Right arm, BP Patient Position: At rest)   Pulse 99   Temp 98.6 °F (37 °C)   Resp 16   Wt 197 lb 8.5 oz (89.6 kg)   SpO2 94%   BMI 27.55 kg/m²     Temp (24hrs), Av.3 °F (36.8 °C), Min:98.1 °F (36.7 °C), Max:98.6 °F (37 °C)      Last 24hr Input/Output:    Intake/Output Summary (Last 24 hours) at 2020 08  Last data filed at 2020 2340  Gross per 24 hour   Intake 660 ml   Output    Net 660 ml        EKG/Rhythm: SR     Admission Weight: Last Weight   Weight: 198 lb 10.2 oz (90.1 kg) Weight: 197 lb 8.5 oz (89.6 kg)       EXAM:  General: No obvious distress    Lungs:   Clear to auscultation bilaterally. Incision:  No erythema, drainage or swelling. Heart:  Regular rate and rhythm, S1, S2 normal, no murmur, click, rub or gallop. Abdomen:   Soft, non-tender. Bowel sounds normal. No masses,  No organomegaly. Extremities:  No edema. PPP   Neurologic:  Gross motor and sensory apparatus intact. Activity: ad erik     Diet:  Cardiac     Lab Data Reviewed:   Recent Labs     20  0804 20  1127   WBC 7.3 8.7   HGB 9.8* 9.4*   HCT 33.6* 31.6*   * 585*   NA  --  138   K  --  4.3   BUN  --  18   CREA  --  1.09   GLU  --  123*   INR  --  1.2*         Assessment:     Active Problems:    Dizziness (2020)      Sustained SVT (HCC) (2020)      RBBB (2020)             Plan/Recommendations/Medical Decision Makin. Intra-op bradycardia/2nd deg block/first degree AV block preop & Postop A fib on 3/31: Continue metoprolol 50 mg PO BID, Eliquis. s/p Cardioversion . Cards added PO amio 200 mg TID(d/c on 200 mg TID x 3 weeks, then 200 mg BID x 1 week, then 200 mg PO daily).   Rate improved, limited A flutter overnight, self converted. BP stable.       2.  CAD with NSTEMI on this admission s/p CABG: On statin, plavix (no ASA with Eliquis and Plavix). On BB (Hold for SBP<90 and HR <60)     3. HTN: BB,     4.  HLD:  Continue Statin     5. Anemia: Postoperative secondary to acute blood loss.  Continue Iron. Monitor     6. Pre-diabetes: A1c 5.9.      7. Hypothyroid:  Continue Synthroid. Thyroid labs ok.      7. Mitral valve prolapse/MR/AS: All mild on intra-operative WILMER. Systolic murmur 3/6 audible on exam       8. BPH: Continue flomax and finasteride.      9. Arthritis/Chronic back pain: No current treatment     Dispo: OOB as tolerated. No PT/OT needed. D/c today.       Signed By: Yannick Trujillo NP

## 2020-04-14 NOTE — TELEPHONE ENCOUNTER
Dione Casiano [phone]      Pt was just released from the hospital on Easter Sun. He just had a Hospital f/u last week. Does he need to schedule one again this week?

## 2020-04-14 NOTE — PROGRESS NOTES
0423: HR increased from low 90s to 108-112; appears to no longer be in NSR. Tele strip on the chart. 6964: returned to NSR 90s    0730: Bedside and Verbal shift change report given to Sophie Cruz  (oncoming nurse) by Yarely Henry (offgoing nurse). Report included the following information SBAR, Kardex, Procedure Summary, Intake/Output, MAR and Cardiac Rhythm NSR. Problem: Falls - Risk of  Goal: *Absence of Falls  Description: [facility] and appropriate interventions in the flowsheet. Outcome: Progressing Towards Goal  Note: Fall Risk Interventions:     Medication Interventions: Evaluate medications/consider consulting pharmacy, Patient to call before getting OOB, Teach patient to arise slowly     Up ad erik with steady gait. Call bell and personal effects within reach. Bed locked and in low position. Non skid footwear in place. Problem: Pressure Injury - Risk of  Goal: *Prevention of pressure injury  Description: Document Morris Scale and appropriate interventions in the flowsheet.   Outcome: Progressing Towards Goal  Note: Pressure Injury Interventions:     Turns self at appropriate intervals; skin assessed Q4H; blood glucose controlled       Problem: Arrhythmia Pathway (Adult)  Goal: *Absence of arrhythmia  Outcome: Progressing Towards Goal  Note: Currently in NSR with AVB 1st deg post cardioversion

## 2020-04-14 NOTE — DISCHARGE INSTRUCTIONS
Cardiac Surgery Specialist    [address] [place] [address] [address] [address]  Office- [phone]  Fax- [phone]       Office- [phone]  Fax- [phone]  _____________________________________________________________  Dr. Ion Clark [facility]   Lemmie Olszewski, AGATOM Dodge, BISHOP Phoenix, BISHOP Del Real, AGPCNP  ______________________________________________________________    [facility]     Surgery & Date: Procedure(s):  EP CARDIOVERSION    Discharge Date: No discharge date for patient encounter. MEDICATIONS:  Please refer to your After Visit Summary for your medication list. If you do not have a prescription for a new medication, you may purchase the medication over the counter. DO NOT TAKE ANY MEDICATIONS THAT ARE NOT ON THIS LIST    INR T[employer]-   INR LEVEL to be drawn-   INR management per    INSTRUCTIONS:  1. NO SMOKING OR TOBACCO PRODUCTS  2. Follow all the instructions in your discharge book  3. You may shower. Wash all incisions twice daily with mild soap and water. No lotions, ointments or powder. 4. Call the office immediately for any redness, swelling, or drainage from your incision. 5. Take your temperature daily and call for a temperature of 101 degrees or higher or for any symptoms that make you think you have and infection. 6. Weigh yourself each morning. Call if you gain more than 5 pounds in 48 hours. 7. Use the incentive spirometer 6-8 times a day-10 breaths each time. Use a pillow or your bear to splint your breastbone when coughing or sneezing. 8. If you feel your breast bone clicking or popping, notify the office immediately. 9. Walk several hundred feet several times daily. DIET  Eat an American Heart Association diet. If you are having trouble with your appetite, eat what you can. Try eating small, frequent meals throughout the day. ACTIVITY  1. NO DRIVING--you will be evaluated to drive at your follow up visit. 2. Increase your activity by walking several times a day. Stay out of bed most of the day. 3. When sitting, keep your legs elevated. 4. You may ride in a car, but you must get out every hour and walk around. If you ride in a car with an airbag that can not be switched off, put the seat ALL the way back or ride in the back seat. 5. Any load bearing activity can be performed as long as it can be performed \"in the tube\". You can reach \"out of the tube\" when performing activities that don't require heavy lifting. Let pain be your guide, your pain level will keep you from doing anything extreme. FOLLOW UP  1. Your first follow up appointment will be on 4/20/2020 at 1130am(THIS   [facility]). Our office is located in [address] [place] [address]. Your second follow up appointment will be in four weeks, on 4/29/2020 at 1pm. Please call our office at [phone] if you are unable to make either one of these appointments. 2. You will be receiving a call before your 5 day appointment to begin cardiac rehab. They are located in the [address] [place] [address] on [facility]. Their phone number is [phone]. Please call if you have not been contacted 2-3 weeks after discharge from the hospital.  3. We will make an appointment with your cardiologist at your last appointment. 4. Consult you primary care physician regarding your influenza &   pneumovax vaccines. 5.   Please bring all medications with you to your appointment.     Signature:___________________________________________________

## 2020-04-14 NOTE — DISCHARGE SUMMARY
CSS Discharge Summary     Patient ID:  Monster Landeros  [id number]  60 y.o.  1940    Admit date: 4/12/2020    Discharge date: 4/14/2020     Admitting Physician: Charla Emery MD     Referring Cardiologist:  N/a     PCP:  Ankur Dyer MD    Admitting Diagnoses: Dizziness/fatigue    Discharge Diagnoses:     Hospital Problems  Date Reviewed: 4/12/2020          Codes Class Noted POA    Dizziness ICD-10-CM: R42  ICD-9-CM: 780.4  4/12/2020 Unknown        Sustained SVT ([address] ICD-10-CM: I47.1  ICD-9-CM: 427.89  4/12/2020 Unknown        RBBB ICD-10-CM: I45.10  ICD-9-CM: 426.4  4/12/2020 Unknown              Discharged Condition: improved    Disposition: home, see patient instructions for treatment and plan    Procedures for this admission:  Procedure(s):  EP CARDIOVERSION    Discharge Medications:      My Medications      START taking these medications      Instructions Each Dose to Equal Morning Noon Evening Bedtime   amiodarone 200 mg tablet  Commonly known as:  CORDARONE    Your last dose was: Your next dose is: Take 200 mg by mouth three times a day x 3 weeks, then decrease to 200 mg by mouth twice a day, then decrease to 200 mg by mouth daily. Indications: atrial fibrillation electrically shocked to normal rhythm                     CHANGE how you take these medications      Instructions Each Dose to Equal Morning Noon Evening Bedtime   metoprolol tartrate 25 mg tablet  Commonly known as:  LOPRESSOR  What changed:  how much to take    Your last dose was: Your next dose is: Take 2 Tabs by mouth every twelve (12) hours for 60 days. 50 mg                    CONTINUE taking these medications      Instructions Each Dose to Equal Morning Noon Evening Bedtime   acetaminophen 325 mg tablet  Commonly known as:  TYLENOL    Your last dose was: Your next dose is: Take 2 Tabs by mouth every four (4) hours as needed for Pain.    650 mg                 apixaban 5 mg tablet  Commonly known as:  ELIQUIS    Your last dose was: Your next dose is: Take 1 Tab by mouth every twelve (12) hours for 60 days. 5 mg                 atorvastatin 40 mg tablet  Commonly known as:  LIPITOR    Your last dose was: Your next dose is:          TAKE 1 TAB BY MOUTH DAILY. cholecalciferol (2,000 UNITS /50 MCG) Cap capsule  Commonly known as:  VITAMIN D3    Your last dose was: Your next dose is: Take 2,000 Units by mouth two (2) times a day. 2,000 Units                 clopidogreL 75 mg Tab  Commonly known as:  PLAVIX    Your last dose was: Your next dose is: Take 1 Tab by mouth daily. 75 mg                 co-enzyme Q-10 100 mg capsule  Commonly known as:  CO Q-10    Your last dose was: Your next dose is: Take 200 mg by mouth daily. 200 mg                 ferrous sulfate 325 mg (65 mg iron) tablet    Your last dose was: Your next dose is: Take 1 Tab by mouth daily (with breakfast) for 30 days. 325 mg                 finasteride 5 mg tablet  Commonly known as:  PROSCAR    Your last dose was: Your next dose is: Take 5 mg by mouth daily. HS   5 mg                 levothyroxine 50 mcg tablet  Commonly known as:  SYNTHROID    Your last dose was: Your next dose is:          TAKE 1 TABLET BY MOUTH EVERY DAY BEFORE BREAKFAST EXCEPT TAKE 2 TABS ON SATURDAY. PRESERVISION AREDS PO    Your last dose was: Your next dose is: Take 1 Cap by mouth two (2) times a day. 1 Cap                 senna-docusate 8.6-50 mg per tablet  Commonly known as:  PERICOLACE    Your last dose was: Your next dose is: Take 1 Tab by mouth daily. 1 Tab                 tamsulosin 0.4 mg capsule  Commonly known as:  FLOMAX    Your last dose was:       Your next dose is:          TAKE 1 CAPSULE BY MOUTH EVERY DAY 30 MINUTES AFTER THE SAME MEAL EACH DAY                        Where to Get Your Medications      These medications were sent to [address] [place] [address] Lily Hannon    Phone:  [phone]   · amiodarone 200 mg tablet  · atorvastatin 40 mg tablet  · metoprolol tartrate 25 mg tablet       HPI:    Eri Shultz is a 78 y.o. male who was referred for cardiac evaluation. He has a PMH significant for CAD (previous PTCA) with NSTEMI on this admission, HTN, HLD, Hypothyroid, mitral valve prolapse, Pre-diabetes (A1c of 6 in December), arthritis, BPH.  He reports a sensation of chest pressure over the last couple of weeks when he walked the dog. This pressure resolved with rest. Denies accompanied shortness of breath, N/V, diaphoresis, syncope. Last night he developed this chest pressure which he noted as severe and unrelenting.  The pressure was substernal and he had his wife bring him to the [facility] ED around 2am. His troponin elevated to 4.89 and he was taken to the Cath Lab this morning with Dr. Kaylan Zamarripa. He was found to have 3-vessel CAD. Dr. Taj Martinez was asked to consult for consideration of Coronary Artery Bypass +/- AVR.       No smoking or ETOH history. Father  at 71 of CAD and brother  at 72 with CAD.      Update:  Rene Kyle taken to the OR on 3/25/20 for a Coronary artery bypass X 5 (LIMA-LAD, BSUQ-Gtgh-NI-OM, RSVG-RCA) with endovascular vein harvest of the LLE with Dr. Taj Martinez. He was taken from the OR to the CVICU in stable condition with the following drips:  Precedex, Insulin, Damien-synephrine, Dobutamine.  When he was hemodynamically stable, he was transferred to the [facility], be discharged to home with his family and Home health, home PT/OT. [facility] was felt stable for discharge on POD # 8 with the following assessment and plan.       Pt called from home and reports significant fatigue, persistent tachycardia (-130s) and lower blood pressures.   Pt denies CP, SOB, dizziness/lightheadedness, worsenign LE edema, bleeding, fever/chills/aches, N/V/diarrhea. Will admit pt for observation, some concern for arrhythmias d/t postop course, see below for specifics.       Cardiac Testing     Cardiac catheterization 03/25/20 :  Conclusion      Findings:  1)Severe native distal left main and 3 vessel CAD with cuprit lesion in distal RCA with MARYA II flow  2)Heavy calcification throughout LAD, proximal RCA  And proximal LCx with ostial LCx aneurysm  3)HIgh syntax score with involvement of Left mainasa well as LAD/diagonal and proximal LCx/OM1 bifurcation involvement  4)LIkely moderate Aortic stenosis with pull back gradient of 25 mm Hg and normal LVEDP     Recommendation  1)Consideration for CABG/AVR  2)MAy reinitiate Heparin/Lovenox per protocol     Access: right radial no issues     Contrast 42 cc. Coronary Findings      Diagnostic   Dominance: Right   Left Main   Ost LM to Ost LAD lesion 60% stenosed. .   Left Anterior Descending   Ost LAD to Prox LAD lesion 70% stenosed. .   Prox LAD to Mid LAD lesion 90% stenosed. .   Mid LAD lesion 80% stenosed. Fellows Nathan LAD lesion 70% stenosed. .   Left Circumflex   Prox Cx lesion 90% stenosed. .   Right Coronary Artery   Ost RCA lesion 60% stenosed. .   Dist RCA lesion 95% stenosed. .         Hospital Course:   1. Intra-op bradycardia/2nd deg block/first degree AV block preop & Postop A fib on 3/31: Continue metoprolol 50 mg PO BID, Eliquis.  s/p Cardioversion 4/13. Cards added PO amio 200 mg TID(d/c on 200 mg TID x 3 weeks, then 200 mg BID x 1 week, then 200 mg PO daily). Rate improved, limited A flutter overnight, self converted. BP stable.       2.  CAD with NSTEMI on this admission s/p CABG: On statin, plavix (no ASA with Eliquis and Plavix). On BB (Hold for SBP<90 and HR <60)     3. HTN: BB,     4.  HLD:  Continue Statin     5. Anemia: Postoperative secondary to acute blood loss.  Continue Iron. Monitor     6. Pre-diabetes: A1c 5.9.      7. Hypothyroid:  Continue Synthroid. Thyroid labs ok.      7. Mitral valve prolapse/MR/AS: All mild on intra-operative WILMER.  Systolic murmur 3/6 audible on exam       8. BPH: Continue flomax and finasteride.      9. Arthritis/Chronic back pain: No current treatment     Dispo: OOB as tolerated. No PT/OT needed. D/c today. Referral to outpatient cardiac rehab made. Discharge Vital Signs:   Visit Vitals  /53 (BP 1 Location: Right arm, BP Patient Position: At rest)   Pulse 99   Temp 98.6 °F (37 °C)   Resp 16   Wt 197 lb 8.5 oz (89.6 kg)   SpO2 94%   BMI 27.55 kg/m²       Labs:   Recent Labs     04/14/20  0804 04/12/20  1127   WBC 7.3 8.7   HGB 9.8* 9.4*   HCT 33.6* 31.6*   * 585*   NA  --  138   K  --  4.3   BUN  --  18   CREA  --  1.09   GLU  --  123*   INR  --  1.2*       Diagnostics:   CXR Results  (Last 48 hours)    None            Patient Instructions/Follow Up Care:  Discharge instructions were reviewed with the patient and family present. Questions were also answered at this time. Prescriptions and medications were reviewed. The patient has a follow up appointment with the Nurse Practitioner or [de-identified] Assistant on 4/20/20 at 1130am and 4/28/20 at 1pm. The patient was also instructed to follow up with his primary care physician as needed. The patient and family were encouraged to call with any questions or concerns.        Signed:  Lawyer Armin NP  4/14/2020  8:32 AM

## 2020-04-14 NOTE — TELEPHONE ENCOUNTER
Advised pt's wife Kole Graves (on HIPPA) MD thinks it would be most helpful for pt to see cardiologist for hospital follow up unless pt has specific concerns for Dr Roseanne Valadez. She states Dr Naya Mcdermott is pt's cardiologist. But pt is still under care of cardiac surgeon Dr Chelsi Kuhn and has appts on 4/22 and 4/28/20 with him. Told her that was good. Did he have any concerns for Dr Roseanne Valadez. No not that she could think of.  Will forward to MD.

## 2020-04-14 NOTE — PROGRESS NOTES
Reason for Readmission:  Patient s/p CABG OR on 3/25- presented with tachycardia and low BP            RUR Score/Risk Level:  14% risk of re-admission        PCP: First and Last name:  Dr. Mason Green   Name of Practice: [facility]   Are you a current patient: Yes/No: Yes   Approximate date of last visit: N/A    Is a Care Conference indicated: None at this time       Did you attend your follow up appointment (s): If not, why not: CM is unaware of any issues with follow-up appointments          Resources/supports as identified by patient/family:   Patient lives with his wife        Top Challenges facing patient (as identified by patient/family and CM): Finances/Medication cost?   None identified     Transportation   Patient's spouse will transport home   Support system or lack thereof? Spouse is support      Living arrangements? Patient lives in two-story home with his wife and dog          Self-care/ADLs/Cognition? Patient is alert and oriented, prior to CABG independent with ADLs and mobility- open to home health services with [facility]    Current Advanced Directive/Advance Care Plan:  Full Code, not on file            Plan for utilizing home health:  Patient is open to [address] notified [facility] of discharge today for resumption of care               Transition of Care Plan:    Based on readmission, the patient's previous Plan of Care   has been evaluated and/or modified. The current Transition of Care Plan is:  Discharge home with resumption of home health services    The CM is familiar with this patient from previous admission s/p CABG, 3/25-4/02, patient upgraded this admission from OBS to INPATIENT status. The CM reviewed notes from recent admission for assessment. The patient lives with his wife and dog in two-story home. The patient has Medicare A & B and Blue Cross Supplement.  The patient works part-time completing taxes, per previous admission patient denied difficulty affording or accessing medications, has prescription coverage and utilizes [facility] on [facility] for prescriptions. The patient's spouse will transport home. The CM notified Gloria and Regla Ran with [address], of the patient's discharge today and resumption of care orders. SAMANTHA Markham     Care Management Interventions  PCP Verified by CM: Yes  Mode of Transport at Discharge:  Other (see comment)(Patient's wife will transport )  Transition of Care Consult (CM Consult): Discharge Planning  MyChart Signup: Yes  Health Maintenance Reviewed: Yes  Physical Therapy Consult: No  Occupational Therapy Consult: No  Speech Therapy Consult: No  Current Support Network: Own Home, Lives with Spouse  Confirm Follow Up Transport: Family  Discharge Location  Discharge Placement: Home with home health

## 2020-04-14 NOTE — PROGRESS NOTES
0915: IV and tele dcd. 1005: discharge instructions reviewed and signed with pt. NAOMIE given for amiodarone. DISCHARGE SUMMARY from Nurse    PATIENT INSTRUCTIONS:    After general anesthesia or intravenous sedation, for 24 hours or while taking prescription Narcotics:  · Limit your activities  · Do not drive and operate hazardous machinery  · Do not make important personal or business decisions  · Do  not drink alcoholic beverages  · If you have not urinated within 8 hours after discharge, please contact your surgeon on call. Report the following to your surgeon:  · Excessive pain, swelling, redness or odor of or around the surgical area  · Temperature over 100.5  · Nausea and vomiting lasting longer than 4 hours or if unable to take medications  · Any signs of decreased circulation or nerve impairment to extremity: change in color, persistent  numbness, tingling, coldness or increase pain  · Any questions    What to do at Home:  Recommended activity: Activity as tolerated,     If you experience any of the following symptoms irregular heartbeat, please follow up with fiser. *  Please give a list of your current medications to your Primary Care Provider. *  Please update this list whenever your medications are discontinued, doses are      changed, or new medications (including over-the-counter products) are added. *  Please carry medication information at all times in case of emergency situations. These are general instructions for a healthy lifestyle:    No smoking/ No tobacco products/ Avoid exposure to second hand smoke  Surgeon General's Warning:  Quitting smoking now greatly reduces serious risk to your health.     Obesity, smoking, and sedentary lifestyle greatly increases your risk for illness    A healthy diet, regular physical exercise & weight monitoring are important for maintaining a healthy lifestyle    You may be retaining fluid if you have a history of heart failure or if you experience any of the following symptoms:  Weight gain of 3 pounds or more overnight or 5 pounds in a week, increased swelling in our hands or feet or shortness of breath while lying flat in bed. Please call your doctor as soon as you notice any of these symptoms; do not wait until your next office visit. The discharge information has been reviewed with the patient. The patient verbalized understanding. Discharge medications reviewed with the patient and appropriate educational materials and side effects teaching were provided.   ___________________________________________________________________________________________________________________________________

## 2020-04-14 NOTE — PROGRESS NOTES
Cardiac Electrophysiology Hospital Progress Note   REFERRING PROVIDER: Dr Katya Guzman:      Jacki Gasca is a 78 y.o. patient who is seen for evaluation of atrial tachycardia/atrial flutter with rapid ventricular rate despite amiodarone and metoprolol  He felt symptomatic and was readmitted one day ago  He had atrial fibrillation post CABG (5 vessel bypass 3/25/2020)  He had NSTEMI after chest pain and was admitted and that led to cardiac cath and CABG-Dr Casillas/Ariel/Lucian  He was given amiodarone and metoprolol during the past admission but amiodarone was stopped due to bradycardia with first and second degree av block  He was discharged home with eliquis and plavix and metoprolol    During this admission ECG showed atrial tachycardia or atypical atrial flutter with RVR and 2:1 AV conduction, RBBB LAFB  Previous echo had reported normal LVEF    Cardioversion 100 J 4/13/2020 to sinus rhythm with first degree av block  He went in and out of atypical atrial flutter afterwards, now sinus rhythm 90-99 bpm occasional PVC     Patient Active Problem List   Diagnosis Code    Mixed hyperlipidemia E78.2    Essential hypertension, benign I10    Coronary atherosclerosis of native coronary artery I25.10    Calculus of kidney N20.0    Insomnia, unspecified G47.00    Unspecified hearing loss H91.90    Macular degeneration H35.30    Skin cancer C44.90    Vitamin D deficiency E55.9    Hypothyroidism, acquired, 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    BPH with urinary obstruction N40.1, N13.8    Advance directive discussed with patient Z71.89    Lumbar disc disease M51.9    Lumbar foraminal stenosis M48.061    IFG (impaired fasting glucose) R73.01    NSTEMI (non-ST elevated myocardial infarction) ([facility] [address] I21.4    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    Dizziness R42    Sustained SVT (HCC) I47.1    RBBB I45.10     Current Facility-Administered Medications   Medication Dose Route Frequency Provider Last Rate Last Dose    sodium chloride (NS) flush 5-40 mL  5-40 mL IntraVENous Q8H Katherine DIAZ, NP   10 mL at 04/14/20 0658    sodium chloride (NS) flush 5-40 mL  5-40 mL IntraVENous PRN Laura Lines, NP        acetaminophen (TYLENOL) tablet 650 mg  650 mg Oral Q4H PRN Laura Lines, NP        apixaban [facility]) tablet 5 mg  5 mg Oral Q12H Katherine DIAZ, NP   5 mg at 04/13/20 2125    atorvastatin (LIPITOR) tablet 40 mg  40 mg Oral QHS Katherine DIAZ, NP   40 mg at 04/13/20 2125    clopidogreL (PLAVIX) tablet 75 mg  75 mg Oral DAILY Katherine DIAZ, NP   75 mg at 04/13/20 3568    ferrous sulfate tablet 325 mg  325 mg Oral DAILY WITH Sixto DIAZ, NP   325 mg at 04/13/20 3486    finasteride (PROSCAR) tablet 5 mg  5 mg Oral QHS Katherine DIAZ, NP   5 mg at 04/13/20 2125    senna-docusate (PERICOLACE) 8.6-50 mg per tablet 1 Tab  1 Tab Oral DAILY Lauraasad Song, NP   1 Tab at 04/13/20 1304    tamsulosin (FLOMAX) capsule 0.4 mg  0.4 mg Oral DAILY Katherine DIAZ, NP   0.4 mg at 04/13/20 2235    levothyroxine (SYNTHROID) tablet 50 mcg  50 mcg Oral Once per day on Sun Mon Tue Wed Thu Fri Laura Duncan, NP   50 mcg at 04/14/20 6435    [START ON 4/18/2020] levothyroxine (SYNTHROID) tablet 100 mcg  100 mcg Oral every Saturday Laura Duncan, NP        metoprolol tartrate (LOPRESSOR) tablet 50 mg  50 mg Oral Q12H Rai Jara MD   50 mg at 04/13/20 2125    amiodarone (CORDARONE) tablet 200 mg  200 mg Oral TID Rai Jara MD   200 mg at 04/13/20 2125     Allergies   Allergen Reactions    Codeine Rash    Pcn [Penicillins] Rash    Sulfa (Sulfonamide Antibiotics) Other (comments)     confusion     Past Medical History:   Diagnosis Date    Arrhythmia     r/t caffeine    Arthritis     Basal cell carcinoma     BPH (benign prostatic hyperplasia)     CAD (coronary artery disease)     s/p PTCA '92    Calculus of kidney     Chronic pain     BACK    Elevated PSA  Hypercholesterolemia     Lumbar foraminal stenosis     Melanoma ([facility] [address]     MVP (mitral valve prolapse)     Other ill-defined conditions(799.89)     Prostatitis    Prediabetes     Seasonal allergies     Squamous cell carcinoma     Systolic murmur     Thyroid disease     HYPOTHYROID    Vision impairment     R EYE, BLOOD CLOT ON RETINA     Past Surgical History:   Procedure Laterality Date    ENDOSCOPY, COLON, DIAGNOSTIC      due 12    HX CHOLECYSTECTOMY  1970    HX CORONARY ARTERY BYPASS GRAFT  03/26/2020    x 5, LIMA to LAD, RSVG to Diag-RI-OM, RSVG to RCA    HX HEART CATHETERIZATION  1992    WITH BALLOON    HX LITHOTRIPSY      x2    HX ORTHOPAEDIC      elbow - fx right arm age 3 yrs   [place] CARDIOVERSION ELECTIVE ARRHYTHMIA EXTERNAL N/A 4/13/2020    EP CARDIOVERSION performed by Kim Gordon MD at [address] CATH LAB     Family History   Problem Relation Age of Onset    Heart Disease Mother         CHF    Heart Disease Father         CAD    Heart Disease Sister         CAD CABG    Lung Disease Sister     Heart Disease Brother         CAD    Pulmonary Fibrosis Brother     Heart Disease Brother         CAD    Stroke Brother 34        cerebral hemorrhage    Cancer Brother         LUNG    High Cholesterol Daughter     Anesth Problems Neg Hx      Social History     Tobacco Use    Smoking status: Never Smoker    Smokeless tobacco: Never Used   Substance Use Topics    Alcohol use: No        Review of Systems:   Constitutional: Negative for fever, chills, weight loss, + malaise/fatigue with rapid rate. HEENT: Negative for nosebleeds, vision changes. Respiratory: Negative for cough, hemoptysis  Cardiovascular: Negative for chest pain, + palpitations, no orthopnea, claudication, leg swelling, syncope, and PND. Gastrointestinal: Negative for nausea, vomiting, diarrhea, blood in stool and melena. Genitourinary: Negative for dysuria, and hematuria.    Musculoskeletal: Negative for myalgias, arthralgia. Skin: Negative for rash. Heme: Does not bleed or bruise easily. Neurological: Negative for speech change and focal weakness     Objective:     Visit Vitals  /53 (BP 1 Location: Right arm, BP Patient Position: At rest)   Pulse 99   Temp 98.6 °F (37 °C)   Resp 16   Wt 197 lb 8.5 oz (89.6 kg)   SpO2 94%   BMI 27.55 kg/m²      Physical Exam:   Constitutional: well-developed and well-nourished. No respiratory distress. Head: Normocephalic and atraumatic. Eyes: Pupils are equal, round  ENT: hearing normal  Neck: supple. No JVD present. Cardiovascular: regular rhythm. Exam reveals no gallop and no friction rub. No murmur heard. Pulmonary/Chest: Effort normal and breath sounds normal. No wheezes. Abdominal: Soft, no tenderness. Musculoskeletal: no edema. Neurological: alert, oriented. Skin: Skin is warm and dry  Psychiatric: normal mood and affect. Behavior is normal. Judgment and thought content normal.         Assessment/Plan:   Atrial tachycardia/atypical atrial flutter with rapid ventricular rate, cardioverted 4/13/2020  Hx of CAD NSTEMI and CABG  RBBB  LAFB    I have discussed with him and his wife and Dr Guerrero Henry  He will take 3 weeks of amiodarone 200 mg TID (lower dose then post CABG to avoid second degree av block) and then 200 mg bid for 1 week and then 200 mg every day  metoprolol 50 mg bid   Continue with eliquis   Dr Guerrero Henry is currently following up with the patient until CABG wound care is over and he will follow up in [facility] with either Dr Nora Stewart or Timmy Peñaloza and if needed, I will see him again in EP clinic    Thank you for involving me in this patient's care and please call with further concerns or questions. Linda He M.D.   Electrophysiology/Cardiology  [facility] [place]  [address] [place] [address]                                [phone]

## 2020-04-14 NOTE — PHYSICIAN ADVISORY
This patient is at high risk of adverse events and deterioration based on documented clinical data, comorbid conditions and current acute care course. Mr. Vandana Littlejohn is expected to meet Inpatient Admission status criteria in accordance with CMS regulation Section 43 .3. Specifically, due to medical necessity the patient's has exceeded Two Midnights. It is our recommendation that this patient's hospitalization status should be upgraded from  OBSERVATION to INPATIENT status. The final decision of the patient's hospitalization status depends on the attending physician's judgment.

## 2020-04-15 ENCOUNTER — HOME CARE VISIT (OUTPATIENT)
Dept: SCHEDULING | Facility: HOME HEALTH | Age: 80
End: 2020-04-15
Payer: MEDICARE

## 2020-04-15 ENCOUNTER — HOME CARE VISIT (OUTPATIENT)
Dept: HOME HEALTH SERVICES | Facility: HOME HEALTH | Age: 80
End: 2020-04-15
Payer: MEDICARE

## 2020-04-15 ENCOUNTER — PATIENT OUTREACH (OUTPATIENT)
Dept: CASE MANAGEMENT | Age: 80
End: 2020-04-15

## 2020-04-15 VITALS
OXYGEN SATURATION: 98 % | TEMPERATURE: 97.3 F | HEIGHT: 72 IN | HEART RATE: 90 BPM | SYSTOLIC BLOOD PRESSURE: 102 MMHG | DIASTOLIC BLOOD PRESSURE: 60 MMHG | BODY MASS INDEX: 26.68 KG/M2 | WEIGHT: 197 LBS | RESPIRATION RATE: 18 BRPM

## 2020-04-15 PROCEDURE — 3331090002 HH PPS REVENUE DEBIT

## 2020-04-15 PROCEDURE — G0162 HHC RN E&M PLAN SVS, 15 MIN: HCPCS

## 2020-04-15 PROCEDURE — 3331090001 HH PPS REVENUE CREDIT

## 2020-04-15 NOTE — PROGRESS NOTES
Care transitions nurse -   Mr. Sawyer is post CABG 3/25/20 - with return to hospital - for fatigue, rapid heart beat with post operative a fib. - He had consultation with cardiology - and cardioversion -  - with medication adjustments. [address] [place] [address] resumed visits today 4/15. Medication reconciliation done with pt's wife today 4/15 - / pulse 90 with HH today  They have bp cuff and will monitor twice a day with med changes/ they are checking temperature, and daily weight - 197 today. Amiodarone loading dose- 200 mg  tid for 3 weeks, then 200 mg bid for 1 week ; then 200 mg every day - ongoing. Metoprolol dosing was increased to 50 mg bid (q 12 hours). Call to and reached pt's wife to confirm Amiodarone dosing.    (Cardiology note - Dr. Yan Dimas- have discussed with him and his wife and Dr Vaughn Ahr  He will take 3 weeks of amiodarone 200 mg TID (lower dose then post CABG to avoid second degree av block) and then 200 mg bid for 1 week and then 200 mg every day  metoprolol 50 mg bid ; Continue with eliquis ). Discussed surgeon as 1st call for any issues during surgical recovery time -   Follow up visit . and  -     COVID-19 Screening Initial Follow-up Note    Patient contacted regarding COVID-19  risk. Care Transition Nurse/ Ambulatory Care Manager contacted the family by telephone to perform post discharge assessment. Verified name and  with family as identifiers. Provided introduction to self, and explanation of the CTN/ACM role, and reason for call due to risk factors for infection and/or exposure to COVID-19. Symptoms reviewed with family who verbalized the following symptoms: fatigue, post operative. Due to no new or worsening symptoms encounter was not routed to provider for escalation.       Patient has following risk factors of: Diabetes, post cardiac surgery with return to hospital for cardoversion     CTN/ACM reviewed discharge instructions, medical action plan and red flags such as increased shortness of breath, increasing fever and signs of decompensation with family who verbalized understanding. Discussed exposure protocols and quarantine with CDC Guidelines What to do if you are sick with coronavirus disease 2019.  Family was given an opportunity for questions and concerns. The family agrees to contact the [employer] exposure line [phone], local [facility] department [address]  [phone] and PCP office for questions related to their healthcare. CTN/ACM provided contact information for future reference. Reviewed and educated family on any new and changed medications related to discharge diagnosis. Patient/family/caregiver given information for [employer] and agrees to enroll no/ pt declined  Patient's preferred e-mail:  n/a  Patient's preferred phone number: no    Follow up call to pt / family in 14 days - they are under cardiac surgery realm for all issues - until released. Will confirm Amiodarone scheduling with Dr. Crystal Yu - post cardioversion - per wife request.    Kenneth Pruitt RN, [facility], [facility] nurse [phone]  [place] [facility] Care Coordination Team                            Procedure date: 4/13/2020     PROCEDURE:   Electrical synchronized cardioversion of atrial tachycardia/atypical atrial flutter with rapid ventricular rate     BRIEF HISTORY:  This is a 78 y.o.man with the history of atrial tachycardia or atypical flutter despite rate control and rhythm control medications. He has been on eliquis since CABG 3/25/2020 because of atrial fibrillation. He had NSTEMI but LVEF was preserved and underwent CABG in March 2020. Post of atrial fibrillation converted to atrial tachycardia with RVR and at one point amiodarone was stopped due to risk of bradycardia from first degree av block and second degree av block.   During this admission he has not shown recurrent av block despite loading PO amiodarone and metoprolol. He had been on metoprolol outpatient.      ASSESSMENT AND PLAN:  The patient will be discharged home with eliquis, amiodarone and metoprolol  Doses of amiodarone should be reduced to 200 mg every day for 3-6 months post CABG

## 2020-04-16 ENCOUNTER — TELEPHONE (OUTPATIENT)
Dept: CASE MANAGEMENT | Age: 80
End: 2020-04-16

## 2020-04-16 PROCEDURE — 3331090002 HH PPS REVENUE DEBIT

## 2020-04-16 PROCEDURE — 3331090001 HH PPS REVENUE CREDIT

## 2020-04-16 NOTE — TELEPHONE ENCOUNTER
Cardiac Surgery Discharge - Follow up call placed to [facility]. Spoke to Mrs Melvin benavides, Reviewed plan of care after discharge and encouraged them to verbalize. They are without questions regarding medications. Encouraged continued use of the incentive spirometer. Confirmed follow up appts and reinforced importance of wearing red reminder bracelet. [address] is without questions or concerns.  Rico Morgan RN

## 2020-04-17 ENCOUNTER — HOME CARE VISIT (OUTPATIENT)
Dept: SCHEDULING | Facility: HOME HEALTH | Age: 80
End: 2020-04-17
Payer: MEDICARE

## 2020-04-17 VITALS
TEMPERATURE: 99 F | HEART RATE: 72 BPM | DIASTOLIC BLOOD PRESSURE: 70 MMHG | SYSTOLIC BLOOD PRESSURE: 138 MMHG | WEIGHT: 196.5 LBS | BODY MASS INDEX: 26.65 KG/M2 | RESPIRATION RATE: 18 BRPM

## 2020-04-17 PROCEDURE — 3331090002 HH PPS REVENUE DEBIT

## 2020-04-17 PROCEDURE — 3331090001 HH PPS REVENUE CREDIT

## 2020-04-17 PROCEDURE — G0300 HHS/HOSPICE OF LPN EA 15 MIN: HCPCS

## 2020-04-17 NOTE — CDMP QUERY
Pt recently underwent CABG. It was noted in that post-op period that pt developed arrhythmias. Pt re-presented with A-Fib/ flutter requiring Cardioversion. For clarification purposes, can the Arrhythmia be further specified as: 1. Post-op complication Arrhythmia unrelated to recent surgery 2. Post-op complication Arrhythmia related to recent surgery 3. Other explanation, please specify 4. Unable to determine Thank you.

## 2020-04-17 NOTE — CDMP QUERY
Pt admitted with A-Fib and has 'Post-op ABLA'. Pt is s/p CABG on 3/25 and was diagnosed with ABLA in the post-op period. Hgb has remained stable during this admission at 9.8, with pt continuing on his home dose of Iron. Please further specify type and acuity of anemia in the medical record. ? Anemia d/t chronic disease ? Anemia due to acute blood loss ? Anemia due to chronic blood loss ? Anemia due to iron deficiency ? Dilutional anemia 
? Other, please specify ? Clinically unable to determine The medical record reflects the following: 
   Risk Factors: s/p CABG on 3/25, with diagnosis of ABLA at that time. Clinical Indicators: tachycardic, with Hgb 9.4-9.8 this admission. Treatment: Iron tablet- 1 daily, monitoring Hgb.

## 2020-04-18 PROCEDURE — 3331090002 HH PPS REVENUE DEBIT

## 2020-04-18 PROCEDURE — 3331090001 HH PPS REVENUE CREDIT

## 2020-04-19 PROCEDURE — 3331090001 HH PPS REVENUE CREDIT

## 2020-04-19 PROCEDURE — 3331090002 HH PPS REVENUE DEBIT

## 2020-04-20 ENCOUNTER — OFFICE VISIT (OUTPATIENT)
Dept: CARDIOLOGY CLINIC | Age: 80
End: 2020-04-20

## 2020-04-20 DIAGNOSIS — Z95.1 S/P CABG X 5: Primary | ICD-10-CM

## 2020-04-20 DIAGNOSIS — I25.10 ATHEROSCLEROSIS OF NATIVE CORONARY ARTERY OF NATIVE HEART WITHOUT ANGINA PECTORIS: ICD-10-CM

## 2020-04-20 PROCEDURE — 3331090002 HH PPS REVENUE DEBIT

## 2020-04-20 PROCEDURE — 3331090001 HH PPS REVENUE CREDIT

## 2020-04-20 RX ORDER — METOPROLOL TARTRATE 25 MG/1
75 TABLET, FILM COATED ORAL EVERY 12 HOURS
Qty: 180 TAB | Refills: 1
Start: 2020-04-20 | End: 2020-04-21 | Stop reason: SDUPTHER

## 2020-04-20 NOTE — PROGRESS NOTES
This visit was conducted over the phone for social distancing purposes related to CoVid-19        Patient: Eri American   Age: 78 y.o. Patient Care Team:  Crispin Leon MD as PCP - General  Crispin Leon MD as PCP - [facility]  Matthew Colon MD as Physician (Internal Medicine)  Milla Coe MD as Physician (Dermatology)  Anthony Pratt MD as Physician (Urology)  Geraldyne Boxer, MD as Surgeon (Orthopedic Surgery)  Mary Knight MD as Physician (Ophthalmology)  Jana Borges MD as Physician (Cardiology)  Priscilla Mancuso MD as Surgeon (Orthopedic Surgery)  Bhanu Mathews MD (Cardiology)  Crispin Leon MD (Internal Medicine)  Jeannine Martinez MD (Cardiology)  Moreno Pearce RN as Care Transitions Nurse (Internal Medicine)    Diagnosis: The primary encounter diagnosis was S/P CABG x 5. A diagnosis of Atherosclerosis of native coronary artery of native heart without angina pectoris was also pertinent to this visit. 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      Date of Surgery: 3/25/20 Surgery: CAB X 5 with Dr. Dean Paez    HPI:  Libby Valencia was available with his wife on the telephone. He states he has been doing well since his discharge. He is walking daily but is still getting more tired than prior to his surgery. His appetite has not returned to normal yet and he finds foods just don't taste quite right. His sleep has returned to his preoperative state-he is getting about 6 hours per night. He will sometimes take some Tylenol before bed but doesn't do this every night. He denies fever, chills, shortness of breath, chest pain. His incisions are healing well. His heart rate has been in the high 90s and low 100s. His BP has been 110-130/60-70s. Current Medications:   Current Outpatient Medications   Medication Sig Dispense Refill    metoprolol tartrate (LOPRESSOR) 25 mg tablet Take 3 Tabs by mouth every twelve (12) hours for 60 days. 180 Tab 1    atorvastatin (LIPITOR) 40 mg tablet TAKE 1 TAB BY MOUTH DAILY. 90 Tab 2    amiodarone (CORDARONE) 200 mg tablet Take 200 mg by mouth three times a day x 3 weeks, then decrease to 200 mg by mouth twice a day, then decrease to 200 mg by mouth daily. Indications: atrial fibrillation electrically shocked to normal rhythm 100 Tab 0    apixaban (ELIQUIS) 5 mg tablet Take 1 Tab by mouth every twelve (12) hours for 60 days. 60 Tab 1    acetaminophen (TYLENOL) 325 mg tablet Take 2 Tabs by mouth every four (4) hours as needed for Pain.  clopidogreL (PLAVIX) 75 mg tab Take 1 Tab by mouth daily. 30 Tab 5    ferrous sulfate 325 mg (65 mg iron) tablet Take 1 Tab by mouth daily (with breakfast) for 30 days. 30 Tab 0    senna-docusate (PERICOLACE) 8.6-50 mg per tablet Take 1 Tab by mouth daily.  levothyroxine (SYNTHROID) 50 mcg tablet TAKE 1 TABLET BY MOUTH EVERY DAY BEFORE BREAKFAST EXCEPT TAKE 2 TABS ON SATURDAY.  112 Tab 0    tamsulosin (FLOMAX) 0.4 mg capsule TAKE 1 CAPSULE BY MOUTH EVERY DAY 30 MINUTES AFTER THE SAME MEAL EACH DAY  11  Cholecalciferol, Vitamin D3, (VITAMIN D3) 2,000 unit cap capsule Take 2,000 Units by mouth two (2) times a day. 30 Cap 11    finasteride (PROSCAR) 5 mg tablet Take 5 mg by mouth daily. HS      co-enzyme Q-10 (CO Q-10) 100 mg capsule Take 200 mg by mouth daily.  VIT A/VIT C/VIT E/ZINC/COPPER (PRESERVISION AREDS PO) Take 1 Cap by mouth two (2) times a day. Vitals: There were no vitals taken for this visit. Allergies: is allergic to codeine; pcn [penicillins]; and sulfa (sulfonamide antibiotics). Physical Exam:  Physical exam not performed on telephone visit    Assessment/Plan:   1. Intra-op bradycardia/2nd deg block/first degree AV block preop & Postop A fib on 3/31: Increase Metoprolol to 75 mg mg PO BID, Eliquis.  s/p Cardioversion 4/13.   Cards added PO amio 200 mg TID(d/c on 200 mg TID x 3 weeks, then 200 mg BID x 1 week, then 200 mg PO daily).   I have asked them to continue checking BP and HR daily and to call if SBP < 100 or HR <50 or any other concerns     2.  CAD with NSTEMI on this admission s/p CABG: On statin, plavix (no ASA with Eliquis and Plavix). On BB (Hold for SBP<90 and HR <60)     3. HTN: BB     4.  HLD:  Continue Statin     5. Hypothyroid:  Continue Synthroid.  Thyroid labs ok.      6. Mitral valve prolapse/MR/AS: All mild on intra-operative WILMER.  Systolic murmur 3/6 audible on exam       7. BPH: Continue flomax and finasteride. Pt is ready to start cardiac rehab.      Rehab - May begin when [facility] has completed  Walking: Increase daily

## 2020-04-21 ENCOUNTER — HOME CARE VISIT (OUTPATIENT)
Dept: HOME HEALTH SERVICES | Facility: HOME HEALTH | Age: 80
End: 2020-04-21
Payer: MEDICARE

## 2020-04-21 ENCOUNTER — TELEPHONE (OUTPATIENT)
Dept: CARDIOLOGY CLINIC | Age: 80
End: 2020-04-21

## 2020-04-21 ENCOUNTER — HOME CARE VISIT (OUTPATIENT)
Dept: SCHEDULING | Facility: HOME HEALTH | Age: 80
End: 2020-04-21
Payer: MEDICARE

## 2020-04-21 VITALS
TEMPERATURE: 97.9 F | SYSTOLIC BLOOD PRESSURE: 132 MMHG | HEART RATE: 89 BPM | DIASTOLIC BLOOD PRESSURE: 74 MMHG | OXYGEN SATURATION: 95 %

## 2020-04-21 PROCEDURE — 3331090001 HH PPS REVENUE CREDIT

## 2020-04-21 PROCEDURE — 3331090002 HH PPS REVENUE DEBIT

## 2020-04-21 PROCEDURE — G0299 HHS/HOSPICE OF RN EA 15 MIN: HCPCS

## 2020-04-21 RX ORDER — METOPROLOL TARTRATE 25 MG/1
50 TABLET, FILM COATED ORAL EVERY 12 HOURS
Qty: 120 TAB | Refills: 1
Start: 2020-04-21 | End: 2020-05-05 | Stop reason: SDUPTHER

## 2020-04-21 NOTE — TELEPHONE ENCOUNTER
Patient's wife called and states that the patient took 75 mg of Metoprolol last evening and as he stood up he got very dizzy. BP was 101/63 with HR of 80. He was shaky. 110/62 with HR of 79 about 30 minutes after the incident. Will decrease Metoprolol back to 50 mg BID. Patient verbalized understanding and agreed.     Jairo Delgado, NP

## 2020-04-22 PROCEDURE — 3331090001 HH PPS REVENUE CREDIT

## 2020-04-22 PROCEDURE — 3331090002 HH PPS REVENUE DEBIT

## 2020-04-23 ENCOUNTER — TELEPHONE (OUTPATIENT)
Dept: CARDIOLOGY CLINIC | Age: 80
End: 2020-04-23

## 2020-04-23 DIAGNOSIS — R06.02 SHORTNESS OF BREATH: ICD-10-CM

## 2020-04-23 DIAGNOSIS — I25.10 ATHEROSCLEROSIS OF NATIVE CORONARY ARTERY OF NATIVE HEART WITHOUT ANGINA PECTORIS: ICD-10-CM

## 2020-04-23 DIAGNOSIS — I10 ESSENTIAL HYPERTENSION, BENIGN: Primary | ICD-10-CM

## 2020-04-23 PROCEDURE — 3331090001 HH PPS REVENUE CREDIT

## 2020-04-23 PROCEDURE — 3331090002 HH PPS REVENUE DEBIT

## 2020-04-23 RX ORDER — POTASSIUM CHLORIDE 20 MEQ/1
20 TABLET, EXTENDED RELEASE ORAL DAILY
Qty: 14 TAB | Refills: 0 | Status: SHIPPED | OUTPATIENT
Start: 2020-04-23 | End: 2020-05-05 | Stop reason: SDUPTHER

## 2020-04-23 RX ORDER — FUROSEMIDE 40 MG/1
40 TABLET ORAL DAILY
Qty: 14 TAB | Refills: 0 | Status: SHIPPED | OUTPATIENT
Start: 2020-04-23 | End: 2020-05-05 | Stop reason: SDUPTHER

## 2020-04-23 NOTE — TELEPHONE ENCOUNTER
Worsening shortness of breath over the last 2 days. Not doing as well with his IS. Had been getting 1250 and now only able to get 750. Looks distended in the abdomen but has bowels have been moving. Weight 197.3 today but this hasn't changed much. Does have some mild LE edema. HR has been in the 80-90s. Will give him Lasix 40 mg daily and KDur 20 meq daily for about 5 days. Will ask  to draw a CBC, BMP tomorrow when they visit.

## 2020-04-24 ENCOUNTER — HOME CARE VISIT (OUTPATIENT)
Dept: SCHEDULING | Facility: HOME HEALTH | Age: 80
End: 2020-04-24
Payer: MEDICARE

## 2020-04-24 ENCOUNTER — HOME CARE VISIT (OUTPATIENT)
Dept: HOME HEALTH SERVICES | Facility: HOME HEALTH | Age: 80
End: 2020-04-24
Payer: MEDICARE

## 2020-04-24 PROCEDURE — 3331090002 HH PPS REVENUE DEBIT

## 2020-04-24 PROCEDURE — G0300 HHS/HOSPICE OF LPN EA 15 MIN: HCPCS

## 2020-04-24 PROCEDURE — 3331090001 HH PPS REVENUE CREDIT

## 2020-04-25 ENCOUNTER — HOME CARE VISIT (OUTPATIENT)
Dept: SCHEDULING | Facility: HOME HEALTH | Age: 80
End: 2020-04-25
Payer: MEDICARE

## 2020-04-25 VITALS
HEART RATE: 82 BPM | WEIGHT: 194.3 LBS | BODY MASS INDEX: 26.35 KG/M2 | SYSTOLIC BLOOD PRESSURE: 114 MMHG | DIASTOLIC BLOOD PRESSURE: 64 MMHG | TEMPERATURE: 97.4 F | OXYGEN SATURATION: 96 % | RESPIRATION RATE: 18 BRPM

## 2020-04-25 PROCEDURE — 3331090002 HH PPS REVENUE DEBIT

## 2020-04-25 PROCEDURE — 3331090001 HH PPS REVENUE CREDIT

## 2020-04-25 PROCEDURE — G0300 HHS/HOSPICE OF LPN EA 15 MIN: HCPCS

## 2020-04-26 PROCEDURE — 3331090001 HH PPS REVENUE CREDIT

## 2020-04-26 PROCEDURE — 3331090002 HH PPS REVENUE DEBIT

## 2020-04-27 ENCOUNTER — TELEPHONE (OUTPATIENT)
Dept: CARDIOLOGY CLINIC | Age: 80
End: 2020-04-27

## 2020-04-27 ENCOUNTER — HOME CARE VISIT (OUTPATIENT)
Dept: SCHEDULING | Facility: HOME HEALTH | Age: 80
End: 2020-04-27
Payer: MEDICARE

## 2020-04-27 VITALS
RESPIRATION RATE: 18 BRPM | HEART RATE: 84 BPM | SYSTOLIC BLOOD PRESSURE: 120 MMHG | TEMPERATURE: 98.4 F | DIASTOLIC BLOOD PRESSURE: 66 MMHG

## 2020-04-27 VITALS
HEART RATE: 83 BPM | SYSTOLIC BLOOD PRESSURE: 122 MMHG | WEIGHT: 192.2 LBS | RESPIRATION RATE: 18 BRPM | TEMPERATURE: 97.1 F | DIASTOLIC BLOOD PRESSURE: 62 MMHG | OXYGEN SATURATION: 97 % | BODY MASS INDEX: 26.07 KG/M2

## 2020-04-27 PROCEDURE — 3331090002 HH PPS REVENUE DEBIT

## 2020-04-27 PROCEDURE — G0299 HHS/HOSPICE OF RN EA 15 MIN: HCPCS

## 2020-04-27 PROCEDURE — 3331090001 HH PPS REVENUE CREDIT

## 2020-04-27 NOTE — TELEPHONE ENCOUNTER
Spoke to pt; he feels improved from end of last week on lasix. Has lost 5 lbs. Less SOB. Abdominal distention is improved. Still has swelling in LE per pt. Isn't ambulating much out of house, recommend walking driveway/neighborhood to help with endurance/stamina. Pt has FU call tomorrow via phone. Call lab for results from 4/25 -- all labs normal. Creat up slightly 1.3 from 1.1. Expected while on lasix.

## 2020-04-28 ENCOUNTER — HOME CARE VISIT (OUTPATIENT)
Dept: HOME HEALTH SERVICES | Facility: HOME HEALTH | Age: 80
End: 2020-04-28
Payer: MEDICARE

## 2020-04-28 ENCOUNTER — OFFICE VISIT (OUTPATIENT)
Dept: CARDIOLOGY CLINIC | Age: 80
End: 2020-04-28

## 2020-04-28 ENCOUNTER — TELEPHONE (OUTPATIENT)
Dept: CARDIOLOGY CLINIC | Age: 80
End: 2020-04-28

## 2020-04-28 DIAGNOSIS — Z95.1 S/P CABG X 5: Primary | ICD-10-CM

## 2020-04-28 PROCEDURE — 3331090001 HH PPS REVENUE CREDIT

## 2020-04-28 PROCEDURE — 3331090002 HH PPS REVENUE DEBIT

## 2020-04-28 NOTE — PROGRESS NOTES
Patient: Monster Landeros   Age: 78 y.o. Patient Care Team:  Radha Guerrero MD as PCP - [facility], Oswald Spann MD as PCP - [facility] Provider  Pascual Gonsales MD as Physician (Internal Medicine)  Shannon Chávez MD as Physician (Dermatology)  Blanquita Moore MD as Physician (Urology)  Siva Alvarenga MD as Surgeon (Orthopedic Surgery)  Inessa Valenzuela MD as Physician (Ophthalmology)  Khanh Ford MD as Physician (Cardiology)  Elroy Samuels MD as Surgeon (Orthopedic Surgery)  Nestor Whittington MD (Cardiology)  Radha Guerrero MD (Internal Medicine)  Bridger Mccormick MD (Cardiology)  Juan Loja RN as Care Transitions Nurse (Internal Medicine)    Diagnosis: The encounter diagnosis was S/P CABG x 5. 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 Location of provider was [facility]. Purpose of call: postop f/u    Content of discussion: Pt is doing well. Has been out of house twice today for walks. Was tired at end, but no worsening in SOB.  Still have LE edema and wife states it gets worse by end of day. Will cont lasix. Discussed using compression hose and long discussion about low NA diet. Reference cardiac booklet for more info. Denies pain, dizziness. Not requiring any pain meds. Hasn't noticed HR be elevated at rest or with activity. Incisions all healed. Small scabs on CT sites. Current Medications:   Current Outpatient Medications   Medication Sig Dispense Refill    furosemide (LASIX) 40 mg tablet Take 1 Tab by mouth daily. 14 Tab 0    potassium chloride (K-DUR, KLOR-CON) 20 mEq tablet Take 1 Tab by mouth daily. 14 Tab 0    metoprolol tartrate (LOPRESSOR) 25 mg tablet Take 2 Tabs by mouth every twelve (12) hours for 60 days. 120 Tab 1    atorvastatin (LIPITOR) 40 mg tablet TAKE 1 TAB BY MOUTH DAILY. 90 Tab 2    amiodarone (CORDARONE) 200 mg tablet Take 200 mg by mouth three times a day x 3 weeks, then decrease to 200 mg by mouth twice a day, then decrease to 200 mg by mouth daily. Indications: atrial fibrillation electrically shocked to normal rhythm (Patient taking differently: Take 200 mg by mouth three times a day x 3 weeks (5/4/20), then decrease to 200 mg by mouth twice a day x 1 week (5/12/20), then decrease to 200 mg by mouth daily. Indications: atrial fibrillation electrically shocked to normal rhythm) 100 Tab 0    apixaban (ELIQUIS) 5 mg tablet Take 1 Tab by mouth every twelve (12) hours for 60 days. 60 Tab 1    clopidogreL (PLAVIX) 75 mg tab Take 1 Tab by mouth daily. 30 Tab 5    ferrous sulfate 325 mg (65 mg iron) tablet Take 1 Tab by mouth daily (with breakfast) for 30 days. 30 Tab 0    senna-docusate (PERICOLACE) 8.6-50 mg per tablet Take 1 Tab by mouth daily.  levothyroxine (SYNTHROID) 50 mcg tablet TAKE 1 TABLET BY MOUTH EVERY DAY BEFORE BREAKFAST EXCEPT TAKE 2 TABS ON SATURDAY.  112 Tab 0    tamsulosin (FLOMAX) 0.4 mg capsule TAKE 1 CAPSULE BY MOUTH EVERY DAY 30 MINUTES AFTER THE SAME MEAL EACH DAY  11    Cholecalciferol, Vitamin D3, (VITAMIN D3) 2,000 unit cap capsule Take 2,000 Units by mouth two (2) times a day. 30 Cap 11    finasteride (PROSCAR) 5 mg tablet Take 5 mg by mouth daily. HS      acetaminophen (TYLENOL) 325 mg tablet Take 2 Tabs by mouth every four (4) hours as needed for Pain.  co-enzyme Q-10 (CO Q-10) 100 mg capsule Take 200 mg by mouth daily.  VIT A/VIT C/VIT E/ZINC/COPPER (PRESERVISION AREDS PO) Take 1 Cap by mouth two (2) times a day. Vitals: There were no vitals taken for this visit. Allergies: is allergic to codeine; pcn [penicillins]; and sulfa (sulfonamide antibiotics). Physical Exam:  Not performed, telehealth visit     Assessment/Plan:   1.  CAD with NSTEMI on this admission s/p CABG: On statin, BB, plavix (no ASA with Eliquis and Plavix)     2. Hx HTN: on BB     3.  HLD:  Continue Statin     4. Anemia: Postoperative secondary to acute blood loss. Cont iron x 30 days     5. Pre-diabetes: A1c 5.9. F/u with PCP     6. Hypothyroid:  Continue Synthroid.      7. Mitral valve prolapse/MR/AS: All mild on intra-operative WILMER. No intervention performed     8. BPH: Continue flomax and finasteride.      9. Arthritis/Chronic back pain: No current treatment     10. Intra-op bradycardia/first degree AV block: On PO amio per DR. Camacho's instructions --had weaning schedule. On BB.       11. Atrial fibrillation on 3/31: Continue BB, Eliquis, PO amio. 12. LE swelling/abd. Swelling:  Cont lasix/kcl x 14 days. Discussed NA intake w/ foods, and 2g NA diet. FU with DR. Filomena Hammans within 1 month.        Time spent: 35min    Pt is ready to begin cardiac rehab.      Rehab - can begin once services resume  Walking: yes  Glucometer: n/a

## 2020-04-28 NOTE — TELEPHONE ENCOUNTER
Returned patient's call, 2 pt identifiers used    Scheduled patient to see Dr. Jose Ramon Bradshaw. 5/14/20 at 9:40 am with an EKG.      Future Appointments   Date Time Provider Wanda Rosita   4/29/2020  1:30 PM Alise Scahfer RN [facility]   5/6/2020 To Be Determined Alise Schafer RN [facility]   5/13/2020 To Be Determined Alise Schafer  [address]   5/14/2020  9:40 AM Pastor Gm  [address]    5/20/2020 To Be Determined Alise Schafer, RN [address] [facility]   5/28/2020 To Be Determined Alise Schafer RN [address] [facility]   6/29/2020  8:05 AM Aaron Burrell MD [id number] [facility]

## 2020-04-28 NOTE — TELEPHONE ENCOUNTER
Called patient. Verified patient's identity with two identifiers. Spoke with patient and his wife. Stated I was calling to schedule appointment to f/u with Dr. Guerda Pimentel. They said they thought it was supposed to be Dr. Jodee Caldwell and told me she saw patient first and started his care. Apologized and told them I did not see that in his chart, but would discuss with Dr. Sharron Bhatt nurse and one of us would call back to schedule an appointment.

## 2020-04-28 NOTE — TELEPHONE ENCOUNTER
Leslie from [facility] is calling to set up a 3-4 wk follow up post heart surgery with Dr. Richard Leija. She states patient will need an EKG done at this visit.      Patient can be reached at [phone]

## 2020-04-29 ENCOUNTER — PATIENT OUTREACH (OUTPATIENT)
Dept: FAMILY MEDICINE CLINIC | Age: 80
End: 2020-04-29

## 2020-04-29 ENCOUNTER — HOME CARE VISIT (OUTPATIENT)
Dept: SCHEDULING | Facility: HOME HEALTH | Age: 80
End: 2020-04-29
Payer: MEDICARE

## 2020-04-29 VITALS
DIASTOLIC BLOOD PRESSURE: 63 MMHG | HEART RATE: 83 BPM | SYSTOLIC BLOOD PRESSURE: 106 MMHG | OXYGEN SATURATION: 94 % | TEMPERATURE: 98 F

## 2020-04-29 PROCEDURE — G0299 HHS/HOSPICE OF RN EA 15 MIN: HCPCS

## 2020-04-29 PROCEDURE — 3331090001 HH PPS REVENUE CREDIT

## 2020-04-29 PROCEDURE — 3331090002 HH PPS REVENUE DEBIT

## 2020-04-29 NOTE — PROGRESS NOTES
Patient resolved from Transition of Care episode on 4/29/2020  Patient/family has been provided the following resources and education related to COVID-19:                         Signs, symptoms and red flags related to COVID-19            CDC exposure and quarantine guidelines            Con[facility]it exposure contact - [phone]            Contact for their local Department of Health               Patient's wife answered phone. Per wife, pt is \"slowly improving\" and [facility] nurse just left. Patient currently reports that the following symptoms have improved:  no new symptoms. No further outreach scheduled with this CTN/ACM. Episode of Care resolved. Patient has this CTN/ACM contact information if future needs arise.

## 2020-04-30 PROCEDURE — 3331090001 HH PPS REVENUE CREDIT

## 2020-04-30 PROCEDURE — 3331090002 HH PPS REVENUE DEBIT

## 2020-05-01 ENCOUNTER — TELEPHONE (OUTPATIENT)
Dept: CARDIOLOGY CLINIC | Age: 80
End: 2020-05-01

## 2020-05-01 PROCEDURE — 3331090002 HH PPS REVENUE DEBIT

## 2020-05-01 PROCEDURE — 3331090001 HH PPS REVENUE CREDIT

## 2020-05-01 NOTE — TELEPHONE ENCOUNTER
Patient has been intermittently nauseated. Stop Iron. Decrease Amiodarone to BID X 1 week then Daily. If no improvement, contact Cardiology as they will follow this long term. Patient verbalized understanding and agreed.     Alejandrina Edwards NP

## 2020-05-01 NOTE — TELEPHONE ENCOUNTER
Patient's wife states patient was prescribed amiodarone in hospital and was put on a set schedule of when to take it. But he has been having issues such as, low BP &  naesea when taking medication. She would like to discuss further. Please advise.      Phone: [phone]

## 2020-05-01 NOTE — TELEPHONE ENCOUNTER
Returned spouse call, 2 pt identifiers used  Patient is nauseous after taking morning medications. Pt taking Amiodarone 200 mg TID, Lopressor 50 mg BID, Lasix 40 mg. (only 14 days for Lasix)     96/59, HR 75  108/66, HR 74  106/54, HR 82  111/63, HR 75    He was advised by Dr. Hendrix Hunting office the following:    Stop Iron. Decrease Amiodarone to BID X 1 week then Daily. If no improvement, contact Cardiology as they will follow this long term.  Patient verbalized understanding and agreed.     Dariela Gamez NP    Please advise anything further

## 2020-05-01 NOTE — TELEPHONE ENCOUNTER
WIFE STATES HE IS FEELING NAUSEAS AT STOMACH AFTER MORNING PILL. DOESN'T FEEL LIKE WALKING STILL FEELS John Alegria.

## 2020-05-02 PROCEDURE — 3331090001 HH PPS REVENUE CREDIT

## 2020-05-02 PROCEDURE — 3331090002 HH PPS REVENUE DEBIT

## 2020-05-03 PROCEDURE — 3331090002 HH PPS REVENUE DEBIT

## 2020-05-03 PROCEDURE — 3331090001 HH PPS REVENUE CREDIT

## 2020-05-04 ENCOUNTER — HOME CARE VISIT (OUTPATIENT)
Dept: SCHEDULING | Facility: HOME HEALTH | Age: 80
End: 2020-05-04
Payer: MEDICARE

## 2020-05-04 VITALS
HEART RATE: 82 BPM | BODY MASS INDEX: 25.7 KG/M2 | SYSTOLIC BLOOD PRESSURE: 114 MMHG | DIASTOLIC BLOOD PRESSURE: 66 MMHG | OXYGEN SATURATION: 95 % | WEIGHT: 189.5 LBS | TEMPERATURE: 97.4 F

## 2020-05-04 PROCEDURE — 3331090003 HH PPS REVENUE ADJ

## 2020-05-04 PROCEDURE — 3331090001 HH PPS REVENUE CREDIT

## 2020-05-04 PROCEDURE — 400013 HH SOC

## 2020-05-04 PROCEDURE — G0299 HHS/HOSPICE OF RN EA 15 MIN: HCPCS

## 2020-05-04 PROCEDURE — 3331090002 HH PPS REVENUE DEBIT

## 2020-05-05 RX ORDER — AMIODARONE HYDROCHLORIDE 200 MG/1
200 TABLET ORAL EVERY EVENING
Qty: 30 TAB | Refills: 1 | Status: SHIPPED | OUTPATIENT
Start: 2020-05-05 | End: 2020-05-14

## 2020-05-05 RX ORDER — FUROSEMIDE 20 MG/1
20 TABLET ORAL DAILY
Qty: 7 TAB | Refills: 0 | Status: SHIPPED | OUTPATIENT
Start: 2020-05-05 | End: 2020-05-14

## 2020-05-05 RX ORDER — POTASSIUM CHLORIDE 20 MEQ/1
20 TABLET, EXTENDED RELEASE ORAL DAILY
Qty: 7 TAB | Refills: 0 | Status: SHIPPED | OUTPATIENT
Start: 2020-05-05 | End: 2020-05-14

## 2020-05-05 RX ORDER — METOPROLOL TARTRATE 25 MG/1
25 TABLET, FILM COATED ORAL 2 TIMES DAILY
Qty: 60 TAB | Refills: 1 | Status: SHIPPED | OUTPATIENT
Start: 2020-05-05 | End: 2020-05-14 | Stop reason: ALTCHOICE

## 2020-05-05 NOTE — TELEPHONE ENCOUNTER
Returned call to patient, 2 pt identifiers used      117/66, 79, before medication  114/66, 82  He takes his morning meds and goes for a walk, then when he comes back in he will take Amiodarone. 96/58, 50's  After amiodarone there is a big drop. His Lasix was for only 14 days. Last pill is tomorrow. Shortness of breath is better, wt down from 197lbs to 189lbs. He feels tired and wiped out. When he drops down to Amiodarone to 1 time a day when is the best time to take it. Also should he continue taking 50 mg Metoprolol BID.      Future Appointments   Date Time Provider [facility]   5/14/2020  9:40 AM Aury Rojas  [address] 6/29/2020  8:05 AM Gilberto Rodriguez MD [id number] [facility]

## 2020-05-05 NOTE — TELEPHONE ENCOUNTER
Please advise him to reduce his metoprolol to 25mg BID. When he drops his amiodarone down to once/day he should take it in the evening. Please ask him to continue his lasix at 20mg daily until his follow up visit (I'll send in new Rxs) and ask him to keep track of his daily weights, BPs and heart rates for his follow up visit.

## 2020-05-05 NOTE — TELEPHONE ENCOUNTER
Called placed to spouse and the following message given:    Per NP Regla:       Please advise him to reduce his metoprolol to 25mg BID. When he drops his amiodarone down to once/day he should take it in the evening. Please ask him to continue his lasix at 20mg daily until his follow up visit (I'll send in new Rxs) and ask him to keep track of his daily weights, BPs and heart rates for his follow up visit. She verbalized understanding.    2 pt identifiers used    Future Appointments   Date Time Provider [facility] Rosita   5/14/2020  9:40 AM Tabby Lorenzo  [address]    6/29/2020  8:05 AM Rachael Rodriguez MD [id number] [facility]

## 2020-05-05 NOTE — TELEPHONE ENCOUNTER
Requested Prescriptions     Signed Prescriptions Disp Refills    potassium chloride (K-DUR, KLOR-CON) 20 mEq tablet 7 Tab 0     Sig: Take 1 Tab by mouth daily.      Authorizing Provider: Eliseo Melchor     Ordering User: Alonzo Gudino     Per verbal orders

## 2020-05-14 ENCOUNTER — OFFICE VISIT (OUTPATIENT)
Dept: CARDIOLOGY CLINIC | Age: 80
End: 2020-05-14

## 2020-05-14 VITALS
WEIGHT: 193.6 LBS | OXYGEN SATURATION: 96 % | HEART RATE: 82 BPM | HEIGHT: 72 IN | SYSTOLIC BLOOD PRESSURE: 120 MMHG | BODY MASS INDEX: 26.22 KG/M2 | DIASTOLIC BLOOD PRESSURE: 78 MMHG

## 2020-05-14 DIAGNOSIS — I47.1 SUSTAINED SVT (HCC): ICD-10-CM

## 2020-05-14 DIAGNOSIS — I25.10 ATHEROSCLEROSIS OF NATIVE CORONARY ARTERY OF NATIVE HEART WITHOUT ANGINA PECTORIS: ICD-10-CM

## 2020-05-14 DIAGNOSIS — E78.2 MIXED HYPERLIPIDEMIA: Primary | ICD-10-CM

## 2020-05-14 DIAGNOSIS — I73.9 PERIPHERAL VASCULAR DISEASE (HCC): ICD-10-CM

## 2020-05-14 DIAGNOSIS — R06.02 SHORTNESS OF BREATH: ICD-10-CM

## 2020-05-14 DIAGNOSIS — I21.4 NSTEMI (NON-ST ELEVATED MYOCARDIAL INFARCTION) (HCC): ICD-10-CM

## 2020-05-14 DIAGNOSIS — Z95.1 S/P CABG X 5: ICD-10-CM

## 2020-05-14 DIAGNOSIS — I10 ESSENTIAL HYPERTENSION, BENIGN: ICD-10-CM

## 2020-05-14 DIAGNOSIS — I45.10 RBBB: ICD-10-CM

## 2020-05-14 RX ORDER — METOPROLOL SUCCINATE 25 MG/1
12.5 TABLET, EXTENDED RELEASE ORAL
Qty: 90 TAB | Refills: 3 | Status: SHIPPED | OUTPATIENT
Start: 2020-05-14 | End: 2021-04-07 | Stop reason: ALTCHOICE

## 2020-05-14 RX ORDER — CLOPIDOGREL BISULFATE 75 MG/1
75 TABLET ORAL DAILY
Qty: 90 TAB | Refills: 3 | Status: SHIPPED | OUTPATIENT
Start: 2020-05-14 | End: 2021-04-07 | Stop reason: ALTCHOICE

## 2020-05-14 RX ORDER — POTASSIUM CHLORIDE 750 MG/1
10 TABLET, EXTENDED RELEASE ORAL DAILY
Qty: 90 TAB | Refills: 1 | Status: SHIPPED | OUTPATIENT
Start: 2020-05-14 | End: 2020-06-18 | Stop reason: SDUPTHER

## 2020-05-14 RX ORDER — FUROSEMIDE 20 MG/1
20 TABLET ORAL DAILY
Qty: 30 TAB | Refills: 3 | Status: SHIPPED | OUTPATIENT
Start: 2020-05-14 | End: 2020-06-18 | Stop reason: SDUPTHER

## 2020-05-14 RX ORDER — AMIODARONE HYDROCHLORIDE 200 MG/1
100 TABLET ORAL EVERY EVENING
Qty: 90 TAB | Refills: 3 | Status: SHIPPED | OUTPATIENT
Start: 2020-05-14 | End: 2020-05-19 | Stop reason: SDUPTHER

## 2020-05-14 NOTE — PROGRESS NOTES
[facility] [place]: Kaykay Armenta  [phone]      HPI: Edna Tran, a [de-identified]y.o. year-old who presents for evaluation of his CAD s/p CABG in 3/20. Has not been able to do rehab due to [address] and that is an issue with his recovery. He has to stop if walking up a grade. At times the leg feel weak. It is gradually getting better over time and he is able to walk further but jsut not like he would prefer it to be. He is walking TID and yesterday he walked 3 miles. No dizziness or lightheadedness. Lots of questions between he and his wife. Extended discussion 45 min face to face in counseling on plans below. She also called in during and after visit. Some le edema, left more than right, wearingcompression hose goes down overnight. In short decrease amio, metoprolol  Every other day on lasix  Cont exercise  Postop echo for continued hurd    Assessment/Plan:  1. Intra-op bradycardia/2nd deg block/first degree AV block preop & Postop A fib on 3/31: Continue metoprolol 50 mg PO BID, Eliquis.  s/p Cardioversion 4/13. weaning down the amio to 100mg daily today  Rate improved, limited A flutter,self converted. BP stable.      2.  CAD with NSTEMI on this admission s/p CABG: On statin, plavix (no ASA with Eliquis and Plavix). On BB (Hold for SBP<90 and HR <60)  3. HTN: BB,  4.  HLD:  Continue Statin  5. Anemia: Postoperative secondary to acute blood loss.  Continue Iron. Monitor  6. Pre-diabetes: A1c 5.9.      7. Hypothyroid:  Continue Synthroid. Thyroid labs ok.      7. Mitral valve prolapse/MR/AS: All mild on intra-operative WILMER.  Systolic murmur 3/6 audible on exam    Check postop echo     8. BPH: Continue flomax and finasteride.      9.  Arthritis/Chronic back pain: No current treatment    fhx no early cad  Soc no tob no etoh  He  has a past medical history of Arrhythmia, Arthritis, Basal cell carcinoma, BPH (benign prostatic hyperplasia), CAD (coronary artery disease), Calculus of kidney, Chronic pain, Elevated PSA, Hypercholesterolemia, Lumbar foraminal stenosis, Melanoma ([address] MVP (mitral valve prolapse), Other ill-defined conditions(799.89), Prediabetes, Seasonal allergies, Squamous cell carcinoma, Systolic murmur, Thyroid disease, and Vision impairment. He also has no past medical history of Adverse effect of anesthesia or Unspecified sleep apnea. Cardiovascular ROS: positive for - dyspnea on exertion  Respiratory ROS: positive for - shortness of breath  Neurological ROS: no TIA or stroke symptoms  All other systems negative except as above. PE  Vitals:    05/14/20 1000   BP: 120/78   Pulse: 82   SpO2: 96%   Weight: 193 lb 9.6 oz (87.8 kg)   Height: 6' (1.829 m)    Body mass index is 26.26 kg/m².    General appearance - alert, well appearing, and in no distress  Mental status - affect appropriate to mood  Eyes - sclera anicteric, moist mucous membranes  Neck - supple, no significant adenopathy  Lymphatics - no  lymphadenopathy  Chest - clear to auscultation, no wheezes, rales or rhonchi  Heart - normal rate, regular rhythm, normal S1, S2, no murmurs, rubs, clicks or gallops  Abdomen - soft, nontender, nondistended, no masses or organomegaly  Back exam - full range of motion, no tenderness  Neurological - cranial nerves II through XII grossly intact, no focal deficit  Musculoskeletal - no muscular tenderness noted, normal strength  Extremities - peripheral pulses normal, no pedal edema  Skin - normal coloration  no rashes    Recent Labs:  Lab Results   Component Value Date/Time    Cholesterol, total 161 03/25/2020 09:00 AM    HDL Cholesterol 62 03/25/2020 09:00 AM    LDL, calculated 83.8 03/25/2020 09:00 AM    Triglyceride 76 03/25/2020 09:00 AM    CHOL/HDL Ratio 2.6 03/25/2020 09:00 AM     Lab Results   Component Value Date/Time    Creatinine (POC) 0.7 01/08/2019 07:47 PM    Creatinine 1.03 04/14/2020 08:04 AM     Lab Results   Component Value Date/Time    BUN 12 04/14/2020 08:04 AM    BUN (POC) 21 (H) 01/08/2019 07:47 PM     Lab Results   Component Value Date/Time    Potassium 3.6 04/14/2020 08:04 AM     Lab Results   Component Value Date/Time    Hemoglobin A1c 5.9 (H) 03/26/2020 12:13 AM     Lab Results   Component Value Date/Time    Hemoglobin (POC) 16.0 12/11/2014 09:43 AM    HGB 9.8 (L) 04/14/2020 08:04 AM     Lab Results   Component Value Date/Time    PLATELET 810 (H) 08/26/5264 08:04 AM       Reviewed:  Past Medical History:   Diagnosis Date    Arrhythmia     r/t caffeine    Arthritis     Basal cell carcinoma     BPH (benign prostatic hyperplasia)     CAD (coronary artery disease)     s/p PTCA '92    Calculus of kidney     Chronic pain     BACK    Elevated PSA     Hypercholesterolemia     Lumbar foraminal stenosis     Melanoma ([address]     MVP (mitral valve prolapse)     Other ill-defined conditions(799.89)     Prostatitis    Prediabetes     Seasonal allergies     Squamous cell carcinoma     Systolic murmur     Thyroid disease     HYPOTHYROID    Vision impairment     R EYE, BLOOD CLOT ON RETINA     Social History     Tobacco Use   Smoking Status Never Smoker   Smokeless Tobacco Never Used     Social History     Substance and Sexual Activity   Alcohol Use No     Allergies   Allergen Reactions    Codeine Rash    Pcn [Penicillins] Rash    Sulfa (Sulfonamide Antibiotics) Other (comments)     confusion       Current Outpatient Medications   Medication Sig    furosemide (LASIX) 20 mg tablet Take 1 Tab by mouth daily.  metoprolol tartrate (LOPRESSOR) 25 mg tablet Take 1 Tab by mouth two (2) times a day.  amiodarone (CORDARONE) 200 mg tablet Take 1 Tab by mouth every evening. Indications: atrial fibrillation electrically shocked to normal rhythm    potassium chloride (K-DUR, KLOR-CON) 20 mEq tablet Take 1 Tab by mouth daily.  atorvastatin (LIPITOR) 40 mg tablet TAKE 1 TAB BY MOUTH DAILY.  apixaban (ELIQUIS) 5 mg tablet Take 1 Tab by mouth every twelve (12) hours for 60 days.     acetaminophen (TYLENOL) 325 mg tablet Take 2 Tabs by mouth every four (4) hours as needed for Pain.  clopidogreL (PLAVIX) 75 mg tab Take 1 Tab by mouth daily.  senna-docusate (PERICOLACE) 8.6-50 mg per tablet Take 1 Tab by mouth daily.  levothyroxine (SYNTHROID) 50 mcg tablet TAKE 1 TABLET BY MOUTH EVERY DAY BEFORE BREAKFAST EXCEPT TAKE 2 TABS ON SATURDAY.  tamsulosin (FLOMAX) 0.4 mg capsule TAKE 1 CAPSULE BY MOUTH EVERY DAY 30 MINUTES AFTER THE SAME MEAL EACH DAY    Cholecalciferol, Vitamin D3, (VITAMIN D3) 2,000 unit cap capsule Take 2,000 Units by mouth two (2) times a day.  finasteride (PROSCAR) 5 mg tablet Take 5 mg by mouth daily. HS     No current facility-administered medications for this visit.         Yanira Peñaloza MD  [facility] heart and V[facility] [place]  [address] [facility] [address]  [address]

## 2020-05-18 ENCOUNTER — TELEPHONE (OUTPATIENT)
Dept: CARDIOLOGY CLINIC | Age: 80
End: 2020-05-18

## 2020-05-18 NOTE — TELEPHONE ENCOUNTER
Patient would like to speak with you in regards to his medications. Please advise.      Phone: [phone]

## 2020-05-18 NOTE — TELEPHONE ENCOUNTER
Returned patient's call, 2 pt identifiers used    Discussed the use of Lasix. Patient feels he needs it and has not taken it in 4 days. Advised patient to take medication if he is having any swelling or short of breath. He will take it along with his potassium.

## 2020-05-19 RX ORDER — AMIODARONE HYDROCHLORIDE 200 MG/1
100 TABLET ORAL EVERY EVENING
Qty: 45 TAB | Refills: 3 | Status: SHIPPED | OUTPATIENT
Start: 2020-05-19 | End: 2020-12-09 | Stop reason: ALTCHOICE

## 2020-05-21 ENCOUNTER — TELEPHONE (OUTPATIENT)
Dept: CARDIAC REHAB | Age: 80
End: 2020-05-21

## 2020-05-21 NOTE — TELEPHONE ENCOUNTER
5/21/2020 Cardiac Rehab: Called Mr. Page Laguna to remind of intake appointment on 5/26/2020. Confirmed appointment with patient and asked for him to call when he arrives and also to wear a mask due to COVID-19 precautions. Provided patient with contact information for [facility]. Also, reminded patient to bring a list of current medications, a personal schedule, and to wear comfortable clothes and shoes.  Justyna Granger

## 2020-05-26 ENCOUNTER — HOSPITAL ENCOUNTER (OUTPATIENT)
Dept: CARDIAC REHAB | Age: 80
Discharge: HOME | End: 2020-05-26
Payer: MEDICARE

## 2020-05-26 VITALS — BODY MASS INDEX: 26.77 KG/M2 | WEIGHT: 191.2 LBS | HEIGHT: 71 IN

## 2020-05-26 PROCEDURE — 93798 PHYS/QHP OP CAR RHAB W/ECG: CPT

## 2020-05-26 PROCEDURE — 93797 PHYS/QHP OP CAR RHAB WO ECG: CPT

## 2020-05-26 NOTE — CARDIO/PULMONARY
Jagruti Grayson  [de-identified] y.o. presented to cardiac wellness for orientation and exercise tolerance test today with a primary diagnosis of NSTEMI 3/25/2020 and CABG x 5 on 3/26/2020. He also had a cardioversion on 4/12/2020 for SVT. His EF is 55 - 60%. Jagruti Grayson has a  past medical history of Arrhythmia, Arthritis, Basal cell carcinoma, BPH, CAD with PTCA, Calculus of kidney, Chronic pain, Elevated PSA, Hypercholesterolemia, Lumbar foraminal stenosis, Melanoma, MVP,  Prediabetes, Seasonal allergies, Squamous cell carcinoma, Systolic murmur, Thyroid disease, and Vision impairment. Cardiac risk factors include family history, dyslipidemia, thyroid dysfunction and these were reviewed with Dwight Mercado. Jagruti Grayson lives with his supportive wife. He is a  and works occasionally as a consultant. PHQ9, depression score, is 2 and this is considered normal.  Patient denied chest pain but did have some SOB during 6 minute walk and was in SR/ST RT BBB with rare PVCs. Jagruti Grayson will view a 60 minute class once a week when he comes in for exercise in cardiac wellness. Education manual and heart healthy cookbook given and reviewed briefly.      Singh Muniz RN  5/26/2020

## 2020-05-29 ENCOUNTER — HOSPITAL ENCOUNTER (OUTPATIENT)
Dept: CARDIAC REHAB | Age: 80
Discharge: HOME | End: 2020-05-29
Payer: MEDICARE

## 2020-05-29 ENCOUNTER — APPOINTMENT (OUTPATIENT)
Dept: CARDIAC REHAB | Age: 80
End: 2020-05-29
Payer: MEDICARE

## 2020-05-29 VITALS — BODY MASS INDEX: 26.69 KG/M2 | WEIGHT: 191.4 LBS

## 2020-05-29 PROCEDURE — 93797 PHYS/QHP OP CAR RHAB WO ECG: CPT

## 2020-05-29 PROCEDURE — 93798 PHYS/QHP OP CAR RHAB W/ECG: CPT

## 2020-06-01 ENCOUNTER — TELEPHONE (OUTPATIENT)
Dept: CARDIOLOGY CLINIC | Age: 80
End: 2020-06-01

## 2020-06-01 NOTE — TELEPHONE ENCOUNTER
Patient stated that he thinks that some of his medications are causing him to be constipated and would like to discuss this with you. Please advise.     Phone #: [phone]  Thanks

## 2020-06-01 NOTE — TELEPHONE ENCOUNTER
Returned patient's call, 2 pt identifiers used      Patient is complaining of constipation. He takes senna daily, also advised he may try Miralax and if that does not help he should contact his PCP for further instructions.

## 2020-06-02 ENCOUNTER — TELEPHONE (OUTPATIENT)
Dept: INTERNAL MEDICINE CLINIC | Age: 80
End: 2020-06-02

## 2020-06-02 NOTE — TELEPHONE ENCOUNTER
Spoke with pt -  has been constipated x3 to 4 days now.  has passed some small stool but not much. He sates he spoke with Dr Ivonne Sheth nurse yesterday who suggested he try Miralax but it did not work. Denies any abdominal pain. Wants to know what MD recommends? No appts available.  Will forward to MD.

## 2020-06-02 NOTE — TELEPHONE ENCOUNTER
Advised pt MD recommends he take 8 oz of magnesium citrate asap. Call in AM if ineffective. Pt verbalizes understanding.

## 2020-06-03 ENCOUNTER — HOSPITAL ENCOUNTER (OUTPATIENT)
Dept: CARDIAC REHAB | Age: 80
Discharge: HOME | End: 2020-06-03
Payer: MEDICARE

## 2020-06-03 VITALS — WEIGHT: 190.6 LBS | BODY MASS INDEX: 26.58 KG/M2

## 2020-06-03 PROCEDURE — 93798 PHYS/QHP OP CAR RHAB W/ECG: CPT

## 2020-06-03 PROCEDURE — 93797 PHYS/QHP OP CAR RHAB WO ECG: CPT

## 2020-06-05 ENCOUNTER — HOSPITAL ENCOUNTER (OUTPATIENT)
Dept: CARDIAC REHAB | Age: 80
Discharge: HOME | End: 2020-06-05
Payer: MEDICARE

## 2020-06-05 VITALS — BODY MASS INDEX: 26.72 KG/M2 | WEIGHT: 191.6 LBS

## 2020-06-05 PROCEDURE — 93798 PHYS/QHP OP CAR RHAB W/ECG: CPT

## 2020-06-05 PROCEDURE — 93797 PHYS/QHP OP CAR RHAB WO ECG: CPT

## 2020-06-08 RX ORDER — LEVOTHYROXINE SODIUM 50 UG/1
TABLET ORAL
Qty: 112 TAB | Refills: 0 | Status: SHIPPED | OUTPATIENT
Start: 2020-06-08 | End: 2020-11-02

## 2020-06-10 ENCOUNTER — HOSPITAL ENCOUNTER (OUTPATIENT)
Dept: CARDIAC REHAB | Age: 80
Discharge: HOME | End: 2020-06-10
Payer: MEDICARE

## 2020-06-10 VITALS — BODY MASS INDEX: 26.46 KG/M2 | WEIGHT: 189.7 LBS

## 2020-06-10 PROCEDURE — 93797 PHYS/QHP OP CAR RHAB WO ECG: CPT

## 2020-06-10 PROCEDURE — 93798 PHYS/QHP OP CAR RHAB W/ECG: CPT

## 2020-06-12 ENCOUNTER — HOSPITAL ENCOUNTER (OUTPATIENT)
Dept: CARDIAC REHAB | Age: 80
Discharge: HOME | End: 2020-06-12
Payer: MEDICARE

## 2020-06-12 ENCOUNTER — TELEPHONE (OUTPATIENT)
Dept: INTERNAL MEDICINE CLINIC | Age: 80
End: 2020-06-12

## 2020-06-12 VITALS — BODY MASS INDEX: 26.05 KG/M2 | WEIGHT: 186.8 LBS

## 2020-06-12 PROCEDURE — 93798 PHYS/QHP OP CAR RHAB W/ECG: CPT

## 2020-06-12 PROCEDURE — 93797 PHYS/QHP OP CAR RHAB WO ECG: CPT

## 2020-06-15 ENCOUNTER — HOSPITAL ENCOUNTER (OUTPATIENT)
Dept: CARDIAC REHAB | Age: 80
Discharge: HOME | End: 2020-06-15
Payer: MEDICARE

## 2020-06-15 VITALS — BODY MASS INDEX: 26.3 KG/M2 | WEIGHT: 188.6 LBS

## 2020-06-15 PROCEDURE — 93798 PHYS/QHP OP CAR RHAB W/ECG: CPT

## 2020-06-16 ENCOUNTER — TELEPHONE (OUTPATIENT)
Dept: CARDIOLOGY CLINIC | Age: 80
End: 2020-06-16

## 2020-06-17 ENCOUNTER — HOSPITAL ENCOUNTER (OUTPATIENT)
Dept: CARDIAC REHAB | Age: 80
Discharge: HOME | End: 2020-06-17
Payer: MEDICARE

## 2020-06-17 ENCOUNTER — APPOINTMENT (OUTPATIENT)
Dept: CARDIAC REHAB | Age: 80
End: 2020-06-17
Payer: MEDICARE

## 2020-06-17 VITALS — BODY MASS INDEX: 25.66 KG/M2 | WEIGHT: 184 LBS

## 2020-06-17 PROCEDURE — 93798 PHYS/QHP OP CAR RHAB W/ECG: CPT

## 2020-06-18 ENCOUNTER — OFFICE VISIT (OUTPATIENT)
Dept: CARDIOLOGY CLINIC | Age: 80
End: 2020-06-18

## 2020-06-18 VITALS
DIASTOLIC BLOOD PRESSURE: 60 MMHG | RESPIRATION RATE: 19 BRPM | BODY MASS INDEX: 25.58 KG/M2 | HEIGHT: 71 IN | OXYGEN SATURATION: 94 % | WEIGHT: 182.7 LBS | HEART RATE: 89 BPM | SYSTOLIC BLOOD PRESSURE: 102 MMHG

## 2020-06-18 DIAGNOSIS — I25.10 ATHEROSCLEROSIS OF NATIVE CORONARY ARTERY OF NATIVE HEART WITHOUT ANGINA PECTORIS: Primary | ICD-10-CM

## 2020-06-18 DIAGNOSIS — I34.0 NONRHEUMATIC MITRAL VALVE REGURGITATION: ICD-10-CM

## 2020-06-18 DIAGNOSIS — J90 BILATERAL PLEURAL EFFUSION: ICD-10-CM

## 2020-06-18 DIAGNOSIS — I10 ESSENTIAL HYPERTENSION, BENIGN: ICD-10-CM

## 2020-06-18 DIAGNOSIS — I48.0 PAROXYSMAL ATRIAL FIBRILLATION (HCC): ICD-10-CM

## 2020-06-18 DIAGNOSIS — D64.9 ANEMIA, UNSPECIFIED TYPE: ICD-10-CM

## 2020-06-18 DIAGNOSIS — R06.09 DOE (DYSPNEA ON EXERTION): ICD-10-CM

## 2020-06-18 RX ORDER — POTASSIUM CHLORIDE 750 MG/1
10 TABLET, EXTENDED RELEASE ORAL DAILY
Qty: 90 TAB | Refills: 1 | Status: SHIPPED | OUTPATIENT
Start: 2020-06-18 | End: 2020-12-09

## 2020-06-18 RX ORDER — FUROSEMIDE 20 MG/1
20 TABLET ORAL DAILY
Qty: 90 TAB | Refills: 1 | Status: SHIPPED | OUTPATIENT
Start: 2020-06-18 | End: 2020-12-09

## 2020-06-18 NOTE — PATIENT INSTRUCTIONS
Please begin taking furosemide 20mg daily and potassium chloride 10meq daily Please call the office if you develop low blood pressure or dizziness after restarting furosemide Please continue to exercise daily for at least 30 minutes Please have your CBC drawn with the labs that Dr. Nas Landeros may order next week Please have chest xray done at the [facility] in 1 month

## 2020-06-18 NOTE — PROGRESS NOTES
[facility] [address]: Lorelei Napoles  [phone]    HPI: [facility], a [de-identified]y.o. year-old who presents for follow up regarding his CAD s/p CABG in 3/20    Having dyspnea with exercise but no PND   Going to cardiac rehab now - reviewed his report from yesterday  He walks his dog twice/day, says he walks about 2 miles/day, doing some light weights at home too  Denies any more leg weakness   No chest pain or palpitations  Denies any LE edema, not wearing compression socks anymore  Says he has noticed some abdominal distention and he is not taking the lasix anymore due to lack of LE edema  No dizziness or lightheadedness    Overall today his heart suggests increased volume and will need to restart lasix, no real le edema but abd distension and increased MR, etc with small effusions on exam and echo. Assessment/Plan:  1. Intra-op bradycardia/2nd deg block/first degree AV block preop & Postop A fib on 3/31/20, limited AFlutter  -continue Toprol XL 12.5mg nightly  -continue Eliquis 5mg BID, no unusual bleeding or bruising  -s/p Cardioversion 4/13/20  -continue amiodarone 100mg daily   2.  CAD with NSTEMI and then s/p CABG in 3/20  -continue statin, Toprol XL, plavix (no ASA with Eliquis and Plavix)  -continue cardiac rehab  -he will follow up here for an IPV in 3 months   3. HTN - borderline low but asymptomatic, continue Toprol XL  4. HLD - continue atorvastatin, LDL 83 in 3/20  5. Anemia - postoperative secondary to acute blood loss, last Hgb 9.8 in 4/20, will check CBC now   6. Pre-diabetes - A1c 5.9%, encouraged regular exercise   7. Hypothyroidism - on synthroid, followed by PCP   8. Mitral valve prolapse/MR/AS - all mild on intra-operative WILMER  -3/6 ESPERANZA audible on exam    -TTE today shows mild AI, mod-severe MR, mild TR, mod PI  -advised him to resume lasix 20mg daily and KCL 10meq daily  9.   Pleural effusions - advised him to resume lasix 20mg daily and KCL 10meq daily, he plans to have labs drawn by PCP next week, asked him to have CXR in 1 month to reassess   10. BPH - continue flomax and finasteride      Echo 6/20 - EF 55-60%, mild cLVH, no WMA, mod AV sclerosis with mild AI, mod-severe MR, mild TR, mod PI, mild PA HTN (40mmHg), trivial circumferential pericardial effusion, bilateral pleural effusions  DCCV 4/13/20  CABG x 5 on 3/26/20 with Dr. Mayi Hall - BEYER to LAD, saphenous vein graft to diagonal to ramus intermedius to obtuse marginal; saphenous vein graft to right coronary artery  Carotid duplex 3/20 - <50% bilateral ICA stenosis  SHANE 3/20 - WNL    Fam Hx: no early cad  Soc Hx: no tob use, no etoh use    He  has a past medical history of Arrhythmia (04/12/2020), Arthritis, Basal cell carcinoma, BPH (benign prostatic hyperplasia), CAD (coronary artery disease), Calculus of kidney, Chronic pain, Elevated PSA, Hypercholesterolemia, Lumbar foraminal stenosis, Melanoma ([facility] [address] MVP (mitral valve prolapse), NSTEMI (non-ST elevated myocardial infarction) ([facility] [address] (03/25/2020), Other ill-defined conditions(799.89), Prediabetes, Seasonal allergies, Squamous cell carcinoma, Systolic murmur, Thyroid disease, and Vision impairment. He also has no past medical history of Adverse effect of anesthesia or Unspecified sleep apnea. Cardiovascular ROS: positive for - dyspnea on exertion  Respiratory ROS: positive for - shortness of breath  Neurological ROS: no TIA or stroke symptoms  All other systems negative except as above. PE  Vitals:    06/18/20 0827   BP: 102/60   Pulse: 89   Resp: 19   SpO2: 94%   Weight: 182 lb 11.2 oz (82.9 kg)   Height: 5' 11\" (1.803 m)    Body mass index is 25.48 kg/m².   General appearance - alert, well appearing, and in no distress  Mental status - affect appropriate to mood  Eyes - sclera anicteric, moist mucous membranes  Neck - supple, no significant adenopathy  Lymphatics - no lymphadenopathy  Chest - clear to auscultation, no wheezes, rales or rhonchi  Heart - normal rate, regular rhythm, normal S1, S2, 2/6 ESPERANZA   Abdomen - soft, nontender, slightly distended   Back exam - full range of motion, no tenderness  Neurological - cranial nerves II through XII grossly intact, no focal deficit  Musculoskeletal - no muscular tenderness noted, normal strength  Extremities - peripheral pulses normal, no pedal edema  Skin - normal coloration  no rashes    Recent Labs:  Lab Results   Component Value Date/Time    Cholesterol, total 161 03/25/2020 09:00 AM    HDL Cholesterol 62 03/25/2020 09:00 AM    LDL, calculated 83.8 03/25/2020 09:00 AM    Triglyceride 76 03/25/2020 09:00 AM    CHOL/HDL Ratio 2.6 03/25/2020 09:00 AM     Lab Results   Component Value Date/Time    Creatinine (POC) 0.7 01/08/2019 07:47 PM    Creatinine 1.03 04/14/2020 08:04 AM     Lab Results   Component Value Date/Time    BUN 12 04/14/2020 08:04 AM    BUN (POC) 21 (H) 01/08/2019 07:47 PM     Lab Results   Component Value Date/Time    Potassium 3.6 04/14/2020 08:04 AM     Lab Results   Component Value Date/Time    Hemoglobin A1c 5.9 (H) 03/26/2020 12:13 AM     Lab Results   Component Value Date/Time    Hemoglobin (POC) 16.0 12/11/2014 09:43 AM    HGB 9.8 (L) 04/14/2020 08:04 AM     Lab Results   Component Value Date/Time    PLATELET 174 (H) 61/28/6858 08:04 AM       Reviewed:  Past Medical History:   Diagnosis Date    Arrhythmia 04/12/2020    cardioversion for SVT    Arthritis     Basal cell carcinoma     BPH (benign prostatic hyperplasia)     CAD (coronary artery disease)     s/p PTCA '92    Calculus of kidney     Chronic pain     BACK    Elevated PSA     Hypercholesterolemia     Lumbar foraminal stenosis     Melanoma ([address]     MVP (mitral valve prolapse)     NSTEMI (non-ST elevated myocardial infarction) ([address] 03/25/2020    Other ill-defined conditions(799.89)     Prostatitis    Prediabetes     Seasonal allergies     Squamous cell carcinoma     Systolic murmur     Thyroid disease     HYPOTHYROID    Vision impairment     R EYE, BLOOD CLOT ON RETINA     Social History     Tobacco Use   Smoking Status Never Smoker   Smokeless Tobacco Never Used     Social History     Substance and Sexual Activity   Alcohol Use No     Allergies   Allergen Reactions    Codeine Rash    Pcn [Penicillins] Rash    Sulfa (Sulfonamide Antibiotics) Other (comments)     confusion       Current Outpatient Medications   Medication Sig    furosemide (LASIX) 20 mg tablet Take 1 Tab by mouth daily.  potassium chloride (KLOR-CON) 10 mEq tablet Take 1 Tab by mouth daily.  levothyroxine (SYNTHROID) 50 mcg tablet TAKE 1 TABLET BY MOUTH EVERY DAY BEFORE BREAKFAST EXCEPT TAKE 2 TABS ON SATURDAY.  Eliquis 5 mg tablet TAKE 1 TAB BY MOUTH EVERY 12 HOURS    amiodarone (CORDARONE) 200 mg tablet Take 0.5 Tabs by mouth every evening. Indications: atrial fibrillation electrically shocked to normal rhythm    metoprolol succinate (Toprol XL) 25 mg XL tablet Take 0.5 Tabs by mouth nightly. Indications: high blood pressure    clopidogreL (PLAVIX) 75 mg tab Take 1 Tab by mouth daily.  atorvastatin (LIPITOR) 40 mg tablet TAKE 1 TAB BY MOUTH DAILY.  acetaminophen (TYLENOL) 325 mg tablet Take 2 Tabs by mouth every four (4) hours as needed for Pain.  tamsulosin (FLOMAX) 0.4 mg capsule TAKE 1 CAPSULE BY MOUTH EVERY DAY 30 MINUTES AFTER THE SAME MEAL EACH DAY    Cholecalciferol, Vitamin D3, (VITAMIN D3) 2,000 unit cap capsule Take 2,000 Units by mouth two (2) times a day.  finasteride (PROSCAR) 5 mg tablet Take 5 mg by mouth daily. HS     No current facility-administered medications for this visit.         Dimas Mcburney, MD  [facility] heart and Vascular [place]  [address] [facility] [address]  [address]

## 2020-06-19 ENCOUNTER — HOSPITAL ENCOUNTER (OUTPATIENT)
Dept: CARDIAC REHAB | Age: 80
Discharge: HOME | End: 2020-06-19
Payer: MEDICARE

## 2020-06-19 VITALS — BODY MASS INDEX: 25.91 KG/M2 | WEIGHT: 185.8 LBS

## 2020-06-19 PROCEDURE — 93798 PHYS/QHP OP CAR RHAB W/ECG: CPT

## 2020-06-22 ENCOUNTER — HOSPITAL ENCOUNTER (OUTPATIENT)
Dept: CARDIAC REHAB | Age: 80
Discharge: HOME | End: 2020-06-22
Payer: MEDICARE

## 2020-06-22 VITALS — WEIGHT: 185.8 LBS | BODY MASS INDEX: 25.91 KG/M2

## 2020-06-22 PROCEDURE — 93798 PHYS/QHP OP CAR RHAB W/ECG: CPT

## 2020-06-24 ENCOUNTER — HOSPITAL ENCOUNTER (OUTPATIENT)
Dept: CARDIAC REHAB | Age: 80
Discharge: HOME | End: 2020-06-24
Payer: MEDICARE

## 2020-06-24 PROCEDURE — 93798 PHYS/QHP OP CAR RHAB W/ECG: CPT

## 2020-06-25 ENCOUNTER — HOSPITAL ENCOUNTER (OUTPATIENT)
Dept: LAB | Age: 80
Discharge: HOME | End: 2020-06-25
Payer: MEDICARE

## 2020-06-25 PROCEDURE — 36415 COLL VENOUS BLD VENIPUNCTURE: CPT

## 2020-06-25 PROCEDURE — 80061 LIPID PANEL: CPT

## 2020-06-25 PROCEDURE — 84443 ASSAY THYROID STIM HORMONE: CPT

## 2020-06-25 PROCEDURE — 83036 HEMOGLOBIN GLYCOSYLATED A1C: CPT

## 2020-06-25 PROCEDURE — 80053 COMPREHEN METABOLIC PANEL: CPT

## 2020-06-25 PROCEDURE — 85025 COMPLETE CBC W/AUTO DIFF WBC: CPT

## 2020-06-26 ENCOUNTER — HOSPITAL ENCOUNTER (OUTPATIENT)
Dept: CARDIAC REHAB | Age: 80
Discharge: HOME | End: 2020-06-26
Payer: MEDICARE

## 2020-06-26 LAB
ALBUMIN SERPL-MCNC: 4.1 G/DL (ref 3.7–4.7)
ALBUMIN/GLOB SERPL: 1.5 {RATIO} (ref 1.2–2.2)
ALP SERPL-CCNC: 132 IU/L (ref 39–117)
ALT SERPL-CCNC: 11 IU/L (ref 0–44)
AST SERPL-CCNC: 13 IU/L (ref 0–40)
BASOPHILS # BLD AUTO: 0.1 X10E3/UL (ref 0–0.2)
BASOPHILS NFR BLD AUTO: 1 %
BILIRUB SERPL-MCNC: 0.6 MG/DL (ref 0–1.2)
BUN SERPL-MCNC: 20 MG/DL (ref 8–27)
BUN/CREAT SERPL: 16 (ref 10–24)
CALCIUM SERPL-MCNC: 9.4 MG/DL (ref 8.6–10.2)
CHLORIDE SERPL-SCNC: 102 MMOL/L (ref 96–106)
CHOLEST SERPL-MCNC: 149 MG/DL (ref 100–199)
CO2 SERPL-SCNC: 23 MMOL/L (ref 20–29)
CREAT SERPL-MCNC: 1.24 MG/DL (ref 0.76–1.27)
EOSINOPHIL # BLD AUTO: 0.1 X10E3/UL (ref 0–0.4)
EOSINOPHIL NFR BLD AUTO: 2 %
ERYTHROCYTE [DISTWIDTH] IN BLOOD BY AUTOMATED COUNT: 14.8 % (ref 11.6–15.4)
EST. AVERAGE GLUCOSE BLD GHB EST-MCNC: 131 MG/DL
GLOBULIN SER CALC-MCNC: 2.8 G/DL (ref 1.5–4.5)
GLUCOSE SERPL-MCNC: 96 MG/DL (ref 65–99)
HBA1C MFR BLD: 6.2 % (ref 4.8–5.6)
HCT VFR BLD AUTO: 46 % (ref 37.5–51)
HDLC SERPL-MCNC: 58 MG/DL
HGB BLD-MCNC: 13.6 G/DL (ref 13–17.7)
IMM GRANULOCYTES # BLD AUTO: 0 X10E3/UL (ref 0–0.1)
IMM GRANULOCYTES NFR BLD AUTO: 0 %
LDLC SERPL CALC-MCNC: 76 MG/DL (ref 0–99)
LYMPHOCYTES # BLD AUTO: 1.5 X10E3/UL (ref 0.7–3.1)
LYMPHOCYTES NFR BLD AUTO: 25 %
MCH RBC QN AUTO: 24.4 PG (ref 26.6–33)
MCHC RBC AUTO-ENTMCNC: 29.6 G/DL (ref 31.5–35.7)
MCV RBC AUTO: 83 FL (ref 79–97)
MONOCYTES # BLD AUTO: 0.6 X10E3/UL (ref 0.1–0.9)
MONOCYTES NFR BLD AUTO: 10 %
NEUTROPHILS # BLD AUTO: 3.8 X10E3/UL (ref 1.4–7)
NEUTROPHILS NFR BLD AUTO: 62 %
PLATELET # BLD AUTO: 332 X10E3/UL (ref 150–450)
POTASSIUM SERPL-SCNC: 4.6 MMOL/L (ref 3.5–5.2)
PROT SERPL-MCNC: 6.9 G/DL (ref 6–8.5)
RBC # BLD AUTO: 5.57 X10E6/UL (ref 4.14–5.8)
SODIUM SERPL-SCNC: 140 MMOL/L (ref 134–144)
TRIGL SERPL-MCNC: 76 MG/DL (ref 0–149)
TSH SERPL DL<=0.005 MIU/L-ACNC: 2.99 UIU/ML (ref 0.45–4.5)
VLDLC SERPL CALC-MCNC: 15 MG/DL (ref 5–40)
WBC # BLD AUTO: 6.1 X10E3/UL (ref 3.4–10.8)

## 2020-06-26 PROCEDURE — 93798 PHYS/QHP OP CAR RHAB W/ECG: CPT

## 2020-06-29 ENCOUNTER — OFFICE VISIT (OUTPATIENT)
Dept: INTERNAL MEDICINE CLINIC | Age: 80
End: 2020-06-29

## 2020-06-29 ENCOUNTER — HOSPITAL ENCOUNTER (OUTPATIENT)
Dept: CARDIAC REHAB | Age: 80
Discharge: HOME | End: 2020-06-29
Payer: MEDICARE

## 2020-06-29 VITALS
TEMPERATURE: 98 F | BODY MASS INDEX: 25.81 KG/M2 | SYSTOLIC BLOOD PRESSURE: 117 MMHG | HEIGHT: 71 IN | RESPIRATION RATE: 16 BRPM | DIASTOLIC BLOOD PRESSURE: 65 MMHG | OXYGEN SATURATION: 96 % | WEIGHT: 184.4 LBS | HEART RATE: 86 BPM

## 2020-06-29 VITALS — WEIGHT: 184.4 LBS | BODY MASS INDEX: 25.72 KG/M2

## 2020-06-29 DIAGNOSIS — E06.3 HYPOTHYROIDISM, ACQUIRED, AUTOIMMUNE: ICD-10-CM

## 2020-06-29 DIAGNOSIS — E78.2 MIXED HYPERLIPIDEMIA: Primary | ICD-10-CM

## 2020-06-29 DIAGNOSIS — I25.700 CORONARY ARTERY DISEASE INVOLVING CORONARY BYPASS GRAFT OF NATIVE HEART WITH UNSTABLE ANGINA PECTORIS (HCC): ICD-10-CM

## 2020-06-29 DIAGNOSIS — I10 ESSENTIAL HYPERTENSION, BENIGN: ICD-10-CM

## 2020-06-29 DIAGNOSIS — H35.30 MACULAR DEGENERATION OF BOTH EYES, UNSPECIFIED TYPE: ICD-10-CM

## 2020-06-29 DIAGNOSIS — D64.9 POSTOPERATIVE ANEMIA: ICD-10-CM

## 2020-06-29 DIAGNOSIS — R73.01 IFG (IMPAIRED FASTING GLUCOSE): ICD-10-CM

## 2020-06-29 PROCEDURE — 93798 PHYS/QHP OP CAR RHAB W/ECG: CPT

## 2020-06-29 RX ORDER — VIT A/VIT C/VIT E/ZINC/COPPER 2148-113
1 TABLET ORAL DAILY
Qty: 30 TAB | Refills: 5
Start: 2020-06-29

## 2020-06-29 NOTE — PROGRESS NOTES
HISTORY OF PRESENT ILLNESS  Melani Best is a [de-identified] y.o. male. HPI Subjective:  Jenn Joseph is seen today for follow up of CAD, IFG and other medical problems. 1. CAD. He is slowly recovering from bypass surgery. He is asymptomatic, mainly just has low energy. 2. IFG, hyperlipidemia. Reviewed labs, stable. 3. Hypertension, stable on current regimen. 4. Macular degeneration. I have given him the okay to restart his vitamin. He had been off this postop for a bit, but I have reentered the medication and there is no contraindication. 5. Preventive medicine. Wellness visit in six months. Review of Systems:  Notable for constipation waxing and waning, but overall okay. I suspect this is stemming in part due to his decreased activity and will resolve. He has never really had constipation before. He also has some nonspecific fatigue and I have encouraged him to continue with cardiac rehab to get reconditioned. Current Outpatient Medications   Medication Sig    vitamins  A,C,E-zinc-copper (Ocuvite PreserVision) [phone] unit-mg-unit tab tablet Take 1 Tab by mouth daily.  furosemide (LASIX) 20 mg tablet Take 1 Tab by mouth daily.  potassium chloride (KLOR-CON) 10 mEq tablet Take 1 Tab by mouth daily. (Patient taking differently: Take 20 mEq by mouth daily.)    levothyroxine (SYNTHROID) 50 mcg tablet TAKE 1 TABLET BY MOUTH EVERY DAY BEFORE BREAKFAST EXCEPT TAKE 2 TABS ON SATURDAY.  Eliquis 5 mg tablet TAKE 1 TAB BY MOUTH EVERY 12 HOURS    amiodarone (CORDARONE) 200 mg tablet Take 0.5 Tabs by mouth every evening. Indications: atrial fibrillation electrically shocked to normal rhythm    metoprolol succinate (Toprol XL) 25 mg XL tablet Take 0.5 Tabs by mouth nightly. Indications: high blood pressure    clopidogreL (PLAVIX) 75 mg tab Take 1 Tab by mouth daily.  atorvastatin (LIPITOR) 40 mg tablet TAKE 1 TAB BY MOUTH DAILY.     acetaminophen (TYLENOL) 325 mg tablet Take 2 Tabs by mouth every four (4) hours as needed for Pain.  tamsulosin (FLOMAX) 0.4 mg capsule TAKE 1 CAPSULE BY MOUTH EVERY DAY 30 MINUTES AFTER THE SAME MEAL EACH DAY    Cholecalciferol, Vitamin D3, (VITAMIN D3) 2,000 unit cap capsule Take 2,000 Units by mouth two (2) times a day.  finasteride (PROSCAR) 5 mg tablet Take 5 mg by mouth daily. HS     No current facility-administered medications for this visit. Review of Systems   Constitutional: Positive for malaise/fatigue. Negative for weight loss. Respiratory: Negative. Cardiovascular: Negative for chest pain, palpitations, leg swelling and PND. Gastrointestinal: Positive for constipation. Musculoskeletal: Negative for myalgias. Neurological: Negative for focal weakness. Endo/Heme/Allergies: Bruises/bleeds easily. No significant bleeding       Physical Exam  Vitals signs and nursing note reviewed. Constitutional:       General: He is not in acute distress. Neck:      Vascular: No carotid bruit. Cardiovascular:      Rate and Rhythm: Normal rate and regular rhythm. Heart sounds: No murmur. No friction rub. No gallop. Pulmonary:      Effort: Pulmonary effort is normal. No respiratory distress. Breath sounds: Normal breath sounds. ASSESSMENT and PLAN  Diagnoses and all orders for this visit:    1. Mixed hyperlipidemia  -     METABOLIC PANEL, COMPREHENSIVE  -     LIPID PANEL    2. Hypothyroidism, acquired, autoimmune  -     TSH 3RD GENERATION    3. Essential hypertension, benign- Continue current regimen of prescription and / or OTC medications     4. Coronary artery disease involving coronary bypass graft of native heart with unstable angina pectoris [facility])- See cardiologist as directed. 5. Postoperative anemia  -     CBC WITH AUTOMATED DIFF    6. IFG (impaired fasting glucose)  -     HEMOGLOBIN A1C WITH EAG    7.  Macular degeneration of both eyes, unspecified type  -     vitamins  A,C,E-zinc-copper (Ocuvite PreserVision) [phone] unit-mg-unit tab tablet; Take 1 Tab by mouth daily.  See ophthalmologist as discussed/directed

## 2020-07-02 ENCOUNTER — HOSPITAL ENCOUNTER (OUTPATIENT)
Dept: CARDIAC REHAB | Age: 80
Discharge: HOME | End: 2020-07-02
Payer: MEDICARE

## 2020-07-02 VITALS — WEIGHT: 186.8 LBS | BODY MASS INDEX: 26.05 KG/M2

## 2020-07-02 PROCEDURE — 93798 PHYS/QHP OP CAR RHAB W/ECG: CPT

## 2020-07-06 ENCOUNTER — HOSPITAL ENCOUNTER (OUTPATIENT)
Dept: CARDIAC REHAB | Age: 80
Discharge: HOME | End: 2020-07-06
Payer: MEDICARE

## 2020-07-06 VITALS — BODY MASS INDEX: 25.69 KG/M2 | WEIGHT: 184.2 LBS

## 2020-07-06 PROCEDURE — 93798 PHYS/QHP OP CAR RHAB W/ECG: CPT

## 2020-07-08 ENCOUNTER — HOSPITAL ENCOUNTER (OUTPATIENT)
Dept: CARDIAC REHAB | Age: 80
Discharge: HOME | End: 2020-07-08
Payer: MEDICARE

## 2020-07-08 VITALS — WEIGHT: 183.8 LBS | BODY MASS INDEX: 25.63 KG/M2

## 2020-07-08 PROCEDURE — 93798 PHYS/QHP OP CAR RHAB W/ECG: CPT

## 2020-07-10 ENCOUNTER — HOSPITAL ENCOUNTER (OUTPATIENT)
Dept: CARDIAC REHAB | Age: 80
Discharge: HOME | End: 2020-07-10
Payer: MEDICARE

## 2020-07-10 VITALS — BODY MASS INDEX: 25.58 KG/M2 | WEIGHT: 183.4 LBS

## 2020-07-10 PROCEDURE — 93798 PHYS/QHP OP CAR RHAB W/ECG: CPT

## 2020-07-13 ENCOUNTER — HOSPITAL ENCOUNTER (OUTPATIENT)
Dept: CARDIAC REHAB | Age: 80
Discharge: HOME | End: 2020-07-13
Payer: MEDICARE

## 2020-07-13 VITALS — BODY MASS INDEX: 25.52 KG/M2 | WEIGHT: 183 LBS

## 2020-07-13 PROCEDURE — 93798 PHYS/QHP OP CAR RHAB W/ECG: CPT

## 2020-07-15 ENCOUNTER — HOSPITAL ENCOUNTER (OUTPATIENT)
Dept: CARDIAC REHAB | Age: 80
Discharge: HOME | End: 2020-07-15
Payer: MEDICARE

## 2020-07-15 VITALS — BODY MASS INDEX: 25.52 KG/M2 | WEIGHT: 183 LBS

## 2020-07-15 PROCEDURE — 93798 PHYS/QHP OP CAR RHAB W/ECG: CPT

## 2020-07-17 ENCOUNTER — HOSPITAL ENCOUNTER (OUTPATIENT)
Dept: CARDIAC REHAB | Age: 80
Discharge: HOME | End: 2020-07-17
Payer: MEDICARE

## 2020-07-17 VITALS — WEIGHT: 182.8 LBS | BODY MASS INDEX: 25.5 KG/M2

## 2020-07-17 PROCEDURE — 93797 PHYS/QHP OP CAR RHAB WO ECG: CPT | Performed by: DIETITIAN, REGISTERED

## 2020-07-17 PROCEDURE — 93798 PHYS/QHP OP CAR RHAB W/ECG: CPT

## 2020-07-17 NOTE — PROGRESS NOTES
Cardiac Rehab Nutrition Assessment - 1:1 Evaluation           NAME: Beryl Goode : 1940 AGE: [de-identified] y.o. GENDER: male  CARDIAC REHAB ADMITTING DIAGNOSIS: MI & CABG    Relevant Comorbidites: hypertension, dyslipidemia, cardiovascular disease and prediabetes        LABS:   Lab Results   Component Value Date/Time    Hemoglobin A1c 6.2 (H) 2020 08:55 AM     Lab Results   Component Value Date/Time    Cholesterol, total 149 2020 08:55 AM    HDL Cholesterol 58 2020 08:55 AM    LDL, calculated 76 2020 08:55 AM    VLDL, calculated 15 2020 08:55 AM    Triglyceride 76 2020 08:55 AM    CHOL/HDL Ratio 2.6 2020 09:00 AM         MEDICATIONS/SUPPLEMENTS:   [unfilled]  Prior to Admission medications    Medication Sig Start Date End Date Taking? Authorizing Provider   vitamins  A,C,E-zinc-copper (Ocuvite PreserVision) [phone] unit-mg-unit tab tablet Take 1 Tab by mouth daily. 20   Cara Rodriguez MD   furosemide (LASIX) 20 mg tablet Take 1 Tab by mouth daily. 20   Carloz Burnett NP   potassium chloride (KLOR-CON) 10 mEq tablet Take 1 Tab by mouth daily. Patient taking differently: Take 20 mEq by mouth daily. 20   Carloz Burnett NP   levothyroxine (SYNTHROID) 50 mcg tablet TAKE 1 TABLET BY MOUTH EVERY DAY BEFORE BREAKFAST EXCEPT TAKE 2 TABS ON SATURDAY. 20   Cara Rodriguez MD   Eliquis 5 mg tablet TAKE 1 TAB BY MOUTH EVERY 12 HOURS 20   Carloz Burnett NP   amiodarone (CORDARONE) 200 mg tablet Take 0.5 Tabs by mouth every evening. Indications: atrial fibrillation electrically shocked to normal rhythm 20   Carloz Burnett NP   metoprolol succinate (Toprol XL) 25 mg XL tablet Take 0.5 Tabs by mouth nightly. Indications: high blood pressure 20   Kevin Allred MD   clopidogreL (PLAVIX) 75 mg tab Take 1 Tab by mouth daily.  20   Kevin Allred MD   atorvastatin (LIPITOR) 40 mg tablet TAKE 1 TAB BY MOUTH DAILY. 4/14/20   Teresita Rodriguez MD   acetaminophen (TYLENOL) 325 mg tablet Take 2 Tabs by mouth every four (4) hours as needed for Pain. 3/30/20   Kaiser Ellis NP   tamsulosin (FLOMAX) 0.4 mg capsule TAKE 1 CAPSULE BY MOUTH EVERY DAY 30 MINUTES AFTER THE SAME MEAL EACH DAY 4/8/19   Provider, Historical   Cholecalciferol, Vitamin D3, (VITAMIN D3) 2,000 unit cap capsule Take 2,000 Units by mouth two (2) times a day. 5/30/18   Teresita Rodriguez MD   finasteride (PROSCAR) 5 mg tablet Take 5 mg by mouth daily. HS    Provider, Historical           ANTHROPOMETRICS:    Ht Readings from Last 1 Encounters:   06/29/20 5' 11\" (1.803 m)      Wt Readings from Last 1 Encounters:   07/15/20 83 kg (183 lb)      IBW:172 # +/- 10%  %IBW: 106 % +/- 10%    BMI: 25.5 kg/M2 Category: overweight  Waist: 39.5 inches    Reported Wt Hx: 20-25 lb weight loss since CABG on 3/26/20    Reported Diet Hx:    Rate Your Plate Score: 56  (Score 58-72: Making many healthy choices; 41-57: Some choices need improving 24-40: many choices need improving)     24 Hour Diet Recall  Breakfast 2 eggs fried in avocado oil w/ sprinkle of salt, packet of grits with tub spread, 1/2 pc whole wheat toast with jelly, 1 sausage madelaine   Lunch Chicken salad (homemade) on honey wheat bread sandwich, 1 serving of pretzels, 1/2 orange   Dinner Homemade beef stew (made with packaged stock), frozen mixed vegetables, orange slices   Snacks Fer crackers with peanut butter   Beverages Decaf coffee with splenda and 2% milk, 1 Coke, water     [place] states he ate a big breakfast yesterday because when he weighed himself he was 178 lbs. He states most mornings he eats cereal, today was [employer], with 2% milk and a banana. Occasionally he drinks a protein shake. Since his CABG he has been stopped eating fast foods and cut back on salt and fat in the diet.  He states he and his wife have long read food labels but until his CABG the labels did not influence his food choices. He reports he has lost weight in part because food doesn't taste as good now that he is eating healthier. Although he states he has found some recipes in our rehab cookbook that he enjoyed. Environmental/Social: , profession is as a , he walks his dog about 1 mile per day        NUTRITION INTERVENTION:  Nutrition 60 minute one-on-one education & goal setting with [address] [place] [address] with [address] relevant labs compared to ideals. Reviewed weight history and patient's verbalized weight goal as well as any real or perceived barriers to obtaining the goal. Collaborated with patient to set a specific short and long term weight goal.     Reviewed Rate Your Plate and conducted a verbal diet recall. Assessed for environmental, financial, psychosocial, physical and comorbidities that may impact the food and eating patterns / behaviors of [address] with patient to set specific nutrient goals as well as specific food / behavior changes that will help patient meet the overall goal of following a heart healthy eating pattern (using guidelines as set forth by the American Heart Association and modeled after healthful eating patterns as recognized by the USDA Dietary Guidelines such as DASH, Mediterranean or plant-based). Briefly reviewed with [address] the nutrition information in the Cardiac Rehab patient education book and encouraged [address] to read thoroughly, ask questions as needed, and use for future reference for heart healthy nutrition information. Anupama Taveras participated in recorded Cardiac Rehab group nutrition classes.           PATIENT GOALS:    Weight Goals:  Short Term Weight Goal: 185 lbs (met)  Long Term Weight Goal:180 lbs    Nutrition Goals:  Daily Recommendations:  Calories: 2000 /day  (using Krystal Garcia with AF 1.3)    Saturated Fat: no more than 13 g/day  Trans Fat: 0 g/day  Sodium: no more than 2000 mg/day  Fruit: 3 cups / day  Vegetables: 3 or more cups/day    Other:  - read and compare food labels  - limit added sugars to no more than 9 teaspoons (36 grams) per day  - experiment with herbs, spices, vinegars & citrus for heart healthy flavor      Keeping a food diary was recommended. Questions addressed. Follow-up plans discussed. [address] [facility] verbalized understanding.             Isela Bingham RD

## 2020-07-20 ENCOUNTER — HOSPITAL ENCOUNTER (OUTPATIENT)
Dept: GENERAL RADIOLOGY | Age: 80
Discharge: HOME | End: 2020-07-20
Payer: MEDICARE

## 2020-07-20 ENCOUNTER — HOSPITAL ENCOUNTER (OUTPATIENT)
Dept: CARDIAC REHAB | Age: 80
Discharge: HOME | End: 2020-07-20
Payer: MEDICARE

## 2020-07-20 ENCOUNTER — TELEPHONE (OUTPATIENT)
Dept: CARDIOLOGY CLINIC | Age: 80
End: 2020-07-20

## 2020-07-20 VITALS — WEIGHT: 183.4 LBS | BODY MASS INDEX: 25.58 KG/M2

## 2020-07-20 DIAGNOSIS — R06.09 DOE (DYSPNEA ON EXERTION): ICD-10-CM

## 2020-07-20 DIAGNOSIS — J90 BILATERAL PLEURAL EFFUSION: ICD-10-CM

## 2020-07-20 DIAGNOSIS — J90 PLEURAL EFFUSION ON RIGHT: Primary | ICD-10-CM

## 2020-07-20 PROCEDURE — 71046 X-RAY EXAM CHEST 2 VIEWS: CPT | Performed by: NURSE PRACTITIONER

## 2020-07-20 PROCEDURE — 93798 PHYS/QHP OP CAR RHAB W/ECG: CPT

## 2020-07-20 NOTE — TELEPHONE ENCOUNTER
Per Dr. Morris Pacheco \"Enlarging right pleural effusion with some collapse. Would proceed with US guided thoaracentesis then fu CXR\"    Verified patient with two types of identifiers. Notified patient of results. Patient states that he has been feeling well since starting cardiac rehab and his shortness has improved. Notified patient of MD recommendations. Will call tomorrow to set up Thoracentesis.

## 2020-07-20 NOTE — PROGRESS NOTES
Enlarging right pleural effusion with some collapse.  Would proceed with US guided thoaracentesis then fu CXR

## 2020-07-21 NOTE — TELEPHONE ENCOUNTER
Spoke with Dr. Jose Ramon Zamudio regarding how patient is feeling and she recommended postponing thoracentesis for now and will repeat Chest x-ray in 3 weeks. Verified patient with two types of identifiers. Notified patient of MD recommendations. Will mail order to patient. Notified patient that if symptoms change before chest x-ray please call our office. Patient verbalized understanding and will call with any other questions.

## 2020-07-22 ENCOUNTER — HOSPITAL ENCOUNTER (OUTPATIENT)
Dept: CARDIAC REHAB | Age: 80
Discharge: HOME | End: 2020-07-22
Payer: MEDICARE

## 2020-07-22 VITALS — WEIGHT: 184 LBS | BODY MASS INDEX: 25.66 KG/M2

## 2020-07-22 PROCEDURE — 93798 PHYS/QHP OP CAR RHAB W/ECG: CPT

## 2020-07-24 ENCOUNTER — HOSPITAL ENCOUNTER (OUTPATIENT)
Dept: CARDIAC REHAB | Age: 80
Discharge: HOME | End: 2020-07-24
Payer: MEDICARE

## 2020-07-24 VITALS — BODY MASS INDEX: 25.44 KG/M2 | WEIGHT: 182.4 LBS

## 2020-07-24 PROCEDURE — 93798 PHYS/QHP OP CAR RHAB W/ECG: CPT

## 2020-07-28 ENCOUNTER — HOSPITAL ENCOUNTER (OUTPATIENT)
Dept: CARDIAC REHAB | Age: 80
Discharge: HOME | End: 2020-07-28
Payer: MEDICARE

## 2020-07-28 VITALS — WEIGHT: 181 LBS | BODY MASS INDEX: 25.24 KG/M2

## 2020-07-28 PROCEDURE — 93798 PHYS/QHP OP CAR RHAB W/ECG: CPT

## 2020-07-29 ENCOUNTER — APPOINTMENT (OUTPATIENT)
Dept: CARDIAC REHAB | Age: 80
End: 2020-07-29
Payer: MEDICARE

## 2020-07-31 ENCOUNTER — HOSPITAL ENCOUNTER (OUTPATIENT)
Dept: CARDIAC REHAB | Age: 80
Discharge: HOME | End: 2020-07-31
Payer: MEDICARE

## 2020-07-31 VITALS — BODY MASS INDEX: 25.19 KG/M2 | WEIGHT: 180.6 LBS

## 2020-07-31 PROCEDURE — 93798 PHYS/QHP OP CAR RHAB W/ECG: CPT

## 2020-08-03 ENCOUNTER — DOCUMENTATION ONLY (OUTPATIENT)
Dept: CARDIOLOGY CLINIC | Age: 80
End: 2020-08-03

## 2020-08-05 ENCOUNTER — HOSPITAL ENCOUNTER (OUTPATIENT)
Dept: CARDIAC REHAB | Age: 80
Discharge: HOME | End: 2020-08-05
Payer: MEDICARE

## 2020-08-05 VITALS — BODY MASS INDEX: 25.02 KG/M2 | WEIGHT: 179.4 LBS

## 2020-08-05 PROCEDURE — 93798 PHYS/QHP OP CAR RHAB W/ECG: CPT

## 2020-08-07 ENCOUNTER — HOSPITAL ENCOUNTER (OUTPATIENT)
Dept: CARDIAC REHAB | Age: 80
Discharge: HOME | End: 2020-08-07
Payer: MEDICARE

## 2020-08-07 VITALS — BODY MASS INDEX: 25.16 KG/M2 | WEIGHT: 180.4 LBS

## 2020-08-07 PROCEDURE — 93798 PHYS/QHP OP CAR RHAB W/ECG: CPT

## 2020-08-11 ENCOUNTER — HOSPITAL ENCOUNTER (OUTPATIENT)
Dept: CARDIAC REHAB | Age: 80
Discharge: HOME | End: 2020-08-11
Payer: MEDICARE

## 2020-08-11 ENCOUNTER — HOSPITAL ENCOUNTER (OUTPATIENT)
Dept: GENERAL RADIOLOGY | Age: 80
Discharge: HOME | End: 2020-08-11
Attending: INTERNAL MEDICINE
Payer: MEDICARE

## 2020-08-11 VITALS — BODY MASS INDEX: 24.83 KG/M2 | WEIGHT: 178 LBS

## 2020-08-11 DIAGNOSIS — J90 PLEURAL EFFUSION ON RIGHT: ICD-10-CM

## 2020-08-11 PROCEDURE — 93798 PHYS/QHP OP CAR RHAB W/ECG: CPT

## 2020-08-11 PROCEDURE — 71046 X-RAY EXAM CHEST 2 VIEWS: CPT

## 2020-08-11 NOTE — PROGRESS NOTES
Moderate right effusion, no change, will discuss at ov may need to be tapped if he is short of breath

## 2020-08-13 ENCOUNTER — TELEPHONE (OUTPATIENT)
Dept: CARDIOLOGY CLINIC | Age: 80
End: 2020-08-13

## 2020-08-13 DIAGNOSIS — J90 PLEURAL EFFUSION: Primary | ICD-10-CM

## 2020-08-14 ENCOUNTER — HOSPITAL ENCOUNTER (EMERGENCY)
Age: 80
Discharge: HOME | End: 2020-08-14
Attending: EMERGENCY MEDICINE
Payer: MEDICARE

## 2020-08-14 ENCOUNTER — APPOINTMENT (OUTPATIENT)
Dept: CT IMAGING | Age: 80
End: 2020-08-14
Attending: EMERGENCY MEDICINE
Payer: MEDICARE

## 2020-08-14 VITALS
HEART RATE: 89 BPM | TEMPERATURE: 97.8 F | RESPIRATION RATE: 22 BRPM | SYSTOLIC BLOOD PRESSURE: 137 MMHG | OXYGEN SATURATION: 94 % | DIASTOLIC BLOOD PRESSURE: 64 MMHG

## 2020-08-14 DIAGNOSIS — R33.9 URINARY RETENTION: Primary | ICD-10-CM

## 2020-08-14 DIAGNOSIS — R31.9 URINARY TRACT INFECTION WITH HEMATURIA, SITE UNSPECIFIED: ICD-10-CM

## 2020-08-14 DIAGNOSIS — J90 PLEURAL EFFUSION: ICD-10-CM

## 2020-08-14 DIAGNOSIS — N32.89 BLOOD CLOT IN BLADDER: ICD-10-CM

## 2020-08-14 DIAGNOSIS — N39.0 URINARY TRACT INFECTION WITH HEMATURIA, SITE UNSPECIFIED: ICD-10-CM

## 2020-08-14 LAB
ALBUMIN SERPL-MCNC: 3.6 G/DL (ref 3.5–5)
ALBUMIN/GLOB SERPL: 1 {RATIO} (ref 1.1–2.2)
ALP SERPL-CCNC: 122 U/L (ref 45–117)
ALT SERPL-CCNC: 22 U/L (ref 12–78)
AMORPH CRY URNS QL MICRO: ABNORMAL
ANION GAP SERPL CALC-SCNC: 10 MMOL/L (ref 5–15)
APPEARANCE UR: ABNORMAL
AST SERPL-CCNC: 18 U/L (ref 15–37)
BACTERIA URNS QL MICRO: ABNORMAL /HPF
BASOPHILS # BLD: 0.1 K/UL (ref 0–0.1)
BASOPHILS NFR BLD: 1 % (ref 0–1)
BILIRUB SERPL-MCNC: 0.6 MG/DL (ref 0.2–1)
BILIRUB UR QL CFM: NEGATIVE
BUN SERPL-MCNC: 25 MG/DL (ref 6–20)
BUN/CREAT SERPL: 17 (ref 12–20)
CALCIUM SERPL-MCNC: 8.9 MG/DL (ref 8.5–10.1)
CHLORIDE SERPL-SCNC: 102 MMOL/L (ref 97–108)
CO2 SERPL-SCNC: 27 MMOL/L (ref 21–32)
COLOR UR: ABNORMAL
CREAT SERPL-MCNC: 1.47 MG/DL (ref 0.7–1.3)
DIFFERENTIAL METHOD BLD: ABNORMAL
EOSINOPHIL # BLD: 0.1 K/UL (ref 0–0.4)
EOSINOPHIL NFR BLD: 2 % (ref 0–7)
EPITH CASTS URNS QL MICRO: ABNORMAL /LPF
ERYTHROCYTE [DISTWIDTH] IN BLOOD BY AUTOMATED COUNT: 20 % (ref 11.5–14.5)
GLOBULIN SER CALC-MCNC: 3.7 G/DL (ref 2–4)
GLUCOSE SERPL-MCNC: 111 MG/DL (ref 65–100)
GLUCOSE UR STRIP.AUTO-MCNC: NEGATIVE MG/DL
HCT VFR BLD AUTO: 43.5 % (ref 36.6–50.3)
HGB BLD-MCNC: 13.4 G/DL (ref 12.1–17)
HGB UR QL STRIP: ABNORMAL
IMM GRANULOCYTES # BLD AUTO: 0 K/UL (ref 0–0.04)
IMM GRANULOCYTES NFR BLD AUTO: 0 % (ref 0–0.5)
KETONES UR QL STRIP.AUTO: ABNORMAL MG/DL
LEUKOCYTE ESTERASE UR QL STRIP.AUTO: ABNORMAL
LYMPHOCYTES # BLD: 1.5 K/UL (ref 0.8–3.5)
LYMPHOCYTES NFR BLD: 21 % (ref 12–49)
MCH RBC QN AUTO: 24.7 PG (ref 26–34)
MCHC RBC AUTO-ENTMCNC: 30.8 G/DL (ref 30–36.5)
MCV RBC AUTO: 80.3 FL (ref 80–99)
MONOCYTES # BLD: 0.6 K/UL (ref 0–1)
MONOCYTES NFR BLD: 8 % (ref 5–13)
NEUTS SEG # BLD: 5 K/UL (ref 1.8–8)
NEUTS SEG NFR BLD: 68 % (ref 32–75)
NITRITE UR QL STRIP.AUTO: POSITIVE
NRBC # BLD: 0 K/UL (ref 0–0.01)
NRBC BLD-RTO: 0 PER 100 WBC
PH UR STRIP: 6.5 [PH] (ref 5–8)
PLATELET # BLD AUTO: 254 K/UL (ref 150–400)
PMV BLD AUTO: 8.9 FL (ref 8.9–12.9)
POTASSIUM SERPL-SCNC: 3.7 MMOL/L (ref 3.5–5.1)
PROT SERPL-MCNC: 7.3 G/DL (ref 6.4–8.2)
PROT UR STRIP-MCNC: >300 MG/DL
RBC # BLD AUTO: 5.42 M/UL (ref 4.1–5.7)
RBC #/AREA URNS HPF: >100 /HPF (ref 0–5)
RBC MORPH BLD: ABNORMAL
SODIUM SERPL-SCNC: 139 MMOL/L (ref 136–145)
SP GR UR REFRACTOMETRY: 1.01 (ref 1–1.03)
UR CULT HOLD, URHOLD: NORMAL
UROBILINOGEN UR QL STRIP.AUTO: 1 EU/DL (ref 0.2–1)
WBC # BLD AUTO: 7.3 K/UL (ref 4.1–11.1)
WBC URNS QL MICRO: ABNORMAL /HPF (ref 0–4)

## 2020-08-14 PROCEDURE — 36415 COLL VENOUS BLD VENIPUNCTURE: CPT

## 2020-08-14 PROCEDURE — 81001 URINALYSIS AUTO W/SCOPE: CPT

## 2020-08-14 PROCEDURE — 74176 CT ABD & PELVIS W/O CONTRAST: CPT

## 2020-08-14 PROCEDURE — 74011250637 HC RX REV CODE- 250/637: Performed by: EMERGENCY MEDICINE

## 2020-08-14 PROCEDURE — 87086 URINE CULTURE/COLONY COUNT: CPT

## 2020-08-14 PROCEDURE — 85025 COMPLETE CBC W/AUTO DIFF WBC: CPT

## 2020-08-14 PROCEDURE — 80053 COMPREHEN METABOLIC PANEL: CPT

## 2020-08-14 PROCEDURE — 99284 EMERGENCY DEPT VISIT MOD MDM: CPT

## 2020-08-14 RX ORDER — CEPHALEXIN 500 MG/1
500 CAPSULE ORAL 2 TIMES DAILY
Qty: 14 CAP | Refills: 0 | Status: SHIPPED | OUTPATIENT
Start: 2020-08-14 | End: 2020-08-21

## 2020-08-14 RX ORDER — CEPHALEXIN 250 MG/1
500 CAPSULE ORAL
Status: COMPLETED | OUTPATIENT
Start: 2020-08-14 | End: 2020-08-14

## 2020-08-14 RX ADMIN — CEPHALEXIN 500 MG: 250 CAPSULE ORAL at 04:39

## 2020-08-14 NOTE — TELEPHONE ENCOUNTER
Spoke with patient, he is short of breath with activity, discussed CXR from 8/11 which showed moderate pleural effusion, he went to ER last night for flank pain and inability to pass urine and his CT showed a large pleural effusion now. Briefly discussed thoracentesis to remove fluid and he seemed agreeable but he was in the urology office and his urologist walked in so he asked that we call him back later to discuss setting up thoracentesis. Abby Ann - please run this by Dr. Stacy Cruz for her approval and then set up thoracentesis if she approves. Thanks.

## 2020-08-14 NOTE — TELEPHONE ENCOUNTER
Per Dr. Claudia Tong: order placed for US guided Thoracentesis. Advised patient to expect a call to scheduled testing.    2 pt identifiers used        Future Appointments   Date Time Provider Wanda Becker   8/18/2020 10:30 AM Sub[facility] H   [facility]21[facility]2020 11:00  [facility]   9/10/2020  8:00 AM MD ZOHREH Luna BS AMB   12/31/2020 10:00 AM Donell Rodriguez MD WEIM BS AMB

## 2020-08-14 NOTE — ED PROVIDER NOTES
80M presenting with CC of avoid to void/urinated. pmh BPH, kidney stone, CABG, AFIB on eliquis and plavix,, hx of elevated PSA, hx of CHF on lasix. Patient reports having pain in right symptoms back yesterday morning. Pain went away however started having difficulty urinating throughout the day. Last night was unable to urinate at 1 AM.  Having increased suprapubic abdominal pain. Patient reports having some blood in the urine yesterday. Patient is followed by Dr. Tata Almodovar and had cystoscopy and prostate biopsy approximately a year and a half ago that reportedly was normal.  Patient is on Flomax and finasteride. 1/08/2019  IMPRESSION:     1. Large irregular filling defect in the bladder with no early enhancement. This  most likely represents clot, but malignancy cannot be entirely excluded. There  is a 2 mm stone in the left side of the bladder. There is mild left  hydroureteronephrosis likely related to this. No evidence of a ureteral stone. There is bilateral nonobstructive nephrolithiasis in the kidneys. 2. Status post cholecystectomy. 3. Diverticulosis coli. 4. Fat-containing left inguinal hernia.     Past Medical History:   Diagnosis Date    Arrhythmia 04/12/2020    cardioversion for SVT    Arthritis     Basal cell carcinoma     BPH (benign prostatic hyperplasia)     CAD (coronary artery disease)     s/p PTCA '92    Calculus of kidney     Chronic pain     BACK    Elevated PSA     Hypercholesterolemia     Lumbar foraminal stenosis     Melanoma ([address]     MVP (mitral valve prolapse)     NSTEMI (non-ST elevated myocardial infarction) ([address] 03/25/2020    Other ill-defined conditions(799.89)     Prostatitis    Prediabetes     Seasonal allergies     Squamous cell carcinoma     Systolic murmur     Thyroid disease     HYPOTHYROID    Vision impairment     R EYE, BLOOD CLOT ON RETINA       Past Surgical History:   Procedure Laterality Date    ENDOSCOPY, COLON, DIAGNOSTIC      due 12    HX BACK SURGERY      HX CHOLECYSTECTOMY  1970    HX CORONARY ARTERY BYPASS GRAFT  03/26/2020    x 5, LIMA to LAD, RSVG to Diag-RI-OM, RSVG to RCA    HX [address] HX LITHOTRIPSY      x2    HX ORTHOPAEDIC      elbow - fx right arm age 3 yrs    NE CARDIOVERSION ELECTIVE ARRHYTHMIA EXTERNAL N/A 4/13/2020    EP CARDIOVERSION performed by Marisol Aiken MD at [address] [facility]         Family History:   Problem Relation Age of Onset    Heart Disease Mother         CHF    Heart Disease Father         CAD    Heart Disease Sister         CAD CABG    Lung Disease Sister     Heart Disease Brother         CAD    Pulmonary Fibrosis Brother     Heart Disease Brother         CAD    Stroke Brother 34        cerebral hemorrhage    Cancer Brother         LUNG    High Cholesterol Daughter     Anesth Problems Neg Hx        Social History     Socioeconomic History    Marital status:      Spouse name: Not on file    Number of children: Not on file    Years of education: Not on file    Highest education level: Not on file   Occupational History    Occupation:      Comment: part time consulting   Social Needs    Financial resource strain: Not on file    Food insecurity     Worry: Not on file     Inability: Not on file   [employer] needs     Medical: Not on file     Non-medical: Not on file   Tobacco Use    Smoking status: Never Smoker    Smokeless tobacco: Never Used   Substance and Sexual Activity    Alcohol use: No    Drug use: No    Sexual activity: Not on file   Lifestyle    Physical activity     Days per week: Not on file     Minutes per session: Not on file    Stress: Not on file   Relationships    Social connections     Talks on phone: Not on file     Gets together: Not on file     Attends Spiritism service: Not on file     Active member of club or organization: Not on file     Attends meetings of clubs or organizations: Not on file Relationship status: Not on file    Intimate partner violence     Fear of current or ex partner: Not on file     Emotionally abused: Not on file     Physically abused: Not on file     Forced sexual activity: Not on file   Other Topics Concern     Service Not Asked    Blood Transfusions Not Asked    Caffeine Concern Not Asked    Occupational Exposure Not Asked    Hobby Hazards Not Asked    Sleep Concern Not Asked    Stress Concern Not Asked    Weight Concern Not Asked    Special Diet Not Asked    Back Care Not Asked    Exercise Not Asked    Bike Helmet Not Asked   [address] Not Asked    Self-Exams Not Asked   Social History Narrative    Not on file         ALLERGIES: Codeine; Pcn [penicillins]; and Sulfa (sulfonamide antibiotics)    Review of Systems   Constitutional: Negative for chills and fever. HENT: Negative for congestion and sore throat. Eyes: Negative for pain. Respiratory: Negative for shortness of breath. Cardiovascular: Negative for chest pain. Gastrointestinal: Positive for abdominal pain (suprapubic pain). Negative for constipation, diarrhea, nausea and vomiting. Genitourinary: Positive for difficulty urinating, dysuria, flank pain (resolved), frequency and hematuria. Negative for penile pain, penile swelling, scrotal swelling and testicular pain. Musculoskeletal: Negative for back pain and neck pain. Skin: Negative for rash. Neurological: Negative for dizziness and headaches. Vitals:    08/14/20 0259 08/14/20 0301   Pulse: 89    Resp:  22   Temp: 97.8 °F (36.6 °C)    SpO2: 96%             Physical Exam  HENT:      Head: Normocephalic. Nose: Nose normal.      Mouth/Throat:      Pharynx: Oropharynx is clear. Eyes:      Conjunctiva/sclera: Conjunctivae normal.   Neck:      Musculoskeletal: Neck supple. Cardiovascular:      Rate and Rhythm: Normal rate. Pulmonary:      Effort: Pulmonary effort is normal. No respiratory distress.    Abdominal: General: Abdomen is flat. Palpations: Abdomen is soft. Tenderness: There is abdominal tenderness in the suprapubic area. There is no right CVA tenderness, left CVA tenderness, guarding or rebound. Negative signs include Harman's sign and McBurney's sign. Musculoskeletal: Normal range of motion. Skin:     General: Skin is warm. Capillary Refill: Capillary refill takes less than 2 seconds. Neurological:      General: No focal deficit present. Mental Status: He is alert. MDM  Number of Diagnoses or Management Options  Blood clot in bladder:   Pleural effusion:   Urinary retention:   Urinary tract infection with hematuria, site unspecified:   Diagnosis management comments: Patient coming in with urinary retention and hematuria. Has history of BPH and history of elevated PSA. Supposedly followed by Dr. Terrance Huitron who is done a biopsy approximately 1-1/2 years ago which was normal.  CAT scan from January 2019: Showed a large irregular filling defect of the bladder hematoma versus mass. Side ultrasound today showing large masslike structure unclear if this is a mass or hematoma. When Russell was placed due to urinary retention greater than 600 cc of bloody urine was drained but no clots. We will perform CAT scan for further evaluation of kidney stones and bladder mass. Patient and his wife was at bedside were advised that they need to follow-up with their urologist, Dr. Terrance Huitron and that the Russell would remain in place. Patient has an appointment with urologist today at 10:30 AM.  Based on urine will start patient on antibiotics and urine culture. Patient has allergies to sulfa and allergy to penicillin which is only rash. Cipro can cause prolonged QT with his amiodarone will avoid this. Since his reaction to penicillin is only rash and cross-reactivity with Keflex is extremely low we will start Keflex at this time.   This was discussed with the patient and his wife who are agreeable with plan. Discussed the discharge impression and any labs and the results with the patient. Answered any questions and addressed any concerns. Discussed the importance of following up with their primary care provider and/or specialist.  Discussed signs or symptoms that would warrant return back to the ER for further evaluation. The patient is agreeable with discharge.              Amount and/or Complexity of Data Reviewed  Clinical lab tests: reviewed  Tests in the radiology section of CPT®: reviewed      ED Course as of Aug 14 0409   Fri Aug 14, 2020   0353 Nitrites(!): Positive [ZD]   0402 Blood(!): LARGE [ZD]   0408 RBC(!): >100 [ZD]   0409 WBC: 5-10 [ZD]      ED Course User Index  [ZD] Dina Clay MD       Procedures

## 2020-08-15 LAB
BACTERIA SPEC CULT: NORMAL
SERVICE CMNT-IMP: NORMAL

## 2020-08-18 ENCOUNTER — APPOINTMENT (OUTPATIENT)
Dept: CARDIAC REHAB | Age: 80
End: 2020-08-18
Payer: MEDICARE

## 2020-08-18 ENCOUNTER — HOSPITAL ENCOUNTER (OUTPATIENT)
Dept: CARDIAC REHAB | Age: 80
Discharge: HOME | End: 2020-08-18
Payer: MEDICARE

## 2020-08-18 ENCOUNTER — TELEPHONE (OUTPATIENT)
Dept: CARDIOLOGY CLINIC | Age: 80
End: 2020-08-18

## 2020-08-18 VITALS — WEIGHT: 177 LBS | BODY MASS INDEX: 24.69 KG/M2

## 2020-08-18 DIAGNOSIS — R06.09 DOE (DYSPNEA ON EXERTION): ICD-10-CM

## 2020-08-18 DIAGNOSIS — I25.10 ATHEROSCLEROSIS OF NATIVE CORONARY ARTERY OF NATIVE HEART WITHOUT ANGINA PECTORIS: ICD-10-CM

## 2020-08-18 DIAGNOSIS — I48.0 PAROXYSMAL ATRIAL FIBRILLATION (HCC): ICD-10-CM

## 2020-08-18 DIAGNOSIS — Z01.818 PRE-OP TESTING: ICD-10-CM

## 2020-08-18 DIAGNOSIS — Z01.818 PRE-OP TESTING: Primary | ICD-10-CM

## 2020-08-18 DIAGNOSIS — J90 BILATERAL PLEURAL EFFUSION: ICD-10-CM

## 2020-08-18 PROCEDURE — 93798 PHYS/QHP OP CAR RHAB W/ECG: CPT

## 2020-08-18 NOTE — TELEPHONE ENCOUNTER
The following message given to patient per Dr. Jerilyn Blankenship:    He has recent optic nerve clot, CABG 3/20 may hold his eliquis 2 days but should not hold plavix. Per Dr. Jerilyn Blankenship he may hold Plavix if okay with his eye doctor. Patient wants to re-scheduled for next week.  Shivam with [facility] scheduling will call me back tomorrow with a new date/time (and day but Tuesday)    2 pt identifiers used

## 2020-08-18 NOTE — TELEPHONE ENCOUNTER
Shivam smith/ [facility] [employer] Scheduling is calling to speak with you in regards to the patient holding eliquis and plavix 5 days prior to his thoracentesis, scheduled this Friday. Please advise.     Phone #: [phone]  Thanks

## 2020-08-19 NOTE — TELEPHONE ENCOUNTER
Pt's spouse would like to talk to the nurse in regards to the pt's 9 and 10 am medications and if the procedure for 8/20 is still planned.  Please advise      Phone:[phone]  Or [phone]

## 2020-08-20 ENCOUNTER — HOSPITAL ENCOUNTER (OUTPATIENT)
Dept: LAB | Age: 80
Discharge: HOME | End: 2020-08-20
Payer: MEDICARE

## 2020-08-20 ENCOUNTER — HOSPITAL ENCOUNTER (OUTPATIENT)
Dept: CARDIAC REHAB | Age: 80
Discharge: HOME | End: 2020-08-20
Payer: MEDICARE

## 2020-08-20 VITALS — BODY MASS INDEX: 24.94 KG/M2 | WEIGHT: 178.8 LBS

## 2020-08-20 PROCEDURE — 36415 COLL VENOUS BLD VENIPUNCTURE: CPT

## 2020-08-20 PROCEDURE — 85730 THROMBOPLASTIN TIME PARTIAL: CPT

## 2020-08-20 PROCEDURE — 93798 PHYS/QHP OP CAR RHAB W/ECG: CPT

## 2020-08-20 PROCEDURE — 85027 COMPLETE CBC AUTOMATED: CPT

## 2020-08-20 PROCEDURE — 85610 PROTHROMBIN TIME: CPT

## 2020-08-20 NOTE — CARDIO/PULMONARY
[address]  Completed phase II cardiac rehab and attended 36 sessions from 5/26/20-8/20/20. Anupama Taveras is interested in maintaining optimal health and will work with Dr. Stacy Cruz. Anupama Taveras has improved his endurance and stamina through regular exercise lost 14 lbs and 1.5 inches from his waist. Blood pressure is 120/58 and is WNL. He has also improved his nutrition and [facility] scores and these were reviewed with patient. Lalyin Annabel Sawyer plans to continue exercising at home, walking a mile a day and using dumbells. He has been referred to the [facility] gym program and has set revised goals that include cardio equipment, light weights and walking 3 to 5 times a week for at least 30 minutes.     Kannan Mackenzie RN  8/20/2020

## 2020-08-20 NOTE — TELEPHONE ENCOUNTER
Rescheduled to Wednesday the 26th at 8:30 am at [facility], arriving at 8:00, no food after midnight, Hold Elquis 48 hours, hold Plavix 5 days prior. Plan on staying there for 4 hours. Patient to have labs drawn today 8/20/20 as required. Above information given to patient.    2 pt identifiers used

## 2020-08-20 NOTE — TELEPHONE ENCOUNTER
Patient is calling to speak with Johnathon Cam in regards to his thoracentesis. Please advise.     Phone #: [phone]  Thanks

## 2020-08-21 ENCOUNTER — APPOINTMENT (OUTPATIENT)
Dept: CARDIAC REHAB | Age: 80
End: 2020-08-21
Payer: MEDICARE

## 2020-08-21 LAB
APTT PPP: 30 SEC (ref 24–33)
ERYTHROCYTE [DISTWIDTH] IN BLOOD BY AUTOMATED COUNT: 19 % (ref 11.6–15.4)
HCT VFR BLD AUTO: 42.8 % (ref 37.5–51)
HGB BLD-MCNC: 13.1 G/DL (ref 13–17.7)
INR PPP: 1.1 (ref 0.8–1.2)
MCH RBC QN AUTO: 25 PG (ref 26.6–33)
MCHC RBC AUTO-ENTMCNC: 30.6 G/DL (ref 31.5–35.7)
MCV RBC AUTO: 82 FL (ref 79–97)
PLATELET # BLD AUTO: 282 X10E3/UL (ref 150–450)
PROTHROMBIN TIME: 11.4 SEC (ref 9.1–12)
RBC # BLD AUTO: 5.25 X10E6/UL (ref 4.14–5.8)
WBC # BLD AUTO: 7.1 X10E3/UL (ref 3.4–10.8)

## 2020-08-26 ENCOUNTER — HOSPITAL ENCOUNTER (OUTPATIENT)
Dept: ULTRASOUND IMAGING | Age: 80
Discharge: HOME | End: 2020-08-26
Attending: INTERNAL MEDICINE
Payer: MEDICARE

## 2020-08-26 ENCOUNTER — HOSPITAL ENCOUNTER (OUTPATIENT)
Dept: GENERAL RADIOLOGY | Age: 80
Discharge: HOME | End: 2020-08-26
Attending: RADIOLOGY
Payer: MEDICARE

## 2020-08-26 VITALS
BODY MASS INDEX: 23.94 KG/M2 | DIASTOLIC BLOOD PRESSURE: 58 MMHG | HEART RATE: 74 BPM | WEIGHT: 171 LBS | TEMPERATURE: 98.7 F | SYSTOLIC BLOOD PRESSURE: 128 MMHG | OXYGEN SATURATION: 98 % | RESPIRATION RATE: 20 BRPM | HEIGHT: 71 IN

## 2020-08-26 PROCEDURE — C1729 CATH, DRAINAGE: HCPCS

## 2020-08-26 PROCEDURE — 71045 X-RAY EXAM CHEST 1 VIEW: CPT

## 2020-08-26 PROCEDURE — 32555 ASPIRATE PLEURA W/ IMAGING: CPT

## 2020-08-26 PROCEDURE — 74011000250 HC RX REV CODE- 250: Performed by: RADIOLOGY

## 2020-08-26 PROCEDURE — 77030032034 HC SYS EVAC ASEPT DISP BBMI -B

## 2020-08-26 RX ORDER — SODIUM BICARBONATE 42 MG/ML
1 INJECTION, SOLUTION INTRAVENOUS
Status: COMPLETED | OUTPATIENT
Start: 2020-08-26 | End: 2020-08-26

## 2020-08-26 RX ORDER — LIDOCAINE HYDROCHLORIDE 10 MG/ML
10 INJECTION, SOLUTION EPIDURAL; INFILTRATION; INTRACAUDAL; PERINEURAL
Status: ACTIVE | OUTPATIENT
Start: 2020-08-26 | End: 2020-08-26

## 2020-08-26 RX ADMIN — SODIUM BICARBONATE 1 ML: 42 INJECTION, SOLUTION INTRAVENOUS at 09:38

## 2020-08-26 NOTE — PROGRESS NOTES
Dr. Yaneli Blake kay off 900 cc of clear yellow pleural fluid from right side of chest.  Postprocedure chest xray done. Pt denies SOB or chest pain. Reviewed discharge instructions with pt and wife. Pt and wife verbalized understanding of instructions. Pt discharged to home via w/c with wife with instructions and belongings.

## 2020-08-26 NOTE — DISCHARGE INSTRUCTIONS
[address] THORACENTESIS DISCHARGE INSTRUCTIONS    General Information:  During this procedures, the doctor will insert a needle into the body to drain fluid from the chest. After the procedure, you will be able to take a deep breath much easier. The site of the puncture may ooze the first day. This will decrease and eventually stop. With the Thoracentesis (draining fluid from the chest), there is a risk of air leaking into the chest around the lung, and risk of bleeding into the chest, with the resulting pressure on the lung possibly making it collapse. A chest x-ray is done after the procedure to detect possible complications. Home Care Instructions:  Keep the puncture site clean and dry. No tub baths or swimming until puncture site heals. Showering is acceptable. Resume your normal diet, and resume your normal activity slowly and as you tolerate. If you are short of breath, rest. If shortness of breath does not ease, please call your ordering doctor. Fluid can re-accumulate in the chest and/or in the abdomen. If this should occur, your doctor needs to know as you may need to have the procedure done again. Call If:     You should call your Physician and/or the Radiology Nurse if you notice any signs of infection, like pus draining, or if it is swollen or reddened. Also call if you have a fever, or if you are bleeding from the puncture site more than a small amount on the dressing. SEEK IMMEDIATE CARE OR CALL 911 IF YOU SUDDENLY HAVE TROUBLE BREATHING, IF YOU HAVE INCREASING CHEST PAIN, IF YOUR LIPS TURN BLUE, OR IF YOU NOTICE BLOOD IN YOUR SPUTUM. Follow-Up Instructions: Please see your ordering doctor as he/she has requested. To Reach Us:  Side effects of sedation medications and other medications used today have been reviewed. Notify us of nausea, itching, hives, dizziness, or anything else out of the ordinary.        Should you experience any of these significant changes, please call [phone] between the hours of 7:30 am and 10 pm or [phone] after hours.  After hours, ask the  to page the [address] Technologist, and describe the problem to the technologist.          Date: 8/26/2020  Discharging Nurse: Danuta Carrasco RN

## 2020-09-10 ENCOUNTER — OFFICE VISIT (OUTPATIENT)
Dept: CARDIOLOGY CLINIC | Age: 80
End: 2020-09-10
Payer: MEDICARE

## 2020-09-10 VITALS
HEART RATE: 70 BPM | RESPIRATION RATE: 15 BRPM | DIASTOLIC BLOOD PRESSURE: 52 MMHG | SYSTOLIC BLOOD PRESSURE: 100 MMHG | BODY MASS INDEX: 24.78 KG/M2 | WEIGHT: 177 LBS | HEIGHT: 71 IN | OXYGEN SATURATION: 98 %

## 2020-09-10 DIAGNOSIS — D64.9 ANEMIA, UNSPECIFIED TYPE: ICD-10-CM

## 2020-09-10 DIAGNOSIS — I21.4 NSTEMI (NON-ST ELEVATED MYOCARDIAL INFARCTION) (HCC): ICD-10-CM

## 2020-09-10 DIAGNOSIS — I47.1 SUSTAINED SVT (HCC): ICD-10-CM

## 2020-09-10 DIAGNOSIS — I10 ESSENTIAL HYPERTENSION, BENIGN: ICD-10-CM

## 2020-09-10 DIAGNOSIS — R06.09 DOE (DYSPNEA ON EXERTION): ICD-10-CM

## 2020-09-10 DIAGNOSIS — I48.0 PAROXYSMAL ATRIAL FIBRILLATION (HCC): ICD-10-CM

## 2020-09-10 DIAGNOSIS — I73.9 PERIPHERAL VASCULAR DISEASE (HCC): ICD-10-CM

## 2020-09-10 DIAGNOSIS — E78.2 MIXED HYPERLIPIDEMIA: Primary | ICD-10-CM

## 2020-09-10 DIAGNOSIS — I45.10 RBBB: ICD-10-CM

## 2020-09-10 DIAGNOSIS — J90 BILATERAL PLEURAL EFFUSION: ICD-10-CM

## 2020-09-10 DIAGNOSIS — I25.10 ATHEROSCLEROSIS OF NATIVE CORONARY ARTERY OF NATIVE HEART WITHOUT ANGINA PECTORIS: ICD-10-CM

## 2020-09-10 PROCEDURE — G8420 CALC BMI NORM PARAMETERS: HCPCS | Performed by: INTERNAL MEDICINE

## 2020-09-10 PROCEDURE — 93005 ELECTROCARDIOGRAM TRACING: CPT | Performed by: INTERNAL MEDICINE

## 2020-09-10 PROCEDURE — G8752 SYS BP LESS 140: HCPCS | Performed by: INTERNAL MEDICINE

## 2020-09-10 PROCEDURE — 99214 OFFICE O/P EST MOD 30 MIN: CPT | Performed by: INTERNAL MEDICINE

## 2020-09-10 PROCEDURE — G8536 NO DOC ELDER MAL SCRN: HCPCS | Performed by: INTERNAL MEDICINE

## 2020-09-10 PROCEDURE — G8427 DOCREV CUR MEDS BY ELIG CLIN: HCPCS | Performed by: INTERNAL MEDICINE

## 2020-09-10 PROCEDURE — G8754 DIAS BP LESS 90: HCPCS | Performed by: INTERNAL MEDICINE

## 2020-09-10 PROCEDURE — G0463 HOSPITAL OUTPT CLINIC VISIT: HCPCS | Performed by: INTERNAL MEDICINE

## 2020-09-10 PROCEDURE — 93010 ELECTROCARDIOGRAM REPORT: CPT | Performed by: INTERNAL MEDICINE

## 2020-09-10 PROCEDURE — 1101F PT FALLS ASSESS-DOCD LE1/YR: CPT | Performed by: INTERNAL MEDICINE

## 2020-09-10 PROCEDURE — G8432 DEP SCR NOT DOC, RNG: HCPCS | Performed by: INTERNAL MEDICINE

## 2020-09-10 NOTE — PROGRESS NOTES
[facility] [place]: Will Roberts  [phone]    HPI: Kingston Harrington, kanchan [de-identified]y.o. year-old who presents for follow up regarding his CAD s/p CABG in 3/20    Having dyspnea with exercise but no PND   Going to cardiac rehab now - reviewed his report from yesterday  He walks his dog twice/day, says he walks about 2 miles/day, doing some light weights at home too  Denies any more leg weakness   No chest pain or palpitations  Denies any LE edema, not wearing compression socks anymore  Says he has noticed some abdominal distention and he is not taking the lasix anymore due to lack of LE edema  No dizziness or lightheadedness    Since the last visit here he went for thoracentesis to lessen his pleural effusion he was able to hold his Eliquis and his Plavix for that procedure without ill effect. He feels much better and his breathing is better now after that procedure was done he is back to walking has more energy no chest pain no new cardiac symptoms. We reviewed the incidence of postoperative pleural effusion and the potential for reaccumulation. He will continue to watch his symptoms and let me know if he is feeling like the fluid is coming back. Otherwise his biggest question is will be able to stop the amiodarone Dr. Lois Landa had indicated to him that it would be stopped a few months postop. We deferred stopping it because of the various complications that were happening again to continue it at a low dose for 3 more months just 100 mg once a day and then if he remains controlled without any big bumps in the road we will go ahead and stop it completely at his December appointment    Assessment/Plan:  1. Intra-op bradycardia/2nd deg block/first degree AV block preop & Postop A fib on 3/31/20, limited AFlutter  -continue Toprol XL 12.5mg nightly  -continue Eliquis 5mg BID, no unusual bleeding or bruising  -s/p Cardioversion 4/13/20  -continue amiodarone 100mg daily   2.  CAD with NSTEMI and then s/p CABG in 3/20  -continue statin, Toprol XL, plavix (no ASA with Eliquis and Plavix)  3. HTN - borderline low but asymptomatic, continue Toprol XL  4. HLD - continue atorvastatin, LDL 83 in 3/20  5. Anemia - postoperative secondary to acute blood loss, last Hgb 9.8 in 4/20 watching CBC  6. Pre-diabetes - A1c 5.9%, encouraged regular exercise   7. Hypothyroidism - on synthroid, followed by PCP   8. Mitral valve prolapse/MR/AS - all mild on intra-operative WILMER  -3/6 ESPERANZA audible on exam    -TTE today shows mild AI, mod-severe MR, mild TR, mod PI  - lasix 20mg daily and KCL 10meq daily  9. Pleural effusions -feeling better post thoracentesis  10. BPH - continue flomax and finasteride      Echo 6/20 - EF 55-60%, mild cLVH, no WMA, mod AV sclerosis with mild AI, mod-severe MR, mild TR, mod PI, mild PA HTN (40mmHg), trivial circumferential pericardial effusion, bilateral pleural effusions  DCCV 4/13/20  CABG x 5 on 3/26/20 with Dr. Graciela BEYER to LAD, saphenous vein graft to diagonal to ramus intermedius to obtuse marginal; saphenous vein graft to right coronary artery  Carotid duplex 3/20 - <50% bilateral ICA stenosis  SHANE 3/20 - WNL    Fam Hx: no early cad  Soc Hx: no tob use, no etoh use    He  has a past medical history of Arrhythmia (04/12/2020), Arthritis, Basal cell carcinoma, BPH (benign prostatic hyperplasia), CAD (coronary artery disease), Calculus of kidney, Chronic pain, Elevated PSA, Hypercholesterolemia, Lumbar foraminal stenosis, Melanoma ([address] MVP (mitral valve prolapse), NSTEMI (non-ST elevated myocardial infarction) ([address] (03/25/2020), Other ill-defined conditions(799.89), Prediabetes, Seasonal allergies, Squamous cell carcinoma, Stroke ([address] Systolic murmur, Thyroid disease, and Vision impairment. He also has no past medical history of Adverse effect of anesthesia or Unspecified sleep apnea.     Cardiovascular ROS: positive for - dyspnea on exertion  Respiratory ROS: positive for - shortness of breath  Neurological ROS: no TIA or stroke symptoms  All other systems negative except as above. PE  Vitals:    09/10/20 0813   BP: 100/52   Pulse: 70   Resp: 15   SpO2: 98%   Weight: 177 lb (80.3 kg)   Height: 5' 11\" (1.803 m)    Body mass index is 24.69 kg/m².   General appearance - alert, well appearing, and in no distress  Mental status - affect appropriate to mood  Eyes - sclera anicteric, moist mucous membranes  Neck - supple, no significant adenopathy  Lymphatics - no lymphadenopathy  Chest - clear to auscultation, no wheezes, rales or rhonchi  Heart - normal rate, regular rhythm, normal S1, S2, 2/6 ESPERANZA   Abdomen - soft, nontender, slightly distended   Back exam - full range of motion, no tenderness  Neurological - cranial nerves II through XII grossly intact, no focal deficit  Musculoskeletal - no muscular tenderness noted, normal strength  Extremities - peripheral pulses normal, no pedal edema  Skin - normal coloration  no rashes    Recent Labs:  Lab Results   Component Value Date/Time    Cholesterol, total 149 06/25/2020 08:55 AM    HDL Cholesterol 58 06/25/2020 08:55 AM    LDL, calculated 76 06/25/2020 08:55 AM    Triglyceride 76 06/25/2020 08:55 AM    CHOL/HDL Ratio 2.6 03/25/2020 09:00 AM     Lab Results   Component Value Date/Time    Creatinine (POC) 0.7 01/08/2019 07:47 PM    Creatinine 1.47 (H) 08/14/2020 03:19 AM     Lab Results   Component Value Date/Time    BUN 25 (H) 08/14/2020 03:19 AM    BUN (POC) 21 (H) 01/08/2019 07:47 PM     Lab Results   Component Value Date/Time    Potassium 3.7 08/14/2020 03:19 AM     Lab Results   Component Value Date/Time    Hemoglobin A1c 6.2 (H) 06/25/2020 08:55 AM     Lab Results   Component Value Date/Time    Hemoglobin (POC) 16.0 12/11/2014 09:43 AM    HGB 13.1 08/20/2020 12:34 PM     Lab Results   Component Value Date/Time    PLATELET 780 18/20/4420 12:34 PM       Reviewed:  Past Medical History:   Diagnosis Date    Arrhythmia 04/12/2020    cardioversion for SVT    Arthritis     Basal cell carcinoma     BPH (benign prostatic hyperplasia)     CAD (coronary artery disease)     s/p PTCA '92    Calculus of kidney     Chronic pain     BACK    Elevated PSA     Hypercholesterolemia     Lumbar foraminal stenosis     [facility]     MVP (mitral valve prolapse)     NSTEMI (non-ST elevated myocardial infarction) ([facility] 03/25/2020    Other ill-defined conditions(799.89)     Prostatitis    Prediabetes     Seasonal allergies     Squamous cell carcinoma     Stroke ([facility]     with left eye visual loss; 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    Systolic murmur     Thyroid disease     HYPOTHYROID    Vision impairment     R EYE, BLOOD CLOT ON RETINA     Social History     Tobacco Use   Smoking Status Never Smoker   Smokeless Tobacco Never Used     Social History     Substance and Sexual Activity   Alcohol Use No     Allergies   Allergen Reactions    Codeine Rash    Pcn [Penicillins] Rash    Sulfa (Sulfonamide Antibiotics) Other (comments)     confusion       Current Outpatient Medications   Medication Sig    vitamins  A,C,E-zinc-copper (Ocuvite PreserVision) 7,160-113-100 unit-mg-unit tab tablet Take 1 Tab by mouth daily.  furosemide (LASIX) 20 mg tablet Take 1 Tab by mouth daily.  potassium chloride (KLOR-CON) 10 mEq tablet Take 1 Tab by mouth daily. (Patient taking differently: Take 20 mEq by mouth daily.)    levothyroxine (SYNTHROID) 50 mcg tablet TAKE 1 TABLET BY MOUTH EVERY DAY BEFORE BREAKFAST EXCEPT TAKE 2 TABS ON SATURDAY.  Eliquis 5 mg tablet TAKE 1 TAB BY MOUTH EVERY 12 HOURS    amiodarone (CORDARONE) 200 mg tablet Take 0.5 Tabs by mouth every evening. Indications: atrial fibrillation electrically shocked to normal rhythm    metoprolol succinate (Toprol XL) 25 mg XL tablet Take 0.5 Tabs by mouth nightly. Indications: high blood pressure    clopidogreL (PLAVIX) 75 mg tab Take 1 Tab by mouth daily.  atorvastatin (LIPITOR) 40 mg tablet TAKE 1 TAB BY MOUTH DAILY.     acetaminophen (TYLENOL) 325 mg tablet Take 2 Tabs by mouth every four (4) hours as needed for Pain.  tamsulosin (FLOMAX) 0.4 mg capsule TAKE 1 CAPSULE BY MOUTH EVERY DAY 30 MINUTES AFTER THE SAME MEAL EACH DAY    Cholecalciferol, Vitamin D3, (VITAMIN D3) 2,000 unit cap capsule Take 2,000 Units by mouth two (2) times a day.  finasteride (PROSCAR) 5 mg tablet Take 5 mg by mouth daily. HS     No current facility-administered medications for this visit.         Dennis Be MD  [facility] [place]  [address] [facility] [address]  [address]

## 2020-09-29 ENCOUNTER — TELEPHONE (OUTPATIENT)
Dept: CARDIOLOGY CLINIC | Age: 80
End: 2020-09-29

## 2020-09-29 NOTE — TELEPHONE ENCOUNTER
Patient called in while at the dentist office wanting to know if he could have a teeth cleaning. No valve replacement, no pacemaker. Prophylactic antibiotics are not needed prior to dental work from a cardiac standpoint. Continue Eliquis and Plavix as prescribed, do not stop. Cabg > 60 days. My proceed with cleaning.     2 pt identifiers used

## 2020-09-29 NOTE — TELEPHONE ENCOUNTER
Pt states he went to the dentist and needs Dr. Vadim Greenberg permission to get his teeth cleaned.  Please advise      Phone:[phone]

## 2020-11-02 RX ORDER — LEVOTHYROXINE SODIUM 50 UG/1
TABLET ORAL
Qty: 112 TAB | Refills: 0 | Status: SHIPPED | OUTPATIENT
Start: 2020-11-02 | End: 2021-03-10

## 2020-11-25 RX ORDER — ATORVASTATIN CALCIUM 40 MG/1
TABLET, FILM COATED ORAL
Qty: 90 TAB | Refills: 2 | Status: SHIPPED | OUTPATIENT
Start: 2020-11-25 | End: 2021-08-02 | Stop reason: SINTOL

## 2020-12-09 ENCOUNTER — OFFICE VISIT (OUTPATIENT)
Dept: CARDIOLOGY CLINIC | Age: 80
End: 2020-12-09
Payer: MEDICARE

## 2020-12-09 ENCOUNTER — TELEPHONE (OUTPATIENT)
Dept: CARDIOLOGY CLINIC | Age: 80
End: 2020-12-09

## 2020-12-09 VITALS
HEIGHT: 71 IN | SYSTOLIC BLOOD PRESSURE: 100 MMHG | DIASTOLIC BLOOD PRESSURE: 70 MMHG | OXYGEN SATURATION: 97 % | BODY MASS INDEX: 25.34 KG/M2 | RESPIRATION RATE: 14 BRPM | HEART RATE: 74 BPM | WEIGHT: 181 LBS

## 2020-12-09 DIAGNOSIS — E78.2 MIXED HYPERLIPIDEMIA: Primary | ICD-10-CM

## 2020-12-09 DIAGNOSIS — I21.4 NSTEMI (NON-ST ELEVATED MYOCARDIAL INFARCTION) (HCC): ICD-10-CM

## 2020-12-09 DIAGNOSIS — I25.10 ATHEROSCLEROSIS OF NATIVE CORONARY ARTERY OF NATIVE HEART WITHOUT ANGINA PECTORIS: ICD-10-CM

## 2020-12-09 DIAGNOSIS — I34.0 NONRHEUMATIC MITRAL VALVE REGURGITATION: ICD-10-CM

## 2020-12-09 DIAGNOSIS — D64.9 ANEMIA, UNSPECIFIED TYPE: ICD-10-CM

## 2020-12-09 DIAGNOSIS — I73.9 PERIPHERAL VASCULAR DISEASE (HCC): ICD-10-CM

## 2020-12-09 DIAGNOSIS — I45.10 RBBB: ICD-10-CM

## 2020-12-09 DIAGNOSIS — I10 ESSENTIAL HYPERTENSION, BENIGN: ICD-10-CM

## 2020-12-09 DIAGNOSIS — I47.1 SUSTAINED SVT (HCC): ICD-10-CM

## 2020-12-09 DIAGNOSIS — R06.09 DOE (DYSPNEA ON EXERTION): ICD-10-CM

## 2020-12-09 PROCEDURE — G8752 SYS BP LESS 140: HCPCS | Performed by: INTERNAL MEDICINE

## 2020-12-09 PROCEDURE — 1101F PT FALLS ASSESS-DOCD LE1/YR: CPT | Performed by: INTERNAL MEDICINE

## 2020-12-09 PROCEDURE — G0463 HOSPITAL OUTPT CLINIC VISIT: HCPCS | Performed by: INTERNAL MEDICINE

## 2020-12-09 PROCEDURE — G8754 DIAS BP LESS 90: HCPCS | Performed by: INTERNAL MEDICINE

## 2020-12-09 PROCEDURE — 99214 OFFICE O/P EST MOD 30 MIN: CPT | Performed by: INTERNAL MEDICINE

## 2020-12-09 PROCEDURE — G8510 SCR DEP NEG, NO PLAN REQD: HCPCS | Performed by: INTERNAL MEDICINE

## 2020-12-09 PROCEDURE — G8427 DOCREV CUR MEDS BY ELIG CLIN: HCPCS | Performed by: INTERNAL MEDICINE

## 2020-12-09 RX ORDER — FUROSEMIDE 20 MG/1
20 TABLET ORAL EVERY OTHER DAY
Qty: 90 TAB | Refills: 1 | Status: SHIPPED | OUTPATIENT
Start: 2020-12-09 | End: 2021-02-16

## 2020-12-09 RX ORDER — POTASSIUM CHLORIDE 750 MG/1
TABLET, FILM COATED, EXTENDED RELEASE ORAL EVERY OTHER DAY
COMMUNITY
End: 2021-04-07 | Stop reason: ALTCHOICE

## 2020-12-09 RX ORDER — POTASSIUM CHLORIDE 750 MG/1
20 TABLET, EXTENDED RELEASE ORAL EVERY OTHER DAY
Qty: 90 TAB | Refills: 1 | Status: SHIPPED | OUTPATIENT
Start: 2020-12-09 | End: 2020-12-09

## 2020-12-09 NOTE — TELEPHONE ENCOUNTER
[facility] is calling to get clarification on the potassium dosage. Please advise.     Phone #: [phone]  Thanks

## 2020-12-09 NOTE — TELEPHONE ENCOUNTER
Returned call to pharmacy, 2 pt identifiers used    Clarified dosing for Potassium. Patient's spouse states he has only been taking Potassium 10 meq every day now will be taking every other day.

## 2020-12-09 NOTE — PROGRESS NOTES
[address] [address]: Fabio Flores  [phone]    HPI: Sulmean Briones, a [de-identified]y.o. year-old who presents for follow up regarding his CAD s/p CABG in 3/20    He is feeling pretty good, wants to come off of his medications   Continues to exercise, and breathing is doing fine. He walks his dog twice/day, says he walks about 2 miles/day, doing some light weights at home too  Denies any more leg weakness   No chest pain or palpitations  Denies any LE edema, not wearing compression socks anymore    No dizziness or lightheadedness    No signs of fluid accumulation after his last thoracentesis. He has no fluid overload although he may continue to benefit from a low-dose of diuretic secondary to his valvular heart disease. We discussed stopping his amiodarone today as he wishes to minimize his medications and his A. fib was postop and hopefully will not return. We extended his course because of all of the issues with thoracentesis pleural effusions but at this time he is stable and it seems prudent to stop the amiodarone. He will follow-up with Dr. Arabella Lee in a couple of weeks and he can recheck his rhythm and blood pressure at that time. In addition his creatinine was up a little bit on his last labs so he probably does not need as much   diuretic as he has been taking  Assessment/Plan:  1. Intra-op bradycardia/2nd deg block/first degree AV block preop & Postop A fib on 3/31/20, limited AFlutter  -continue Toprol XL 12.5mg nightly  -continue Eliquis 5mg BID, no unusual bleeding or bruising  -s/p Cardioversion 4/13/20  -stop amio 12/20  2.  CAD with NSTEMI and then s/p CABG in 3/20  -continue statin, Toprol XL, plavix (no ASA with Eliquis and Plavix)  3. HTN - borderline low but asymptomatic, continue Toprol XL  4. HLD - continue atorvastatin, LDL , recheck coming  5. Anemia - postoperative secondary to acute blood loss, stable now  6. Pre-diabetes - A1c 5.9%, encouraged regular exercise   7.  Hypothyroidism - on synthroid, followed by PCP   8. Mitral valve prolapse/MR/AS - all mild on intra-operative WIMLER  -3/6 ESPERANZA audible on exam    -TTE with mild AI, mod-severe MR, mild TR, mod PI, fu 2021  - lasix 20mg daily and KCL 10meq TIW  9. Pleural effusions -feeling better post thoracentesis  10. BPH - continue flomax and finasteride      Echo 6/20 - EF 55-60%, mild cLVH, no WMA, mod AV sclerosis with mild AI, mod-severe MR, mild TR, mod PI, mild PA HTN (40mmHg), trivial circumferential pericardial effusion, bilateral pleural effusions  DCCV 4/13/20  CABG x 5 on 3/26/20 with Dr. Esteban Callaway - BEYER to LAD, saphenous vein graft to diagonal to ramus intermedius to obtuse marginal; saphenous vein graft to right coronary artery  Carotid duplex 3/20 - <50% bilateral ICA stenosis  SHANE 3/20 - WNL    Fam Hx: no early cad  Soc Hx: no tob use, no etoh use    He  has a past medical history of Arrhythmia (04/12/2020), Arthritis, Basal cell carcinoma, BPH (benign prostatic hyperplasia), CAD (coronary artery disease), Calculus of kidney, Chronic pain, Elevated PSA, Hypercholesterolemia, Lumbar foraminal stenosis, Melanoma ([address] MVP (mitral valve prolapse), NSTEMI (non-ST elevated myocardial infarction) ([facility] [address] (03/25/2020), Other ill-defined conditions(799.89), Prediabetes, Seasonal allergies, Squamous cell carcinoma, Stroke ([address] Systolic murmur, Thyroid disease, and Vision impairment. He also has no past medical history of Adverse effect of anesthesia or Unspecified sleep apnea. Cardiovascular ROS: positive for - dyspnea on exertion  Respiratory ROS: positive for - shortness of breath  Neurological ROS: no TIA or stroke symptoms  All other systems negative except as above. PE  Vitals:    12/09/20 0816   BP: 100/70   Pulse: 74   Resp: 14   SpO2: 97%   Weight: 181 lb (82.1 kg)   Height: 5' 11\" (1.803 m)    Body mass index is 25.24 kg/m².   General appearance - alert, well appearing, and in no distress  Mental status - affect appropriate to mood  Eyes - sclera anicteric, moist mucous membranes  Neck - supple, no significant adenopathy  Lymphatics - no lymphadenopathy  Chest - clear to auscultation, no wheezes, rales or rhonchi  Heart - normal rate, regular rhythm, normal S1, S2, 2/6 ESPERANZA   Abdomen - soft, nontender, slightly distended   Back exam - full range of motion, no tenderness  Neurological - cranial nerves II through XII grossly intact, no focal deficit  Musculoskeletal - no muscular tenderness noted, normal strength  Extremities - peripheral pulses normal, no pedal edema  Skin - normal coloration  no rashes    Recent Labs:  Lab Results   Component Value Date/Time    Cholesterol, total 149 06/25/2020 08:55 AM    HDL Cholesterol 58 06/25/2020 08:55 AM    LDL, calculated 76 06/25/2020 08:55 AM    Triglyceride 76 06/25/2020 08:55 AM    CHOL/HDL Ratio 2.6 03/25/2020 09:00 AM     Lab Results   Component Value Date/Time    Creatinine (POC) 0.7 01/08/2019 07:47 PM    Creatinine 1.47 (H) 08/14/2020 03:19 AM     Lab Results   Component Value Date/Time    BUN 25 (H) 08/14/2020 03:19 AM    BUN (POC) 21 (H) 01/08/2019 07:47 PM     Lab Results   Component Value Date/Time    Potassium 3.7 08/14/2020 03:19 AM     Lab Results   Component Value Date/Time    Hemoglobin A1c 6.2 (H) 06/25/2020 08:55 AM     Lab Results   Component Value Date/Time    Hemoglobin (POC) 16.0 12/11/2014 09:43 AM    HGB 13.1 08/20/2020 12:34 PM     Lab Results   Component Value Date/Time    PLATELET 988 64/37/8754 12:34 PM       Reviewed:  Past Medical History:   Diagnosis Date    Arrhythmia 04/12/2020    cardioversion for SVT    Arthritis     Basal cell carcinoma     BPH (benign prostatic hyperplasia)     CAD (coronary artery disease)     s/p PTCA '92    Calculus of kidney     Chronic pain     BACK    Elevated PSA     Hypercholesterolemia     Lumbar foraminal stenosis     Melanoma ([address]     MVP (mitral valve prolapse)     NSTEMI (non-ST elevated myocardial infarction) ([address] 03/25/2020    Other ill-defined conditions(799.89)     Prostatitis    Prediabetes     Seasonal allergies     Squamous cell carcinoma     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; 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    Systolic murmur     Thyroid disease     HYPOTHYROID    Vision impairment     R EYE, BLOOD CLOT ON RETINA     Social History     Tobacco Use   Smoking Status Never Smoker   Smokeless Tobacco Never Used     Social History     Substance and Sexual Activity   Alcohol Use No     Allergies   Allergen Reactions    Codeine Rash    Pcn [Penicillins] Rash    Sulfa (Sulfonamide Antibiotics) Other (comments)     confusion       Current Outpatient Medications   Medication Sig    atorvastatin (LIPITOR) 40 mg tablet TAKE 1 TABLET BY MOUTH EVERY DAY    levothyroxine (SYNTHROID) 50 mcg tablet TAKE 1 TABLET BY MOUTH EVERY DAY BEFORE BREAKFAST EXCEPT TAKE 2 TABS ON SATURDAY.  vitamins  A,C,E-zinc-copper (Ocuvite PreserVision) 2,[id number] unit-mg-unit tab tablet Take 1 Tab by mouth daily.  furosemide (LASIX) 20 mg tablet Take 1 Tab by mouth daily.  potassium chloride (KLOR-CON) 10 mEq tablet Take 1 Tab by mouth daily. (Patient taking differently: Take 20 mEq by mouth daily.)    Eliquis 5 mg tablet TAKE 1 TAB BY MOUTH EVERY 12 HOURS    amiodarone (CORDARONE) 200 mg tablet Take 0.5 Tabs by mouth every evening. Indications: atrial fibrillation electrically shocked to normal rhythm    metoprolol succinate (Toprol XL) 25 mg XL tablet Take 0.5 Tabs by mouth nightly. Indications: high blood pressure    clopidogreL (PLAVIX) 75 mg tab Take 1 Tab by mouth daily.  tamsulosin (FLOMAX) 0.4 mg capsule TAKE 1 CAPSULE BY MOUTH EVERY DAY 30 MINUTES AFTER THE SAME MEAL EACH DAY    Cholecalciferol, Vitamin D3, (VITAMIN D3) 2,000 unit cap capsule Take 2,000 Units by mouth two (2) times a day.  finasteride (PROSCAR) 5 mg tablet Take 5 mg by mouth daily.  HS    acetaminophen (TYLENOL) 325 mg tablet Take 2 Tabs by mouth every four (4) hours as needed for Pain. No current facility-administered medications for this visit.         Vanessa Whatley MD  [facility] heart and Vascular [place]  [facility] [address] [facility] [address]  [address]

## 2020-12-31 ENCOUNTER — OFFICE VISIT (OUTPATIENT)
Dept: INTERNAL MEDICINE CLINIC | Age: 80
End: 2020-12-31
Payer: MEDICARE

## 2020-12-31 VITALS
BODY MASS INDEX: 24.95 KG/M2 | HEART RATE: 74 BPM | TEMPERATURE: 97.8 F | HEIGHT: 71 IN | RESPIRATION RATE: 20 BRPM | OXYGEN SATURATION: 96 % | DIASTOLIC BLOOD PRESSURE: 79 MMHG | WEIGHT: 178.2 LBS | SYSTOLIC BLOOD PRESSURE: 136 MMHG

## 2020-12-31 DIAGNOSIS — I10 ESSENTIAL HYPERTENSION, BENIGN: ICD-10-CM

## 2020-12-31 DIAGNOSIS — R73.01 IFG (IMPAIRED FASTING GLUCOSE): ICD-10-CM

## 2020-12-31 DIAGNOSIS — E78.2 MIXED HYPERLIPIDEMIA: ICD-10-CM

## 2020-12-31 DIAGNOSIS — Z13.31 SCREENING FOR DEPRESSION: ICD-10-CM

## 2020-12-31 DIAGNOSIS — Z12.5 SCREENING FOR PROSTATE CANCER: ICD-10-CM

## 2020-12-31 DIAGNOSIS — E06.3 HYPOTHYROIDISM, ACQUIRED, AUTOIMMUNE: ICD-10-CM

## 2020-12-31 DIAGNOSIS — Z79.899 ENCOUNTER FOR LONG-TERM (CURRENT) USE OF MEDICATIONS: ICD-10-CM

## 2020-12-31 DIAGNOSIS — Z00.00 MEDICARE ANNUAL WELLNESS VISIT, SUBSEQUENT: Primary | ICD-10-CM

## 2020-12-31 LAB
ALBUMIN SERPL-MCNC: 3.8 G/DL (ref 3.5–5)
ALBUMIN/GLOB SERPL: 1.1 {RATIO} (ref 1.1–2.2)
ALP SERPL-CCNC: 149 U/L (ref 45–117)
ALT SERPL-CCNC: 16 U/L (ref 12–78)
ANION GAP SERPL CALC-SCNC: 0 MMOL/L (ref 5–15)
AST SERPL-CCNC: 15 U/L (ref 15–37)
BILIRUB DIRECT SERPL-MCNC: 0.2 MG/DL (ref 0–0.2)
BILIRUB SERPL-MCNC: 0.7 MG/DL (ref 0.2–1)
BUN SERPL-MCNC: 24 MG/DL (ref 6–20)
BUN/CREAT SERPL: 22 (ref 12–20)
CALCIUM SERPL-MCNC: 9.4 MG/DL (ref 8.5–10.1)
CHLORIDE SERPL-SCNC: 106 MMOL/L (ref 97–108)
CHOLEST SERPL-MCNC: 186 MG/DL
CO2 SERPL-SCNC: 31 MMOL/L (ref 21–32)
CREAT SERPL-MCNC: 1.09 MG/DL (ref 0.7–1.3)
EST. AVERAGE GLUCOSE BLD GHB EST-MCNC: 120 MG/DL
GLOBULIN SER CALC-MCNC: 3.6 G/DL (ref 2–4)
GLUCOSE SERPL-MCNC: 96 MG/DL (ref 65–100)
HBA1C MFR BLD: 5.8 % (ref 4–5.6)
HDLC SERPL-MCNC: 68 MG/DL
HDLC SERPL: 2.7 {RATIO} (ref 0–5)
LDLC SERPL CALC-MCNC: 100.4 MG/DL (ref 0–100)
LIPID PROFILE,FLP: ABNORMAL
POTASSIUM SERPL-SCNC: 4.5 MMOL/L (ref 3.5–5.1)
PROT SERPL-MCNC: 7.4 G/DL (ref 6.4–8.2)
SODIUM SERPL-SCNC: 137 MMOL/L (ref 136–145)
TRIGL SERPL-MCNC: 88 MG/DL (ref ?–150)
TSH SERPL DL<=0.05 MIU/L-ACNC: 1.24 UIU/ML (ref 0.36–3.74)
VLDLC SERPL CALC-MCNC: 17.6 MG/DL

## 2020-12-31 PROCEDURE — G0439 PPPS, SUBSEQ VISIT: HCPCS | Performed by: INTERNAL MEDICINE

## 2020-12-31 PROCEDURE — G0463 HOSPITAL OUTPT CLINIC VISIT: HCPCS | Performed by: INTERNAL MEDICINE

## 2020-12-31 PROCEDURE — 99214 OFFICE O/P EST MOD 30 MIN: CPT | Performed by: INTERNAL MEDICINE

## 2020-12-31 PROCEDURE — G0444 DEPRESSION SCREEN ANNUAL: HCPCS | Performed by: INTERNAL MEDICINE

## 2020-12-31 NOTE — PATIENT INSTRUCTIONS
Medicare Wellness Visit, Male The best way to live healthy is to have a lifestyle where you eat a well-balanced diet, exercise regularly, limit alcohol use, and quit all forms of tobacco/nicotine, if applicable. Regular preventive services are another way to keep healthy. Preventive services (vaccines, screening tests, monitoring & exams) can help personalize your care plan, which helps you manage your own care. Screening tests can find health problems at the earliest stages, when they are easiest to treat. [facility] follows the current, evidence-based guidelines published by the [facility] Kerwin ([facility]) when recommending preventive services for our patients. Because we follow these guidelines, sometimes recommendations change over time as research supports it. (For example, a prostate screening blood test is no longer routinely recommended for men with no symptoms). Of course, you and your doctor may decide to screen more often for some diseases, based on your risk and co-morbidities (chronic disease you are already diagnosed with). Preventive services for you include: - Medicare offers their members a free annual wellness visit, which is time for you and your primary care provider to discuss and plan for your preventive service needs. Take advantage of this benefit every year! 
-All adults over age 72 should receive the recommended pneumonia vaccines. Current USPSTF guidelines recommend a series of two vaccines for the best pneumonia protection.  
-All adults should have a flu vaccine yearly and tetanus vaccine every 10 years. 
-All adults age 48 and older should receive the shingles vaccines (series of two vaccines). -All adults age 38-68 who are overweight should have a diabetes screening test once every three years. -Other screening tests & preventive services for persons with diabetes include: an eye exam to screen for diabetic retinopathy, a kidney function test, a foot exam, and stricter control over your cholesterol.  
-Cardiovascular screening for adults with routine risk involves an electrocardiogram (ECG) at intervals determined by the provider.  
-Colorectal cancer screening should be done for adults age 54-65 with no increased risk factors for colorectal cancer. There are a number of acceptable methods of screening for this type of cancer. Each test has its own benefits and drawbacks. Discuss with your provider what is most appropriate for you during your annual wellness visit. The different tests include: colonoscopy (considered the best screening method), a fecal occult blood test, a fecal DNA test, and sigmoidoscopy. 
-All adults [place] between [facility] should be screened once for Hepatitis C. 
-An Abdominal Aortic Aneurysm (AAA) Screening is recommended for men age 73-68 who has ever smoked in their lifetime. Here is a list of your current Health Maintenance items (your personalized list of preventive services) with a due date: 
Health Maintenance Due Topic Date Due  Shingles Vaccine (1 of 2) 05/03/1990  Pneumococcal Vaccine (2 of 2 - PPSV23) 03/20/2019  Yearly Flu Vaccine (1) 09/01/2020

## 2020-12-31 NOTE — PROGRESS NOTES
This is the Subsequent Medicare Annual Wellness Exam, performed 12 months or more after the Initial AWV or the last Subsequent AWV    I have reviewed the patient's medical history in detail and updated the computerized patient record. Depression Risk Factor Screening:     3 most recent PHQ Screens 12/31/2020   Little interest or pleasure in doing things Not at all   Feeling down, depressed, irritable, or hopeless Not at all   Total Score PHQ 2 0   Trouble falling or staying asleep, or sleeping too much -   Feeling tired or having little energy -   Poor appetite, weight loss, or overeating -   Feeling bad about yourself - or that you are a failure or have let yourself or your family down -   Trouble concentrating on things such as school, work, reading, or watching TV -   Moving or speaking so slowly that other people could have noticed; or the opposite being so fidgety that others notice -   Thoughts of being better off dead, or hurting yourself in some way -   PHQ 9 Score -   How difficult have these problems made it for you to do your work, take care of your home and get along with others -       Alcohol Risk Screen    Do you average more than 1 drink per night or more than 7 drinks a week: No    In the past three months have you have had more than 4 drinks containing alcohol on one occasion: No        Functional Ability and Level of Safety:    Hearing: The patient wears hearing aids. Activities of Daily Living: The home contains: no safety equipment. Patient does total self care      Ambulation: with no difficulty     Fall Risk:  Fall Risk Assessment, last 12 mths 12/31/2020   Able to walk? Yes   Fall in past 12 months? 0   Do you feel unsteady?  0   Are you worried about falling 0      Abuse Screen:  Patient is not abused       Cognitive Screening    Has your family/caregiver stated any concerns about your memory: no         Assessment/Plan   Education and counseling provided:  Are appropriate based on today's review and evaluation    Diagnoses and all orders for this visit:    1. Medicare annual wellness visit, subsequent    2. Essential hypertension, benign  -     METABOLIC PANEL, BASIC; Future  -     URINALYSIS W/ RFLX MICROSCOPIC; Future    3. Mixed hyperlipidemia  -     LIPID PANEL; Future  -     HEPATIC FUNCTION PANEL; Future    4. Hypothyroidism, acquired, autoimmune  -     TSH 3RD GENERATION; Future    5. IFG (impaired fasting glucose)  -     HEMOGLOBIN A1C WITH EAG; Future    6. Encounter for long-term (current) use of medications  -     CBC WITH AUTOMATED DIFF; Future    7. Screening for prostate cancer  -     PSA SCREENING (SCREENING); Future    8.  Screening for depression  -     [place] Maintenance Due     Health Maintenance Due   Topic Date Due    Shingrix Vaccine Age 49> (1 of 2) 05/03/1990    Pneumococcal 65+ years (2 of 2 - PPSV23) 03/20/2019    Flu Vaccine (1) 09/01/2020       Patient Care Team   Patient Care Team:  Samir Gutierrez MD as PCP - [facility]  Samir Gutierrez MD as PCP - [facility] Empaneled Provider  Mari Nuñez MD as Physician (Internal Medicine)  Bandar Brizuela MD as Physician (Dermatology)  Cesar Tan MD as Physician (Urology)  Sg Freedman MD as Surgeon (Orthopedic Surgery)  Ramsey Estrada MD as Physician (Ophthalmology)  Atif Pack MD as Physician (Cardiology)  Reese Babinski, MD as Surgeon (Orthopedic Surgery)  Sandy Menon MD (Cardiology)  Samir Gutierrez MD (Internal Medicine)  Shazia Knutson MD (Cardiology)  Brian Saxena MD (Cardiology)    History     Patient Active Problem List   Diagnosis Code    Mixed hyperlipidemia E78.2    Essential hypertension, benign I10    Coronary atherosclerosis of native coronary artery I25.10    Calculus of kidney N20.0    Insomnia, unspecified G47.00    Unspecified hearing loss H91.90    Macular degeneration H35.30    Skin cancer C44.90    Vitamin D deficiency E55.9    Hypothyroidism, acquired, 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    BPH with urinary obstruction N40.1, N13.8    Advance directive discussed with patient Z71.89    Lumbar disc disease M51.9    Lumbar foraminal stenosis M48.061    IFG (impaired fasting glucose) R73.01    NSTEMI (non-ST elevated myocardial infarction) ([facility]) I21.4    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    Dizziness R42    Sustained SVT ([facility]) I47.1    RBBB I45.10    Peripheral vascular disease ([facility]) I73.9     Past Medical History:   Diagnosis Date    Arrhythmia 04/12/2020    cardioversion for SVT    Arthritis     Basal cell carcinoma     BPH (benign prostatic hyperplasia)     CAD (coronary artery disease)     s/p PTCA '92    Calculus of kidney     Chronic pain     BACK    Elevated PSA     Hypercholesterolemia     Lumbar foraminal stenosis     [facility] ([facility] [address]     MVP (mitral valve prolapse)     NSTEMI (non-ST elevated myocardial infarction) ([facility] [address] 03/25/2020    Other ill-defined conditions(799.89)     Prostatitis    Prediabetes     Seasonal allergies     Squamous cell carcinoma     Stroke ([facility] [address]     with left eye visual loss; 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    Systolic murmur     Thyroid disease     HYPOTHYROID    Vision impairment     R EYE, BLOOD CLOT ON RETINA      Past Surgical History:   Procedure Laterality Date    ENDOSCOPY, COLON, DIAGNOSTIC      due 12    HX BACK SURGERY      HX CHOLECYSTECTOMY  1970    HX CORONARY ARTERY BYPASS GRAFT  03/26/2020    x 5, LIMA to LAD, RSVG to Diag-RI-OM, RSVG to RCA    HX HEART CATHETERIZATION  1992    WITH BALLOON    HX LITHOTRIPSY      x2    HX ORTHOPAEDIC      elbow - fx right arm age 3 yrs    VT CARDIOVERSION ELECTIVE ARRHYTHMIA EXTERNAL N/A 4/13/2020    EP CARDIOVERSION performed by Negro Bo MD at [place] [address], [facility] CATH LAB     Current Outpatient Medications   Medication Sig Dispense Refill    furosemide (LASIX) 20 mg tablet Take 1 Tab by mouth every other day.  90 Tab 1    potassium chloride SR (KLOR-CON 10) 10 mEq tablet Take  by mouth every other day.  atorvastatin (LIPITOR) 40 mg tablet TAKE 1 TABLET BY MOUTH EVERY DAY 90 Tab 2    levothyroxine (SYNTHROID) 50 mcg tablet TAKE 1 TABLET BY MOUTH EVERY DAY BEFORE BREAKFAST EXCEPT TAKE 2 TABS ON SATURDAY. 112 Tab 0    vitamins  A,C,E-zinc-copper (Ocuvite PreserVision) 7,160-113-100 unit-mg-unit tab tablet Take 1 Tab by mouth daily. 30 Tab 5    Eliquis 5 mg tablet TAKE 1 TAB BY MOUTH EVERY 12 HOURS 180 Tab 3    metoprolol succinate (Toprol XL) 25 mg XL tablet Take 0.5 Tabs by mouth nightly. Indications: high blood pressure 90 Tab 3    clopidogreL (PLAVIX) 75 mg tab Take 1 Tab by mouth daily. 90 Tab 3    acetaminophen (TYLENOL) 325 mg tablet Take 2 Tabs by mouth every four (4) hours as needed for Pain.  tamsulosin (FLOMAX) 0.4 mg capsule TAKE 1 CAPSULE BY MOUTH EVERY DAY 30 MINUTES AFTER THE SAME MEAL EACH DAY  11    Cholecalciferol, Vitamin D3, (VITAMIN D3) 2,000 unit cap capsule Take 2,000 Units by mouth two (2) times a day. (Patient taking differently: Take 2,000 Units by mouth daily.) 30 Cap 11    finasteride (PROSCAR) 5 mg tablet Take 5 mg by mouth daily. HS       Allergies   Allergen Reactions    Codeine Rash    Pcn [Penicillins] Rash    Sulfa (Sulfonamide Antibiotics) Other (comments)     confusion       Family History   Problem Relation Age of Onset    Heart Disease Mother         CHF    Heart Disease Father         CAD    Heart Disease Sister         CAD CABG    Lung Disease Sister     Heart Disease Brother         CAD    Pulmonary Fibrosis Brother     Heart Disease Brother         CAD    Stroke Brother 34        cerebral hemorrhage    Cancer Brother         LUNG    High Cholesterol Daughter     Anesth Problems Neg Hx      Social History     Tobacco Use    Smoking status: Never Smoker    Smokeless tobacco: Never Used   Substance Use Topics    Alcohol use:  No

## 2020-12-31 NOTE — PROGRESS NOTES
HISTORY OF PRESENT ILLNESS  Karen Kumar is a [de-identified] y.o. male. HPI Beatriz Saravia is seen today for a Medicare Wellness Visit as well as for follow up of chronic problems. Preventive Medicine. See attached Wellness note. He is due for labs and the shingles vaccine. He is up to date with urology follow up and eye exam.     Chronic Problems Reviewed. 1. Coronary artery disease. He is clinically stable. He is continuing to recover from bypass surgery in the spring having no further complications. 2. Hyperlipidemia, IFG. Due for routine labs. 3. Urologic problems. Up to date with urology follow up. 4. Hypothyroidism. Due for routine labs. New Problem Reviewed. He has some tenderness to touch in the left groin. This has been present for about two weeks. It comes and goes and does tend to increase with lifting but does not change with bowel movements or other Valsalva type maneuvers. His exam is negative for hernia so I think he has strained a muscle and he will let me know if this persists. Current Outpatient Medications   Medication Sig    furosemide (LASIX) 20 mg tablet Take 1 Tab by mouth every other day.  potassium chloride SR (KLOR-CON 10) 10 mEq tablet Take  by mouth every other day.  atorvastatin (LIPITOR) 40 mg tablet TAKE 1 TABLET BY MOUTH EVERY DAY    levothyroxine (SYNTHROID) 50 mcg tablet TAKE 1 TABLET BY MOUTH EVERY DAY BEFORE BREAKFAST EXCEPT TAKE 2 TABS ON SATURDAY.  vitamins  A,C,E-zinc-copper (Ocuvite PreserVision) [phone] unit-mg-unit tab tablet Take 1 Tab by mouth daily.  Eliquis 5 mg tablet TAKE 1 TAB BY MOUTH EVERY 12 HOURS    metoprolol succinate (Toprol XL) 25 mg XL tablet Take 0.5 Tabs by mouth nightly. Indications: high blood pressure    clopidogreL (PLAVIX) 75 mg tab Take 1 Tab by mouth daily.  acetaminophen (TYLENOL) 325 mg tablet Take 2 Tabs by mouth every four (4) hours as needed for Pain.     tamsulosin (FLOMAX) 0.4 mg capsule TAKE 1 CAPSULE BY MOUTH EVERY DAY 30 MINUTES AFTER THE SAME MEAL EACH DAY    Cholecalciferol, Vitamin D3, (VITAMIN D3) 2,000 unit cap capsule Take 2,000 Units by mouth two (2) times a day. (Patient taking differently: Take 2,000 Units by mouth daily.)    finasteride (PROSCAR) 5 mg tablet Take 5 mg by mouth daily. HS     No current facility-administered medications for this visit. Review of Systems   Constitutional: Negative for weight loss. HENT: Positive for hearing loss. Eyes: Positive for blurred vision. Retinal problems   Respiratory: Negative. Cardiovascular: Negative for chest pain, palpitations, leg swelling and PND. Gastrointestinal: Negative. Genitourinary: Negative for dysuria and hematuria. Musculoskeletal: Negative for myalgias. Neurological: Negative for focal weakness. Physical Exam  Vitals signs and nursing note reviewed. Constitutional:       General: He is not in acute distress. Appearance: He is well-developed. HENT:      Head: Normocephalic and atraumatic. Right Ear: Tympanic membrane, ear canal and external ear normal.      Left Ear: Tympanic membrane, ear canal and external ear normal.   Eyes:      General:         Right eye: No discharge. Left eye: No discharge. Pupils: Pupils are equal, round, and reactive to light. Neck:      Musculoskeletal: Normal range of motion and neck supple. Thyroid: No thyromegaly. Vascular: No carotid bruit. Cardiovascular:      Rate and Rhythm: Normal rate and regular rhythm. Heart sounds: Normal heart sounds. No murmur. No friction rub. No gallop. Pulmonary:      Effort: Pulmonary effort is normal. No respiratory distress. Breath sounds: Normal breath sounds. No wheezing or rales. Abdominal:      General: Bowel sounds are normal. There is no distension. Palpations: Abdomen is soft. There is no mass. Tenderness: There is no abdominal tenderness. There is no guarding or rebound. Hernia: There is no hernia in the left inguinal area or right inguinal area. Musculoskeletal: Normal range of motion. General: No tenderness. Lymphadenopathy:      Cervical: No cervical adenopathy. Lower Body: No right inguinal adenopathy. No left inguinal adenopathy. Skin:     General: Skin is warm and dry. Findings: No rash. Neurological:      Mental Status: He is alert and oriented to person, place, and time. Deep Tendon Reflexes: Reflexes are normal and symmetric. Psychiatric:         Behavior: Behavior normal.         ASSESSMENT and PLAN  Diagnoses and all orders for this visit:    1. Medicare annual wellness visit, subsequent    2. Essential hypertension, benign  -     METABOLIC PANEL, BASIC; Future  - Continue current regimen of prescription and / or OTC medications     3. Mixed hyperlipidemia  -     LIPID PANEL; Future  -     HEPATIC FUNCTION PANEL; Future    4. Hypothyroidism, acquired, autoimmune  -     TSH 3RD GENERATION; Future    5. IFG (impaired fasting glucose)  -     HEMOGLOBIN A1C WITH EAG; Future    6. Encounter for long-term (current) use of medications    7. Screening for prostate cancer- See urologist as directed     8.  Screening for depression  -     Rosio Platt

## 2021-01-15 ENCOUNTER — TELEPHONE (OUTPATIENT)
Dept: CARDIOLOGY CLINIC | Age: 81
End: 2021-01-15

## 2021-01-15 NOTE — TELEPHONE ENCOUNTER
Pt requesting to speak with the nurse in regards to a question he has about the vaccine.  Please advise      QWVKS:[phone]

## 2021-01-15 NOTE — TELEPHONE ENCOUNTER
Returned patient's call, 2 pt identifiers used      Patient wanted to know if he should get the Covid Vaccine. I advised we are recommending the vaccine to everyone unless they have had a reaction to any previous vaccines in the past or actively receiving Chemo.

## 2021-03-10 RX ORDER — LEVOTHYROXINE SODIUM 50 UG/1
TABLET ORAL
Qty: 112 TAB | Refills: 0 | Status: SHIPPED | OUTPATIENT
Start: 2021-03-10 | End: 2021-07-12

## 2021-03-24 ENCOUNTER — TELEPHONE (OUTPATIENT)
Dept: CARDIOLOGY CLINIC | Age: 81
End: 2021-03-24

## 2021-03-24 NOTE — TELEPHONE ENCOUNTER
Patient returned my call 4/7 appointment with Dr. Casillas rescheduled as follows, appointment is an in office appointment     Future Appointments   Date Time Provider Department Center   4/7/2021  2:20 PM Graciela Casillas MD [facility]   6/28/2021  8:10 AM Sg Rodriguez MD [facility]

## 2021-03-24 NOTE — TELEPHONE ENCOUNTER
3/24/2021 at 8:40 left message call to reschedule 4/7/2021 appointment with Dr. Bobby Blackman [facility] hospital coverage AM of 4/7/2021

## 2021-04-07 ENCOUNTER — OFFICE VISIT (OUTPATIENT)
Dept: CARDIOLOGY CLINIC | Age: 81
End: 2021-04-07
Payer: MEDICARE

## 2021-04-07 VITALS
SYSTOLIC BLOOD PRESSURE: 120 MMHG | HEIGHT: 71 IN | BODY MASS INDEX: 25.26 KG/M2 | OXYGEN SATURATION: 98 % | RESPIRATION RATE: 13 BRPM | DIASTOLIC BLOOD PRESSURE: 70 MMHG | HEART RATE: 66 BPM | WEIGHT: 180.4 LBS

## 2021-04-07 DIAGNOSIS — I21.4 NSTEMI (NON-ST ELEVATED MYOCARDIAL INFARCTION) (HCC): ICD-10-CM

## 2021-04-07 DIAGNOSIS — I45.10 RBBB: ICD-10-CM

## 2021-04-07 DIAGNOSIS — I47.1 SUSTAINED SVT (HCC): ICD-10-CM

## 2021-04-07 DIAGNOSIS — D64.9 ANEMIA, UNSPECIFIED TYPE: ICD-10-CM

## 2021-04-07 DIAGNOSIS — R06.09 DOE (DYSPNEA ON EXERTION): ICD-10-CM

## 2021-04-07 DIAGNOSIS — I25.10 ATHEROSCLEROSIS OF NATIVE CORONARY ARTERY OF NATIVE HEART WITHOUT ANGINA PECTORIS: ICD-10-CM

## 2021-04-07 DIAGNOSIS — I48.0 PAROXYSMAL ATRIAL FIBRILLATION (HCC): ICD-10-CM

## 2021-04-07 DIAGNOSIS — E78.2 MIXED HYPERLIPIDEMIA: Primary | ICD-10-CM

## 2021-04-07 DIAGNOSIS — I73.9 PERIPHERAL VASCULAR DISEASE (HCC): ICD-10-CM

## 2021-04-07 DIAGNOSIS — I10 ESSENTIAL HYPERTENSION, BENIGN: ICD-10-CM

## 2021-04-07 PROCEDURE — G0463 HOSPITAL OUTPT CLINIC VISIT: HCPCS | Performed by: INTERNAL MEDICINE

## 2021-04-07 PROCEDURE — G8510 SCR DEP NEG, NO PLAN REQD: HCPCS | Performed by: INTERNAL MEDICINE

## 2021-04-07 PROCEDURE — G8536 NO DOC ELDER MAL SCRN: HCPCS | Performed by: INTERNAL MEDICINE

## 2021-04-07 PROCEDURE — G8754 DIAS BP LESS 90: HCPCS | Performed by: INTERNAL MEDICINE

## 2021-04-07 PROCEDURE — 99214 OFFICE O/P EST MOD 30 MIN: CPT | Performed by: INTERNAL MEDICINE

## 2021-04-07 PROCEDURE — G8752 SYS BP LESS 140: HCPCS | Performed by: INTERNAL MEDICINE

## 2021-04-07 PROCEDURE — 1101F PT FALLS ASSESS-DOCD LE1/YR: CPT | Performed by: INTERNAL MEDICINE

## 2021-04-07 PROCEDURE — G8427 DOCREV CUR MEDS BY ELIG CLIN: HCPCS | Performed by: INTERNAL MEDICINE

## 2021-04-07 PROCEDURE — G8419 CALC BMI OUT NRM PARAM NOF/U: HCPCS | Performed by: INTERNAL MEDICINE

## 2021-04-07 RX ORDER — GUAIFENESIN 100 MG/5ML
81 LIQUID (ML) ORAL DAILY
Qty: 90 TAB | Refills: 3 | Status: SHIPPED | OUTPATIENT
Start: 2021-04-07 | End: 2022-06-22 | Stop reason: SDUPTHER

## 2021-04-07 NOTE — PROGRESS NOTES
[facility] [place]: Morris Pacheco  [phone]    HPI: Christina Rosales, a [de-identified]y.o. year-old who presents for follow up regarding his CAD s/p CABG in 3/20    He is feeling pretty good, wants to come off of his medications   Continues to exercise, and breathing is doing fine. He walks his dog twice/day, says he walks about 2 miles/day, doing some light weights at home too  Denies any more leg weakness   No chest pain or palpitations  Denies any LE edema, not wearing compression socks anymore  Played golf yesterday. Wants to come off of his medications, compensated, no edema, will stop KCl and lasix  Also wants to stop metoprolol- no return of afib, and ok to stop. Labs in June with Dr. Faheem Johnson    No dizziness or lightheadedness    No signs of fluid accumulation after his last thoracentesis. He has no fluid overload although he may continue to benefit from a low-dose of diuretic secondary to his valvular heart disease. wishes to simplify and stop today   Off amio now and no trouble. Assessment/Plan:  1. Intra-op bradycardia/2nd deg block/first degree AV block preop & Postop A fib on 3/31/20, limited AFlutter  -continue Toprol XL 12.5mg nightly  -continue Eliquis 5mg BID, no unusual bleeding or bruising  -s/p Cardioversion 4/13/20  -stop amio 12/20  2.  CAD with NSTEMI and then s/p CABG in 3/20  -continue statin, Toprol XL, plavix (no ASA with Eliquis and Plavix)  3. HTN - borderline low but asymptomatic, continue Toprol XL  4. HLD - continue atorvastatin, LDL , recheck coming  5. Anemia - postoperative secondary to acute blood loss, stable now  6. Pre-diabetes - A1c 5.9%, encouraged regular exercise   7. Hypothyroidism - on synthroid, followed by PCP   8. Mitral valve prolapse/MR/AS - all mild on intra-operative WILMER  -3/6 ESPERANZA audible on exam    -TTE with mild AI, mod-severe MR, mild TR, mod PI, fu 2021  9. Pleural effusions -feeling better post thoracentesis  10.   BPH - continue flomax and finasteride    Echo 6/20 - EF 55-60%, mild cLVH, no WMA, mod AV sclerosis with mild AI, mod-severe MR, mild TR, mod PI, mild PA HTN (40mmHg), trivial circumferential pericardial effusion, bilateral pleural effusions  DCCV 4/13/20  CABG x 5 on 3/26/20 with Dr. Nimisha Conway - LIMA to LAD, saphenous vein graft to diagonal to ramus intermedius to obtuse marginal; saphenous vein graft to right coronary artery  Carotid duplex 3/20 - <50% bilateral ICA stenosis  SHANE 3/20 - WNL    Fam Hx: no early cad  Soc Hx: no tob use, no etoh use    He  has a past medical history of Arrhythmia (04/12/2020), Arthritis, Basal cell carcinoma, BPH (benign prostatic hyperplasia), CAD (coronary artery disease), Calculus of kidney, Chronic pain, Elevated PSA, Hypercholesterolemia, Lumbar foraminal stenosis, Melanoma ([facility] [address] MVP (mitral valve prolapse), NSTEMI (non-ST elevated myocardial infarction) ([facility] [address] (03/25/2020), Other ill-defined conditions(799.89), Prediabetes, Seasonal allergies, Squamous cell carcinoma, Stroke ([facility] [address] Systolic murmur, Thyroid disease, and Vision impairment. He also has no past medical history of Adverse effect of anesthesia or Unspecified sleep apnea. Cardiovascular ROS: positive for - dyspnea on exertion  Respiratory ROS: positive for - shortness of breath  Neurological ROS: no TIA or stroke symptoms  All other systems negative except as above. PE  Vitals:    04/07/21 1428   BP: 120/70   Pulse: 66   Resp: 13   SpO2: 98%   Weight: 180 lb 6.4 oz (81.8 kg)   Height: 5' 11\" (1.803 m)    Body mass index is 25.16 kg/m².   General appearance - alert, well appearing, and in no distress  Mental status - affect appropriate to mood  Eyes - sclera anicteric, moist mucous membranes  Neck - supple, no significant adenopathy  Lymphatics - no lymphadenopathy  Chest - clear to auscultation, no wheezes, rales or rhonchi  Heart - normal rate, regular rhythm, normal S1, S2, 2/6 ESPERANZA   Abdomen - soft, nontender, slightly distended   Back exam - full range of motion, no tenderness  Neurological - cranial nerves II through XII grossly intact, no focal deficit  Musculoskeletal - no muscular tenderness noted, normal strength  Extremities - peripheral pulses normal, no pedal edema  Skin - normal coloration  no rashes    Recent Labs:  Lab Results   Component Value Date/Time    Cholesterol, total 186 12/31/2020 10:31 AM    HDL Cholesterol 68 12/31/2020 10:31 AM    LDL, calculated 100.4 (H) 12/31/2020 10:31 AM    Triglyceride 88 12/31/2020 10:31 AM    CHOL/HDL Ratio 2.7 12/31/2020 10:31 AM     Lab Results   Component Value Date/Time    Creatinine (POC) 0.7 01/08/2019 07:47 PM    Creatinine 1.09 12/31/2020 10:31 AM     Lab Results   Component Value Date/Time    BUN 24 (H) 12/31/2020 10:31 AM    BUN (POC) 21 (H) 01/08/2019 07:47 PM     Lab Results   Component Value Date/Time    Potassium 4.5 12/31/2020 10:31 AM     Lab Results   Component Value Date/Time    Hemoglobin A1c 5.8 (H) 12/31/2020 10:31 AM     Lab Results   Component Value Date/Time    Hemoglobin (POC) 16.0 12/11/2014 09:43 AM    HGB 13.1 08/20/2020 12:34 PM     Lab Results   Component Value Date/Time    PLATELET 597 58/51/7215 12:34 PM       Reviewed:  Past Medical History:   Diagnosis Date    Arrhythmia 04/12/2020    cardioversion for SVT    Arthritis     Basal cell carcinoma     BPH (benign prostatic hyperplasia)     CAD (coronary artery disease)     s/p PTCA '92    Calculus of kidney     Chronic pain     BACK    Elevated PSA     Hypercholesterolemia     Lumbar foraminal stenosis     Melanoma ([address]     MVP (mitral valve prolapse)     NSTEMI (non-ST elevated myocardial infarction) ([address] 03/25/2020    Other ill-defined conditions(799.89)     Prostatitis    Prediabetes     Seasonal allergies     Squamous cell carcinoma     Stroke ([address]     with left eye visual loss; 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    Systolic murmur     Thyroid disease     HYPOTHYROID    Vision impairment     R EYE, BLOOD CLOT ON RETINA     Social History     Tobacco Use   Smoking Status Never Smoker   Smokeless Tobacco Never Used     Social History     Substance and Sexual Activity   Alcohol Use No     Allergies   Allergen Reactions    Codeine Rash    Pcn [Penicillins] Rash    Sulfa (Sulfonamide Antibiotics) Other (comments)     confusion       Current Outpatient Medications   Medication Sig    levothyroxine (SYNTHROID) 50 mcg tablet TAKE 1 TABLET BY MOUTH EVERY DAY BEFORE BREAKFAST EXCEPT TAKE 2 TABS ON SATURDAY.  furosemide (LASIX) 20 mg tablet TAKE 1 TABLET BY MOUTH EVERY DAY    potassium chloride SR (KLOR-CON 10) 10 mEq tablet Take  by mouth every other day.  atorvastatin (LIPITOR) 40 mg tablet TAKE 1 TABLET BY MOUTH EVERY DAY    vitamins  A,C,E-zinc-copper (Ocuvite PreserVision) [phone] unit-mg-unit tab tablet Take 1 Tab by mouth daily.  Eliquis 5 mg tablet TAKE 1 TAB BY MOUTH EVERY 12 HOURS    metoprolol succinate (Toprol XL) 25 mg XL tablet Take 0.5 Tabs by mouth nightly. Indications: high blood pressure    clopidogreL (PLAVIX) 75 mg tab Take 1 Tab by mouth daily.  acetaminophen (TYLENOL) 325 mg tablet Take 2 Tabs by mouth every four (4) hours as needed for Pain.  tamsulosin (FLOMAX) 0.4 mg capsule TAKE 1 CAPSULE BY MOUTH EVERY DAY 30 MINUTES AFTER THE SAME MEAL EACH DAY    Cholecalciferol, Vitamin D3, (VITAMIN D3) 2,000 unit cap capsule Take 2,000 Units by mouth two (2) times a day. (Patient taking differently: Take 2,000 Units by mouth daily.)    finasteride (PROSCAR) 5 mg tablet Take 5 mg by mouth daily. HS     No current facility-administered medications for this visit.         Bettye Benitez MD  [facility] heart and [facility] [place]  [address] [facility] [address]  [address]

## 2021-05-02 NOTE — PROGRESS NOTES
[facility] spoke with [employer] - added on thyroid peroxidase antibody - dx abnormal thyroid function (R94.6).
Add on thyroid peroxidase antibody.  Dx= abnormal thyroid function   Will Review at followup
Advised pt MD needs to make a slight increase in levothyroxine - continue 50 mcg every day except take 2 on saturdays. Pt verbalizes understanding. Rx sent to pharmacy with new directions.
Call- we should make a slight increase in levothyroxine- continue 50 mcg every day except take 2 on saturdays
stated

## 2021-06-04 RX ORDER — APIXABAN 5 MG/1
TABLET, FILM COATED ORAL
Qty: 180 TABLET | Refills: 3 | Status: SHIPPED | OUTPATIENT
Start: 2021-06-04 | End: 2022-06-22 | Stop reason: SDUPTHER

## 2021-06-09 ENCOUNTER — APPOINTMENT (OUTPATIENT)
Dept: INTERNAL MEDICINE CLINIC | Age: 81
End: 2021-06-09

## 2021-06-09 DIAGNOSIS — R73.01 IFG (IMPAIRED FASTING GLUCOSE): ICD-10-CM

## 2021-06-09 DIAGNOSIS — I10 ESSENTIAL HYPERTENSION, BENIGN: ICD-10-CM

## 2021-06-09 DIAGNOSIS — E06.3 HYPOTHYROIDISM, ACQUIRED, AUTOIMMUNE: ICD-10-CM

## 2021-06-09 DIAGNOSIS — E78.2 MIXED HYPERLIPIDEMIA: ICD-10-CM

## 2021-06-09 DIAGNOSIS — I10 ESSENTIAL HYPERTENSION, BENIGN: Primary | ICD-10-CM

## 2021-06-09 LAB
ALBUMIN SERPL-MCNC: 3.7 G/DL (ref 3.5–5)
ALBUMIN/GLOB SERPL: 1.1 {RATIO} (ref 1.1–2.2)
ALP SERPL-CCNC: 125 U/L (ref 45–117)
ALT SERPL-CCNC: 12 U/L (ref 12–78)
ANION GAP SERPL CALC-SCNC: 6 MMOL/L (ref 5–15)
AST SERPL-CCNC: 14 U/L (ref 15–37)
BILIRUB DIRECT SERPL-MCNC: 0.2 MG/DL (ref 0–0.2)
BILIRUB SERPL-MCNC: 0.6 MG/DL (ref 0.2–1)
BUN SERPL-MCNC: 25 MG/DL (ref 6–20)
BUN/CREAT SERPL: 29 (ref 12–20)
CALCIUM SERPL-MCNC: 9 MG/DL (ref 8.5–10.1)
CHLORIDE SERPL-SCNC: 106 MMOL/L (ref 97–108)
CHOLEST SERPL-MCNC: 156 MG/DL
CO2 SERPL-SCNC: 28 MMOL/L (ref 21–32)
CREAT SERPL-MCNC: 0.86 MG/DL (ref 0.7–1.3)
EST. AVERAGE GLUCOSE BLD GHB EST-MCNC: 117 MG/DL
GLOBULIN SER CALC-MCNC: 3.3 G/DL (ref 2–4)
GLUCOSE SERPL-MCNC: 94 MG/DL (ref 65–100)
HBA1C MFR BLD: 5.7 % (ref 4–5.6)
HDLC SERPL-MCNC: 63 MG/DL
HDLC SERPL: 2.5 {RATIO} (ref 0–5)
LDLC SERPL CALC-MCNC: 76.6 MG/DL (ref 0–100)
POTASSIUM SERPL-SCNC: 4.3 MMOL/L (ref 3.5–5.1)
PROT SERPL-MCNC: 7 G/DL (ref 6.4–8.2)
SODIUM SERPL-SCNC: 140 MMOL/L (ref 136–145)
TRIGL SERPL-MCNC: 82 MG/DL (ref ?–150)
TSH SERPL DL<=0.05 MIU/L-ACNC: 0.94 UIU/ML (ref 0.36–3.74)
VLDLC SERPL CALC-MCNC: 16.4 MG/DL

## 2021-06-24 ENCOUNTER — OFFICE VISIT (OUTPATIENT)
Dept: INTERNAL MEDICINE CLINIC | Age: 81
End: 2021-06-24
Payer: MEDICARE

## 2021-06-24 VITALS
WEIGHT: 180 LBS | DIASTOLIC BLOOD PRESSURE: 71 MMHG | TEMPERATURE: 97.6 F | HEIGHT: 71 IN | SYSTOLIC BLOOD PRESSURE: 122 MMHG | BODY MASS INDEX: 25.2 KG/M2 | RESPIRATION RATE: 16 BRPM | OXYGEN SATURATION: 97 % | HEART RATE: 76 BPM

## 2021-06-24 DIAGNOSIS — E06.3 HYPOTHYROIDISM, ACQUIRED, AUTOIMMUNE: ICD-10-CM

## 2021-06-24 DIAGNOSIS — I10 ESSENTIAL HYPERTENSION, BENIGN: Primary | ICD-10-CM

## 2021-06-24 DIAGNOSIS — I25.700 CORONARY ARTERY DISEASE INVOLVING CORONARY BYPASS GRAFT OF NATIVE HEART WITH UNSTABLE ANGINA PECTORIS (HCC): ICD-10-CM

## 2021-06-24 DIAGNOSIS — E78.2 MIXED HYPERLIPIDEMIA: ICD-10-CM

## 2021-06-24 DIAGNOSIS — R73.01 IFG (IMPAIRED FASTING GLUCOSE): ICD-10-CM

## 2021-06-24 PROCEDURE — G0463 HOSPITAL OUTPT CLINIC VISIT: HCPCS | Performed by: INTERNAL MEDICINE

## 2021-06-24 PROCEDURE — G8536 NO DOC ELDER MAL SCRN: HCPCS | Performed by: INTERNAL MEDICINE

## 2021-06-24 PROCEDURE — G8754 DIAS BP LESS 90: HCPCS | Performed by: INTERNAL MEDICINE

## 2021-06-24 PROCEDURE — G8510 SCR DEP NEG, NO PLAN REQD: HCPCS | Performed by: INTERNAL MEDICINE

## 2021-06-24 PROCEDURE — G8419 CALC BMI OUT NRM PARAM NOF/U: HCPCS | Performed by: INTERNAL MEDICINE

## 2021-06-24 PROCEDURE — G8752 SYS BP LESS 140: HCPCS | Performed by: INTERNAL MEDICINE

## 2021-06-24 PROCEDURE — 1101F PT FALLS ASSESS-DOCD LE1/YR: CPT | Performed by: INTERNAL MEDICINE

## 2021-06-24 PROCEDURE — 99214 OFFICE O/P EST MOD 30 MIN: CPT | Performed by: INTERNAL MEDICINE

## 2021-06-24 PROCEDURE — G8427 DOCREV CUR MEDS BY ELIG CLIN: HCPCS | Performed by: INTERNAL MEDICINE

## 2021-06-24 NOTE — PROGRESS NOTES
HISTORY OF PRESENT ILLNESS  Nayeli Aguilar is a 80 y.o. male. HPI  Assessment: Roverto Cormier is seen today for follow up of hyperlipidemia and other medical problems. 1) Hyperlipidemia, IFG, hypothyroidism. Reviewed labs and stable on current prescription med regimen. 2) Coronary artery disease. Clinically stable and up to date with cardiology follow up.   3) Hypertension. Stable on current prescription med regimen. 4) Urologic problems. Up to date with specialist follow up. 5) Preventive medicine. Wellness Visit in six months. See review of systems regarding musculoskeletal symptom. He will hold Lipitor for one month and I have asked him to send me a My Chart message with an update. I was emphatic with him that he really needs to be on a statin if at all possible, so will change drugs if this appears to have anything to do with his musculoskeletal symptoms. Review of Systems   Constitutional: Negative for chills, fever and weight loss. Respiratory: Negative. Cardiovascular: Negative for chest pain, palpitations, leg swelling and PND. Genitourinary: Positive for frequency and hematuria. See urologist as directed    Musculoskeletal: Positive for joint pain and myalgias. Very stiff in AM, loosens up in 30 min. He ? Lipitor  Also wonders about arthritic cream   Neurological: Negative for focal weakness. Physical Exam  Vitals and nursing note reviewed. Constitutional:       General: He is not in acute distress. Neck:      Vascular: No carotid bruit. Cardiovascular:      Rate and Rhythm: Normal rate and regular rhythm. Heart sounds: No murmur heard. No friction rub. No gallop. Pulmonary:      Effort: Pulmonary effort is normal. No respiratory distress. Breath sounds: Normal breath sounds. Musculoskeletal:      Right lower leg: No edema. Left lower leg: No edema. ASSESSMENT and PLAN  Diagnoses and all orders for this visit:    1.  Essential hypertension, benign    2. Mixed hyperlipidemia  -     METABOLIC PANEL, COMPREHENSIVE; Future  -     CBC WITH AUTOMATED DIFF; Future  -     LIPID PANEL; Future    3. Hypothyroidism, acquired, autoimmune  -     TSH 3RD GENERATION; Future    4. IFG (impaired fasting glucose)  -     HEMOGLOBIN A1C WITH EAG; Future    5.  Coronary artery disease involving coronary bypass graft of native heart with unstable angina pectoris ([facility]

## 2021-07-12 RX ORDER — LEVOTHYROXINE SODIUM 50 UG/1
TABLET ORAL
Qty: 112 TABLET | Refills: 0 | Status: SHIPPED | OUTPATIENT
Start: 2021-07-12 | End: 2021-07-27 | Stop reason: SDUPTHER

## 2021-07-20 ENCOUNTER — TELEPHONE (OUTPATIENT)
Dept: INTERNAL MEDICINE CLINIC | Age: 81
End: 2021-07-20

## 2021-07-20 NOTE — TELEPHONE ENCOUNTER
Janette Copper [phone]      The patient Cardio  is moving to a different office.  Should he follow Dr Radha Kirkland or move to a different  [facility] ?

## 2021-07-21 NOTE — TELEPHONE ENCOUNTER
Spoke with pt's wife Ravi Tinsley (on HIPAA) - advised her MD is fine with him staying with Dr Dial November. But if he prefers to stay with [facility] cardiology some names to try would be Angy Hinton or Paola Servin. Others in the group are excellent as well.

## 2021-07-23 ENCOUNTER — TELEPHONE (OUTPATIENT)
Dept: INTERNAL MEDICINE CLINIC | Age: 81
End: 2021-07-23

## 2021-07-23 NOTE — TELEPHONE ENCOUNTER
Mrs.Carole Sawyer called on the behalf of the pt, Dorothea Gabriel is having issues on the right side of his groin    In that area it is swollen and gets very uncomfortable    They called  office and just wanted to get opinion of pcp on what the pt should do next    Patient states we MAY  respond via [address]

## 2021-07-26 ENCOUNTER — TELEPHONE (OUTPATIENT)
Dept: CARDIOLOGY CLINIC | Age: 81
End: 2021-07-26

## 2021-07-26 NOTE — TELEPHONE ENCOUNTER
Patient's wife is requesting a call back to schedule an appointment with Dr. Scar Garay. Patient is an established patient with Dr. Edy Farfan, and patient had surgery done in the hospital by Dr. Scar Garay.     PHONE: [phone]

## 2021-07-26 NOTE — TELEPHONE ENCOUNTER
Spoke with pt - states has been experiencing right groin pain/swelling. By afternoon it begins to get painful to walk. Transferred pt to [facility] to schedule appt for tomorrow.   Will forward to MD.

## 2021-07-27 ENCOUNTER — OFFICE VISIT (OUTPATIENT)
Dept: INTERNAL MEDICINE CLINIC | Age: 81
End: 2021-07-27
Payer: MEDICARE

## 2021-07-27 VITALS
BODY MASS INDEX: 24.81 KG/M2 | OXYGEN SATURATION: 97 % | RESPIRATION RATE: 20 BRPM | TEMPERATURE: 98.2 F | SYSTOLIC BLOOD PRESSURE: 121 MMHG | HEIGHT: 71 IN | HEART RATE: 76 BPM | WEIGHT: 177.2 LBS | DIASTOLIC BLOOD PRESSURE: 68 MMHG

## 2021-07-27 DIAGNOSIS — K40.90 RIGHT INGUINAL HERNIA: Primary | ICD-10-CM

## 2021-07-27 DIAGNOSIS — E06.3 HYPOTHYROIDISM, ACQUIRED, AUTOIMMUNE: ICD-10-CM

## 2021-07-27 PROCEDURE — G8510 SCR DEP NEG, NO PLAN REQD: HCPCS | Performed by: INTERNAL MEDICINE

## 2021-07-27 PROCEDURE — G8536 NO DOC ELDER MAL SCRN: HCPCS | Performed by: INTERNAL MEDICINE

## 2021-07-27 PROCEDURE — G8752 SYS BP LESS 140: HCPCS | Performed by: INTERNAL MEDICINE

## 2021-07-27 PROCEDURE — 99214 OFFICE O/P EST MOD 30 MIN: CPT | Performed by: INTERNAL MEDICINE

## 2021-07-27 PROCEDURE — G8754 DIAS BP LESS 90: HCPCS | Performed by: INTERNAL MEDICINE

## 2021-07-27 PROCEDURE — G8420 CALC BMI NORM PARAMETERS: HCPCS | Performed by: INTERNAL MEDICINE

## 2021-07-27 PROCEDURE — G0463 HOSPITAL OUTPT CLINIC VISIT: HCPCS | Performed by: INTERNAL MEDICINE

## 2021-07-27 PROCEDURE — G8427 DOCREV CUR MEDS BY ELIG CLIN: HCPCS | Performed by: INTERNAL MEDICINE

## 2021-07-27 PROCEDURE — 1101F PT FALLS ASSESS-DOCD LE1/YR: CPT | Performed by: INTERNAL MEDICINE

## 2021-07-27 RX ORDER — LEVOTHYROXINE SODIUM 50 UG/1
TABLET ORAL
Qty: 112 TABLET | Refills: 3 | Status: SHIPPED | OUTPATIENT
Start: 2021-07-27 | End: 2022-08-03 | Stop reason: SDUPTHER

## 2021-07-27 NOTE — PROGRESS NOTES
HISTORY OF PRESENT ILLNESS  Shana Arcos is a 80 y.o. male. Groin Pain  The history is provided by the patient (notes swelling, pain in right groin. Worsens as day progress, decreases lying flat. ). This is a chronic problem. Episode onset: 6 mos +, worsening over last 6 wks. The problem has been gradually worsening. Associated symptoms include abdominal pain. Associated symptoms comments: mild. The symptoms are aggravated by walking. The symptoms are relieved by laying down. He has tried rest for the symptoms. The treatment provided moderate relief. No hx of previous hernia    Review of Systems   Constitutional: Negative for chills and fever. Gastrointestinal: Positive for abdominal pain. Negative for constipation, nausea and vomiting. Genitourinary: Negative for urgency. Physical Exam  Vitals and nursing note reviewed. Abdominal:      General: Abdomen is flat. Bowel sounds are normal.      Hernia: A hernia is present. Hernia is present in the right inguinal area. Skin:     General: Skin is warm and dry. Neurological:      Mental Status: He is alert. Psychiatric:         Behavior: Behavior normal.         ASSESSMENT and PLAN  Diagnoses and all orders for this visit:    1. Right inguinal hernia  -     REFERRAL TO GENERAL SURGERY  - See patient instructions , red flags reviewed    2. Hypothyroidism, acquired, autoimmune  -     levothyroxine (SYNTHROID) 50 mcg tablet; TAKE 1 TABLET BY MOUTH EVERY DAY BEFORE BREAKFAST EXCEPT TAKE 2 TABS ON SATURDAY.

## 2021-07-27 NOTE — PATIENT INSTRUCTIONS

## 2021-07-28 NOTE — TELEPHONE ENCOUNTER
Returned patient call and verified identity by two identifiers. Patient states that he would like to coordinate an appointment with a New Provider due to existing Provider leaving practice. I assisted in coordinating an appointment with Dr. Ramirez Bosch on 10/11/2021, as requested. Patient verbalized all understanding and had no additional questions.     Future Appointments   Date Time Provider Wanda Becker   10/11/2021  9:00 AM MD ZOHREH Gong AMB   12/22/2021  8:10 AM Danni Rodriguez MD [facility]

## 2021-08-02 RX ORDER — ROSUVASTATIN CALCIUM 20 MG/1
20 TABLET, COATED ORAL
Qty: 90 TABLET | Refills: 3 | Status: SHIPPED | OUTPATIENT
Start: 2021-08-02 | End: 2022-06-22 | Stop reason: SDUPTHER

## 2021-08-17 ENCOUNTER — OFFICE VISIT (OUTPATIENT)
Dept: SURGERY | Age: 81
End: 2021-08-17
Payer: MEDICARE

## 2021-08-17 VITALS
HEART RATE: 89 BPM | RESPIRATION RATE: 18 BRPM | WEIGHT: 180.8 LBS | HEIGHT: 71 IN | DIASTOLIC BLOOD PRESSURE: 69 MMHG | TEMPERATURE: 98.7 F | BODY MASS INDEX: 25.31 KG/M2 | OXYGEN SATURATION: 95 % | SYSTOLIC BLOOD PRESSURE: 136 MMHG

## 2021-08-17 DIAGNOSIS — K40.21 BILATERAL RECURRENT INGUINAL HERNIA WITHOUT OBSTRUCTION OR GANGRENE: Primary | ICD-10-CM

## 2021-08-17 PROCEDURE — G8510 SCR DEP NEG, NO PLAN REQD: HCPCS | Performed by: SURGERY

## 2021-08-17 PROCEDURE — G8754 DIAS BP LESS 90: HCPCS | Performed by: SURGERY

## 2021-08-17 PROCEDURE — G8419 CALC BMI OUT NRM PARAM NOF/U: HCPCS | Performed by: SURGERY

## 2021-08-17 PROCEDURE — G8427 DOCREV CUR MEDS BY ELIG CLIN: HCPCS | Performed by: SURGERY

## 2021-08-17 PROCEDURE — G8752 SYS BP LESS 140: HCPCS | Performed by: SURGERY

## 2021-08-17 PROCEDURE — 1101F PT FALLS ASSESS-DOCD LE1/YR: CPT | Performed by: SURGERY

## 2021-08-17 PROCEDURE — G8536 NO DOC ELDER MAL SCRN: HCPCS | Performed by: SURGERY

## 2021-08-17 PROCEDURE — 99203 OFFICE O/P NEW LOW 30 MIN: CPT | Performed by: SURGERY

## 2021-08-17 NOTE — H&P (VIEW-ONLY)
Pending sale to [facility] at [facility] Surgery History and Physical    History of Present Illness:      Shana Arcos is a 80 y.o. male who has a right inguinal hernia. He has noticed a bulge in the right groin for about a year. He has pain sometimes with straining and bending over. His pain is a 1-2 out of 10. He does not have the pain when he sitting at rest.  He says sometimes he has some alternating constipation and normal bowel movements. He does not have any nausea vomiting or any other generalized abdominal pain. He has a recent history of cardiac bypass done about a year and a half ago here at [facility].     Past Medical History:   Diagnosis Date    Arrhythmia 04/12/2020    cardioversion for SVT    Arthritis     Basal cell carcinoma     BPH (benign prostatic hyperplasia)     CAD (coronary artery disease)     s/p PTCA '92    Calculus of kidney     Chronic pain     BACK    Elevated PSA     Hypercholesterolemia     Lumbar foraminal stenosis     Melanoma ([address]     MVP (mitral valve prolapse)     NSTEMI (non-ST elevated myocardial infarction) ([address] 03/25/2020    Other ill-defined conditions(799.89)     Prostatitis    Prediabetes     Seasonal allergies     Squamous cell carcinoma     Stroke ([address]     with left eye visual loss; 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    Systolic murmur     Thyroid disease     HYPOTHYROID    Vision impairment     R EYE, BLOOD CLOT ON RETINA       Past Surgical History:   Procedure Laterality Date    ENDOSCOPY, COLON, DIAGNOSTIC      due 12    HX BACK SURGERY      HX CHOLECYSTECTOMY  1970    HX CORONARY ARTERY BYPASS GRAFT  03/26/2020    x 5, LIMA to LAD, RSVG to Diag-RI-OM, RSVG to RCA    [address]X [address] HX LITHOTRIPSY      x2    HX ORTHOPAEDIC      elbow - fx right arm age 3 yrs    [place] CARDIOVERSION ELECTIVE ARRHYTHMIA EXTERNAL N/A 4/13/2020    EP CARDIOVERSION performed by Harpreet Mackey MD at [address], [facility]  CATH LAB Current Outpatient Medications:     rosuvastatin (CRESTOR) 20 mg tablet, Take 1 Tablet by mouth nightly. Replaces atorvastatin, Disp: 90 Tablet, Rfl: 3    levothyroxine (SYNTHROID) 50 mcg tablet, TAKE 1 TABLET BY MOUTH EVERY DAY BEFORE BREAKFAST EXCEPT TAKE 2 TABS ON SATURDAY. , Disp: 112 Tablet, Rfl: 3    Eliquis 5 mg tablet, TAKE 1 TABLET BY MOUTH EVERY 12 HOURS, Disp: 180 Tablet, Rfl: 3    aspirin 81 mg chewable tablet, Take 1 Tab by mouth daily. , Disp: 90 Tab, Rfl: 3    vitamins  A,C,E-zinc-copper (Ocuvite PreserVision) 7,160-113-100 unit-mg-unit tab tablet, Take 1 Tab by mouth daily. (Patient taking differently: Take 1 Tablet by mouth two (2) times a day.), Disp: 30 Tab, Rfl: 5    tamsulosin (FLOMAX) 0.4 mg capsule, TAKE 1 CAPSULE BY MOUTH EVERY DAY 30 MINUTES AFTER THE SAME MEAL EACH DAY, Disp: , Rfl: 11    Cholecalciferol, Vitamin D3, (VITAMIN D3) 2,000 unit cap capsule, Take 2,000 Units by mouth two (2) times a day. (Patient taking differently: Take 2,000 Units by mouth daily.), Disp: 30 Cap, Rfl: 11    finasteride (PROSCAR) 5 mg tablet, Take 5 mg by mouth daily. HS, Disp: , Rfl:     acetaminophen (TYLENOL) 325 mg tablet, Take 2 Tabs by mouth every four (4) hours as needed for Pain.  (Patient not taking: Reported on 8/17/2021), Disp: , Rfl:     Allergies   Allergen Reactions    Codeine Rash    Pcn [Penicillins] Rash    Sulfa (Sulfonamide Antibiotics) Other (comments)     confusion       Social History     Socioeconomic History    Marital status:      Spouse name: Not on file    Number of children: Not on file    Years of education: Not on file    Highest education level: Not on file   Occupational History    Occupation:      Comment: part time consulting   Tobacco Use    Smoking status: Never Smoker    Smokeless tobacco: Never Used   Substance and Sexual Activity    Alcohol use: No    Drug use: No    Sexual activity: Not on file   Other Topics Concern     Service Not Asked    Blood Transfusions Not Asked    Caffeine Concern Not Asked    Occupational Exposure Not Asked    Hobby Hazards Not Asked    Sleep Concern Not Asked    Stress Concern Not Asked    Weight Concern Not Asked    Special Diet Not Asked    Back Care Not Asked    Exercise Not Asked    Bike Helmet Not Asked   [address] Not Asked    Self-Exams Not Asked   Social History Narrative    Not on file     Social Determinants of Health     Financial Resource Strain:     Difficulty of Paying Living Expenses:    Food Insecurity:     Worried About Running Out of Food in the Last Year:     [address] in the Last Year:    Transportation Needs:     Lack of Transportation (Medical):      Lack of Transportation (Non-Medical):    Physical Activity:     Days of Exercise per Week:     Minutes of Exercise per Session:    Stress:     Feeling of Stress :    Social Connections:     Frequency of Communication with Friends and Family:     Frequency of Social Gatherings with Friends and Family:     Attends Latter day Services:     Active Member of Clubs or Organizations:     Attends Club or Organization Meetings:     Marital Status:    Intimate Partner Violence:     Fear of Current or Ex-Partner:     Emotionally Abused:     Physically Abused:     Sexually Abused:        Family History   Problem Relation Age of Onset    Heart Disease Mother         CHF    Heart Disease Father         CAD    Heart Disease Sister         CAD CABG    Lung Disease Sister     Heart Disease Brother         CAD    Pulmonary Fibrosis Brother     Heart Disease Brother         CAD    Stroke Brother 29        cerebral hemorrhage    Cancer Brother         LUNG    High Cholesterol Daughter     Anesth Problems Neg Hx        ROS   Constitutional: negative  Ears, Nose, Mouth, Throat, and Face: negative  Respiratory: negative  Cardiovascular: negative  Gastrointestinal: positive for Right groin bulge and pain  Genitourinary:negative  Integument/Breast: negative  Hematologic/Lymphatic: negative  Behavioral/Psychiatric: negative  Allergic/Immunologic: negative      Physical Exam:     Visit Vitals  /69 (BP 1 Location: Right arm, BP Patient Position: Sitting, BP Cuff Size: Adult)   Pulse 89   Temp 98.7 °F (37.1 °C) (Oral)   Resp 18   Ht 5' 11\" (1.803 m)   Wt 180 lb 12.8 oz (82 kg)   SpO2 95%   BMI 25.22 kg/m²       General - alert and oriented, no apparent distress  HEENT - no jaundice, no hearing imparement  Pulm - CTAB, no C/W/R  CV - RRR, no M/R/G  Abd -soft, nondistended, bowel sounds present, nontender to palpation, right inguinal hernia present that is soft and partially reducible, mild tenderness to palpation, left inguinal hernia present that is small and reducible, nontender to palpation  Ext - pulses intact in UE and LE bilaterally, no edema  Skin - supple, no rashes  Psychiatric - normal affect, good mood    Labs  Lab Results   Component Value Date/Time    Sodium 140 06/09/2021 09:51 AM    Potassium 4.3 06/09/2021 09:51 AM    Chloride 106 06/09/2021 09:51 AM    CO2 28 06/09/2021 09:51 AM    Anion gap 6 06/09/2021 09:51 AM    Glucose 94 06/09/2021 09:51 AM    BUN 25 (H) 06/09/2021 09:51 AM    Creatinine 0.86 06/09/2021 09:51 AM    BUN/Creatinine ratio 29 (H) 06/09/2021 09:51 AM    GFR est AA >60 06/09/2021 09:51 AM    GFR est non-AA >60 06/09/2021 09:51 AM    Calcium 9.0 06/09/2021 09:51 AM    Bilirubin, total 0.6 06/09/2021 09:51 AM    Alk.  phosphatase 125 (H) 06/09/2021 09:51 AM    Protein, total 7.0 06/09/2021 09:51 AM    Albumin 3.7 06/09/2021 09:51 AM    Globulin 3.3 06/09/2021 09:51 AM    A-G Ratio 1.1 06/09/2021 09:51 AM    ALT (SGPT) 12 06/09/2021 09:51 AM    AST (SGOT) 14 (L) 06/09/2021 09:51 AM     Lab Results   Component Value Date/Time    WBC 7.1 08/20/2020 12:34 PM    Hemoglobin (POC) 16.0 12/11/2014 09:43 AM    HGB 13.1 08/20/2020 12:34 PM    Hematocrit (POC) 45 01/08/2019 07:47 PM    HCT 42.8 08/20/2020 12:34 PM    PLATELET 736 85/12/7197 12:34 PM    MCV 82 08/20/2020 12:34 PM         Imaging  none  I have reviewed and agree with all of the pertinent images    Assessment:     Cristofer Rouse is a 80 y.o. male with bilateral inguinal hernias    Recommendations:     1. He does have bilateral inguinal hernias with the right side being a medium size hernia. The right hernia is partially reducible on exam.  He will need robotic bilateral inguinal hernia repairs with mesh in the operating room. He is on Eliquis and will need to be off this for 5 to 7 days prior to surgery. He also needs cardiac clearance from his cardiologist here at [facility]. He plays golf and is otherwise pretty active for his age which means he should likely be fine for surgery. We will call him to schedule him for surgery and get cardiac clearance. I have discussed the above procedure with the patient in detail. We reviewed the benefits and possible complications of the surgery which include bleeding, infection, damage to adjacent organs, venous thromboembolism, need for repeat surgery, death and other unforseen complications. The patient agreed to proceed with the surgery. Abran Espinoza MD    Greater than half of the time: 30 minutes was used in counciling the patient about diagnosis and treatment plan    Mr. Carly Ramirez has a reminder for a \"due or due soon\" health maintenance. I have asked that he contact his primary care provider for follow-up on this health maintenance.

## 2021-08-17 NOTE — PROGRESS NOTES
1. Have you been to the ER, urgent care clinic since your last visit? Hospitalized since your last visit? No    2. Have you seen or consulted any other health care providers outside of the [address] [place] [address] since your last visit? Include any pap smears or colon screening.  No

## 2021-08-17 NOTE — PROGRESS NOTES
[facility] at [facility] Surgery History and Physical    History of Present Illness:      India Mcarthur is a 80 y.o. male who has a right inguinal hernia. He has noticed a bulge in the right groin for about a year. He has pain sometimes with straining and bending over. His pain is a 1-2 out of 10. He does not have the pain when he sitting at rest.  He says sometimes he has some alternating constipation and normal bowel movements. He does not have any nausea vomiting or any other generalized abdominal pain. He has a recent history of cardiac bypass done about a year and a half ago here at [facility].     Past Medical History:   Diagnosis Date    Arrhythmia 04/12/2020    cardioversion for SVT    Arthritis     Basal cell carcinoma     BPH (benign prostatic hyperplasia)     CAD (coronary artery disease)     s/p PTCA '92    Calculus of kidney     Chronic pain     BACK    Elevated PSA     Hypercholesterolemia     Lumbar foraminal stenosis     Melanoma ([address]     MVP (mitral valve prolapse)     NSTEMI (non-ST elevated myocardial infarction) ([address] 03/25/2020    Other ill-defined conditions(799.89)     Prostatitis    Prediabetes     Seasonal allergies     Squamous cell carcinoma     Stroke ([address]     with left eye visual loss; 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    Systolic murmur     Thyroid disease     HYPOTHYROID    Vision impairment     R EYE, BLOOD CLOT ON RETINA       Past Surgical History:   Procedure Laterality Date    ENDOSCOPY, COLON, DIAGNOSTIC      due 12    HX BACK SURGERY      HX CHOLECYSTECTOMY  1970    HX CORONARY ARTERY BYPASS GRAFT  03/26/2020    x 5, LIMA to LAD, RSVG to Diag-RI-OM, RSVG to RCA    HX [address] HX LITHOTRIPSY      x2    HX ORTHOPAEDIC      elbow - fx right arm age 3 yrs    [place] CARDIOVERSION ELECTIVE ARRHYTHMIA EXTERNAL N/A 4/13/2020    EP CARDIOVERSION performed by Abeba Mercedes MD at [address], [facility]  CATH LAB Current Outpatient Medications:     rosuvastatin (CRESTOR) 20 mg tablet, Take 1 Tablet by mouth nightly. Replaces atorvastatin, Disp: 90 Tablet, Rfl: 3    levothyroxine (SYNTHROID) 50 mcg tablet, TAKE 1 TABLET BY MOUTH EVERY DAY BEFORE BREAKFAST EXCEPT TAKE 2 TABS ON SATURDAY. , Disp: 112 Tablet, Rfl: 3    Eliquis 5 mg tablet, TAKE 1 TABLET BY MOUTH EVERY 12 HOURS, Disp: 180 Tablet, Rfl: 3    aspirin 81 mg chewable tablet, Take 1 Tab by mouth daily. , Disp: 90 Tab, Rfl: 3    vitamins  A,C,E-zinc-copper (Ocuvite PreserVision) 7,160-113-100 unit-mg-unit tab tablet, Take 1 Tab by mouth daily. (Patient taking differently: Take 1 Tablet by mouth two (2) times a day.), Disp: 30 Tab, Rfl: 5    tamsulosin (FLOMAX) 0.4 mg capsule, TAKE 1 CAPSULE BY MOUTH EVERY DAY 30 MINUTES AFTER THE SAME MEAL EACH DAY, Disp: , Rfl: 11    Cholecalciferol, Vitamin D3, (VITAMIN D3) 2,000 unit cap capsule, Take 2,000 Units by mouth two (2) times a day. (Patient taking differently: Take 2,000 Units by mouth daily.), Disp: 30 Cap, Rfl: 11    finasteride (PROSCAR) 5 mg tablet, Take 5 mg by mouth daily. HS, Disp: , Rfl:     acetaminophen (TYLENOL) 325 mg tablet, Take 2 Tabs by mouth every four (4) hours as needed for Pain.  (Patient not taking: Reported on 8/17/2021), Disp: , Rfl:     Allergies   Allergen Reactions    Codeine Rash    Pcn [Penicillins] Rash    Sulfa (Sulfonamide Antibiotics) Other (comments)     confusion       Social History     Socioeconomic History    Marital status:      Spouse name: Not on file    Number of children: Not on file    Years of education: Not on file    Highest education level: Not on file   Occupational History    Occupation:      Comment: part time consulting   Tobacco Use    Smoking status: Never Smoker    Smokeless tobacco: Never Used   Substance and Sexual Activity    Alcohol use: No    Drug use: No    Sexual activity: Not on file   Other Topics Concern     Service Not Asked    Blood Transfusions Not Asked    Caffeine Concern Not Asked    Occupational Exposure Not Asked    Hobby Hazards Not Asked    Sleep Concern Not Asked    Stress Concern Not Asked    Weight Concern Not Asked    Special Diet Not Asked    Back Care Not Asked    Exercise Not Asked    Bike Helmet Not Asked   [address] Not Asked    Self-Exams Not Asked   Social History Narrative    Not on file     Social Determinants of Health     Financial Resource Strain:     Difficulty of Paying Living Expenses:    Food Insecurity:     Worried About Running Out of Food in the Last Year:     [address] in the Last Year:    Transportation Needs:     Lack of Transportation (Medical):      Lack of Transportation (Non-Medical):    Physical Activity:     Days of Exercise per Week:     Minutes of Exercise per Session:    Stress:     Feeling of Stress :    Social Connections:     Frequency of Communication with Friends and Family:     Frequency of Social Gatherings with Friends and Family:     Attends Hoahaoism Services:     Active Member of Clubs or Organizations:     Attends Club or Organization Meetings:     Marital Status:    Intimate Partner Violence:     Fear of Current or Ex-Partner:     Emotionally Abused:     Physically Abused:     Sexually Abused:        Family History   Problem Relation Age of Onset    Heart Disease Mother         CHF    Heart Disease Father         CAD    Heart Disease Sister         CAD CABG    Lung Disease Sister     Heart Disease Brother         CAD    Pulmonary Fibrosis Brother     Heart Disease Brother         CAD    Stroke Brother 29        cerebral hemorrhage    Cancer Brother         LUNG    High Cholesterol Daughter     Anesth Problems Neg Hx        ROS   Constitutional: negative  Ears, Nose, Mouth, Throat, and Face: negative  Respiratory: negative  Cardiovascular: negative  Gastrointestinal: positive for Right groin bulge and pain  Genitourinary:negative  Integument/Breast: negative  Hematologic/Lymphatic: negative  Behavioral/Psychiatric: negative  Allergic/Immunologic: negative      Physical Exam:     Visit Vitals  /69 (BP 1 Location: Right arm, BP Patient Position: Sitting, BP Cuff Size: Adult)   Pulse 89   Temp 98.7 °F (37.1 °C) (Oral)   Resp 18   Ht 5' 11\" (1.803 m)   Wt 180 lb 12.8 oz (82 kg)   SpO2 95%   BMI 25.22 kg/m²       General - alert and oriented, no apparent distress  HEENT - no jaundice, no hearing imparement  Pulm - CTAB, no C/W/R  CV - RRR, no M/R/G  Abd -soft, nondistended, bowel sounds present, nontender to palpation, right inguinal hernia present that is soft and partially reducible, mild tenderness to palpation, left inguinal hernia present that is small and reducible, nontender to palpation  Ext - pulses intact in UE and LE bilaterally, no edema  Skin - supple, no rashes  Psychiatric - normal affect, good mood    Labs  Lab Results   Component Value Date/Time    Sodium 140 06/09/2021 09:51 AM    Potassium 4.3 06/09/2021 09:51 AM    Chloride 106 06/09/2021 09:51 AM    CO2 28 06/09/2021 09:51 AM    Anion gap 6 06/09/2021 09:51 AM    Glucose 94 06/09/2021 09:51 AM    BUN 25 (H) 06/09/2021 09:51 AM    Creatinine 0.86 06/09/2021 09:51 AM    BUN/Creatinine ratio 29 (H) 06/09/2021 09:51 AM    GFR est AA >60 06/09/2021 09:51 AM    GFR est non-AA >60 06/09/2021 09:51 AM    Calcium 9.0 06/09/2021 09:51 AM    Bilirubin, total 0.6 06/09/2021 09:51 AM    Alk.  phosphatase 125 (H) 06/09/2021 09:51 AM    Protein, total 7.0 06/09/2021 09:51 AM    Albumin 3.7 06/09/2021 09:51 AM    Globulin 3.3 06/09/2021 09:51 AM    A-G Ratio 1.1 06/09/2021 09:51 AM    ALT (SGPT) 12 06/09/2021 09:51 AM    AST (SGOT) 14 (L) 06/09/2021 09:51 AM     Lab Results   Component Value Date/Time    WBC 7.1 08/20/2020 12:34 PM    Hemoglobin (POC) 16.0 12/11/2014 09:43 AM    HGB 13.1 08/20/2020 12:34 PM    Hematocrit (POC) 45 01/08/2019 07:47 PM    HCT 42.8 08/20/2020 12:34 PM    PLATELET 095 89/41/5089 12:34 PM    MCV 82 08/20/2020 12:34 PM         Imaging  none  I have reviewed and agree with all of the pertinent images    Assessment:     Jean-Pierre Noriega is a 80 y.o. male with bilateral inguinal hernias    Recommendations:     1. He does have bilateral inguinal hernias with the right side being a medium size hernia. The right hernia is partially reducible on exam.  He will need robotic bilateral inguinal hernia repairs with mesh in the operating room. He is on Eliquis and will need to be off this for 5 to 7 days prior to surgery. He also needs cardiac clearance from his cardiologist here at [facility]. He plays golf and is otherwise pretty active for his age which means he should likely be fine for surgery. We will call him to schedule him for surgery and get cardiac clearance. I have discussed the above procedure with the patient in detail. We reviewed the benefits and possible complications of the surgery which include bleeding, infection, damage to adjacent organs, venous thromboembolism, need for repeat surgery, death and other unforseen complications. The patient agreed to proceed with the surgery. Harry Velez MD    Greater than half of the time: 30 minutes was used in counciling the patient about diagnosis and treatment plan    Mr. Akash Siddiqi has a reminder for a \"due or due soon\" health maintenance. I have asked that he contact his primary care provider for follow-up on this health maintenance.

## 2021-08-18 ENCOUNTER — TELEPHONE (OUTPATIENT)
Dept: CARDIOLOGY CLINIC | Age: 81
End: 2021-08-18

## 2021-08-18 NOTE — LETTER
8/18/2021 10:06 AM    Mr. Xochilt Andujar  [address] [address]      Dear Dr. Jennie Cardona  Fax #: [phone]    Mr. Yosef Evans is currently under the care of [address] [place] [address]. He is intermediate risk for cardiac complications during non-cardiac surgery. He may hold his Eliquis 2 days prior. No further cardiac evaluation is indicated at this time. Please do not hesitate to contact me with questions.       Sincerely,      BRENNAN Saul;nilo

## 2021-08-18 NOTE — TELEPHONE ENCOUNTER
Patient is requesting cardiac clearance for an upcoming hernia procedure that is not yet scheduled but states that is will be done in the next two weeks by Dr. Ean Abdul. Patient did not have fax but was able to provide the contact number to the office which is [phone].      Patient phone: [phone]

## 2021-08-18 NOTE — TELEPHONE ENCOUNTER
He is cleared to proceed with surgery, he is intermediate risk due to his hx of CAD, he may hold his eliquis 2 days prior to his procedure

## 2021-08-18 NOTE — TELEPHONE ENCOUNTER
LVM for patient to return call at earliest convenience. Clearance letter faxed to Dr. Basim Bradford.

## 2021-09-13 ENCOUNTER — HOSPITAL ENCOUNTER (OUTPATIENT)
Dept: PREADMISSION TESTING | Age: 81
Discharge: HOME | End: 2021-09-13
Payer: MEDICARE

## 2021-09-13 ENCOUNTER — TRANSCRIBE ORDER (OUTPATIENT)
Dept: REGISTRATION | Age: 81
End: 2021-09-13

## 2021-09-13 VITALS
HEIGHT: 71 IN | WEIGHT: 177.69 LBS | SYSTOLIC BLOOD PRESSURE: 119 MMHG | RESPIRATION RATE: 18 BRPM | HEART RATE: 72 BPM | DIASTOLIC BLOOD PRESSURE: 68 MMHG | BODY MASS INDEX: 24.88 KG/M2 | TEMPERATURE: 97.8 F

## 2021-09-13 DIAGNOSIS — Z01.812 PRE-PROCEDURE LAB EXAM: ICD-10-CM

## 2021-09-13 DIAGNOSIS — Z01.812 PRE-PROCEDURE LAB EXAM: Primary | ICD-10-CM

## 2021-09-13 LAB
ANION GAP SERPL CALC-SCNC: 0 MMOL/L (ref 5–15)
ATRIAL RATE: 69 BPM
BASOPHILS # BLD: 0.1 K/UL (ref 0–0.1)
BASOPHILS NFR BLD: 1 % (ref 0–1)
BUN SERPL-MCNC: 20 MG/DL (ref 6–20)
BUN/CREAT SERPL: 20 (ref 12–20)
CALCIUM SERPL-MCNC: 8.8 MG/DL (ref 8.5–10.1)
CALCULATED P AXIS, ECG09: 45 DEGREES
CALCULATED R AXIS, ECG10: -85 DEGREES
CALCULATED T AXIS, ECG11: 32 DEGREES
CHLORIDE SERPL-SCNC: 108 MMOL/L (ref 97–108)
CO2 SERPL-SCNC: 32 MMOL/L (ref 21–32)
CREAT SERPL-MCNC: 1.01 MG/DL (ref 0.7–1.3)
DIAGNOSIS, 93000: NORMAL
DIFFERENTIAL METHOD BLD: NORMAL
EOSINOPHIL # BLD: 0.2 K/UL (ref 0–0.4)
EOSINOPHIL NFR BLD: 3 % (ref 0–7)
ERYTHROCYTE [DISTWIDTH] IN BLOOD BY AUTOMATED COUNT: 14.3 % (ref 11.5–14.5)
GLUCOSE SERPL-MCNC: 88 MG/DL (ref 65–100)
HCT VFR BLD AUTO: 45.2 % (ref 36.6–50.3)
HGB BLD-MCNC: 14.4 G/DL (ref 12.1–17)
IMM GRANULOCYTES # BLD AUTO: 0 K/UL (ref 0–0.04)
IMM GRANULOCYTES NFR BLD AUTO: 0 % (ref 0–0.5)
LYMPHOCYTES # BLD: 1.3 K/UL (ref 0.8–3.5)
LYMPHOCYTES NFR BLD: 20 % (ref 12–49)
MCH RBC QN AUTO: 28.6 PG (ref 26–34)
MCHC RBC AUTO-ENTMCNC: 31.9 G/DL (ref 30–36.5)
MCV RBC AUTO: 89.7 FL (ref 80–99)
MONOCYTES # BLD: 0.6 K/UL (ref 0–1)
MONOCYTES NFR BLD: 9 % (ref 5–13)
NEUTS SEG # BLD: 4.4 K/UL (ref 1.8–8)
NEUTS SEG NFR BLD: 67 % (ref 32–75)
NRBC # BLD: 0 K/UL (ref 0–0.01)
NRBC BLD-RTO: 0 PER 100 WBC
P-R INTERVAL, ECG05: 288 MS
PLATELET # BLD AUTO: 234 K/UL (ref 150–400)
PMV BLD AUTO: 9.6 FL (ref 8.9–12.9)
POTASSIUM SERPL-SCNC: 4.7 MMOL/L (ref 3.5–5.1)
Q-T INTERVAL, ECG07: 416 MS
QRS DURATION, ECG06: 138 MS
QTC CALCULATION (BEZET), ECG08: 445 MS
RBC # BLD AUTO: 5.04 M/UL (ref 4.1–5.7)
SODIUM SERPL-SCNC: 140 MMOL/L (ref 136–145)
VENTRICULAR RATE, ECG03: 69 BPM
WBC # BLD AUTO: 6.4 K/UL (ref 4.1–11.1)

## 2021-09-13 PROCEDURE — 85025 COMPLETE CBC W/AUTO DIFF WBC: CPT

## 2021-09-13 PROCEDURE — 80048 BASIC METABOLIC PNL TOTAL CA: CPT

## 2021-09-13 PROCEDURE — 93005 ELECTROCARDIOGRAM TRACING: CPT

## 2021-09-13 PROCEDURE — 36415 COLL VENOUS BLD VENIPUNCTURE: CPT

## 2021-09-13 PROCEDURE — U0005 INFEC AGEN DETEC AMPLI PROBE: HCPCS

## 2021-09-13 RX ORDER — ACETAMINOPHEN 500 MG
2000 TABLET ORAL
COMMUNITY

## 2021-09-13 NOTE — PERIOP NOTES
Preoperative and medication instructions reviewed with patient , surgical site infection sheet given,  chg soap x 2 given and instructions on how to use chg soap correctly. Patient given opportunity to ask questions and all questions were answered. Information given regarding covid testing and arrival to hospital on day of surgery. Copy of covid card on chart    Cardio notes in [facility] .  Dr. Fawn Sharp

## 2021-09-14 LAB
SARS-COV-2, XPLCVT: NOT DETECTED
SOURCE, COVRS: NORMAL

## 2021-09-16 ENCOUNTER — ANESTHESIA EVENT (OUTPATIENT)
Dept: SURGERY | Age: 81
End: 2021-09-16
Payer: MEDICARE

## 2021-09-16 ENCOUNTER — ANESTHESIA (OUTPATIENT)
Dept: SURGERY | Age: 81
End: 2021-09-16
Payer: MEDICARE

## 2021-09-16 ENCOUNTER — HOSPITAL ENCOUNTER (OUTPATIENT)
Age: 81
Setting detail: OUTPATIENT SURGERY
Discharge: HOME | End: 2021-09-16
Attending: SURGERY | Admitting: SURGERY
Payer: MEDICARE

## 2021-09-16 VITALS
SYSTOLIC BLOOD PRESSURE: 144 MMHG | HEART RATE: 62 BPM | BODY MASS INDEX: 24.88 KG/M2 | OXYGEN SATURATION: 94 % | TEMPERATURE: 97.2 F | RESPIRATION RATE: 15 BRPM | DIASTOLIC BLOOD PRESSURE: 59 MMHG | HEIGHT: 71 IN | WEIGHT: 177.69 LBS

## 2021-09-16 DIAGNOSIS — K40.20 NON-RECURRENT BILATERAL INGUINAL HERNIA WITHOUT OBSTRUCTION OR GANGRENE: Primary | ICD-10-CM

## 2021-09-16 PROCEDURE — 77030031139 HC SUT VCRL2 J&J -A: Performed by: SURGERY

## 2021-09-16 PROCEDURE — 74011250636 HC RX REV CODE- 250/636: Performed by: SURGERY

## 2021-09-16 PROCEDURE — 77030008684 HC TU ET CUF COVD -B: Performed by: STUDENT IN AN ORGANIZED HEALTH CARE EDUCATION/TRAINING PROGRAM

## 2021-09-16 PROCEDURE — 74011000250 HC RX REV CODE- 250: Performed by: SURGERY

## 2021-09-16 PROCEDURE — 74011000250 HC RX REV CODE- 250: Performed by: NURSE ANESTHETIST, CERTIFIED REGISTERED

## 2021-09-16 PROCEDURE — 2709999900 HC NON-CHARGEABLE SUPPLY: Performed by: SURGERY

## 2021-09-16 PROCEDURE — 74011250636 HC RX REV CODE- 250/636: Performed by: NURSE ANESTHETIST, CERTIFIED REGISTERED

## 2021-09-16 PROCEDURE — 77030016151 HC PROTCTR LNS DFOG COVD -B: Performed by: SURGERY

## 2021-09-16 PROCEDURE — 74011250636 HC RX REV CODE- 250/636: Performed by: ANESTHESIOLOGY

## 2021-09-16 PROCEDURE — 77030040506 HC DRN WND MDII -A: Performed by: SURGERY

## 2021-09-16 PROCEDURE — 77030035277 HC OBTRTR BLDELSS DISP INTU -B: Performed by: SURGERY

## 2021-09-16 PROCEDURE — 77030040361 HC SLV COMPR DVT MDII -B: Performed by: SURGERY

## 2021-09-16 PROCEDURE — 77030010507 HC ADH SKN DERMBND J&J -B: Performed by: SURGERY

## 2021-09-16 PROCEDURE — 49650 LAP ING HERNIA REPAIR INIT: CPT | Performed by: SURGERY

## 2021-09-16 PROCEDURE — 76060000036 HC ANESTHESIA 2.5 TO 3 HR: Performed by: SURGERY

## 2021-09-16 PROCEDURE — 77030040830 HC CATH URETH FOL MDII -A: Performed by: SURGERY

## 2021-09-16 PROCEDURE — C1781 MESH (IMPLANTABLE): HCPCS | Performed by: SURGERY

## 2021-09-16 PROCEDURE — 77030026438 HC STYL ET INTUB CARD -A: Performed by: STUDENT IN AN ORGANIZED HEALTH CARE EDUCATION/TRAINING PROGRAM

## 2021-09-16 PROCEDURE — 77030022704 HC SUT VLOC COVD -B: Performed by: SURGERY

## 2021-09-16 PROCEDURE — 77030002912 HC SUT ETHBND J&J -A: Performed by: SURGERY

## 2021-09-16 PROCEDURE — 77030020703 HC SEAL CANN DISP INTU -B: Performed by: SURGERY

## 2021-09-16 PROCEDURE — 76210000021 HC REC RM PH II 0.5 TO 1 HR: Performed by: SURGERY

## 2021-09-16 PROCEDURE — 76210000000 HC OR PH I REC 2 TO 2.5 HR: Performed by: SURGERY

## 2021-09-16 PROCEDURE — S2900 ROBOTIC SURGICAL SYSTEM: HCPCS | Performed by: SURGERY

## 2021-09-16 PROCEDURE — 76010000877 HC OR TIME 2.5 TO 3HR INTENSV - TIER 2: Performed by: SURGERY

## 2021-09-16 PROCEDURE — 77030002933 HC SUT MCRYL J&J -A: Performed by: SURGERY

## 2021-09-16 PROCEDURE — 77030040922 HC BLNKT HYPOTHRM STRY -A

## 2021-09-16 DEVICE — LAPAROSCOPIC SELF-FIXATING MESH, RIGHT ANATOMICAL
Type: IMPLANTABLE DEVICE | Site: PELVIS | Status: FUNCTIONAL
Brand: PROGRIP

## 2021-09-16 DEVICE — LAPAROSCOPIC SELF-FIXATING MESH, LEFT ANATOMICAL
Type: IMPLANTABLE DEVICE | Site: PELVIS | Status: FUNCTIONAL
Brand: PROGRIP

## 2021-09-16 RX ORDER — DEXAMETHASONE SODIUM PHOSPHATE 4 MG/ML
INJECTION, SOLUTION INTRA-ARTICULAR; INTRALESIONAL; INTRAMUSCULAR; INTRAVENOUS; SOFT TISSUE AS NEEDED
Status: DISCONTINUED | OUTPATIENT
Start: 2021-09-16 | End: 2021-09-16 | Stop reason: HOSPADM

## 2021-09-16 RX ORDER — BUPIVACAINE HYDROCHLORIDE AND EPINEPHRINE 5; 5 MG/ML; UG/ML
INJECTION, SOLUTION EPIDURAL; INTRACAUDAL; PERINEURAL AS NEEDED
Status: DISCONTINUED | OUTPATIENT
Start: 2021-09-16 | End: 2021-09-16 | Stop reason: HOSPADM

## 2021-09-16 RX ORDER — OXYCODONE HYDROCHLORIDE 5 MG/1
5 TABLET ORAL
Status: DISCONTINUED | OUTPATIENT
Start: 2021-09-16 | End: 2021-09-17 | Stop reason: HOSPADM

## 2021-09-16 RX ORDER — SODIUM CHLORIDE 0.9 % (FLUSH) 0.9 %
5-40 SYRINGE (ML) INJECTION EVERY 8 HOURS
Status: DISCONTINUED | OUTPATIENT
Start: 2021-09-16 | End: 2021-09-16 | Stop reason: HOSPADM

## 2021-09-16 RX ORDER — SODIUM CHLORIDE 0.9 % (FLUSH) 0.9 %
5-40 SYRINGE (ML) INJECTION AS NEEDED
Status: DISCONTINUED | OUTPATIENT
Start: 2021-09-16 | End: 2021-09-16 | Stop reason: HOSPADM

## 2021-09-16 RX ORDER — GLYCOPYRROLATE 0.2 MG/ML
INJECTION INTRAMUSCULAR; INTRAVENOUS AS NEEDED
Status: DISCONTINUED | OUTPATIENT
Start: 2021-09-16 | End: 2021-09-16 | Stop reason: HOSPADM

## 2021-09-16 RX ORDER — FENTANYL CITRATE 50 UG/ML
INJECTION, SOLUTION INTRAMUSCULAR; INTRAVENOUS AS NEEDED
Status: DISCONTINUED | OUTPATIENT
Start: 2021-09-16 | End: 2021-09-16 | Stop reason: HOSPADM

## 2021-09-16 RX ORDER — IBUPROFEN 800 MG/1
800 TABLET ORAL
Qty: 30 TABLET | Refills: 0 | Status: SHIPPED | OUTPATIENT
Start: 2021-09-16 | End: 2022-03-09

## 2021-09-16 RX ORDER — ONDANSETRON 2 MG/ML
INJECTION INTRAMUSCULAR; INTRAVENOUS AS NEEDED
Status: DISCONTINUED | OUTPATIENT
Start: 2021-09-16 | End: 2021-09-16 | Stop reason: HOSPADM

## 2021-09-16 RX ORDER — PROPOFOL 10 MG/ML
INJECTION, EMULSION INTRAVENOUS AS NEEDED
Status: DISCONTINUED | OUTPATIENT
Start: 2021-09-16 | End: 2021-09-16 | Stop reason: HOSPADM

## 2021-09-16 RX ORDER — HYDROMORPHONE HYDROCHLORIDE 1 MG/ML
0.2 INJECTION, SOLUTION INTRAMUSCULAR; INTRAVENOUS; SUBCUTANEOUS
Status: DISCONTINUED | OUTPATIENT
Start: 2021-09-16 | End: 2021-09-17 | Stop reason: HOSPADM

## 2021-09-16 RX ORDER — ROCURONIUM BROMIDE 10 MG/ML
INJECTION, SOLUTION INTRAVENOUS AS NEEDED
Status: DISCONTINUED | OUTPATIENT
Start: 2021-09-16 | End: 2021-09-16 | Stop reason: HOSPADM

## 2021-09-16 RX ORDER — FENTANYL CITRATE 50 UG/ML
25 INJECTION, SOLUTION INTRAMUSCULAR; INTRAVENOUS
Status: COMPLETED | OUTPATIENT
Start: 2021-09-16 | End: 2021-09-16

## 2021-09-16 RX ORDER — MORPHINE SULFATE 4 MG/ML
INJECTION INTRAVENOUS AS NEEDED
Status: DISCONTINUED | OUTPATIENT
Start: 2021-09-16 | End: 2021-09-16 | Stop reason: HOSPADM

## 2021-09-16 RX ORDER — OXYCODONE AND ACETAMINOPHEN 5; 325 MG/1; MG/1
1 TABLET ORAL
Qty: 30 TABLET | Refills: 0 | Status: SHIPPED | OUTPATIENT
Start: 2021-09-16 | End: 2021-09-23

## 2021-09-16 RX ORDER — SODIUM CHLORIDE, SODIUM LACTATE, POTASSIUM CHLORIDE, CALCIUM CHLORIDE 600; 310; 30; 20 MG/100ML; MG/100ML; MG/100ML; MG/100ML
50 INJECTION, SOLUTION INTRAVENOUS CONTINUOUS
Status: DISCONTINUED | OUTPATIENT
Start: 2021-09-16 | End: 2021-09-16 | Stop reason: HOSPADM

## 2021-09-16 RX ORDER — SODIUM CHLORIDE 0.9 % (FLUSH) 0.9 %
5-40 SYRINGE (ML) INJECTION EVERY 8 HOURS
Status: DISCONTINUED | OUTPATIENT
Start: 2021-09-16 | End: 2021-09-17 | Stop reason: HOSPADM

## 2021-09-16 RX ORDER — SODIUM CHLORIDE 9 MG/ML
INJECTION, SOLUTION INTRAVENOUS
Status: DISCONTINUED | OUTPATIENT
Start: 2021-09-16 | End: 2021-09-16 | Stop reason: HOSPADM

## 2021-09-16 RX ORDER — LIDOCAINE HYDROCHLORIDE 20 MG/ML
INJECTION, SOLUTION EPIDURAL; INFILTRATION; INTRACAUDAL; PERINEURAL AS NEEDED
Status: DISCONTINUED | OUTPATIENT
Start: 2021-09-16 | End: 2021-09-16 | Stop reason: HOSPADM

## 2021-09-16 RX ORDER — NEOSTIGMINE METHYLSULFATE 1 MG/ML
INJECTION, SOLUTION INTRAVENOUS AS NEEDED
Status: DISCONTINUED | OUTPATIENT
Start: 2021-09-16 | End: 2021-09-16 | Stop reason: HOSPADM

## 2021-09-16 RX ORDER — SODIUM CHLORIDE 0.9 % (FLUSH) 0.9 %
5-40 SYRINGE (ML) INJECTION AS NEEDED
Status: DISCONTINUED | OUTPATIENT
Start: 2021-09-16 | End: 2021-09-17 | Stop reason: HOSPADM

## 2021-09-16 RX ORDER — ONDANSETRON 2 MG/ML
4 INJECTION INTRAMUSCULAR; INTRAVENOUS AS NEEDED
Status: DISCONTINUED | OUTPATIENT
Start: 2021-09-16 | End: 2021-09-17 | Stop reason: HOSPADM

## 2021-09-16 RX ORDER — LIDOCAINE HYDROCHLORIDE 10 MG/ML
0.1 INJECTION, SOLUTION EPIDURAL; INFILTRATION; INTRACAUDAL; PERINEURAL AS NEEDED
Status: DISCONTINUED | OUTPATIENT
Start: 2021-09-16 | End: 2021-09-16 | Stop reason: HOSPADM

## 2021-09-16 RX ADMIN — DEXAMETHASONE SODIUM PHOSPHATE 4 MG: 4 INJECTION, SOLUTION INTRAMUSCULAR; INTRAVENOUS at 14:33

## 2021-09-16 RX ADMIN — WATER 2 G: 1 INJECTION INTRAMUSCULAR; INTRAVENOUS; SUBCUTANEOUS at 14:50

## 2021-09-16 RX ADMIN — ROCURONIUM BROMIDE 50 MG: 10 SOLUTION INTRAVENOUS at 14:26

## 2021-09-16 RX ADMIN — FENTANYL CITRATE 50 MCG: 50 INJECTION, SOLUTION INTRAMUSCULAR; INTRAVENOUS at 14:26

## 2021-09-16 RX ADMIN — ROCURONIUM BROMIDE 10 MG: 10 SOLUTION INTRAVENOUS at 15:56

## 2021-09-16 RX ADMIN — SODIUM CHLORIDE: 900 INJECTION, SOLUTION INTRAVENOUS at 16:34

## 2021-09-16 RX ADMIN — FENTANYL CITRATE 25 MCG: 50 INJECTION INTRAMUSCULAR; INTRAVENOUS at 17:45

## 2021-09-16 RX ADMIN — MORPHINE SULFATE 1 MG: 4 INJECTION INTRAVENOUS at 15:52

## 2021-09-16 RX ADMIN — FENTANYL CITRATE 50 MCG: 50 INJECTION, SOLUTION INTRAMUSCULAR; INTRAVENOUS at 14:57

## 2021-09-16 RX ADMIN — NEOSTIGMINE METHYLSULFATE 3 MG: 1 INJECTION, SOLUTION INTRAVENOUS at 16:47

## 2021-09-16 RX ADMIN — FENTANYL CITRATE 25 MCG: 50 INJECTION INTRAMUSCULAR; INTRAVENOUS at 17:18

## 2021-09-16 RX ADMIN — PHENYLEPHRINE HYDROCHLORIDE 40 MCG/MIN: 10 INJECTION INTRAVENOUS at 14:30

## 2021-09-16 RX ADMIN — FENTANYL CITRATE 25 MCG: 50 INJECTION INTRAMUSCULAR; INTRAVENOUS at 18:20

## 2021-09-16 RX ADMIN — PROPOFOL 100 MG: 10 INJECTION, EMULSION INTRAVENOUS at 14:26

## 2021-09-16 RX ADMIN — SODIUM CHLORIDE, POTASSIUM CHLORIDE, SODIUM LACTATE AND CALCIUM CHLORIDE 50 ML/HR: 600; 310; 30; 20 INJECTION, SOLUTION INTRAVENOUS at 13:45

## 2021-09-16 RX ADMIN — MORPHINE SULFATE 1 MG: 4 INJECTION INTRAVENOUS at 15:27

## 2021-09-16 RX ADMIN — GLYCOPYRROLATE 0.4 MG: 0.2 INJECTION, SOLUTION INTRAMUSCULAR; INTRAVENOUS at 16:47

## 2021-09-16 RX ADMIN — LIDOCAINE HYDROCHLORIDE 80 MG: 20 INJECTION, SOLUTION EPIDURAL; INFILTRATION; INTRACAUDAL; PERINEURAL at 14:26

## 2021-09-16 RX ADMIN — FENTANYL CITRATE 25 MCG: 50 INJECTION INTRAMUSCULAR; INTRAVENOUS at 17:30

## 2021-09-16 RX ADMIN — ONDANSETRON HYDROCHLORIDE 4 MG: 2 INJECTION, SOLUTION INTRAMUSCULAR; INTRAVENOUS at 16:42

## 2021-09-16 NOTE — ANESTHESIA POSTPROCEDURE EVALUATION
Procedure(s):  ROBOTIC BILATERAL INGUINAL HERNIA REPAIR WITH MESH. general    Anesthesia Post Evaluation      Multimodal analgesia: multimodal analgesia used between 6 hours prior to anesthesia start to PACU discharge  Patient location during evaluation: bedside  Patient participation: complete - patient participated  Level of consciousness: awake  Pain score: 3  Pain management: satisfactory to patient  Airway patency: patent  Anesthetic complications: no  Cardiovascular status: acceptable and blood pressure returned to baseline  Respiratory status: acceptable  Hydration status: acceptable  Comments: I have evaluated the patient and meets criteria for discharge from PACU. An Ray DO. Post anesthesia nausea and vomiting:  none  Final Post Anesthesia Temperature Assessment:  Normothermia (36.0-37.5 degrees C)      INITIAL Post-op Vital signs:   Vitals Value Taken Time   /62 09/16/21 1715   Temp 36.1 °C (97 °F) 09/16/21 1702   Pulse 62 09/16/21 1719   Resp 13 09/16/21 1719   SpO2 97 % 09/16/21 1719   Vitals shown include unvalidated device data.

## 2021-09-16 NOTE — PERIOP NOTES
VS stable. Awake and alert. Resting comfortably. Patient denies nausea. Pain improved. No signs of excessive bleeding. Tolerating ice chips without difficulty. Discharge instructions reviewed with wife. Questions answered. Ready to transfer to Phase 2.

## 2021-09-16 NOTE — DISCHARGE INSTRUCTIONS
Inguinal Hernia Repair      Patient Discharge Instructions    Jean-Pierre Noriega / [id number] : 1940    Admitted 2021 Discharged: 2021     · It is important that you take the medication exactly as they are prescribed. · Keep your medication in the bottles provided by the pharmacist and keep a list of the medication names, dosages, and times to be taken in your wallet. · Do not take other medications without consulting your doctor. · Wound care: You may shower starting tomorrow. Do not remove the dermabond on the incision, it will fall of on its own in a few weeks. · No heavy lifting or strenuous activity for 2wks after the operation    Take ibuprofen (Motrin) as scheduled then combine with oxycodone/acetaminophen (Percocet, Roxicet, Tylox) as needed for severe pain. What to do at Home    See instructions below. Follow-up with Dr. Gomez Hendricks in 2 week(s). Call the office ([phone]) to schedule your appointment. Information obtained by :  I understand that if any problems occur once I am at home I am to contact my physician. I understand and acknowledge receipt of the instructions indicated above. [de-identified] or R.N.'s Signature                                                                  Date/Time                                                                                                                                              Patient or Representative Signature                                                          Date/Time     Inguinal Hernia Repair: What to Expect at [address] are likely to have pain for the next few days. You may also feel like you have the flu, and you may have a low fever and feel tired and nauseated. This is common.   You should feel better after a few days and will probably feel much better in 7 days.  For several weeks you may feel twinges or pulling in the hernia repair when you move. You may have some bruising on the scrotum and along the penis. This is normal. Men will need to wear a jockstrap or briefs, not boxers, for scrotal support for several days after a groin (inguinal) hernia repair. Spandex bicycle shorts may provide good support. This care sheet gives you a general idea about how long it will take for you to recover. But each person recovers at a different pace. Follow the steps below to get better as quickly as possible. How can you care for yourself at home? Activity  · Rest when you feel tired. Getting enough sleep will help you recover. Sleep with your head up by using three or four pillows. You can also try to sleep with your head up in a recliner chair. Do not sleep on your side or stomach. · Try to walk each day. Start by walking a little more than you did the day before. Bit by bit, increase the amount you walk. Walking boosts blood flow and helps prevent pneumonia and constipation. · Put ice or a cold pack on the area of your hernia repair for 10 to 20 minutes at a time. Try to do this every 1 to 2 hours for the first 24 hours (when you are awake) or until the swelling goes down. Put a thin cloth between the ice and your skin. · Avoid strenuous activities, such as biking, jogging, weight lifting, or aerobic exercise, until your doctor says it is okay. · Avoid lifting anything that would make you strain. This may include heavy grocery bags and milk containers, a heavy briefcase or backpack, cat litter or dog food bags, a vacuum , or a child. · You may drive when you are no longer taking pain medicine and can quickly move your foot from the gas pedal to the brake. You must also be able to sit comfortably for a long period of time, even if you do not plan to go far. You might get caught in traffic.   · Most people are able to return to work within 1 to 2 weeks after surgery. · You may shower 24 to 48 hours after surgery, if your doctor okays it. Pat the cut (incision) dry. Do not take a bath for the first 2 weeks, or until your doctor tells you it is okay. · Your doctor will tell you when you can have sex again. Diet  · You can eat your normal diet. If your stomach is upset, try bland, low-fat foods like plain rice, broiled chicken, toast, and yogurt. · Drink plenty of fluids (unless your doctor tells you not to). · You may notice that your bowel movements are not regular right after your surgery. This is common. Avoid constipation and straining with bowel movements. You may want to take a fiber supplement every day. If you have not had a bowel movement after a couple of days, ask your doctor about taking a mild laxative. Medicines  · Take pain medicines exactly as directed. ¨ If the doctor gave you a prescription medicine for pain, take it as prescribed. ¨ If you are not taking a prescription pain medicine, take an over-the-counter medicine such as acetaminophen (Tylenol), ibuprofen (Advil, Motrin), or naproxen (Aleve). Read and follow all instructions on the label. ¨ Do not take two or more pain medicines at the same time unless the doctor told you to. Many pain medicines have acetaminophen, which is Tylenol. Too much acetaminophen (Tylenol) can be harmful. · If your doctor prescribed antibiotics, take them as directed. Do not stop taking them just because you feel better. You need to take the full course of antibiotics. · If you think your pain medicine is making you sick to your stomach:  ¨ Take your medicine after meals (unless your doctor has told you not to). ¨ Ask your doctor for a different pain medicine. Incision care  · Wash the area daily with warm, soapy water and pat it dry. Follow-up care is a key part of your treatment and safety. Be sure to make and go to all appointments, and call your doctor if you are having problems.  It's also a good idea to know your test results and keep a list of the medicines you take. When should you call for help? Call 911 anytime you think you may need emergency care. For example, call if:  · You passed out (lost consciousness). · You have sudden chest pain and shortness of breath, or you cough up blood. · You have severe pain in your belly. Call your doctor now or seek immediate medical care if:  · You are sick to your stomach and cannot keep fluids down. · You have signs of a blood clot, such as:  ¨ Pain in your calf, back of the knee, thigh, or groin. ¨ Redness and swelling in your leg or groin. · You have trouble passing urine or stool, especially if you have mild pain or swelling in your lower belly. · Bright red blood has soaked through the bandage over your incision. Watch closely for any changes in your health, and be sure to contact your doctor if:  · Your swelling is getting worse. · Your swelling is not going down. Where can you learn more? Go to [employer]  Enter Q124 in the search box to learn more about \"Hernia Repair: What to Expect at Home. \"   © 7129-8913 [employer], [employer]. Care instructions adapted under license by [facility] [employer] (which disclaims liability or warranty for this information). This care instruction is for use with your licensed healthcare professional. If you have questions about a medical condition or this instruction, always ask your healthcare professional. [place] [address] any warranty or liability for your use of this information. Content Version: 0.9.46045;  Last Revised: January 21, 2011    ______________________________________________________________________    Anesthesia Discharge Instructions    After general anesthesia or intervenous sedation, for 24 hours or while taking prescription Narcotics:  · Limit your activities  · Do not drive or operate hazardous machinery  · If you have not urinated within 8 hours after discharge, please contact your surgeon on call. · Do not make important personal or business decisions  · Do not drink alcoholic beverages    Report the following to your surgeon:  · Excessive pain, swelling, redness or odor of or around the surgical area  · Temperature over 100.5 degrees  · Nausea and vomiting lasting longer than 4 hours or if unable to take medication  · Any signs of decreased circulation or nerve impairment to extremity:  Change in color, persistent numbness, tingling, coldness or increased pain.   · Any questions        Restart Eliquis tomorrow  Restart Flomax and Proscar tonight

## 2021-09-16 NOTE — ANESTHESIA PREPROCEDURE EVALUATION
Anesthetic History   No history of anesthetic complications            Review of Systems / Medical History  Patient summary reviewed, nursing notes reviewed and pertinent labs reviewed    Pulmonary  Within defined limits                 Neuro/Psych       CVA  TIA    Comments: Chronic pain (G89.29)  BACK      Vision impairment (H54.7)     Cardiovascular    Hypertension        Dysrhythmias   CAD, CABG and hyperlipidemia    Exercise tolerance: >4 METS  Comments: Systolic murmur (V83.8)        MVP (mitral valve prolapse) (I34.1)         GI/Hepatic/Renal         Renal disease: stones       Endo/Other      Hypothyroidism  Cancer     Other Findings              Physical Exam    Airway  Mallampati: II  TM Distance: 4 - 6 cm  Neck ROM: normal range of motion   Mouth opening: Normal     Cardiovascular  Regular rate and rhythm,  S1 and S2 normal,  no murmur, click, rub, or gallop  Rhythm: regular  Rate: normal         Dental    Dentition: Implants  Comments: chipped   Pulmonary  Breath sounds clear to auscultation               Abdominal  GI exam deferred       Other Findings            Anesthetic Plan    ASA: 3  Anesthesia type: general          Induction: Intravenous  Anesthetic plan and risks discussed with: Patient

## 2021-09-16 NOTE — INTERVAL H&P NOTE
Update History & Physical    The surgery was reviewed with the patient and I examined the patient. There was no change. The surgical site was confirmed by the patient and me. Plan:  The risk, benefits, expected outcome, and alternative to the recommended procedure have been discussed with the patient. Patient understands and wants to proceed with the procedure.     Electronically signed by Jose Angel Diego MD on 9/16/2021 at 2:09 PM

## 2021-09-16 NOTE — BRIEF OP NOTE
Brief Postoperative Note    Patient: Natalie Thomason  YOB: 1940  MRN: [id number]    Date of Procedure: 9/16/2021     Pre-Op Diagnosis: BILATERAL INGUINAL HERNIA    Post-Op Diagnosis: Same as preoperative diagnosis. Procedure(s):  ROBOTIC BILATERAL INGUINAL HERNIA REPAIR WITH MESH    Surgeon(s):  Dennie Muzzy, MD    Surgical Assistant: Surg Asst-1: Maryjane Mirza    Anesthesia: General     Estimated Blood Loss (mL): Minimal    Complications: None    Specimens: * No specimens in log *     Implants:   Implant Name Type Inv.  Item Serial No.  Lot No. LRB No. Used Action   MESH SLF-FLT PROGRIP RT -- PROGRIP - SNA  MESH SLF-FLT PROGRIP RT -- PROGRIP NA COVIDIEN  SURGICAL [id number] Right 1 Implanted   MESH SLF-FLT PROGRIP LT -- PROGRIP - SNA  MESH SLF-FLT PROGRIP LT -- PROGRIP NA COVIDIEN  SURGICAL [id number] Left 1 Implanted       Drains:   [REMOVED] Cy Press #1 03/26/20 Anterior Chest (Removed)       Lattie Cheadle #2 03/26/20 Anterior Chest (Removed)       [REMOVED] Cy Press #3 03/26/20 Anterior Chest (Removed)       Findings: bilateral indirect inguinal hernias R>L, both repaired with a [id number] R +L Parietex Progrip mesh placed preperitoneal    Electronically Signed by Jenaro Lopez MD on 9/16/2021 at 4:51 PM

## 2021-09-16 NOTE — PERIOP NOTES
Spoke with patients wife Raymonde Curling via cell phone an updated her on start of procedure. Surgeon aware.

## 2021-09-17 ENCOUNTER — TELEPHONE (OUTPATIENT)
Dept: SURGERY | Age: 81
End: 2021-09-17

## 2021-09-17 NOTE — OP NOTES
[address]   OPERATIVE REPORT    Name:  Leandra Booker  MR#:  [id number]  :  1940  ACCOUNT #:  [de-identified]  DATE OF SERVICE:  2021      PREOPERATIVE DIAGNOSIS:  Bilateral inguinal hernias. POSTOPERATIVE DIAGNOSIS:  Bilateral inguinal hernias. PROCEDURE PERFORMED:  Robotic bilateral inguinal hernia repairs with mesh. SURGEON:  Kane Holguin MD    ASSISTANT:  Dmitriy Reed SA    ANESTHESIA:  General.    COMPLICATIONS:  None. SPECIMENS REMOVED:  None. IMPLANTS:  15 x 10-cm right-sided and left-sided Parietex ProGrip mesh. ESTIMATED BLOOD LOSS:  Minimal.    DRAINS:  None. FINDINGS:  Bilateral indirect inguinal hernias, right side greater than left side, both repaired with a 15 x 10-cm right and left Parietex ProGrip mesh, placed in the preperitoneal space. INDICATIONS OF THE OPERATION:  The patient is an 49-year-old male who has a history of bilateral inguinal hernias with the right side greater than the left side and is needing repair with mesh in the operating room. DESCRIPTION OF THE OPERATION:  The patient was met in the preop holding area. The H and P was updated. Consent was signed. All risks and benefits were explained to the patient prior to the start of the operation. He was taken back to the operating room. He was lying in the supine position. The abdomen was prepped and draped in standard sterile fashion. Time-out was called. Antibiotics were given. SCDs were on lower extremities. A Russell catheter was then inserted. We then started the operation by making an 8-mm incision into the left upper quadrant, inserting a VisiPort trocar into the intra-abdominal cavity, insufflating to 15 mmHg and then placed an 8-mm trocar superior to the umbilicus and another one in the right upper quadrant. The [employer] ports were placed. We then docked the [employer] robot onto the patient. The abdomen was insufflated to 15 mmHg.   There were no injuries to the underlying abdominal structures from the trocar placements. Once the [employer] robot was docked, we started our repair on the patient's right side, going from medial to lateral in the right lower quadrant, dissecting into the preperitoneal plane with the scissors and blunt dissection, dissecting into that preperitoneal plane all the way down into the right groin, down into the area of the pubic bone, to hernia sac into the lateral abdominal wall. We then sequentially reduced our hernia sac out of the indirect inguinal canal.  The patient had an indirect right inguinal hernia and also an indirect hernia on the left side. We dissected the hernia sac out of the inguinal canal and dissected it free from the testicular vessels and vas deferens which were all identified and preserved. There was a small hole in the peritoneum which we would close little later. We then had our hernia sac completely reduced and removed from the inguinal canal.  We also dissected out a large cord lipoma which was going through the hernia defect. We reduced that back into the preperitoneal space and then widened our dissection plane down below the pubic bone and allowed for our mesh to lie into the area to cover the hernia very easily. Once that was all done, we then placed a 15 x 10-cm right-sided Parietex ProGrip mesh, placing into the preperitoneal plane with the mesh  to the abdominal wall. We centered the mesh over the hernia defect and then we sutured the mesh to the pubic bone inferiorly and to the anterior abdominal wall superiorly with a 2-0 Ethibond suture in an interrupted fashion in those 2 spots. We then placed the peritoneum back up over the top of the mesh with a running 3-0 absorbable V-Loc suture in a running fashion. The peritoneal flap was closed. We then closed one small hole in the peritoneum from the area of the hernia sac with the remainder of the Ethibond suture in interrupted figure-of-eight fashion. The defect was completely closed. There were no holes in the peritoneum. Everything looked good. The right-sided hernia was repaired. We then started our repair. We then removed our needles from the patient and then we started our dissection on the left-sided hernia. We dissected into the preperitoneal plane going from medial to lateral in the left lower quadrant, dissecting all the way down inferiorly to the pubic bone to the hernia sac into the lateral abdominal wall laterally. We dissected the hernia sac free from the testicular vessels and from the indirect inguinal canal.  Once that was dissected out, we also dissected out a cord lipoma from the area as well. Once that was dissected out and we had that back into the preperitoneal space, we widened our dissection plane down below the pubic bone and along the lateral wall to allow our mesh to lie flat against the abdominal wall. Once that was done, we then placed our 15 x 10-cm Parietex ProGrip mesh, placing in the preperitoneal plane, centering it over our hernia defect on the left side and once that was centered and the mesh  were adherent to the  anterior abdominal wall, we sutured the mesh to the pubic bone inferiorly into the anterior abdominal wall superiorly with a 2-0 Ethibond suture. The mesh was adherent. Everything looked good and then we closed the peritoneal flap with a running 3-0 absorbable V-Loc suture in a running fashion. There were no holes in the peritoneum on the left side. We then removed our sutures and then desufflated the abdominal cavity, removed our trocars and closed the skin with 4-0 Monocryl after undocking the robot, closed the skin with 4-0 Monocryl and Dermabond to complete the operation. Dr. Kusum Aburto was present and scrubbed during the entire operation. The counts were correct.         Clifford Muñiz MD      [id number]  D:  09/16/2021 16:59  T:  09/17/2021 1:36  JOB #:  [id number]

## 2021-09-17 NOTE — TELEPHONE ENCOUNTER
Returned call to Mr Carly Ramirez verified all PHI. Reviewed notes patient had Robotic Bilateral Inguinal Hernia Repair on 9/16/21. Mr Carly Ramirez states he has not had a BM since the morning of surgery. He is asking what he can take for his bowels. I explained to patient is common to not have a BM after surgery. I advised if still no BM in 3 days he should take some miralex and or senna tab drink fluids. He is urinating good. There is no other issues.      Patient is scheduled for post op follow up on 10/1/21 at 9 :20 AM.

## 2021-09-17 NOTE — TELEPHONE ENCOUNTER
Patient stated that he may need a prescription for his constipation.  He would like a call back to discuss

## 2021-09-27 ENCOUNTER — TELEPHONE (OUTPATIENT)
Dept: SURGERY | Age: 81
End: 2021-09-27

## 2021-09-27 NOTE — TELEPHONE ENCOUNTER
Returned call to Mr Rosalia Torres verified all PHI. Reviewed notes patient had ROBOTIC BILATERAL INGUINAL HERNIA REPAIR WITH MESH on 9/16/21. Patient wants to know when he can resume playing golf? Patient has post op appointment on 10/1/21 with Babak Alba NP at 9:20 AM.      I advised patient it is recommended to refrain from any strenuous activities for at least 2 weeks. The NP will advise during appointment if he is able to resume activities. Mr Rosalia Torres verbalized understanding.

## 2021-09-27 NOTE — TELEPHONE ENCOUNTER
Patient called in wanting to know when he can go back to playing golf. Patient had Robotic bilateral inguinal hernia repairs with mesh on 9/17/2021.   CB: [phone]

## 2021-10-01 ENCOUNTER — VIRTUAL VISIT (OUTPATIENT)
Dept: SURGERY | Age: 81
End: 2021-10-01
Payer: MEDICARE

## 2021-10-01 DIAGNOSIS — Z09 POSTOPERATIVE EXAMINATION: Primary | ICD-10-CM

## 2021-10-01 PROCEDURE — 99024 POSTOP FOLLOW-UP VISIT: CPT | Performed by: NURSE PRACTITIONER

## 2021-10-01 NOTE — PATIENT INSTRUCTIONS
Hernia Repair: What to Expect at [address] are likely to have pain for the next few days. You may also feel tired and have less energy than normal. This is common. You should feel better after a few days and will probably feel much better in 7 days. For several weeks you may feel discomfort or pulling in the hernia repair when you move. You may have some bruising near the repair site and on your genitals. This is normal. If you have swelling in the genitals, you may be told to wear well-fitting briefs. [employer] bicycle shorts may also provide good support. This care sheet gives you a general idea about how long it will take for you to recover. But each person recovers at a different pace. Follow the steps below to get better as quickly as possible. How can you care for yourself at home? Activity    · Rest when you feel tired. Getting enough sleep will help you recover.     · Try to walk each day. Start by walking a little more than you did the day before. Bit by bit, increase the amount you walk. Walking boosts blood flow and helps prevent pneumonia and constipation.     · Avoid strenuous activities, such as biking, jogging, weight lifting, or aerobic exercise, until your doctor says it is okay.     · Avoid lifting anything that would make you strain. This may include heavy grocery bags and milk containers, a heavy briefcase or backpack, cat litter or dog food bags, a vacuum , or a child.     · You may drive when you are no longer taking pain medicine and can quickly move your foot from the gas pedal to the brake. You must also be able to sit comfortably for a long period of time, even if you do not plan to go far. You might get caught in traffic.     · Most people are able to return to work within 1 to 2 weeks after surgery.     · You may shower 24 to 48 hours after surgery, if your doctor okays it. Pat the cut (incision) dry.  Do not take a bath for the first 2 weeks, or until your doctor tells you it is okay.     · Your doctor will tell you when you can have sex again. Diet    · You can eat your normal diet. If your stomach is upset, try bland, low-fat foods like plain rice, broiled chicken, toast, and yogurt.     · Drink plenty of fluids (unless your doctor tells you not to).     · You may notice that your bowel movements are not regular right after your surgery. This is common. Avoid constipation and straining with bowel movements. You may want to take a fiber supplement every day. If you have not had a bowel movement after a couple of days, ask your doctor about taking a mild laxative. Medicines    · Your doctor will tell you if and when you can restart your medicines. He or she will also give you instructions about taking any new medicines.     · If you take aspirin or some other blood thinner, ask your doctor if and when to start taking it again. Make sure that you understand exactly what your doctor wants you to do.     · Be safe with medicines. Take pain medicines exactly as directed. ? If the doctor gave you a prescription medicine for pain, take it as prescribed. ? If you are not taking a prescription pain medicine, take an over-the-counter medicine such as acetaminophen (Tylenol), ibuprofen (Advil, Motrin), or naproxen (Aleve). Read and follow all instructions on the label. ? Do not take two or more pain medicines at the same time unless the doctor told you to. Many pain medicines have acetaminophen, which is Tylenol. Too much acetaminophen (Tylenol) can be harmful.     · If your doctor prescribed antibiotics, take them as directed. Do not stop taking them just because you feel better. You need to take the full course of antibiotics.     · If you think your pain medicine is making you sick to your stomach:  ? Take your medicine after meals (unless your doctor has told you not to). ? Ask your doctor for a different pain medicine.    Incision care    · If you have strips of tape on the cut (incision) the doctor made, leave the tape on for a week or until it falls off.     · If you have staples closing the cut, you will need to visit your doctor in 1 to 2 weeks to have them removed.     · Wash the area daily with warm, soapy water and pat it dry. Follow-up care is a key part of your treatment and safety. Be sure to make and go to all appointments, and call your doctor if you are having problems. It's also a good idea to know your test results and keep a list of the medicines you take. When should you call for help? Call 911 anytime you think you may need emergency care. For example, call if:    · You passed out (lost consciousness).     · You are short of breath. Call your doctor now or seek immediate medical care if:    · You have pain that does not get better after you take pain medicine.     · You are sick to your stomach and cannot keep fluids down.     · You have signs of a blood clot in your leg (called a deep vein thrombosis), such as:  ? Pain in your calf, back of the knee, thigh, or groin. ? Redness and swelling in your leg or groin.     · You cannot pass stools or gas.     · Bright red blood has soaked through the bandage over your incision.     · You have loose stitches, or your incision comes open.     · You have signs of infection, such as:  ? Increased pain, swelling, warmth, or redness. ? Red streaks leading from the incision. ? Pus draining from the incision. ? A fever. Watch closely for any changes in your health, and be sure to contact your doctor if you have any problems. Where can you learn more? Go to http://www.gray.com/  Enter G034 in the search box to learn more about \"Hernia Repair: What to Expect at Home. \"  Current as of: February 10, 2021               Content Version: 13.0  © 6508-9820 [employer].    Care instructions adapted under license by [employer] (which disclaims liability or warranty for this information). If you have questions about a medical condition or this instruction, always ask your healthcare professional. [place] [address] any warranty or liability for your use of this information.

## 2021-10-01 NOTE — PROGRESS NOTES
1. Have you been to the ER, urgent care clinic since your last visit? Hospitalized since your last visit? no    2. Have you seen or consulted any other health care providers outside of the [address] [place] [address] since your last visit? Include any pap smears or colon screening.  no

## 2021-10-01 NOTE — PROGRESS NOTES
Subjective:      Anish Abraham is a 80 y.o. male presents for postop care 2 weeks status post robotic bilateral inguinal hernia repair with mesh  Appetite is good. Eating a regular diet without difficulty. Bowel movements are regular. The patient is not having any pain. .        Mr. Zina Porter has a reminder for a \"due or due soon\" health maintenance. I have asked that he contact his primary care provider for follow-up on this health maintenance. Objective: There were no vitals taken for this visit. General:  alert, cooperative, no distress       Incision:   healing well, no drainage, no erythema, no dehiscence, incision well approximated     Assessment:     Doing well postoperatively. Plan:     1. May increase activity as tolerated  2. May go and play a light round of golf. Patient become sore he should stop  3. No lifting greater than 20 pounds x 1 week then 30 pounds x 1 week. 4.  Follow-up as needed  5.   May get incisions wet

## 2021-10-11 ENCOUNTER — OFFICE VISIT (OUTPATIENT)
Dept: CARDIOLOGY CLINIC | Age: 81
End: 2021-10-11
Payer: MEDICARE

## 2021-10-11 ENCOUNTER — TELEPHONE (OUTPATIENT)
Dept: INTERNAL MEDICINE CLINIC | Age: 81
End: 2021-10-11

## 2021-10-11 ENCOUNTER — OFFICE VISIT (OUTPATIENT)
Dept: INTERNAL MEDICINE CLINIC | Age: 81
End: 2021-10-11
Payer: MEDICARE

## 2021-10-11 VITALS
OXYGEN SATURATION: 97 % | SYSTOLIC BLOOD PRESSURE: 126 MMHG | WEIGHT: 178 LBS | BODY MASS INDEX: 24.92 KG/M2 | DIASTOLIC BLOOD PRESSURE: 70 MMHG | RESPIRATION RATE: 18 BRPM | HEIGHT: 71 IN | HEART RATE: 82 BPM

## 2021-10-11 VITALS
HEART RATE: 63 BPM | WEIGHT: 180 LBS | RESPIRATION RATE: 16 BRPM | DIASTOLIC BLOOD PRESSURE: 67 MMHG | TEMPERATURE: 97.3 F | HEIGHT: 71 IN | BODY MASS INDEX: 25.2 KG/M2 | OXYGEN SATURATION: 97 % | SYSTOLIC BLOOD PRESSURE: 126 MMHG

## 2021-10-11 DIAGNOSIS — I25.10 ATHEROSCLEROSIS OF NATIVE CORONARY ARTERY OF NATIVE HEART WITHOUT ANGINA PECTORIS: Primary | ICD-10-CM

## 2021-10-11 DIAGNOSIS — I10 ESSENTIAL HYPERTENSION, BENIGN: ICD-10-CM

## 2021-10-11 DIAGNOSIS — I25.700 CORONARY ARTERY DISEASE INVOLVING CORONARY BYPASS GRAFT OF NATIVE HEART WITH UNSTABLE ANGINA PECTORIS (HCC): ICD-10-CM

## 2021-10-11 DIAGNOSIS — I34.0 NONRHEUMATIC MITRAL VALVE REGURGITATION: ICD-10-CM

## 2021-10-11 DIAGNOSIS — H25.013 CATARACT CORTICAL, SENILE, BILATERAL: ICD-10-CM

## 2021-10-11 DIAGNOSIS — E06.3 HYPOTHYROIDISM, ACQUIRED, AUTOIMMUNE: ICD-10-CM

## 2021-10-11 DIAGNOSIS — E78.2 MIXED HYPERLIPIDEMIA: ICD-10-CM

## 2021-10-11 DIAGNOSIS — R73.01 IFG (IMPAIRED FASTING GLUCOSE): ICD-10-CM

## 2021-10-11 DIAGNOSIS — I10 ESSENTIAL HYPERTENSION, BENIGN: Primary | ICD-10-CM

## 2021-10-11 PROCEDURE — G8754 DIAS BP LESS 90: HCPCS | Performed by: SPECIALIST

## 2021-10-11 PROCEDURE — G8536 NO DOC ELDER MAL SCRN: HCPCS | Performed by: SPECIALIST

## 2021-10-11 PROCEDURE — 93010 ELECTROCARDIOGRAM REPORT: CPT | Performed by: SPECIALIST

## 2021-10-11 PROCEDURE — 1101F PT FALLS ASSESS-DOCD LE1/YR: CPT | Performed by: INTERNAL MEDICINE

## 2021-10-11 PROCEDURE — 1101F PT FALLS ASSESS-DOCD LE1/YR: CPT | Performed by: SPECIALIST

## 2021-10-11 PROCEDURE — G8427 DOCREV CUR MEDS BY ELIG CLIN: HCPCS | Performed by: SPECIALIST

## 2021-10-11 PROCEDURE — 99214 OFFICE O/P EST MOD 30 MIN: CPT | Performed by: INTERNAL MEDICINE

## 2021-10-11 PROCEDURE — G8420 CALC BMI NORM PARAMETERS: HCPCS | Performed by: SPECIALIST

## 2021-10-11 PROCEDURE — G8419 CALC BMI OUT NRM PARAM NOF/U: HCPCS | Performed by: INTERNAL MEDICINE

## 2021-10-11 PROCEDURE — 99214 OFFICE O/P EST MOD 30 MIN: CPT | Performed by: SPECIALIST

## 2021-10-11 PROCEDURE — 93005 ELECTROCARDIOGRAM TRACING: CPT | Performed by: SPECIALIST

## 2021-10-11 PROCEDURE — G8754 DIAS BP LESS 90: HCPCS | Performed by: INTERNAL MEDICINE

## 2021-10-11 PROCEDURE — G8536 NO DOC ELDER MAL SCRN: HCPCS | Performed by: INTERNAL MEDICINE

## 2021-10-11 PROCEDURE — G8752 SYS BP LESS 140: HCPCS | Performed by: INTERNAL MEDICINE

## 2021-10-11 PROCEDURE — G8752 SYS BP LESS 140: HCPCS | Performed by: SPECIALIST

## 2021-10-11 PROCEDURE — G0463 HOSPITAL OUTPT CLINIC VISIT: HCPCS | Performed by: INTERNAL MEDICINE

## 2021-10-11 PROCEDURE — G8510 SCR DEP NEG, NO PLAN REQD: HCPCS | Performed by: INTERNAL MEDICINE

## 2021-10-11 PROCEDURE — G8427 DOCREV CUR MEDS BY ELIG CLIN: HCPCS | Performed by: INTERNAL MEDICINE

## 2021-10-11 PROCEDURE — G8510 SCR DEP NEG, NO PLAN REQD: HCPCS | Performed by: SPECIALIST

## 2021-10-11 PROCEDURE — G0463 HOSPITAL OUTPT CLINIC VISIT: HCPCS | Performed by: SPECIALIST

## 2021-10-11 NOTE — TELEPHONE ENCOUNTER
Reason for call:  Patient was here for pre-op today and was asked if there was a form to fill out. Wife called [facility] and was told they faxed it here in Sept.  If you cannot find it, call [phone] to request another form.     If you find the form, please fax to [phone]  Attn: Leandro Fuentes    Is this a new problem: no     Date of last appointment:  10/11/2021     Can we respond via nexTunet: no    Best call back number: [phone]

## 2021-10-11 NOTE — PROGRESS NOTES
HISTORY OF PRESENT ILLNESS  Porsha Nowak is a 80 y.o. male     SUMMARY:   Problem List  Date Reviewed: 10/11/2021        Codes Class Noted    Peripheral vascular disease ([facility] ICD-10-CM: I73.9  ICD-9-CM: 443.9  5/14/2020        Dizziness ICD-10-CM: V04  ICD-9-CM: 780.4  4/12/2020        Sustained SVT ([facility] ICD-10-CM: I47.1  ICD-9-CM: 427.89  4/12/2020        RBBB ICD-10-CM: I45.10  ICD-9-CM: 426.4  4/12/2020        S/P CABG x 5 ICD-10-CM: Z95.1  ICD-9-CM: V45.81  3/26/2020    Overview Signed 3/26/2020  7:49 PM by TOM Goodson     x 5, LIMA to LAD, RSVG to Diag-RI-OM, RSVG to RCA             NSTEMI (non-ST elevated myocardial infarction) ([facility] ICD-10-CM: I21.4  ICD-9-CM: 410.70  3/25/2020        IFG (impaired fasting glucose) ICD-10-CM: R73.01  ICD-9-CM: 790.21  5/22/2017        Lumbar foraminal stenosis (Chronic) ICD-10-CM: M48.061  ICD-9-CM: 724.02  Unknown        Lumbar disc disease ICD-10-CM: M51.9  ICD-9-CM: 722.93  2/22/2017        Advance directive discussed with patient ICD-10-CM: Z71.89  ICD-9-CM: V65.49  2/21/2017        BPH with urinary obstruction ICD-10-CM: N40.1, N13.8  ICD-9-CM: 600.01, 599.69  1/9/2016        Vitamin D deficiency ICD-10-CM: E55.9  ICD-9-CM: 268.9  6/19/2014        Hypothyroidism, acquired, autoimmune ICD-10-CM: E06.3  ICD-9-CM: 244.8  6/19/2014        Skin cancer ICD-10-CM: C44.90  ICD-9-CM: 173.90  5/8/2013        Macular degeneration ICD-10-CM: H35.30  ICD-9-CM: 362.50  3/19/2012        Unspecified hearing loss ICD-10-CM: H91.90  ICD-9-CM: 389.9  9/13/2010        Mixed hyperlipidemia ICD-10-CM: E78.2  ICD-9-CM: 272.2  12/1/2009        Essential hypertension, benign ICD-10-CM: I10  ICD-9-CM: 401.1  12/1/2009        Coronary atherosclerosis of native coronary artery ICD-10-CM: I25.10  ICD-9-CM: 414.01  12/1/2009        Calculus of kidney ICD-10-CM: N20.0  ICD-9-CM: 592.0  12/1/2009        Insomnia, unspecified ICD-10-CM: G47.00  ICD-9-CM: 780.52  12/1/2009 Current Outpatient Medications on File Prior to Visit   Medication Sig    ibuprofen (MOTRIN) 800 mg tablet Take 1 Tablet by mouth every six (6) hours as needed for Pain.  cholecalciferol (Vitamin D3) (2,000 UNITS /50 MCG) cap capsule Take 2,000 Units by mouth every morning.  rosuvastatin (CRESTOR) 20 mg tablet Take 1 Tablet by mouth nightly. Replaces atorvastatin    levothyroxine (SYNTHROID) 50 mcg tablet TAKE 1 TABLET BY MOUTH EVERY DAY BEFORE BREAKFAST EXCEPT TAKE 2 TABS ON SATURDAY.  Eliquis 5 mg tablet TAKE 1 TABLET BY MOUTH EVERY 12 HOURS    aspirin 81 mg chewable tablet Take 1 Tab by mouth daily.  vitamins  A,C,E-zinc-copper (Ocuvite PreserVision) [phone] unit-mg-unit tab tablet Take 1 Tab by mouth daily. (Patient taking differently: Take 1 Tablet by mouth two (2) times a day.)    acetaminophen (TYLENOL) 325 mg tablet Take 650 mg by mouth every four (4) hours as needed for Pain.  tamsulosin (FLOMAX) 0.4 mg capsule TAKE 1 CAPSULE BY MOUTH EVERY DAY 30 MINUTES AFTER THE SAME MEAL EACH DAY    finasteride (PROSCAR) 5 mg tablet Take 5 mg by mouth daily. HS     No current facility-administered medications on file prior to visit. CARDIOLOGY STUDIES TO DATE:  Halie Desouza      6/20 echo normal lvef, lvh, lae, gabriel, sclerotic non stenotic av, mild ai, mod to severe mrmild tr with pa pressure 40 mm, mod pul regurg    Chief Complaint   Patient presents with    Follow-up     HPI :  He is previously followed by Dr. Jackson Gravely for coronary disease. He is active. He plays golf goes up and down the stairs and walks for exercise with no worrisome symptoms or anything that reminded him about what led up to his bypass surgery. He has not had an echo for over a year and his last study showed significant MR. Lipid profile in June looked outstanding. He is not having any problems with his medications.   And he is not felt any A. fib he needs an EKG today because he has cataract surgery coming up in early November  CARDIAC ROS:   negative for chest pain, dyspnea, palpitations, syncope, orthopnea, paroxysmal nocturnal dyspnea, exertional chest pressure/discomfort, claudication, lower extremity edema    Family History   Problem Relation Age of Onset    Heart Disease Mother         CHF    Heart Disease Father         CAD    Heart Disease Sister         CAD CABG    Lung Disease Sister     Heart Disease Brother         CAD    Pulmonary Fibrosis Brother     Heart Disease Brother         CAD    Stroke Brother 34        cerebral hemorrhage    Cancer Brother         LUNG    High Cholesterol Daughter     No Known Problems Son     Anesth Problems Neg Hx        Past Medical History:   Diagnosis Date    Arrhythmia 04/12/2020    cardioversion for SVT    Arthritis     HANDS    Basal cell carcinoma     BPH (benign prostatic hyperplasia)     CAD (coronary artery disease)     s/p PTCA '92    Calculus of kidney     Chronic pain     BACK    Elevated PSA     Heart attack ([address]     Hypercholesterolemia     Kidney stones     Lumbar foraminal stenosis     Melanoma ([address]     MVP (mitral valve prolapse)     NSTEMI (non-ST elevated myocardial infarction) ([address] 03/25/2020    Other ill-defined conditions(799.89)     Prostatitis    Prediabetes     Seasonal allergies     Squamous cell carcinoma     Stroke ([address]     with left eye visual loss; 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    Systolic murmur     Thyroid disease     HYPOTHYROID    Vision impairment     R EYE, BLOOD CLOT ON RETINA       GENERAL ROS:  A comprehensive review of systems was negative except for that written in the HPI.     Visit Vitals  /70 (BP 1 Location: Left upper arm, BP Patient Position: Sitting, BP Cuff Size: Adult)   Pulse 82   Resp 18   Ht 5' 11\" (1.803 m)   Wt 178 lb (80.7 kg)   SpO2 97%   BMI 24.83 kg/m²       Wt Readings from Last 3 Encounters:   10/11/21 178 lb (80.7 kg)   09/16/21 177 lb 11.1 oz (80.6 kg)   09/13/21 177 lb 11.1 oz (80.6 kg)            BP Readings from Last 3 Encounters:   10/11/21 126/70   09/16/21 (!) 144/59   09/13/21 119/68       PHYSICAL EXAM  General appearance: alert, cooperative, no distress, appears stated age  Neurologic: Alert and oriented X 3  Neck: supple, symmetrical, trachea midline, no adenopathy, no carotid bruit and no JVD  Lungs: clear to auscultation bilaterally  Heart: regular rate and rhythm, S1, S2 normal, no murmur, click, rub or gallop  Extremities: extremities normal, atraumatic, no cyanosis or edema    Lab Results   Component Value Date/Time    Cholesterol, total 156 06/09/2021 09:51 AM    Cholesterol, total 186 12/31/2020 10:31 AM    Cholesterol, total 149 06/25/2020 08:55 AM    Cholesterol, total 161 03/25/2020 09:00 AM    Cholesterol, total 188 12/30/2019 08:43 AM    HDL Cholesterol 63 06/09/2021 09:51 AM    HDL Cholesterol 68 12/31/2020 10:31 AM    HDL Cholesterol 58 06/25/2020 08:55 AM    HDL Cholesterol 62 03/25/2020 09:00 AM    HDL Cholesterol 58 12/30/2019 08:43 AM    LDL, calculated 76.6 06/09/2021 09:51 AM    LDL, calculated 100.4 (H) 12/31/2020 10:31 AM    LDL, calculated 76 06/25/2020 08:55 AM    LDL, calculated 83.8 03/25/2020 09:00 AM    LDL, calculated 110 (H) 12/30/2019 08:43 AM    Triglyceride 82 06/09/2021 09:51 AM    Triglyceride 88 12/31/2020 10:31 AM    Triglyceride 76 06/25/2020 08:55 AM    Triglyceride 76 03/25/2020 09:00 AM    Triglyceride 102 12/30/2019 08:43 AM    CHOL/HDL Ratio 2.5 06/09/2021 09:51 AM    CHOL/HDL Ratio 2.7 12/31/2020 10:31 AM    CHOL/HDL Ratio 2.6 03/25/2020 09:00 AM    CHOL/HDL Ratio 3.0 09/10/2010 07:52 AM    CHOL/HDL Ratio 3.1 03/03/2010 08:15 AM     ASSESSMENT :      He is stable and asymptomatic, well compensated on a good medical regimen. I want to get an echocardiogram on him to see where we  terms of the mitral regurg though today I really do not even hear a murmur.   He should be fine for cataract surgery without special precautions or further cardiac testing. current treatment plan is effective, no change in therapy  lab results and schedule of future lab studies reviewed with patient  reviewed diet, exercise and weight control    Encounter Diagnoses   Name Primary?  Atherosclerosis of native coronary artery of native heart without angina pectoris Yes    Essential hypertension, benign     Nonrheumatic mitral valve regurgitation     Mixed hyperlipidemia      No orders of the defined types were placed in this encounter. Follow-up and Dispositions    · Return in about 6 months (around 4/11/2022). Felicita Jarvis MD  10/11/2021  Please note that this dictation was completed with [employer], the [employer] voice recognition software. Quite often unanticipated grammatical, syntax, homophones, and other interpretive errors are inadvertently transcribed by the computer software. Please disregard these errors. Please excuse any errors that have escaped final proofreading. Thank you.

## 2021-10-11 NOTE — PROGRESS NOTES
Verified name and birth date for privacy precautions. Chart reviewed in preparation for today's visit.      Chief Complaint   Patient presents with    Pre-op Exam          Health Maintenance Due   Topic    Shingrix Vaccine Age 50> (1 of 2)    Flu Vaccine (1)         Wt Readings from Last 3 Encounters:   10/11/21 180 lb (81.6 kg)   10/11/21 178 lb (80.7 kg)   09/16/21 177 lb 11.1 oz (80.6 kg)     Temp Readings from Last 3 Encounters:   10/11/21 97.3 °F (36.3 °C) (Temporal)   09/16/21 97.2 °F (36.2 °C)   09/13/21 97.8 °F (36.6 °C)     BP Readings from Last 3 Encounters:   10/11/21 126/67   10/11/21 126/70   09/16/21 (!) 144/59     Pulse Readings from Last 3 Encounters:   10/11/21 63   10/11/21 82   09/16/21 62         Learning Assessment:  :     Learning Assessment 11/28/2017 7/7/2015 6/9/2014   PRIMARY LEARNER Patient Patient Patient   HIGHEST LEVEL OF EDUCATION - PRIMARY LEARNER  - > 4 YEARS OF COLLEGE > 4 YEARS OF COLLEGE   BARRIERS PRIMARY LEARNER - NONE NONE   CO-LEARNER CAREGIVER - - No   PRIMARY LANGUAGE ENGLISH ENGLISH ENGLISH   LEARNER PREFERENCE PRIMARY DEMONSTRATION DEMONSTRATION OTHER (COMMENT)     VIDEOS LISTENING -   ANSWERED BY patient self patient   RELATIONSHIP SELF SELF SELF       Depression Screening:  :     3 most recent PHQ Screens 10/11/2021   Little interest or pleasure in doing things Not at all   Feeling down, depressed, irritable, or hopeless Not at all   Total Score PHQ 2 0   Trouble falling or staying asleep, or sleeping too much -   Feeling tired or having little energy -   Poor appetite, weight loss, or overeating -   Feeling bad about yourself - or that you are a failure or have let yourself or your family down -   Trouble concentrating on things such as school, work, reading, or watching TV -   Moving or speaking so slowly that other people could have noticed; or the opposite being so fidgety that others notice -   Thoughts of being better off dead, or hurting yourself in some way - PHQ 9 Score -   How difficult have these problems made it for you to do your work, take care of your home and get along with others -       Fall Risk Assessment:  :     Fall Risk Assessment, last 12 mths 10/11/2021   Able to walk? Yes   Fall in past 12 months? 0   Do you feel unsteady? 0   Are you worried about falling 0       Abuse Screening:  :     Abuse Screening Questionnaire 10/11/2021 7/7/2015   Do you ever feel afraid of your partner? N N   Are you in a relationship with someone who physically or mentally threatens you? N N   Is it safe for you to go home?  Lisa Sharma

## 2021-10-11 NOTE — PROGRESS NOTES
HISTORY OF PRESENT ILLNESS  Joseluis Diaz is a 80 y.o. male. HPI   Assessment: Ciro Izaguirre is seen today for follow up of chronic medical problems, hyperlipidemia and others. He also needs preoperative medical clearance for cataract surgery. 1) Cataracts. He has bilateral cataract surgery scheduled for the end of this month and the first of November. 2) Hyperlipidemia, hypothyroidism, IFG. He is up to date with labs and these conditions have been stable. 3) Coronary artery disease. Up to date with cardiology follow up. He does have an echocardiogram pending but this is routine. He is not having any symptoms. Surgical history updated for having inguinal hernia surgery which went well. Assessment: He is at low risk for medical complications based on clinical stability and the nature of the proposed procedure. EKG was done today at the cardiologist's office and he has a copy. Recommendations: We will complete forms and send them on to his ophthalmologist in [place]. There is no need for lab studies. He will follow up as previously directed. He will call prn if I may help with any postoperative medical problems or questions.        Past Medical History:   Diagnosis Date    Arrhythmia 04/12/2020    cardioversion for SVT    Arthritis     HANDS    Basal cell carcinoma     BPH (benign prostatic hyperplasia)     CAD (coronary artery disease)     s/p PTCA '92    Calculus of kidney     Chronic pain     BACK    Elevated PSA     Heart attack ([address]     Hypercholesterolemia     Kidney stones     Lumbar foraminal stenosis     Melanoma ([address]     MVP (mitral valve prolapse)     NSTEMI (non-ST elevated myocardial infarction) ([address] 03/25/2020    Other ill-defined conditions(799.89)     Prostatitis    Prediabetes     Seasonal allergies     Squamous cell carcinoma     Stroke ([address]     with left eye visual loss; 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    Systolic murmur     Thyroid disease HYPOTHYROID    Vision impairment     R EYE, BLOOD CLOT ON RETINA     Past Surgical History:   Procedure Laterality Date    ENDOSCOPY, COLON, DIAGNOSTIC      due 12    HX BACK SURGERY      HX CHOLECYSTECTOMY  1970    HX CORONARY ARTERY BYPASS GRAFT  03/26/2020    x 5, LIMA to LAD, RSVG to Diag-RI-OM, RSVG to RCA    [address] HX HERNIA REPAIR  09/15/2021    HX LITHOTRIPSY      x2    HX ORTHOPAEDIC      elbow - fx right arm age 3 yrs    [place] CARDIOVERSION ELECTIVE ARRHYTHMIA EXTERNAL N/A 4/13/2020    EP CARDIOVERSION performed by Raul Serrano MD at [address], [facility] CATH LAB     Current Outpatient Medications on File Prior to Visit   Medication Sig Dispense Refill    ibuprofen (MOTRIN) 800 mg tablet Take 1 Tablet by mouth every six (6) hours as needed for Pain. 30 Tablet 0    cholecalciferol (Vitamin D3) (2,000 UNITS /50 MCG) cap capsule Take 2,000 Units by mouth every morning.  rosuvastatin (CRESTOR) 20 mg tablet Take 1 Tablet by mouth nightly. Replaces atorvastatin 90 Tablet 3    levothyroxine (SYNTHROID) 50 mcg tablet TAKE 1 TABLET BY MOUTH EVERY DAY BEFORE BREAKFAST EXCEPT TAKE 2 TABS ON SATURDAY. 112 Tablet 3    Eliquis 5 mg tablet TAKE 1 TABLET BY MOUTH EVERY 12 HOURS 180 Tablet 3    aspirin 81 mg chewable tablet Take 1 Tab by mouth daily. 90 Tab 3    vitamins  A,C,E-zinc-copper (Ocuvite PreserVision) [phone] unit-mg-unit tab tablet Take 1 Tab by mouth daily. (Patient taking differently: Take 1 Tablet by mouth two (2) times a day.) 30 Tab 5    acetaminophen (TYLENOL) 325 mg tablet Take 650 mg by mouth every four (4) hours as needed for Pain.  tamsulosin (FLOMAX) 0.4 mg capsule TAKE 1 CAPSULE BY MOUTH EVERY DAY 30 MINUTES AFTER THE SAME MEAL EACH DAY  11    finasteride (PROSCAR) 5 mg tablet Take 5 mg by mouth daily. HS       No current facility-administered medications on file prior to visit.      Allergies   Allergen Reactions    Codeine Rash    Pcn [Penicillins] Rash     HAD REACTION WILL IN [facility] DEVELOPED A RASH , NO HOSPITAL TREATMENT NEEDED PER PATIENT    Sulfa (Sulfonamide Antibiotics) Other (comments)     confusion       Review of Systems   Constitutional: Negative for chills, fever and weight loss. Eyes: Positive for blurred vision. Respiratory: Negative. Cardiovascular: Negative for chest pain, palpitations, leg swelling and PND. Musculoskeletal: Negative for myalgias. Neurological: Negative for focal weakness. Physical Exam  Vitals and nursing note reviewed. Constitutional:       General: He is not in acute distress. Neck:      Vascular: No carotid bruit. Cardiovascular:      Rate and Rhythm: Normal rate and regular rhythm. Heart sounds: No murmur heard. No friction rub. No gallop. Pulmonary:      Effort: Pulmonary effort is normal. No respiratory distress. Breath sounds: Normal breath sounds. ASSESSMENT and PLAN  Diagnoses and all orders for this visit:    1. Essential hypertension, benign    2. Hypothyroidism, acquired, autoimmune    3. Mixed hyperlipidemia    4. IFG (impaired fasting glucose)    5.  Coronary artery disease involving coronary bypass graft of native heart with unstable angina pectoris (HCC)    6. Cataract cortical, senile, bilateral

## 2021-10-29 ENCOUNTER — ANCILLARY PROCEDURE (OUTPATIENT)
Dept: CARDIOLOGY CLINIC | Age: 81
End: 2021-10-29
Payer: MEDICARE

## 2021-10-29 VITALS
BODY MASS INDEX: 25.2 KG/M2 | SYSTOLIC BLOOD PRESSURE: 126 MMHG | WEIGHT: 180 LBS | HEIGHT: 71 IN | DIASTOLIC BLOOD PRESSURE: 68 MMHG

## 2021-10-29 DIAGNOSIS — I34.0 NONRHEUMATIC MITRAL VALVE REGURGITATION: ICD-10-CM

## 2021-10-29 PROCEDURE — 93306 TTE W/DOPPLER COMPLETE: CPT | Performed by: SPECIALIST

## 2021-11-02 ENCOUNTER — TELEPHONE (OUTPATIENT)
Dept: CARDIOLOGY CLINIC | Age: 81
End: 2021-11-02

## 2021-11-02 LAB
ECHO AO ASC DIAM: 3.02 CM
ECHO AO ROOT DIAM: 3.55 CM
ECHO AV AREA PEAK VELOCITY: 1.44 CM2
ECHO AV AREA VTI: 1.27 CM2
ECHO AV AREA/BSA PEAK VELOCITY: 0.7 CM2/M2
ECHO AV AREA/BSA VTI: 0.6 CM2/M2
ECHO AV MEAN GRADIENT: 10.01 MMHG
ECHO AV PEAK GRADIENT: 18.21 MMHG
ECHO AV PEAK VELOCITY: 213.39 CM/S
ECHO AV VTI: 43.75 CM
ECHO EST RA PRESSURE: 3 MMHG
ECHO LA AREA 4C: 22.62 CM2
ECHO LA MAJOR AXIS: 4.19 CM
ECHO LA MINOR AXIS: 2.07 CM
ECHO LA VOL 2C: 65.61 ML (ref 18–58)
ECHO LA VOL 4C: 70.01 ML (ref 18–58)
ECHO LA VOL BP: 75.08 ML (ref 18–58)
ECHO LA VOL/BSA BIPLANE: 37.17 ML/M2 (ref 16–28)
ECHO LA VOLUME INDEX A2C: 32.48 ML/M2 (ref 16–28)
ECHO LA VOLUME INDEX A4C: 34.66 ML/M2 (ref 16–28)
ECHO LV E' LATERAL VELOCITY: 10.18 CM/S
ECHO LV E' SEPTAL VELOCITY: 5.55 CM/S
ECHO LV EDV A2C: 130.86 ML
ECHO LV EDV A4C: 115.7 ML
ECHO LV EDV BP: 126.51 ML (ref 67–155)
ECHO LV EDV INDEX A4C: 57.3 ML/M2
ECHO LV EDV INDEX BP: 62.6 ML/M2
ECHO LV EDV NDEX A2C: 64.8 ML/M2
ECHO LV EJECTION FRACTION A2C: 53 PERCENT
ECHO LV EJECTION FRACTION A4C: 55 PERCENT
ECHO LV EJECTION FRACTION BIPLANE: 55.2 PERCENT (ref 55–100)
ECHO LV ESV A2C: 61.95 ML
ECHO LV ESV A4C: 51.87 ML
ECHO LV ESV BP: 56.69 ML (ref 22–58)
ECHO LV ESV INDEX A2C: 30.7 ML/M2
ECHO LV ESV INDEX A4C: 25.7 ML/M2
ECHO LV ESV INDEX BP: 28.1 ML/M2
ECHO LV INTERNAL DIMENSION DIASTOLIC: 3.97 CM (ref 4.2–5.9)
ECHO LV INTERNAL DIMENSION SYSTOLIC: 2.76 CM
ECHO LV IVSD: 1.04 CM (ref 0.6–1)
ECHO LV MASS 2D: 139.3 G (ref 88–224)
ECHO LV MASS INDEX 2D: 68.9 G/M2 (ref 49–115)
ECHO LV POSTERIOR WALL DIASTOLIC: 1.11 CM (ref 0.6–1)
ECHO LVOT DIAM: 2.03 CM
ECHO LVOT PEAK GRADIENT: 3.61 MMHG
ECHO LVOT PEAK VELOCITY: 94.97 CM/S
ECHO LVOT SV: 55.6 ML
ECHO LVOT VTI: 17.16 CM
ECHO MV A VELOCITY: 69.65 CM/S
ECHO MV AREA PHT: 4.47 CM2
ECHO MV E DECELERATION TIME (DT): 169.63 MS
ECHO MV E VELOCITY: 81.1 CM/S
ECHO MV E/A RATIO: 1.16
ECHO MV E/E' LATERAL: 7.97
ECHO MV E/E' RATIO (AVERAGED): 11.29
ECHO MV E/E' SEPTAL: 14.61
ECHO MV PRESSURE HALF TIME (PHT): 49.19 MS
ECHO RA MAJOR AXIS: 3.45 CM
ECHO RIGHT VENTRICULAR SYSTOLIC PRESSURE (RVSP): 25 MMHG
ECHO RV INTERNAL DIMENSION: 2.92 CM
ECHO RV TAPSE: 1.54 CM (ref 1.5–2)
ECHO TV REGURGITANT MAX VELOCITY: 234.89 CM/S
ECHO TV REGURGITANT PEAK GRADIENT: 22.07 MMHG
LA VOL DISK BP: 71.04 ML (ref 18–58)
LVOT MG: 1.65 MMHG

## 2021-11-02 NOTE — TELEPHONE ENCOUNTER
----- Message from [address], [place] sent at 11/2/2021  2:40 PM EDT -----  Heart muscle is strong and some valve leakage, if anything better than before. Aortic valve has some calcium on it and some restriction in opening.  All of this is ok, just need to watch and would repeat 1yr

## 2021-11-02 NOTE — PROGRESS NOTES
Heart muscle is strong and some valve leakage, if anything better than before. Aortic valve has some calcium on it and some restriction in opening.  All of this is ok, just need to watch and would repeat 1yr

## 2021-11-02 NOTE — TELEPHONE ENCOUNTER
Called patient. Verified patient's identity with two identifiers. Notified pt of results and Dr. Aaron Remedies message. Patient verbalized understanding and denied further questions or concerns.

## 2021-12-22 ENCOUNTER — OFFICE VISIT (OUTPATIENT)
Dept: INTERNAL MEDICINE CLINIC | Age: 81
End: 2021-12-22
Payer: MEDICARE

## 2021-12-22 VITALS
WEIGHT: 181 LBS | RESPIRATION RATE: 16 BRPM | BODY MASS INDEX: 25.34 KG/M2 | TEMPERATURE: 97.8 F | HEART RATE: 69 BPM | OXYGEN SATURATION: 96 % | DIASTOLIC BLOOD PRESSURE: 58 MMHG | SYSTOLIC BLOOD PRESSURE: 115 MMHG | HEIGHT: 71 IN

## 2021-12-22 DIAGNOSIS — Z00.00 MEDICARE ANNUAL WELLNESS VISIT, SUBSEQUENT: Primary | ICD-10-CM

## 2021-12-22 DIAGNOSIS — N40.1 BPH WITH URINARY OBSTRUCTION: ICD-10-CM

## 2021-12-22 DIAGNOSIS — I25.700 CORONARY ARTERY DISEASE INVOLVING CORONARY BYPASS GRAFT OF NATIVE HEART WITH UNSTABLE ANGINA PECTORIS (HCC): ICD-10-CM

## 2021-12-22 DIAGNOSIS — E06.3 HYPOTHYROIDISM, ACQUIRED, AUTOIMMUNE: ICD-10-CM

## 2021-12-22 DIAGNOSIS — Z13.31 SCREENING FOR DEPRESSION: ICD-10-CM

## 2021-12-22 DIAGNOSIS — I10 ESSENTIAL HYPERTENSION, BENIGN: ICD-10-CM

## 2021-12-22 DIAGNOSIS — E78.2 MIXED HYPERLIPIDEMIA: ICD-10-CM

## 2021-12-22 DIAGNOSIS — R73.01 IFG (IMPAIRED FASTING GLUCOSE): ICD-10-CM

## 2021-12-22 DIAGNOSIS — N13.8 BPH WITH URINARY OBSTRUCTION: ICD-10-CM

## 2021-12-22 PROCEDURE — G8510 SCR DEP NEG, NO PLAN REQD: HCPCS | Performed by: INTERNAL MEDICINE

## 2021-12-22 PROCEDURE — G8536 NO DOC ELDER MAL SCRN: HCPCS | Performed by: INTERNAL MEDICINE

## 2021-12-22 PROCEDURE — G8754 DIAS BP LESS 90: HCPCS | Performed by: INTERNAL MEDICINE

## 2021-12-22 PROCEDURE — G0439 PPPS, SUBSEQ VISIT: HCPCS | Performed by: INTERNAL MEDICINE

## 2021-12-22 PROCEDURE — G8427 DOCREV CUR MEDS BY ELIG CLIN: HCPCS | Performed by: INTERNAL MEDICINE

## 2021-12-22 PROCEDURE — G8419 CALC BMI OUT NRM PARAM NOF/U: HCPCS | Performed by: INTERNAL MEDICINE

## 2021-12-22 PROCEDURE — G0444 DEPRESSION SCREEN ANNUAL: HCPCS | Performed by: INTERNAL MEDICINE

## 2021-12-22 PROCEDURE — 1101F PT FALLS ASSESS-DOCD LE1/YR: CPT | Performed by: INTERNAL MEDICINE

## 2021-12-22 PROCEDURE — G8752 SYS BP LESS 140: HCPCS | Performed by: INTERNAL MEDICINE

## 2021-12-22 NOTE — PATIENT INSTRUCTIONS
Medicare Wellness Visit, Male    The best way to live healthy is to have a lifestyle where you eat a well-balanced diet, exercise regularly, limit alcohol use, and quit all forms of tobacco/nicotine, if applicable. Regular preventive services are another way to keep healthy. Preventive services (vaccines, screening tests, monitoring & exams) can help personalize your care plan, which helps you manage your own care. Screening tests can find health problems at the earliest stages, when they are easiest to treat. [facility] follows the current, evidence-based guidelines published by the [facility] Mauricio Kerwin ([facility]) when recommending preventive services for our patients. Because we follow these guidelines, sometimes recommendations change over time as research supports it. (For example, a prostate screening blood test is no longer routinely recommended for men with no symptoms). Of course, you and your doctor may decide to screen more often for some diseases, based on your risk and co-morbidities (chronic disease you are already diagnosed with). Preventive services for you include:  - Medicare offers their members a free annual wellness visit, which is time for you and your primary care provider to discuss and plan for your preventive service needs. Take advantage of this benefit every year!  -All adults over age 72 should receive the recommended pneumonia vaccines. Current USPSTF guidelines recommend a series of two vaccines for the best pneumonia protection.   -All adults should have a flu vaccine yearly and tetanus vaccine every 10 years.  -All adults age 48 and older should receive the shingles vaccines (series of two vaccines).        -All adults age 38-68 who are overweight should have a diabetes screening test once every three years.   -Other screening tests & preventive services for persons with diabetes include: an eye exam to screen for diabetic retinopathy, a kidney function test, a foot exam, and stricter control over your cholesterol.   -Cardiovascular screening for adults with routine risk involves an electrocardiogram (ECG) at intervals determined by the provider.   -Colorectal cancer screening should be done for adults age 54-65 with no increased risk factors for colorectal cancer. There are a number of acceptable methods of screening for this type of cancer. Each test has its own benefits and drawbacks. Discuss with your provider what is most appropriate for you during your annual wellness visit. The different tests include: colonoscopy (considered the best screening method), a fecal occult blood test, a fecal DNA test, and sigmoidoscopy.  -All adults born between [facility] should be screened once for Hepatitis C.  -An Abdominal Aortic Aneurysm (AAA) Screening is recommended for men age 73-68 who has ever smoked in their lifetime.      Here is a list of your current Health Maintenance items (your personalized list of preventive services) with a due date:  Health Maintenance Due   Topic Date Due    Shingles Vaccine (1 of 2) Never done    COVID-19 Vaccine (3 - Booster for Pfizer series) 08/27/2021    Yearly Flu Vaccine (1) Never done

## 2021-12-22 NOTE — PROGRESS NOTES
This is the Subsequent Medicare Annual Wellness Exam, performed 12 months or more after the Initial AWV or the last Subsequent AWV    I have reviewed the patient's medical history in detail and updated the computerized patient record. Assessment/Plan   Education and counseling provided:  Are appropriate based on today's review and evaluation    1. Medicare annual wellness visit, subsequent  2. Hypothyroidism, acquired, autoimmune  3. Essential hypertension, benign  4. Mixed hyperlipidemia  5. IFG (impaired fasting glucose)  6. Coronary artery disease involving coronary bypass graft of native heart with unstable angina pectoris ([facility]  7. BPH with urinary obstruction  8. Screening for depression  -     DEPRESSION SCREEN ANNUAL       Depression Risk Factor Screening     3 most recent PHQ Screens 12/22/2021   Little interest or pleasure in doing things Not at all   Feeling down, depressed, irritable, or hopeless Not at all   Total Score PHQ 2 0   Trouble falling or staying asleep, or sleeping too much -   Feeling tired or having little energy -   Poor appetite, weight loss, or overeating -   Feeling bad about yourself - or that you are a failure or have let yourself or your family down -   Trouble concentrating on things such as school, work, reading, or watching TV -   Moving or speaking so slowly that other people could have noticed; or the opposite being so fidgety that others notice -   Thoughts of being better off dead, or hurting yourself in some way -   PHQ 9 Score -   How difficult have these problems made it for you to do your work, take care of your home and get along with others -       Alcohol & Drug Abuse Risk Screen    Do you average more than 1 drink per night or more than 7 drinks a week: No- 0    In the past three months have you have had more than 4 drinks containing alcohol on one occasion: No          Functional Ability and Level of Safety    Hearing: The patient wears hearing aids. Activities of Daily Living: The home contains: no safety equipment. Patient does total self care      Ambulation: with no difficulty     Fall Risk:  Fall Risk Assessment, last 12 mths 12/22/2021   Able to walk? Yes   Fall in past 12 months? 0   Do you feel unsteady?  0   Are you worried about falling 0      Abuse Screen:  Patient is not abused       Cognitive Screening    Has your family/caregiver stated any concerns about your memory: no         Health Maintenance Due     Health Maintenance Due   Topic Date Due    Shingrix Vaccine Age 49> (1 of 2) Never done    COVID-19 Vaccine (3 - Booster for Matta Peter series) 08/27/2021    Flu Vaccine (1) Never done       Patient Care Team   Patient Care Team:  Chadd Ortiz MD as PCP - General  Chadd Ortiz MD as PCP - [facility]  Carlene Brock MD as Physician (Internal Medicine)  Lesvia Brizuela MD as Physician (Dermatology)  Juanito Faust MD as Physician (Urology)  Gwen Tirado MD as Surgeon (Orthopedic Surgery)  Dao Mary MD as Physician (Ophthalmology)  Antonio Mccollum MD as Physician (Cardiology)  Marvin Marcum MD as Surgeon (Orthopedic Surgery)  Curtis Staples MD (Cardiology)  Chadd Ortiz MD (Internal Medicine)  Angelito Mike MD (Cardiology)  Felicia David MD (Cardiology)  Guerita Kramer NP (Nurse Practitioner)    History     Patient Active Problem List   Diagnosis Code    Mixed hyperlipidemia E78.2    Essential hypertension, benign I10    Coronary atherosclerosis of native coronary artery I25.10    Calculus of kidney N20.0    Insomnia, unspecified G47.00    Unspecified hearing loss H91.90    Macular degeneration H35.30    Skin cancer C44.90    Vitamin D deficiency E55.9    Hypothyroidism, acquired, 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    BPH with urinary obstruction N40.1, N13.8    Advance directive discussed with patient Z71.89    Lumbar disc disease M51.9    Lumbar foraminal stenosis M48.061    IFG (impaired fasting glucose) R73.01    NSTEMI (non-ST elevated myocardial infarction) ([facility]) I21.4    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    Dizziness R42    Sustained SVT ([facility]) I47.1    RBBB I45.10    Peripheral vascular disease ([facility]) I73.9     Past Medical History:   Diagnosis Date    Arrhythmia 04/12/2020    cardioversion for SVT    Arthritis     HANDS    Basal cell carcinoma     BPH (benign prostatic hyperplasia)     CAD (coronary artery disease)     s/p PTCA '92    Calculus of kidney     Chronic pain     BACK    Elevated PSA     Heart [facility]     Hypercholesterolemia     Kidney stones     Lumbar foraminal stenosis     Melanoma ([facility]     MVP (mitral valve prolapse)     NSTEMI (non-ST elevated myocardial infarction) ([facility] 03/25/2020    Other ill-defined conditions(799.89)     Prostatitis    Prediabetes     Seasonal allergies     Squamous cell carcinoma     Stroke ([facility]     with left eye visual loss; 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    Systolic murmur     Thyroid disease     HYPOTHYROID    Vision impairment     R EYE, BLOOD CLOT ON RETINA      Past Surgical History:   Procedure Laterality Date    ENDOSCOPY, COLON, DIAGNOSTIC      due 12    HX BACK SURGERY      HX CATARACT REMOVAL Bilateral 11/2021    HX CHOLECYSTECTOMY  1970    HX CORONARY ARTERY BYPASS GRAFT  03/26/2020    x 5, LIMA to LAD, RSVG to Diag-RI-OM, RSVG to RCA    [address]    HX HERNIA REPAIR  09/15/2021    HX LITHOTRIPSY      x2    HX ORTHOPAEDIC      elbow - fx right arm age 3 yrs    [place] CARDIOVERSION ELECTIVE ARRHYTHMIA EXTERNAL N/A 4/13/2020    EP CARDIOVERSION performed by Lam Cooper MD at [address] [address], [facility] CATH LAB     Current Outpatient Medications   Medication Sig Dispense Refill    ibuprofen (MOTRIN) 800 mg tablet Take 1 Tablet by mouth every six (6) hours as needed for Pain. 30 Tablet 0    cholecalciferol (Vitamin D3) (2,000 UNITS /50 MCG) cap capsule Take 2,000 Units by mouth every morning.  rosuvastatin (CRESTOR) 20 mg tablet Take 1 Tablet by mouth nightly. Replaces atorvastatin 90 Tablet 3    levothyroxine (SYNTHROID) 50 mcg tablet TAKE 1 TABLET BY MOUTH EVERY DAY BEFORE BREAKFAST EXCEPT TAKE 2 TABS ON SATURDAY. 112 Tablet 3    Eliquis 5 mg tablet TAKE 1 TABLET BY MOUTH EVERY 12 HOURS 180 Tablet 3    aspirin 81 mg chewable tablet Take 1 Tab by mouth daily. 90 Tab 3    vitamins  A,C,E-zinc-copper (Ocuvite PreserVision) [id number] unit-mg-unit tab tablet Take 1 Tab by mouth daily. (Patient taking differently: Take 1 Tablet by mouth two (2) times a day.) 30 Tab 5    acetaminophen (TYLENOL) 325 mg tablet Take 650 mg by mouth every four (4) hours as needed for Pain.  finasteride (PROSCAR) 5 mg tablet Take 5 mg by mouth daily.  HS      tamsulosin (FLOMAX) 0.4 mg capsule TAKE 1 CAPSULE BY MOUTH EVERY DAY 30 MINUTES AFTER THE SAME MEAL EACH DAY (Patient not taking: Reported on 12/22/2021)  11     Allergies   Allergen Reactions    Codeine Rash    Pcn [Penicillins] Rash     HAD REACTION WILL IN COLLEGE DEVELOPED A RASH , NO HOSPITAL TREATMENT NEEDED PER PATIENT    Sulfa (Sulfonamide Antibiotics) Other (comments)     confusion       Family History   Problem Relation Age of Onset    Heart Disease Mother         CHF    Heart Disease Father         CAD    Heart Disease Sister         CAD CABG    Lung Disease Sister     Heart Disease Brother         CAD    Pulmonary Fibrosis Brother     Heart Disease Brother         CAD    Stroke Brother 34        cerebral hemorrhage    Cancer Brother         LUNG    High Cholesterol Daughter     No Known Problems Son     Anesth Problems Neg Hx      Social History     Tobacco Use    Smoking status: Never Smoker    Smokeless tobacco: Never Used   Substance Use Topics    Alcohol use: No         Britney Leon MD

## 2021-12-22 NOTE — PROGRESS NOTES
HISTORY OF PRESENT ILLNESS  Judson Owens is a 80 y.o. male. HPI  Assessment:  Irving Reed is seen today for a Medicare wellness visit, as well as for follow up of chronic problems. 1. Preventive care. See attached wellness visit note. He is due for shingles vaccine, colonoscopy in 2022. He is up to date with labs, which I fully reviewed with him, other vaccinations, although he does decline the flu vaccine. 2. Chronic problems are reviewed. He is stable from a cardiac standpoint and up to date with specialist follow up. The same goes for his urology problems. BP is stable and his labs are fine. Surgical History:  Updated for having had hernia repair. Review of Systems   Constitutional: Negative for chills, fever and weight loss. HENT: Positive for hearing loss. Eyes: Negative for blurred vision. Respiratory: Negative. Cardiovascular: Negative for chest pain, palpitations, leg swelling and PND. Gastrointestinal: Negative. Genitourinary: Positive for frequency. Musculoskeletal: Negative for myalgias. Neurological: Negative for focal weakness. Physical Exam  Vitals and nursing note reviewed. Constitutional:       General: He is not in acute distress. Appearance: He is well-developed. HENT:      Head: Normocephalic and atraumatic. Right Ear: Tympanic membrane, ear canal and external ear normal.      Left Ear: Tympanic membrane, ear canal and external ear normal.   Eyes:      General:         Right eye: No discharge. Left eye: No discharge. Pupils: Pupils are equal, round, and reactive to light. Neck:      Thyroid: No thyromegaly. Vascular: No carotid bruit. Cardiovascular:      Rate and Rhythm: Normal rate and regular rhythm. Heart sounds: Normal heart sounds. No murmur heard. No friction rub. No gallop. Pulmonary:      Effort: Pulmonary effort is normal. No respiratory distress. Breath sounds: Normal breath sounds.  No wheezing or rales. Abdominal:      General: Bowel sounds are normal. There is no distension. Palpations: Abdomen is soft. There is no mass. Tenderness: There is no abdominal tenderness. There is no guarding or rebound. Musculoskeletal:         General: No tenderness. Normal range of motion. Cervical back: Normal range of motion and neck supple. Lymphadenopathy:      Cervical: No cervical adenopathy. Skin:     General: Skin is warm and dry. Findings: No rash. Neurological:      Mental Status: He is alert and oriented to person, place, and time. Deep Tendon Reflexes: Reflexes are normal and symmetric. Psychiatric:         Behavior: Behavior normal.         ASSESSMENT and PLAN  Diagnoses and all orders for this visit:    1. Medicare annual wellness visit, subsequent    2. Hypothyroidism, acquired, autoimmune  -     TSH 3RD GENERATION; Future    3. Essential hypertension, benign    4. Mixed hyperlipidemia  -     METABOLIC PANEL, COMPREHENSIVE; Future  -     LIPID PANEL; Future    5. IFG (impaired fasting glucose)  -     HEMOGLOBIN A1C WITH EAG; Future    6. Coronary artery disease involving coronary bypass graft of native heart with unstable angina pectoris ([facility]    7. BPH with urinary obstruction    8.  Screening for depression  -     Rosio Platt

## 2021-12-22 NOTE — PROGRESS NOTES
Verified name and birth date for privacy precautions. Chart reviewed in preparation for today's visit.      Chief Complaint   Patient presents with   [address] Maintenance Due   Topic    Shingrix Vaccine Age 49> (1 of 2)    COVID-19 Vaccine (3 - Booster for [employer] series)    Flu Vaccine (1)    Medicare Yearly Exam          Wt Readings from Last 3 Encounters:   12/22/21 181 lb (82.1 kg)   10/29/21 180 lb (81.6 kg)   10/11/21 180 lb (81.6 kg)     Temp Readings from Last 3 Encounters:   12/22/21 97.8 °F (36.6 °C) (Temporal)   10/11/21 97.3 °F (36.3 °C) (Temporal)   09/16/21 97.2 °F (36.2 °C)     BP Readings from Last 3 Encounters:   12/22/21 (!) 115/58   10/29/21 126/68   10/11/21 126/67     Pulse Readings from Last 3 Encounters:   12/22/21 69   10/11/21 63   10/11/21 82         Learning Assessment:  :     Learning Assessment 11/28/2017 7/7/2015 6/9/2014   PRIMARY LEARNER Patient Patient Patient   HIGHEST LEVEL OF EDUCATION - PRIMARY LEARNER  - > 4 YEARS OF COLLEGE > 4 YEARS OF COLLEGE   BARRIERS PRIMARY LEARNER - NONE NONE   CO-LEARNER CAREGIVER - - No   PRIMARY LANGUAGE ENGLISH ENGLISH ENGLISH   LEARNER PREFERENCE PRIMARY DEMONSTRATION DEMONSTRATION OTHER (COMMENT)     VIDEOS LISTENING -   ANSWERED BY patient self patient   RELATIONSHIP SELF SELF SELF       Depression Screening:  :     3 most recent PHQ Screens 12/22/2021   Little interest or pleasure in doing things Not at all   Feeling down, depressed, irritable, or hopeless Not at all   Total Score PHQ 2 0   Trouble falling or staying asleep, or sleeping too much -   Feeling tired or having little energy -   Poor appetite, weight loss, or overeating -   Feeling bad about yourself - or that you are a failure or have let yourself or your family down -   Trouble concentrating on things such as school, work, reading, or watching TV -   Moving or speaking so slowly that other people could have noticed; or the opposite being so fidgety that others notice -   Thoughts of being better off dead, or hurting yourself in some way -   PHQ 9 Score -   How difficult have these problems made it for you to do your work, take care of your home and get along with others -       Fall Risk Assessment:  :     Fall Risk Assessment, last 12 mths 12/22/2021   Able to walk? Yes   Fall in past 12 months? 0   Do you feel unsteady? 0   Are you worried about falling 0       Abuse Screening:  :     Abuse Screening Questionnaire 12/22/2021 10/11/2021 7/7/2015   Do you ever feel afraid of your partner? N N N   Are you in a relationship with someone who physically or mentally threatens you? N N N   Is it safe for you to go home?  Jigna Schaefer

## 2021-12-23 ENCOUNTER — TELEPHONE (OUTPATIENT)
Dept: CARDIOLOGY CLINIC | Age: 81
End: 2021-12-23

## 2021-12-23 NOTE — TELEPHONE ENCOUNTER
Patient to be scheduled for endoscopy procedure with Dr. Liberty Mancuso. Requesting cardiac clearance and holding eliquis. Needs to be faxed to [phone].

## 2021-12-27 NOTE — TELEPHONE ENCOUNTER
Faxed the above to [phone]. Confirmation received. Informed patient of clearance and holding eliquis. Verbalized understanding.

## 2022-03-05 ENCOUNTER — HOSPITAL ENCOUNTER (INPATIENT)
Age: 82
LOS: 4 days | Discharge: HOME | DRG: 378 | End: 2022-03-09
Attending: EMERGENCY MEDICINE | Admitting: INTERNAL MEDICINE
Payer: MEDICARE

## 2022-03-05 ENCOUNTER — APPOINTMENT (OUTPATIENT)
Dept: CT IMAGING | Age: 82
DRG: 378 | End: 2022-03-05
Attending: EMERGENCY MEDICINE
Payer: MEDICARE

## 2022-03-05 DIAGNOSIS — K92.2 ACUTE GI BLEEDING: ICD-10-CM

## 2022-03-05 DIAGNOSIS — R07.9 CHEST PAIN, UNSPECIFIED TYPE: Primary | ICD-10-CM

## 2022-03-05 DIAGNOSIS — D64.9 ANEMIA, UNSPECIFIED TYPE: ICD-10-CM

## 2022-03-05 DIAGNOSIS — I48.0 PAROXYSMAL A-FIB (HCC): ICD-10-CM

## 2022-03-05 DIAGNOSIS — R06.09 DOE (DYSPNEA ON EXERTION): ICD-10-CM

## 2022-03-05 DIAGNOSIS — J90 PLEURAL EFFUSION: ICD-10-CM

## 2022-03-05 PROBLEM — K92.1 BLOODY STOOL: Status: ACTIVE | Noted: 2022-03-05

## 2022-03-05 LAB
ALBUMIN SERPL-MCNC: 3.4 G/DL (ref 3.5–5)
ALBUMIN/GLOB SERPL: 1.1 {RATIO} (ref 1.1–2.2)
ALP SERPL-CCNC: 86 U/L (ref 45–117)
ALT SERPL-CCNC: 18 U/L (ref 12–78)
ANION GAP SERPL CALC-SCNC: 10 MMOL/L (ref 5–15)
AST SERPL-CCNC: 17 U/L (ref 15–37)
ATRIAL RATE: 100 BPM
BASOPHILS # BLD: 0.1 K/UL (ref 0–0.1)
BASOPHILS NFR BLD: 1 % (ref 0–1)
BILIRUB SERPL-MCNC: 0.4 MG/DL (ref 0.2–1)
BNP SERPL-MCNC: 703 PG/ML (ref 0–450)
BUN SERPL-MCNC: 27 MG/DL (ref 6–20)
BUN/CREAT SERPL: 25 (ref 12–20)
CALCIUM SERPL-MCNC: 8.3 MG/DL (ref 8.5–10.1)
CALCULATED R AXIS, ECG10: -68 DEGREES
CALCULATED T AXIS, ECG11: 98 DEGREES
CHLORIDE SERPL-SCNC: 106 MMOL/L (ref 97–108)
CO2 SERPL-SCNC: 27 MMOL/L (ref 21–32)
COMMENT, HOLDF: NORMAL
COVID-19 RAPID TEST, COVR: NOT DETECTED
CREAT SERPL-MCNC: 1.08 MG/DL (ref 0.7–1.3)
DIAGNOSIS, 93000: NORMAL
DIFFERENTIAL METHOD BLD: ABNORMAL
EOSINOPHIL # BLD: 0.1 K/UL (ref 0–0.4)
EOSINOPHIL NFR BLD: 2 % (ref 0–7)
ERYTHROCYTE [DISTWIDTH] IN BLOOD BY AUTOMATED COUNT: 17.4 % (ref 11.5–14.5)
GLOBULIN SER CALC-MCNC: 3.2 G/DL (ref 2–4)
GLUCOSE SERPL-MCNC: 137 MG/DL (ref 65–100)
HCT VFR BLD AUTO: 27.1 % (ref 36.6–50.3)
HEMOCCULT STL QL: NEGATIVE
HGB BLD-MCNC: 8.4 G/DL (ref 12.1–17)
IMM GRANULOCYTES # BLD AUTO: 0 K/UL (ref 0–0.04)
IMM GRANULOCYTES NFR BLD AUTO: 1 % (ref 0–0.5)
LYMPHOCYTES # BLD: 1.3 K/UL (ref 0.8–3.5)
LYMPHOCYTES NFR BLD: 18 % (ref 12–49)
MAGNESIUM SERPL-MCNC: 2.1 MG/DL (ref 1.6–2.4)
MCH RBC QN AUTO: 26.8 PG (ref 26–34)
MCHC RBC AUTO-ENTMCNC: 31 G/DL (ref 30–36.5)
MCV RBC AUTO: 86.6 FL (ref 80–99)
MONOCYTES # BLD: 0.5 K/UL (ref 0–1)
MONOCYTES NFR BLD: 7 % (ref 5–13)
NEUTS SEG # BLD: 5.4 K/UL (ref 1.8–8)
NEUTS SEG NFR BLD: 71 % (ref 32–75)
NRBC # BLD: 0 K/UL (ref 0–0.01)
NRBC BLD-RTO: 0 PER 100 WBC
PLATELET # BLD AUTO: 247 K/UL (ref 150–400)
PMV BLD AUTO: 8.7 FL (ref 8.9–12.9)
POTASSIUM SERPL-SCNC: 3.5 MMOL/L (ref 3.5–5.1)
PROT SERPL-MCNC: 6.6 G/DL (ref 6.4–8.2)
Q-T INTERVAL, ECG07: 390 MS
QRS DURATION, ECG06: 134 MS
QTC CALCULATION (BEZET), ECG08: 503 MS
RBC # BLD AUTO: 3.13 M/UL (ref 4.1–5.7)
SAMPLES BEING HELD,HOLD: NORMAL
SODIUM SERPL-SCNC: 143 MMOL/L (ref 136–145)
SOURCE, COVRS: NORMAL
TROPONIN-HIGH SENSITIVITY: 28 NG/L (ref 0–76)
VENTRICULAR RATE, ECG03: 100 BPM
WBC # BLD AUTO: 7.4 K/UL (ref 4.1–11.1)

## 2022-03-05 PROCEDURE — 74011250637 HC RX REV CODE- 250/637: Performed by: INTERNAL MEDICINE

## 2022-03-05 PROCEDURE — 83880 ASSAY OF NATRIURETIC PEPTIDE: CPT

## 2022-03-05 PROCEDURE — 71250 CT THORAX DX C-: CPT

## 2022-03-05 PROCEDURE — 93005 ELECTROCARDIOGRAM TRACING: CPT

## 2022-03-05 PROCEDURE — 83735 ASSAY OF MAGNESIUM: CPT

## 2022-03-05 PROCEDURE — 65660000000 HC RM CCU STEPDOWN

## 2022-03-05 PROCEDURE — 36415 COLL VENOUS BLD VENIPUNCTURE: CPT

## 2022-03-05 PROCEDURE — 87635 SARS-COV-2 COVID-19 AMP PRB: CPT

## 2022-03-05 PROCEDURE — 99285 EMERGENCY DEPT VISIT HI MDM: CPT

## 2022-03-05 PROCEDURE — 74011000250 HC RX REV CODE- 250: Performed by: INTERNAL MEDICINE

## 2022-03-05 PROCEDURE — 80053 COMPREHEN METABOLIC PANEL: CPT

## 2022-03-05 PROCEDURE — 84484 ASSAY OF TROPONIN QUANT: CPT

## 2022-03-05 PROCEDURE — 82272 OCCULT BLD FECES 1-3 TESTS: CPT

## 2022-03-05 PROCEDURE — 85025 COMPLETE CBC W/AUTO DIFF WBC: CPT

## 2022-03-05 PROCEDURE — 74011250636 HC RX REV CODE- 250/636: Performed by: INTERNAL MEDICINE

## 2022-03-05 RX ORDER — ONDANSETRON 4 MG/1
4 TABLET, ORALLY DISINTEGRATING ORAL
Status: DISCONTINUED | OUTPATIENT
Start: 2022-03-05 | End: 2022-03-09 | Stop reason: HOSPADM

## 2022-03-05 RX ORDER — FINASTERIDE 5 MG/1
5 TABLET, FILM COATED ORAL DAILY
Status: DISCONTINUED | OUTPATIENT
Start: 2022-03-06 | End: 2022-03-09 | Stop reason: HOSPADM

## 2022-03-05 RX ORDER — LEVOTHYROXINE SODIUM 50 UG/1
50 TABLET ORAL
Status: DISCONTINUED | OUTPATIENT
Start: 2022-03-12 | End: 2022-03-09 | Stop reason: HOSPADM

## 2022-03-05 RX ORDER — TAMSULOSIN HYDROCHLORIDE 0.4 MG/1
0.4 CAPSULE ORAL DAILY
Status: DISCONTINUED | OUTPATIENT
Start: 2022-03-06 | End: 2022-03-09 | Stop reason: HOSPADM

## 2022-03-05 RX ORDER — SODIUM CHLORIDE 0.9 % (FLUSH) 0.9 %
5-40 SYRINGE (ML) INJECTION AS NEEDED
Status: DISCONTINUED | OUTPATIENT
Start: 2022-03-05 | End: 2022-03-09 | Stop reason: HOSPADM

## 2022-03-05 RX ORDER — SODIUM CHLORIDE 9 MG/ML
75 INJECTION, SOLUTION INTRAVENOUS CONTINUOUS
Status: DISPENSED | OUTPATIENT
Start: 2022-03-05 | End: 2022-03-06

## 2022-03-05 RX ORDER — ONDANSETRON 2 MG/ML
4 INJECTION INTRAMUSCULAR; INTRAVENOUS
Status: DISCONTINUED | OUTPATIENT
Start: 2022-03-05 | End: 2022-03-09 | Stop reason: HOSPADM

## 2022-03-05 RX ORDER — FUROSEMIDE 20 MG/1
20 TABLET ORAL DAILY
COMMUNITY
End: 2022-03-09

## 2022-03-05 RX ORDER — ACETAMINOPHEN 325 MG/1
650 TABLET ORAL
Status: DISCONTINUED | OUTPATIENT
Start: 2022-03-05 | End: 2022-03-09 | Stop reason: HOSPADM

## 2022-03-05 RX ORDER — SODIUM CHLORIDE 0.9 % (FLUSH) 0.9 %
5-40 SYRINGE (ML) INJECTION EVERY 8 HOURS
Status: DISCONTINUED | OUTPATIENT
Start: 2022-03-05 | End: 2022-03-09 | Stop reason: HOSPADM

## 2022-03-05 RX ORDER — POLYETHYLENE GLYCOL 3350 17 G/17G
17 POWDER, FOR SOLUTION ORAL DAILY PRN
Status: DISCONTINUED | OUTPATIENT
Start: 2022-03-05 | End: 2022-03-09 | Stop reason: HOSPADM

## 2022-03-05 RX ORDER — ROSUVASTATIN CALCIUM 10 MG/1
20 TABLET, COATED ORAL
Status: DISCONTINUED | OUTPATIENT
Start: 2022-03-05 | End: 2022-03-09 | Stop reason: HOSPADM

## 2022-03-05 RX ORDER — ACETAMINOPHEN 650 MG/1
650 SUPPOSITORY RECTAL
Status: DISCONTINUED | OUTPATIENT
Start: 2022-03-05 | End: 2022-03-09 | Stop reason: HOSPADM

## 2022-03-05 RX ORDER — PANTOPRAZOLE SODIUM 40 MG/1
40 TABLET, DELAYED RELEASE ORAL
Status: DISCONTINUED | OUTPATIENT
Start: 2022-03-05 | End: 2022-03-06

## 2022-03-05 RX ORDER — LEVOTHYROXINE SODIUM 50 UG/1
50 TABLET ORAL
Status: DISCONTINUED | OUTPATIENT
Start: 2022-03-06 | End: 2022-03-09 | Stop reason: HOSPADM

## 2022-03-05 RX ADMIN — PANTOPRAZOLE SODIUM 40 MG: 40 TABLET, DELAYED RELEASE ORAL at 18:20

## 2022-03-05 RX ADMIN — SODIUM CHLORIDE 75 ML/HR: 9 INJECTION, SOLUTION INTRAVENOUS at 18:20

## 2022-03-05 RX ADMIN — SODIUM CHLORIDE, PRESERVATIVE FREE 10 ML: 5 INJECTION INTRAVENOUS at 18:20

## 2022-03-05 RX ADMIN — ROSUVASTATIN 20 MG: 10 TABLET, FILM COATED ORAL at 21:57

## 2022-03-05 NOTE — ED PROVIDER NOTES
Please note that this dictation was completed with [employer], the computer voice recognition software.  Quite often unanticipated grammatical, syntax, homophones, and other interpretive errors are inadvertently transcribed by the computer software.  Please disregard these errors.  Please excuse any errors that have escaped final proofreading. 19-year-old male past medical history markable for previous cardioversion for SVT, arthritis of the hands, basal cell carcinoma, BPH, status post stent, chronic back pain, previous heart attack, high cholesterol, Previous coronary artery bypass graft, and previous surgeries presents the ER POV with his wife complaining of \" dyspnea with exertion times the past 2 days. Patient states approximately 10 days ago he had an episode of some bloody stools with clots however that resolved after couple episodes. He is on Eliquis. He called his GI doctor is scheduled for an upcoming colonoscopy. He had stated the episodes occur when he is exerting himself or walking. Now he notices \"if I only walk a block I get short of breath and developed the substernal chest pain. Described as an achy nature nonradiating. It is relieved with rest and has not been waking him up at night. He has been tolerating p.o. normally normal bladder bowel habits otherwise. He is noted no ankle swelling though he has noted \"slight increase in abdominal girth 3 to 4 pound weight gain over the past week.   I increased the Lasix yesterday today but I have noticed not noticed any decrease in my weight';     pt denies HA, vison changes, diff swallowing, current CP, SOB, Abd pain, F/Ch, N/V, D/Cons or other current systemic complaints    Patient adds my cardiologist is Dr. Kiki Rodriguez;     Social/ [address] reviewed in EMR    EMR Chart Reviewed               Past Medical History:   Diagnosis Date    Arrhythmia 04/12/2020    cardioversion for SVT    Arthritis     HANDS    Basal cell carcinoma     BPH (benign prostatic hyperplasia)     CAD (coronary artery disease)     s/p PTCA '92    Calculus of kidney     Chronic pain     BACK    Elevated PSA     Heart attack ([facility]     Hypercholesterolemia     Kidney stones     Lumbar foraminal stenosis     Melanoma ([facility]     MVP (mitral valve prolapse)     NSTEMI (non-ST elevated myocardial infarction) ([facility] 03/25/2020    Other ill-defined conditions(799.89)     Prostatitis    Prediabetes     Seasonal allergies     Squamous cell carcinoma     Stroke ([facility]     with left eye visual loss; 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    Systolic murmur     Thyroid disease     HYPOTHYROID    Vision impairment     R EYE, BLOOD CLOT ON RETINA       Past Surgical History:   Procedure Laterality Date    ENDOSCOPY, COLON, DIAGNOSTIC      due 12    HX BACK SURGERY      HX CATARACT REMOVAL Bilateral 11/2021    HX CHOLECYSTECTOMY  1970    HX CORONARY ARTERY BYPASS GRAFT  03/26/2020    x 5, LIMA to LAD, RSVG to Diag-RI-OM, RSVG to RCA    [address] HX HERNIA REPAIR  09/15/2021    HX LITHOTRIPSY      x2    HX ORTHOPAEDIC      elbow - fx right arm age 3 yrs   Jes Boyce CT CARDIOVERSION ELECTIVE ARRHYTHMIA EXTERNAL N/A 4/13/2020    EP CARDIOVERSION performed by Abeba Mercedes MD at [address] CATH LAB         Family History:   Problem Relation Age of Onset    Heart Disease Mother         CHF    Heart Disease Father         CAD    Heart Disease Sister         CAD CABG    Lung Disease Sister     Heart Disease Brother         CAD    Pulmonary Fibrosis Brother     Heart Disease Brother         CAD    Stroke Brother 34        cerebral hemorrhage    Cancer Brother         LUNG    High Cholesterol Daughter     No Known Problems Son     Anesth Problems Neg Hx        Social History     Socioeconomic History    Marital status:      Spouse name: Not on file    Number of children: Not on file    Years of education: Not on file    Highest education level: Not on file   Occupational History    Occupation:      Comment: part time consulting   Tobacco Use    Smoking status: Never Smoker    Smokeless tobacco: Never Used   Vaping Use    Vaping Use: Never used   Substance and Sexual Activity    Alcohol use: No    Drug use: No    Sexual activity: Not on file   Other Topics Concern     Service Not Asked    Blood Transfusions Not Asked    Caffeine Concern Not Asked    Occupational Exposure Not Asked    Hobby Hazards Not Asked    Sleep Concern Not Asked    Stress Concern Not Asked    Weight Concern Not Asked    Special Diet Not Asked    Back Care Not Asked    Exercise Not Asked    Bike Helmet Not Asked   [address] Not Asked    Self-Exams Not Asked   Social History Narrative    Not on file     Social Determinants of Health     Financial Resource Strain:     Difficulty of Paying Living Expenses: Not on file   Food Insecurity:     Worried About Running Out of Food in the Last Year: Not on file    Singh of Food in the Last Year: Not on file   Transportation Needs:     Lack of Transportation (Medical): Not on file    Lack of Transportation (Non-Medical):  Not on file   Physical Activity:     Days of Exercise per Week: Not on file    Minutes of Exercise per Session: Not on file   Stress:     Feeling of Stress : Not on file   Social Connections:     Frequency of Communication with Friends and Family: Not on file    Frequency of Social Gatherings with Friends and Family: Not on file    Attends [employer] Services: Not on file    Active Member of Clubs or Organizations: Not on file    Attends Club or Organization Meetings: Not on file    Marital Status: Not on file   Intimate Partner Violence:     Fear of Current or Ex-Partner: Not on file    Emotionally Abused: Not on file    Physically Abused: Not on file    Sexually Abused: Not on file   Housing Stability:     Unable to Pay for Housing in the Last Year: Not on file    Number of Places Lived in the Last Year: Not on file    Unstable Housing in the Last Year: Not on file         ALLERGIES: Codeine, Pcn [penicillins], and Sulfa (sulfonamide antibiotics)    Review of Systems   Constitutional: Negative for chills and fever. HENT: Negative for drooling, trouble swallowing and voice change. Eyes: Negative for visual disturbance. Respiratory: Positive for chest tightness and shortness of breath. Cardiovascular: Positive for chest pain. Gastrointestinal: Negative for abdominal pain, blood in stool, nausea and vomiting. Genitourinary: Negative for dysuria. Musculoskeletal: Negative for back pain. Skin: Negative for rash. Neurological: Negative for facial asymmetry and speech difficulty. Psychiatric/Behavioral: Negative for confusion. All other systems reviewed and are negative. Vitals:    03/06/22 0217 03/06/22 0404 03/06/22 0405 03/06/22 0600   BP:   125/60    Pulse: 71 88 93 89   Resp:   18    Temp:   97.7 °F (36.5 °C)    SpO2:   94%    Weight:   81.8 kg (180 lb 5.4 oz)             Physical Exam  Vitals and nursing note reviewed. Constitutional:       General: He is not in acute distress. Appearance: He is well-developed. He is obese. He is not ill-appearing, toxic-appearing or diaphoretic. Comments: NAD, AxOx4, speaking in complete sentences       HENT:      Head: Normocephalic and atraumatic. Right Ear: External ear normal.      Left Ear: External ear normal.      Mouth/Throat:      Pharynx: No oropharyngeal exudate. Eyes:      General: No scleral icterus. Right eye: No discharge. Left eye: No discharge. Extraocular Movements: Extraocular movements intact. Conjunctiva/sclera: Conjunctivae normal.      Pupils: Pupils are equal, round, and reactive to light. Cardiovascular:      Rate and Rhythm: Normal rate and regular rhythm. Pulses: Normal pulses. Heart sounds: Normal heart sounds. No murmur heard.   No friction rub. No gallop. Pulmonary:      Effort: Pulmonary effort is normal. No respiratory distress. Breath sounds: Normal breath sounds. No stridor. No wheezing, rhonchi or rales. Chest:      Chest wall: No tenderness. Abdominal:      General: Bowel sounds are normal. There is no distension. Palpations: Abdomen is soft. There is no mass. Tenderness: There is no abdominal tenderness. There is no guarding or rebound. Comments: nttp       Genitourinary:     Comments: Pt denies urinary/ Testicular/ scrotal or penile  complaints  Musculoskeletal:         General: No deformity or signs of injury. Normal range of motion. Cervical back: Normal range of motion and neck supple. Right lower leg: No edema. Left lower leg: No edema. Lymphadenopathy:      Cervical: No cervical adenopathy. Skin:     General: Skin is warm and dry. Capillary Refill: Capillary refill takes less than 2 seconds. Findings: No bruising, erythema, lesion or rash. Neurological:      General: No focal deficit present. Mental Status: He is alert and oriented to person, place, and time. Cranial Nerves: No cranial nerve deficit. Sensory: No sensory deficit. Motor: No weakness. Gait: Gait normal.      Deep Tendon Reflexes: Reflexes normal.          MDM       Procedures    No chief complaint on file. 10:56 AM  The patients presenting problems have been discussed, and they are in agreement with the care plan formulated and outlined with them. I have encouraged them to ask questions as they arise throughout their visit.     MEDICATIONS GIVEN:  Medications - No data to display    LABS REVIEWED:  Labs Reviewed   SAMPLES BEING HELD   CBC WITH AUTOMATED DIFF   TROPONIN-HIGH SENSITIVITY   METABOLIC PANEL, COMPREHENSIVE   NT-PRO BNP   MAGNESIUM       RADIOLOGY RESULTS:  The following have been ordered and reviewed:  _____________________________________________________________________  _____________________________________________________________________    EKG interpretation:   Rhythm: normal sinus rhythm; and regular . Rate (approx.): 100; Axis: normal; P wave: normal; QRS interval: normal ; ST/T wave: normal; Negative acute significant segmental elevations/ compared to study dated 09/13/2021    PROCEDURES:        CONSULTATIONS:       PROGRESS NOTES:      DIAGNOSIS:    No diagnosis found. PLAN:  1-      ED COURSE: The patients hospital course has been uncomplicated. 1:53 PM  Patient  was told results plans for evaluation for admission transferred [facility].  He agree with these plans. [facility] Consult for Admission  2:00 PM    ED Room Number: SER02/02  Patient Name and age:  Ayde Lucas 80 y.o.  male  Working Diagnosis:   1. Chest pain, unspecified type    2. VANN (dyspnea on exertion)    3. Anemia, unspecified type    4.  Pleural effusion        COVID-19 Suspicion:  no  Sepsis present:  no  Reassessment needed: yes  Code Status:  Full Code  Readmission: no  Isolation Requirements:  no  Recommended Level of Care:  telemetry  Department:[facility] - [phone]  Other:  VANN/ CP w/ significant history; 'Bloody BM's 1 weeks ago/ none since (eliquis)

## 2022-03-05 NOTE — ED TRIAGE NOTES
CP/SOB on and off for 2 days with exertion only. Does relieve with rest.  Pt does have a positive cardiac hx.

## 2022-03-05 NOTE — H&P
Hospitalist History and Physical  Wing MD Miguel  Answering service: [phone] from in house phone        Date of Service:  3/5/2022  NAME:  Grazyna Sepulveda  :  1940  MRN:  [id number]  Primary Care Provider: Jose Molina MD    Chief Complaint:   Chief Complaint   Patient presents with    Chest Pain       History of Present Illness:     Grazyna Sepulveda is a 80 y.o. male with past medical history of SVT status post cardioversion, arthritis, BPH and coronary disease status post PTCA 90 who comes in with complaints of shortness of breath, dyspnea exertion and bright red blood per rectum a week ago. Patient is awake alert and oriented. Able to answer my question and follow my request.  Chart reviewed at length. As per collective reports, patient is on Eliquis for A. fib/SVT and was doing well until about a week ago he developed bright red blood per rectum which went on about for about 5 to 10 days and it resolved on its own. He was seen by GI and there was a plan for colonoscopy later this month. He was not noticing any other bleed so he kept taking his Eliquis with his aspirin but since yesterday he has been feeling shortness of breath, lethargy and dyspnea on exertion with some mid epigastric pain without any radiation, without any heaviness or chest pain at rest.  Patient woke up this morning and he was having worsening of the symptoms and felt out of breath on minimal exertion so he came to the ED for evaluation. In the ED he was hemodynamically stable, cardiac enzymes x1 were negative, EKG was unremarkable except for right bundle branch block which is a previous finding as well. His blood work revealed that he had significant anemia with hemoglobin dropping from 14.2-8.4 denoting acute blood loss anemia. He is being admitted to the hospital for further relation management.   He has no ongoing bleeding, no chest pain at rest, no dyspnea at rest, he appears pale but otherwise he is in good spirits. Denies any abdominal pain at the moment, no nausea, vomiting, fever, chills, rigors or night sweats. Denies any other medical complaints. Chart reviewed at length. Review of Systems:  Pertinent positives noted in HPI. All other systems were reviewed and are negative. Past Medical and surgical history:   Past Medical History:   Diagnosis Date    Arrhythmia 04/12/2020    cardioversion for SVT    Arthritis     HANDS    Basal cell carcinoma     BPH (benign prostatic hyperplasia)     CAD (coronary artery disease)     s/p PTCA '92    Calculus of kidney     Chronic pain     BACK    Elevated PSA     Heart attack ([address]     Hypercholesterolemia     Kidney stones     Lumbar foraminal stenosis     Melanoma ([address]     MVP (mitral valve prolapse)     NSTEMI (non-ST elevated myocardial infarction) ([facility] 03/25/2020    Other ill-defined conditions(799.89)     Prostatitis    Prediabetes     Seasonal allergies     Squamous cell carcinoma     Stroke ([facility]     with left eye visual loss; 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    Systolic murmur     Thyroid disease     HYPOTHYROID    Vision impairment     R EYE, BLOOD CLOT ON RETINA      Past Surgical History:   Procedure Laterality Date    ENDOSCOPY, COLON, DIAGNOSTIC      due 12    HX BACK SURGERY      HX CATARACT REMOVAL Bilateral 11/2021    HX CHOLECYSTECTOMY  1970    HX CORONARY ARTERY BYPASS GRAFT  03/26/2020    x 5, LIMA to LAD, RSVG to Diag-RI-OM, RSVG to RCA    [address] HX HERNIA REPAIR  09/15/2021    HX LITHOTRIPSY      x2    HX ORTHOPAEDIC      elbow - fx right arm age 3 yrs    TX CARDIOVERSION ELECTIVE ARRHYTHMIA EXTERNAL N/A 4/13/2020    EP CARDIOVERSION performed by Cole Bolden MD at [address], [facility]  CATH LAB       Home medications:  Prior to Admission medications    Medication Sig Start Date End Date Taking?  Authorizing Provider   furosemide (Lasix) 20 mg tablet Take 20 mg by mouth daily. Yes Other, MD Elvia   ibuprofen (MOTRIN) 800 mg tablet Take 1 Tablet by mouth every six (6) hours as needed for Pain. 9/16/21  Yes Amadou Dowling MD   cholecalciferol (Vitamin D3) (2,000 UNITS /50 MCG) cap capsule Take 2,000 Units by mouth every morning. Yes Provider, Historical   rosuvastatin (CRESTOR) 20 mg tablet Take 1 Tablet by mouth nightly. Replaces atorvastatin 8/2/21  Yes Estella Rodriguez MD   levothyroxine (SYNTHROID) 50 mcg tablet TAKE 1 TABLET BY MOUTH EVERY DAY BEFORE BREAKFAST EXCEPT TAKE 2 TABS ON SATURDAY. 7/27/21  Yes Estella Rodriguez MD   Eliquis 5 mg tablet TAKE 1 TABLET BY MOUTH EVERY 12 HOURS 6/4/21  Yes Maira Grey NP   aspirin 81 mg chewable tablet Take 1 Tab by mouth daily. 4/7/21  Yes Blanquita Angel MD   vitamins  A,C,E-zinc-copper (Ocuvite PreserVision) [phone] unit-mg-unit tab tablet Take 1 Tab by mouth daily. Patient taking differently: Take 1 Tablet by mouth two (2) times a day. 6/29/20  Yes Estella Rodriguez MD   acetaminophen (TYLENOL) 325 mg tablet Take 650 mg by mouth every four (4) hours as needed for Pain. 3/30/20  Yes Bridgette Cash NP   tamsulosin (FLOMAX) 0.4 mg capsule TAKE 1 CAPSULE BY MOUTH EVERY DAY 30 MINUTES AFTER THE SAME MEAL EACH DAY 4/8/19  Yes Provider, Historical   finasteride (PROSCAR) 5 mg tablet Take 5 mg by mouth daily. HS   Yes Provider, Historical       Allergies:   Allergies   Allergen Reactions    Codeine Rash    Pcn [Penicillins] Rash     HAD REACTION WILL IN COLLEGE DEVELOPED A RASH , NO HOSPITAL TREATMENT NEEDED PER PATIENT    Sulfa (Sulfonamide Antibiotics) Other (comments)     confusion       Family history:   Family History   Problem Relation Age of Onset    Heart Disease Mother         CHF    Heart Disease Father         CAD    Heart Disease Sister         CAD CABG    Lung Disease Sister     Heart Disease Brother         CAD    Pulmonary Fibrosis Brother     Heart Disease Brother CAD    Stroke Brother 34        cerebral hemorrhage    Cancer Brother         LUNG    High Cholesterol Daughter     No Known Problems Son     Anesth Problems Neg Hx         SOCIAL HISTORY:  Patient resides at Home. Patient ambulates with out device. Smoking history: reports that he has never smoked. He has never used smokeless tobacco.  Drug History:  reports no history of drug use. Alcohol history:  reports no history of alcohol use. Objective:       Physical Exam:   Visit Vitals  BP (!) 113/52 (BP 1 Location: Right upper arm, BP Patient Position: Sitting)   Pulse 92   Temp 98.1 °F (36.7 °C) Comment: admission vitals   Resp 18   Wt 83.4 kg (183 lb 13.8 oz) Comment: admission    SpO2 97%   BMI 25.64 kg/m²     General appearance: alert, cooperative, no distress, appears stated age, appears pale  Head: Normocephalic, without obvious abnormality, atraumatic  Eyes: negative  Throat: Oral mucosa is moist  Lungs: clear to auscultation bilaterally  Heart: Regular rate and rhythm, 4/6 systolic murmur at the aortic area noted, no rubs  Abdomen: soft, non-tender. Bowel sounds normal. No masses,  no organomegaly  Extremities: extremities normal, atraumatic, no cyanosis or edema  Pulses: 2+ and symmetric  Skin: Skin color, texture, turgor normal. No rashes or lesions  Neurologic: Grossly normal    ECG: Bifascicular block noted    Laboratory and other diagnostic Data Review: All diagnostic labs and studies have been reviewed. CT CHEST WO CONT    Result Date: 3/5/2022  CT CHEST WITHOUT CONTRAST. 3/5/2022 12:42 PM INDICATION: Chest pain for 2 days. History of pleural effusion COMPARISON: CT abdomen pelvis 8/14/2020 TECHNIQUE: CT of the chest was performed without contrast. Coronal and sagittal reconstructions were performed. CT dose reduction was achieved through use of a standardized protocol tailored for this examination and automatic exposure control for dose modulation.  FINDINGS: There are 2 lobules of round atelectasis in the right middle lobe and right lower lobe. A small, chronic, exudative, right pleural effusion is associated. The lungs are otherwise clear. The central airways are patent. No pneumothorax. The heart is enlarged. Post CABG. There is extensive calcification of the native coronary arteries and mild calcification of the graft vessels. Aortic valve calcification is mild to moderate. Bovine arch is a normal variant. The thyroid is atrophic. No thoracic lymphadenopathy. Pulmonary, mediastinal marcella, and splenic calcifications are consistent with old granulomatous disease. Post cholecystectomy. No acute process. Small, chronic, right pleural effusion; with round atelectasis. Patient Vitals for the past 12 hrs:   Temp Pulse Resp BP SpO2   03/05/22 1601 98.1 °F (36.7 °C) 92 18 (!) 113/52 97 %   03/05/22 1430 -- 86 18 122/61 98 %   03/05/22 1350 -- -- -- (!) 112/57 --   03/05/22 1303 -- 76 21 (!) 112/58 97 %   03/05/22 1250 -- 87 25 131/62 99 %   03/05/22 1233 -- -- -- 122/61 --   03/05/22 1220 -- (!) 101 18 (!) 128/56 99 %   03/05/22 1203 -- 88 22 (!) 117/59 100 %   03/05/22 1150 -- 94 19 (!) 124/58 98 %   03/05/22 1133 -- 92 27 (!) 130/51 97 %   03/05/22 1103 97.5 °F (36.4 °C) 93 25 130/74 98 %   03/05/22 1059 -- (!) 101 18 (!) 144/68 97 %       Recent Labs     03/05/22  1100   WBC 7.4   HGB 8.4*   HCT 27.1*        Recent Labs     03/05/22  1100      K 3.5      CO2 27   BUN 27*   CREA 1.08   *   CA 8.3*   MG 2.1     Recent Labs     03/05/22  1100   ALT 18   AP 86   TBILI 0.4   TP 6.6   ALB 3.4*   GLOB 3.2     No results for input(s): INR, PTP, APTT, INREXT in the last 72 hours. No results for input(s): FE, TIBC, PSAT, FERR in the last 72 hours. No results found for: FOL, RBCF   No results for input(s): PH, PCO2, PO2 in the last 72 hours. No results for input(s): CPK, CKNDX, TROIQ in the last 72 hours.     No lab exists for component: CPKMB  Lab Results   Component Value Date/Time    Cholesterol, total 148 12/17/2021 09:15 AM    HDL Cholesterol 65 12/17/2021 09:15 AM    LDL, calculated 69.8 12/17/2021 09:15 AM    Triglyceride 66 12/17/2021 09:15 AM    CHOL/HDL Ratio 2.3 12/17/2021 09:15 AM     Lab Results   Component Value Date/Time    Glucose (POC) 135 (H) 03/30/2020 04:36 PM    Glucose (POC) 147 (H) 03/30/2020 11:37 AM    Glucose (POC) 137 (H) 03/30/2020 07:23 AM    Glucose (POC) 154 (H) 03/29/2020 10:01 PM    Glucose (POC) 164 (H) 03/29/2020 04:27 PM     Lab Results   Component Value Date/Time    Color BROWN 08/14/2020 03:19 AM    Appearance CLOUDY (A) 08/14/2020 03:19 AM    Specific gravity 1.015 08/14/2020 03:19 AM    Specific gravity 1.023 01/08/2019 07:46 PM    pH (UA) 6.5 08/14/2020 03:19 AM    Protein >300 (A) 08/14/2020 03:19 AM    Glucose Negative 08/14/2020 03:19 AM    Ketone TRACE (A) 08/14/2020 03:19 AM    Bilirubin NEGATIVE  03/25/2020 05:56 PM    Urobilinogen 1.0 08/14/2020 03:19 AM    Nitrites Positive (A) 08/14/2020 03:19 AM    Leukocyte Esterase SMALL (A) 08/14/2020 03:19 AM    Epithelial cells FEW 08/14/2020 03:19 AM    Bacteria 1+ (A) 08/14/2020 03:19 AM    WBC 5-10 08/14/2020 03:19 AM    RBC >100 (H) 08/14/2020 03:19 AM       Assessment:   Given the patient's current clinical presentation, I have a high level of concern for decompensation if discharged from the emergency department. Complex decision making was performed, which includes reviewing the patient's available past medical records, laboratory results, and x-ray films. My assessment of this patient's clinical condition and my plan of care is as follows.       Active Problems:    Chest pain (3/5/2022)      VANN (dyspnea on exertion) (3/5/2022)      Bloody stool (3/5/2022)        Plan:     -Midepigastric pain, dyspnea on exertion, acute blood loss anemia and bloody BM 1 week ago  Admit to telemetry  Symptoms likely due to ongoing anemia complicated with aortic stenosis  Patient had bright red blood per rectum for a few days but stopped but he continued taking his Eliquis, hemoglobin drop of about 6 g from 14-8.4  Hold Eliquis, aspirin  Trend H&H, transfuse as needed: No active bleeding-check fecal occult blood  Start Protonix 40 mg p.o. twice daily  Soft diet  GI consult-plans for colonoscopy on 3/14/2022 noted. May need colonoscopy and EGD during this hospitalization    -Paroxysmal atrial fibrillation, currently normal sinus rhythm  Hold Eliquis and aspirin  Not on any rate control medications  May need IV fluids as his blood pressure on the softer side    -Aortic stenosis, mild  Murmur noted  Hold Lasix today    -BPH : continue home medications      Diet: Soft diet  Activity: Out of bed to chair, fall precautions  DVT prophylaxis: SCD  Isolation precautions: None  Consultations: Cardiology and GI  Anticipated disposition: Home with family  Code status: Full Code    Admit to inpatient status       Patient was explained about the risk of admission including and not a complete list including risk of falls,fractures,blood clots,allergic reactions,infections. Patient/family also understands and agrees to the treatment plan including medications and side effect profiles and also understand the risk with radiation while undergoing imaging studies. The patient and the family/friends (after permission given by the patient to discuss) understand this and agree with the admission plan. Signed By: Wing Miguel MD     03/05/22  5:09 PM        Patient's emergency contacts:  Extended Emergency Contact Information  Primary Emergency Contact: Jovanna Sawyer  Address: [address] [place] [address],[address] [facility] [address] [facility] Phone: [phone]  Mobile Phone: [phone]  Relation: Spouse     Please note that this dictation was completed with [email or web address], the [employer] voice recognition software.   Quite often unanticipated grammatical, syntax, homophones, and other interpretive errors are inadvertently transcribed by the computer software. Please disregard these errors. Please excuse any errors that have escaped final proofreading.

## 2022-03-05 NOTE — PROGRESS NOTES
1601 Patient arrived from [facility] ED.   SBAR received by Aleyda Batista RN.     BP (!) 113/52 (BP 1 Location: Right upper arm, BP Patient Position: Sitting)   Pulse 92   Temp 98.1 °F (36.7 °C) Comment: admission vitals  Resp 18   Wt 83.4 kg (183 lb 13.8 oz) Comment: admission   SpO2 97%   BMI 25.64 kg/m²

## 2022-03-06 ENCOUNTER — APPOINTMENT (OUTPATIENT)
Dept: NON INVASIVE DIAGNOSTICS | Age: 82
DRG: 378 | End: 2022-03-06
Attending: INTERNAL MEDICINE
Payer: MEDICARE

## 2022-03-06 PROBLEM — I34.0 NONRHEUMATIC MITRAL VALVE REGURGITATION: Status: ACTIVE | Noted: 2022-03-06

## 2022-03-06 LAB
ANION GAP SERPL CALC-SCNC: 3 MMOL/L (ref 5–15)
BUN SERPL-MCNC: 20 MG/DL (ref 6–20)
BUN/CREAT SERPL: 24 (ref 12–20)
CALCIUM SERPL-MCNC: 8.4 MG/DL (ref 8.5–10.1)
CHLORIDE SERPL-SCNC: 110 MMOL/L (ref 97–108)
CO2 SERPL-SCNC: 27 MMOL/L (ref 21–32)
CREAT SERPL-MCNC: 0.82 MG/DL (ref 0.7–1.3)
ECHO AO ROOT DIAM: 3 CM
ECHO AO ROOT INDEX: 1.49 CM/M2
ECHO AR MAX VEL PISA: 4.4 M/S
ECHO AV AREA PEAK VELOCITY: 1.4 CM2
ECHO AV AREA PEAK VELOCITY: 1.5 CM2
ECHO AV AREA VTI: 1.3 CM2
ECHO AV AREA VTI: 1.5 CM2
ECHO AV MEAN GRADIENT: 12 MMHG
ECHO AV MEAN VELOCITY: 1.7 M/S
ECHO AV PEAK GRADIENT: 18 MMHG
ECHO AV PEAK VELOCITY: 2.1 M/S
ECHO AV REGURGITANT PHT: 417.7 MILLISECOND
ECHO AV VELOCITY RATIO: 0.48
ECHO AV VTI: 44.1 CM
ECHO LA DIAMETER INDEX: 1.94 CM/M2
ECHO LA DIAMETER: 3.9 CM
ECHO LA TO AORTIC ROOT RATIO: 1.3
ECHO LV E' LATERAL VELOCITY: 10 CM/S
ECHO LV E' SEPTAL VELOCITY: 5 CM/S
ECHO LV FRACTIONAL SHORTENING: 42 % (ref 28–44)
ECHO LV INTERNAL DIMENSION DIASTOLE INDEX: 1.89 CM/M2
ECHO LV INTERNAL DIMENSION DIASTOLIC: 3.8 CM (ref 4.2–5.9)
ECHO LV INTERNAL DIMENSION SYSTOLIC INDEX: 1.09 CM/M2
ECHO LV INTERNAL DIMENSION SYSTOLIC: 2.2 CM
ECHO LV IVSD: 0.7 CM (ref 0.6–1)
ECHO LV MASS 2D: 71.9 G (ref 88–224)
ECHO LV MASS INDEX 2D: 35.8 G/M2 (ref 49–115)
ECHO LV POSTERIOR WALL DIASTOLIC: 0.7 CM (ref 0.6–1)
ECHO LV RELATIVE WALL THICKNESS RATIO: 0.37
ECHO LVOT AREA: 3.1 CM2
ECHO LVOT AV VTI INDEX: 0.44
ECHO LVOT DIAM: 2 CM
ECHO LVOT MEAN GRADIENT: 2 MMHG
ECHO LVOT PEAK GRADIENT: 4 MMHG
ECHO LVOT PEAK VELOCITY: 1 M/S
ECHO LVOT STROKE VOLUME INDEX: 30.2 ML/M2
ECHO LVOT SV: 60.6 ML
ECHO LVOT VTI: 19.3 CM
ECHO MV A VELOCITY: 0.59 M/S
ECHO MV AREA PHT: 9.2 CM2
ECHO MV E DECELERATION TIME (DT): 82.7 MS
ECHO MV E VELOCITY: 0.98 M/S
ECHO MV E/A RATIO: 1.66
ECHO MV E/E' LATERAL: 9.8
ECHO MV E/E' RATIO (AVERAGED): 14.7
ECHO MV E/E' SEPTAL: 19.6
ECHO MV PRESSURE HALF TIME (PHT): 24 MS
ECHO MV REGURGITANT PEAK GRADIENT: 67 MMHG
ECHO MV REGURGITANT PEAK VELOCITY: 4.1 M/S
ECHO MV REGURGITANT VTIA: 121.1 CM
ECHO PV MAX VELOCITY: 0.9 M/S
ECHO PV PEAK GRADIENT: 3 MMHG
ECHO RV TAPSE: 1.7 CM (ref 1.5–2)
ECHO TV REGURGITANT MAX VELOCITY: 3.05 M/S
ECHO TV REGURGITANT PEAK GRADIENT: 37 MMHG
FERRITIN SERPL-MCNC: 5 NG/ML (ref 26–388)
GLUCOSE SERPL-MCNC: 103 MG/DL (ref 65–100)
HCT VFR BLD AUTO: 24.2 % (ref 36.6–50.3)
HGB BLD-MCNC: 7.5 G/DL (ref 12.1–17)
HISTORY CHECKED?,CKHIST: NORMAL
IRON SATN MFR SERPL: 4 % (ref 20–50)
IRON SERPL-MCNC: 16 UG/DL (ref 35–150)
POTASSIUM SERPL-SCNC: 3.7 MMOL/L (ref 3.5–5.1)
SODIUM SERPL-SCNC: 140 MMOL/L (ref 136–145)
TIBC SERPL-MCNC: 362 UG/DL (ref 250–450)
TSH SERPL DL<=0.05 MIU/L-ACNC: 2.03 UIU/ML (ref 0.36–3.74)

## 2022-03-06 PROCEDURE — 82728 ASSAY OF FERRITIN: CPT

## 2022-03-06 PROCEDURE — 86900 BLOOD TYPING SEROLOGIC ABO: CPT

## 2022-03-06 PROCEDURE — 86923 COMPATIBILITY TEST ELECTRIC: CPT

## 2022-03-06 PROCEDURE — 85018 HEMOGLOBIN: CPT

## 2022-03-06 PROCEDURE — 83540 ASSAY OF IRON: CPT

## 2022-03-06 PROCEDURE — 30243N1 TRANSFUSION OF NONAUTOLOGOUS RED BLOOD CELLS INTO CENTRAL VEIN, PERCUTANEOUS APPROACH: ICD-10-PCS | Performed by: INTERNAL MEDICINE

## 2022-03-06 PROCEDURE — 74011250636 HC RX REV CODE- 250/636: Performed by: INTERNAL MEDICINE

## 2022-03-06 PROCEDURE — 80048 BASIC METABOLIC PNL TOTAL CA: CPT

## 2022-03-06 PROCEDURE — C9113 INJ PANTOPRAZOLE SODIUM, VIA: HCPCS | Performed by: INTERNAL MEDICINE

## 2022-03-06 PROCEDURE — 65660000000 HC RM CCU STEPDOWN

## 2022-03-06 PROCEDURE — 84443 ASSAY THYROID STIM HORMONE: CPT

## 2022-03-06 PROCEDURE — 74011000250 HC RX REV CODE- 250: Performed by: INTERNAL MEDICINE

## 2022-03-06 PROCEDURE — 99222 1ST HOSP IP/OBS MODERATE 55: CPT | Performed by: SPECIALIST

## 2022-03-06 PROCEDURE — 36430 TRANSFUSION BLD/BLD COMPNT: CPT

## 2022-03-06 PROCEDURE — 93306 TTE W/DOPPLER COMPLETE: CPT

## 2022-03-06 PROCEDURE — 74011250637 HC RX REV CODE- 250/637: Performed by: SPECIALIST

## 2022-03-06 PROCEDURE — P9016 RBC LEUKOCYTES REDUCED: HCPCS

## 2022-03-06 PROCEDURE — 36415 COLL VENOUS BLD VENIPUNCTURE: CPT

## 2022-03-06 PROCEDURE — 93306 TTE W/DOPPLER COMPLETE: CPT | Performed by: SPECIALIST

## 2022-03-06 PROCEDURE — 74011250637 HC RX REV CODE- 250/637: Performed by: INTERNAL MEDICINE

## 2022-03-06 RX ORDER — METOPROLOL SUCCINATE 25 MG/1
25 TABLET, EXTENDED RELEASE ORAL DAILY
Status: DISCONTINUED | OUTPATIENT
Start: 2022-03-06 | End: 2022-03-09 | Stop reason: HOSPADM

## 2022-03-06 RX ORDER — PANTOPRAZOLE SODIUM 40 MG/10ML
40 INJECTION, POWDER, LYOPHILIZED, FOR SOLUTION INTRAVENOUS EVERY 12 HOURS
Status: DISCONTINUED | OUTPATIENT
Start: 2022-03-06 | End: 2022-03-09 | Stop reason: HOSPADM

## 2022-03-06 RX ORDER — SODIUM CHLORIDE 0.9 % (FLUSH) 0.9 %
10 SYRINGE (ML) INJECTION EVERY 12 HOURS
Status: DISCONTINUED | OUTPATIENT
Start: 2022-03-06 | End: 2022-03-09 | Stop reason: HOSPADM

## 2022-03-06 RX ORDER — SODIUM CHLORIDE 9 MG/ML
250 INJECTION, SOLUTION INTRAVENOUS AS NEEDED
Status: DISCONTINUED | OUTPATIENT
Start: 2022-03-06 | End: 2022-03-09 | Stop reason: HOSPADM

## 2022-03-06 RX ADMIN — METOPROLOL SUCCINATE 25 MG: 25 TABLET, EXTENDED RELEASE ORAL at 16:37

## 2022-03-06 RX ADMIN — PANTOPRAZOLE SODIUM 40 MG: 40 INJECTION, POWDER, FOR SOLUTION INTRAVENOUS at 23:22

## 2022-03-06 RX ADMIN — PANTOPRAZOLE SODIUM 40 MG: 40 INJECTION, POWDER, FOR SOLUTION INTRAVENOUS at 11:00

## 2022-03-06 RX ADMIN — FINASTERIDE 5 MG: 5 TABLET, FILM COATED ORAL at 09:03

## 2022-03-06 RX ADMIN — PANTOPRAZOLE SODIUM 40 MG: 40 TABLET, DELAYED RELEASE ORAL at 07:12

## 2022-03-06 RX ADMIN — ROSUVASTATIN 20 MG: 10 TABLET, FILM COATED ORAL at 21:26

## 2022-03-06 RX ADMIN — SODIUM CHLORIDE, PRESERVATIVE FREE 10 ML: 5 INJECTION INTRAVENOUS at 22:59

## 2022-03-06 RX ADMIN — TAMSULOSIN HYDROCHLORIDE 0.4 MG: 0.4 CAPSULE ORAL at 09:03

## 2022-03-06 RX ADMIN — SODIUM CHLORIDE, PRESERVATIVE FREE 10 ML: 5 INJECTION INTRAVENOUS at 06:00

## 2022-03-06 RX ADMIN — LEVOTHYROXINE SODIUM 50 MCG: 0.05 TABLET ORAL at 07:12

## 2022-03-06 NOTE — PROGRESS NOTES
[address] [facility]  Hospitalist Group                                                                                          Hospitalist Progress Note  Katia Del Rio MD  Answering service: [phone] from in house phone        Date of Service:  3/6/2022  NAME:  Mateo Chaparro  :  1940  MRN:  [id number]    Admission Summary:   Mateo Chaparro is a 80 y.o. male with past medical history of SVT status post cardioversion, arthritis, BPH and coronary disease status post PTCA who comes in with complaints of shortness of breath, dyspnea exertion and bright red blood per rectum a week ago. Patient is awake alert and oriented. Able to answer my question and follow my request.  Chart reviewed at length. As per collective reports, patient is on Eliquis for A. fib/SVT and was doing well until about a week ago he developed bright red blood per rectum which went on about for about 5 to 10 days and it resolved on its own. He was seen by GI and there was a plan for colonoscopy later this month. He was not noticing any other bleed so he kept taking his Eliquis with his aspirin but since yesterday he has been feeling shortness of breath, lethargy and dyspnea on exertion with some mid epigastric pain without any radiation, without any heaviness or chest pain at rest.  Patient woke up this morning and he was having worsening of the symptoms and felt out of breath on minimal exertion so he came to the ED for evaluation. In the ED he was hemodynamically stable, cardiac enzymes x1 were negative, EKG was unremarkable except for right bundle branch block which is a previous finding as well. His blood work revealed that he had significant anemia with hemoglobin dropping from 14.2-8.4 denoting acute blood loss anemia. He is being admitted to the hospital for further relation management.   He has no ongoing bleeding, no chest pain at rest, no dyspnea at rest, he appears pale but otherwise he is in good spirits. Denies any abdominal pain at the moment, no nausea, vomiting, fever, chills, rigors or night sweats. Denies any other medical complaints. Interval history / Subjective:   No acute overnight events  BM yesterday w/o blood or melena  Reports VANN but no cp or sob at rest  Wife at bedside  Last Eliquis yesterday AM     Assessment & Plan:     Symptomatic iron deficiency anemia 2/2 subacute GI bleed  CLD today, NPO at MN in case of procedure  GI to see  Last BRBPR was 1 week PTA, subsequent BMs have been wnl  Will transfuse for goal hgb>8 given CAD and cardiac symptoms  Hold eliquis, last dose yesterday AM  PPI  Drop in hgb today d/t dilution 2/2 IVF  Monitor hgb     Paroxysmal atrial fibrillation, currently normal sinus rhythm  Hold Eliquis and aspirin  Not on any rate control medications  Cardiology to see     CAD, Aortic stenosis  Hold home Lasix for now, unclear why on this  Statin     BPH : flomax    Hypothyroidism  Cont LT4; TSH wnl 12/2021    Code status: Full Code  Prophylaxis: SCD's  Care Plan discussed with: Patient/Family  Anticipated Disposition: Home w/Family  Anticipated Discharge: Greater than 48 hours     Hospital Problems  Date Reviewed: 12/22/2021          Codes Class Noted POA    Chest pain ICD-10-CM: R07.9  ICD-9-CM: 786.50  3/5/2022 Unknown        VANN (dyspnea on exertion) ICD-10-CM: R06.00  ICD-9-CM: 786.09  3/5/2022 Unknown        Bloody stool ICD-10-CM: K92.1  ICD-9-CM: 578.1  3/5/2022 Unknown                Review of Systems:   Pertinent items are noted in HPI. No fever/chills, poor appetite, cough, sputum, N/V, D/C, CP, or abd pain    Vital Signs:    Last 24hrs VS reviewed since prior progress note.  Most recent are:  Visit Vitals  BP (!) 118/46 (BP 1 Location: Right upper arm, BP Patient Position: At rest;Lying)   Pulse 82   Temp 98.1 °F (36.7 °C)   Resp 18   Wt 81.8 kg (180 lb 5.4 oz)   SpO2 99%   BMI 25.15 kg/m²         Intake/Output Summary (Last 24 hours) at 3/6/2022 1107  Last data filed at 3/6/2022 0749  Gross per 24 hour   Intake 240 ml   Output 1300 ml   Net -1060 ml      Physical Examination:     I had a face to face encounter with this patient and independently examined them on 3/6/2022 as outlined below:    General: Alert, cooperative, no acute distress    EENT:  EOMI. Anicteric sclerae. MMM  Resp:  CTA bilaterally, no wheezing or rales. No accessory muscle use  CV:  RRR  GI:  Soft, Non distended, Non tender  Neurologic:  Alert and oriented, normal speech, LOUIS  Extremities: No edema or cyanosis  Psych:   Not anxious or agitated  Skin:  No rashes. No jaundice    Data Review:    I personally reviewed labs and imaging results    Labs:     Recent Labs     03/06/22  0413 03/05/22  1100   WBC  --  7.4   HGB 7.5* 8.4*   HCT 24.2* 27.1*   PLT  --  247     Recent Labs     03/06/22  0413 03/05/22  1100    143   K 3.7 3.5   * 106   CO2 27 27   BUN 20 27*   CREA 0.82 1.08   * 137*   CA 8.4* 8.3*   MG  --  2.1     Recent Labs     03/05/22  1100   ALT 18   AP 86   TBILI 0.4   TP 6.6   ALB 3.4*   GLOB 3.2     No results for input(s): INR, PTP, APTT, INREXT in the last 72 hours. Recent Labs     03/06/22  0413   TIBC 362   PSAT 4*   FERR 5*      No results found for: FOL, RBCF   No results for input(s): PH, PCO2, PO2 in the last 72 hours. No results for input(s): CPK, CKNDX, TROIQ in the last 72 hours.     No lab exists for component: CPKMB  Lab Results   Component Value Date/Time    Cholesterol, total 148 12/17/2021 09:15 AM    HDL Cholesterol 65 12/17/2021 09:15 AM    LDL, calculated 69.8 12/17/2021 09:15 AM    Triglyceride 66 12/17/2021 09:15 AM    CHOL/HDL Ratio 2.3 12/17/2021 09:15 AM     Lab Results   Component Value Date/Time    Glucose (POC) 135 (H) 03/30/2020 04:36 PM    Glucose (POC) 147 (H) 03/30/2020 11:37 AM    Glucose (POC) 137 (H) 03/30/2020 07:23 AM    Glucose (POC) 154 (H) 03/29/2020 10:01 PM    Glucose (POC) 164 (H) 03/29/2020 04:27 PM     Lab Results   Component Value Date/Time    Color BROWN 08/14/2020 03:19 AM    Appearance CLOUDY (A) 08/14/2020 03:19 AM    Specific gravity 1.015 08/14/2020 03:19 AM    Specific gravity 1.023 01/08/2019 07:46 PM    pH (UA) 6.5 08/14/2020 03:19 AM    Protein >300 (A) 08/14/2020 03:19 AM    Glucose Negative 08/14/2020 03:19 AM    Ketone TRACE (A) 08/14/2020 03:19 AM    Bilirubin NEGATIVE  03/25/2020 05:56 PM    Urobilinogen 1.0 08/14/2020 03:19 AM    Nitrites Positive (A) 08/14/2020 03:19 AM    Leukocyte Esterase SMALL (A) 08/14/2020 03:19 AM    Epithelial cells FEW 08/14/2020 03:19 AM    Bacteria 1+ (A) 08/14/2020 03:19 AM    WBC 5-10 08/14/2020 03:19 AM    RBC >100 (H) 08/14/2020 03:19 AM         Medications Reviewed:     Current Facility-Administered Medications   Medication Dose Route Frequency    0.9% sodium chloride infusion 250 mL  250 mL IntraVENous PRN    pantoprazole (PROTONIX) injection 40 mg  40 mg IntraVENous Q12H    And    sodium chloride (NS) flush 10 mL  10 mL IntraVENous Q12H    sodium chloride (NS) flush 5-40 mL  5-40 mL IntraVENous Q8H    sodium chloride (NS) flush 5-40 mL  5-40 mL IntraVENous PRN    acetaminophen (TYLENOL) tablet 650 mg  650 mg Oral Q6H PRN    Or    acetaminophen (TYLENOL) suppository 650 mg  650 mg Rectal Q6H PRN    polyethylene glycol (MIRALAX) packet 17 g  17 g Oral DAILY PRN    ondansetron (ZOFRAN ODT) tablet 4 mg  4 mg Oral Q8H PRN    Or    ondansetron (ZOFRAN) injection 4 mg  4 mg IntraVENous Q6H PRN    finasteride (PROSCAR) tablet 5 mg  5 mg Oral DAILY    levothyroxine (SYNTHROID) tablet 50 mcg  50 mcg Oral ACB    rosuvastatin (CRESTOR) tablet 20 mg  20 mg Oral QHS    tamsulosin (FLOMAX) capsule 0.4 mg  0.4 mg Oral DAILY    [START ON 3/12/2022] levothyroxine (SYNTHROID) tablet 50 mcg  50 mcg Oral Q7D     ______________________________________________________________________  EXPECTED LENGTH OF STAY: - - -  ACTUAL LENGTH OF STAY:          1                 Criselda STRANGE Jagjit Mckinley MD

## 2022-03-06 NOTE — CONSULTS
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    BPH with urinary obstruction N40.1, N13.8    Advance directive discussed with patient Z71.89    Lumbar disc disease M51.9    Lumbar foraminal stenosis M48.061    IFG (impaired fasting glucose) R73.01    NSTEMI (non-ST elevated myocardial infarction) ([facility]) I21.4    Hx of CABG Z95.1    Dizziness R42    Sustained SVT ([facility]) I47.1    RBBB I45.10    Peripheral vascular disease ([facility]) I73.9    Chest pain R07.9    VANN (dyspnea on exertion) R06.00    Bloody stool K92.1    Nonrheumatic mitral valve regurgitation I34.0       Recommendations:    Repeat echo, add low dose beta blocker to assist in maintenance of sinus rhythm. Eliquis is being held, transfusion in the works. I suspect his chest pressure and SOB yesterday was due to anemia, not progression of CAD. Will not order stress test at this point but can be considered in future if indicated. Aldo Jack MD  [phone]  Castro Arcos is a 80 y.o. male who presented 3/5/2022 with complaints of shortness of breath and chest pain. The symptoms began approximately 1 day ago. He has a history of cardiac disease including CAD, CABG and atrial fib. He had CABG and AF in 2019, known MR felt to be moderate by echo 10/21, was on Eliquis as prophylaxis. Examination by the attending physician suggested the possibility of anemia due to lower GI bleed. At present the patient has slightly improved since initial presentation.   Therapy thus far has included bedrest. Cardiology has been consulted to assist in the management of this patient.     Current Facility-Administered Medications   Medication Dose Route Frequency    0.9% sodium chloride infusion 250 mL  250 mL IntraVENous PRN    pantoprazole (PROTONIX) injection 40 mg  40 mg IntraVENous Q12H    And    sodium chloride (NS) flush 10 mL  10 mL IntraVENous Q12H    metoprolol succinate (TOPROL-XL) XL tablet 25 mg  25 mg Oral DAILY    sodium chloride (NS) flush 5-40 mL  5-40 mL IntraVENous Q8H    sodium chloride (NS) flush 5-40 mL  5-40 mL IntraVENous PRN    acetaminophen (TYLENOL) tablet 650 mg  650 mg Oral Q6H PRN    Or    acetaminophen (TYLENOL) suppository 650 mg  650 mg Rectal Q6H PRN    polyethylene glycol (MIRALAX) packet 17 g  17 g Oral DAILY PRN    ondansetron (ZOFRAN ODT) tablet 4 mg  4 mg Oral Q8H PRN    Or    ondansetron (ZOFRAN) injection 4 mg  4 mg IntraVENous Q6H PRN    finasteride (PROSCAR) tablet 5 mg  5 mg Oral DAILY    levothyroxine (SYNTHROID) tablet 50 mcg  50 mcg Oral ACB    rosuvastatin (CRESTOR) tablet 20 mg  20 mg Oral QHS    tamsulosin (FLOMAX) capsule 0.4 mg  0.4 mg Oral DAILY    [START ON 3/12/2022] levothyroxine (SYNTHROID) tablet 50 mcg  50 mcg Oral Q7D     Past Medical History:   Diagnosis Date    Arrhythmia 04/12/2020    cardioversion for SVT    Arthritis     HANDS    Basal cell carcinoma     BPH (benign prostatic hyperplasia)     CAD (coronary artery disease)     s/p PTCA '92    Calculus of kidney     Chronic pain     BACK    Elevated PSA     Heart attack ([address]     Hypercholesterolemia     Kidney stones     Lumbar foraminal stenosis     Melanoma ([address]     MVP (mitral valve prolapse)     NSTEMI (non-ST elevated myocardial infarction) ([address] 03/25/2020    Other ill-defined conditions(799.89)     Prostatitis    Prediabetes     Seasonal allergies     Squamous cell carcinoma     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; 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    Systolic murmur  Thyroid disease     HYPOTHYROID    Vision impairment     R EYE, BLOOD CLOT ON RETINA     Patient Active Problem List   Diagnosis Code    Mixed hyperlipidemia E78.2    Essential hypertension, benign I10    Coronary artery disease involving native coronary artery of native heart without angina pectoris I25.10    Calculus of kidney N20.0    Insomnia, unspecified G47.00    Unspecified hearing loss H91.90    Macular degeneration H35.30    Skin cancer C44.90    Vitamin D deficiency E55.9    Hypothyroidism, acquired, 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    BPH with urinary obstruction N40.1, N13.8    Advance directive discussed with patient Z70.80    Lumbar disc disease M51.9    Lumbar foraminal stenosis M48.061    IFG (impaired fasting glucose) R73.01    NSTEMI (non-ST elevated myocardial infarction) ([facility]) I21.4    Hx of CABG Z95.1    Dizziness R42    Sustained SVT ([facility]) I47.1    RBBB I45.10    Peripheral vascular disease ([facility]) I73.9    Chest pain R07.9    VANN (dyspnea on exertion) R06.00    Bloody stool K92.1    Nonrheumatic mitral valve regurgitation I34.0     Allergies   Allergen Reactions    Codeine Rash    Pcn [Penicillins] Rash     HAD REACTION WILL IN [facility] DEVELOPED A RASH , NO HOSPITAL TREATMENT NEEDED PER PATIENT    Sulfa (Sulfonamide Antibiotics) Other (comments)     confusion     Social History     Tobacco Use    Smoking status: Never Smoker    Smokeless tobacco: Never Used   Vaping Use    Vaping Use: Never used   Substance Use Topics    Alcohol use: No    Drug use: No     Family History   Problem Relation Age of Onset    Heart Disease Mother         CHF    Heart Disease Father         CAD    Heart Disease Sister         CAD CABG    Lung Disease Sister     Heart Disease Brother         CAD    Pulmonary Fibrosis Brother     Heart Disease Brother         CAD    Stroke Brother 34        cerebral hemorrhage    Cancer Brother         LUNG    High Cholesterol Daughter     No Known Problems Son    [facility] Anesth Problems Neg Hx        Review of Symptoms:  A comprehensive review of systems was negative except for that written in the HPI. Objective:      Visit Vitals  BP (!) 140/52 (BP 1 Location: Right upper arm, BP Patient Position: At rest;Lying)   Pulse 99   Temp 98.1 °F (36.7 °C)   Resp 18   Wt 180 lb 5.4 oz (81.8 kg)   SpO2 97%   BMI 25.15 kg/m²      Physical Exam    Visit Vitals  BP (!) 140/52 (BP 1 Location: Right upper arm, BP Patient Position: At rest;Lying)   Pulse 99   Temp 98.1 °F (36.7 °C)   Resp 18   Wt 180 lb 5.4 oz (81.8 kg)   SpO2 97%   BMI 25.15 kg/m²     General Appearance:  Well developed, well nourished,alert and oriented x 3,  individual in no acute distress. Ears/Nose/Mouth/Throat:   Hearing grossly normal.         Neck: Supple. Chest:   Lungs clear to auscultation bilaterally. Cardiovascular:  Regular rate and rhythm, S1, S2 normal, 3/6 murmur. Abdomen:   Soft, non-tender, bowel sounds are active. Extremities: No edema bilaterally. Skin: Warm and dry.                Cardiographics    Telemetry: normal sinus rhythm  ECG: normal sinus rhythm, RBBB  Echocardiogram: Not done    Labs:   Recent Results (from the past 24 hour(s))   COVID-19 RAPID TEST    Collection Time: 03/05/22  2:43 PM   Result Value Ref Range    Specimen source Nasopharyngeal      COVID-19 rapid test Not detected NOTD     OCCULT BLOOD, STOOL    Collection Time: 03/05/22  9:13 PM   Result Value Ref Range    Occult blood, stool Negative NEG     HGB & HCT    Collection Time: 03/06/22  4:13 AM   Result Value Ref Range    HGB 7.5 (L) 12.1 - 17.0 g/dL    HCT 24.2 (L) 36.6 - 50.3 %   FERRITIN    Collection Time: 03/06/22  4:13 AM   Result Value Ref Range    Ferritin 5 (L) 26 - 388 NG/ML   IRON PROFILE    Collection Time: 03/06/22  4:13 AM   Result Value Ref Range    Iron 16 (L) 35 - 150 ug/dL    TIBC 362 250 - 450 ug/dL    Iron % saturation 4 (L) 20 - 50 %   METABOLIC PANEL, BASIC    Collection Time: 03/06/22  4:13 AM   Result Value Ref Range    Sodium 140 136 - 145 mmol/L    Potassium 3.7 3.5 - 5.1 mmol/L    Chloride 110 (H) 97 - 108 mmol/L    CO2 27 21 - 32 mmol/L    Anion gap 3 (L) 5 - 15 mmol/L    Glucose 103 (H) 65 - 100 mg/dL    BUN 20 6 - 20 MG/DL    Creatinine 0.82 0.70 - 1.30 MG/DL    BUN/Creatinine ratio 24 (H) 12 - 20      GFR est AA >60 >60 ml/min/1.73m2    GFR est non-AA >60 >60 ml/min/1.73m2    Calcium 8.4 (L) 8.5 - 10.1 MG/DL   TSH 3RD GENERATION    Collection Time: 03/06/22  4:13 AM   Result Value Ref Range    TSH 2.03 0.36 - 3.74 uIU/mL   RBC, ALLOCATE    Collection Time: 03/06/22 10:45 AM   Result Value Ref Range    HISTORY CHECKED?  Historical check performed        Kayce Wright MD

## 2022-03-07 LAB
ABO + RH BLD: NORMAL
ANION GAP SERPL CALC-SCNC: 0 MMOL/L (ref 5–15)
BLD PROD TYP BPU: NORMAL
BLD PROD TYP BPU: NORMAL
BLOOD GROUP ANTIBODIES SERPL: NORMAL
BPU ID: NORMAL
BPU ID: NORMAL
BUN SERPL-MCNC: 16 MG/DL (ref 6–20)
BUN/CREAT SERPL: 18 (ref 12–20)
CALCIUM SERPL-MCNC: 8.3 MG/DL (ref 8.5–10.1)
CHLORIDE SERPL-SCNC: 110 MMOL/L (ref 97–108)
CO2 SERPL-SCNC: 30 MMOL/L (ref 21–32)
CREAT SERPL-MCNC: 0.9 MG/DL (ref 0.7–1.3)
CROSSMATCH RESULT,%XM: NORMAL
CROSSMATCH RESULT,%XM: NORMAL
ERYTHROCYTE [DISTWIDTH] IN BLOOD BY AUTOMATED COUNT: 16 % (ref 11.5–14.5)
GLUCOSE SERPL-MCNC: 101 MG/DL (ref 65–100)
HCT VFR BLD AUTO: 27.3 % (ref 36.6–50.3)
HGB BLD-MCNC: 8.6 G/DL (ref 12.1–17)
MAGNESIUM SERPL-MCNC: 2.2 MG/DL (ref 1.6–2.4)
MCH RBC QN AUTO: 26.8 PG (ref 26–34)
MCHC RBC AUTO-ENTMCNC: 31.5 G/DL (ref 30–36.5)
MCV RBC AUTO: 85 FL (ref 80–99)
NRBC # BLD: 0 K/UL (ref 0–0.01)
NRBC BLD-RTO: 0 PER 100 WBC
PLATELET # BLD AUTO: 239 K/UL (ref 150–400)
PMV BLD AUTO: 8.8 FL (ref 8.9–12.9)
POTASSIUM SERPL-SCNC: 3.8 MMOL/L (ref 3.5–5.1)
RBC # BLD AUTO: 3.21 M/UL (ref 4.1–5.7)
SODIUM SERPL-SCNC: 140 MMOL/L (ref 136–145)
SPECIMEN EXP DATE BLD: NORMAL
STATUS OF UNIT,%ST: NORMAL
STATUS OF UNIT,%ST: NORMAL
UNIT DIVISION, %UDIV: 0
UNIT DIVISION, %UDIV: 0
WBC # BLD AUTO: 5.9 K/UL (ref 4.1–11.1)

## 2022-03-07 PROCEDURE — 36415 COLL VENOUS BLD VENIPUNCTURE: CPT

## 2022-03-07 PROCEDURE — 83735 ASSAY OF MAGNESIUM: CPT

## 2022-03-07 PROCEDURE — 80048 BASIC METABOLIC PNL TOTAL CA: CPT

## 2022-03-07 PROCEDURE — 74011250636 HC RX REV CODE- 250/636: Performed by: INTERNAL MEDICINE

## 2022-03-07 PROCEDURE — 85027 COMPLETE CBC AUTOMATED: CPT

## 2022-03-07 PROCEDURE — C9113 INJ PANTOPRAZOLE SODIUM, VIA: HCPCS | Performed by: INTERNAL MEDICINE

## 2022-03-07 PROCEDURE — 74011000250 HC RX REV CODE- 250: Performed by: NURSE PRACTITIONER

## 2022-03-07 PROCEDURE — 74011000250 HC RX REV CODE- 250: Performed by: INTERNAL MEDICINE

## 2022-03-07 PROCEDURE — 99232 SBSQ HOSP IP/OBS MODERATE 35: CPT | Performed by: INTERNAL MEDICINE

## 2022-03-07 PROCEDURE — 74011250637 HC RX REV CODE- 250/637: Performed by: SPECIALIST

## 2022-03-07 PROCEDURE — 74011250637 HC RX REV CODE- 250/637: Performed by: INTERNAL MEDICINE

## 2022-03-07 PROCEDURE — 65660000000 HC RM CCU STEPDOWN

## 2022-03-07 RX ADMIN — SODIUM CHLORIDE, PRESERVATIVE FREE 10 ML: 5 INJECTION INTRAVENOUS at 11:13

## 2022-03-07 RX ADMIN — PANTOPRAZOLE SODIUM 40 MG: 40 INJECTION, POWDER, FOR SOLUTION INTRAVENOUS at 22:03

## 2022-03-07 RX ADMIN — METOPROLOL SUCCINATE 25 MG: 25 TABLET, EXTENDED RELEASE ORAL at 08:50

## 2022-03-07 RX ADMIN — ROSUVASTATIN 20 MG: 10 TABLET, FILM COATED ORAL at 22:02

## 2022-03-07 RX ADMIN — PANTOPRAZOLE SODIUM 40 MG: 40 INJECTION, POWDER, FOR SOLUTION INTRAVENOUS at 11:13

## 2022-03-07 RX ADMIN — SODIUM CHLORIDE, PRESERVATIVE FREE 10 ML: 5 INJECTION INTRAVENOUS at 16:35

## 2022-03-07 RX ADMIN — SODIUM CHLORIDE, PRESERVATIVE FREE 10 ML: 5 INJECTION INTRAVENOUS at 06:44

## 2022-03-07 RX ADMIN — SODIUM CHLORIDE, PRESERVATIVE FREE 10 ML: 5 INJECTION INTRAVENOUS at 22:12

## 2022-03-07 RX ADMIN — POLYETHYLENE GLYCOL 3350, SODIUM SULFATE ANHYDROUS, SODIUM BICARBONATE, SODIUM CHLORIDE, POTASSIUM CHLORIDE 4000 ML: 236; 22.74; 6.74; 5.86; 2.97 POWDER, FOR SOLUTION ORAL at 16:35

## 2022-03-07 RX ADMIN — TAMSULOSIN HYDROCHLORIDE 0.4 MG: 0.4 CAPSULE ORAL at 08:50

## 2022-03-07 RX ADMIN — FINASTERIDE 5 MG: 5 TABLET, FILM COATED ORAL at 08:50

## 2022-03-07 RX ADMIN — LEVOTHYROXINE SODIUM 50 MCG: 0.05 TABLET ORAL at 06:43

## 2022-03-07 NOTE — CONSULTS
[address]   [facility] [address], [address]  [phone]                     GI CONSULTATION NOTE      NAME:  Natalie Thomason   :   1940   MRN:   [id number]     Consult Date: 3/7/2022     Chief Complaint: gi bleed, anemia    History of Present Illness:  Patient is a 80 y.o. who is seen in consultation at the request of Dr. Frannie Singh for the above mentioned problems. Mr. Wali Gamez has a past medical history significant for hypothyroidism, CABG x5, CAD w/PTCA, afib (on Eliquis), SVT s/p cardioversion (2020), BPH, and non-rheumatic MVR. He presented to [facility] ED with worsening VANN and chest pain. Additionally had BRBPR 10 days ago. He was scheduled for outpatient colonoscopy with Dr. Shannon Zelaya on (3/14). Hgb in ED was initially 8.4,  dropped from 13.6 in (). He received 2 units of PRBC's for hgb 7.5 on 3/6. Last Eliquis dose was on Saturday morning (3/5). Reports changes in bowel habits alternating loose/formed over the past few months with LLQ \"rumbling\" not associated with eating or bowel movements. He currently denies abdominal pain, nausea, or dyspepsia. Had a formed brown movement this morning.         PMH:  Past Medical History:   Diagnosis Date    Arrhythmia 2020    cardioversion for SVT    Arthritis     HANDS    Basal cell carcinoma     BPH (benign prostatic hyperplasia)     CAD (coronary artery disease)     s/p PTCA     Calculus of kidney     Chronic pain     BACK    Elevated PSA     Heart attack ([address]     Hypercholesterolemia     Kidney stones     Lumbar foraminal stenosis     Melanoma ([address]     MVP (mitral valve prolapse)     NSTEMI (non-ST elevated myocardial infarction) ([address] 2020    Other ill-defined conditions(799.89)     Prostatitis    Prediabetes     Seasonal allergies     Squamous cell carcinoma     Stroke ([address]     with left eye visual loss; 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    Systolic murmur     Thyroid disease HYPOTHYROID    Vision impairment     R EYE, BLOOD CLOT ON RETINA       PSH:  Past Surgical History:   Procedure Laterality Date    ENDOSCOPY, COLON, DIAGNOSTIC      due 12    HX BACK SURGERY      HX CATARACT REMOVAL Bilateral 11/2021    HX CHOLECYSTECTOMY  1970    HX CORONARY ARTERY BYPASS GRAFT  03/26/2020    x 5, LIMA to LAD, RSVG to Diag-RI-OM, RSVG to RCA    [address] HX HERNIA REPAIR  09/15/2021    HX LITHOTRIPSY      x2    HX ORTHOPAEDIC      elbow - fx right arm age 3 yrs    [place] CARDIOVERSION ELECTIVE ARRHYTHMIA EXTERNAL N/A 4/13/2020    EP CARDIOVERSION performed by Imelda Jackson MD at [address], [facility]  CATH LAB       Allergies: Allergies   Allergen Reactions    Codeine Rash    Pcn [Penicillins] Rash     HAD REACTION WILL IN [facility] DEVELOPED A RASH , NO HOSPITAL TREATMENT NEEDED PER PATIENT    Sulfa (Sulfonamide Antibiotics) Other (comments)     confusion       Home Medications:  Prior to Admission Medications   Prescriptions Last Dose Informant Patient Reported? Taking? Eliquis 5 mg tablet 3/4/2022 at Unknown time  No Yes   Sig: TAKE 1 TABLET BY MOUTH EVERY 12 HOURS   acetaminophen (TYLENOL) 325 mg tablet 3/4/2022 at Unknown time  Yes Yes   Sig: Take 650 mg by mouth every four (4) hours as needed for Pain. aspirin 81 mg chewable tablet 3/4/2022 at Unknown time  No Yes   Sig: Take 1 Tab by mouth daily. cholecalciferol (Vitamin D3) (2,000 UNITS /50 MCG) cap capsule 3/4/2022 at Unknown time  Yes Yes   Sig: Take 2,000 Units by mouth every morning. finasteride (PROSCAR) 5 mg tablet 3/4/2022 at Unknown time  Yes Yes   Sig: Take 5 mg by mouth daily. HS   furosemide (Lasix) 20 mg tablet 3/4/2022 at Unknown time  Yes Yes   Sig: Take 20 mg by mouth daily.    ibuprofen (MOTRIN) 800 mg tablet 3/4/2022 at Unknown time  No Yes   Sig: Take 1 Tablet by mouth every six (6) hours as needed for Pain.   levothyroxine (SYNTHROID) 50 mcg tablet 3/4/2022 at Unknown time  No Yes   Sig: TAKE 1 TABLET BY MOUTH EVERY DAY BEFORE BREAKFAST EXCEPT TAKE 2 TABS ON SATURDAY. rosuvastatin (CRESTOR) 20 mg tablet 3/4/2022 at Unknown time  No Yes   Sig: Take 1 Tablet by mouth nightly. Replaces atorvastatin   tamsulosin (FLOMAX) 0.4 mg capsule 3/4/2022 at Unknown time  Yes Yes   Sig: TAKE 1 CAPSULE BY MOUTH EVERY DAY 30 MINUTES AFTER THE SAME MEAL EACH DAY   vitamins  A,C,E-zinc-copper (Ocuvite PreserVision) 3939-035-182 unit-mg-unit tab tablet 3/4/2022 at Unknown time  No Yes   Sig: Take 1 Tab by mouth daily. Patient taking differently: Take 1 Tablet by mouth two (2) times a day.       Facility-Administered Medications: None       Hospital Medications:  Current Facility-Administered Medications   Medication Dose Route Frequency    peg 3350-electrolytes (COLYTE) 4000 mL  4,000 mL Oral ONCE    0.9% sodium chloride infusion 250 mL  250 mL IntraVENous PRN    pantoprazole (PROTONIX) injection 40 mg  40 mg IntraVENous Q12H    And    sodium chloride (NS) flush 10 mL  10 mL IntraVENous Q12H    metoprolol succinate (TOPROL-XL) XL tablet 25 mg  25 mg Oral DAILY    sodium chloride (NS) flush 5-40 mL  5-40 mL IntraVENous Q8H    sodium chloride (NS) flush 5-40 mL  5-40 mL IntraVENous PRN    acetaminophen (TYLENOL) tablet 650 mg  650 mg Oral Q6H PRN    Or    acetaminophen (TYLENOL) suppository 650 mg  650 mg Rectal Q6H PRN    polyethylene glycol (MIRALAX) packet 17 g  17 g Oral DAILY PRN    ondansetron (ZOFRAN ODT) tablet 4 mg  4 mg Oral Q8H PRN    Or    ondansetron (ZOFRAN) injection 4 mg  4 mg IntraVENous Q6H PRN    finasteride (PROSCAR) tablet 5 mg  5 mg Oral DAILY    levothyroxine (SYNTHROID) tablet 50 mcg  50 mcg Oral ACB    rosuvastatin (CRESTOR) tablet 20 mg  20 mg Oral QHS    tamsulosin (FLOMAX) capsule 0.4 mg  0.4 mg Oral DAILY    [START ON 3/12/2022] levothyroxine (SYNTHROID) tablet 50 mcg  50 mcg Oral Q7D       Social History:  Social History     Tobacco Use    Smoking status: Never Smoker    Smokeless tobacco: Never Used   Substance Use Topics    Alcohol use: No       Family History:  Family History   Problem Relation Age of Onset    Heart Disease Mother         CHF    Heart Disease Father         CAD    Heart Disease Sister         CAD CABG    Lung Disease Sister     Heart Disease Brother         CAD    Pulmonary Fibrosis Brother     Heart Disease Brother         CAD    Stroke Brother 34        cerebral hemorrhage    Cancer Brother         LUNG    High Cholesterol Daughter     No Known Problems Son     Anesth Problems Neg Hx        Review of Systems:    Constitutional: negative fever, negative chills, negative weight loss  Eyes:   negative visual changes  ENT:   negative sore throat, tongue or lip swelling  Respiratory:  negative cough, negative dyspnea  Cards:  negative for chest pain, palpitations, lower extremity edema  GI:   See HPI  :  negative for frequency, dysuria  Integument:  negative for rash and pruritus  Heme:  negative for easy bruising and gum/nose bleeding  Musculoskel: negative for myalgias, back pain and muscle weakness  Neuro: negative for headaches, dizziness, vertigo  Psych:  negative for feelings of anxiety, depression      Objective:     Patient Vitals for the past 8 hrs:   BP Temp Pulse Resp SpO2 Weight   03/07/22 1400 -- -- 78 -- -- --   03/07/22 1200 -- -- 85 -- -- --   03/07/22 1111 (!) 128/51 98 °F (36.7 °C) 85 18 99 % --   03/07/22 1000 -- -- 83 -- -- --   03/07/22 0839 -- -- -- -- -- 80.6 kg (177 lb 11.1 oz)   03/07/22 0646 (!) 125/59 98.2 °F (36.8 °C) 88 16 99 % --     03/07 0701 - 03/07 1900  In: 240 [P.O.:240]  Out: 350 [Urine:350]  03/05 1901 - 03/07 0700  In: 660.4 [P.O.:240]  Out: 1650 [Urine:1650]      PHYSICAL EXAM:  General appearance: cooperative, no distress, appears stated age  Skin: Extremities and face reveal no rashes. HEENT: Sclerae anicteric. Cardiovascular: Regular rate and rhythm. + murmurs, no gallops, or rubs. Respiratory:Clear breath sounds with no wheezes, rales, or rhonchi. GI: Abdomen nondistended, soft, and nontender. Normal active bowel sounds. Rectal: Deferred   Musculoskeletal: No pitting edema of the lower legs. Neurological:  Patient is alert and oriented. Psychiatric:  No anxiety or agitation. Data Review     Recent Labs     03/07/22  0359 03/06/22  0413 03/05/22  1100 03/05/22  1100   WBC 5.9  --   --  7.4   HGB 8.6* 7.5*   < > 8.4*   HCT 27.3* 24.2*   < > 27.1*     --   --  247    < > = values in this interval not displayed. Recent Labs     03/07/22  0359 03/06/22  0413    140   K 3.8 3.7   * 110*   CO2 30 27   BUN 16 20   CREA 0.90 0.82   * 103*   CA 8.3* 8.4*     Recent Labs     03/05/22  1100   AP 86   TP 6.6   ALB 3.4*   GLOB 3.2     No results for input(s): INR, PTP, APTT, INREXT in the last 72 hours. Imaging studies reviewed    Assessment / Plan :     GI Bleed  Anemia     81 yo male with h/o CABG x5, CAD, afib on Eliquis, and MVR. Presents with VANN and chest pain, additionally had BRBPR 10 days ago. He was scheduled for outpatient colonoscopy with Dr. Mallie Kocher on (3/14). Hgb in ED initially 8.4 dropped from 13.6 in 12/21. He received 2 units of PRBC's for hgb 7.5 on 3/6. Last Eliquis dose on Saturday morning (3/5). Reports changes in bowel habits alternating loose/formed. Ddx: diverticular bleed, hemorrhoids, AVM, polyps or malignancy in the setting of anticoagulation. - plan for colonoscopy tomorrow  - start bowel prep today at 1500  - clear liquid diet today and NPO after midnight  - Hold anticoagulation   - trend hgb and transfuse for hgb <7  - supportive care per hospitalist  ATTENDING ADDENDUM OF [place] NOTE:    I discussed care with the nurse practitioner who has made the above exam and assessment regarding [facility].  I agree with the findings and plan as documented in the note above and have edited it to reflect my findings and plan.  Above described patient with complex cardiac history on anticoagulation admitted with symptomatic anemia and recent hematochezia. Agree with above differential, most likely diverticular bleeding. Will proceed with colonoscopy tentatively for 3/8 to exclude lesions needing endoscopic intervention.        Patient Active Hospital Problem List:   Active Problems:    Coronary artery disease involving native coronary artery of native heart without angina pectoris (12/1/2009)      Hx of CABG (3/26/2020)      Overview: x 5, LIMA to LAD, RSVG to Diag-RI-OM, RSVG to RCA      Chest pain (3/5/2022)      VANN (dyspnea on exertion) (3/5/2022)      Bloody stool (3/5/2022)      Nonrheumatic mitral valve regurgitation (3/6/2022)

## 2022-03-07 NOTE — PROGRESS NOTES
0730: Bedside and Verbal shift change report given to [address] [place] [address] (oncoming nurse) by Debbie Romo (offgoing nurse). Report included the following information SBAR, Kardex, Intake/Output, MAR, Accordion, Recent Results and Cardiac Rhythm NSR BBB. 1045: spoke with Noe Dubon NP with GI. Plan for clear liquids today, NPO midnight for scope tomorrow. Per NP patient to finish bowel prep by midnight    1930: Bedside and Verbal shift change report given to Glennette Severs (oncoming nurse) by [address] [place] [address] (offgoing nurse). Report included the following information SBAR, Kardex, Intake/Output, MAR, Accordion, Recent Results and Cardiac Rhythm NSR, BBB.

## 2022-03-07 NOTE — PROGRESS NOTES
Cardiology Progress Note            Admit Date: 3/5/2022  Admit Diagnosis: Chest pain [R07.9]  VANN (dyspnea on exertion) [R06.00]  Bloody stool [K92.1]  Date: 3/7/2022     Time: 5:01 PM  Primary cardiologist:  Dr. Aleks Carter    HPI: Dhara Lockhart is a 80 y.o. male who presented 3/5/2022 with complaints of SOB and chest pain. The symptoms began approximately 1 day prior. He has PMH of CAD, CABG (5 vessel) in 3/2020, HTN, HLD, pre-diabetes, MVP, MR (mild) and atrial fib. He had CABG and AF in 2019, known MR felt to be moderate by echo 10/21, was on Eliquis as prophylaxis. Examination by the attending physician suggested the possibility of anemia due to lower GI bleed. He received  unit PRBCs on 3/5 and hgb today 8.6. Last dose eliquis on 3/5/22 a.m. Subjective: No chest pain today. He reports that he walked around unit today and had no chest pain or SOB. He tells me that when he had the chest pain prior to admission it was more like a tightness and different than the pain he had prior to his bypass. Hgb 8.6 (from 7.5 yesterday). Stool for OB was negative. Colonoscopy is planned for tomorrow. Assessment and Plan     1. SOB: currently resolved. -NT pro BNP low on admission and no pulmonary edema on CT chest noted. .   - Repeat echo with EF 55-50%, NWMA. Mild AI, moderate to severe MR.   -Suspect marked anemia playing the major role in his presenting symptoms. 2. Mitral regurgitation: mod- severe. -Will d/w Dr. Lito Lainez.      3. History of CAD (CABG x 5 3/2020):   -continue Toprol XL. ASA on hold given concern for GI bleed, resume ASA when ok with GI.   -Continue statin.   -no current chest pain and HS trop low on admission. 4. History of PAF (postop cabg),     -hx of cardiovesion 4/13/20, off amiodarone 12/2020   -currently NSR with 1st degree AV block.    -Contnue toprol XL. -Hold eliquis due to anemia, GIB    5. HTN   - bp controlled   -toprol XL. 6. Anemia   -hgb 8.6 today s/p 1 unit PRBC   -Colonoscopy tomorrow. 7. Aortic insufficiency: mild by echo      Saw and evaluated pt and agree with above assessment and plan. Pt is stable cardiac wise. Agree with holding [address] for GI bleed; hopefully restart when ok with GI after their evaluation. Mikhail Dasilva MD        Cardiac testing history:  03/05/22    ECHO ADULT COMPLETE 03/06/2022 3/6/2022    Interpretation Summary    Left Ventricle: Left ventricle size is normal. Normal wall thickness. Normal wall motion. Normal left ventricular systolic function with a visually estimated EF of 55 - 60%. Normal diastolic function.   Aortic Valve: Moderately thickened cusp. Mildly calcified cusp. Moderate sclerosis of the aortic valve cusp. Mild transvalvular regurgitation.   Mitral Valve: Mildly thickened leaflet. Moderate to severe transvalvular regurgitation with an eccentrically directed jet and may underestimate severity.   Left Atrium: Left atrium is moderately dilated. Signed by:  Anjelica Sarabia MD on 3/6/2022  3:47 PM        Echo 6/20 - EF 55-60%, mild cLVH, no WMA, mod AV sclerosis with mild AI, mod-severe MR, mild TR, mod PI, mild PA HTN (40mmHg), trivial circumferential pericardial effusion, bilateral pleural effusions  DCCV 4/13/20  CABG x 5 on 3/26/20 with Dr. Zenobia Sanches - LIMA to LAD, saphenous vein graft to diagonal to ramus intermedius to obtuse marginal; saphenous vein graft to right coronary artery  Carotid duplex 3/20 - <50% bilateral ICA stenosis  SHANE 3/20 - WNL    PMH  Past Medical History:   Diagnosis Date    Arrhythmia 04/12/2020    cardioversion for SVT    Arthritis     HANDS    Basal cell carcinoma     BPH (benign prostatic hyperplasia)     CAD (coronary artery disease)     s/p PTCA '92    Calculus of kidney     Chronic pain     BACK    Elevated PSA     Heart attack ([address]     Hypercholesterolemia     Kidney stones     Lumbar foraminal stenosis     [facility]oma ([facility]     MVP (mitral valve prolapse)     NSTEMI (non-ST elevated myocardial infarction) ([facility] 03/25/2020    Other ill-defined conditions(799.89)     Prostatitis    Prediabetes     Seasonal allergies     Squamous cell carcinoma     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; 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    Systolic murmur     Thyroid disease     HYPOTHYROID    Vision impairment     R EYE, BLOOD CLOT ON RETINA      Social Hx  Social History     Socioeconomic History    Marital status:      Spouse name: Not on file    Number of children: Not on file    Years of education: Not on file    Highest education level: Not on file   Occupational History    Occupation:      Comment: part time consulting   Tobacco Use    Smoking status: Never Smoker    Smokeless tobacco: Never Used   Vaping Use    Vaping Use: Never used   Substance and Sexual Activity    Alcohol use: No    Drug use: No    Sexual activity: Not on file   Other Topics Concern     Service Not Asked    Blood Transfusions Not Asked    Caffeine Concern Not Asked    Occupational Exposure Not Asked    Hobby Hazards Not Asked    Sleep Concern Not Asked    Stress Concern Not Asked    Weight Concern Not Asked    Special Diet Not Asked    Back Care Not Asked    Exercise Not Asked    Bike Helmet Not Asked    Seat Belt Not Asked    Self-Exams Not Asked   Social History Narrative    Not on file     Social Determinants of Health     Financial Resource Strain:     Difficulty of Paying Living Expenses: Not on file   Food Insecurity:     Worried About Running Out of Food in the Last Year: Not on file    Singh of Food in the Last Year: Not on file   Transportation Needs:     Lack of Transportation (Medical): Not on file    Lack of Transportation (Non-Medical):  Not on file   Physical Activity:     Days of Exercise per Week: Not on file    Minutes of Exercise per Session: Not on file   Stress:     Feeling of Stress : Not on file   Social Connections:     Frequency of Communication with Friends and Family: Not on file    Frequency of Social Gatherings with Friends and Family: Not on file    Attends Jew Services: Not on file    Active Member of Clubs or Organizations: Not on file    Attends Club or Organization Meetings: Not on file    Marital Status: Not on file   Intimate Partner Violence:     Fear of Current or Ex-Partner: Not on file    Emotionally Abused: Not on file    Physically Abused: Not on file    Sexually Abused: Not on file   Housing Stability:     Unable to Pay for Housing in the Last Year: Not on file    Number of [place] in the Last Year: Not on file    Unstable Housing in the Last Year: Not on file       ROS:  Pertinent items are noted in HPI. Objective:      Physical Exam:                Visit Vitals  BP (!) 113/52 (BP 1 Location: Right upper arm, BP Patient Position: At rest)   Pulse 80   Temp 97.7 °F (36.5 °C)   Resp 19   Ht 5' 11\" (1.803 m)   Wt 80.6 kg (177 lb 11.1 oz)   SpO2 100%   BMI 24.78 kg/m²          General Appearance:   Well developed, well nourished,alert and oriented x 3, and   individual in no acute distress. Ears/Nose/Mouth/Throat:    Hearing grossly normal.         Neck:  Supple. Chest:    Lungs clear to auscultation bilaterally. Cardiovascular:   Regular rate and rhythm, S1, S2 normal, grade III/VI systolic murmur best at LUSB, LLSB   Abdomen:    Soft, non-tender, bowel sounds are active. Extremities:  No edema bilaterally. Skin:  Warm and dry.      Telemetry: NSR 1st degree AV blcok          Data Review:    Labs:    Recent Results (from the past 24 hour(s))   CBC W/O DIFF    Collection Time: 03/07/22  3:59 AM   Result Value Ref Range    WBC 5.9 4.1 - 11.1 K/uL    RBC 3.21 (L) 4.10 - 5.70 M/uL    HGB 8.6 (L) 12.1 - 17.0 g/dL    HCT 27.3 (L) 36.6 - 50.3 %    MCV 85.0 80.0 - 99.0 FL    MCH 26.8 26.0 - 34.0 PG    MCHC 31.5 30.0 - 36.5 g/dL    RDW 16.0 (H) 11.5 - 14.5 %    PLATELET 722 577 - 090 K/uL    MPV 8.8 (L) 8.9 - 12.9 FL    NRBC 0.0 0  WBC    ABSOLUTE NRBC 0.00 0.00 - 6.76 K/uL   METABOLIC PANEL, BASIC    Collection Time: 03/07/22  3:59 AM   Result Value Ref Range    Sodium 140 136 - 145 mmol/L    Potassium 3.8 3.5 - 5.1 mmol/L    Chloride 110 (H) 97 - 108 mmol/L    CO2 30 21 - 32 mmol/L    Anion gap 0 (L) 5 - 15 mmol/L    Glucose 101 (H) 65 - 100 mg/dL    BUN 16 6 - 20 MG/DL    Creatinine 0.90 0.70 - 1.30 MG/DL    BUN/Creatinine ratio 18 12 - 20      GFR est AA >60 >60 ml/min/1.73m2    GFR est non-AA >60 >60 ml/min/1.73m2    Calcium 8.3 (L) 8.5 - 10.1 MG/DL   MAGNESIUM    Collection Time: 03/07/22  3:59 AM   Result Value Ref Range    Magnesium 2.2 1.6 - 2.4 mg/dL          Radiology:        Current Facility-Administered Medications   Medication Dose Route Frequency    0.9% sodium chloride infusion 250 mL  250 mL IntraVENous PRN    pantoprazole (PROTONIX) injection 40 mg  40 mg IntraVENous Q12H    And    sodium chloride (NS) flush 10 mL  10 mL IntraVENous Q12H    metoprolol succinate (TOPROL-XL) XL tablet 25 mg  25 mg Oral DAILY    sodium chloride (NS) flush 5-40 mL  5-40 mL IntraVENous Q8H    sodium chloride (NS) flush 5-40 mL  5-40 mL IntraVENous PRN    acetaminophen (TYLENOL) tablet 650 mg  650 mg Oral Q6H PRN    Or    acetaminophen (TYLENOL) suppository 650 mg  650 mg Rectal Q6H PRN    polyethylene glycol (MIRALAX) packet 17 g  17 g Oral DAILY PRN    ondansetron (ZOFRAN ODT) tablet 4 mg  4 mg Oral Q8H PRN    Or    ondansetron (ZOFRAN) injection 4 mg  4 mg IntraVENous Q6H PRN    finasteride (PROSCAR) tablet 5 mg  5 mg Oral DAILY    levothyroxine (SYNTHROID) tablet 50 mcg  50 mcg Oral ACB    rosuvastatin (CRESTOR) tablet 20 mg  20 mg Oral QHS    tamsulosin (FLOMAX) capsule 0.4 mg  0.4 mg Oral DAILY    [START ON 3/12/2022] levothyroxine (SYNTHROID) tablet 50 mcg  50 mcg Oral Q7D          Nury Salmeron.  BRENNAN Purvis     [facility] [address] [place] [address] Drive, [address] [facility] [address]   [facility], [address]   [phone]

## 2022-03-07 NOTE — PROGRESS NOTES
0820: Bedside and Verbal shift change report given to [address]  (oncoming nurse) by Huong Cooney  (offgoing nurse). Report included the following information SBAR, Kardex, Intake/Output, MAR, Recent Results, Med Rec Status and Cardiac Rhythm NSR w/ BBB.

## 2022-03-07 NOTE — PROGRESS NOTES
[facility] Adult  Hospitalist Group                                                                                          Hospitalist Progress Note  Kiko Delgado MD  Answering service: [phone] from in house phone        Date of Service:  3/7/2022  NAME:  India Mcarthur  :  1940  MRN:  [id number]    Admission Summary:   India Mcarthur is a 80 y.o. male with past medical history of SVT status post cardioversion, arthritis, BPH and coronary disease status post PTCA who comes in with complaints of shortness of breath, dyspnea exertion and bright red blood per rectum a week ago. Patient is awake alert and oriented. Able to answer my question and follow my request.  Chart reviewed at length. As per collective reports, patient is on Eliquis for A. fib/SVT and was doing well until about a week ago he developed bright red blood per rectum which went on about for about 5 to 10 days and it resolved on its own. He was seen by GI and there was a plan for colonoscopy later this month. He was not noticing any other bleed so he kept taking his Eliquis with his aspirin but since yesterday he has been feeling shortness of breath, lethargy and dyspnea on exertion with some mid epigastric pain without any radiation, without any heaviness or chest pain at rest.  Patient woke up this morning and he was having worsening of the symptoms and felt out of breath on minimal exertion so he came to the ED for evaluation. In the ED he was hemodynamically stable, cardiac enzymes x1 were negative, EKG was unremarkable except for right bundle branch block which is a previous finding as well. His blood work revealed that he had significant anemia with hemoglobin dropping from 14.2-8.4 denoting acute blood loss anemia. He is being admitted to the hospital for further relation management.   He has no ongoing bleeding, no chest pain at rest, no dyspnea at rest, he appears pale but otherwise he is in good spirits. Denies any abdominal pain at the moment, no nausea, vomiting, fever, chills, rigors or night sweats. Denies any other medical complaints. Interval history / Subjective:   No acute overnight events  BM yesterday w/o blood or melena  VANN has resolved  S/p 1 PRBC last night  Wife at bedside  Last Eliquis dose 3/5 AM     Assessment & Plan:     Symptomatic iron deficiency anemia 2/2 subacute GI bleed  CLD today, NPO at [facility] for colon +/- EGD  Appreciate GI  Last BRBPR was 1 week PTA, subsequent BMs have been wnl  S/p 1 PRBC 3/6 for hgb 7.5 for goal hgb>8 given CAD and cardiac symptoms  Hold eliquis, last dose 3/5 AM  PPI  Monitor hgb     Paroxysmal atrial fibrillation, currently normal sinus rhythm  Hold Eliquis and aspirin  Cardiology started BB to assist in maintenance of sinus rhythm    CAD, Aortic stenosis  Hold home Lasix for now, unclear why on this  Statin  Echo wnl 55-60%  Per cardiology: suspect his chest pressure and SOB was due to anemia, not progression of CAD.  Will not order stress test at this point but can be considered in future if indicated     BPH : flomax    Hypothyroidism  Cont LT4; TSH wnl 12/2021    Code status: Full Code  Prophylaxis: SCD's  Care Plan discussed with: Patient/Family  Anticipated Disposition: Home w/Family  Anticipated Discharge: 24 hours to 48 hours     Hospital Problems  Date Reviewed: 3/6/2022          Codes Class Noted POA    Nonrheumatic mitral valve regurgitation ICD-10-CM: I34.0  ICD-9-CM: 424.0  3/6/2022 Unknown        Chest pain ICD-10-CM: R07.9  ICD-9-CM: 786.50  3/5/2022 Unknown        VANN (dyspnea on exertion) ICD-10-CM: R06.00  ICD-9-CM: 786.09  3/5/2022 Unknown        Bloody stool ICD-10-CM: K92.1  ICD-9-CM: 578.1  3/5/2022 Unknown        Hx of CABG ICD-10-CM: Z95.1  ICD-9-CM: V45.81  3/26/2020 Unknown    Overview Signed 3/26/2020  7:49 PM by TOM Dean     x 5, LIMA to LAD, RSVG to Diag-RI-OM, RSVG to RCA             Coronary artery disease involving native coronary artery of native heart without angina pectoris ICD-10-CM: I25.10  ICD-9-CM: 414.01  12/1/2009 Unknown                Review of Systems:   Pertinent items are noted in HPI. No fever/chills, poor appetite, cough, sputum, N/V, D/C, CP, or abd pain    Vital Signs:    Last 24hrs VS reviewed since prior progress note. Most recent are:  Visit Vitals  BP (!) 128/51 (BP 1 Location: Right arm, BP Patient Position: At rest)   Pulse 78   Temp 98 °F (36.7 °C)   Resp 18   Ht 5' 11\" (1.803 m)   Wt 80.6 kg (177 lb 11.1 oz)   SpO2 99%   BMI 24.78 kg/m²         Intake/Output Summary (Last 24 hours) at 3/7/2022 1537  Last data filed at 3/7/2022 1512  Gross per 24 hour   Intake 900.4 ml   Output 1375 ml   Net -474.6 ml      Physical Examination:     I had a face to face encounter with this patient and independently examined them on 3/7/2022 as outlined below:    General: Alert, cooperative, no acute distress    EENT:  EOMI. Anicteric sclerae. MMM  Resp:  CTA bilaterally, no wheezing or rales. No accessory muscle use  CV:  RRR  GI:  Soft, Non distended, Non tender  Neurologic:  Alert and oriented, normal speech, LOUIS  Extremities: No edema or cyanosis  Psych:   Not anxious or agitated  Skin:  No rashes. No jaundice    Data Review:    I personally reviewed labs and imaging results    Labs:     Recent Labs     03/07/22  0359 03/06/22 0413 03/05/22  1100 03/05/22  1100   WBC 5.9  --   --  7.4   HGB 8.6* 7.5*   < > 8.4*   HCT 27.3* 24.2*   < > 27.1*     --   --  247    < > = values in this interval not displayed.      Recent Labs     03/07/22  0359 03/06/22 0413 03/05/22  1100    140 143   K 3.8 3.7 3.5   * 110* 106   CO2 30 27 27   BUN 16 20 27*   CREA 0.90 0.82 1.08   * 103* 137*   CA 8.3* 8.4* 8.3*   MG 2.2  --  2.1     Recent Labs     03/05/22  1100   ALT 18   AP 86   TBILI 0.4   TP 6.6   ALB 3.4*   GLOB 3.2     No results for input(s): INR, PTP, APTT, INREXT, INREXT in the last 72 hours. Recent Labs     03/06/22  0413   TIBC 362   PSAT 4*   FERR 5*      No results found for: FOL, RBCF   No results for input(s): PH, PCO2, PO2 in the last 72 hours. No results for input(s): CPK, CKNDX, TROIQ in the last 72 hours.     No lab exists for component: CPKMB  Lab Results   Component Value Date/Time    Cholesterol, total 148 12/17/2021 09:15 AM    HDL Cholesterol 65 12/17/2021 09:15 AM    LDL, calculated 69.8 12/17/2021 09:15 AM    Triglyceride 66 12/17/2021 09:15 AM    CHOL/HDL Ratio 2.3 12/17/2021 09:15 AM     Lab Results   Component Value Date/Time    Glucose (POC) 135 (H) 03/30/2020 04:36 PM    Glucose (POC) 147 (H) 03/30/2020 11:37 AM    Glucose (POC) 137 (H) 03/30/2020 07:23 AM    Glucose (POC) 154 (H) 03/29/2020 10:01 PM    Glucose (POC) 164 (H) 03/29/2020 04:27 PM     Lab Results   Component Value Date/Time    Color BROWN 08/14/2020 03:19 AM    Appearance CLOUDY (A) 08/14/2020 03:19 AM    Specific gravity 1.015 08/14/2020 03:19 AM    Specific gravity 1.023 01/08/2019 07:46 PM    pH (UA) 6.5 08/14/2020 03:19 AM    Protein >300 (A) 08/14/2020 03:19 AM    Glucose Negative 08/14/2020 03:19 AM    Ketone TRACE (A) 08/14/2020 03:19 AM    Bilirubin NEGATIVE  03/25/2020 05:56 PM    Urobilinogen 1.0 08/14/2020 03:19 AM    Nitrites Positive (A) 08/14/2020 03:19 AM    Leukocyte Esterase SMALL (A) 08/14/2020 03:19 AM    Epithelial cells FEW 08/14/2020 03:19 AM    Bacteria 1+ (A) 08/14/2020 03:19 AM    WBC 5-10 08/14/2020 03:19 AM    RBC >100 (H) 08/14/2020 03:19 AM         Medications Reviewed:     Current Facility-Administered Medications   Medication Dose Route Frequency    peg 3350-electrolytes (COLYTE) 4000 mL  4,000 mL Oral ONCE    0.9% sodium chloride infusion 250 mL  250 mL IntraVENous PRN    pantoprazole (PROTONIX) injection 40 mg  40 mg IntraVENous Q12H    And    sodium chloride (NS) flush 10 mL  10 mL IntraVENous Q12H    metoprolol succinate (TOPROL-XL) XL tablet 25 mg  25 mg Oral DAILY    sodium chloride (NS) flush 5-40 mL  5-40 mL IntraVENous Q8H    sodium chloride (NS) flush 5-40 mL  5-40 mL IntraVENous PRN    acetaminophen (TYLENOL) tablet 650 mg  650 mg Oral Q6H PRN    Or    acetaminophen (TYLENOL) suppository 650 mg  650 mg Rectal Q6H PRN    polyethylene glycol (MIRALAX) packet 17 g  17 g Oral DAILY PRN    ondansetron (ZOFRAN ODT) tablet 4 mg  4 mg Oral Q8H PRN    Or    ondansetron (ZOFRAN) injection 4 mg  4 mg IntraVENous Q6H PRN    finasteride (PROSCAR) tablet 5 mg  5 mg Oral DAILY    levothyroxine (SYNTHROID) tablet 50 mcg  50 mcg Oral ACB    rosuvastatin (CRESTOR) tablet 20 mg  20 mg Oral QHS    tamsulosin (FLOMAX) capsule 0.4 mg  0.4 mg Oral DAILY    [START ON 3/12/2022] levothyroxine (SYNTHROID) tablet 50 mcg  50 mcg Oral Q7D     ______________________________________________________________________  EXPECTED LENGTH OF STAY: - - -  ACTUAL LENGTH OF STAY:          2                 Jovany Raman MD

## 2022-03-07 NOTE — PROGRESS NOTES
Transitions of Care Plan   RUR: 9% - low   Admission Dx: Anemia    Consults: GI   Baseline: independent without DME; resides w wife; hx of CABG April 2020   Barriers to Discharge: Medical   Disposition: home with family assistance   Estimated Discharge Date: 3/8/22    Reason for Admission:  Anemia                   RUR Score:          9% - low           Plan for utilizing home health:     No     PCP: First and Last name:  Veronica Michaels MD     Name of Practice: [facility]   Are you a current patient: Yes/No: Yes   Approximate date of last visit:    Can you participate in a virtual visit with your PCP:                     Current Advanced Directive/Advance Care Plan: Full Code    Healthcare Decision Maker:   Click here to complete [employer] including selection of the [employer] Relationship (ie \"Primary\")           Wife - Jose Liu - p: [phone]                  Transition of Care Plan:                      Chart review assessment. CM attempted x 2 to speak with patient and spouse at the bedside - patient with RN both attempts. Low RUR. Patient remains at his independent baseline. Resides with wife. [facility] for prescriptions. Remote history of HH with [facility] after CABG last spring. Shaila Mccauley, MPH  Care Manager l [employer]  Available via [facility] or      Care Management Interventions  PCP Verified by CM: Yes  Palliative Care Criteria Met (RRAT>21 & CHF Dx)?: No  Mode of Transport at Discharge:  Other (see comment) (Wife)  Transition of Care Consult (CM Consult): Discharge Planning  MyChart Signup: Yes  Discharge Durable Medical Equipment: No  Health Maintenance Reviewed: Yes  Physical Therapy Consult: No  Occupational Therapy Consult: No  Speech Therapy Consult: No  Support Systems: Spouse/Significant Other  Confirm Follow Up Transport: Family  Discharge Location  Patient Expects to be Discharged to[de-identified] Home

## 2022-03-08 ENCOUNTER — ANESTHESIA (OUTPATIENT)
Dept: ENDOSCOPY | Age: 82
DRG: 378 | End: 2022-03-08
Payer: MEDICARE

## 2022-03-08 ENCOUNTER — ANESTHESIA EVENT (OUTPATIENT)
Dept: ENDOSCOPY | Age: 82
DRG: 378 | End: 2022-03-08
Payer: MEDICARE

## 2022-03-08 LAB
ANION GAP SERPL CALC-SCNC: 3 MMOL/L (ref 5–15)
BUN SERPL-MCNC: 11 MG/DL (ref 6–20)
BUN/CREAT SERPL: 13 (ref 12–20)
CALCIUM SERPL-MCNC: 8.5 MG/DL (ref 8.5–10.1)
CHLORIDE SERPL-SCNC: 108 MMOL/L (ref 97–108)
CO2 SERPL-SCNC: 28 MMOL/L (ref 21–32)
CREAT SERPL-MCNC: 0.85 MG/DL (ref 0.7–1.3)
ERYTHROCYTE [DISTWIDTH] IN BLOOD BY AUTOMATED COUNT: 15.9 % (ref 11.5–14.5)
GLUCOSE SERPL-MCNC: 102 MG/DL (ref 65–100)
HCT VFR BLD AUTO: 29.2 % (ref 36.6–50.3)
HGB BLD-MCNC: 9.3 G/DL (ref 12.1–17)
MCH RBC QN AUTO: 27 PG (ref 26–34)
MCHC RBC AUTO-ENTMCNC: 31.8 G/DL (ref 30–36.5)
MCV RBC AUTO: 84.6 FL (ref 80–99)
NRBC # BLD: 0 K/UL (ref 0–0.01)
NRBC BLD-RTO: 0 PER 100 WBC
PLATELET # BLD AUTO: 271 K/UL (ref 150–400)
PMV BLD AUTO: 8.8 FL (ref 8.9–12.9)
POTASSIUM SERPL-SCNC: 3.7 MMOL/L (ref 3.5–5.1)
RBC # BLD AUTO: 3.45 M/UL (ref 4.1–5.7)
SODIUM SERPL-SCNC: 139 MMOL/L (ref 136–145)
WBC # BLD AUTO: 6.8 K/UL (ref 4.1–11.1)

## 2022-03-08 PROCEDURE — 74011250637 HC RX REV CODE- 250/637: Performed by: INTERNAL MEDICINE

## 2022-03-08 PROCEDURE — 99232 SBSQ HOSP IP/OBS MODERATE 35: CPT | Performed by: INTERNAL MEDICINE

## 2022-03-08 PROCEDURE — 74011000250 HC RX REV CODE- 250: Performed by: INTERNAL MEDICINE

## 2022-03-08 PROCEDURE — 74011000250 HC RX REV CODE- 250: Performed by: NURSE ANESTHETIST, CERTIFIED REGISTERED

## 2022-03-08 PROCEDURE — C9113 INJ PANTOPRAZOLE SODIUM, VIA: HCPCS | Performed by: INTERNAL MEDICINE

## 2022-03-08 PROCEDURE — 65660000000 HC RM CCU STEPDOWN

## 2022-03-08 PROCEDURE — 76060000031 HC ANESTHESIA FIRST 0.5 HR: Performed by: INTERNAL MEDICINE

## 2022-03-08 PROCEDURE — 36415 COLL VENOUS BLD VENIPUNCTURE: CPT

## 2022-03-08 PROCEDURE — 85027 COMPLETE CBC AUTOMATED: CPT

## 2022-03-08 PROCEDURE — 74011250637 HC RX REV CODE- 250/637: Performed by: SPECIALIST

## 2022-03-08 PROCEDURE — 76040000019: Performed by: INTERNAL MEDICINE

## 2022-03-08 PROCEDURE — 74011250636 HC RX REV CODE- 250/636: Performed by: NURSE ANESTHETIST, CERTIFIED REGISTERED

## 2022-03-08 PROCEDURE — 80048 BASIC METABOLIC PNL TOTAL CA: CPT

## 2022-03-08 PROCEDURE — 0DJD8ZZ INSPECTION OF LOWER INTESTINAL TRACT, VIA NATURAL OR ARTIFICIAL OPENING ENDOSCOPIC: ICD-10-PCS | Performed by: INTERNAL MEDICINE

## 2022-03-08 PROCEDURE — 74011250636 HC RX REV CODE- 250/636: Performed by: INTERNAL MEDICINE

## 2022-03-08 RX ORDER — GUAIFENESIN 100 MG/5ML
81 LIQUID (ML) ORAL DAILY
Status: DISCONTINUED | OUTPATIENT
Start: 2022-03-09 | End: 2022-03-09 | Stop reason: HOSPADM

## 2022-03-08 RX ORDER — DEXTROMETHORPHAN/PSEUDOEPHED 2.5-7.5/.8
1.2 DROPS ORAL
Status: DISCONTINUED | OUTPATIENT
Start: 2022-03-08 | End: 2022-03-08 | Stop reason: HOSPADM

## 2022-03-08 RX ORDER — PROPOFOL 10 MG/ML
INJECTION, EMULSION INTRAVENOUS AS NEEDED
Status: DISCONTINUED | OUTPATIENT
Start: 2022-03-08 | End: 2022-03-08 | Stop reason: HOSPADM

## 2022-03-08 RX ORDER — SODIUM CHLORIDE 9 MG/ML
50 INJECTION, SOLUTION INTRAVENOUS CONTINUOUS
Status: DISPENSED | OUTPATIENT
Start: 2022-03-08 | End: 2022-03-08

## 2022-03-08 RX ORDER — SODIUM CHLORIDE 9 MG/ML
INJECTION, SOLUTION INTRAVENOUS
Status: DISCONTINUED | OUTPATIENT
Start: 2022-03-08 | End: 2022-03-08 | Stop reason: HOSPADM

## 2022-03-08 RX ORDER — NALOXONE HYDROCHLORIDE 0.4 MG/ML
0.4 INJECTION, SOLUTION INTRAMUSCULAR; INTRAVENOUS; SUBCUTANEOUS
Status: DISCONTINUED | OUTPATIENT
Start: 2022-03-08 | End: 2022-03-08 | Stop reason: HOSPADM

## 2022-03-08 RX ORDER — ATROPINE SULFATE 0.1 MG/ML
0.5 INJECTION INTRAVENOUS
Status: DISCONTINUED | OUTPATIENT
Start: 2022-03-08 | End: 2022-03-08 | Stop reason: HOSPADM

## 2022-03-08 RX ORDER — SODIUM CHLORIDE 0.9 % (FLUSH) 0.9 %
5-40 SYRINGE (ML) INJECTION AS NEEDED
Status: DISCONTINUED | OUTPATIENT
Start: 2022-03-08 | End: 2022-03-09 | Stop reason: HOSPADM

## 2022-03-08 RX ORDER — FLUMAZENIL 0.1 MG/ML
0.2 INJECTION INTRAVENOUS
Status: DISCONTINUED | OUTPATIENT
Start: 2022-03-08 | End: 2022-03-08 | Stop reason: HOSPADM

## 2022-03-08 RX ORDER — SODIUM CHLORIDE 0.9 % (FLUSH) 0.9 %
5-40 SYRINGE (ML) INJECTION EVERY 8 HOURS
Status: DISCONTINUED | OUTPATIENT
Start: 2022-03-08 | End: 2022-03-09 | Stop reason: HOSPADM

## 2022-03-08 RX ORDER — LIDOCAINE HYDROCHLORIDE 20 MG/ML
INJECTION, SOLUTION EPIDURAL; INFILTRATION; INTRACAUDAL; PERINEURAL AS NEEDED
Status: DISCONTINUED | OUTPATIENT
Start: 2022-03-08 | End: 2022-03-08 | Stop reason: HOSPADM

## 2022-03-08 RX ORDER — EPINEPHRINE 0.1 MG/ML
1 INJECTION INTRACARDIAC; INTRAVENOUS
Status: DISCONTINUED | OUTPATIENT
Start: 2022-03-08 | End: 2022-03-08 | Stop reason: HOSPADM

## 2022-03-08 RX ADMIN — PANTOPRAZOLE SODIUM 40 MG: 40 INJECTION, POWDER, FOR SOLUTION INTRAVENOUS at 12:13

## 2022-03-08 RX ADMIN — PROPOFOL 10 MG: 10 INJECTION, EMULSION INTRAVENOUS at 13:49

## 2022-03-08 RX ADMIN — ROSUVASTATIN 20 MG: 10 TABLET, FILM COATED ORAL at 22:50

## 2022-03-08 RX ADMIN — PROPOFOL 10 MG: 10 INJECTION, EMULSION INTRAVENOUS at 13:50

## 2022-03-08 RX ADMIN — SODIUM CHLORIDE, PRESERVATIVE FREE 10 ML: 5 INJECTION INTRAVENOUS at 06:47

## 2022-03-08 RX ADMIN — PANTOPRAZOLE SODIUM 40 MG: 40 INJECTION, POWDER, FOR SOLUTION INTRAVENOUS at 22:50

## 2022-03-08 RX ADMIN — SODIUM CHLORIDE, PRESERVATIVE FREE 10 ML: 5 INJECTION INTRAVENOUS at 22:50

## 2022-03-08 RX ADMIN — FINASTERIDE 5 MG: 5 TABLET, FILM COATED ORAL at 08:30

## 2022-03-08 RX ADMIN — TAMSULOSIN HYDROCHLORIDE 0.4 MG: 0.4 CAPSULE ORAL at 08:30

## 2022-03-08 RX ADMIN — APIXABAN 5 MG: 5 TABLET, FILM COATED ORAL at 18:05

## 2022-03-08 RX ADMIN — LIDOCAINE HYDROCHLORIDE 50 MG: 20 INJECTION, SOLUTION EPIDURAL; INFILTRATION; INTRACAUDAL; PERINEURAL at 13:40

## 2022-03-08 RX ADMIN — METOPROLOL SUCCINATE 25 MG: 25 TABLET, EXTENDED RELEASE ORAL at 08:30

## 2022-03-08 RX ADMIN — PROPOFOL 20 MG: 10 INJECTION, EMULSION INTRAVENOUS at 13:46

## 2022-03-08 RX ADMIN — PROPOFOL 10 MG: 10 INJECTION, EMULSION INTRAVENOUS at 13:48

## 2022-03-08 RX ADMIN — SODIUM CHLORIDE: 900 INJECTION, SOLUTION INTRAVENOUS at 13:31

## 2022-03-08 RX ADMIN — SODIUM CHLORIDE, PRESERVATIVE FREE 10 ML: 5 INJECTION INTRAVENOUS at 12:13

## 2022-03-08 RX ADMIN — LEVOTHYROXINE SODIUM 50 MCG: 0.05 TABLET ORAL at 06:47

## 2022-03-08 RX ADMIN — PROPOFOL 50 MG: 10 INJECTION, EMULSION INTRAVENOUS at 13:40

## 2022-03-08 NOTE — ANESTHESIA PREPROCEDURE EVALUATION
Relevant Problems   RESPIRATORY SYSTEM   (+) VANN (dyspnea on exertion)      CARDIOVASCULAR   (+) Coronary artery disease involving native coronary artery of native heart without angina pectoris   (+) Essential hypertension, benign   (+) Hx of CABG   (+) NSTEMI (non-ST elevated myocardial infarction) (HCC)   (+) Nonrheumatic mitral valve regurgitation   (+) RBBB   (+) Sustained SVT (HCC)      RENAL FAILURE   (+) Calculus of kidney      ENDOCRINE   (+) Hypothyroidism, acquired, autoimmune      PERSONAL HX & FAMILY HX OF CANCER   (+) Skin cancer       Anesthetic History   No history of anesthetic complications            Review of Systems / Medical History  Patient summary reviewed, nursing notes reviewed and pertinent labs reviewed    Pulmonary  Within defined limits                 Neuro/Psych   Within defined limits    CVA       Cardiovascular  Within defined limits  Hypertension        Dysrhythmias   CAD and CABG         GI/Hepatic/Renal  Within defined limits              Endo/Other  Within defined limits    Hypothyroidism  Arthritis and anemia     Other Findings              Physical Exam    Airway  Mallampati: II  TM Distance: > 6 cm  Neck ROM: normal range of motion   Mouth opening: Normal     Cardiovascular  Regular rate and rhythm,  S1 and S2 normal,  no murmur, click, rub, or gallop             Dental  No notable dental hx       Pulmonary  Breath sounds clear to auscultation               Abdominal  GI exam deferred       Other Findings            Anesthetic Plan    ASA: 3  Anesthesia type: MAC            Anesthetic plan and risks discussed with: Patient

## 2022-03-08 NOTE — PROCEDURES
118 [facility].  217 [facility] 140 [place], [address]  [phone]                              Colonoscopy Procedure Note      Indications:    Hematochezia/melena     :  Meghna Man MD    Staff: Endoscopy Technician-1: Rick Howard  Endoscopy RN-1: Keyona Mendez RN    Referring Provider: Anne Marie Gaines MD    Sedation: MAC    Procedure Details:  After informed consent was obtained with all risks and benefits of procedure explained and preoperative exam completed, the patient was taken to the endoscopy suite and placed in the left lateral decubitus position. Upon sequential sedation as per above, a digital rectal exam was performed per below. The Olympus videocolonoscope was inserted in the rectum and carefully advanced to the terminal ileum. The quality of preparation was inadequate to identify polyps <10mm in some segments. Mineral Bowel Prep Score : 3/1/1/5 . The colonoscope was slowly withdrawn with careful evaluation between folds. Retroflexion in the rectum was performed. Findings: No bleeding, no clots  Rectum: normal   Sigmoid: moderate to severe diverticulosis  Descending Colon: normal  Transverse Colon: moderate amount of opaque and semisolid stool in this segment preventing ability to exclude small, flat polyps  Ascending Colon: moderate amount of opaque and semisolid stool in this segment preventing ability to exclude small, flat polyps  Cecum: normal   Terminal Ileum: normal    Interventions:  none    Specimen Removed:  * No specimens in log *    Complications: None. EBL:  none    Impression:    See Postoperative diagnosis above    Recommendations:   - No active bleeding or recent bleeding identified on this exam; high suspicion of diverticular or hemorrhoidal etiology  - No PPI needed unless for dyspepsia or GERD  - Can resume his anticoagulation and monitor for 24 hours for recurrent bleeding  - Resume previous diet.   - Previous colorectal cancer screening or surveillance schedule can be resumed as previously planned; since this exam was limited in some segments to detect small, flat polyps  - GI will sign off    Discharge Disposition: floor    Antonio Gates MD  3/8/2022  1:55 PM

## 2022-03-08 NOTE — PROGRESS NOTES
Problem: Falls - Risk of  Goal: *Absence of Falls  Description: Document [place] Fall Risk and appropriate interventions in the flowsheet.   Outcome: Progressing Towards Goal  Note: Fall Risk Interventions:            Medication Interventions: Teach patient to arise slowly                   Problem: Patient Education: Go to Patient Education Activity  Goal: Patient/Family Education  Outcome: Progressing Towards Goal

## 2022-03-08 NOTE — PROGRESS NOTES
1930: Bedside shift change report given to Kitty RN (oncoming nurse) by Rich RN (offgoing nurse). Report included the following information SBAR, Kardex, Procedure Summary, Intake/Output, MAR and Recent Results. Pt had uneventful shift. 0730: Bedside shift change report given to [id number] [place] [address] (oncoming nurse) by Alissa Jackson RN (offgoing nurse). Report included the following information SBAR, Kardex, Procedure Summary, Intake/Output, MAR and Recent Results.

## 2022-03-08 NOTE — PROGRESS NOTES
[facility]                                                                                          Hospitalist Progress Note  Homar Maza MD  Answering service: [phone] OR [phone] from in house phone        Date of Service:  3/8/2022  NAME:  Armaan May  :  1940  MRN:  [id number]    Admission Summary:   Armaan May is a 80 y.o. male with past medical history of SVT status post cardioversion, arthritis, BPH and coronary disease status post PTCA who comes in with complaints of shortness of breath, dyspnea exertion and bright red blood per rectum a week ago. Patient is awake alert and oriented. Able to answer my question and follow my request.  Chart reviewed at length. As per collective reports, patient is on Eliquis for A. fib/SVT and was doing well until about a week ago he developed bright red blood per rectum which went on about for about 5 to 10 days and it resolved on its own. He was seen by GI and there was a plan for colonoscopy later this month. He was not noticing any other bleed so he kept taking his Eliquis with his aspirin but since yesterday he has been feeling shortness of breath, lethargy and dyspnea on exertion with some mid epigastric pain without any radiation, without any heaviness or chest pain at rest.  Patient woke up this morning and he was having worsening of the symptoms and felt out of breath on minimal exertion so he came to the ED for evaluation. In the ED he was hemodynamically stable, cardiac enzymes x1 were negative, EKG was unremarkable except for right bundle branch block which is a previous finding as well. His blood work revealed that he had significant anemia with hemoglobin dropping from 14.2-8.4 denoting acute blood loss anemia. He is being admitted to the hospital for further relation management.   He has no ongoing bleeding, no chest pain at rest, no dyspnea at rest, he appears pale but otherwise he is in good spirits. Denies any abdominal pain at the moment, no nausea, vomiting, fever, chills, rigors or night sweats. Denies any other medical complaints. Interval history / Subjective:   No acute overnight events  Tolerated bowel prep, no blood or melena  No sob or vann  For colonoscopy today     Assessment & Plan:     Symptomatic iron deficiency anemia 2/2 subacute GI bleed  Colonoscopy today with no active bleeding  Last BRBPR was 1 week PTA, subsequent BMs have been wnl  S/p 1 PRBC 3/6 for hgb 7.5 for goal hgb>8 given CAD and cardiac symptoms  Per GI can resume eliquis and monitor for 24h for recurrent bleeding  If recurs, may need watchman per cardiology  No ppi needed     Paroxysmal atrial fibrillation, currently normal sinus rhythm, rate-controlled  Resume Eliquis and aspirin  Cardiology started BB to assist in maintenance of sinus rhythm    CAD, Aortic stenosis  Hold home Lasix for now, unclear why on this  Statin  Echo wnl 55-60%  Per cardiology: suspect his chest pressure and SOB was due to anemia, not progression of CAD.  Will not order stress test at this point but can be considered in future if indicated     BPH : flomax    Hypothyroidism  Cont LT4; TSH wnl 12/2021    Code status: Full Code  Prophylaxis: eliquis  Care Plan discussed with: Patient/Family  Anticipated Disposition: Home w/Family  Anticipated Discharge: Less than 24 hours     Hospital Problems  Date Reviewed: 3/8/2022          Codes Class Noted POA    Nonrheumatic mitral valve regurgitation ICD-10-CM: I34.0  ICD-9-CM: 424.0  3/6/2022 Unknown        Chest pain ICD-10-CM: R07.9  ICD-9-CM: 786.50  3/5/2022 Unknown        VANN (dyspnea on exertion) ICD-10-CM: R06.00  ICD-9-CM: 786.09  3/5/2022 Unknown        Bloody stool ICD-10-CM: K92.1  ICD-9-CM: 578.1  3/5/2022 Unknown        Hx of CABG ICD-10-CM: Z95.1  ICD-9-CM: V45.81  3/26/2020 Unknown    Overview Signed 3/26/2020  7:49 PM by TOM Sanchez     x 5, LIMA to LAD, RSVG to Diag-RI-OM, RSVG to RCA             Coronary artery disease involving native coronary artery of native heart without angina pectoris ICD-10-CM: I25.10  ICD-9-CM: 414.01  12/1/2009 Unknown                Review of Systems:   Pertinent items are noted in HPI. No fever/chills, poor appetite, cough, sputum, N/V, D/C, CP, or abd pain    Vital Signs:    Last 24hrs VS reviewed since prior progress note. Most recent are:  Visit Vitals  /60   Pulse 81   Temp 98.4 °F (36.9 °C)   Resp 17   Ht 5' 11\" (1.803 m)   Wt 79.5 kg (175 lb 4.3 oz)   SpO2 99%   BMI 24.44 kg/m²         Intake/Output Summary (Last 24 hours) at 3/8/2022 1459  Last data filed at 3/8/2022 1413  Gross per 24 hour   Intake 1110 ml   Output 325 ml   Net 785 ml      Physical Examination:     I had a face to face encounter with this patient and independently examined them on 3/8/2022 as outlined below:    General: Alert, cooperative, no acute distress    EENT:  EOMI. Anicteric sclerae. MMM  Resp:  CTA bilaterally, no wheezing or rales. No accessory muscle use  CV:  RRR  GI:  Soft, Non distended, Non tender  Neurologic:  Alert and oriented, normal speech, LOUIS  Extremities: No edema or cyanosis  Psych:   Not anxious or agitated  Skin:  No rashes. No jaundice    Data Review:    I personally reviewed labs and imaging results    Labs:     Recent Labs     03/08/22  0351 03/07/22  0359   WBC 6.8 5.9   HGB 9.3* 8.6*   HCT 29.2* 27.3*    239     Recent Labs     03/08/22  0351 03/07/22  0359 03/06/22  0413    140 140   K 3.7 3.8 3.7    110* 110*   CO2 28 30 27   BUN 11 16 20   CREA 0.85 0.90 0.82   * 101* 103*   CA 8.5 8.3* 8.4*   MG  --  2.2  --      No results for input(s): ALT, AP, TBIL, TBILI, TP, ALB, GLOB, GGT, AML, LPSE in the last 72 hours. No lab exists for component: SGOT, GPT, AMYP, HLPSE  No results for input(s): INR, PTP, APTT, INREXT, INREXT in the last 72 hours.    Recent Labs     03/06/22  0413   TIBC 362   PSAT 4*   FERR 5*      No results found for: FOL, RBCF   No results for input(s): PH, PCO2, PO2 in the last 72 hours. No results for input(s): CPK, CKNDX, TROIQ in the last 72 hours.     No lab exists for component: CPKMB  Lab Results   Component Value Date/Time    Cholesterol, total 148 12/17/2021 09:15 AM    HDL Cholesterol 65 12/17/2021 09:15 AM    LDL, calculated 69.8 12/17/2021 09:15 AM    Triglyceride 66 12/17/2021 09:15 AM    CHOL/HDL Ratio 2.3 12/17/2021 09:15 AM     Lab Results   Component Value Date/Time    Glucose (POC) 135 (H) 03/30/2020 04:36 PM    Glucose (POC) 147 (H) 03/30/2020 11:37 AM    Glucose (POC) 137 (H) 03/30/2020 07:23 AM    Glucose (POC) 154 (H) 03/29/2020 10:01 PM    Glucose (POC) 164 (H) 03/29/2020 04:27 PM     Lab Results   Component Value Date/Time    Color BROWN 08/14/2020 03:19 AM    Appearance CLOUDY (A) 08/14/2020 03:19 AM    Specific gravity 1.015 08/14/2020 03:19 AM    Specific gravity 1.023 01/08/2019 07:46 PM    pH (UA) 6.5 08/14/2020 03:19 AM    Protein >300 (A) 08/14/2020 03:19 AM    Glucose Negative 08/14/2020 03:19 AM    Ketone TRACE (A) 08/14/2020 03:19 AM    Bilirubin NEGATIVE  03/25/2020 05:56 PM    Urobilinogen 1.0 08/14/2020 03:19 AM    Nitrites Positive (A) 08/14/2020 03:19 AM    Leukocyte Esterase SMALL (A) 08/14/2020 03:19 AM    Epithelial cells FEW 08/14/2020 03:19 AM    Bacteria 1+ (A) 08/14/2020 03:19 AM    WBC 5-10 08/14/2020 03:19 AM    RBC >100 (H) 08/14/2020 03:19 AM         Medications Reviewed:     Current Facility-Administered Medications   Medication Dose Route Frequency    0.9% sodium chloride infusion  50 mL/hr IntraVENous CONTINUOUS    sodium chloride (NS) flush 5-40 mL  5-40 mL IntraVENous Q8H    sodium chloride (NS) flush 5-40 mL  5-40 mL IntraVENous PRN    apixaban (ELIQUIS) tablet 5 mg  5 mg Oral Q12H    0.9% sodium chloride infusion 250 mL  250 mL IntraVENous PRN    pantoprazole (PROTONIX) injection 40 mg  40 mg IntraVENous Q12H    And    sodium chloride (NS) flush 10 mL  10 mL IntraVENous Q12H    metoprolol succinate (TOPROL-XL) XL tablet 25 mg  25 mg Oral DAILY    sodium chloride (NS) flush 5-40 mL  5-40 mL IntraVENous Q8H    sodium chloride (NS) flush 5-40 mL  5-40 mL IntraVENous PRN    acetaminophen (TYLENOL) tablet 650 mg  650 mg Oral Q6H PRN    Or    acetaminophen (TYLENOL) suppository 650 mg  650 mg Rectal Q6H PRN    polyethylene glycol (MIRALAX) packet 17 g  17 g Oral DAILY PRN    ondansetron (ZOFRAN ODT) tablet 4 mg  4 mg Oral Q8H PRN    Or    ondansetron (ZOFRAN) injection 4 mg  4 mg IntraVENous Q6H PRN    finasteride (PROSCAR) tablet 5 mg  5 mg Oral DAILY    levothyroxine (SYNTHROID) tablet 50 mcg  50 mcg Oral ACB    rosuvastatin (CRESTOR) tablet 20 mg  20 mg Oral QHS    tamsulosin (FLOMAX) capsule 0.4 mg  0.4 mg Oral DAILY    [START ON 3/12/2022] levothyroxine (SYNTHROID) tablet 50 mcg  50 mcg Oral Q7D     ______________________________________________________________________  EXPECTED LENGTH OF STAY: 2d 16h  ACTUAL LENGTH OF STAY:          3                 Lara Pedroza MD

## 2022-03-08 NOTE — PROGRESS NOTES
Cardiology Progress Note            Admit Date: 3/5/2022  Admit Diagnosis: Chest pain [R07.9]  VANN (dyspnea on exertion) [R06.00]  Bloody stool [K92.1]  Date: 3/8/2022     Time: 5:01 PM  Primary cardiologist:  Dr. Del Jara    HPI: Eron Yee is a 80 y.o. male who presented 3/5/2022 with complaints of SOB and chest pain. The symptoms began approximately 1 day prior. He has PMH of CAD, CABG (5 vessel) in 3/2020, HTN, HLD, pre-diabetes, MVP, MR (mild) and atrial fib. He had CABG and AF in 2019, known MR felt to be moderate by echo 10/21, was on Eliquis as prophylaxis. Admitted with anemia, lower GI bleed. He received  unit PRBCs on 3/5 and hgb today 8.6. Last dose eliquis on 3/5/22 a.m. Subjective: Denies chest pain, SOB. Completed bowel prep. Stated he walked around unit yesterday without chest pain or SOB. Awaiting colonoscopy today. He reports he only takes lasix prn if SOB at home (was originally prescribed by Dr. Darcy Ortiz). Assessment and Plan     1. SOB: currently resolved. -no pulmonary edema on imagins. -EF 55-50%, NWMA. Mild AI, moderate to severe MR.   -Suspect marked anemia playing the major role in his presenting symptoms. 2. Mitral regurgitation: mod- severe.   -d/w Dr. Leland Simental- monitor outpatient. 3. History of CAD (CABG x 5 3/2020):   -Toprol XL. ASA on hold given concern for GI bleed, resume ASA when ok with GI.   -Continue statin. -stable. No chest pain. 4. History of PAF (postop cabg),     -hx of cardiovesion 4/13/20, off amiodarone 12/2020   -currently NSR with 1st degree AV block. No PAF on tele review.   -Contnue toprol XL. -Eliquis on hold due to anemia, GIB. Consider watchman as outpatient but will need 45 days of [address] post procedure. 5.HTN   -bp controlled on toprol XL. 6. Anemia, GIB   -hgb improved (9.3 today)   -Colonoscopy today    7.  Aortic insufficiency/aortic stenosis: both mild by echo     Cardiac status stable. Colonoscopy today. Restart eliquis, asa when ok with GI. Consider watchman as outpatient. Porter Fairbanks. BRENNAN Purvis    Saw and evaluated pt and agree with above assessment and plan. Pt is stable cardiac wise. Agree with holding [address] for GI bleed; hopefully restart when ok with GI after their evaluation. Consider watchman outpt and will need close follow up with Dr. Matthew Bhakta MD    Cardiac testing history:  03/05/22    ECHO ADULT COMPLETE 03/06/2022 3/6/2022    Interpretation Summary    Left Ventricle: Left ventricle size is normal. Normal wall thickness. Normal wall motion. Normal left ventricular systolic function with a visually estimated EF of 55 - 60%. Normal diastolic function.   Aortic Valve: Moderately thickened cusp. Mildly calcified cusp. Moderate sclerosis of the aortic valve cusp. Mild transvalvular regurgitation.   Mitral Valve: Mildly thickened leaflet. Moderate to severe transvalvular regurgitation with an eccentrically directed jet and may underestimate severity.   Left Atrium: Left atrium is moderately dilated. Signed by:  Nova No MD on 3/6/2022  3:47 PM        Echo 6/20 - EF 55-60%, mild cLVH, no WMA, mod AV sclerosis with mild AI, mod-severe MR, mild TR, mod PI, mild PA HTN (40mmHg), trivial circumferential pericardial effusion, bilateral pleural effusions  DCCV 4/13/20  CABG x 5 on 3/26/20 with Dr. Katy Don - LIMA to LAD, saphenous vein graft to diagonal to ramus intermedius to obtuse marginal; saphenous vein graft to right coronary artery  Carotid duplex 3/20 - <50% bilateral ICA stenosis  SHANE 3/20 - WNL    PMH  Past Medical History:   Diagnosis Date    Arrhythmia 04/12/2020    cardioversion for SVT    Arthritis     HANDS    Basal cell carcinoma     BPH (benign prostatic hyperplasia)     CAD (coronary artery disease)     s/p PTCA '92    Calculus of kidney     Chronic pain     BACK    Elevated PSA     Heart attack ([address]  Hypercholesterolemia     Kidney stones     Lumbar foraminal stenosis     Melanoma ([facility] [facility]     MVP (mitral valve prolapse)     NSTEMI (non-ST elevated myocardial infarction) ([facility] 03/25/2020    Other ill-defined conditions(799.89)     Prostatitis    Prediabetes     Seasonal allergies     Squamous cell carcinoma     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; 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    Systolic murmur     Thyroid disease     HYPOTHYROID    Vision impairment     R EYE, BLOOD CLOT ON RETINA      Social Hx  Social History     Socioeconomic History    Marital status:      Spouse name: Not on file    Number of children: Not on file    Years of education: Not on file    Highest education level: Not on file   Occupational History    Occupation:      Comment: part time consulting   Tobacco Use    Smoking status: Never Smoker    Smokeless tobacco: Never Used   Vaping Use    Vaping Use: Never used   Substance and Sexual Activity    Alcohol use: No    Drug use: No    Sexual activity: Not on file   Other Topics Concern     Service Not Asked    Blood Transfusions Not Asked    Caffeine Concern Not Asked    Occupational Exposure Not Asked    Hobby Hazards Not Asked    Sleep Concern Not Asked    Stress Concern Not Asked    Weight Concern Not Asked    Special Diet Not Asked    Back Care Not Asked    Exercise Not Asked    Bike Helmet Not Asked    Seat Belt Not Asked    Self-Exams Not Asked   Social History Narrative    Not on file     Social Determinants of Health     Financial Resource Strain:     Difficulty of Paying Living Expenses: Not on file   Food Insecurity:     Worried About Running Out of Food in the Last Year: Not on file    Singh of Food in the Last Year: Not on file   Transportation Needs:     Lack of Transportation (Medical): Not on file    Lack of Transportation (Non-Medical):  Not on file   Physical Activity:     Days of Exercise per Week: Not on file   Opower of Exercise per Session: Not on file   Stress:     Feeling of Stress : Not on file   Social Connections:     Frequency of Communication with Friends and Family: Not on file    Frequency of Social Gatherings with Friends and Family: Not on file    Attends Rastafarian Services: Not on file    Active Member of Clubs or Organizations: Not on file    Attends Club or Organization Meetings: Not on file    Marital Status: Not on file   Intimate Partner Violence:     Fear of Current or Ex-Partner: Not on file    Emotionally Abused: Not on file    Physically Abused: Not on file    Sexually Abused: Not on file   Housing Stability:     Unable to Pay for Housing in the Last Year: Not on file    Number of [place] in the Last Year: Not on file    Unstable Housing in the Last Year: Not on file       ROS:  Pertinent items are noted in HPI. Objective:      Physical Exam:                Visit Vitals  BP (!) 124/55 (BP 1 Location: Left upper arm, BP Patient Position: At rest)   Pulse 68   Temp 97.8 °F (36.6 °C)   Resp 18   Ht 5' 11\" (1.803 m)   Wt 79.5 kg (175 lb 4.3 oz)   SpO2 98%   BMI 24.44 kg/m²          General Appearance:   Well developed, well nourished,alert and oriented x 3, and   individual in no acute distress. Ears/Nose/Mouth/Throat:    Hearing grossly normal.         Neck:  Supple. Chest:    few faint crackles right base. No use of accessory muscles. .   Cardiovascular:   Regular rate and rhythm, S1, S2 normal, grade III/VI systolic murmur best at LLSB   Abdomen:    Soft, non-tender, bowel sounds are active. Extremities:  No edema bilaterally. Skin:  Warm and dry.      Telemetry: NSR 1st degree AV blcok          Data Review:    Labs:    Recent Results (from the past 24 hour(s))   CBC W/O DIFF    Collection Time: 03/08/22  3:51 AM   Result Value Ref Range    WBC 6.8 4.1 - 11.1 K/uL    RBC 3.45 (L) 4.10 - 5.70 M/uL    HGB 9.3 (L) 12.1 - 17.0 g/dL    HCT 29.2 (L) 36.6 - 50.3 % MCV 84.6 80.0 - 99.0 FL    MCH 27.0 26.0 - 34.0 PG    MCHC 31.8 30.0 - 36.5 g/dL    RDW 15.9 (H) 11.5 - 14.5 %    PLATELET 828 900 - 692 K/uL    MPV 8.8 (L) 8.9 - 12.9 FL    NRBC 0.0 0  WBC    ABSOLUTE NRBC 0.00 0.00 - 7.63 K/uL   METABOLIC PANEL, BASIC    Collection Time: 03/08/22  3:51 AM   Result Value Ref Range    Sodium 139 136 - 145 mmol/L    Potassium 3.7 3.5 - 5.1 mmol/L    Chloride 108 97 - 108 mmol/L    CO2 28 21 - 32 mmol/L    Anion gap 3 (L) 5 - 15 mmol/L    Glucose 102 (H) 65 - 100 mg/dL    BUN 11 6 - 20 MG/DL    Creatinine 0.85 0.70 - 1.30 MG/DL    BUN/Creatinine ratio 13 12 - 20      GFR est AA >60 >60 ml/min/1.73m2    GFR est non-AA >60 >60 ml/min/1.73m2    Calcium 8.5 8.5 - 10.1 MG/DL          Radiology:        Current Facility-Administered Medications   Medication Dose Route Frequency    0.9% sodium chloride infusion 250 mL  250 mL IntraVENous PRN    pantoprazole (PROTONIX) injection 40 mg  40 mg IntraVENous Q12H    And    sodium chloride (NS) flush 10 mL  10 mL IntraVENous Q12H    metoprolol succinate (TOPROL-XL) XL tablet 25 mg  25 mg Oral DAILY    sodium chloride (NS) flush 5-40 mL  5-40 mL IntraVENous Q8H    sodium chloride (NS) flush 5-40 mL  5-40 mL IntraVENous PRN    acetaminophen (TYLENOL) tablet 650 mg  650 mg Oral Q6H PRN    Or    acetaminophen (TYLENOL) suppository 650 mg  650 mg Rectal Q6H PRN    polyethylene glycol (MIRALAX) packet 17 g  17 g Oral DAILY PRN    ondansetron (ZOFRAN ODT) tablet 4 mg  4 mg Oral Q8H PRN    Or    ondansetron (ZOFRAN) injection 4 mg  4 mg IntraVENous Q6H PRN    finasteride (PROSCAR) tablet 5 mg  5 mg Oral DAILY    levothyroxine (SYNTHROID) tablet 50 mcg  50 mcg Oral ACB    rosuvastatin (CRESTOR) tablet 20 mg  20 mg Oral QHS    tamsulosin (FLOMAX) capsule 0.4 mg  0.4 mg Oral DAILY    [START ON 3/12/2022] levothyroxine (SYNTHROID) tablet 50 mcg  50 mcg Oral Q7D          Yana Paredes.  Clive 3914, NP     [facility] [address] [facility] [address]   Aravind Hannah   [phone]

## 2022-03-08 NOTE — PROGRESS NOTES
Spiritual Care Partner Volunteer visited patient in [facility] on 3.8. 22.     Documented by Haseeb Shaw, Staff , on 3.8.22  Please call MELI ([phone]) to page  if needed

## 2022-03-08 NOTE — ROUTINE PROCESS
[facility]  1940  [id number]    Situation:  Verbal report received from: Hallie Syed RN  Procedure: Procedure(s):  COLONOSCOPY -;    Background:    Preoperative diagnosis: Anemia  Postoperative diagnosis: Diverticulosis    :  Dr. Elsy Saleh  Assistant(s): Endoscopy Technician-1: Bill Reza  Endoscopy RN-1: Sanjuana Ji RN    Specimens: * No specimens in log *  H. Pylori  no    Assessment:  Intra-procedure medications   Anesthesia gave intra-procedure sedation and medications, see anesthesia flow sheet yes    Intravenous fluids: NS@ KVO     Vital signs stable     Abdominal assessment: round and soft     Recommendation:.   Return to floor  Family or Friend   Permission to share finding with family or friend yes

## 2022-03-08 NOTE — H&P
Pre-Endoscopy H&P   Chief complaint: hematochezia    HPI:  Patient presents for procedure. The indication for the procedure, the patient's history and the patient's current medications are reviewed prior to the procedure and that data is reported on the endoscopy note in the chart to which this document is attached. Any significant complaints with regard to organ systems will be noted, and if not mentioned then a review of systems is not contributory.     Past Medical History:   Diagnosis Date    Arrhythmia 04/12/2020    cardioversion for SVT    Arthritis     HANDS    Basal cell carcinoma     BPH (benign prostatic hyperplasia)     CAD (coronary artery disease)     s/p PTCA '92    Calculus of kidney     Chronic pain     BACK    Elevated PSA     Heart attack ([address]     Hypercholesterolemia     Kidney stones     Lumbar foraminal stenosis     Melanoma ([address]     MVP (mitral valve prolapse)     NSTEMI (non-ST elevated myocardial infarction) ([address] 03/25/2020    Other ill-defined conditions(799.89)     Prostatitis    Prediabetes     Seasonal allergies     Squamous cell carcinoma     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     with left eye visual loss; 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    Systolic murmur     Thyroid disease     HYPOTHYROID    Vision impairment     R EYE, BLOOD CLOT ON RETINA      Past Surgical History:   Procedure Laterality Date    ENDOSCOPY, COLON, DIAGNOSTIC      due 12    HX BACK SURGERY      HX CATARACT REMOVAL Bilateral 11/2021    HX CHOLECYSTECTOMY  1970    HX CORONARY ARTERY BYPASS GRAFT  03/26/2020    x 5, LIMA to LAD, RSVG to Diag-RI-OM, RSVG to RCA    [address] HX HERNIA REPAIR  09/15/2021    HX LITHOTRIPSY      x2    HX ORTHOPAEDIC      elbow - fx right arm age 3 yrs    DC CARDIOVERSION ELECTIVE ARRHYTHMIA EXTERNAL N/A 4/13/2020    EP CARDIOVERSION performed by Lisandra Sarabia MD at [address] CATH LAB     Social   Social History     Tobacco Use    Smoking status: Never Smoker    Smokeless tobacco: Never Used   Substance Use Topics    Alcohol use: No      Family History   Problem Relation Age of Onset    Heart Disease Mother         CHF    Heart Disease Father         CAD    Heart Disease Sister         CAD CABG    Lung Disease Sister     Heart Disease Brother         CAD    Pulmonary Fibrosis Brother     Heart Disease Brother         CAD    Stroke Brother 34        cerebral hemorrhage    Cancer Brother         LUNG    High Cholesterol Daughter     No Known Problems Son     Anesth Problems Neg Hx       Allergies   Allergen Reactions    Codeine Rash    Pcn [Penicillins] Rash     HAD REACTION WILL IN COLLEGE DEVELOPED A RASH , NO HOSPITAL TREATMENT NEEDED PER PATIENT    Sulfa (Sulfonamide Antibiotics) Other (comments)     confusion      Prior to Admission Medications   Prescriptions Last Dose Informant Patient Reported? Taking? Eliquis 5 mg tablet 3/4/2022 at Unknown time  No Yes   Sig: TAKE 1 TABLET BY MOUTH EVERY 12 HOURS   acetaminophen (TYLENOL) 325 mg tablet 3/4/2022 at Unknown time  Yes Yes   Sig: Take 650 mg by mouth every four (4) hours as needed for Pain. aspirin 81 mg chewable tablet 3/4/2022 at Unknown time  No Yes   Sig: Take 1 Tab by mouth daily. cholecalciferol (Vitamin D3) (2,000 UNITS /50 MCG) cap capsule 3/4/2022 at Unknown time  Yes Yes   Sig: Take 2,000 Units by mouth every morning. finasteride (PROSCAR) 5 mg tablet 3/4/2022 at Unknown time  Yes Yes   Sig: Take 5 mg by mouth daily. HS   furosemide (Lasix) 20 mg tablet 3/4/2022 at Unknown time  Yes Yes   Sig: Take 20 mg by mouth daily. ibuprofen (MOTRIN) 800 mg tablet 3/4/2022 at Unknown time  No Yes   Sig: Take 1 Tablet by mouth every six (6) hours as needed for Pain.   levothyroxine (SYNTHROID) 50 mcg tablet 3/4/2022 at Unknown time  No Yes   Sig: TAKE 1 TABLET BY MOUTH EVERY DAY BEFORE BREAKFAST EXCEPT TAKE 2 TABS ON SATURDAY.    rosuvastatin (CRESTOR) 20 mg tablet 3/4/2022 at Unknown time  No Yes   Sig: Take 1 Tablet by mouth nightly. Replaces atorvastatin   tamsulosin (FLOMAX) 0.4 mg capsule 3/4/2022 at Unknown time  Yes Yes   Sig: TAKE 1 CAPSULE BY MOUTH EVERY DAY 30 MINUTES AFTER THE SAME MEAL EACH DAY   vitamins  A,C,E-zinc-copper (Ocuvite PreserVision) [phone] unit-mg-unit tab tablet 3/4/2022 at Unknown time  No Yes   Sig: Take 1 Tab by mouth daily. Patient taking differently: Take 1 Tablet by mouth two (2) times a day. Facility-Administered Medications: None       PHYSICAL EXAM:  The patient is examined immediately prior to the procedure. Visit Vitals  BP (!) 137/53 (BP 1 Location: Right upper arm)   Pulse 93   Temp 98.1 °F (36.7 °C)   Resp 16   Ht 5' 11\" (1.803 m)   Wt 79.5 kg (175 lb 4.3 oz)   SpO2 98%   BMI 24.44 kg/m²     Gen: Appears comfortable, no distress. Pulm: comfortable respirations with no abnormal audible breath sounds  CV: heart regular, well perfused  GI: abdomen flat. ASSESSMENT:  Patient here for procedure. The indication for the procedure, the patient's history and the patient's current medications are reviewed prior to the procedure and that data is reported on the endoscopy note in the chart to which this document is attached. PLAN:  Informed consent discussion held, patient afforded an opportunity to ask questions and all questions answered. After being advised of the risks, benefits, and alternatives, the patient requested that we proceed and indicated so on a written consent form. Will proceed with procedure as planned.   Marcie Bruno MD

## 2022-03-08 NOTE — PERIOP NOTES
1250: Pt arrives to endo via stretcher. 1310: Report rec'd from [facility]. .Patient has been evaluated by anesthesia pre-procedure. Patient alert and oriented. Vital signs will not be charted by the Endoscopy nurse. All vitals, airway, and loc are monitored by anesthesia staff throughout procedure. .Endoscope will be pre-cleaned at bedside immediately following procedure by MATTEO Telles TRANSFER - OUT REPORT:    Verbal report given to Hiram(name) on [facility]  being transferred to [facility](unit) for routine post - op       Report consisted of patients Situation, Background, Assessment and   Recommendations(SBAR). Information from the following report(s) Procedure Summary was reviewed with the receiving nurse. Lines:   Peripheral IV 03/05/22 Antecubital (Active)   Site Assessment Clean, dry, & intact 03/08/22 0800   Phlebitis Assessment 0 03/08/22 0800   Infiltration Assessment 0 03/08/22 0800   Dressing Status Clean, dry, & intact 03/08/22 0800   Dressing Type Transparent;Tape 03/08/22 0800   Hub Color/Line Status Pink;Capped 03/08/22 0800   Action Taken Open ports on tubing capped 03/08/22 0800   Alcohol Cap Used Yes 03/08/22 0800        Opportunity for questions and clarification was provided.

## 2022-03-09 VITALS
BODY MASS INDEX: 24.51 KG/M2 | OXYGEN SATURATION: 100 % | WEIGHT: 175.04 LBS | SYSTOLIC BLOOD PRESSURE: 109 MMHG | HEIGHT: 71 IN | TEMPERATURE: 97.8 F | DIASTOLIC BLOOD PRESSURE: 45 MMHG | RESPIRATION RATE: 19 BRPM | HEART RATE: 83 BPM

## 2022-03-09 LAB
ANION GAP SERPL CALC-SCNC: 1 MMOL/L (ref 5–15)
BUN SERPL-MCNC: 17 MG/DL (ref 6–20)
BUN/CREAT SERPL: 18 (ref 12–20)
CALCIUM SERPL-MCNC: 8.4 MG/DL (ref 8.5–10.1)
CHLORIDE SERPL-SCNC: 109 MMOL/L (ref 97–108)
CO2 SERPL-SCNC: 29 MMOL/L (ref 21–32)
CREAT SERPL-MCNC: 0.94 MG/DL (ref 0.7–1.3)
ERYTHROCYTE [DISTWIDTH] IN BLOOD BY AUTOMATED COUNT: 16.2 % (ref 11.5–14.5)
GLUCOSE SERPL-MCNC: 108 MG/DL (ref 65–100)
HCT VFR BLD AUTO: 27.9 % (ref 36.6–50.3)
HGB BLD-MCNC: 8.7 G/DL (ref 12.1–17)
MAGNESIUM SERPL-MCNC: 2.2 MG/DL (ref 1.6–2.4)
MCH RBC QN AUTO: 26.8 PG (ref 26–34)
MCHC RBC AUTO-ENTMCNC: 31.2 G/DL (ref 30–36.5)
MCV RBC AUTO: 85.8 FL (ref 80–99)
NRBC # BLD: 0 K/UL (ref 0–0.01)
NRBC BLD-RTO: 0 PER 100 WBC
PLATELET # BLD AUTO: 268 K/UL (ref 150–400)
PMV BLD AUTO: 8.8 FL (ref 8.9–12.9)
POTASSIUM SERPL-SCNC: 4.1 MMOL/L (ref 3.5–5.1)
RBC # BLD AUTO: 3.25 M/UL (ref 4.1–5.7)
SODIUM SERPL-SCNC: 139 MMOL/L (ref 136–145)
WBC # BLD AUTO: 5.7 K/UL (ref 4.1–11.1)

## 2022-03-09 PROCEDURE — 74011250637 HC RX REV CODE- 250/637: Performed by: INTERNAL MEDICINE

## 2022-03-09 PROCEDURE — 85027 COMPLETE CBC AUTOMATED: CPT

## 2022-03-09 PROCEDURE — C9113 INJ PANTOPRAZOLE SODIUM, VIA: HCPCS | Performed by: INTERNAL MEDICINE

## 2022-03-09 PROCEDURE — 36415 COLL VENOUS BLD VENIPUNCTURE: CPT

## 2022-03-09 PROCEDURE — 83735 ASSAY OF MAGNESIUM: CPT

## 2022-03-09 PROCEDURE — 74011250637 HC RX REV CODE- 250/637: Performed by: SPECIALIST

## 2022-03-09 PROCEDURE — 80048 BASIC METABOLIC PNL TOTAL CA: CPT

## 2022-03-09 PROCEDURE — 74011000250 HC RX REV CODE- 250: Performed by: INTERNAL MEDICINE

## 2022-03-09 PROCEDURE — 99232 SBSQ HOSP IP/OBS MODERATE 35: CPT | Performed by: INTERNAL MEDICINE

## 2022-03-09 PROCEDURE — 74011250636 HC RX REV CODE- 250/636: Performed by: INTERNAL MEDICINE

## 2022-03-09 RX ORDER — METOPROLOL SUCCINATE 25 MG/1
25 TABLET, EXTENDED RELEASE ORAL DAILY
Qty: 30 TABLET | Refills: 1 | Status: SHIPPED | OUTPATIENT
Start: 2022-03-10 | End: 2022-04-20 | Stop reason: SDUPTHER

## 2022-03-09 RX ORDER — AMMONIUM LACTATE 12 G/100G
LOTION TOPICAL ONCE
Status: DISCONTINUED | OUTPATIENT
Start: 2022-03-09 | End: 2022-03-09 | Stop reason: HOSPADM

## 2022-03-09 RX ADMIN — FINASTERIDE 5 MG: 5 TABLET, FILM COATED ORAL at 09:18

## 2022-03-09 RX ADMIN — SODIUM CHLORIDE, PRESERVATIVE FREE 10 ML: 5 INJECTION INTRAVENOUS at 07:08

## 2022-03-09 RX ADMIN — METOPROLOL SUCCINATE 25 MG: 25 TABLET, EXTENDED RELEASE ORAL at 09:18

## 2022-03-09 RX ADMIN — LEVOTHYROXINE SODIUM 50 MCG: 0.05 TABLET ORAL at 07:08

## 2022-03-09 RX ADMIN — SODIUM CHLORIDE, PRESERVATIVE FREE 10 ML: 5 INJECTION INTRAVENOUS at 12:00

## 2022-03-09 RX ADMIN — TAMSULOSIN HYDROCHLORIDE 0.4 MG: 0.4 CAPSULE ORAL at 09:18

## 2022-03-09 RX ADMIN — PANTOPRAZOLE SODIUM 40 MG: 40 INJECTION, POWDER, FOR SOLUTION INTRAVENOUS at 11:59

## 2022-03-09 RX ADMIN — APIXABAN 5 MG: 5 TABLET, FILM COATED ORAL at 09:18

## 2022-03-09 RX ADMIN — ASPIRIN 81 MG 81 MG: 81 TABLET ORAL at 09:18

## 2022-03-09 NOTE — PROGRESS NOTES
Cardiology Progress Note            Admit Date: 3/5/2022  Admit Diagnosis: Chest pain [R07.9]  VANN (dyspnea on exertion) [R06.00]  Bloody stool [K92.1]  Date: 3/9/2022     Time: 5:01 PM  Primary cardiologist:  Dr. Scott Batista    HPI: Judy Donald is a 80 y.o. male who presented 3/5/2022 with complaints of SOB and chest pain. The symptoms began approximately 1 day prior. He has PMH of CAD, CABG (5 vessel) in 3/2020, HTN, HLD, pre-diabetes, MVP, MR (mild) and atrial fib. He had CABG and AF in 2019, known MR felt to be moderate by echo 10/21, was on Eliquis as prophylaxis. Admitted with anemia, lower GI bleed. He received  unit PRBCs on 3/5 and hgb today 8.6. Last dose eliquis on 3/5/22 a.m. Subjective: Denies chest pain, SOB at rest and with walking around unit. Had colonoscopy yesterday with no evidence of active or recent bleeding. Retrialing Eliquis and ASA per GI. Assessment and Plan     1. SOB: currently resolved.   -No pulmonary edema on imagins. -EF 55-50%, NWMA. Mild AI, moderate to severe MR.   -Suspect anemia played the major role in his presenting symptoms. 2. Mitral regurgitation: mod- severe.   -monitor outpatient. 3. History of CAD (CABG x 5 3/2020):   -Toprol XL. -Continue ASA (ok with GI), statin. statin.   -Stable. No chest pain. 4. History of PAF (postop cabg),     -Hx of cardiovesion 4/13/20, off amiodarone 12/2020   -Remains NSR-sinus tachycardia with 1st degree AV block. No PAF on tele review.   -Continue toprol XL. -Resume eliquis (ok with GI). Consider watchman as outpatient but will need 45 days of [address] post procedure. 5.HTN   -bp controlled on toprol XL. 6. Anemia,GIB   -hgb 8.7   -GI suspects may have had diverticular or hemorrhoid bleed    7. Aortic insufficiency/aortic stenosis: both mild by echo     Cardiac status stable.  Eliquis and ASA restarted with GI approval.  Will need close followup with Dr. Hollie Amador. Office will call to move up appointment. Saw and evaluated pt and agree with above assessment and plan. Pt is stable cardiac wise. [address] was held for GI bleed; GI ok'd restarting. Consider watchman outpt and will need close follow up with Dr. Cecy Redmond MD         Future Appointments   Date Time Provider Wanda Becker   4/20/2022  8:40 AM MD ZOHREH Piedra BS AMB   6/22/2022  8:00 AM Luis Rodriguez MD [facility] SAMI BS AMB        Manda Almaguer. BRENNAN Purvis      Cardiac testing  03/05/22    ECHO ADULT COMPLETE 03/06/2022 3/6/2022    Interpretation Summary    Left Ventricle: Left ventricle size is normal. Normal wall thickness. Normal wall motion. Normal left ventricular systolic function with a visually estimated EF of 55 - 60%. Normal diastolic function.   Aortic Valve: Moderately thickened cusp. Mildly calcified cusp. Moderate sclerosis of the aortic valve cusp. Mild transvalvular regurgitation.   Mitral Valve: Mildly thickened leaflet. Moderate to severe transvalvular regurgitation with an eccentrically directed jet and may underestimate severity.   Left Atrium: Left atrium is moderately dilated. Signed by:  Sixto Contreras MD on 3/6/2022  3:47 PM        Echo 6/20 - EF 55-60%, mild cLVH, no WMA, mod AV sclerosis with mild AI, mod-severe MR, mild TR, mod PI, mild PA HTN (40mmHg), trivial circumferential pericardial effusion, bilateral pleural effusions  DCCV 4/13/20  CABG x 5 on 3/26/20 with Dr. El BEYER to LAD, saphenous vein graft to diagonal to ramus intermedius to obtuse marginal; saphenous vein graft to right coronary artery  Carotid duplex 3/20 - <50% bilateral ICA stenosis  SHANE 3/20 - WNL    PMH  Past Medical History:   Diagnosis Date    Arrhythmia 04/12/2020    cardioversion for SVT    Arthritis     HANDS    Basal cell carcinoma     BPH (benign prostatic hyperplasia)     CAD (coronary artery disease)     s/p PTCA '92    Calculus of kidney     Chronic pain     BACK    Elevated PSA     [facility]     Hypercholesterolemia     Kidney stones     Lumbar foraminal stenosis     [facility]     MVP (mitral valve prolapse)     NSTEMI (non-ST elevated myocardial infarction) ([facility] 03/25/2020    Other ill-defined conditions(799.89)     Prostatitis    Prediabetes     Seasonal allergies     Squamous cell carcinoma     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; 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    Systolic murmur     Thyroid disease     HYPOTHYROID    Vision impairment     R EYE, BLOOD CLOT ON RETINA      Social Hx  Social History     Socioeconomic History    Marital status:      Spouse name: Not on file    Number of children: Not on file    Years of education: Not on file    Highest education level: Not on file   Occupational History    Occupation:      Comment: part time consulting   Tobacco Use    Smoking status: Never Smoker    Smokeless tobacco: Never Used   Vaping Use    Vaping Use: Never used   Substance and Sexual Activity    Alcohol use: No    Drug use: No    Sexual activity: Not on file   Other Topics Concern     Service Not Asked    Blood Transfusions Not Asked    Caffeine Concern Not Asked    Occupational Exposure Not Asked    Hobby Hazards Not Asked    Sleep Concern Not Asked    Stress Concern Not Asked    Weight Concern Not Asked    Special Diet Not Asked    Back Care Not Asked    Exercise Not Asked    Bike Helmet Not Asked    Seat Belt Not Asked    Self-Exams Not Asked   Social History Narrative    Not on file     Social Determinants of Health     Financial Resource Strain:     Difficulty of Paying Living Expenses: Not on file   Food Insecurity:     Worried About Running Out of Food in the Last Year: Not on file    Singh of Food in the Last Year: Not on file   Transportation Needs:     Lack of Transportation (Medical):  Not on file    Lack of Transportation (Non-Medical): Not on file   Physical Activity:     Days of Exercise per Week: Not on file    Minutes of Exercise per Session: Not on file   Stress:     Feeling of Stress : Not on file   Social Connections:     Frequency of Communication with Friends and Family: Not on file    Frequency of Social Gatherings with Friends and Family: Not on file    Attends [employer] Services: Not on file    Active Member of [address] [place] [address] or Organizations: Not on file    Attends Club or Organization Meetings: Not on file    Marital Status: Not on file   Intimate Partner Violence:     Fear of Current or Ex-Partner: Not on file    Emotionally Abused: Not on file    Physically Abused: Not on file    Sexually Abused: Not on file   Housing Stability:     Unable to Pay for Housing in the Last Year: Not on file    Number of [place] in the Last Year: Not on file    Unstable Housing in the Last Year: Not on file       ROS:  Pertinent items are noted in HPI. Objective:      Physical Exam:                Visit Vitals  BP (!) 109/45 (BP 1 Location: Right upper arm, BP Patient Position: At rest)   Pulse 83   Temp 97.8 °F (36.6 °C)   Resp 19   Ht 5' 11\" (1.803 m)   Wt 79.4 kg (175 lb 0.7 oz)   SpO2 100%   BMI 24.41 kg/m²          General Appearance:   Well developed, well nourished,alert and oriented x 3, and   individual in no acute distress. Ears/Nose/Mouth/Throat:    Hearing grossly normal.         Neck:  Supple. Chest:    clear. No use of accessory muscles. .   Cardiovascular:   Regular rate and rhythm, S1, S2 normal, grade III/VI systolic murmur best at LLSB   Abdomen:    Soft, non-tender, bowel sounds are active. Extremities:  No edema bilaterally. Skin:  Warm and dry.      Telemetry: NSR-sinus tachycardia 1st degree AV blcok          Data Review:    Labs:    Recent Results (from the past 24 hour(s))   CBC W/O DIFF    Collection Time: 03/09/22  4:12 AM   Result Value Ref Range    WBC 5.7 4.1 - 11.1 K/uL    RBC 3.25 (L) 4.10 - 5.70 M/uL HGB 8.7 (L) 12.1 - 17.0 g/dL    HCT 27.9 (L) 36.6 - 50.3 %    MCV 85.8 80.0 - 99.0 FL    MCH 26.8 26.0 - 34.0 PG    MCHC 31.2 30.0 - 36.5 g/dL    RDW 16.2 (H) 11.5 - 14.5 %    PLATELET 887 271 - 044 K/uL    MPV 8.8 (L) 8.9 - 12.9 FL    NRBC 0.0 0  WBC    ABSOLUTE NRBC 0.00 0.00 - 3.52 K/uL   METABOLIC PANEL, BASIC    Collection Time: 03/09/22  4:12 AM   Result Value Ref Range    Sodium 139 136 - 145 mmol/L    Potassium 4.1 3.5 - 5.1 mmol/L    Chloride 109 (H) 97 - 108 mmol/L    CO2 29 21 - 32 mmol/L    Anion gap 1 (L) 5 - 15 mmol/L    Glucose 108 (H) 65 - 100 mg/dL    BUN 17 6 - 20 MG/DL    Creatinine 0.94 0.70 - 1.30 MG/DL    BUN/Creatinine ratio 18 12 - 20      GFR est AA >60 >60 ml/min/1.73m2    GFR est non-AA >60 >60 ml/min/1.73m2    Calcium 8.4 (L) 8.5 - 10.1 MG/DL   MAGNESIUM    Collection Time: 03/09/22  4:12 AM   Result Value Ref Range    Magnesium 2.2 1.6 - 2.4 mg/dL          Radiology:        Current Facility-Administered Medications   Medication Dose Route Frequency    ammonium lactate (LAC-HYDRIN) 12 % lotion   Topical ONCE    diphenhydrAMINE-zinc acetate 1%-0.1% (BENADRYL) cream   Topical ONCE    sodium chloride (NS) flush 5-40 mL  5-40 mL IntraVENous Q8H    sodium chloride (NS) flush 5-40 mL  5-40 mL IntraVENous PRN    apixaban (ELIQUIS) tablet 5 mg  5 mg Oral Q12H    aspirin chewable tablet 81 mg  81 mg Oral DAILY    0.9% sodium chloride infusion 250 mL  250 mL IntraVENous PRN    pantoprazole (PROTONIX) injection 40 mg  40 mg IntraVENous Q12H    And    sodium chloride (NS) flush 10 mL  10 mL IntraVENous Q12H    metoprolol succinate (TOPROL-XL) XL tablet 25 mg  25 mg Oral DAILY    sodium chloride (NS) flush 5-40 mL  5-40 mL IntraVENous Q8H    sodium chloride (NS) flush 5-40 mL  5-40 mL IntraVENous PRN    acetaminophen (TYLENOL) tablet 650 mg  650 mg Oral Q6H PRN    Or    acetaminophen (TYLENOL) suppository 650 mg  650 mg Rectal Q6H PRN    polyethylene glycol (MIRALAX) packet 17 g  17 g Oral DAILY PRN    ondansetron (ZOFRAN ODT) tablet 4 mg  4 mg Oral Q8H PRN    Or    ondansetron (ZOFRAN) injection 4 mg  4 mg IntraVENous Q6H PRN    finasteride (PROSCAR) tablet 5 mg  5 mg Oral DAILY    levothyroxine (SYNTHROID) tablet 50 mcg  50 mcg Oral ACB    rosuvastatin (CRESTOR) tablet 20 mg  20 mg Oral QHS    tamsulosin (FLOMAX) capsule 0.4 mg  0.4 mg Oral DAILY    [START ON 3/12/2022] levothyroxine (SYNTHROID) tablet 50 mcg  50 mcg Oral Q7D          David Castro.  BRENNAN Purvis     [facility] [address] [place] [address], [address] [place] [address] [address]   Merlin Hannah   [phone]

## 2022-03-09 NOTE — ANESTHESIA POSTPROCEDURE EVALUATION
Procedure(s):  COLONOSCOPY -;.    MAC    Anesthesia Post Evaluation      Multimodal analgesia: multimodal analgesia not used between 6 hours prior to anesthesia start to PACU discharge  Patient location during evaluation: PACU  Patient participation: complete - patient participated  Level of consciousness: awake  Pain score: 0  Pain management: adequate  Airway patency: patent  Anesthetic complications: no  Cardiovascular status: acceptable  Respiratory status: acceptable  Hydration status: acceptable  Comments: I have evaluated the patient and meets criteria for discharge from PACU. Matthew Tapia MD    Final Post Anesthesia Temperature Assessment:  Normothermia (36.0-37.5 degrees C)      INITIAL Post-op Vital signs:   Vitals Value Taken Time   /48 03/09/22 0712   Temp 36.6 °C (97.9 °F) 03/09/22 0712   Pulse 74 03/09/22 0821   Resp 19 03/09/22 0712   SpO2 95 % 03/09/22 0713   Vitals shown include unvalidated device data.

## 2022-03-09 NOTE — PROGRESS NOTES
Problem: Falls - Risk of  Goal: *Absence of Falls  Description: Document Mary Locket Fall Risk and appropriate interventions in the flowsheet.   Outcome: Progressing Towards Goal  Note: Fall Risk Interventions:            Medication Interventions: Teach patient to arise slowly         History of Falls Interventions: Vital signs minimum Q4HRs X 24 hrs (comment for end date)         Problem: Patient Education: Go to Patient Education Activity  Goal: Patient/Family Education  Outcome: Progressing Towards Goal

## 2022-03-09 NOTE — PROGRESS NOTES
0730: Bedside and Verbal shift change report given to ESTUARDO Gandhi  (oncoming nurse) by Alex Hennessy  (offgoing nurse). Report included the following information SBAR, Kardex, Intake/Output, MAR, Recent Results, Med Rec Status and Cardiac Rhythm NSR. Preceptor Review of Student Work    3/9/2022  - Shift times - 2330 to 0730    The RN documentation of patient care for [facility] has been reviewed and approved. All medications have been administered under the direct supervision of the or preceptor.     Herbie Garnica RN

## 2022-03-09 NOTE — TELEPHONE ENCOUNTER
----- Message from Wilbert Arias. BRENNAN Purvis sent at 3/9/2022 12:56 PM EST -----  Please change follow up for 1 week-10 days . Please call pt with appointment. Future Appointments  4/20/2022  8:40 AM    MD ZOHREH Liang              BS AMB  6/22/2022  8:00 AM    Angie Rodriguez MD WEIM                BS AMB    Thanks!

## 2022-03-09 NOTE — DISCHARGE SUMMARY
Discharge Summary     PATIENT ID: Nancy Witt  MRN: [id number]   YOB: 1940    DATE OF ADMISSION: 3/5/2022 10:52 AM    DATE OF DISCHARGE: 3/9/2022  PRIMARY CARE PROVIDER: Brooke Cazares MD   DISCHARGING PROVIDER: Allison Florez MD    To contact this individual call [phone] and ask the  to page. If unavailable ask to be transferred the Adult Hospitalist Department. CONSULTATIONS: IP CONSULT TO GASTROENTEROLOGY  IP CONSULT TO CARDIOLOGY    PROCEDURES/SURGERIES: Procedure(s):  COLONOSCOPY -;    ADMITTING DIAGNOSES & HOSPITAL COURSE:   Cali Rutledge a 80 y. o. male with past medical history of SVT status post cardioversion, arthritis, BPH and coronary disease status post PTCA who comes in with complaints of shortness of breath, dyspnea exertion and bright red blood per rectum a week ago.   Patient is awake alert and oriented.  Able to answer my question and follow my request. Deven Ning reviewed at length.  As per collective reports, patient is on Eliquis for A. fib/SVT and was doing well until about a week ago he developed bright red blood per rectum which went on about for about 5 to 10 days and it resolved on its own. Mary Espana was seen by GI and there was a plan for colonoscopy later this month. Mary Espana was not noticing any other bleed so he kept taking his Eliquis with his aspirin but since yesterday he has been feeling shortness of breath, lethargy and dyspnea on exertion with some mid epigastric pain without any radiation, without any heaviness or chest pain at rest.  Patient woke up this morning and he was having worsening of the symptoms and felt out of breath on minimal exertion so he came to the ED for evaluation.  In the ED he was hemodynamically stable, cardiac enzymes x1 were negative, EKG was unremarkable except for right bundle branch block which is a previous finding as well.  His blood work revealed that he had significant anemia with hemoglobin dropping from 14.2-8.4 denoting acute blood loss anemia.  He is being admitted to the hospital for further relation management. Anant Martin has no ongoing bleeding, no chest pain at rest, no dyspnea at rest, he appears pale but otherwise he is in good spirits.  Denies any abdominal pain at the moment, no nausea, vomiting, fever, chills, rigors or night sweats.  Denies any other medical complaints. DISCHARGE DIAGNOSES / PLAN:      Symptomatic iron deficiency anemia 2/2 subacute GI bleed  S/p Colonoscopy 3/8 with no active bleeding  Last BRBPR was 1 week PTA, subsequent BMs have been wnl  S/p 1 PRBC 3/6 for hgb 7.5 for goal hgb>8 given CAD and cardiac symptoms  Per GI ok to resume eliquis and ASA post-colonoscopy, pt remains stable  If recurs, may need watchman per cardiology  No ppi needed unless for symptomatic reflux     Paroxysmal atrial fibrillation, currently normal sinus rhythm, rate-controlled  Resumed Eliquis and aspirin  Cardiology started BB to assist in maintenance of sinus rhythm     CAD, Aortic stenosis  Hold home Lasix for now, unclear why on this  Statin  Echo wnl 55-60%  Per cardiology: suspect his chest pressure and SOB was due to anemia, not progression of CAD. Will not order stress test at this point but can be considered in future if indicated     BPH : flomax     Hypothyroidism  Cont LT4; TSH wnl 12/2021     ADDITIONAL CARE RECOMMENDATIONS: f/u pcp, cardiology, gi    PENDING TEST RESULTS:   At the time of discharge the following test results are still pending: none    Patient Instructions     FOLLOW UP APPOINTMENTS:    Follow-up Information     Follow up With Specialties Details Why Contact Info    Chun Mendoza MD Internal Medicine   [address] [place] [address]  [address] [facility]  [phone]           DIET: Cardiac Diet    ACTIVITY: Activity as tolerated    NOTIFY YOUR PHYSICIAN FOR ANY OF THE FOLLOWING:   Fever over 101 degrees for 24 hours.    Chest pain, shortness of breath, fever, chills, nausea, vomiting, diarrhea, change in mentation, falling, weakness, bleeding. Severe pain or pain not relieved by medications. Or, any other signs or symptoms that you may have questions about. DISPOSITION:  x  Home With:   OT  PT  HH  RN       Long term SNF/Inpatient Rehab    Independent/assisted living    Hospice    Other:       PATIENT CONDITION AT DISCHARGE:   Functional status    Poor     Deconditioned    x Independent      Cognition    x Lucid     Forgetful     Dementia      Catheters/lines (plus indication)    Russell     PICC     PEG    x None      Code status   x  Full code     DNR      PHYSICAL EXAMINATION AT DISCHARGE:  General:  Alert, cooperative, no distress  Back:    Symmetric, ROM normal  Lungs:   Clear to auscultation bilaterally, symmetric expansion, no respiratory distress  Chest wall:  No tenderness or deformity  Heart:   Regular rate and rhythm, 2/6 SM  Abdomen:   Soft, non-tender, ND  Extremities: Extremities normal, atraumatic, no cyanosis or edema  Skin:  Skin color, texture, turgor normal. No rashes or lesions  Neurologic: CNII-XII intact. LOUIS.  A&Ox3    CHRONIC MEDICAL DIAGNOSES:  Problem List as of 3/9/2022 Date Reviewed: 3/8/2022          Codes Class Noted - Resolved    Nonrheumatic mitral valve regurgitation ICD-10-CM: I34.0  ICD-9-CM: 424.0  3/6/2022 - Present        Chest pain ICD-10-CM: R07.9  ICD-9-CM: 786.50  3/5/2022 - Present        VANN (dyspnea on exertion) ICD-10-CM: R06.00  ICD-9-CM: 786.09  3/5/2022 - Present        Bloody stool ICD-10-CM: K92.1  ICD-9-CM: 578.1  3/5/2022 - Present        Peripheral vascular disease ([facility] [address] ICD-10-CM: I73.9  ICD-9-CM: 443.9  5/14/2020 - Present        Dizziness ICD-10-CM: R42  ICD-9-CM: 780.4  4/12/2020 - Present        Sustained SVT ([facility] [address] ICD-10-CM: I47.1  ICD-9-CM: 427.89  4/12/2020 - Present        RBBB ICD-10-CM: I45.10  ICD-9-CM: 426.4  4/12/2020 - Present        Hx of CABG ICD-10-CM: Z95.1  ICD-9-CM: V45.81  3/26/2020 - Present    Overview Signed 3/26/2020 7:49 PM by TOM Huerta     x 5, LIMA to LAD, RSVG to Diag-RI-OM, RSVG to RCA             NSTEMI (non-ST elevated myocardial infarction) [facility]) ICD-10-CM: I21.4  ICD-9-CM: 410.70  3/25/2020 - Present        IFG (impaired fasting glucose) ICD-10-CM: R73.01  ICD-9-CM: 790.21  5/22/2017 - Present        Lumbar foraminal stenosis (Chronic) ICD-10-CM: M48.061  ICD-9-CM: 724.02  Unknown - Present        Lumbar disc disease ICD-10-CM: M51.9  ICD-9-CM: 722.93  2/22/2017 - Present        Advance directive discussed with patient ICD-10-CM: Z71.89  ICD-9-CM: V65.49  2/21/2017 - Present        BPH with urinary obstruction ICD-10-CM: N40.1, N13.8  ICD-9-CM: 600.01, 599.69  1/9/2016 - Present        Vitamin D deficiency ICD-10-CM: E55.9  ICD-9-CM: 268.9  6/19/2014 - Present        Hypothyroidism, acquired, autoimmune ICD-10-CM: E06.3  ICD-9-CM: 244.8  6/19/2014 - Present        Skin cancer ICD-10-CM: C44.90  ICD-9-CM: 173.90  5/8/2013 - Present        Macular degeneration ICD-10-CM: H35.30  ICD-9-CM: 362.50  3/19/2012 - Present        Unspecified hearing loss ICD-10-CM: H91.90  ICD-9-CM: 389.9  9/13/2010 - Present        Mixed hyperlipidemia ICD-10-CM: E78.2  ICD-9-CM: 272.2  12/1/2009 - Present        Essential hypertension, benign ICD-10-CM: I10  ICD-9-CM: 401.1  12/1/2009 - Present        Coronary artery disease involving native coronary artery of native heart without angina pectoris ICD-10-CM: I25.10  ICD-9-CM: 414.01  12/1/2009 - Present        Calculus of kidney ICD-10-CM: N20.0  ICD-9-CM: 592.0  12/1/2009 - Present        Insomnia, unspecified ICD-10-CM: G47.00  ICD-9-CM: 780.52  12/1/2009 - Present        RESOLVED: Prediabetes ICD-10-CM: R73.03  ICD-9-CM: 790.29  2/3/2017 - 5/22/2017        RESOLVED: Encounter for long-term (current) use of other medications ICD-10-CM: Z79.899  ICD-9-CM: V58.69  5/8/2013 - 2/3/2017        RESOLVED: IFG (impaired fasting glucose) ICD-10-CM: R73.01  ICD-9-CM: 790.21  3/19/2012 - 2/3/2017        RESOLVED: BPH without obstruction/lower urinary tract symptoms ICD-10-CM: N40.0  ICD-9-CM: 600.00  12/1/2009 - 1/9/2016        RESOLVED: Allergic rhinitis, cause unspecified ICD-10-CM: J30.9  ICD-9-CM: 477.9  12/1/2009 - 2/3/2017              DISCHARGE MEDICATIONS:  Current Discharge Medication List      START taking these medications    Details   metoprolol succinate (TOPROL-XL) 25 mg XL tablet Take 1 Tablet by mouth daily. Qty: 30 Tablet, Refills: 1  Start date: 3/10/2022         CONTINUE these medications which have NOT CHANGED    Details   cholecalciferol (Vitamin D3) (2,000 UNITS /50 MCG) cap capsule Take 2,000 Units by mouth every morning. rosuvastatin (CRESTOR) 20 mg tablet Take 1 Tablet by mouth nightly. Replaces atorvastatin  Qty: 90 Tablet, Refills: 3      levothyroxine (SYNTHROID) 50 mcg tablet TAKE 1 TABLET BY MOUTH EVERY DAY BEFORE BREAKFAST EXCEPT TAKE 2 TABS ON SATURDAY. Qty: 112 Tablet, Refills: 3    Associated Diagnoses: Hypothyroidism, acquired, autoimmune      Eliquis 5 mg tablet TAKE 1 TABLET BY MOUTH EVERY 12 HOURS  Qty: 180 Tablet, Refills: 3      aspirin 81 mg chewable tablet Take 1 Tab by mouth daily. Qty: 90 Tab, Refills: 3      vitamins  A,C,E-zinc-copper (Ocuvite PreserVision) 7,160-113-100 unit-mg-unit tab tablet Take 1 Tab by mouth daily. Qty: 30 Tab, Refills: 5    Associated Diagnoses: Macular degeneration of both eyes, unspecified type      acetaminophen (TYLENOL) 325 mg tablet Take 650 mg by mouth every four (4) hours as needed for Pain.      tamsulosin (FLOMAX) 0.4 mg capsule TAKE 1 CAPSULE BY MOUTH EVERY DAY 30 MINUTES AFTER THE SAME MEAL EACH DAY  Refills: 11      finasteride (PROSCAR) 5 mg tablet Take 5 mg by mouth daily.  HS         STOP taking these medications       furosemide (Lasix) 20 mg tablet Comments:   Reason for Stopping:         ibuprofen (MOTRIN) 800 mg tablet Comments:   Reason for Stopping:             Greater than 30 minutes were spent with the patient on counseling and coordination of care    Signed:   Claudetta Cumins, MD  3/9/2022  11:03 AM

## 2022-03-10 ENCOUNTER — PATIENT OUTREACH (OUTPATIENT)
Dept: CASE MANAGEMENT | Age: 82
End: 2022-03-10

## 2022-03-10 NOTE — PROGRESS NOTES
Care Transitions Initial Call    Call within 2 business days of discharge: Yes     Patient: Rick Meyer Patient : 1940 MRN: [id number]    Last Discharge [address]       Complaint Diagnosis Description Type Department Provider    3/5/22 Chest Pain Chest pain, unspecified type . .. ED to Hosp-Admission (Discharged) (ADMIT) Ana Cristina Kennedy MD; Isac Zavala... Was this an external facility discharge? Yes, [facility] 3/5-3/9 Discharge Facility    Challenges to be reviewed by the provider   Additional needs identified to be addressed with provider: yes  [facility] 3/5-3/9 Iron deficiency anemia/GIB  Needs ANGELY with pcp. EKG- ok except did show Right BBB which is previous finding  Given 1 unit PRBC for Hgb 7.5. Colonoscopy 3/8-no active bleeding. ECHO 55-60%. Per GI, ok to resume Eliquis and ASA post colonoscopy. Method of communication with provider : chart routing    Discussed [phone] related testing which was not done at this time. Advance Care Planning:   Does patient have an Advance Directive:  currently not on file; education provided     Inpatient Readmission Risk score: Unplanned Readmit Risk Score: 8.4 ( )    Was this a readmission? no   Patient stated reason for the admission: sob and red blood in stool a week ago    Patients top risk factors for readmission: medical condition-H/o CABG 2020, SVT s/p cardioversion,BPH,Arthritis,CAD s/p PCA,Afib on Eliquis, Iron deficiency anemia d/t GIB. Interventions to address risk factors: Scheduled appointment with PCP--patient has called and LM, Scheduled appointment with Specialist-GI appt 3/14 and Obtained and reviewed discharge summary and/or continuity of care documents. Declined info on [facility] as resource. States they will be moving to [facility] soon, healthcare will be available there. Care Transition Nurse (CTN) contacted the patient by telephone to perform post hospital discharge assessment.  Verified name and  with patient as identifiers. Provided introduction to self, and explanation of the CTN role. Reports he feels fine, slept well. No sob this am. Has not had a BM yet. CTN reviewed discharge instructions, medical action plan and red flags with patient who verbalized understanding. Were discharge instructions available to patient? yes. Reviewed appropriate site of care based on symptoms and resources available to patient including: PCP, Specialist, When to call 911 and [address]. Patient given an opportunity to ask questions and does not have any further questions or concerns at this time. The patient agrees to contact the PCP office for questions related to their healthcare. Medication reconciliation was performed with patient, who verbalizes understanding of administration of home medications. Advised obtaining a 90-day supply of all daily and as-needed medications. Referral to Pharm D needed: no     Home Health/Outpatient orders at discharge: [address]: na  Date of initial visit: na    Durable Medical Equipment ordered at discharge: None  [address]: na  Durable Medical Equipment received: na    Covid Risk Education    Educated patient about risk for severe COVID-19 due to risk factors according to CDC guidelines. CTN reviewed discharge instructions, medical action plan and red flag symptoms with the patient who verbalized understanding. Discussed COVID vaccination status: yes. Education provided on COVID-19 vaccination as appropriate. Discussed exposure protocols and quarantine with CDC Guidelines. Patient was given an opportunity to verbalize any questions and concerns and agrees to contact CTN or health care provider for questions related to their healthcare. Was patient discharged with a pulse oximeter? no.    Discussed follow-up appointments. If no appointment was previously scheduled, appointment scheduling offered: yes.  Is follow up appointment scheduled within 7 days of discharge? pending with pcp. . (patient has called and LM) Offered to give him contact info on [address] as resource, he declined. [facility] follow up appointment(s):   Future Appointments   Date Time Provider Wanda Becker   4/20/2022  8:40 AM MD ZOHREH Yeh BS AMB   6/22/2022  8:00 AM Janette Rodriguez MD WEIM BS AMB     Non-[facility] follow up appointment(s): GI, 3/14. Plan for follow-up call in 7-10 days based on severity of symptoms and risk factors. Plan for next call: symptom management-assess current symptoms, self management-following discharge instructions, follow up appointment-saw pcp for ANGELY visit and medication management-taking meds as ordered. CTN provided contact information for future needs. Goals Addressed                 This Visit's Progress     Prevent complications post hospitalization. 3/10/22 [facility] 3/5-3/9 Iron def anemia 2/2 subacute GIB   Reviewed discharge instructions with patient using teachback.  Reviewed meds, education provided on new med, using teachback.  Reviewed red flags: blood in stool, sob, chest pain, weakness,fever,nausea,vomiting,diarrhea.  ANGELY with pcp, pending. Has LM for pcp for appt.  Declined info on [facility] as resource, stating they are moving to [facility] soon, will have HC there.  Appt to f/u with GI, 3/14.  Given CTN contact info if questions/concerns.    CTN to check back in about a week, sooner prn.[email or web address]

## 2022-03-11 ENCOUNTER — NURSE TRIAGE (OUTPATIENT)
Dept: OTHER | Facility: CLINIC | Age: 82
End: 2022-03-11

## 2022-03-11 ENCOUNTER — TELEPHONE (OUTPATIENT)
Dept: CARDIOLOGY CLINIC | Age: 82
End: 2022-03-11

## 2022-03-11 ENCOUNTER — TELEPHONE (OUTPATIENT)
Dept: INTERNAL MEDICINE CLINIC | Age: 82
End: 2022-03-11

## 2022-03-11 NOTE — TELEPHONE ENCOUNTER
Patients wife called stating that her  walked 2 blocks to take out the trash, and when he returned home he was having SOB. She states that he denies chest pain or dizziness. Advised the wife to take him to the ER for evaluation. She said that he was just discharged from the hospital on Wednesday, and she does not want to take him back to the ER. She states that when he was in the hospital, he was anemic and had to have a blood transfusion. She thinks that his iron could be low again that is why he is having SOB, and that he walked 2 blocks. She states that she wanted to schedule an appointment with Dr. Meghna Mcgrath next week, and that if he gets worse then she will take him to the ER. He was scheduled next week and I advised again my recommendation to take him to the ER, she verbalized understanding and states that she will if things get worse.

## 2022-03-11 NOTE — TELEPHONE ENCOUNTER
Called Mrs. Sawyer. Notified her of Dr. Calderon Ped message. She will notify patient and denied further questions or concerns.

## 2022-03-11 NOTE — TELEPHONE ENCOUNTER
Called Mrs. Sawyer. She gave me a quick history of the past week with patient. Today, patient called his wife after she dropped him off at work asking her to call for an earlier pcp appt because of his sob. Soonest she could get was Tuesday. She asked if maybe Dr. Christal Caldwell could order labs to check blood count because pcp was out of office, they advised they could not order labs until he is seen in office, and said pt should go back to er if worsening sxs. Explained even if we ordered labs, may not have results before end of weekend, but I would ask if okay and if anything else he would advise. Pt is still at work, but I will call Mrs. Sawyer back.

## 2022-03-11 NOTE — TELEPHONE ENCOUNTER
Would not be unusual for him to still have sob after release from hospital, since hgb only 8.7 on discharge.  Will continue to have symptoms until it comes up to normal. Should just take it easy for a while, if sees more rectal bleeding needs to go back to hospital

## 2022-03-11 NOTE — TELEPHONE ENCOUNTER
Patients wife is calling in regards to Husbands recent discharge from [facility] on 3.9.22 , he originally went in for chest paints and GI bleeding , when he came home he was experiencing S. O.B and physical exertion.  Please Advise      Kristi Whitman (Wife)  [phone]

## 2022-03-15 ENCOUNTER — OFFICE VISIT (OUTPATIENT)
Dept: INTERNAL MEDICINE CLINIC | Age: 82
End: 2022-03-15
Payer: MEDICARE

## 2022-03-15 VITALS
DIASTOLIC BLOOD PRESSURE: 62 MMHG | HEART RATE: 85 BPM | RESPIRATION RATE: 20 BRPM | WEIGHT: 184.4 LBS | BODY MASS INDEX: 25.81 KG/M2 | TEMPERATURE: 97.5 F | OXYGEN SATURATION: 97 % | HEIGHT: 71 IN | SYSTOLIC BLOOD PRESSURE: 133 MMHG

## 2022-03-15 DIAGNOSIS — D50.0 IRON DEFICIENCY ANEMIA DUE TO CHRONIC BLOOD LOSS: Primary | ICD-10-CM

## 2022-03-15 DIAGNOSIS — I10 ESSENTIAL HYPERTENSION, BENIGN: ICD-10-CM

## 2022-03-15 DIAGNOSIS — I25.700 CORONARY ARTERY DISEASE INVOLVING CORONARY BYPASS GRAFT OF NATIVE HEART WITH UNSTABLE ANGINA PECTORIS (HCC): ICD-10-CM

## 2022-03-15 PROCEDURE — 99496 TRANSJ CARE MGMT HIGH F2F 7D: CPT | Performed by: INTERNAL MEDICINE

## 2022-03-15 PROCEDURE — G8427 DOCREV CUR MEDS BY ELIG CLIN: HCPCS | Performed by: INTERNAL MEDICINE

## 2022-03-15 RX ORDER — FERROUS SULFATE 325(65) MG
325 TABLET, DELAYED RELEASE (ENTERIC COATED) ORAL
Qty: 30 TABLET | Refills: 0
Start: 2022-03-15

## 2022-03-15 NOTE — PROGRESS NOTES
HISTORY OF PRESENT ILLNESS  Baron Rockwell is a 80 y.o. male. HPI  Assessment:  Mena Ventura is seen today for hospital follow up for recent admission with GI bleeding and anemia. He was admitted to [facility] on 03/05/21 and discharged on 03/09. Nurse navigator contact was on 03/10 and this visit is today, the 16th. This represents a transitional care visit. 1. Anemia with GI bleeding. He had several episodes of bright red blood per rectum. The amount of bleeding reported did not seem to correlate with the degree of anemia, so I suspect there has been more of a chronic bleeding ongoing. He is on Eliquis for arrhythmia, as well as an aspirin for CAD. Ibuprofen was on his medication list, but he took this very rarely. It had been prescribed by a specialist.  He was advised to avoid NSAIDs altogether given this problem, as well as his current medication regimen. He expresses understanding. He has a follow up with Dr. Gena Gregorio. His colonoscopy was unremarkable except for diverticula. 2. History of CAD. He did have some shortness of breath and chest tightness in the setting of his presentation, but this appears to be related to his anemia. He did have cardiology follow up in house. 3. History of SVT, atrial flutter. No recurrence. We decided on a plan of rechecking a CBC as his blood count was not greatly improved at the time of discharge. He did receive 1 unit of packed red cells. We also started iron supplementation once daily. I reviewed with him potential for constipation and he will monitor for any signs of this. He has bowel medications available. I suspect we will recheck a CBC in one month, but we will await current testing to determine the next step for follow up. Review of Systems   Constitutional: Negative for chills, fever and weight loss. Respiratory: Positive for shortness of breath.     Cardiovascular: Negative for chest pain, palpitations, leg swelling and PND.   Gastrointestinal: Negative for blood in stool and melena. Musculoskeletal: Negative for myalgias. Neurological: Negative for focal weakness. Physical Exam  Vitals and nursing note reviewed. Constitutional:       General: He is not in acute distress. Cardiovascular:      Rate and Rhythm: Normal rate and regular rhythm. Heart sounds: Murmur heard. Systolic murmur is present with a grade of 2/6. No friction rub. No gallop. Comments: Trace stasis edema bilateral lower extremities. Pulmonary:      Effort: Pulmonary effort is normal.      Breath sounds: Normal breath sounds. ASSESSMENT and PLAN  Diagnoses and all orders for this visit:    1. Iron deficiency anemia due to chronic blood loss  -     ferrous sulfate (IRON) 325 mg (65 mg iron) EC tablet; Take 1 Tablet by mouth Daily (before breakfast). -     CBC WITH AUTOMATED DIFF; Future    2. Essential hypertension, benign    3.  Coronary artery disease involving coronary bypass graft of native heart with unstable angina pectoris ([facility] [address]

## 2022-03-16 LAB
BASOPHILS # BLD: 0.1 K/UL (ref 0–0.1)
BASOPHILS NFR BLD: 1 % (ref 0–1)
DIFFERENTIAL METHOD BLD: ABNORMAL
EOSINOPHIL # BLD: 0.2 K/UL (ref 0–0.4)
EOSINOPHIL NFR BLD: 3 % (ref 0–7)
ERYTHROCYTE [DISTWIDTH] IN BLOOD BY AUTOMATED COUNT: 17 % (ref 11.5–14.5)
HCT VFR BLD AUTO: 30.7 % (ref 36.6–50.3)
HGB BLD-MCNC: 9.1 G/DL (ref 12.1–17)
IMM GRANULOCYTES # BLD AUTO: 0 K/UL (ref 0–0.04)
IMM GRANULOCYTES NFR BLD AUTO: 0 % (ref 0–0.5)
LYMPHOCYTES # BLD: 1.2 K/UL (ref 0.8–3.5)
LYMPHOCYTES NFR BLD: 17 % (ref 12–49)
MCH RBC QN AUTO: 26.6 PG (ref 26–34)
MCHC RBC AUTO-ENTMCNC: 29.6 G/DL (ref 30–36.5)
MCV RBC AUTO: 89.8 FL (ref 80–99)
MONOCYTES # BLD: 0.5 K/UL (ref 0–1)
MONOCYTES NFR BLD: 7 % (ref 5–13)
NEUTS SEG # BLD: 5 K/UL (ref 1.8–8)
NEUTS SEG NFR BLD: 72 % (ref 32–75)
NRBC # BLD: 0 K/UL (ref 0–0.01)
NRBC BLD-RTO: 0 PER 100 WBC
PLATELET # BLD AUTO: 297 K/UL (ref 150–400)
PMV BLD AUTO: 9.4 FL (ref 8.9–12.9)
RBC # BLD AUTO: 3.42 M/UL (ref 4.1–5.7)
WBC # BLD AUTO: 7 K/UL (ref 4.1–11.1)

## 2022-03-17 ENCOUNTER — PATIENT OUTREACH (OUTPATIENT)
Dept: CASE MANAGEMENT | Age: 82
End: 2022-03-17

## 2022-03-17 DIAGNOSIS — D50.0 IRON DEFICIENCY ANEMIA DUE TO CHRONIC BLOOD LOSS: Primary | ICD-10-CM

## 2022-03-17 NOTE — PROGRESS NOTES
Called and spoke to patient. Goals      Prevent complications post hospitalization. 3/10/22 [facility] 3/5-3/9 Iron def anemia 2/2 subacute GIB   Reviewed discharge instructions with patient using teachback.  Reviewed meds, education provided on new med, using teachback.  Reviewed red flags: blood in stool, sob, chest pain, weakness,fever,nausea,vomiting,diarrhea.  ANGELY with pcp, pending. Has LM for pcp for appt.  Declined info on [facility] as resource, stating they are moving to [facility] soon, will have HC there.  Appt to f/u with GI, 3/14.  Given CTN contact info if questions/concerns.  CTN to check back in about a week, sooner prn.mbt  3/17/22  Patient reports he feels fine. Still has little bit of sob with activity. Saw pcp this week, labs done- show Hgb improving slowly. Continues to take Ferrous Sulfate bid as advised. Encouraged to eat a heart healthy diet and get his rest.  Sees GI next week for f/u. No red flags noted. CTN to check back in about a week. mbt

## 2022-03-18 PROBLEM — Z95.1 HX OF CABG: Status: ACTIVE | Noted: 2020-03-26

## 2022-03-18 PROBLEM — I73.9 PERIPHERAL VASCULAR DISEASE (HCC): Status: ACTIVE | Noted: 2020-05-14

## 2022-03-18 PROBLEM — I45.10 RBBB: Status: ACTIVE | Noted: 2020-04-12

## 2022-03-18 PROBLEM — I21.4 NSTEMI (NON-ST ELEVATED MYOCARDIAL INFARCTION) (HCC): Status: ACTIVE | Noted: 2020-03-25

## 2022-03-19 PROBLEM — Z71.89 ADVANCE DIRECTIVE DISCUSSED WITH PATIENT: Status: ACTIVE | Noted: 2017-02-21

## 2022-03-19 PROBLEM — I34.0 NONRHEUMATIC MITRAL VALVE REGURGITATION: Status: ACTIVE | Noted: 2022-03-06

## 2022-03-19 PROBLEM — R07.9 CHEST PAIN: Status: ACTIVE | Noted: 2022-03-05

## 2022-03-19 PROBLEM — R06.09 DOE (DYSPNEA ON EXERTION): Status: ACTIVE | Noted: 2022-03-05

## 2022-03-19 PROBLEM — I47.10 SUSTAINED SVT: Status: ACTIVE | Noted: 2020-04-12

## 2022-03-19 PROBLEM — K92.1 BLOODY STOOL: Status: ACTIVE | Noted: 2022-03-05

## 2022-03-19 PROBLEM — I47.1 SUSTAINED SVT (HCC): Status: ACTIVE | Noted: 2020-04-12

## 2022-03-20 PROBLEM — R42 DIZZINESS: Status: ACTIVE | Noted: 2020-04-12

## 2022-03-20 PROBLEM — R73.01 IFG (IMPAIRED FASTING GLUCOSE): Status: ACTIVE | Noted: 2017-05-22

## 2022-03-20 PROBLEM — M51.9 LUMBAR DISC DISEASE: Status: ACTIVE | Noted: 2017-02-22

## 2022-03-25 ENCOUNTER — PATIENT OUTREACH (OUTPATIENT)
Dept: CASE MANAGEMENT | Age: 82
End: 2022-03-25

## 2022-03-25 NOTE — PROGRESS NOTES
Called and spoke to wife. Goals      Prevent complications post hospitalization. 3/10/22 [facility] 3/5-3/9 Iron def anemia 2/2 subacute GIB   Reviewed discharge instructions with patient using teachback.  Reviewed meds, education provided on new med, using teachback.  Reviewed red flags: blood in stool, sob, chest pain, weakness,fever,nausea,vomiting,diarrhea.  ANGELY with pcp, pending. Has LM for pcp for appt.  Declined info on [facility] as resource, [facility], will have HC there.  Appt to f/u with GI, 3/14.  Given CTN contact info if questions/concerns.  CTN to check back in about a week, sooner prn.mbt  3/17/22  Patient reports he feels fine. Still has little bit of sob with activity. Saw pcp this week, labs done- show Hgb improving slowly. Continues to take Ferrous Sulfate bid as advised. Encouraged to eat a heart healthy diet and get his rest.  Sees GI next week for f/u. No red flags noted. CTN to check back in about a week. mbt  3/25/22  Wife reports he is doing ok,maybe a little stronger. But not back to 100% yet. Saw GI this week, . Labs done, results pending. Still some sob with activity. No blood in stool. No red flags noted.   Advised will check back in about a week, sooner prn.mbt

## 2022-03-31 NOTE — PROGRESS NOTES
Physician Progress Note      Lupe Shresthaland  CSN #:                  [id number]  :                       1940  ADMIT DATE:       3/5/2022 10:52 AM  Karen Carreon DATE:        3/9/2022 1:40 PM  RESPONDING  PROVIDER #:        Tisha Nowak MD          QUERY TEXT:    Patient admitted with GI bleeding, noted to be on Eliquis POA and was held during inpatient stay. If possible, please document in progress notes and discharge summary the cause of the GI bleeding: The medical record reflects the following:  Risk Factors: patient is on Eliquis for A. fib/SVT    Clinical Indicators:  H&P  - As per collective reports, patient is on Eliquis for A. fib/SVT and was doing well until about a week ago he developed bright red blood per rectum which went on about for about 5 to 10 days and it resolved on its own. - He was not noticing any other bleed so he kept taking his Eliquis with his aspirin but since yesterday he has been feeling shortness of breath, lethargy and dyspnea on exertion with some mid epigastric pain without any radiation, without any heaviness or chest pain at rest.  - Hold Eliquis and aspirin    3/8 Colonoscopy Findings:  No bleeding, no clots  Rectum: normal  Sigmoid: moderate to severe diverticulosis  Descending Colon: normal  Transverse Colon: moderate amount of opaque and semisolid stool in this segment preventing ability to exclude small, flat polyps  Ascending Colon: moderate amount of opaque and semisolid stool in this segment preventing ability to exclude small, flat polyps  Cecum: normal  Terminal Ileum: normal    Treatment: H&H followed, held Eliquis and asprin, PRBC, GI following, colonoscopy    Thank you,  Ama Nielsen  [phone]  Options provided:  -- GI bleed due to Eliquis  -- GI bleed due to other cause, Please specify cause.   -- Other - I will add my own diagnosis  -- Disagree - Not applicable / Not valid  -- Disagree - Clinically unable to determine / Unknown  -- Refer to Clinical Documentation Reviewer    PROVIDER RESPONSE TEXT:    Patient's GI bleed due to diverticulosis    Query created by: Torrey Kaplan on 3/14/2022 1:45 PM      Electronically signed by:  Yefri Mcghee MD 3/31/2022 2:01 PM

## 2022-04-07 ENCOUNTER — PATIENT OUTREACH (OUTPATIENT)
Dept: CASE MANAGEMENT | Age: 82
End: 2022-04-07

## 2022-04-08 NOTE — PROGRESS NOTES
Patient resolved from Transition of Care episode on 4/8/22. ACM/CTN was unsuccessful at contacting this patient today. Patient/family was provided the following resources and education related to COVID-19 during the initial call:                         Signs, symptoms and red flags related to COVID-19            CDC exposure and quarantine guidelines            [facility] exposure contact - [phone]            Contact for their local Department of Health                 Patient has not had any additional ED or hospital visits. No further outreach scheduled with this CTN/ACM. Episode of Care resolved. Patient has this CTN/ACM contact information if future needs arise.

## 2022-04-20 ENCOUNTER — OFFICE VISIT (OUTPATIENT)
Dept: CARDIOLOGY CLINIC | Age: 82
End: 2022-04-20
Payer: MEDICARE

## 2022-04-20 VITALS
DIASTOLIC BLOOD PRESSURE: 76 MMHG | BODY MASS INDEX: 25.96 KG/M2 | RESPIRATION RATE: 14 BRPM | HEART RATE: 64 BPM | OXYGEN SATURATION: 95 % | WEIGHT: 185.4 LBS | HEIGHT: 71 IN | SYSTOLIC BLOOD PRESSURE: 120 MMHG

## 2022-04-20 DIAGNOSIS — I34.0 NONRHEUMATIC MITRAL VALVE REGURGITATION: Primary | ICD-10-CM

## 2022-04-20 DIAGNOSIS — E78.2 MIXED HYPERLIPIDEMIA: ICD-10-CM

## 2022-04-20 DIAGNOSIS — I73.9 PERIPHERAL VASCULAR DISEASE (HCC): ICD-10-CM

## 2022-04-20 DIAGNOSIS — I10 ESSENTIAL HYPERTENSION, BENIGN: ICD-10-CM

## 2022-04-20 DIAGNOSIS — R06.09 DOE (DYSPNEA ON EXERTION): ICD-10-CM

## 2022-04-20 DIAGNOSIS — I25.10 CORONARY ARTERY DISEASE INVOLVING NATIVE CORONARY ARTERY OF NATIVE HEART WITHOUT ANGINA PECTORIS: ICD-10-CM

## 2022-04-20 PROCEDURE — G8427 DOCREV CUR MEDS BY ELIG CLIN: HCPCS | Performed by: SPECIALIST

## 2022-04-20 PROCEDURE — G0463 HOSPITAL OUTPT CLINIC VISIT: HCPCS | Performed by: SPECIALIST

## 2022-04-20 PROCEDURE — G8419 CALC BMI OUT NRM PARAM NOF/U: HCPCS | Performed by: SPECIALIST

## 2022-04-20 PROCEDURE — G8752 SYS BP LESS 140: HCPCS | Performed by: SPECIALIST

## 2022-04-20 PROCEDURE — G8536 NO DOC ELDER MAL SCRN: HCPCS | Performed by: SPECIALIST

## 2022-04-20 PROCEDURE — 1101F PT FALLS ASSESS-DOCD LE1/YR: CPT | Performed by: SPECIALIST

## 2022-04-20 PROCEDURE — G8510 SCR DEP NEG, NO PLAN REQD: HCPCS | Performed by: SPECIALIST

## 2022-04-20 PROCEDURE — 99214 OFFICE O/P EST MOD 30 MIN: CPT | Performed by: SPECIALIST

## 2022-04-20 PROCEDURE — G8754 DIAS BP LESS 90: HCPCS | Performed by: SPECIALIST

## 2022-04-20 RX ORDER — METOPROLOL SUCCINATE 25 MG/1
25 TABLET, EXTENDED RELEASE ORAL DAILY
Qty: 90 TABLET | Refills: 3 | Status: SHIPPED | OUTPATIENT
Start: 2022-04-20

## 2022-04-20 NOTE — PROGRESS NOTES
HISTORY OF PRESENT ILLNESS  Mateo Chaparro is a 80 y.o. male     SUMMARY:   Problem List  Date Reviewed: 4/20/2022          Codes Class Noted    Nonrheumatic mitral valve regurgitation ICD-10-CM: I34.0  ICD-9-CM: 424.0  3/6/2022        Chest pain ICD-10-CM: R07.9  ICD-9-CM: 786.50  3/5/2022        VANN (dyspnea on exertion) ICD-10-CM: R06.00  ICD-9-CM: 786.09  3/5/2022        Bloody stool ICD-10-CM: K92.1  ICD-9-CM: 578.1  3/5/2022        Peripheral vascular disease ([facility] ICD-10-CM: I73.9  ICD-9-CM: 443.9  5/14/2020        Dizziness ICD-10-CM: R42  ICD-9-CM: 780.4  4/12/2020        Sustained SVT ([facility] ICD-10-CM: I47.1  ICD-9-CM: 427.89  4/12/2020        RBBB ICD-10-CM: I45.10  ICD-9-CM: 426.4  4/12/2020        Hx of CABG ICD-10-CM: Z95.1  ICD-9-CM: V45.81  3/26/2020    Overview Signed 3/26/2020  7:49 PM by TOM Bragg     x 5, LIMA to LAD, RSVG to Diag-RI-OM, RSVG to RCA             NSTEMI (non-ST elevated myocardial infarction) ([facility] ICD-10-CM: I21.4  ICD-9-CM: 410.70  3/25/2020        IFG (impaired fasting glucose) ICD-10-CM: R73.01  ICD-9-CM: 790.21  5/22/2017        Lumbar foraminal stenosis (Chronic) ICD-10-CM: M48.061  ICD-9-CM: 724.02  Unknown        Lumbar disc disease ICD-10-CM: M51.9  ICD-9-CM: 722.93  2/22/2017        Advance directive discussed with patient ICD-10-CM: Z71.89  ICD-9-CM: V65.49  2/21/2017        BPH with urinary obstruction ICD-10-CM: N40.1, N13.8  ICD-9-CM: 600.01, 599.69  1/9/2016        Vitamin D deficiency ICD-10-CM: E55.9  ICD-9-CM: 268.9  6/19/2014        Hypothyroidism, acquired, autoimmune ICD-10-CM: E06.3  ICD-9-CM: 244.8  6/19/2014        Skin cancer ICD-10-CM: C44.90  ICD-9-CM: 173.90  5/8/2013        Macular degeneration ICD-10-CM: H35.30  ICD-9-CM: 362.50  3/19/2012        Unspecified hearing loss ICD-10-CM: H91.90  ICD-9-CM: 389.9  9/13/2010        Mixed hyperlipidemia ICD-10-CM: E78.2  ICD-9-CM: 272.2  12/1/2009        Essential hypertension, benign ICD-10-CM: I10  ICD-9-CM: 401.1  12/1/2009        Coronary artery disease involving native coronary artery of native heart without angina pectoris ICD-10-CM: I25.10  ICD-9-CM: 414.01  12/1/2009        Calculus of kidney ICD-10-CM: N20.0  ICD-9-CM: 592.0  12/1/2009        Insomnia, unspecified ICD-10-CM: G47.00  ICD-9-CM: 780.52  12/1/2009              Current Outpatient Medications on File Prior to Visit   Medication Sig    ferrous sulfate (IRON) 325 mg (65 mg iron) EC tablet Take 1 Tablet by mouth Daily (before breakfast).  metoprolol succinate (TOPROL-XL) 25 mg XL tablet Take 1 Tablet by mouth daily.  cholecalciferol (Vitamin D3) (2,000 UNITS /50 MCG) cap capsule Take 2,000 Units by mouth every morning.  rosuvastatin (CRESTOR) 20 mg tablet Take 1 Tablet by mouth nightly. Replaces atorvastatin    levothyroxine (SYNTHROID) 50 mcg tablet TAKE 1 TABLET BY MOUTH EVERY DAY BEFORE BREAKFAST EXCEPT TAKE 2 TABS ON SATURDAY.  Eliquis 5 mg tablet TAKE 1 TABLET BY MOUTH EVERY 12 HOURS    aspirin 81 mg chewable tablet Take 1 Tab by mouth daily.  vitamins  A,C,E-zinc-copper (Ocuvite PreserVision) [phone] unit-mg-unit tab tablet Take 1 Tab by mouth daily. (Patient taking differently: Take 1 Tablet by mouth two (2) times a day.)    acetaminophen (TYLENOL) 325 mg tablet Take 650 mg by mouth every four (4) hours as needed for Pain.  tamsulosin (FLOMAX) 0.4 mg capsule TAKE 1 CAPSULE BY MOUTH EVERY DAY 30 MINUTES AFTER THE SAME MEAL EACH DAY    finasteride (PROSCAR) 5 mg tablet Take 5 mg by mouth daily. HS     No current facility-administered medications on file prior to visit.        CARDIOLOGY STUDIES TO DATE:  Evens Caballero      6/20 echo normal lvef, lvh, lae, gabriel, sclerotic non stenotic av, mild ai, mod to severe mrmild tr with pa pressure 40 mm, mod pul regurg    3/22  echo normal lvef, calcified av with mild regurg, lae, mod to severe mr    Chief Complaint   Patient presents with    Follow-up HPI :  He was in the hospital last month with a lower GI bleed. No definite source was determined. He did have to have some transfusion and his most recent hemoglobin about a month ago was only up to 9.1. He has some shortness of breath with activity that is gradually been improving since he got out of the hospital with no other cardiac type symptoms. He is active and tomorrow plans to play golf. He did have a follow-up echo during a recent hospitalization which showed stable findings.   CARDIAC ROS:   negative for chest pain, palpitations, syncope, orthopnea, paroxysmal nocturnal dyspnea, exertional chest pressure/discomfort, claudication, lower extremity edema    Family History   Problem Relation Age of Onset    Heart Disease Mother         CHF    Heart Disease Father         CAD    Heart Disease Sister         CAD CABG    Lung Disease Sister     Heart Disease Brother         CAD    Pulmonary Fibrosis Brother     Heart Disease Brother         CAD    Stroke Brother 34        cerebral hemorrhage    Cancer Brother         LUNG    High Cholesterol Daughter     No Known Problems Son     Anesth Problems Neg Hx        Past Medical History:   Diagnosis Date    Arrhythmia 04/12/2020    cardioversion for SVT    Arthritis     HANDS    Basal cell carcinoma     BPH (benign prostatic hyperplasia)     CAD (coronary artery disease)     s/p PTCA '92    Calculus of kidney     Chronic pain     BACK    Elevated PSA     Heart attack ([address]     Hypercholesterolemia     Kidney stones     Lumbar foraminal stenosis     Melanoma ([address]     MVP (mitral valve prolapse)     NSTEMI (non-ST elevated myocardial infarction) ([address] 03/25/2020    Other ill-defined conditions(799.89)     Prostatitis    Prediabetes     Seasonal allergies     Squamous cell carcinoma     Stroke ([address]     with left eye visual loss; 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    Systolic murmur     Thyroid disease     HYPOTHYROID    Vision impairment R EYE, BLOOD CLOT ON RETINA       GENERAL ROS:  A comprehensive review of systems was negative except for that written in the HPI.     Visit Vitals  /76 (BP 1 Location: Left arm, BP Patient Position: Sitting, BP Cuff Size: Adult)   Pulse 64   Resp 14   Ht 5' 11\" (1.803 m)   Wt 185 lb 6.4 oz (84.1 kg)   SpO2 95%   BMI 25.86 kg/m²       Wt Readings from Last 3 Encounters:   04/20/22 185 lb 6.4 oz (84.1 kg)   03/15/22 184 lb 6.4 oz (83.6 kg)   03/09/22 175 lb 0.7 oz (79.4 kg)            BP Readings from Last 3 Encounters:   04/20/22 120/76   03/15/22 133/62   03/09/22 (!) 109/45       PHYSICAL EXAM  General appearance: alert, cooperative, no distress, appears stated age  Neurologic: Alert and oriented X 3  Neck: supple, symmetrical, trachea midline, no adenopathy, no carotid bruit and no JVD  Lungs: clear to auscultation bilaterally  Heart: irregularly irregular rhythm, S1, S2 normal, no S3 or S4  Extremities: extremities normal, atraumatic, no cyanosis or edema    Lab Results   Component Value Date/Time    Cholesterol, total 148 12/17/2021 09:15 AM    Cholesterol, total 156 06/09/2021 09:51 AM    Cholesterol, total 186 12/31/2020 10:31 AM    Cholesterol, total 149 06/25/2020 08:55 AM    Cholesterol, total 161 03/25/2020 09:00 AM    HDL Cholesterol 65 12/17/2021 09:15 AM    HDL Cholesterol 63 06/09/2021 09:51 AM    HDL Cholesterol 68 12/31/2020 10:31 AM    HDL Cholesterol 58 06/25/2020 08:55 AM    HDL Cholesterol 62 03/25/2020 09:00 AM    LDL, calculated 69.8 12/17/2021 09:15 AM    LDL, calculated 76.6 06/09/2021 09:51 AM    LDL, calculated 100.4 (H) 12/31/2020 10:31 AM    LDL, calculated 76 06/25/2020 08:55 AM    LDL, calculated 83.8 03/25/2020 09:00 AM    Triglyceride 66 12/17/2021 09:15 AM    Triglyceride 82 06/09/2021 09:51 AM    Triglyceride 88 12/31/2020 10:31 AM    Triglyceride 76 06/25/2020 08:55 AM    Triglyceride 76 03/25/2020 09:00 AM    CHOL/HDL Ratio 2.3 12/17/2021 09:15 AM    CHOL/HDL Ratio 2.5 06/09/2021 09:51 AM    CHOL/HDL Ratio 2.7 12/31/2020 10:31 AM    CHOL/HDL Ratio 2.6 03/25/2020 09:00 AM    CHOL/HDL Ratio 3.0 09/10/2010 07:52 AM     ASSESSMENT :      He is stable and I think asymptomatic at this point on a reasonable medical regimen. I told him that once his hemoglobin was back to normal if he was still experiencing any unusual shortness of breath or other cardiac type symptoms to let us know and we would pursue further testing at that time. current treatment plan is effective, no change in therapy  lab results and schedule of future lab studies reviewed with patient  reviewed diet, exercise and weight control    Encounter Diagnoses   Name Primary?  Nonrheumatic mitral valve regurgitation Yes    Peripheral vascular disease ([address]     VANN (dyspnea on exertion)     Mixed hyperlipidemia     Coronary artery disease involving native coronary artery of native heart without angina pectoris     Essential hypertension, benign      No orders of the defined types were placed in this encounter. Follow-up and Dispositions    · Return in about 6 months (around 10/20/2022). 90[facility], MD  4/20/2022  Please note that this dictation was completed with [employer], the [employer] voice recognition software. Quite often unanticipated grammatical, syntax, homophones, and other interpretive errors are inadvertently transcribed by the computer software. Please disregard these errors. Please excuse any errors that have escaped final proofreading. Thank you.

## 2022-06-22 ENCOUNTER — OFFICE VISIT (OUTPATIENT)
Dept: INTERNAL MEDICINE CLINIC | Age: 82
End: 2022-06-22
Payer: MEDICARE

## 2022-06-22 VITALS
BODY MASS INDEX: 25.79 KG/M2 | TEMPERATURE: 97.8 F | DIASTOLIC BLOOD PRESSURE: 74 MMHG | SYSTOLIC BLOOD PRESSURE: 123 MMHG | HEIGHT: 71 IN | HEART RATE: 73 BPM | RESPIRATION RATE: 20 BRPM | WEIGHT: 184.2 LBS | OXYGEN SATURATION: 96 %

## 2022-06-22 DIAGNOSIS — I25.700 CORONARY ARTERY DISEASE INVOLVING CORONARY BYPASS GRAFT OF NATIVE HEART WITH UNSTABLE ANGINA PECTORIS (HCC): ICD-10-CM

## 2022-06-22 DIAGNOSIS — E78.2 MIXED HYPERLIPIDEMIA: ICD-10-CM

## 2022-06-22 DIAGNOSIS — N40.1 BPH WITH URINARY OBSTRUCTION: ICD-10-CM

## 2022-06-22 DIAGNOSIS — N13.8 BPH WITH URINARY OBSTRUCTION: ICD-10-CM

## 2022-06-22 DIAGNOSIS — I10 ESSENTIAL HYPERTENSION, BENIGN: ICD-10-CM

## 2022-06-22 DIAGNOSIS — E06.3 HYPOTHYROIDISM, ACQUIRED, AUTOIMMUNE: ICD-10-CM

## 2022-06-22 DIAGNOSIS — R73.01 IFG (IMPAIRED FASTING GLUCOSE): ICD-10-CM

## 2022-06-22 DIAGNOSIS — D50.0 IRON DEFICIENCY ANEMIA DUE TO CHRONIC BLOOD LOSS: Primary | ICD-10-CM

## 2022-06-22 LAB
BASOPHILS # BLD: 0.1 K/UL (ref 0–0.1)
BASOPHILS NFR BLD: 1 % (ref 0–1)
DIFFERENTIAL METHOD BLD: ABNORMAL
EOSINOPHIL # BLD: 0.2 K/UL (ref 0–0.4)
EOSINOPHIL NFR BLD: 3 % (ref 0–7)
ERYTHROCYTE [DISTWIDTH] IN BLOOD BY AUTOMATED COUNT: 15.9 % (ref 11.5–14.5)
HCT VFR BLD AUTO: 45.7 % (ref 36.6–50.3)
HGB BLD-MCNC: 14.5 G/DL (ref 12.1–17)
IMM GRANULOCYTES # BLD AUTO: 0 K/UL (ref 0–0.04)
IMM GRANULOCYTES NFR BLD AUTO: 0 % (ref 0–0.5)
LYMPHOCYTES # BLD: 1.5 K/UL (ref 0.8–3.5)
LYMPHOCYTES NFR BLD: 22 % (ref 12–49)
MCH RBC QN AUTO: 28.2 PG (ref 26–34)
MCHC RBC AUTO-ENTMCNC: 31.7 G/DL (ref 30–36.5)
MCV RBC AUTO: 88.7 FL (ref 80–99)
MONOCYTES # BLD: 0.6 K/UL (ref 0–1)
MONOCYTES NFR BLD: 9 % (ref 5–13)
NEUTS SEG # BLD: 4.4 K/UL (ref 1.8–8)
NEUTS SEG NFR BLD: 65 % (ref 32–75)
NRBC # BLD: 0 K/UL (ref 0–0.01)
NRBC BLD-RTO: 0 PER 100 WBC
PLATELET # BLD AUTO: 212 K/UL (ref 150–400)
PMV BLD AUTO: 9.7 FL (ref 8.9–12.9)
RBC # BLD AUTO: 5.15 M/UL (ref 4.1–5.7)
WBC # BLD AUTO: 6.8 K/UL (ref 4.1–11.1)

## 2022-06-22 PROCEDURE — G8510 SCR DEP NEG, NO PLAN REQD: HCPCS | Performed by: INTERNAL MEDICINE

## 2022-06-22 PROCEDURE — 99214 OFFICE O/P EST MOD 30 MIN: CPT | Performed by: INTERNAL MEDICINE

## 2022-06-22 PROCEDURE — G8754 DIAS BP LESS 90: HCPCS | Performed by: INTERNAL MEDICINE

## 2022-06-22 PROCEDURE — G8752 SYS BP LESS 140: HCPCS | Performed by: INTERNAL MEDICINE

## 2022-06-22 PROCEDURE — G8427 DOCREV CUR MEDS BY ELIG CLIN: HCPCS | Performed by: INTERNAL MEDICINE

## 2022-06-22 PROCEDURE — G8536 NO DOC ELDER MAL SCRN: HCPCS | Performed by: INTERNAL MEDICINE

## 2022-06-22 PROCEDURE — G0463 HOSPITAL OUTPT CLINIC VISIT: HCPCS | Performed by: INTERNAL MEDICINE

## 2022-06-22 PROCEDURE — G8417 CALC BMI ABV UP PARAM F/U: HCPCS | Performed by: INTERNAL MEDICINE

## 2022-06-22 PROCEDURE — 1101F PT FALLS ASSESS-DOCD LE1/YR: CPT | Performed by: INTERNAL MEDICINE

## 2022-06-22 RX ORDER — DOXYCYCLINE 50 MG/1
50 CAPSULE ORAL DAILY
COMMUNITY

## 2022-06-22 RX ORDER — GUAIFENESIN 100 MG/5ML
81 LIQUID (ML) ORAL DAILY
Qty: 90 TABLET | Refills: 0 | Status: SHIPPED | OUTPATIENT
Start: 2022-06-22 | End: 2022-10-20

## 2022-06-22 RX ORDER — ROSUVASTATIN CALCIUM 20 MG/1
20 TABLET, COATED ORAL
Qty: 90 TABLET | Refills: 0 | Status: SHIPPED | OUTPATIENT
Start: 2022-06-22

## 2022-06-22 RX ORDER — TAMSULOSIN HYDROCHLORIDE 0.4 MG/1
CAPSULE ORAL
Qty: 90 CAPSULE | Refills: 0 | Status: SHIPPED | OUTPATIENT
Start: 2022-06-22

## 2022-06-22 NOTE — PROGRESS NOTES
ALMA IZAGUIRRE  : 10/07/1926  03/30/17 15:54:43  ACCOUNT:  [id number]  HOME PHONE:  [phone]  WORK PHONE:     Patient had questions about dose of Atorvastatin.  She usually took 40 mg daily, but when she was prescribed Harvoni, patient's Hepatologist asked patient to reduce dose to 20 mg daily.  She recently saw the Hepatologist, who recommended that patient resume 40 mg daily as prescribed by Dr. Nunez.  Prescription called to pharmacy for Atorvastatin 40 mg #30 with 1 refill until f/u office visit with Dr. Nunez.      See my chart note

## 2022-06-22 NOTE — PROGRESS NOTES
HISTORY OF PRESENT ILLNESS  Caesar Carlson is a 80 y.o. male. HPI  Assessment:  Jourdan Ballesteros is seen today for follow up of hypertension and other problems. 1. Hypertension, stable on current prescription med regimen. 2. CAD, clinically stable and up to date with cardiology follow up. 3. Hyperlipidemia, IFG. Reviewed labs, stable on current prescription med regimen. 4. BPH, urology follow up is pending. 5. Anemia. He has followed up with GI closely and is due for recheck CBC. 6. Preventive care. Wellness visit in six months. Review of Systems   Constitutional: Negative for chills, fever and weight loss. Respiratory: Negative. Cardiovascular: Negative for chest pain, palpitations, leg swelling and PND. Musculoskeletal: Negative for myalgias. Neurological: Positive for tingling and sensory change. Negative for focal weakness. Some leg sensory change, likely relating to lumbar DDD       Physical Exam  Vitals and nursing note reviewed. Constitutional:       General: He is not in acute distress. Neck:      Vascular: No carotid bruit. Cardiovascular:      Rate and Rhythm: Normal rate and regular rhythm. Heart sounds: No murmur heard. No friction rub. No gallop. Pulmonary:      Effort: Pulmonary effort is normal. No respiratory distress. Breath sounds: Normal breath sounds. Musculoskeletal:      Right lower leg: No edema. Left lower leg: No edema. ASSESSMENT and PLAN  Diagnoses and all orders for this visit:    1. Iron deficiency anemia due to chronic blood loss  -     CBC WITH AUTOMATED DIFF; Future    2. Essential hypertension, benign    3. Coronary artery disease involving coronary bypass graft of native heart with unstable angina pectoris (HCC)  -     aspirin 81 mg chewable tablet; Take 1 Tablet by mouth daily. 4. Hypothyroidism, acquired, autoimmune    5. Mixed hyperlipidemia  -     rosuvastatin (CRESTOR) 20 mg tablet; Take 1 Tablet by mouth nightly. Replaces atorvastatin    6. IFG (impaired fasting glucose)    7. BPH with urinary obstruction  -     tamsulosin (FLOMAX) 0.4 mg capsule; TAKE 1 CAPSULE BY MOUTH EVERY DAY 30 MINUTES AFTER THE SAME MEAL EACH DAY    Other orders  -     apixaban (Eliquis) 5 mg tablet; Take 1 Tablet by mouth every twelve (12) hours.

## 2022-10-21 ENCOUNTER — OFFICE VISIT (OUTPATIENT)
Dept: CARDIOLOGY CLINIC | Age: 82
End: 2022-10-21
Payer: MEDICARE

## 2022-10-21 VITALS
HEART RATE: 86 BPM | BODY MASS INDEX: 25.9 KG/M2 | WEIGHT: 185 LBS | OXYGEN SATURATION: 99 % | SYSTOLIC BLOOD PRESSURE: 122 MMHG | DIASTOLIC BLOOD PRESSURE: 80 MMHG | HEIGHT: 71 IN | RESPIRATION RATE: 18 BRPM

## 2022-10-21 DIAGNOSIS — R06.09 DOE (DYSPNEA ON EXERTION): ICD-10-CM

## 2022-10-21 DIAGNOSIS — E78.2 MIXED HYPERLIPIDEMIA: ICD-10-CM

## 2022-10-21 DIAGNOSIS — I10 ESSENTIAL HYPERTENSION, BENIGN: ICD-10-CM

## 2022-10-21 DIAGNOSIS — I45.10 RBBB: ICD-10-CM

## 2022-10-21 DIAGNOSIS — I25.700 CORONARY ARTERY DISEASE INVOLVING CORONARY BYPASS GRAFT OF NATIVE HEART WITH UNSTABLE ANGINA PECTORIS (HCC): ICD-10-CM

## 2022-10-21 DIAGNOSIS — I34.0 NONRHEUMATIC MITRAL VALVE REGURGITATION: ICD-10-CM

## 2022-10-21 DIAGNOSIS — I25.10 CORONARY ARTERY DISEASE INVOLVING NATIVE CORONARY ARTERY OF NATIVE HEART WITHOUT ANGINA PECTORIS: Primary | ICD-10-CM

## 2022-10-21 DIAGNOSIS — I48.0 PAROXYSMAL ATRIAL FIBRILLATION (HCC): ICD-10-CM

## 2022-10-21 PROCEDURE — 99214 OFFICE O/P EST MOD 30 MIN: CPT | Performed by: SPECIALIST

## 2022-10-21 PROCEDURE — G8417 CALC BMI ABV UP PARAM F/U: HCPCS | Performed by: SPECIALIST

## 2022-10-21 PROCEDURE — G8536 NO DOC ELDER MAL SCRN: HCPCS | Performed by: SPECIALIST

## 2022-10-21 PROCEDURE — G8754 DIAS BP LESS 90: HCPCS | Performed by: SPECIALIST

## 2022-10-21 PROCEDURE — 1101F PT FALLS ASSESS-DOCD LE1/YR: CPT | Performed by: SPECIALIST

## 2022-10-21 PROCEDURE — 1123F ACP DISCUSS/DSCN MKR DOCD: CPT | Performed by: SPECIALIST

## 2022-10-21 PROCEDURE — G8510 SCR DEP NEG, NO PLAN REQD: HCPCS | Performed by: SPECIALIST

## 2022-10-21 PROCEDURE — G8752 SYS BP LESS 140: HCPCS | Performed by: SPECIALIST

## 2022-10-21 PROCEDURE — G8427 DOCREV CUR MEDS BY ELIG CLIN: HCPCS | Performed by: SPECIALIST

## 2022-10-21 RX ORDER — GUAIFENESIN 100 MG/5ML
81 LIQUID (ML) ORAL DAILY
Qty: 90 TABLET | Refills: 3 | Status: SHIPPED | OUTPATIENT
Start: 2022-10-21

## 2022-10-21 RX ORDER — DOXYCYCLINE HYCLATE 50 MG/1
50 CAPSULE ORAL DAILY
COMMUNITY
Start: 2022-09-23

## 2022-10-21 NOTE — PROGRESS NOTES
HISTORY OF PRESENT ILLNESS  Ailyn Carlos is a 80 y.o. male     SUMMARY:   Problem List  Date Reviewed: 10/20/2022            Codes Class Noted    Nonrheumatic mitral valve regurgitation ICD-10-CM: I34.0  ICD-9-CM: 424.0  3/6/2022        Chest pain ICD-10-CM: R07.9  ICD-9-CM: 786.50  3/5/2022        VANN (dyspnea on exertion) ICD-10-CM: R06.09  ICD-9-CM: 786.09  3/5/2022        Bloody stool ICD-10-CM: K92.1  ICD-9-CM: 578.1  3/5/2022        Peripheral vascular disease ([facility] ICD-10-CM: I73.9  ICD-9-CM: 443.9  5/14/2020        Dizziness ICD-10-CM: R42  ICD-9-CM: 780.4  4/12/2020        Sustained SVT ([facility] ICD-10-CM: I47.1  ICD-9-CM: 427.89  4/12/2020        RBBB ICD-10-CM: I45.10  ICD-9-CM: 426.4  4/12/2020        Hx of CABG ICD-10-CM: Z95.1  ICD-9-CM: V45.81  3/26/2020    Overview Signed 3/26/2020  7:49 PM by TOM De La Rosa     x 5, LIMA to LAD, RSVG to Diag-RI-OM, RSVG to RCA             NSTEMI (non-ST elevated myocardial infarction) ([facility] ICD-10-CM: I21.4  ICD-9-CM: 410.70  3/25/2020        IFG (impaired fasting glucose) ICD-10-CM: R73.01  ICD-9-CM: 790.21  5/22/2017        Lumbar foraminal stenosis (Chronic) ICD-10-CM: M48.061  ICD-9-CM: 724.02  Unknown        Lumbar disc disease ICD-10-CM: M51.9  ICD-9-CM: 722.93  2/22/2017        Advance directive discussed with patient ICD-10-CM: Z71.89  ICD-9-CM: V65.49  2/21/2017        BPH with urinary obstruction ICD-10-CM: N40.1, N13.8  ICD-9-CM: 600.01, 599.69  1/9/2016        Vitamin D deficiency ICD-10-CM: E55.9  ICD-9-CM: 268.9  6/19/2014        Hypothyroidism, acquired, autoimmune ICD-10-CM: E06.3  ICD-9-CM: 244.8  6/19/2014        Skin cancer ICD-10-CM: C44.90  ICD-9-CM: 173.90  5/8/2013        Macular degeneration ICD-10-CM: H35.30  ICD-9-CM: 362.50  3/19/2012        Unspecified hearing loss ICD-10-CM: H91.90  ICD-9-CM: 389.9  9/13/2010        Mixed hyperlipidemia ICD-10-CM: E78.2  ICD-9-CM: 272.2  12/1/2009        Essential hypertension, benign ICD-10-CM: I10  ICD-9-CM: 401.1  12/1/2009        Coronary artery disease involving native coronary artery of native heart without angina pectoris ICD-10-CM: I25.10  ICD-9-CM: 414.01  12/1/2009        Calculus of kidney ICD-10-CM: N20.0  ICD-9-CM: 592.0  12/1/2009        Insomnia, unspecified ICD-10-CM: G47.00  ICD-9-CM: 780.52  12/1/2009           Current Outpatient Medications on File Prior to Visit   Medication Sig    doxycycline (VIBRAMYCIN) 50 mg capsule Take 50 mg by mouth daily. aspirin 81 mg chewable tablet TAKE 1 TABLET BY MOUTH EVERY DAY    levothyroxine (SYNTHROID) 50 mcg tablet TAKE 1 TABLET BY MOUTH EVERY DAY BEFORE BREAKFAST EXCEPT TAKE 2 TABS ON SATURDAY. doxycycline (MONODOX) 50 mg capsule Take 50 mg by mouth daily. apixaban (Eliquis) 5 mg tablet Take 1 Tablet by mouth every twelve (12) hours. rosuvastatin (CRESTOR) 20 mg tablet Take 1 Tablet by mouth nightly. Replaces atorvastatin    tamsulosin (FLOMAX) 0.4 mg capsule TAKE 1 CAPSULE BY MOUTH EVERY DAY 30 MINUTES AFTER THE SAME MEAL EACH DAY    metoprolol succinate (TOPROL-XL) 25 mg XL tablet Take 1 Tablet by mouth daily. ferrous sulfate (IRON) 325 mg (65 mg iron) EC tablet Take 1 Tablet by mouth Daily (before breakfast). cholecalciferol (VITAMIN D3) (2,000 UNITS /50 MCG) cap capsule Take 2,000 Units by mouth every morning. vitamins  A,C,E-zinc-copper (Ocuvite PreserVision) [phone] unit-mg-unit tab tablet Take 1 Tab by mouth daily. (Patient taking differently: Take 1 Tablet by mouth two (2) times a day.)    acetaminophen (TYLENOL) 325 mg tablet Take 650 mg by mouth every four (4) hours as needed for Pain. finasteride (PROSCAR) 5 mg tablet Take 5 mg by mouth daily. HS     No current facility-administered medications on file prior to visit.        CARDIOLOGY STUDIES TO DATE:  Karsten Tran      6/20 echo normal lvef, lvh, lae, gabriel, sclerotic non stenotic av, mild ai, mod to severe mrmild tr with pa pressure 40 mm, mod pul regurg    3/22  echo normal lvef, calcified av with mild regurg, lae, mod to severe mr    Chief Complaint   Patient presents with    Follow-up     HPI :  He continues to do well and remains asymptomatic from a cardiac standpoint. They are moving to [facility] assisted living and looking forward to that. Over the summer his hemoglobin dropped for no obvious reason and at that point he was short of breath with activity but once he took supplemental iron and his hemoglobin normalized those symptoms went away. He is still quite active. He exercises and walks some plus plays golf on a regular basis without any limitations. His primary care is following his lipids.   CARDIAC ROS:   negative for chest pain, dyspnea, palpitations, syncope, orthopnea, paroxysmal nocturnal dyspnea, claudication, lower extremity edema    Family History   Problem Relation Age of Onset    Heart Disease Mother         CHF    Heart Disease Father         CAD    Heart Disease Sister         CAD CABG    Lung Disease Sister     Heart Disease Brother         CAD    Pulmonary Fibrosis Brother     Heart Disease Brother         CAD    Stroke Brother 34        cerebral hemorrhage    Cancer Brother         LUNG    High Cholesterol Daughter     No Known Problems Son     Anesth Problems Neg Hx        Past Medical History:   Diagnosis Date    Arrhythmia 04/12/2020    cardioversion for SVT    Arthritis     HANDS    Basal cell carcinoma     BPH (benign prostatic hyperplasia)     CAD (coronary artery disease)     s/p PTCA '92    Calculus of kidney     Chronic pain     BACK    Elevated PSA     Heart attack ([address]     Hypercholesterolemia     Kidney stones     Lumbar foraminal stenosis     Melanoma ([address]     MVP (mitral valve prolapse)     NSTEMI (non-ST elevated myocardial infarction) ([address] 03/25/2020    Other ill-defined conditions(799.89)     Prostatitis    Prediabetes     Seasonal allergies     Squamous cell carcinoma     Stroke ([address]     with left eye visual loss; 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    Systolic murmur     Thyroid disease     HYPOTHYROID    Vision impairment     R EYE, BLOOD CLOT ON RETINA       GENERAL ROS:  A comprehensive review of systems was negative except for that written in the HPI.     Visit Vitals  /80 (BP 1 Location: Right arm, BP Patient Position: Sitting, BP Cuff Size: Adult)   Pulse 86   Resp 18   Ht 5' 11\" (1.803 m)   Wt 185 lb (83.9 kg)   SpO2 99%   BMI 25.80 kg/m²       Wt Readings from Last 3 Encounters:   10/21/22 185 lb (83.9 kg)   06/22/22 184 lb 3.2 oz (83.6 kg)   04/20/22 185 lb 6.4 oz (84.1 kg)            BP Readings from Last 3 Encounters:   10/21/22 122/80   06/22/22 123/74   04/20/22 120/76       PHYSICAL EXAM  General appearance: alert, cooperative, no distress, appears stated age  Neurologic: Alert and oriented X 3  Neck: supple, symmetrical, trachea midline, no adenopathy, no carotid bruit, and no JVD  Lungs: clear to auscultation bilaterally  Heart: regular rate and rhythm, S1, S2 normal, no S3 or S4, systolic murmur: holosystolic 2/6, blowing at apex  Extremities: extremities normal, atraumatic, no cyanosis or edema    Lab Results   Component Value Date/Time    Cholesterol, total 152 06/20/2022 08:41 AM    Cholesterol, total 148 12/17/2021 09:15 AM    Cholesterol, total 156 06/09/2021 09:51 AM    Cholesterol, total 186 12/31/2020 10:31 AM    Cholesterol, total 149 06/25/2020 08:55 AM    HDL Cholesterol 63 06/20/2022 08:41 AM    HDL Cholesterol 65 12/17/2021 09:15 AM    HDL Cholesterol 63 06/09/2021 09:51 AM    HDL Cholesterol 68 12/31/2020 10:31 AM    HDL Cholesterol 58 06/25/2020 08:55 AM    LDL, calculated 69.2 06/20/2022 08:41 AM    LDL, calculated 69.8 12/17/2021 09:15 AM    LDL, calculated 76.6 06/09/2021 09:51 AM    LDL, calculated 100.4 (H) 12/31/2020 10:31 AM    LDL, calculated 76 06/25/2020 08:55 AM    Triglyceride 99 06/20/2022 08:41 AM    Triglyceride 66 12/17/2021 09:15 AM    Triglyceride 82 06/09/2021 09:51 AM Triglyceride 88 12/31/2020 10:31 AM    Triglyceride 76 06/25/2020 08:55 AM    CHOL/HDL Ratio 2.4 06/20/2022 08:41 AM    CHOL/HDL Ratio 2.3 12/17/2021 09:15 AM    CHOL/HDL Ratio 2.5 06/09/2021 09:51 AM    CHOL/HDL Ratio 2.7 12/31/2020 10:31 AM    CHOL/HDL Ratio 2.6 03/25/2020 09:00 AM     ASSESSMENT :      He is stable and asymptomatic at this point well compensated on a good medical regimen and needs no cardiac testing. We will repeat his echo when he returns for follow-up. current treatment plan is effective, no change in therapy  lab results and schedule of future lab studies reviewed with patient  reviewed diet, exercise and weight control    Encounter Diagnoses   Name Primary? Coronary artery disease involving native coronary artery of native heart without angina pectoris Yes    Mixed hyperlipidemia     Essential hypertension, benign     RBBB     VANN (dyspnea on exertion)     Nonrheumatic mitral valve regurgitation     Paroxysmal atrial fibrillation (HCC)      Orders Placed This Encounter    doxycycline (VIBRAMYCIN) 50 mg capsule       Follow-up and Dispositions    Return in about 6 months (around 4/21/2023). Cecy Monte MD  10/21/2022  Please note that this dictation was completed with [employer], the [email or web address] voice recognition software. Quite often unanticipated grammatical, syntax, homophones, and other interpretive errors are inadvertently transcribed by the computer software. Please disregard these errors. Please excuse any errors that have escaped final proofreading. Thank you.

## 2022-11-15 DIAGNOSIS — E78.2 MIXED HYPERLIPIDEMIA: ICD-10-CM

## 2022-11-15 NOTE — TELEPHONE ENCOUNTER
Patient's spouse called in refill. Patient has an upcoming appt to establish care next month, but is currently out of pills. PCP: Alejandro Wing MD    Last appt: 6/22/2022  Future Appointments   Date Time Provider Wanda Becker   12/5/2022  9:30 AM Alejandro Wing MD [facility] BS AMB   4/21/2023  9:00 AM REGINA VARMA BS AMB   4/21/2023 10:00 AM MD ZOHREH Elkins BS AMB       Requested Prescriptions     Pending Prescriptions Disp Refills    rosuvastatin (CRESTOR) 20 mg tablet 90 Tablet 0     Sig: Take 1 Tablet by mouth nightly.  Replaces atorvastatin       Prior labs and Blood pressures:  BP Readings from Last 3 Encounters:   10/21/22 122/80   06/22/22 123/74   04/20/22 120/76     Lab Results   Component Value Date/Time    Sodium 140 06/20/2022 08:41 AM    Potassium 4.1 06/20/2022 08:41 AM    Chloride 106 06/20/2022 08:41 AM    CO2 30 06/20/2022 08:41 AM    Anion gap 4 (L) 06/20/2022 08:41 AM    Glucose 91 06/20/2022 08:41 AM    BUN 22 (H) 06/20/2022 08:41 AM    Creatinine 0.99 06/20/2022 08:41 AM    BUN/Creatinine ratio 22 (H) 06/20/2022 08:41 AM    GFR est AA >60 06/20/2022 08:41 AM    GFR est non-AA >60 06/20/2022 08:41 AM    Calcium 8.5 06/20/2022 08:41 AM

## 2022-11-18 DIAGNOSIS — E78.2 MIXED HYPERLIPIDEMIA: ICD-10-CM

## 2022-11-18 RX ORDER — ROSUVASTATIN CALCIUM 20 MG/1
20 TABLET, COATED ORAL
Qty: 90 TABLET | Refills: 0 | Status: SHIPPED | OUTPATIENT
Start: 2022-11-18

## 2022-11-18 RX ORDER — ROSUVASTATIN CALCIUM 20 MG/1
20 TABLET, COATED ORAL
Qty: 90 TABLET | Refills: 0 | OUTPATIENT
Start: 2022-11-18

## 2022-11-18 NOTE — TELEPHONE ENCOUNTER
Requested Prescriptions     Pending Prescriptions Disp Refills    rosuvastatin (CRESTOR) 20 mg tablet 90 Tablet 0     Sig: Take 1 Tablet by mouth nightly.  Replaces atorvastatin     [facility]/pharmacy #[id number]  [phone]    Pt do not have any more med

## 2022-12-05 ENCOUNTER — OFFICE VISIT (OUTPATIENT)
Dept: PRIMARY CARE CLINIC | Age: 82
End: 2022-12-05
Payer: MEDICARE

## 2022-12-05 VITALS
WEIGHT: 185.2 LBS | SYSTOLIC BLOOD PRESSURE: 119 MMHG | DIASTOLIC BLOOD PRESSURE: 62 MMHG | HEART RATE: 75 BPM | TEMPERATURE: 97.1 F | BODY MASS INDEX: 25.93 KG/M2 | HEIGHT: 71 IN | OXYGEN SATURATION: 96 % | RESPIRATION RATE: 20 BRPM

## 2022-12-05 DIAGNOSIS — E03.9 ACQUIRED HYPOTHYROIDISM: ICD-10-CM

## 2022-12-05 DIAGNOSIS — E03.9 ACQUIRED HYPOTHYROIDISM: Primary | ICD-10-CM

## 2022-12-05 DIAGNOSIS — R73.02 IGT (IMPAIRED GLUCOSE TOLERANCE): ICD-10-CM

## 2022-12-05 DIAGNOSIS — R35.1 BPH ASSOCIATED WITH NOCTURIA: ICD-10-CM

## 2022-12-05 DIAGNOSIS — N40.1 BPH ASSOCIATED WITH NOCTURIA: ICD-10-CM

## 2022-12-05 DIAGNOSIS — H35.3132 INTERMEDIATE STAGE NONEXUDATIVE AGE-RELATED MACULAR DEGENERATION OF BOTH EYES: ICD-10-CM

## 2022-12-05 DIAGNOSIS — Z86.73 HISTORY OF STROKE: ICD-10-CM

## 2022-12-05 DIAGNOSIS — I34.0 NONRHEUMATIC MITRAL VALVE REGURGITATION: ICD-10-CM

## 2022-12-05 DIAGNOSIS — D50.8 OTHER IRON DEFICIENCY ANEMIA: ICD-10-CM

## 2022-12-05 DIAGNOSIS — Z95.1 S/P CABG X 4: ICD-10-CM

## 2022-12-05 PROCEDURE — G8536 NO DOC ELDER MAL SCRN: HCPCS | Performed by: INTERNAL MEDICINE

## 2022-12-05 PROCEDURE — G8752 SYS BP LESS 140: HCPCS | Performed by: INTERNAL MEDICINE

## 2022-12-05 PROCEDURE — 99204 OFFICE O/P NEW MOD 45 MIN: CPT | Performed by: INTERNAL MEDICINE

## 2022-12-05 PROCEDURE — 1123F ACP DISCUSS/DSCN MKR DOCD: CPT | Performed by: INTERNAL MEDICINE

## 2022-12-05 PROCEDURE — G8427 DOCREV CUR MEDS BY ELIG CLIN: HCPCS | Performed by: INTERNAL MEDICINE

## 2022-12-05 PROCEDURE — 1101F PT FALLS ASSESS-DOCD LE1/YR: CPT | Performed by: INTERNAL MEDICINE

## 2022-12-05 PROCEDURE — G8754 DIAS BP LESS 90: HCPCS | Performed by: INTERNAL MEDICINE

## 2022-12-05 PROCEDURE — G8417 CALC BMI ABV UP PARAM F/U: HCPCS | Performed by: INTERNAL MEDICINE

## 2022-12-05 PROCEDURE — G8432 DEP SCR NOT DOC, RNG: HCPCS | Performed by: INTERNAL MEDICINE

## 2022-12-05 PROCEDURE — 3078F DIAST BP <80 MM HG: CPT | Performed by: INTERNAL MEDICINE

## 2022-12-05 PROCEDURE — 3074F SYST BP LT 130 MM HG: CPT | Performed by: INTERNAL MEDICINE

## 2022-12-05 NOTE — PROGRESS NOTES
1. \"Have you been to the ER, urgent care clinic since your last visit? Hospitalized since your last visit? \" No    2. \"Have you seen or consulted any other health care providers outside of the [address] [place] [address] since your last visit? \" Yes When: Urologist and Derma    Also Neuro/Eye doctor   3. For patients aged 39-70: Has the patient had a colonoscopy / FIT/ Cologuard? NA - based on age      .   Chief Complaint   Patient presents with    Establish Care

## 2022-12-05 NOTE — PROGRESS NOTES
Ailyn Carlos (: 1940) is a 80 y.o. male, new patient, here for evaluation of the following chief complaint(s):  Establish Care       ASSESSMENT/PLAN:  Below is the assessment and plan developed based on review of pertinent history, physical exam, labs, studies, and medications. 1. Acquired hypothyroidism  Told him to continue Synthroid same dose and will  recheck his TSH.  -     TSH 3RD GENERATION; Future  2. Nonrheumatic mitral valve regurgitation  Followed by cardiologist  3. S/P CABG x 4  On Crestor and metoprolol. 4. BPH associated with nocturia  -     METABOLIC PANEL, COMPREHENSIVE; Future  5. Intermediate stage nonexudative age-related macular degeneration of both eyes  On doxycycline. Followed by ophthalmologist  6. History of stroke  On Eliquis. 7. IGT (impaired glucose tolerance)  -     HEMOGLOBIN A1C WITH EAG; Future  8. Other iron deficiency anemia  -     CBC W/O DIFF; Future          SUBJECTIVE/OBJECTIVE:  Patient is new to this practice. He was seeing Dr. Shazia Roberts but since he has left the practice , patient wants to get established here. [facility]. He has a history of coronary artery disease status post CABG x4 and has been followed by  cardiologist Dr. Christal Caldwell. He is also on a blood thinner Eliquis for his history of stroke. According to him stroke had affected his eyes and  he is seeing an ophthalmologist for macular degeneration. He was put on doxycycline for macular degeneration. Records from the [facility] care reviewed. He had a colonoscopy done by Dr. Salas No as his hemoglobin had dropped but since transfusion he had not had his blood work done. He wants to get his labs done today. He has a history of hypothyroidism and would like to get his TSH checked as well. He sees urologist for BPH and has been stable on his medications.       Visit Vitals  /62 (BP 1 Location: Right upper arm, BP Patient Position: Sitting)   Pulse 75   Temp 97.1 °F (36.2 °C) (Temporal)   Resp 20   Ht 5' 11\" (1.803 m)   Wt 185 lb 3.2 oz (84 kg)   SpO2 96%   BMI 25.83 kg/m²      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    BPH with urinary obstruction N40.1, N13.8    Advance directive discussed with patient Z71.89    Lumbar disc disease M51.9    Lumbar foraminal stenosis M48.061    IFG (impaired fasting glucose) R73.01    NSTEMI (non-ST elevated myocardial infarction) ([facility]) I21.4    Hx of CABG Z95.1    Dizziness R42    Sustained SVT ([facility]) I47.1    RBBB I45.10    Peripheral vascular disease ([facility]) I73.9    VANN (dyspnea on exertion) R06.09    Bloody stool K92.1    Nonrheumatic mitral valve regurgitation I34.0    Intermediate stage nonexudative age-related macular degeneration of both eyes H35.3132        Current Outpatient Medications on File Prior to Visit   Medication Sig Dispense Refill    vit A/vit C/vit E/zinc/copper (ICAPS AREDS PO) Take  by mouth. rosuvastatin (CRESTOR) 20 mg tablet Take 1 Tablet by mouth nightly. Replaces atorvastatin 90 Tablet 0    doxycycline (VIBRAMYCIN) 50 mg capsule Take 50 mg by mouth daily. aspirin 81 mg chewable tablet Take 1 Tablet by mouth daily. 90 Tablet 3    apixaban (Eliquis) 5 mg tablet Take 1 Tablet by mouth every twelve (12) hours. 180 Tablet 2    levothyroxine (SYNTHROID) 50 mcg tablet TAKE 1 TABLET BY MOUTH EVERY DAY BEFORE BREAKFAST EXCEPT TAKE 2 TABS ON SATURDAY.  112 Tablet 1    tamsulosin (FLOMAX) 0.4 mg capsule TAKE 1 CAPSULE BY MOUTH EVERY DAY 30 MINUTES AFTER THE SAME MEAL EACH DAY 90 Capsule 0    metoprolol succinate (TOPROL-XL) 25 mg XL tablet Take 1 Tablet by mouth daily. 90 Tablet 3    cholecalciferol (VITAMIN D3) (2,000 UNITS /50 MCG) cap capsule Take 2,000 Units by mouth every morning. vitamins  A,C,E-zinc-copper (Ocuvite PreserVision) [phone] unit-mg-unit tab tablet Take 1 Tab by mouth daily. (Patient taking differently: Take 1 Tablet by mouth two (2) times a day.) 30 Tab 5    acetaminophen (TYLENOL) 325 mg tablet Take 650 mg by mouth every four (4) hours as needed for Pain. finasteride (PROSCAR) 5 mg tablet Take 5 mg by mouth daily. HS      [DISCONTINUED] doxycycline (MONODOX) 50 mg capsule Take 50 mg by mouth daily. ferrous sulfate (IRON) 325 mg (65 mg iron) EC tablet Take 1 Tablet by mouth Daily (before breakfast). (Patient not taking: Reported on 12/5/2022) 30 Tablet 0     No current facility-administered medications on file prior to visit.        Allergies   Allergen Reactions    Codeine Rash    Pcn [Penicillins] Rash     HAD REACTION WILL IN [facility] DEVELOPED A RASH , NO HOSPITAL TREATMENT NEEDED PER PATIENT    Sulfa (Sulfonamide Antibiotics) Other (comments)     confusion       Past Medical History:   Diagnosis Date    Arrhythmia 04/12/2020    cardioversion for SVT    Arthritis     HANDS    Basal cell carcinoma     BPH (benign prostatic hyperplasia)     CAD (coronary artery disease)     s/p PTCA '92    Calculus of kidney     Chronic pain     BACK    Elevated PSA     Heart attack ([address]     Hypercholesterolemia     Kidney stones     Lumbar foraminal stenosis     Melanoma (HCC)     MVP (mitral valve prolapse)     NSTEMI (non-ST elevated myocardial infarction) ([address] 03/25/2020    Other ill-defined conditions(799.89)     Prostatitis    Prediabetes     Seasonal allergies     Squamous cell carcinoma     Stroke ([address]     with left eye visual loss; 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    Systolic murmur     Thyroid disease     HYPOTHYROID    Vision impairment     R EYE, BLOOD CLOT ON RETINA       Past Surgical History: Procedure Laterality Date    COLONOSCOPY N/A 3/8/2022    COLONOSCOPY -; performed by Mamie Mathews MD at [facility] ENDOSCOPY    ENDOSCOPY, COLON, DIAGNOSTIC      due 12    HX BACK SURGERY      HX CATARACT REMOVAL Bilateral 11/2021    HX CHOLECYSTECTOMY  1970    HX CORONARY ARTERY BYPASS GRAFT  03/26/2020    x 5, LIMA to LAD, RSVG to Diag-RI-OM, RSVG to RCA    [facility]  09/15/2021    HX LITHOTRIPSY      x2    HX ORTHOPAEDIC      elbow - fx right arm age 3 yrs    PA CARDIOVERSION ELECTIVE ARRHYTHMIA EXTERNAL N/A 4/13/2020    EP CARDIOVERSION performed by Kwabena Romero MD at [address] CATH LAB       Family History   Problem Relation Age of Onset    Heart Disease Mother         CHF    Heart Disease Father         CAD    Heart Disease Sister         CAD CABG    Lung Disease Sister     Heart Disease Brother         CAD    Pulmonary Fibrosis Brother     Heart Disease Brother         CAD    Stroke Brother 34        cerebral hemorrhage    Cancer Brother         LUNG    High Cholesterol Daughter     No Known Problems Son     Anesth Problems Neg Hx        Social History     Socioeconomic History    Marital status:      Spouse name: Not on file    Number of children: Not on file    Years of education: Not on file    Highest education level: Not on file   Occupational History    Occupation:      Comment: part time consulting   Tobacco Use    Smoking status: Never    Smokeless tobacco: Never   Vaping Use    Vaping Use: Never used   Substance and Sexual Activity    Alcohol use: No    Drug use: No    Sexual activity: Not on file   Other Topics Concern     Service Not Asked    Blood Transfusions Not Asked    Caffeine Concern Not Asked    Occupational Exposure Not Asked    Hobby Hazards Not Asked    Sleep Concern Not Asked    Stress Concern Not Asked    Weight Concern Not Asked    Special Diet Not Asked    Back Care Not Asked    Exercise Not Asked Bike Helmet Not Asked    Seat Belt Not Asked    Self-Exams Not Asked   Social History Narrative    Not on file     Social Determinants of Health     Financial Resource Strain: Low Risk     Difficulty of Paying Living Expenses: Not hard at all   Food Insecurity: No Food Insecurity    Worried About Running Out of Food in the Last Year: Never true    Ran Out of Food in the Last Year: Never true   Transportation Needs: Not on file   Physical Activity: Not on file   Stress: Not on file   Social Connections: Not on file   Intimate Partner Violence: Not on file   Housing Stability: Not on file       No visits with results within 3 Month(s) from this visit. Latest known visit with results is:   Office Visit on 06/22/2022   Component Date Value Ref Range Status    WBC 06/22/2022 6.8  4.1 - 11.1 K/uL Final    RBC 06/22/2022 5.15  4.10 - 5.70 M/uL Final    HGB 06/22/2022 14.5  12.1 - 17.0 g/dL Final    HCT 06/22/2022 45.7  36.6 - 50.3 % Final    MCV 06/22/2022 88.7  80.0 - 99.0 FL Final    MCH 06/22/2022 28.2  26.0 - 34.0 PG Final    MCHC 06/22/2022 31.7  30.0 - 36.5 g/dL Final    RDW 06/22/2022 15.9 (A)  11.5 - 14.5 % Final    PLATELET 47/53/6754 776  150 - 400 K/uL Final    MPV 06/22/2022 9.7  8.9 - 12.9 FL Final    NRBC 06/22/2022 0.0  0  WBC Final    ABSOLUTE NRBC 06/22/2022 0.00  0.00 - 0.01 K/uL Final    NEUTROPHILS 06/22/2022 65  32 - 75 % Final    LYMPHOCYTES 06/22/2022 22  12 - 49 % Final    MONOCYTES 06/22/2022 9  5 - 13 % Final    EOSINOPHILS 06/22/2022 3  0 - 7 % Final    BASOPHILS 06/22/2022 1  0 - 1 % Final    IMMATURE GRANULOCYTES 06/22/2022 0  0.0 - 0.5 % Final    ABS. NEUTROPHILS 06/22/2022 4.4  1.8 - 8.0 K/UL Final    ABS. LYMPHOCYTES 06/22/2022 1.5  0.8 - 3.5 K/UL Final    ABS. MONOCYTES 06/22/2022 0.6  0.0 - 1.0 K/UL Final    ABS. EOSINOPHILS 06/22/2022 0.2  0.0 - 0.4 K/UL Final    ABS. BASOPHILS 06/22/2022 0.1  0.0 - 0.1 K/UL Final    ABS. IMM.  GRANS. 06/22/2022 0.0  0.00 - 0.04 K/UL Final    DF 06/22/2022 AUTOMATED    Final      Review of Systems   Constitutional:  Negative for activity change, fatigue and unexpected weight change. HENT:  Positive for hearing loss. Negative for congestion, ear discharge, ear pain, rhinorrhea and sore throat. Eyes:  Negative for pain, discharge and redness. Respiratory:  Negative for cough, chest tightness and shortness of breath. Cardiovascular:  Negative for leg swelling. Gastrointestinal:  Negative for abdominal pain, constipation and diarrhea. Endocrine: Negative for polyuria. Genitourinary:  Negative for dysuria, flank pain and urgency. Musculoskeletal:  Negative for arthralgias, back pain and myalgias. Skin:  Negative for color change. Neurological:  Negative for dizziness, light-headedness and headaches. Psychiatric/Behavioral:  Negative for dysphoric mood and sleep disturbance. The patient is not nervous/anxious. Physical Exam    Vitals and nursing note reviewed. Constitutional:       Appearance: Normal appearance. Eyes:      Extraocular Movements: Extraocular movements intact. Pupils: Pupils are equal, round, and reactive to light. Cardiovascular:      Rate and Rhythm: Normal rate and regular rhythm. Pulmonary:      Effort: Pulmonary effort is normal.      Breath sounds: Normal breath sounds. Abdominal:      General: Bowel sounds are normal. There is no distension. Palpations: Abdomen is soft. Musculoskeletal:         General: No swelling. Normal range of motion. Cervical back: Normal range of motion and neck supple. Right lower leg: No edema. Left lower leg: No edema. Neurological:      General: No focal deficit present. Mental Status:  Alert and oriented to person, place, and time. Motor: No weakness. Psychiatric:         Mood and Affect: Mood normal.         Behavior: Behavior normal.          An electronic signature was used to authenticate this note.   -- Brigida Newby MD

## 2022-12-06 LAB
ALBUMIN SERPL-MCNC: 4 G/DL (ref 3.5–5)
ALBUMIN/GLOB SERPL: 1.3 {RATIO} (ref 1.1–2.2)
ALP SERPL-CCNC: 105 U/L (ref 45–117)
ALT SERPL-CCNC: 21 U/L (ref 12–78)
ANION GAP SERPL CALC-SCNC: 1 MMOL/L (ref 5–15)
AST SERPL-CCNC: 19 U/L (ref 15–37)
BILIRUB SERPL-MCNC: 0.5 MG/DL (ref 0.2–1)
BUN SERPL-MCNC: 18 MG/DL (ref 6–20)
BUN/CREAT SERPL: 17 (ref 12–20)
CALCIUM SERPL-MCNC: 9.1 MG/DL (ref 8.5–10.1)
CHLORIDE SERPL-SCNC: 108 MMOL/L (ref 97–108)
CO2 SERPL-SCNC: 31 MMOL/L (ref 21–32)
CREAT SERPL-MCNC: 1.04 MG/DL (ref 0.7–1.3)
ERYTHROCYTE [DISTWIDTH] IN BLOOD BY AUTOMATED COUNT: 14.8 % (ref 11.5–14.5)
EST. AVERAGE GLUCOSE BLD GHB EST-MCNC: 126 MG/DL
GLOBULIN SER CALC-MCNC: 3.1 G/DL (ref 2–4)
GLUCOSE SERPL-MCNC: 105 MG/DL (ref 65–100)
HBA1C MFR BLD: 6 % (ref 4–5.6)
HCT VFR BLD AUTO: 48.5 % (ref 36.6–50.3)
HGB BLD-MCNC: 15 G/DL (ref 12.1–17)
MCH RBC QN AUTO: 28.4 PG (ref 26–34)
MCHC RBC AUTO-ENTMCNC: 30.9 G/DL (ref 30–36.5)
MCV RBC AUTO: 91.7 FL (ref 80–99)
NRBC # BLD: 0 K/UL (ref 0–0.01)
NRBC BLD-RTO: 0 PER 100 WBC
PLATELET # BLD AUTO: 199 K/UL (ref 150–400)
PMV BLD AUTO: 9.8 FL (ref 8.9–12.9)
POTASSIUM SERPL-SCNC: 5 MMOL/L (ref 3.5–5.1)
PROT SERPL-MCNC: 7.1 G/DL (ref 6.4–8.2)
RBC # BLD AUTO: 5.29 M/UL (ref 4.1–5.7)
SODIUM SERPL-SCNC: 140 MMOL/L (ref 136–145)
TSH SERPL DL<=0.05 MIU/L-ACNC: 1.78 UIU/ML (ref 0.36–3.74)
WBC # BLD AUTO: 6 K/UL (ref 4.1–11.1)

## 2022-12-09 NOTE — ED TRIAGE NOTES
Patient arrives for c/o CP that started while \"laying in bed 20 minutes ago\". Left sided in nature, non-radiating. \"pressure like\" Denies SOB. continue diuretics  Patient is currently on ethacrynic acid May need to increase dose to BID  Patient seen by pulmonary  O2 sats have been improving  Patient currently on 1 Liter NC  will try to wean off completely

## 2022-12-11 NOTE — PROGRESS NOTES
Results reviewed. Release via [employer]   Lizeth Javier, hope you are enjoying your weekend. Just left you a voicemail . Your blood sugar and thyroid numbers look good. Keep up the good work! Do you need any refills? Have a wonderful Holidays!

## 2023-02-13 DIAGNOSIS — E78.2 MIXED HYPERLIPIDEMIA: ICD-10-CM

## 2023-02-13 RX ORDER — ROSUVASTATIN CALCIUM 20 MG/1
20 TABLET, COATED ORAL
Qty: 90 TABLET | Refills: 0 | Status: SHIPPED | OUTPATIENT
Start: 2023-02-13

## 2023-02-20 DIAGNOSIS — E06.3 HYPOTHYROIDISM, ACQUIRED, AUTOIMMUNE: ICD-10-CM

## 2023-02-20 RX ORDER — LEVOTHYROXINE SODIUM 50 UG/1
TABLET ORAL
Qty: 112 TABLET | Refills: 1 | Status: SHIPPED | OUTPATIENT
Start: 2023-02-20

## 2023-02-20 NOTE — TELEPHONE ENCOUNTER
Requested Prescriptions     Pending Prescriptions Disp Refills    levothyroxine (SYNTHROID) 50 mcg tablet 112 Tablet 1     Sig: TAKE 1 TABLET BY MOUTH EVERY DAY BEFORE BREAKFAST EXCEPT TAKE 2 TABS ON SATURDAY.         Last Visit 12/5/22  Last Refill 8/5/22

## 2023-03-21 ENCOUNTER — OFFICE VISIT (OUTPATIENT)
Dept: PRIMARY CARE CLINIC | Age: 83
End: 2023-03-21
Payer: MEDICARE

## 2023-03-21 VITALS
TEMPERATURE: 97.5 F | RESPIRATION RATE: 17 BRPM | HEART RATE: 69 BPM | BODY MASS INDEX: 25.84 KG/M2 | DIASTOLIC BLOOD PRESSURE: 76 MMHG | WEIGHT: 184.6 LBS | OXYGEN SATURATION: 97 % | HEIGHT: 71 IN | SYSTOLIC BLOOD PRESSURE: 135 MMHG

## 2023-03-21 DIAGNOSIS — N40.1 BPH WITH URINARY OBSTRUCTION: ICD-10-CM

## 2023-03-21 DIAGNOSIS — N13.8 BPH WITH URINARY OBSTRUCTION: ICD-10-CM

## 2023-03-21 DIAGNOSIS — Z23 NEED FOR SHINGLES VACCINE: ICD-10-CM

## 2023-03-21 DIAGNOSIS — E03.9 ACQUIRED HYPOTHYROIDISM: ICD-10-CM

## 2023-03-21 DIAGNOSIS — E78.2 MIXED HYPERLIPIDEMIA: ICD-10-CM

## 2023-03-21 DIAGNOSIS — I73.9 PERIPHERAL VASCULAR DISEASE (HCC): ICD-10-CM

## 2023-03-21 DIAGNOSIS — R73.02 IGT (IMPAIRED GLUCOSE TOLERANCE): ICD-10-CM

## 2023-03-21 DIAGNOSIS — Z00.00 MEDICARE ANNUAL WELLNESS VISIT, SUBSEQUENT: Primary | ICD-10-CM

## 2023-03-21 DIAGNOSIS — H35.3132 INTERMEDIATE STAGE NONEXUDATIVE AGE-RELATED MACULAR DEGENERATION OF BOTH EYES: ICD-10-CM

## 2023-03-21 DIAGNOSIS — K58.8 OTHER IRRITABLE BOWEL SYNDROME: ICD-10-CM

## 2023-03-21 DIAGNOSIS — I34.0 NONRHEUMATIC MITRAL VALVE REGURGITATION: ICD-10-CM

## 2023-03-21 DIAGNOSIS — I48.0 PAROXYSMAL ATRIAL FIBRILLATION (HCC): ICD-10-CM

## 2023-03-21 PROBLEM — K92.1 BLOODY STOOL: Status: RESOLVED | Noted: 2022-03-05 | Resolved: 2023-03-21

## 2023-03-21 PROBLEM — R42 DIZZINESS: Status: RESOLVED | Noted: 2020-04-12 | Resolved: 2023-03-21

## 2023-03-21 PROBLEM — R06.09 DOE (DYSPNEA ON EXERTION): Status: RESOLVED | Noted: 2022-03-05 | Resolved: 2023-03-21

## 2023-03-21 PROCEDURE — G8417 CALC BMI ABV UP PARAM F/U: HCPCS | Performed by: INTERNAL MEDICINE

## 2023-03-21 PROCEDURE — 1123F ACP DISCUSS/DSCN MKR DOCD: CPT | Performed by: INTERNAL MEDICINE

## 2023-03-21 PROCEDURE — 3075F SYST BP GE 130 - 139MM HG: CPT | Performed by: INTERNAL MEDICINE

## 2023-03-21 PROCEDURE — G8536 NO DOC ELDER MAL SCRN: HCPCS | Performed by: INTERNAL MEDICINE

## 2023-03-21 PROCEDURE — 1101F PT FALLS ASSESS-DOCD LE1/YR: CPT | Performed by: INTERNAL MEDICINE

## 2023-03-21 PROCEDURE — G0439 PPPS, SUBSEQ VISIT: HCPCS | Performed by: INTERNAL MEDICINE

## 2023-03-21 PROCEDURE — 3078F DIAST BP <80 MM HG: CPT | Performed by: INTERNAL MEDICINE

## 2023-03-21 PROCEDURE — G8427 DOCREV CUR MEDS BY ELIG CLIN: HCPCS | Performed by: INTERNAL MEDICINE

## 2023-03-21 PROCEDURE — 99214 OFFICE O/P EST MOD 30 MIN: CPT | Performed by: INTERNAL MEDICINE

## 2023-03-21 PROCEDURE — G8432 DEP SCR NOT DOC, RNG: HCPCS | Performed by: INTERNAL MEDICINE

## 2023-03-21 RX ORDER — ZOSTER VACCINE RECOMBINANT, ADJUVANTED 50 MCG/0.5
0.5 KIT INTRAMUSCULAR ONCE
Qty: 0.5 ML | Refills: 0 | Status: SHIPPED | OUTPATIENT
Start: 2023-03-21 | End: 2023-03-21

## 2023-03-21 NOTE — PROGRESS NOTES
Gabriella Martinez (: 1940) is a 80 y.o. male, established patient, here for evaluation of the following chief complaint(s): Annual Wellness Visit (MVW-) and Follow Up Chronic Condition       ASSESSMENT/PLAN:  Below is the assessment and plan developed based on review of pertinent history, physical exam, labs, studies, and medications. 1. Peripheral vascular disease ([address]  He is followed by Cardiology. 2. Paroxysmal atrial fibrillation [facility])  He is followed by Cardiology. I recommend that he continue taking Eliquis 5 mg daily. He plans to have an echocardiogram in a month. 3. Other irritable bowel syndrome  He is followed by Dr Thien Vasquez (Gastroenterology). 4. Acquired hypothyroidism  -     TSH 3RD GENERATION; Future  I ordered blood work to check his TSH. I recommend that he continue taking levothyroxine 50 mcg daily. 5. IGT (impaired glucose tolerance)  -     HEMOGLOBIN A1C WITH EAG; Future  I ordered blood work to check his Hgb a1c.    6. BPH with urinary obstruction  -     PSA, DIAGNOSTIC (PROSTATE SPECIFIC AG); Future  I ordered blood work to check his PSA. 7. Mixed hyperlipidemia  -     METABOLIC PANEL, COMPREHENSIVE; Future  -     CBC W/O DIFF; Future  -     LIPID PANEL; Future  I ordered a CBC, CMP, and a lipid panel. I recommend that he continue taking rosuvastatin 20 mg daily. 8. Intermediate stage nonexudative age-related macular degeneration of both eyes  He is followed by Ophthalmology. I recommend that he continue taking doxycycline 50 mg daily as prescribed. 9. Nonrheumatic mitral valve regurgitation  He is followed by Cardiology. Repeat ECHO yearly. SUBJECTIVE/OBJECTIVE:  HPI  Patient presents today for a follow up for chronic conditions. He is fasting today. Pt's BP is elevated today in office at 152/72, 135/76 on manual repeat in left arm while sitting down. He checks his BP at home and states that it is usually in the 120s.   He takes metoprolol 25 mg daily. He has mitral valve regurgitation. He states that he sleeps for 6 hour a night and sleeps well. He is followed by Cardiology. He states that he has been having SOB occasionally. His Hgb got low last year and he took iron tablets. He sees Dr Daniel Norwood (Gastroenterology) and has been having irregular bowel movements. He takes a stool softner which has been helpful. He states that he had a double hernia surgery 2 years ago and notes that he believes his bowel regularity changed after the surgery. He is followed by Dr Arabella Best (Urology). He was last seen in November. He takes finasteride 5 mg daily and tamsulosin 0.4 mg daily. He is taking doxycycline 50 mg after seeing his Ophthalmologist to try to slow down dry macular degeneration. 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 His right eye has some macular degeneration. He is taking doxycycline 100 mg BID for an infection in his elbow.     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    BPH with urinary obstruction N40.1, N13.8    Advance directive discussed with patient Z71.89    Lumbar disc disease M51.9    Lumbar foraminal stenosis M48.061    IFG (impaired fasting glucose) R73.01    NSTEMI (non-ST elevated myocardial infarction) ([facility]) I21.4    Hx of CABG Z95.1    Sustained SVT ([facility]) I47.1    RBBB I45.10    Peripheral vascular disease ([facility]) I73.9    Nonrheumatic mitral valve regurgitation I34.0    Intermediate stage nonexudative age-related macular degeneration of both eyes H35.3132        Current Outpatient Medications on File Prior to Visit   Medication Sig 98.4 Dispense Refill    levothyroxine (SYNTHROID) 50 mcg tablet TAKE 1 TABLET BY MOUTH EVERY DAY BEFORE BREAKFAST EXCEPT TAKE 2 TABS ON SATURDAY. 112 Tablet 1    rosuvastatin (CRESTOR) 20 mg tablet TAKE 1 TABLET BY MOUTH NIGHTLY. REPLACES ATORVASTATIN 90 Tablet 0    vit A/vit C/vit E/zinc/copper (ICAPS AREDS PO) Take  by mouth. doxycycline (VIBRAMYCIN) 50 mg capsule Take 100 mg by mouth two (2) times a day. aspirin 81 mg chewable tablet Take 1 Tablet by mouth daily. 90 Tablet 3    apixaban (Eliquis) 5 mg tablet Take 1 Tablet by mouth every twelve (12) hours. 180 Tablet 2    tamsulosin (FLOMAX) 0.4 mg capsule TAKE 1 CAPSULE BY MOUTH EVERY DAY 30 MINUTES AFTER THE SAME MEAL EACH DAY 90 Capsule 0    metoprolol succinate (TOPROL-XL) 25 mg XL tablet Take 1 Tablet by mouth daily. 90 Tablet 3    cholecalciferol (VITAMIN D3) (2,000 UNITS /50 MCG) cap capsule Take 2,000 Units by mouth every morning. finasteride (PROSCAR) 5 mg tablet Take 5 mg by mouth daily. HS      ferrous sulfate (IRON) 325 mg (65 mg iron) EC tablet Take 1 Tablet by mouth Daily (before breakfast). (Patient not taking: No sig reported) 30 Tablet 0    vitamins  A,C,E-zinc-copper (Ocuvite PreserVision) [phone] unit-mg-unit tab tablet Take 1 Tab by mouth daily. (Patient not taking: Reported on 3/21/2023) 30 Tab 5    acetaminophen (TYLENOL) 325 mg tablet Take 650 mg by mouth every four (4) hours as needed for Pain. No current facility-administered medications on file prior to visit.        Allergies   Allergen Reactions    Codeine Rash    Pcn [Penicillins] Rash     HAD REACTION WILL IN COLLEGE DEVELOPED A RASH , NO HOSPITAL TREATMENT NEEDED PER PATIENT    Sulfa (Sulfonamide Antibiotics) Other (comments)     confusion       Past Medical History:   Diagnosis Date    Arrhythmia 04/12/2020    cardioversion for SVT    Arthritis     HANDS    Basal cell carcinoma     BPH (benign prostatic hyperplasia)     CAD (coronary artery disease)     s/p PTCA '92    Calculus of kidney     Chronic pain     BACK    Elevated PSA     Heart attack ([facility]     Hypercholesterolemia     Kidney stones     Lumbar foraminal stenosis     Melanoma (HCC)     MVP (mitral valve prolapse)     NSTEMI (non-ST elevated myocardial infarction) ([facility] 03/25/2020    Other ill-defined conditions(799.89)     Prostatitis    Prediabetes     Seasonal allergies     Squamous cell carcinoma     Stroke ([facility]     with left eye visual loss; 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    Systolic murmur     Thyroid disease     HYPOTHYROID    Vision impairment     R EYE, BLOOD CLOT ON RETINA       Past Surgical History:   Procedure Laterality Date    COLONOSCOPY N/A 3/8/2022    COLONOSCOPY -; performed by Bhanu Floewrs MD at [facility], [address], DIAGNOSTIC      due 12    HX BACK SURGERY      HX CATARACT REMOVAL Bilateral 11/2021    HX CHOLECYSTECTOMY  1970    HX CORONARY ARTERY BYPASS GRAFT  03/26/2020    x 5, LIMA to LAD, RSVG to Diag-RI-OM, RSVG to RCA    [address] [place] [address]  09/15/2021    HX LITHOTRIPSY      x2    HX ORTHOPAEDIC      elbow - fx right arm age 3 yrs    [place] CARDIOVERSION ELECTIVE ARRHYTHMIA EXTERNAL N/A 4/13/2020    EP CARDIOVERSION performed by Leslee Gutierrez MD at [address] CATH LAB       Family History   Problem Relation Age of Onset    Heart Disease Mother         CHF    Heart Disease Father         CAD    Heart Disease Sister         CAD CABG    Lung Disease Sister     Heart Disease Brother         CAD    Pulmonary Fibrosis Brother     Heart Disease Brother         CAD    Stroke Brother 29        cerebral hemorrhage    Cancer Brother         LUNG    High Cholesterol Daughter     No Known Problems Son     Anesth Problems Neg Hx        Social History     Socioeconomic History    Marital status:      Spouse name: Not on file    Number of children: Not on file    Years of education: Not on file    Highest education level: Not on file Occupational History    Occupation:      Comment: part time consulting   Tobacco Use    Smoking status: Never    Smokeless tobacco: Never   Vaping Use    Vaping Use: Never used   Substance and Sexual Activity    Alcohol use: No    Drug use: No    Sexual activity: Not on file   Other Topics Concern     Service Not Asked    Blood Transfusions Not Asked    Caffeine Concern Not Asked    Occupational Exposure Not Asked    Hobby Hazards Not Asked    Sleep Concern Not Asked    Stress Concern Not Asked    Weight Concern Not Asked    Special Diet Not Asked    Back Care Not Asked    Exercise Not Asked    Bike Helmet Not Asked    Seat Belt Not Asked    Self-Exams Not Asked   Social History Narrative    Not on file     Social Determinants of Health     Financial Resource Strain: Low Risk     Difficulty of Paying Living Expenses: Not hard at all   Food Insecurity: No Food Insecurity    Worried About Running Out of Food in the Last Year: Never true    Ran Out of Food in the Last Year: Never true   Transportation Needs: Not on file   Physical Activity: Not on file   Stress: Not on file   Social Connections: Not on file   Intimate Partner Violence: Not on file   Housing Stability: Not on file       No visits with results within 3 Month(s) from this visit. Latest known visit with results is:   Orders Only on 12/05/2022   Component Date Value Ref Range Status    WBC 12/05/2022 6.0  4.1 - 11.1 K/uL Final    RBC 12/05/2022 5.29  4. 10 - 5.70 M/uL Final    HGB 12/05/2022 15.0  12.1 - 17.0 g/dL Final    HCT 12/05/2022 48.5  36.6 - 50.3 % Final    MCV 12/05/2022 91.7  80.0 - 99.0 FL Final    MCH 12/05/2022 28.4  26.0 - 34.0 PG Final    MCHC 12/05/2022 30.9  30.0 - 36.5 g/dL Final    RDW 12/05/2022 14.8 (A)  11.5 - 14.5 % Final    PLATELET 72/44/2815 551  150 - 400 K/uL Final    MPV 12/05/2022 9.8  8.9 - 12.9 FL Final    NRBC 12/05/2022 0.0  0  WBC Final    ABSOLUTE NRBC 12/05/2022 0.00  0.00 - 0.01 K/uL Final Sodium 12/05/2022 140  136 - 145 mmol/L Final    Potassium 12/05/2022 5.0  3.5 - 5.1 mmol/L Final    Chloride 12/05/2022 108  97 - 108 mmol/L Final    CO2 12/05/2022 31  21 - 32 mmol/L Final    Anion gap 12/05/2022 1 (A)  5 - 15 mmol/L Final    Glucose 12/05/2022 105 (A)  65 - 100 mg/dL Final    BUN 12/05/2022 18  6 - 20 MG/DL Final    Creatinine 12/05/2022 1.04  0.70 - 1.30 MG/DL Final    BUN/Creatinine ratio 12/05/2022 17  12 - 20   Final    eGFR 12/05/2022 >60  >60 ml/min/1.73m2 Final    Comment:   Pediatric calculator link: [employer]. org/professionals/kdoqi/gfr_calculatorped    These results are not intended for use in patients <25years of age. eGFR results are calculated without a race factor using  the 2021 CKD-EPI equation. Careful clinical correlation is recommended, particularly when comparing to results calculated using previous equations. The CKD-EPI equation is less accurate in patients with extremes of muscle mass, extra-renal metabolism of creatinine, excessive creatine ingestion, or following therapy that affects renal tubular secretion. Calcium 12/05/2022 9.1  8.5 - 10.1 MG/DL Final    Bilirubin, total 12/05/2022 0.5  0.2 - 1.0 MG/DL Final    ALT (SGPT) 12/05/2022 21  12 - 78 U/L Final    AST (SGOT) 12/05/2022 19  15 - 37 U/L Final    Alk. phosphatase 12/05/2022 105  45 - 117 U/L Final    Protein, total 12/05/2022 7.1  6.4 - 8.2 g/dL Final    Albumin 12/05/2022 4.0  3.5 - 5.0 g/dL Final    Globulin 12/05/2022 3.1  2.0 - 4.0 g/dL Final    A-G Ratio 12/05/2022 1.3  1.1 - 2.2   Final    TSH 12/05/2022 1.78  0.36 - 3.74 uIU/mL Final    Comment:   Due to TSH heterogeneity, both structurally and degree of glycosylation, monoclonal antibodies used in the TSH assay may not accurately quantitate TSH. Therefore, this result should be correlated with clinical findings as well as with other assessments of thyroid function, e.g., free T4, free T3.       Hemoglobin A1c 12/05/2022 6.0 (A)  4.0 - 5.6 % Final    Comment: NEW METHOD  PLEASE NOTE NEW REFERENCE RANGE  (NOTE)  HbA1C Interpretive Ranges  <5.7              Normal  5.7 - 6.4         Consider Prediabetes  >6.5              Consider Diabetes      Est. average glucose 12/05/2022 126  mg/dL Final       Review of Systems   Constitutional:  Negative for activity change, fatigue and unexpected weight change. HENT:  Negative for congestion, ear discharge, ear pain, hearing loss, rhinorrhea and sore throat. Eyes:  Negative for pain, discharge and redness. Macular degeneration   Respiratory:  Negative for cough, chest tightness and shortness of breath. Cardiovascular:  Negative for leg swelling. Gastrointestinal:  Negative for abdominal pain, constipation and diarrhea. Endocrine: Negative for polyuria. Genitourinary:  Negative for dysuria, flank pain and urgency. Musculoskeletal:  Negative for arthralgias, back pain and myalgias. Skin:  Negative for color change. Neurological:  Negative for dizziness, light-headedness and headaches. Psychiatric/Behavioral:  Negative for dysphoric mood and sleep disturbance. The patient is not nervous/anxious. Visit Vitals  /76 (BP 1 Location: Left upper arm, BP Patient Position: Sitting)   Pulse 69   Temp 97.5 °F (36.4 °C)   Resp 17   Ht 5' 11\" (1.803 m)   Wt 184 lb 9.6 oz (83.7 kg)   SpO2 97%   BMI 25.75 kg/m²       Physical Exam  Vitals and nursing note reviewed. Constitutional:       General: He is not in acute distress. Appearance: Normal appearance. He is well-developed. He is not diaphoretic. HENT:      Head: Normocephalic and atraumatic. Right Ear: External ear normal.      Left Ear: External ear normal.      Mouth/Throat:      Mouth: Mucous membranes are moist.      Pharynx: Oropharynx is clear. Eyes:      General: No scleral icterus. Right eye: No discharge. Left eye: No discharge. Extraocular Movements: Extraocular movements intact. Conjunctiva/sclera: Conjunctivae normal.      Pupils: Pupils are equal, round, and reactive to light. Cardiovascular:      Rate and Rhythm: Normal rate and regular rhythm. Pulses: Normal pulses. Heart sounds: Normal heart sounds. Pulmonary:      Effort: Pulmonary effort is normal.      Breath sounds: Normal breath sounds. No wheezing. Musculoskeletal:      Right lower leg: No edema. Left lower leg: No edema. Neurological:      Mental Status: He is alert and oriented to person, place, and time. Psychiatric:         Mood and Affect: Mood and affect normal.         I, Nathan Malik MD, personally performed the services described in this documentation as scribed by Booker Rowe in my presence, and it is both accurate and complete. An electronic signature was used to authenticate this note.   -- Booker Rowe

## 2023-03-22 DIAGNOSIS — E06.3 HYPOTHYROIDISM, ACQUIRED, AUTOIMMUNE: ICD-10-CM

## 2023-03-22 RX ORDER — LEVOTHYROXINE SODIUM 50 UG/1
TABLET ORAL
Qty: 112 TABLET | Refills: 2 | Status: SHIPPED | OUTPATIENT
Start: 2023-03-22

## 2023-04-21 ENCOUNTER — OFFICE VISIT (OUTPATIENT)
Dept: CARDIOLOGY CLINIC | Age: 83
End: 2023-04-21

## 2023-04-21 ENCOUNTER — ANCILLARY PROCEDURE (OUTPATIENT)
Dept: CARDIOLOGY CLINIC | Age: 83
End: 2023-04-21
Payer: MEDICARE

## 2023-04-21 ENCOUNTER — TELEPHONE (OUTPATIENT)
Dept: CARDIOLOGY CLINIC | Age: 83
End: 2023-04-21

## 2023-04-21 VITALS
HEIGHT: 71 IN | BODY MASS INDEX: 25.76 KG/M2 | SYSTOLIC BLOOD PRESSURE: 135 MMHG | DIASTOLIC BLOOD PRESSURE: 76 MMHG | WEIGHT: 184 LBS

## 2023-04-21 VITALS
OXYGEN SATURATION: 98 % | DIASTOLIC BLOOD PRESSURE: 60 MMHG | HEIGHT: 71 IN | HEART RATE: 65 BPM | SYSTOLIC BLOOD PRESSURE: 120 MMHG | BODY MASS INDEX: 25.76 KG/M2 | RESPIRATION RATE: 15 BRPM | WEIGHT: 184 LBS

## 2023-04-21 DIAGNOSIS — E78.2 MIXED HYPERLIPIDEMIA: ICD-10-CM

## 2023-04-21 DIAGNOSIS — I48.0 PAROXYSMAL ATRIAL FIBRILLATION (HCC): ICD-10-CM

## 2023-04-21 DIAGNOSIS — R06.09 DOE (DYSPNEA ON EXERTION): ICD-10-CM

## 2023-04-21 DIAGNOSIS — I45.10 RBBB: ICD-10-CM

## 2023-04-21 DIAGNOSIS — I34.0 NONRHEUMATIC MITRAL VALVE REGURGITATION: ICD-10-CM

## 2023-04-21 DIAGNOSIS — I25.10 CORONARY ARTERY DISEASE INVOLVING NATIVE CORONARY ARTERY OF NATIVE HEART WITHOUT ANGINA PECTORIS: Primary | ICD-10-CM

## 2023-04-21 DIAGNOSIS — I25.10 CORONARY ARTERY DISEASE INVOLVING NATIVE CORONARY ARTERY OF NATIVE HEART WITHOUT ANGINA PECTORIS: ICD-10-CM

## 2023-04-21 LAB
ECHO AO ASC DIAM: 3.9 CM
ECHO AO ASCENDING AORTA INDEX: 1.91 CM/M2
ECHO AO ROOT DIAM: 3.9 CM
ECHO AO ROOT INDEX: 1.91 CM/M2
ECHO AR MAX VEL PISA: 4.4 M/S
ECHO AV AREA PEAK VELOCITY: 2 CM2
ECHO AV AREA VTI: 2.2 CM2
ECHO AV AREA/BSA PEAK VELOCITY: 1 CM2/M2
ECHO AV AREA/BSA VTI: 1.1 CM2/M2
ECHO AV MEAN GRADIENT: 6 MMHG
ECHO AV MEAN VELOCITY: 1.2 M/S
ECHO AV PEAK GRADIENT: 12 MMHG
ECHO AV PEAK VELOCITY: 1.7 M/S
ECHO AV REGURGITANT PHT: 509.1 MILLISECOND
ECHO AV VELOCITY RATIO: 0.47
ECHO AV VTI: 33.6 CM
ECHO EST RA PRESSURE: 3 MMHG
ECHO LA DIAMETER INDEX: 2.01 CM/M2
ECHO LA DIAMETER: 4.1 CM
ECHO LA TO AORTIC ROOT RATIO: 1.05
ECHO LA VOL 2C: 99 ML (ref 18–58)
ECHO LA VOL 4C: 82 ML (ref 18–58)
ECHO LA VOL BP: 94 ML (ref 18–58)
ECHO LA VOL/BSA BIPLANE: 46 ML/M2 (ref 16–34)
ECHO LA VOLUME AREA LENGTH: 98 ML
ECHO LA VOLUME INDEX A2C: 49 ML/M2 (ref 16–34)
ECHO LA VOLUME INDEX A4C: 40 ML/M2 (ref 16–34)
ECHO LA VOLUME INDEX AREA LENGTH: 48 ML/M2 (ref 16–34)
ECHO LV E' LATERAL VELOCITY: 12 CM/S
ECHO LV E' SEPTAL VELOCITY: 5 CM/S
ECHO LV EJECTION FRACTION A2C: 53 %
ECHO LV EJECTION FRACTION A4C: 57 %
ECHO LV EJECTION FRACTION BIPLANE: 55 % (ref 55–100)
ECHO LV FRACTIONAL SHORTENING: 37 % (ref 28–44)
ECHO LV INTERNAL DIMENSION DIASTOLE INDEX: 2.5 CM/M2
ECHO LV INTERNAL DIMENSION DIASTOLIC: 5.1 CM (ref 4.2–5.9)
ECHO LV INTERNAL DIMENSION SYSTOLIC INDEX: 1.57 CM/M2
ECHO LV INTERNAL DIMENSION SYSTOLIC: 3.2 CM
ECHO LV IVSD: 1.2 CM (ref 0.6–1)
ECHO LV MASS 2D: 241.2 G (ref 88–224)
ECHO LV MASS INDEX 2D: 118.3 G/M2 (ref 49–115)
ECHO LV POSTERIOR WALL DIASTOLIC: 1.2 CM (ref 0.6–1)
ECHO LV RELATIVE WALL THICKNESS RATIO: 0.47
ECHO LVOT AREA: 4.2 CM2
ECHO LVOT AV VTI INDEX: 0.51
ECHO LVOT CARDIAC OUTPUT: 3.4 LITER/MINUTE
ECHO LVOT CARDIAC OUTPUT: 3.4 LITER/MINUTE
ECHO LVOT CARDIAC OUTPUT: 3.5 LITER/MINUTE
ECHO LVOT DIAM: 2.3 CM
ECHO LVOT MEAN GRADIENT: 1 MMHG
ECHO LVOT PEAK GRADIENT: 3 MMHG
ECHO LVOT PEAK VELOCITY: 0.8 M/S
ECHO LVOT STROKE VOLUME INDEX: 34.8 ML/M2
ECHO LVOT SV: 71 ML
ECHO LVOT VTI: 17.1 CM
ECHO MV A VELOCITY: 0.8 M/S
ECHO MV E DECELERATION TIME (DT): 212.5 MS
ECHO MV E VELOCITY: 0.98 M/S
ECHO MV E/A RATIO: 1.23
ECHO MV E/E' LATERAL: 8.17
ECHO MV E/E' RATIO (AVERAGED): 13.88
ECHO MV E/E' SEPTAL: 19.6
ECHO MV REGURGITANT ALIASING (NYQUIST) VELOCITY: 18 CM/S
ECHO MV REGURGITANT VELOCITY PISA: 4.8 M/S
ECHO MV REGURGITANT VTIA: 158.5 CM
ECHO RIGHT VENTRICULAR SYSTOLIC PRESSURE (RVSP): 32 MMHG
ECHO RV BASAL DIMENSION: 3.1 CM
ECHO RV FREE WALL PEAK S': 9 CM/S
ECHO RV TAPSE: 1.4 CM (ref 1.7–?)
ECHO TV REGURGITANT MAX VELOCITY: 2.7 M/S
ECHO TV REGURGITANT PEAK GRADIENT: 29 MMHG

## 2023-04-21 PROCEDURE — 93306 TTE W/DOPPLER COMPLETE: CPT | Performed by: SPECIALIST

## 2023-04-21 NOTE — TELEPHONE ENCOUNTER
----- Message from Glenroy Ellsworth MD sent at 4/21/2023  4:14 PM EDT -----  Heart muscle is strong, couple of leaky valves, no sig change from before. Doubt heart is cause of sob.  Repeat 1yr

## 2023-04-21 NOTE — PROGRESS NOTES
HISTORY OF PRESENT ILLNESS  Kenzie Robbins is a 80 y.o. male     SUMMARY:   Problem List  Date Reviewed: 3/21/2023            Codes Class Noted    Intermediate stage nonexudative age-related macular degeneration of both eyes ICD-10-CM: H35.3132  ICD-9-CM: 362.51  12/5/2022        Nonrheumatic mitral valve regurgitation ICD-10-CM: I34.0  ICD-9-CM: 424.0  3/6/2022        Peripheral vascular disease ([facility] ICD-10-CM: I73.9  ICD-9-CM: 443.9  5/14/2020        Sustained SVT ([facility] ICD-10-CM: I47.1  ICD-9-CM: 427.89  4/12/2020        RBBB ICD-10-CM: I45.10  ICD-9-CM: 426.4  4/12/2020        Hx of CABG ICD-10-CM: Z95.1  ICD-9-CM: V45.81  3/26/2020    Overview Signed 3/26/2020  7:49 PM by TOM Brantley     x 5, LIMA to LAD, RSVG to Diag-RI-OM, RSVG to RCA             NSTEMI (non-ST elevated myocardial infarction) ([facility] ICD-10-CM: I21.4  ICD-9-CM: 410.70  3/25/2020        IFG (impaired fasting glucose) ICD-10-CM: R73.01  ICD-9-CM: 790.21  5/22/2017        Lumbar foraminal stenosis (Chronic) ICD-10-CM: M48.061  ICD-9-CM: 724.02  Unknown        Lumbar disc disease ICD-10-CM: M51.9  ICD-9-CM: 722.93  2/22/2017        Advance directive discussed with patient ICD-10-CM: Z71.89  ICD-9-CM: V65.49  2/21/2017        BPH with urinary obstruction ICD-10-CM: N40.1, N13.8  ICD-9-CM: 600.01, 599.69  1/9/2016        Hypothyroidism, acquired, autoimmune ICD-10-CM: E06.3  ICD-9-CM: 244.8  6/19/2014        Skin cancer ICD-10-CM: C44.90  ICD-9-CM: 173.90  5/8/2013        Macular degeneration ICD-10-CM: H35.30  ICD-9-CM: 362.50  3/19/2012        Unspecified hearing loss ICD-10-CM: H91.90  ICD-9-CM: 389.9  9/13/2010        Mixed hyperlipidemia ICD-10-CM: E78.2  ICD-9-CM: 272.2  12/1/2009        Essential hypertension, benign ICD-10-CM: I10  ICD-9-CM: 401.1  12/1/2009        Coronary artery disease involving native coronary artery of native heart without angina pectoris ICD-10-CM: I25.10  ICD-9-CM: 414.01  12/1/2009        Calculus of kidney ICD-10-CM: N20.0  ICD-9-CM: 592.0  12/1/2009        Insomnia, unspecified ICD-10-CM: G47.00  ICD-9-CM: 780.52  12/1/2009           Current Outpatient Medications on File Prior to Visit   Medication Sig    OTHER Med for IBS    levothyroxine (SYNTHROID) 50 mcg tablet TAKE 1 TABLET BY MOUTH EVERY DAY BEFORE BREAKFAST EXCEPT TAKE 2 TABS ON SATURDAY. rosuvastatin (CRESTOR) 20 mg tablet TAKE 1 TABLET BY MOUTH NIGHTLY. REPLACES ATORVASTATIN    vit A/vit C/vit E/zinc/copper (ICAPS AREDS PO) Take  by mouth. doxycycline (VIBRAMYCIN) 50 mg capsule Take 2 Capsules by mouth two (2) times a day. aspirin 81 mg chewable tablet Take 1 Tablet by mouth daily. apixaban (Eliquis) 5 mg tablet Take 1 Tablet by mouth every twelve (12) hours. tamsulosin (FLOMAX) 0.4 mg capsule TAKE 1 CAPSULE BY MOUTH EVERY DAY 30 MINUTES AFTER THE SAME MEAL EACH DAY    metoprolol succinate (TOPROL-XL) 25 mg XL tablet Take 1 Tablet by mouth daily. finasteride (PROSCAR) 5 mg tablet Take 1 Tablet by mouth daily. HS     No current facility-administered medications on file prior to visit. CARDIOLOGY STUDIES TO DATE:  PHIDebbi Sera      6/20 echo normal lvef, lvh, lae, gabriel, sclerotic non stenotic av, mild ai, mod to severe mrmild tr with pa pressure 40 mm, mod pul regurg    3/22  echo normal lvef, calcified av with mild regurg, lae, mod to severe mr    Chief Complaint   Patient presents with    Follow-up     HPI :  Overall he is doing quite well though he still has some mild dyspnea on exertion and this is something he is complained of off and on for a couple years. In spite of this he still walks plays golf weather permitting and maintains his household without any real difficulty. Most recent A1c was 5.8 and LDL was 65.   CARDIAC ROS:   negative for chest pain, palpitations, syncope, orthopnea, paroxysmal nocturnal dyspnea, exertional chest pressure/discomfort, claudication, lower extremity edema    Family History Problem Relation Age of Onset    Heart Disease Mother         CHF    Heart Disease Father         CAD    Heart Disease Sister         CAD CABG    Lung Disease Sister     Heart Disease Brother         CAD    Pulmonary Fibrosis Brother     Heart Disease Brother         CAD    Stroke Brother 34        cerebral hemorrhage    Cancer Brother         LUNG    High Cholesterol Daughter     No Known Problems Son     Anesth Problems Neg Hx        Past Medical History:   Diagnosis Date    Arrhythmia 04/12/2020    cardioversion for SVT    Arthritis     HANDS    Basal cell carcinoma     BPH (benign prostatic hyperplasia)     CAD (coronary artery disease)     s/p PTCA '92    Calculus of kidney     Chronic pain     BACK    Elevated PSA     Heart attack ([address]     Hypercholesterolemia     Kidney stones     Lumbar foraminal stenosis     Melanoma ([address]     MVP (mitral valve prolapse)     NSTEMI (non-ST elevated myocardial infarction) ([facility] 03/25/2020    Other ill-defined conditions(799.89)     Prostatitis    Prediabetes     Seasonal allergies     Squamous cell carcinoma     Stroke ([address]     with left eye visual loss; 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    Systolic murmur     Thyroid disease     HYPOTHYROID    Vision impairment     R EYE, BLOOD CLOT ON RETINA       GENERAL ROS:  A comprehensive review of systems was negative except for that written in the HPI.     Visit Vitals  /60 (BP 1 Location: Right arm)   Pulse 65   Resp 15   Ht 5' 11\" (1.803 m)   Wt 184 lb (83.5 kg)   SpO2 98%   BMI 25.66 kg/m²       Wt Readings from Last 3 Encounters:   04/21/23 184 lb (83.5 kg)   04/21/23 184 lb (83.5 kg)   03/21/23 184 lb 9.6 oz (83.7 kg)            BP Readings from Last 3 Encounters:   04/21/23 120/60   04/21/23 135/76   03/21/23 135/76       PHYSICAL EXAM  General appearance: alert, cooperative, no distress, appears stated age  Neurologic: Alert and oriented X 3  Neck: supple, symmetrical, trachea midline, no adenopathy, no carotid bruit, and no JVD  Lungs: clear to auscultation bilaterally  Heart: regular rate and rhythm, S1, S2 normal, no murmur, click, rub or gallop  Extremities: extremities normal, atraumatic, no cyanosis or edema    Lab Results   Component Value Date/Time    Cholesterol, total 154 03/21/2023 10:33 AM    Cholesterol, total 152 06/20/2022 08:41 AM    Cholesterol, total 148 12/17/2021 09:15 AM    Cholesterol, total 156 06/09/2021 09:51 AM    Cholesterol, total 186 12/31/2020 10:31 AM    HDL Cholesterol 76 03/21/2023 10:33 AM    HDL Cholesterol 63 06/20/2022 08:41 AM    HDL Cholesterol 65 12/17/2021 09:15 AM    HDL Cholesterol 63 06/09/2021 09:51 AM    HDL Cholesterol 68 12/31/2020 10:31 AM    LDL, calculated 65.4 03/21/2023 10:33 AM    LDL, calculated 69.2 06/20/2022 08:41 AM    LDL, calculated 69.8 12/17/2021 09:15 AM    LDL, calculated 76.6 06/09/2021 09:51 AM    LDL, calculated 100.4 (H) 12/31/2020 10:31 AM    Triglyceride 63 03/21/2023 10:33 AM    Triglyceride 99 06/20/2022 08:41 AM    Triglyceride 66 12/17/2021 09:15 AM    Triglyceride 82 06/09/2021 09:51 AM    Triglyceride 88 12/31/2020 10:31 AM    CHOL/HDL Ratio 2.0 03/21/2023 10:33 AM    CHOL/HDL Ratio 2.4 06/20/2022 08:41 AM    CHOL/HDL Ratio 2.3 12/17/2021 09:15 AM    CHOL/HDL Ratio 2.5 06/09/2021 09:51 AM    CHOL/HDL Ratio 2.7 12/31/2020 10:31 AM     ASSESSMENT :      He is stable and I think asymptomatic from a cardiac standpoint on a good medical regimen. He had an echocardiogram today we will follow these results up by phone. current treatment plan is effective, no change in therapy  lab results and schedule of future lab studies reviewed with patient  reviewed diet, exercise and weight control    Encounter Diagnoses   Name Primary?     Coronary artery disease involving native coronary artery of native heart without angina pectoris Yes    Nonrheumatic mitral valve regurgitation     Mixed hyperlipidemia     RBBB     Paroxysmal atrial fibrillation (HCC)     VANN (dyspnea on exertion)      Orders Placed This Encounter    OTHER       Follow-up and Dispositions    Return in about 6 months (around 10/21/2023). Samira Henriquez MD  4/21/2023  Please note that this dictation was completed with [employer], the computer voice recognition software. Quite often unanticipated grammatical, syntax, homophones, and other interpretive errors are inadvertently transcribed by the computer software. Please disregard these errors. Please excuse any errors that have escaped final proofreading. Thank you.

## 2023-04-21 NOTE — TELEPHONE ENCOUNTER
Called pt. Verified patient's identity with two identifiers. Notified pt of results and Dr. Antonette Deras message. Patient verbalized understanding and denied further questions or concerns.

## 2023-04-21 NOTE — PROGRESS NOTES
Heart muscle is strong, couple of leaky valves, no sig change from before. Doubt heart is cause of sob.  Repeat 1yr

## 2023-04-29 RX ORDER — FINASTERIDE 5 MG/1
TABLET, FILM COATED ORAL DAILY
COMMUNITY

## 2023-04-29 RX ORDER — ROSUVASTATIN CALCIUM 20 MG/1
TABLET, COATED ORAL
COMMUNITY
Start: 2023-02-13 | End: 2023-05-22 | Stop reason: SDUPTHER

## 2023-04-29 RX ORDER — METOPROLOL SUCCINATE 25 MG/1
TABLET, EXTENDED RELEASE ORAL DAILY
COMMUNITY
Start: 2022-04-20 | End: 2023-05-17 | Stop reason: SDUPTHER

## 2023-04-29 RX ORDER — ACETAMINOPHEN 325 MG/1
TABLET ORAL EVERY 4 HOURS PRN
COMMUNITY
Start: 2020-03-30

## 2023-04-29 RX ORDER — TAMSULOSIN HYDROCHLORIDE 0.4 MG/1
CAPSULE ORAL
COMMUNITY
Start: 2022-06-22

## 2023-04-29 RX ORDER — LANOLIN ALCOHOL/MO/W.PET/CERES
CREAM (GRAM) TOPICAL
COMMUNITY
Start: 2022-03-15

## 2023-04-29 RX ORDER — DOXYCYCLINE HYCLATE 50 MG/1
CAPSULE ORAL DAILY
COMMUNITY
Start: 2022-09-23

## 2023-04-29 RX ORDER — ASPIRIN 81 MG/1
TABLET, CHEWABLE ORAL DAILY
COMMUNITY
Start: 2022-10-21

## 2023-04-29 RX ORDER — LEVOTHYROXINE SODIUM 0.05 MG/1
TABLET ORAL
COMMUNITY
Start: 2023-02-20

## 2023-05-17 ENCOUNTER — TELEPHONE (OUTPATIENT)
Age: 83
End: 2023-05-17

## 2023-05-17 DIAGNOSIS — I48.0 PAROXYSMAL ATRIAL FIBRILLATION (HCC): Primary | ICD-10-CM

## 2023-05-17 RX ORDER — METOPROLOL SUCCINATE 25 MG/1
25 TABLET, EXTENDED RELEASE ORAL DAILY
Qty: 90 TABLET | Refills: 1 | Status: SHIPPED | OUTPATIENT
Start: 2023-05-17

## 2023-05-17 NOTE — TELEPHONE ENCOUNTER
Requested Prescriptions     Signed Prescriptions Disp Refills    metoprolol succinate (TOPROL XL) 25 MG extended release tablet 90 tablet 1     Sig: Take 1 tablet by mouth daily     Authorizing Provider: Kwabena Ahn     Ordering User: Cherise Gould     Per Dr. Melody Rodriguez verbal order.

## 2023-05-22 RX ORDER — ROSUVASTATIN CALCIUM 20 MG/1
20 TABLET, COATED ORAL DAILY
Qty: 90 TABLET | Refills: 0 | Status: SHIPPED | OUTPATIENT
Start: 2023-05-22 | End: 2023-08-20

## 2023-05-22 NOTE — TELEPHONE ENCOUNTER
Patient needs a refill on his rosuvastatin sent to the [facility] close to us on [address]

## 2023-05-22 NOTE — TELEPHONE ENCOUNTER
Requested Prescriptions     Pending Prescriptions Disp Refills    rosuvastatin (CRESTOR) 20 MG tablet 90 tablet 0     Sig: Take 1 tablet by mouth daily        Last Visit 3/21/23  Last Refill 2/13/23

## 2023-07-17 RX ORDER — ROSUVASTATIN CALCIUM 20 MG/1
TABLET, COATED ORAL
Qty: 90 TABLET | Refills: 0 | Status: SHIPPED | OUTPATIENT
Start: 2023-07-17

## 2023-08-29 NOTE — PROGRESS NOTES
Chief Complaint   Patient presents with    Annual Wellness Visit     MVW-       Visit Vitals  BP (!) 152/72 (BP 1 Location: Left upper arm)   Pulse 69   Temp 97.5 °F (36.4 °C)   Resp 17   Ht 5' 11\" (1.803 m)   Wt 184 lb 9.6 oz (83.7 kg)   SpO2 97%   BMI 25.75 kg/m²       1. Have you been to the ER, urgent care clinic since your last visit? Hospitalized since your last visit? Yes [facility]    2. Have you seen or consulted any other health care providers outside of the [address] [place] [address] since your last visit? Include any pap smears or colon screening. No      3. For patients aged 39-70: Has the patient had a colonoscopy / FIT/ Cologuard? Yes - no Care Gap present      Sena Gomez is a 80 y.o. male and presents for Annual Medicare Wellness Visit. Assessment of cognitive impairment: Alert and oriented x 4. Depression Screen:   3 most recent PHQ Screens 10/21/2022   Little interest or pleasure in doing things Not at all   Feeling down, depressed, irritable, or hopeless Not at all   Total Score PHQ 2 0   Trouble falling or staying asleep, or sleeping too much -   Feeling tired or having little energy -   Poor appetite, weight loss, or overeating -   Feeling bad about yourself - or that you are a failure or have let yourself or your family down -   Trouble concentrating on things such as school, work, reading, or watching TV -   Moving or speaking so slowly that other people could have noticed; or the opposite being so fidgety that others notice -   Thoughts of being better off dead, or hurting yourself in some way -   PHQ 9 Score -   How difficult have these problems made it for you to do your work, take care of your home and get along with others -       Fall Risk Assessment:    Fall Risk Assessment, last 12 mths 3/21/2023   Able to walk? Yes   Fall in past 12 months? 0   Do you feel unsteady?  0   Are you worried about falling 0       Abuse Screen:   Abuse Screening Questionnaire Left Message to call  to schedule pre op [employer] class, from office   12/22/2021   Do you ever feel afraid of your partner? N   Are you in a relationship with someone who physically or mentally threatens you? N   Is it safe for you to go home? Y       Activities of Daily Living:  Self-care. Requires assistance with: no ADLs  Patient handle his/her own medications  yes Use of pill box  no  Activities of Daily Living:   ADL Assessment 3/21/2023   Feeding yourself No Help Needed   Getting from bed to chair No Help Needed   Getting dressed No Help Needed   Bathing or showering No Help Needed   Walk across the room (includes cane/walker) No Help Needed   Using the telphone No Help Needed   Taking your medications No Help Needed   Preparing meals No Help Needed   Managing money (expenses/bills) No Help Needed   Moderately strenuous housework (laundry) No Help Needed   Shopping for personal items (toiletries/medicines) No Help Needed   Shopping for groceries No Help Needed   Driving No Help Needed   Climbing a flight of stairs No Help Needed   Getting to places beyond walking distances No Help Needed       Health Maintenance:  Daily Aspirin: yes  Bone Density: na  Glaucoma Screening: yes and Oct 2022  Immunizations:    Tetanus: up to date. Influenza: not up to date - declined . Shingles: not up to date - script sent to the pharmacy. PPSV-23:  up to date - . Prevnar-13: up to date. NKOSC92: up to date    Cancer screening:     Colon: up to date. Prostate:   up to date - followed by urologist.     Alcohol Risk Screen:   On any occasion during the past 3 months, have you had more than 3 drinks(female) or 4 drinks (male) containing alcohol in one? No  Do you average more than 7 drinks (female) or 14 drinks (male) per week? No  Type and amount:no    Hearing Loss:  wears hearing aides    Vision Loss:   Wears glasses, contact lenses, or have any other visual impairment  yes    Adult Nutrition Screen:  No risk factors noted.     Advance Care Planning:   End of Life Planning: has an advanced directive - a copy HAS NOT been provided. Anita Vela [address] ACP-Facilitator appointment no      Medications/Allergies: Reviewed with patient  Prior to Admission medications    Medication Sig Start Date End Date Taking? Authorizing Provider   levothyroxine (SYNTHROID) 50 mcg tablet TAKE 1 TABLET BY MOUTH EVERY DAY BEFORE BREAKFAST EXCEPT TAKE 2 TABS ON SATURDAY. 2/20/23  Yes Shad Liao MD   rosuvastatin (CRESTOR) 20 mg tablet TAKE 1 TABLET BY MOUTH NIGHTLY. REPLACES ATORVASTATIN 2/13/23  Yes Shad Liao MD   vit A/vit C/vit E/zinc/copper (ICAPS AREDS PO) Take  by mouth. Yes Provider, Historical   doxycycline (VIBRAMYCIN) 50 mg capsule Take 100 mg by mouth two (2) times a day. 9/23/22  Yes Provider, Historical   aspirin 81 mg chewable tablet Take 1 Tablet by mouth daily. 10/21/22  Yes Adam Nuñez MD   apixaban (Eliquis) 5 mg tablet Take 1 Tablet by mouth every twelve (12) hours. 10/21/22  Yes Adam Nuñez MD   tamsulosin (FLOMAX) 0.4 mg capsule TAKE 1 CAPSULE BY MOUTH EVERY DAY 30 MINUTES AFTER THE SAME MEAL EACH DAY 6/22/22  Yes Dalton Rodriguez MD   metoprolol succinate (TOPROL-XL) 25 mg XL tablet Take 1 Tablet by mouth daily. 4/20/22  Yes Adam Nuñez MD   cholecalciferol (VITAMIN D3) (2,000 UNITS /50 MCG) cap capsule Take 2,000 Units by mouth every morning. Yes Provider, Historical   finasteride (PROSCAR) 5 mg tablet Take 5 mg by mouth daily. HS   Yes Provider, Historical   ferrous sulfate (IRON) 325 mg (65 mg iron) EC tablet Take 1 Tablet by mouth Daily (before breakfast). Patient not taking: No sig reported 3/15/22   Shazia Ward MD   vitamins  A,C,E-zinc-copper (Ocuvite PreserVision) [phone] unit-mg-unit tab tablet Take 1 Tab by mouth daily. Patient not taking: Reported on 3/21/2023 6/29/20   Shazia Ward MD   acetaminophen (TYLENOL) 325 mg tablet Take 650 mg by mouth every four (4) hours as needed for Pain.  3/30/20   Jerry Ayers Spencer Arango NP       Allergies   Allergen Reactions    Codeine Rash    Pcn [Penicillins] Rash     HAD REACTION WILL IN [facility] DEVELOPED A RASH , NO HOSPITAL TREATMENT NEEDED PER PATIENT    Sulfa (Sulfonamide Antibiotics) Other (comments)     confusion       Objective:  Visit Vitals  /76   Pulse 69   Temp 97.5 °F (36.4 °C)   Resp 17   Ht 5' 11\" (1.803 m)   Wt 184 lb 9.6 oz (83.7 kg)   SpO2 97%   BMI 25.75 kg/m²    Body mass index is 25.75 kg/m². Problem List: Reviewed with patient and discussed risk factors.     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    BPH with urinary obstruction N40.1, N13.8    Advance directive discussed with patient Z71.89    Lumbar disc disease M51.9    Lumbar foraminal stenosis M48.061    IFG (impaired fasting glucose) R73.01    NSTEMI (non-ST elevated myocardial infarction) (HCC) I21.4    Hx of CABG Z95.1    Dizziness R42    Sustained SVT (HCC) I47.1    RBBB I45.10    Peripheral vascular disease (HCC) I73.9    VANN (dyspnea on exertion) R06.09    Bloody stool K92.1    Nonrheumatic mitral valve regurgitation I34.0    Intermediate stage nonexudative age-related macular degeneration of both eyes H35.3132       PSH: Reviewed with patient  Past Surgical History:   Procedure Laterality Date    COLONOSCOPY N/A 3/8/2022    COLONOSCOPY -; performed by Severiano Peguero MD at [facility], [address], DIAGNOSTIC      due 12    HX BACK SURGERY      HX CATARACT REMOVAL Bilateral 11/2021    HX CHOLECYSTECTOMY  1970    HX CORONARY ARTERY BYPASS GRAFT  03/26/2020    x 5, LIMA to LAD, RSVG to Diag-RI-OM, RSVG to RCA    HX HEART CATHETERIZATION  1992    WITH BALLOON    HX HERNIA REPAIR  09/15/2021    HX LITHOTRIPSY      x2    HX ORTHOPAEDIC      elbow - fx right arm age 3 yrs    [place] CARDIOVERSION ELECTIVE ARRHYTHMIA EXTERNAL N/A 4/13/2020    EP CARDIOVERSION performed by Saul Evans MD at [facility], [place], operated by [facility]        SH: Reviewed with patient  Social History     Tobacco Use    Smoking status: Never    Smokeless tobacco: Never   Vaping Use    Vaping Use: Never used   Substance Use Topics    Alcohol use: No    Drug use: No       FH: Reviewed with patient  Family History   Problem Relation Age of Onset    Heart Disease Mother         CHF    Heart Disease Father         CAD    Heart Disease Sister         CAD CABG    Lung Disease Sister     Heart Disease Brother         CAD    Pulmonary Fibrosis Brother     Heart Disease Brother         CAD    Stroke Brother 34        cerebral hemorrhage    Cancer Brother         LUNG    High Cholesterol Daughter     No Known Problems Son     Anesth Problems Neg Hx        Current medical providers:    Patient Care Team:  Angelina Hermosillo MD as PCP - General (Internal Medicine Physician)  Angelina Hermosillo MD as PCP - [address] [place] [address] Provider  Coni Nogueira MD as Physician (Dermatology Physician)  Ector Moy MD as Physician (Urology)  Amina Laboy MD as Surgeon (Orthopedic Surgery)  Brett Owens MD as Physician (Ophthalmology)  Carole Hatch MD as Physician (Cardiovascular Disease Physician)  Jitendra Gonzalez MD as Surgeon (Orthopedic Surgery)  Elizabeth Rockwell MD (Cardiovascular Disease Physician)  Saul Evans MD (Cardiovascular Disease Physician)  Jatin Sharpe MD (Cardiovascular Disease Physician)  Bhakti Zhou NP (Nurse Practitioner)    Plan:    Diagnoses and all orders for this visit:    Medicare annual wellness visit, subsequent  . Age appropriate Health screening and immunization discussed with patient.       Need for shingles vaccine  -     varicella-zoster recombinant, PF, (Shingrix, PF,) 50 mcg/0.5 mL susr injection; 0.5 mL by IntraMUSCular route once for 1 dose., Normal, Disp-0.5 mL, R-0    Health Maintenance   Topic Date Due    Shingles Vaccine (1 of 2) Never done    COVID-19 Vaccine (5 - Booster for Pfizer series) 07/26/2022    Medicare Yearly Exam  12/23/2022    Flu Vaccine (1) 12/28/2023 (Originally 8/1/2022)    Lipid Screen  06/20/2023    Depression Screen  10/21/2023    DTaP/Tdap/Td series (3 - Td or Tdap) 08/23/2027    Pneumococcal 65+ years  Completed          Urinary/ Fecal Incontinence: No    Regular physical exercise: Not much lately due to the cold weather. Patient verbalized understanding of information presented. AVS and Medicare Part B Preventive Services Table printed and given to pt and reviewed. See table for findings under Recommendation and Scheduled. All of the patient's questions were answered.

## 2023-09-01 DIAGNOSIS — I48.0 PAROXYSMAL ATRIAL FIBRILLATION (HCC): Primary | ICD-10-CM

## 2023-09-01 RX ORDER — APIXABAN 5 MG/1
TABLET, FILM COATED ORAL
Qty: 180 TABLET | Refills: 3 | Status: SHIPPED | OUTPATIENT
Start: 2023-09-01

## 2023-09-01 NOTE — TELEPHONE ENCOUNTER
Requested Prescriptions     Signed Prescriptions Disp Refills    apixaban (ELIQUIS) 5 MG TABS tablet 180 tablet 3     Sig: TAKE 1 TABLET BY MOUTH EVERY 12 HOURS     Authorizing Provider: Wilfredo Rob     Ordering User: Kalia Morris    Per Dr. Wil Tobias verbal order.

## 2023-11-06 DIAGNOSIS — E78.2 MIXED HYPERLIPIDEMIA: Primary | ICD-10-CM

## 2023-11-06 RX ORDER — ROSUVASTATIN CALCIUM 20 MG/1
TABLET, COATED ORAL
Qty: 90 TABLET | Refills: 0 | Status: SHIPPED | OUTPATIENT
Start: 2023-11-06

## 2023-11-11 DIAGNOSIS — I48.0 PAROXYSMAL ATRIAL FIBRILLATION (HCC): ICD-10-CM

## 2023-11-13 RX ORDER — METOPROLOL SUCCINATE 25 MG/1
25 TABLET, EXTENDED RELEASE ORAL DAILY
Qty: 90 TABLET | Refills: 0 | Status: SHIPPED | OUTPATIENT
Start: 2023-11-13

## 2023-11-13 NOTE — TELEPHONE ENCOUNTER
Requested Prescriptions     Signed Prescriptions Disp Refills    metoprolol succinate (TOPROL XL) 25 MG extended release tablet 90 tablet 0     Sig: Take 1 tablet by mouth daily *NEED TO SCHEDULE FOLLOW UP APPOINTMENT FOR FURTHER REFILLS*     Authorizing Provider: Batool Rivera     Ordering User: Cole Enciso    Per Dr. Karsten Price verbal order.

## 2024-01-18 DIAGNOSIS — I25.700: ICD-10-CM

## 2024-01-18 RX ORDER — ASPIRIN 81 MG/1
81 TABLET, CHEWABLE ORAL DAILY
Qty: 90 TABLET | Refills: 3 | Status: SHIPPED | OUTPATIENT
Start: 2024-01-18

## 2024-01-18 NOTE — TELEPHONE ENCOUNTER
Requested Prescriptions     Signed Prescriptions Disp Refills    aspirin 81 MG chewable tablet 90 tablet 3     Sig: TAKE 1 TABLET BY MOUTH EVERY DAY     Authorizing Provider: CHIKIS DILLARD III     Ordering User: KOLTON COHEN per MD    No future appointments.

## 2024-02-02 DIAGNOSIS — E78.2 MIXED HYPERLIPIDEMIA: ICD-10-CM

## 2024-02-02 RX ORDER — ROSUVASTATIN CALCIUM 20 MG/1
TABLET, COATED ORAL
Qty: 90 TABLET | Refills: 0 | Status: SHIPPED | OUTPATIENT
Start: 2024-02-02

## 2024-02-20 ENCOUNTER — TELEPHONE (OUTPATIENT)
Dept: PRIMARY CARE CLINIC | Facility: CLINIC | Age: 84
End: 2024-02-20

## 2024-02-20 NOTE — TELEPHONE ENCOUNTER
Called and spoke with the patient's wife who is on PHI- med was sent to pharmacy on 2/2/24.  She is currently making an appointment for the patient

## 2024-02-20 NOTE — TELEPHONE ENCOUNTER
Pt's wife ([phone]) called to request prescription for the following medication:    Rosuvastatin 20 MG (completely out of this medication)    Please send to:  [id number]  P [phone]  F [phone]    Please assist with this matter.     VLT - PSR

## 2024-02-26 ENCOUNTER — TELEPHONE (OUTPATIENT)
Dept: PRIMARY CARE CLINIC | Facility: CLINIC | Age: 84
End: 2024-02-26

## 2024-02-26 NOTE — TELEPHONE ENCOUNTER
Called cell and left a message that we have you on the schedule tomorrow for MWV but your MWV isn't due until after March 21st so please call back to re-schedule. Called house number and spoke to wife, told her MWV isn't due until after March 21st. I asked if there was anything else he needed to come in for for tomorrow and she said no. She said he would need an martin on a Tuesday or Thursday. He is re-scheduled for March 26th at 8:30am. I told wife I will remove the martin for tomorrow. She said ok, thank you.

## 2024-03-08 ENCOUNTER — OFFICE VISIT (OUTPATIENT)
Dept: PRIMARY CARE CLINIC | Facility: CLINIC | Age: 84
End: 2024-03-08

## 2024-03-08 VITALS
SYSTOLIC BLOOD PRESSURE: 107 MMHG | HEART RATE: 104 BPM | TEMPERATURE: 97.8 F | WEIGHT: 183.4 LBS | DIASTOLIC BLOOD PRESSURE: 70 MMHG | HEIGHT: 71 IN | RESPIRATION RATE: 19 BRPM | BODY MASS INDEX: 25.68 KG/M2 | OXYGEN SATURATION: 94 %

## 2024-03-08 DIAGNOSIS — J40 BRONCHITIS DUE TO COVID-19 VIRUS: ICD-10-CM

## 2024-03-08 DIAGNOSIS — E03.9 ACQUIRED HYPOTHYROIDISM: ICD-10-CM

## 2024-03-08 DIAGNOSIS — R73.02 IGT (IMPAIRED GLUCOSE TOLERANCE): ICD-10-CM

## 2024-03-08 DIAGNOSIS — R35.1 BENIGN PROSTATIC HYPERPLASIA WITH NOCTURIA: ICD-10-CM

## 2024-03-08 DIAGNOSIS — I48.0 PAROXYSMAL ATRIAL FIBRILLATION (HCC): ICD-10-CM

## 2024-03-08 DIAGNOSIS — U07.1 BRONCHITIS DUE TO COVID-19 VIRUS: ICD-10-CM

## 2024-03-08 DIAGNOSIS — Z00.00 MEDICARE ANNUAL WELLNESS VISIT, SUBSEQUENT: Primary | ICD-10-CM

## 2024-03-08 DIAGNOSIS — I73.9 PERIPHERAL VASCULAR DISEASE, UNSPECIFIED (HCC): ICD-10-CM

## 2024-03-08 DIAGNOSIS — E78.2 COMBINED HYPERLIPIDEMIA: ICD-10-CM

## 2024-03-08 DIAGNOSIS — N40.1 BENIGN PROSTATIC HYPERPLASIA WITH NOCTURIA: ICD-10-CM

## 2024-03-08 RX ORDER — METHYLPREDNISOLONE 4 MG/1
TABLET ORAL
Qty: 1 KIT | Refills: 0 | Status: SHIPPED | OUTPATIENT
Start: 2024-03-08 | End: 2024-03-14

## 2024-03-08 RX ORDER — AZITHROMYCIN 250 MG/1
TABLET, FILM COATED ORAL
Qty: 6 TABLET | Refills: 0 | Status: SHIPPED | OUTPATIENT
Start: 2024-03-08 | End: 2024-03-18

## 2024-03-08 RX ORDER — BENZONATATE 200 MG/1
200 CAPSULE ORAL 3 TIMES DAILY PRN
Qty: 21 CAPSULE | Refills: 0 | Status: SHIPPED | OUTPATIENT
Start: 2024-03-08 | End: 2024-03-15

## 2024-03-08 ASSESSMENT — ENCOUNTER SYMPTOMS
RHINORRHEA: 0
EYE DISCHARGE: 0
BACK PAIN: 0
ABDOMINAL PAIN: 0
DIARRHEA: 0
COLOR CHANGE: 0
COUGH: 1
CHEST TIGHTNESS: 0
SORE THROAT: 0
CONSTIPATION: 0
SHORTNESS OF BREATH: 0

## 2024-03-08 ASSESSMENT — PATIENT HEALTH QUESTIONNAIRE - PHQ9
SUM OF ALL RESPONSES TO PHQ QUESTIONS 1-9: 0
SUM OF ALL RESPONSES TO PHQ9 QUESTIONS 1 & 2: 0
2. FEELING DOWN, DEPRESSED OR HOPELESS: 0
SUM OF ALL RESPONSES TO PHQ QUESTIONS 1-9: 0
1. LITTLE INTEREST OR PLEASURE IN DOING THINGS: 0

## 2024-03-08 ASSESSMENT — LIFESTYLE VARIABLES
HOW OFTEN DO YOU HAVE A DRINK CONTAINING ALCOHOL: NEVER
HOW MANY STANDARD DRINKS CONTAINING ALCOHOL DO YOU HAVE ON A TYPICAL DAY: PATIENT DOES NOT DRINK

## 2024-03-08 NOTE — PROGRESS NOTES
Corinne Marsh (:  1940) is a 83 y.o. male, Established patient, here for evaluation of the following chief complaint(s):  Hematuria (Called Urologist, having clots and could not pass urine, this AM passed clots and reports to be okay ) and Post-COVID Symptoms (Coughing and congestion )           ASSESSMENT/PLAN:  1. Paroxysmal atrial fibrillation (HCC)  -     CBC; Future  -     Comprehensive Metabolic Panel; Future  I ordered a CBC and CMP.  I recommend that he continue taking Eliquis 5 mg BID and metoprolol 25 mg daily.    2. Peripheral vascular disease, unspecified (HCC)  I recommend that he continue taking rosuvastatin 20 mg daily.    3. Acquired hypothyroidism  -     TSH; Future  I ordered blood work to check his TSH.  I recommend that he continue taking levothyroxine 50 mcg daily.    4. Bronchitis due to COVID-19 virus  -     methylPREDNISolone (MEDROL DOSEPACK) 4 MG tablet; Take by mouth., Disp-1 kit, R-0Normal sent to pharmacy.   -     azithromycin (ZITHROMAX) 250 MG tablet; 500mg on day 1 followed by 250mg on days 2 - 5, Disp-6 tablet, R-0Normal sent to pharmacy.  -     benzonatate (TESSALON) 200 MG capsule; Take 1 capsule by mouth 3 times daily as needed for Cough, Disp-21 capsule, R-0Normal sent to pharmacy.   I prescribed a medrol dosepack.  Potential side effects were discussed.  I prescribed azithromycin. Potential side effects were discussed.  I prescribed Tessalon. Potential side effects were discussed.     5. Benign prostatic hyperplasia with nocturia  -     PSA, Diagnostic; Future  I ordered blood work to check his PSA.  .He is followed by Urology.  I recommend that he continue taking finasteride ad tamsulosin daily.    6. IGT (impaired glucose tolerance)  -     Hemoglobin A1C; Future  I ordered blood work to check his Hgb A1c.    7. Combined hyperlipidemia  -     Lipid Panel; Future  I ordered a lipid panel.  I recommend that he continue taking Crestor 20 mg daily.             Subjective 
Health Decision Maker has been checked with the patient      Patient has stated that the scribe can come in room    Chief Complaint   Patient presents with    Hematuria     Called Urologist, having clots and could not pass urine, this AM passed clots and reports to be okay     Post-COVID Symptoms     Coughing and congestion      Depression: Not at risk (3/8/2024)    PHQ-2     PHQ-2 Score: 0      /70 (Site: Left Upper Arm)   Pulse (!) 104   Temp 97.8 °F (36.6 °C)   Resp 19   Ht 1.803 m (5' 11\")   Wt 83.2 kg (183 lb 6.4 oz)   SpO2 94%   BMI 25.58 kg/m²     \"Have you been to the ER, urgent care clinic since your last visit?  Hospitalized since your last visit?\"    NO    “Have you seen or consulted any other health care providers outside of [facility] since your last visit?”    NO    Specialist patient sees: Urologist-  [facility] in [place]     Immunization History   Administered Date(s) Administered    COVID-19, PFIZER PURPLE top, DILUTE for use, (age 12 y+), 30mcg/0.3mL 01/30/2021, 02/27/2021, 09/30/2021    Pneumococcal Vaccine 01/05/2006    Pneumococcal, PCV-13, PREVNAR 13, (age 6w+), IM, 0.5mL 03/20/2018    Td vaccine (adult) 11/05/2014    Zoster Live (Zostavax) 06/01/2014      
MOUTH EVERY DAY Yes Bernadine West MD   aspirin 81 MG chewable tablet TAKE 1 TABLET BY MOUTH EVERY DAY Yes Roverto Orona III, MD   metoprolol succinate (TOPROL XL) 25 MG extended release tablet TAKE 1 TABLET BY MOUTH DAILY *NEED TO SCHEDULE FOLLOW UP APPOINTMENT FOR FURTHER REFILLS* Yes Roverto Orona III, MD   apixaban (ELIQUIS) 5 MG TABS tablet TAKE 1 TABLET BY MOUTH EVERY 12 HOURS Yes Roverto Orona III, MD   acetaminophen (TYLENOL) 325 MG tablet Take by mouth every 4 hours as needed Yes Automatic Reconciliation, Ar   Cholecalciferol 50 MCG (2000 UT) CAPS Take by mouth Yes Automatic Reconciliation, Ar   ferrous sulfate (FE TABS 325) 325 (65 Fe) MG EC tablet Take by mouth every morning (before breakfast) Yes Automatic Reconciliation, Ar   finasteride (PROSCAR) 5 MG tablet Take by mouth daily Yes Automatic Reconciliation, Ar   levothyroxine (SYNTHROID) 50 MCG tablet TAKE 1 TABLET BY MOUTH EVERY DAY BEFORE BREAKFAST EXCEPT TAKE 2 TABS ON SATURDAY. Yes Automatic Reconciliation, Ar   tamsulosin (FLOMAX) 0.4 MG capsule TAKE 1 CAPSULE BY MOUTH EVERY DAY 30 MINUTES AFTER THE SAME MEAL EACH DAY Yes Automatic Reconciliation, Ar   doxycycline (VIBRAMYCIN) 50 MG capsule Take by mouth daily  Patient not taking: Reported on 3/8/2024  Automatic Reconciliation, Ar       CareTeam (Including outside providers/suppliers regularly involved in providing care):   Patient Care Team:  Bernadine West MD as PCP - General  Bernadine West MD as [facility] - [facility]  Shane Rodriges MD as Surgeon  [facility] as Physician  Newton Dickens MD as Physician  Hermelindo Osborn MD as Physician  Fredy Mckeon MD as Surgeon     Reviewed and updated this visit:  Tobacco  Allergies  Meds  Problems  Med Hx  Surg Hx  Soc Hx  Fam Hx       Reviewed and updated this visit:  Tobacco  Allergies  Meds  Problems  Med Hx  Surg Hx  Soc Hx  Fam Hx      Health screenings:   Eye exam up to date   Colon cancer screening up

## 2024-03-08 NOTE — PATIENT INSTRUCTIONS
Your Everyday Guide\" from [employer]. Call 1-286.139.6528 or search [employer] online.  You need 0571-6233 mg of calcium and 7111-6030 IU of vitamin D per day. It is possible to meet your calcium requirement with diet alone, but a vitamin D supplement is usually necessary to meet this goal.  When exposed to the sun, use a sunscreen that protects against both UVA and UVB radiation with an SPF of 30 or greater. Reapply every 2 to 3 hours or after sweating, drying off with a towel, or swimming.  Always wear a seat belt when traveling in a car. Always wear a helmet when riding a bicycle or motorcycle.

## 2024-03-15 DIAGNOSIS — I48.0 PAROXYSMAL ATRIAL FIBRILLATION (HCC): ICD-10-CM

## 2024-03-15 RX ORDER — METOPROLOL SUCCINATE 25 MG/1
25 TABLET, EXTENDED RELEASE ORAL DAILY
Qty: 30 TABLET | Refills: 0 | Status: SHIPPED | OUTPATIENT
Start: 2024-03-15

## 2024-03-15 NOTE — TELEPHONE ENCOUNTER
Requested Prescriptions     Signed Prescriptions Disp Refills    metoprolol succinate (TOPROL XL) 25 MG extended release tablet 30 tablet 0     Sig: Take 1 tablet by mouth daily *NEED TO SCHEDULE FOLLOW UP APPOINTMENT FOR FURTHER REFILLS*     Authorizing Provider: CHIKIS ORONA III     Ordering User: JUAN ALBERTO BULL    Per Dr. Orona's verbal order.

## 2024-04-05 ENCOUNTER — OFFICE VISIT (OUTPATIENT)
Age: 84
End: 2024-04-05
Payer: MEDICARE

## 2024-04-05 VITALS
SYSTOLIC BLOOD PRESSURE: 124 MMHG | BODY MASS INDEX: 26.04 KG/M2 | HEIGHT: 71 IN | WEIGHT: 186 LBS | OXYGEN SATURATION: 96 % | DIASTOLIC BLOOD PRESSURE: 68 MMHG | HEART RATE: 72 BPM

## 2024-04-05 DIAGNOSIS — I34.0 NONRHEUMATIC MITRAL (VALVE) INSUFFICIENCY: ICD-10-CM

## 2024-04-05 DIAGNOSIS — E78.2 MIXED HYPERLIPIDEMIA: ICD-10-CM

## 2024-04-05 DIAGNOSIS — I10 ESSENTIAL (PRIMARY) HYPERTENSION: Primary | ICD-10-CM

## 2024-04-05 DIAGNOSIS — I48.0 PAROXYSMAL ATRIAL FIBRILLATION (HCC): ICD-10-CM

## 2024-04-05 DIAGNOSIS — I25.10 ATHEROSCLEROSIS OF NATIVE CORONARY ARTERY OF NATIVE HEART WITHOUT ANGINA PECTORIS: ICD-10-CM

## 2024-04-05 PROCEDURE — 99214 OFFICE O/P EST MOD 30 MIN: CPT | Performed by: SPECIALIST

## 2024-04-05 PROCEDURE — G8419 CALC BMI OUT NRM PARAM NOF/U: HCPCS | Performed by: SPECIALIST

## 2024-04-05 PROCEDURE — G8427 DOCREV CUR MEDS BY ELIG CLIN: HCPCS | Performed by: SPECIALIST

## 2024-04-05 PROCEDURE — 3078F DIAST BP <80 MM HG: CPT | Performed by: SPECIALIST

## 2024-04-05 PROCEDURE — 1123F ACP DISCUSS/DSCN MKR DOCD: CPT | Performed by: SPECIALIST

## 2024-04-05 PROCEDURE — 3074F SYST BP LT 130 MM HG: CPT | Performed by: SPECIALIST

## 2024-04-05 PROCEDURE — 1036F TOBACCO NON-USER: CPT | Performed by: SPECIALIST

## 2024-04-05 ASSESSMENT — PATIENT HEALTH QUESTIONNAIRE - PHQ9
SUM OF ALL RESPONSES TO PHQ QUESTIONS 1-9: 0
SUM OF ALL RESPONSES TO PHQ9 QUESTIONS 1 & 2: 0
SUM OF ALL RESPONSES TO PHQ QUESTIONS 1-9: 0
1. LITTLE INTEREST OR PLEASURE IN DOING THINGS: NOT AT ALL
2. FEELING DOWN, DEPRESSED OR HOPELESS: NOT AT ALL
SUM OF ALL RESPONSES TO PHQ QUESTIONS 1-9: 0
SUM OF ALL RESPONSES TO PHQ QUESTIONS 1-9: 0

## 2024-04-05 NOTE — PROGRESS NOTES
History   Problem Relation Age of Onset    Heart Disease Brother         CAD    Pulmonary Fibrosis Brother     Heart Disease Father         CAD    Stroke Brother 29        cerebral hemorrhage    Cancer Brother         LUNG    High Cholesterol Daughter     No Known Problems Son     Anesth Problems Neg Hx     Heart Disease Mother         CHF    Lung Disease Sister     Heart Disease Sister         CAD CABG    Heart Disease Brother         CAD       Past Medical History:   Diagnosis Date    Arrhythmia 04/12/2020    cardioversion for SVT    Arthritis     HANDS    Basal cell carcinoma     BPH (benign prostatic hyperplasia)     CAD (coronary artery disease)     s/p PTCA '92    Calculus of kidney     Chronic pain     BACK    COVID 02/2024    Elevated PSA     Heart attack (HCC)     Hypercholesterolemia     Kidney stones     Lumbar foraminal stenosis     Macular degeneration     Melanoma (HCC)     MVP (mitral valve prolapse)     NSTEMI (non-ST elevated myocardial infarction) (HCC) 03/25/2020    Other ill-defined conditions(799.89)     Prostatitis    Prediabetes     Seasonal allergies     Squamous cell carcinoma     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; 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    Systolic murmur     Thyroid disease     HYPOTHYROID    Vision impairment     R EYE, BLOOD CLOT ON RETINA       Specialty Problems          Cardiology Problems    Coronary artery disease involving native coronary artery of native heart without angina pectoris        Essential hypertension, benign        Mixed hyperlipidemia        NSTEMI (non-ST elevated myocardial infarction) (HCC)        RBBB        Sustained SVT (HCC)        Peripheral vascular disease (HCC)        Nonrheumatic mitral valve regurgitation        Paroxysmal atrial fibrillation (HCC)            GENERAL ROS:  A comprehensive review of systems was negative except for what was noted in the HPI.  /68 (Site: Left Upper Arm, Position: Sitting, Cuff Size: Large Adult)   Pulse 72

## 2024-04-08 RX ORDER — LEVOTHYROXINE SODIUM 0.05 MG/1
50 TABLET ORAL DAILY
Qty: 90 TABLET | Refills: 0 | Status: SHIPPED | OUTPATIENT
Start: 2024-04-08 | End: 2024-07-07

## 2024-04-08 NOTE — TELEPHONE ENCOUNTER
Levothyroxine 50 mcg    [facility]/pharmacy #[id number] - [place], [place] - [address] - P [phone] - F [phone]

## 2024-05-24 DIAGNOSIS — E78.2 MIXED HYPERLIPIDEMIA: ICD-10-CM

## 2024-05-24 RX ORDER — ROSUVASTATIN CALCIUM 20 MG/1
TABLET, COATED ORAL
Qty: 90 TABLET | Refills: 0 | Status: SHIPPED | OUTPATIENT
Start: 2024-05-24

## 2024-05-28 NOTE — LETTER
8/17/2021    Patient: Kristan De La Torre   YOB: 1940   Date of Visit: 8/17/2021     Yamileth Merchant MD  [address] [place], [place] [address]gisela Perez [facility]    Dear Yamileth Merchant MD,      Thank you for referring Mr. Adam Chavez to [facility] [address] [facility] for evaluation. My notes for this consultation are attached. If you have questions, please do not hesitate to call me. I look forward to following your patient along with you.       Sincerely,    Radha Hastings MD
Back Pain

## 2024-06-19 DIAGNOSIS — E06.3 HYPOTHYROIDISM, ACQUIRED, AUTOIMMUNE: Primary | ICD-10-CM

## 2024-06-19 DIAGNOSIS — E78.2 MIXED HYPERLIPIDEMIA: ICD-10-CM

## 2024-06-19 RX ORDER — LEVOTHYROXINE SODIUM 0.05 MG/1
50 TABLET ORAL DAILY
Qty: 30 TABLET | Refills: 0 | Status: SHIPPED | OUTPATIENT
Start: 2024-06-19

## 2024-06-19 RX ORDER — ROSUVASTATIN CALCIUM 20 MG/1
TABLET, COATED ORAL
Qty: 90 TABLET | Refills: 0 | OUTPATIENT
Start: 2024-06-19

## 2024-07-08 ENCOUNTER — TELEPHONE (OUTPATIENT)
Age: 84
End: 2024-07-08

## 2024-07-08 DIAGNOSIS — I48.0 PAROXYSMAL ATRIAL FIBRILLATION (HCC): ICD-10-CM

## 2024-07-08 DIAGNOSIS — E78.2 MIXED HYPERLIPIDEMIA: ICD-10-CM

## 2024-07-08 DIAGNOSIS — E06.3 HYPOTHYROIDISM, ACQUIRED, AUTOIMMUNE: ICD-10-CM

## 2024-07-08 RX ORDER — METOPROLOL SUCCINATE 25 MG/1
25 TABLET, EXTENDED RELEASE ORAL DAILY
Qty: 90 TABLET | Refills: 3 | Status: SHIPPED | OUTPATIENT
Start: 2024-07-08

## 2024-07-08 NOTE — TELEPHONE ENCOUNTER
Requested Prescriptions     Signed Prescriptions Disp Refills    metoprolol succinate (TOPROL XL) 25 MG extended release tablet 90 tablet 3     Sig: Take 1 tablet by mouth daily     Authorizing Provider: CHIKIS ORONA III     Ordering User: JUAN ALBERTO BULL    apixaban (ELIQUIS) 5 MG TABS tablet 180 tablet 3     Sig: Take 1 tablet by mouth in the morning and 1 tablet in the evening.     Authorizing Provider: CHIKIS ORONA III     Ordering User: JUAN ALBERTO BULL    .Per Dr. Orona's verbal order.     Called Mrs. Marsh and notified her refills were sent.

## 2024-07-08 NOTE — TELEPHONE ENCOUNTER
Patient wife called in stating that  is needing an refill on METOPROLOL 25MG AND ELIQUIS 5MG. Pharmacy was confirmed. Patient wife is requesting to please have an call back once prescription is sent in.      Patient  wife #~ [phone]

## 2024-07-09 RX ORDER — ROSUVASTATIN CALCIUM 20 MG/1
TABLET, COATED ORAL
Qty: 90 TABLET | Refills: 0 | Status: SHIPPED | OUTPATIENT
Start: 2024-07-09

## 2024-07-09 RX ORDER — LEVOTHYROXINE SODIUM 0.05 MG/1
50 TABLET ORAL DAILY
Qty: 30 TABLET | Refills: 3 | Status: SHIPPED | OUTPATIENT
Start: 2024-07-09

## 2024-09-30 DIAGNOSIS — E06.3 HYPOTHYROIDISM, ACQUIRED, AUTOIMMUNE: ICD-10-CM

## 2024-10-01 RX ORDER — LEVOTHYROXINE SODIUM 50 UG/1
50 TABLET ORAL DAILY
Qty: 90 TABLET | Refills: 1 | Status: SHIPPED | OUTPATIENT
Start: 2024-10-01

## 2024-10-24 ENCOUNTER — TELEPHONE (OUTPATIENT)
Age: 84
End: 2024-10-24

## 2024-10-24 NOTE — TELEPHONE ENCOUNTER
Ok from cardiac standpoint without special precautions or further cardiac testing. Ok to hold eliquis for 2 days and aspirin for 7days preop if necessary, and resume postop as soon as bleeding risk is acceptable

## 2024-10-24 NOTE — TELEPHONE ENCOUNTER
Dr. Dyer with [facility] is requesting cardiac clearance for patient to undergo enucleation of prostate under general anesthesia. Clearance request does not include holding blood thinners, but please advise if okay to hold eliquis and how many days he can hold if so. Thanks.     Fax # [phone]

## 2024-11-25 DIAGNOSIS — E78.2 MIXED HYPERLIPIDEMIA: ICD-10-CM

## 2024-11-25 RX ORDER — ROSUVASTATIN CALCIUM 20 MG/1
TABLET, COATED ORAL
Qty: 90 TABLET | Refills: 0 | Status: SHIPPED | OUTPATIENT
Start: 2024-11-25

## 2024-12-05 NOTE — PROGRESS NOTES
Problem: Mobility Impaired (Adult and Pediatric)  Goal: *Acute Goals and Plan of Care (Insert Text)  Description: FUNCTIONAL STATUS PRIOR TO ADMISSION: Patient was independent and active without use of DME. When asked, he reported no falls in the last 12 months. HOME SUPPORT PRIOR TO ADMISSION: The patient lived with his wife but did not require assist.    Physical Therapy Goals  Initiated 3/27/2020  1. Patient will move from supine to sit and sit to supine , scoot up and down and roll side to side in bed with independence within 5 days. 2.  Patient will perform sit to/from stand with independence within 5 days. 3.  Patient will ambulate 300 feet with least restrictive assistive device and independence within 5 days. 4.  Patient will ascend/descend 4 stairs with one handrail(s) with modified independence within 5 days. 5.  Patient will perform cardiac exercises per protocol with independence within 5 days. 6.  Patient will verbally recall and functionally demonstrate mindful-based movements (\"move in the tube\") principles without cues within 5 days. Outcome: Progressing Towards Goal   PHYSICAL THERAPY TREATMENT  Patient: Jacki Gasca (41 y.o. male)  Date: 3/28/2020  Diagnosis: NSTEMI (non-ST elevated myocardial infarction) ([facility] [address] [I21.4]   S/P CABG x 5  Procedure(s) (LRB):  CORONARY ARTERY BYPASS GRAFTING X 5   WITH LEFT LEG EVH AND LIMA HARVESTING. CARDIOPULMONARY BYPASS. TRANSESOPHAGEAL ECHOCARDIOGRAM AND EPI AORTIC ULTRASOUND BY DR. Jessica Howard. (N/A) 2 Days Post-Op  Precautions: Fall, Sternal  Chart, physical therapy assessment, plan of care and goals were reviewed. ASSESSMENT  Patient continues with skilled PT services and is progressing towards goals. Patient received in chair, sitting for approx 1 hour. Pt reports he required assistance for bed mobility from nursing. Pt with appropriate vitals with mobility. Pt performed seated cardiac exercises with fair carryover.  Pt then performed 2 trials of sit<>Stand demonstrating good carryover of move in tube principles. Pt ambulated several feet forward/backward with increased time, fair steadiness, and decreased jael. Pt with cautious posture requiring cues to relax shoulders. Pt then returned to chair, RN assisting pt in preparing for breakfast.      Patient is verbalizing and is demonstrating understanding of mindful-based movements (\"move in the tube\") principles of keeping UEs proximal to ribcage to prevent lateral pull on the sternum during load-bearing activities with visual and verbal cues required for compliance. Current Level of Function Impacting Discharge (mobility/balance): min A for transfers and ambulation x 6 ft total    Other factors to consider for discharge: mindful based principles, does not need to negotiate stairs at home, lives with wife         PLAN :  Patient continues to benefit from skilled intervention to address the above impairments. Continue treatment per established plan of care. to address goals. Recommendation for discharge: (in order for the patient to meet his/her long term goals)  Physical therapy at least 2 days/week in the home AND ensure assist and/or supervision for safety with mobility     This discharge recommendation:  Has been made in collaboration with the attending provider and/or case management    IF patient discharges home will need the following DME: patient owns DME required for discharge       SUBJECTIVE:   Patient stated I was able to snooze a bit.     OBJECTIVE DATA SUMMARY:   Patient mobilized on continuous portable monitor/telemetry.   Critical Behavior:  Neurologic State: Alert, Appropriate for age  Orientation Level: Oriented X4  Cognition: Appropriate decision making, Appropriate for age attention/concentration, Appropriate safety awareness, Follows commands  Safety/Judgement: Decreased insight into deficits    Functional Mobility Training:  Bed Mobility: N/A    Transfers:  Sit to Stand: Minimum assistance; Additional time  Stand to Sit: Contact guard assistance; Additional time      Balance:  Sitting: Intact  Standing: Impaired; Without support  Standing - Static: Good  Standing - Dynamic : Fair    Ambulation/Gait Training:  Distance (ft): 3 Feet (ft)(x2)  Assistive Device: Gait belt  Ambulation - Level of Assistance: Minimal assistance; Additional time        Gait Abnormalities: Decreased step clearance        Base of Support: Narrowed  Stance: Weight shift  Speed/Gaby: Slow  Step Length: Left shortened;Right shortened  Swing Pattern: Left asymmetrical;Right asymmetrical            Cardiac diagnosis intervention:  Patient instructed and educated on mindful movement principles based on Move in The Tube concept to include maintaining bilateral elbows close to rib cage when performing any load-bearing activity such as getting in/out of bed, pushing up from a chair, opening a door, or lifting a box. Patient was given a handout with diagrams of each correct/incorrect method of performing each of the above tasks. Therapeutic Exercises:   Patient instructed on the benefits and demonstrated cardiac exercises while seated. Instructed and indicated understanding on how to progress reps, sets against gravity, pacing through progressive muscle strengthening standing based on surgeon clearance for more weight in prep for basic and instrumental ADLs. Instruction on the use of household items in place of weights as needed.      CARDIAC  EXERCISE   Sets   Reps   Active Active Assist   Passive Self ROM   Comments   Shoulder flexion 1 5 [x]                                            []                                            []                                            []                                               Shoulder abduction 1      5 [x]                                            []                                            []                                            [] Scapular elevation 1 5 [x]                                            []                                            []                                            []                                               Scapular retraction 1 5 [x]                                            []                                            []                                            []                                               Trunk rotation 1 5 [x]                                            []                                            []                                            []                                               Trunk sidebending 1 5 [x]                                            []                                            []                                            []                                                  []                                            []                                            []                                            []                                                 Activity Tolerance:   Fair  Please refer to the flowsheet for vital signs taken during this treatment. After treatment patient left in no apparent distress:   Sitting in chair, Call bell within reach, and RN present     COMMUNICATION/COLLABORATION:   The patients plan of care was discussed with: Registered nurse.      Linda Willett, PT, DPT   Time Calculation: 23 mins  OB Discharge Instructions

## 2024-12-26 DIAGNOSIS — E78.2 MIXED HYPERLIPIDEMIA: ICD-10-CM

## 2024-12-26 DIAGNOSIS — I25.700: ICD-10-CM

## 2024-12-26 RX ORDER — ASPIRIN 81 MG
81 TABLET,CHEWABLE ORAL DAILY
Qty: 90 TABLET | Refills: 3 | Status: SHIPPED | OUTPATIENT
Start: 2024-12-26

## 2024-12-26 RX ORDER — ROSUVASTATIN CALCIUM 20 MG/1
TABLET, COATED ORAL
Qty: 90 TABLET | Refills: 0 | OUTPATIENT
Start: 2024-12-26

## 2025-02-05 DIAGNOSIS — E78.2 MIXED HYPERLIPIDEMIA: ICD-10-CM

## 2025-02-05 DIAGNOSIS — E06.3 HYPOTHYROIDISM, ACQUIRED, AUTOIMMUNE: ICD-10-CM

## 2025-02-05 RX ORDER — LEVOTHYROXINE SODIUM 50 UG/1
50 TABLET ORAL DAILY
Qty: 90 TABLET | Refills: 0 | Status: SHIPPED | OUTPATIENT
Start: 2025-02-05

## 2025-02-05 RX ORDER — ROSUVASTATIN CALCIUM 20 MG/1
TABLET, COATED ORAL
Qty: 90 TABLET | Refills: 0 | Status: SHIPPED | OUTPATIENT
Start: 2025-02-05

## 2025-02-10 ENCOUNTER — TELEPHONE (OUTPATIENT)
Age: 85
End: 2025-02-10

## 2025-02-10 NOTE — TELEPHONE ENCOUNTER
Spouse called to ask if you could call her. The patient is in [facility] since 2/5. He's clotting and they are having a time with his eloquis since his urology procedure. She has additional questions.     Elsa # [phone]

## 2025-02-10 NOTE — TELEPHONE ENCOUNTER
Per Dr. Orona response to pt wife question re Eliquis and Aspirin,  \"They should hold eliquis and aspirin until bleeding risk is acceptable. \"    Spoke with pt wife after 2 ID    She wanted clarification of what to do regarding holding his medication. I relayed the above information and she did state that the doctors at [facility] are planning to hold his Eliquis for 7 more days and restart his Aspirin today.  She also plans to restart his My chart to be able to keep up with any changes.  Wife verbalized understanding with no further questions.

## 2025-02-10 NOTE — TELEPHONE ENCOUNTER
Attempted to reach patient by telephone. A message was left for return call.   I was just going to tell her to defer to the hospital docs taking care of him unless you would like for me to tell her anything else

## 2025-02-10 NOTE — TELEPHONE ENCOUNTER
Spouse called to ask if you could call her. The patient is in [facility] since 2/5. He's clotting and they are having a time with his eloquis since his urology procedure.     Elsa # [phone]

## 2025-02-10 NOTE — TELEPHONE ENCOUNTER
Patient's wife is returning call to the nurse.Elsa Marsh states that the phone disconnected and she didn't get a chance to speak with the nurse about her .Please give a call back.    Patient's wife said that the Neurologist doctor wanted her to contact us and the patient is still in [facility].    [phone] eva Elsa (wife)

## 2025-02-12 DIAGNOSIS — E78.2 MIXED HYPERLIPIDEMIA: ICD-10-CM

## 2025-02-12 RX ORDER — ROSUVASTATIN CALCIUM 20 MG/1
TABLET, COATED ORAL
Qty: 90 TABLET | Refills: 0 | OUTPATIENT
Start: 2025-02-12

## 2025-03-06 ENCOUNTER — TELEPHONE (OUTPATIENT)
Age: 85
End: 2025-03-06

## 2025-03-06 NOTE — TELEPHONE ENCOUNTER
Patient called as he's not feeling well and is experiencing some SOB at times.     Patient # [phone]

## 2025-03-06 NOTE — TELEPHONE ENCOUNTER
Should get advice from surgeon, I dont know any details about his surgery. Ideally, should stop aspirin and go back on eliquis

## 2025-03-06 NOTE — TELEPHONE ENCOUNTER
Pt called call center and I returned his call.    He states he was off Eliquis before a procedure in January and due to bleeding after surgery,was told to continue with Aspirin and continue to hold off on Eliquis. He states his surgeon did not tell him when to start back on Eliquis as he has not taken it since he says January 28th.  He states he has not had any further bleeding since Feb 11,2025 and does not have a follow up appt with his surgeon until 03/11/2025. He was going to call the surgeons office after speaking with me but realized they had closed for the day.  He states at one point his HGB had dropped to 8 but he states he lost a lot of blood and they continued to do lab work daily. He did not say what his most recent HBG was but admits to being tired and a little SOB with exertion.

## 2025-03-07 ENCOUNTER — TELEPHONE (OUTPATIENT)
Age: 85
End: 2025-03-07

## 2025-03-07 NOTE — TELEPHONE ENCOUNTER
Per Dr. Orona regarding when to start back on Eliquis,    Should get advice from surgeon, I dont know any details about his surgery. Ideally, should stop aspirin and go back on eliquis .    Spoke with pt after 2 ID and relayed above message requesting him to call his surgeon for question re eliquis  Pt verbalized understanding w no further questions.

## 2025-03-08 DIAGNOSIS — E78.2 MIXED HYPERLIPIDEMIA: ICD-10-CM

## 2025-03-08 RX ORDER — ROSUVASTATIN CALCIUM 20 MG/1
20 TABLET, COATED ORAL DAILY
Qty: 30 TABLET | Refills: 0 | Status: SHIPPED | OUTPATIENT
Start: 2025-03-08

## 2025-03-13 ENCOUNTER — HOSPITAL ENCOUNTER (OUTPATIENT)
Age: 85
Discharge: HOME | End: 2025-03-16
Payer: MEDICARE

## 2025-03-13 ENCOUNTER — TELEPHONE (OUTPATIENT)
Age: 85
End: 2025-03-13

## 2025-03-13 ENCOUNTER — RESULTS FOLLOW-UP (OUTPATIENT)
Age: 85
End: 2025-03-13

## 2025-03-13 DIAGNOSIS — I10 ESSENTIAL (PRIMARY) HYPERTENSION: ICD-10-CM

## 2025-03-13 DIAGNOSIS — R06.09 DYSPNEA ON EXERTION: ICD-10-CM

## 2025-03-13 DIAGNOSIS — I25.700 ATHEROSCLEROSIS OF CORONARY ARTERY BYPASS GRAFT OF NATIVE HEART WITH UNSTABLE ANGINA PECTORIS (HCC): ICD-10-CM

## 2025-03-13 DIAGNOSIS — R06.09 DYSPNEA ON EXERTION: Primary | ICD-10-CM

## 2025-03-13 LAB
ALBUMIN SERPL-MCNC: 3.6 G/DL (ref 3.5–5)
ALBUMIN/GLOB SERPL: 1.2 (ref 1.1–2.2)
ALP SERPL-CCNC: 91 U/L (ref 45–117)
ALT SERPL-CCNC: 17 U/L (ref 12–78)
ANION GAP SERPL CALC-SCNC: 4 MMOL/L (ref 2–12)
AST SERPL-CCNC: 15 U/L (ref 15–37)
BILIRUB SERPL-MCNC: 0.6 MG/DL (ref 0.2–1)
BUN SERPL-MCNC: 23 MG/DL (ref 6–20)
BUN/CREAT SERPL: 20 (ref 12–20)
CALCIUM SERPL-MCNC: 9.4 MG/DL (ref 8.5–10.1)
CHLORIDE SERPL-SCNC: 107 MMOL/L (ref 97–108)
CO2 SERPL-SCNC: 30 MMOL/L (ref 21–32)
CREAT SERPL-MCNC: 1.17 MG/DL (ref 0.7–1.3)
ERYTHROCYTE [DISTWIDTH] IN BLOOD BY AUTOMATED COUNT: 14.7 % (ref 11.5–14.5)
GLOBULIN SER CALC-MCNC: 3 G/DL (ref 2–4)
GLUCOSE SERPL-MCNC: 107 MG/DL (ref 65–100)
HCT VFR BLD AUTO: 34.3 % (ref 36.6–50.3)
HGB BLD-MCNC: 10.1 G/DL (ref 12.1–17)
MCH RBC QN AUTO: 24.6 PG (ref 26–34)
MCHC RBC AUTO-ENTMCNC: 29.4 G/DL (ref 30–36.5)
MCV RBC AUTO: 83.7 FL (ref 80–99)
NRBC # BLD: 0 K/UL (ref 0–0.01)
NRBC BLD-RTO: 0 PER 100 WBC
PLATELET # BLD AUTO: 253 K/UL (ref 150–400)
PMV BLD AUTO: 10 FL (ref 8.9–12.9)
POTASSIUM SERPL-SCNC: 4.5 MMOL/L (ref 3.5–5.1)
PROT SERPL-MCNC: 6.6 G/DL (ref 6.4–8.2)
RBC # BLD AUTO: 4.1 M/UL (ref 4.1–5.7)
SODIUM SERPL-SCNC: 141 MMOL/L (ref 136–145)
WBC # BLD AUTO: 6.9 K/UL (ref 4.1–11.1)

## 2025-03-13 PROCEDURE — 71046 X-RAY EXAM CHEST 2 VIEWS: CPT

## 2025-03-13 NOTE — TELEPHONE ENCOUNTER
Spoke with Elsa pt wife to let her know Dr. Orona's recommendations to continue Eliquis twice daily and do not take Aspirin for now.  Orders placed for CXR,CBC and BMP and wife states they are going to have some stitches removed today and will head over to the lab and imaging center to have those done.  Verbalized understanding with no further questions.

## 2025-03-13 NOTE — TELEPHONE ENCOUNTER
Pt spouse called re his SOBOE. Recent surgery x 2 for Urology issues most recent March 6.   He had his follow up with his surgeon and has been released from his care. He is back on Aspirin and started Eliquis yesterday but only took one and took one this morning but is hesitant to take a second dose due to worries about possible restart of bleeding.  Wife is mostly concerned because she sees him with his abdomen more distended that usual. Denies any other swelling-states she did find out that he had been having SOBOE prior to the surgery that he did not tell her about so it is not new for him.  Would like advice on restarting Eliquis and about distended abdomen as she states his abdomen looked like this when he had to have fluid removed a few years ago.

## 2025-03-13 NOTE — TELEPHONE ENCOUNTER
As we said before, if he is going to take a blood thinner it should be eliquis, not aspirin. No guarantee that bleeding will not recur, though seems less likely since surgery was last month. There is a risk of stroke if he stays off eliquis    There are many possibilities for why he is sob, heart is least likely given recent events.    We should get a chest xray, cbc and bmp. If abnormal and doesn't indicate a cardiac problem, he will need to fup with pcp

## 2025-03-17 ENCOUNTER — RESULTS FOLLOW-UP (OUTPATIENT)
Age: 85
End: 2025-03-17

## 2025-03-17 NOTE — RESULT ENCOUNTER NOTE
Spoke with patient after 2 identifiers to review test results/recommendations.  Pt verbalized understanding with no further questions. States he will be calling his PCP today to follow up re anemia.

## 2025-03-18 ENCOUNTER — TELEPHONE (OUTPATIENT)
Dept: PRIMARY CARE CLINIC | Facility: CLINIC | Age: 85
End: 2025-03-18

## 2025-03-18 NOTE — TELEPHONE ENCOUNTER
Patient wife calling with concern since surgery. Patient is having some health issues following surgery and what medication he can take. Patient wife said he had blood in urine yesterday and patient has been having SOB since his surgery. With will like to be seen as possible did not want to wait until next Friday which was the next available.

## 2025-03-18 NOTE — TELEPHONE ENCOUNTER
Spoke to pt wife and she has concerns and questions ever since pts surgery. He is scheduled for 3/19 at 215pm.

## 2025-03-19 ENCOUNTER — OFFICE VISIT (OUTPATIENT)
Dept: PRIMARY CARE CLINIC | Facility: CLINIC | Age: 85
End: 2025-03-19
Payer: MEDICARE

## 2025-03-19 VITALS
SYSTOLIC BLOOD PRESSURE: 108 MMHG | TEMPERATURE: 98.9 F | OXYGEN SATURATION: 95 % | RESPIRATION RATE: 15 BRPM | HEIGHT: 71 IN | DIASTOLIC BLOOD PRESSURE: 60 MMHG | WEIGHT: 183.4 LBS | BODY MASS INDEX: 25.68 KG/M2 | HEART RATE: 79 BPM

## 2025-03-19 DIAGNOSIS — R06.09 EXERTIONAL DYSPNEA: ICD-10-CM

## 2025-03-19 DIAGNOSIS — Z90.79 S/P TURP (TRANSURETHRAL RESECTION OF PROSTATE): ICD-10-CM

## 2025-03-19 DIAGNOSIS — D50.8 OTHER IRON DEFICIENCY ANEMIA: ICD-10-CM

## 2025-03-19 DIAGNOSIS — R06.09 EXERTIONAL DYSPNEA: Primary | ICD-10-CM

## 2025-03-19 DIAGNOSIS — I48.0 PAROXYSMAL ATRIAL FIBRILLATION (HCC): ICD-10-CM

## 2025-03-19 DIAGNOSIS — E03.9 ACQUIRED HYPOTHYROIDISM: ICD-10-CM

## 2025-03-19 LAB
ERYTHROCYTE [DISTWIDTH] IN BLOOD BY AUTOMATED COUNT: 14.9 % (ref 11.5–14.5)
HCT VFR BLD AUTO: 31.9 % (ref 36.6–50.3)
HGB BLD-MCNC: 9.5 G/DL (ref 12.1–17)
MCH RBC QN AUTO: 24.8 PG (ref 26–34)
MCHC RBC AUTO-ENTMCNC: 29.8 G/DL (ref 30–36.5)
MCV RBC AUTO: 83.3 FL (ref 80–99)
NRBC # BLD: 0 K/UL (ref 0–0.01)
NRBC BLD-RTO: 0 PER 100 WBC
NT PRO BNP: 1408 PG/ML
PLATELET # BLD AUTO: 286 K/UL (ref 150–400)
PMV BLD AUTO: 9.5 FL (ref 8.9–12.9)
RBC # BLD AUTO: 3.83 M/UL (ref 4.1–5.7)
TSH SERPL DL<=0.05 MIU/L-ACNC: 1.21 UIU/ML (ref 0.36–3.74)
WBC # BLD AUTO: 6.3 K/UL (ref 4.1–11.1)

## 2025-03-19 PROCEDURE — 1126F AMNT PAIN NOTED NONE PRSNT: CPT | Performed by: INTERNAL MEDICINE

## 2025-03-19 PROCEDURE — G8419 CALC BMI OUT NRM PARAM NOF/U: HCPCS | Performed by: INTERNAL MEDICINE

## 2025-03-19 PROCEDURE — 1036F TOBACCO NON-USER: CPT | Performed by: INTERNAL MEDICINE

## 2025-03-19 PROCEDURE — 1159F MED LIST DOCD IN RCRD: CPT | Performed by: INTERNAL MEDICINE

## 2025-03-19 PROCEDURE — G8427 DOCREV CUR MEDS BY ELIG CLIN: HCPCS | Performed by: INTERNAL MEDICINE

## 2025-03-19 PROCEDURE — 1160F RVW MEDS BY RX/DR IN RCRD: CPT | Performed by: INTERNAL MEDICINE

## 2025-03-19 PROCEDURE — 1123F ACP DISCUSS/DSCN MKR DOCD: CPT | Performed by: INTERNAL MEDICINE

## 2025-03-19 PROCEDURE — 99214 OFFICE O/P EST MOD 30 MIN: CPT | Performed by: INTERNAL MEDICINE

## 2025-03-19 PROCEDURE — 3078F DIAST BP <80 MM HG: CPT | Performed by: INTERNAL MEDICINE

## 2025-03-19 PROCEDURE — 3074F SYST BP LT 130 MM HG: CPT | Performed by: INTERNAL MEDICINE

## 2025-03-19 RX ORDER — LUBIPROSTONE 8 UG/1
8 CAPSULE ORAL DAILY
COMMUNITY

## 2025-03-19 SDOH — ECONOMIC STABILITY: FOOD INSECURITY: WITHIN THE PAST 12 MONTHS, THE FOOD YOU BOUGHT JUST DIDN'T LAST AND YOU DIDN'T HAVE MONEY TO GET MORE.: NEVER TRUE

## 2025-03-19 SDOH — ECONOMIC STABILITY: TRANSPORTATION INSECURITY
IN THE PAST 12 MONTHS, HAS LACK OF TRANSPORTATION KEPT YOU FROM MEETINGS, WORK, OR FROM GETTING THINGS NEEDED FOR DAILY LIVING?: NO

## 2025-03-19 SDOH — ECONOMIC STABILITY: FOOD INSECURITY: WITHIN THE PAST 12 MONTHS, YOU WORRIED THAT YOUR FOOD WOULD RUN OUT BEFORE YOU GOT MONEY TO BUY MORE.: NEVER TRUE

## 2025-03-19 SDOH — ECONOMIC STABILITY: INCOME INSECURITY: IN THE LAST 12 MONTHS, WAS THERE A TIME WHEN YOU WERE NOT ABLE TO PAY THE MORTGAGE OR RENT ON TIME?: NO

## 2025-03-19 SDOH — ECONOMIC STABILITY: TRANSPORTATION INSECURITY
IN THE PAST 12 MONTHS, HAS THE LACK OF TRANSPORTATION KEPT YOU FROM MEDICAL APPOINTMENTS OR FROM GETTING MEDICATIONS?: NO

## 2025-03-19 ASSESSMENT — PATIENT HEALTH QUESTIONNAIRE - PHQ9
2. FEELING DOWN, DEPRESSED OR HOPELESS: NOT AT ALL
SUM OF ALL RESPONSES TO PHQ QUESTIONS 1-9: 0
1. LITTLE INTEREST OR PLEASURE IN DOING THINGS: NOT AT ALL
SUM OF ALL RESPONSES TO PHQ QUESTIONS 1-9: 0

## 2025-03-19 ASSESSMENT — ENCOUNTER SYMPTOMS
DIARRHEA: 0
BACK PAIN: 0
COUGH: 0
CHEST TIGHTNESS: 0
CONSTIPATION: 0
SORE THROAT: 0
RHINORRHEA: 0
COLOR CHANGE: 0
EYE DISCHARGE: 0
ABDOMINAL PAIN: 0
SHORTNESS OF BREATH: 1

## 2025-03-19 NOTE — PROGRESS NOTES
Health Decision Maker has been checked with the patient   Primary Decision Maker: Elsa Marsh - Spouse - [phone]     Ramón is coming in       Chief Complaint   Patient presents with    Shortness of Breath     Reports that he has been having SOB since JAN after surgery, usually upon exertion    Follow-up     Surgery in January on the prostate, did have complications from the surgery and did have to go back for a second surgery. Patient did labs through Cardio and they have referred to PCP to to abnormal labs    Other     Reports that yesterday they did notice a blood clot that was passed in urine. Has not seen any today- 6 weeks post surgery.       \"Have you been to the ER, urgent care clinic since your last visit?  Hospitalized since your last visit?\"    YES - When: approximately 2 months ago.  Where and Why: [facility].    “Have you seen or consulted any other health care providers outside of [facility] since your last visit?”    NO      Vitals:    03/19/25 1419   BP: (!) 100/54   BP Site: Left Upper Arm   Patient Position: Sitting   BP Cuff Size: Small Adult   Pulse: 79   Resp: 15   Temp: 98.9 °F (37.2 °C)   TempSrc: Temporal   SpO2: 95%   Weight: 83.2 kg (183 lb 6.4 oz)   Height: 1.803 m (5' 11\")      Depression: Not at risk (3/19/2025)    PHQ-2     PHQ-2 Score: 0              Click Here for Release of Records Request    Chart reviewed: immunizations are documented.   Immunization History   Administered Date(s) Administered    COVID-19, PFIZER GRAY top, DO NOT Dilute, (age 12 y+), IM, 30 mcg/0.3 mL 05/31/2022    COVID-19, PFIZER PURPLE top, DILUTE for use, (age 12 y+), 30mcg/0.3mL 01/30/2021, 02/27/2021, 09/30/2021, 10/22/2021    Pneumococcal Vaccine 01/05/2006    Pneumococcal, PCV-13, PREVNAR 13, (age 6w+), IM, 0.5mL 03/20/2018    Pneumococcal, PPSV23, PNEUMOVAX 23, (age 2y+), SC/IM, 0.5mL 01/05/2006    Td vaccine (adult) 11/05/2014    Zoster Live (Zostavax) 06/01/2014      
adenopathy.   Neurological:      Mental Status: He is alert and oriented to person, place, and time.   Psychiatric:         Mood and Affect: Mood normal.            IAlissa, am scribing for and in the presence of Bernadine West MD. 3/19/25/2:49 PM EDT  Bernadine LAGUNA MD, personally performed the services described by my scribe in my presence, and it is both accurate and complete.    An electronic signature was used to authenticate this note.    --Alissa Bonilla

## 2025-03-20 ENCOUNTER — TELEPHONE (OUTPATIENT)
Age: 85
End: 2025-03-20

## 2025-03-20 ENCOUNTER — RESULTS FOLLOW-UP (OUTPATIENT)
Dept: PRIMARY CARE CLINIC | Facility: CLINIC | Age: 85
End: 2025-03-20

## 2025-03-20 DIAGNOSIS — D50.8 OTHER IRON DEFICIENCY ANEMIA: ICD-10-CM

## 2025-03-20 DIAGNOSIS — R06.02 SHORTNESS OF BREATH: Primary | ICD-10-CM

## 2025-03-20 RX ORDER — FUROSEMIDE 40 MG/1
40 TABLET ORAL DAILY
Qty: 30 TABLET | Refills: 0 | Status: SHIPPED | OUTPATIENT
Start: 2025-03-20

## 2025-03-20 RX ORDER — POTASSIUM CHLORIDE 750 MG/1
10 TABLET, EXTENDED RELEASE ORAL DAILY
Qty: 30 TABLET | Refills: 0 | Status: SHIPPED | OUTPATIENT
Start: 2025-03-20

## 2025-03-20 NOTE — TELEPHONE ENCOUNTER
Spoke with pt and pt wife Elsa. She wanted to update Dr. Orona on what Dr. West has done as far as lab work,medications adjustments,etc.These are all in our Epic system.  She states she will call and update me with any changes.   Wife feels like pt abdomen is still distended but is anxious to see if the Lasix will help.  Reminded if any symptoms worsen to go to ER to be checked.    Wife and pt verbalized understanding.

## 2025-03-20 NOTE — TELEPHONE ENCOUNTER
I have attempted to contact this patient by phone with the following results: per- pcp notes form is missing pt signatures.      Pt state please mail the form to here she can sign it.     Form mail to pt.        Amanda Israel MA on 5/6/2020 at 2:43 PM           Patient's wife is calling to speak with the nurse about the medication that his PCP has put him on.    They are talking about putting the patient on Lasix 40 mg for 3 days and a potassium medicine.    Patient's Hemoglogbin levels have dropped and the PCP is going to do a stool specimen on the patient.Patient has had lab work done also for the doctor to review.    [phone]

## 2025-03-24 ENCOUNTER — TELEPHONE (OUTPATIENT)
Dept: PRIMARY CARE CLINIC | Facility: CLINIC | Age: 85
End: 2025-03-24

## 2025-03-28 ENCOUNTER — TELEPHONE (OUTPATIENT)
Dept: PRIMARY CARE CLINIC | Facility: CLINIC | Age: 85
End: 2025-03-28

## 2025-03-28 DIAGNOSIS — D50.8 OTHER IRON DEFICIENCY ANEMIA: Primary | ICD-10-CM

## 2025-03-28 DIAGNOSIS — D50.8 OTHER IRON DEFICIENCY ANEMIA: ICD-10-CM

## 2025-03-28 NOTE — TELEPHONE ENCOUNTER
Called and spoke with the patients wife.  Reports that yesterday they found blood in the urine- no clots but the water was very pink.  Reports that she did call the urologist Dr Magallon's office- they called yesterday and still waiting to hear a response    Urologist seen on 3/11/25 told them to follow up in four months.  They restarted the Eliquis on 3/12/25 taking once daily and then went back to 2 tabs a day on 3/14/25    Taking Lasix every other day as directed by Dr West   SOB is better- but still having SOB on walking and moving   Wife is very concerned over this and the blod in the urine.  Reports that she dropped off the stool sample last week which I checked out- it has been discontinued and I have no notification for this. Told her I will send Dr West a message to see what will be advised at this time

## 2025-03-28 NOTE — TELEPHONE ENCOUNTER
Patient's wife called very concerned about her .  She said they did a stool sample and waiting to see the results but in the meantime they found blood in his urine.  She said if you can fit them in today she would bring him in to see Dr West.  She has a lot of questions.  Please call his wife Elsa back at Ph# [phone]

## 2025-03-28 NOTE — TELEPHONE ENCOUNTER
Called and spoke with the patients wife again, did advise that she will need to call Urology again or will need to take him to the ER

## 2025-03-31 ENCOUNTER — TELEPHONE (OUTPATIENT)
Dept: PRIMARY CARE CLINIC | Facility: CLINIC | Age: 85
End: 2025-03-31

## 2025-03-31 NOTE — TELEPHONE ENCOUNTER
Spoke to wife and told her name,  and date of collection needs to be on the tube. She said she did that the last time but she did not put the date on the order form. I told her I don't think nothing needs to be written on the order form from Dr. West. She said that's what the instructions say. I told her to make sure the date is on the tube and order is inside before you close it. She said ok, thank you. She will bring it today or tomorrow.

## 2025-03-31 NOTE — TELEPHONE ENCOUNTER
Patient wife is calling because she needs more help and clearer instructions on how to properly do the stool sample kit since they did not do it correctly the last time

## 2025-04-02 ENCOUNTER — RESULTS FOLLOW-UP (OUTPATIENT)
Dept: PRIMARY CARE CLINIC | Facility: CLINIC | Age: 85
End: 2025-04-02

## 2025-04-02 LAB — HEMOCCULT STL QL IA: NEGATIVE

## 2025-04-07 ENCOUNTER — ANCILLARY PROCEDURE (OUTPATIENT)
Age: 85
End: 2025-04-07
Payer: MEDICARE

## 2025-04-07 ENCOUNTER — TELEPHONE (OUTPATIENT)
Dept: PRIMARY CARE CLINIC | Facility: CLINIC | Age: 85
End: 2025-04-07

## 2025-04-07 VITALS — HEIGHT: 71 IN | WEIGHT: 182 LBS | BODY MASS INDEX: 25.48 KG/M2

## 2025-04-07 DIAGNOSIS — I34.0 MITRAL REGURGITATION: ICD-10-CM

## 2025-04-07 LAB
ECHO AO ASC DIAM: 3.5 CM
ECHO AO ASCENDING AORTA INDEX: 1.72 CM/M2
ECHO AO ROOT DIAM: 3.7 CM
ECHO AO ROOT INDEX: 1.82 CM/M2
ECHO AV AREA PEAK VELOCITY: 1.1 CM2
ECHO AV AREA VTI: 1.4 CM2
ECHO AV AREA/BSA PEAK VELOCITY: 0.5 CM2/M2
ECHO AV AREA/BSA VTI: 0.7 CM2/M2
ECHO AV MEAN GRADIENT: 13 MMHG
ECHO AV MEAN VELOCITY: 1.7 M/S
ECHO AV PEAK GRADIENT: 27 MMHG
ECHO AV PEAK VELOCITY: 2.6 M/S
ECHO AV VELOCITY RATIO: 0.23
ECHO AV VTI: 41.3 CM
ECHO BSA: 2.03 M2
ECHO EST RA PRESSURE: 5 MMHG
ECHO LA DIAMETER INDEX: 2.17 CM/M2
ECHO LA DIAMETER: 4.4 CM
ECHO LA TO AORTIC ROOT RATIO: 1.19
ECHO LA VOL A-L A2C: 113 ML (ref 18–58)
ECHO LA VOL A-L A4C: 80 ML (ref 18–58)
ECHO LA VOL BP: 92 ML (ref 18–58)
ECHO LA VOL MOD A2C: 108 ML (ref 18–58)
ECHO LA VOL MOD A4C: 74 ML (ref 18–58)
ECHO LA VOL/BSA BIPLANE: 45 ML/M2 (ref 16–34)
ECHO LA VOLUME AREA LENGTH: 98 ML
ECHO LA VOLUME INDEX A-L A2C: 56 ML/M2 (ref 16–34)
ECHO LA VOLUME INDEX A-L A4C: 39 ML/M2 (ref 16–34)
ECHO LA VOLUME INDEX AREA LENGTH: 48 ML/M2 (ref 16–34)
ECHO LA VOLUME INDEX MOD A2C: 53 ML/M2 (ref 16–34)
ECHO LA VOLUME INDEX MOD A4C: 36 ML/M2 (ref 16–34)
ECHO LV E' LATERAL VELOCITY: 9.35 CM/S
ECHO LV E' SEPTAL VELOCITY: 3.93 CM/S
ECHO LV EDV A2C: 132 ML
ECHO LV EDV A4C: 101 ML
ECHO LV EDV BP: 123 ML (ref 67–155)
ECHO LV EDV INDEX A4C: 50 ML/M2
ECHO LV EDV INDEX BP: 61 ML/M2
ECHO LV EDV NDEX A2C: 65 ML/M2
ECHO LV EF PHYSICIAN: 50 %
ECHO LV EJECTION FRACTION A2C: 46 %
ECHO LV EJECTION FRACTION A4C: 45 %
ECHO LV EJECTION FRACTION BIPLANE: 46 % (ref 55–100)
ECHO LV ESV A2C: 71 ML
ECHO LV ESV A4C: 55 ML
ECHO LV ESV BP: 67 ML (ref 22–58)
ECHO LV ESV INDEX A2C: 35 ML/M2
ECHO LV ESV INDEX A4C: 27 ML/M2
ECHO LV ESV INDEX BP: 33 ML/M2
ECHO LV FRACTIONAL SHORTENING: 36 % (ref 28–44)
ECHO LV INTERNAL DIMENSION DIASTOLE INDEX: 2.76 CM/M2
ECHO LV INTERNAL DIMENSION DIASTOLIC: 5.6 CM (ref 4.2–5.9)
ECHO LV INTERNAL DIMENSION SYSTOLIC INDEX: 1.77 CM/M2
ECHO LV INTERNAL DIMENSION SYSTOLIC: 3.6 CM
ECHO LV IVSD: 1 CM (ref 0.6–1)
ECHO LV MASS 2D: 249.3 G (ref 88–224)
ECHO LV MASS INDEX 2D: 122.8 G/M2 (ref 49–115)
ECHO LV POSTERIOR WALL DIASTOLIC: 1.2 CM (ref 0.6–1)
ECHO LV RELATIVE WALL THICKNESS RATIO: 0.43
ECHO LVOT AREA: 4.5 CM2
ECHO LVOT AV VTI INDEX: 0.31
ECHO LVOT DIAM: 2.4 CM
ECHO LVOT MEAN GRADIENT: 1 MMHG
ECHO LVOT PEAK GRADIENT: 2 MMHG
ECHO LVOT PEAK VELOCITY: 0.6 M/S
ECHO LVOT STROKE VOLUME INDEX: 28.1 ML/M2
ECHO LVOT SV: 57 ML
ECHO LVOT VTI: 12.6 CM
ECHO MV A VELOCITY: 0.62 M/S
ECHO MV E DECELERATION TIME (DT): 156 MS
ECHO MV E VELOCITY: 1.13 M/S
ECHO MV E/A RATIO: 1.82
ECHO MV E/E' LATERAL: 12.09
ECHO MV E/E' RATIO (AVERAGED): 20.42
ECHO MV E/E' SEPTAL: 28.75
ECHO PULMONARY ARTERY END DIASTOLIC PRESSURE: 9 MMHG
ECHO PV MAX VELOCITY: 0.8 M/S
ECHO PV PEAK GRADIENT: 2 MMHG
ECHO PV REGURGITANT MAX VELOCITY: 1.5 M/S
ECHO RIGHT VENTRICULAR SYSTOLIC PRESSURE (RVSP): 56 MMHG
ECHO RV TAPSE: 1.5 CM (ref 1.7–?)
ECHO TV REGURGITANT MAX VELOCITY: 3.57 M/S
ECHO TV REGURGITANT PEAK GRADIENT: 51 MMHG

## 2025-04-07 PROCEDURE — 93306 TTE W/DOPPLER COMPLETE: CPT | Performed by: SPECIALIST

## 2025-04-07 SDOH — HEALTH STABILITY: PHYSICAL HEALTH: ON AVERAGE, HOW MANY MINUTES DO YOU ENGAGE IN EXERCISE AT THIS LEVEL?: 30 MIN

## 2025-04-07 SDOH — HEALTH STABILITY: PHYSICAL HEALTH: ON AVERAGE, HOW MANY DAYS PER WEEK DO YOU ENGAGE IN MODERATE TO STRENUOUS EXERCISE (LIKE A BRISK WALK)?: 4 DAYS

## 2025-04-07 ASSESSMENT — PATIENT HEALTH QUESTIONNAIRE - PHQ9
SUM OF ALL RESPONSES TO PHQ QUESTIONS 1-9: 1
2. FEELING DOWN, DEPRESSED OR HOPELESS: NOT AT ALL
SUM OF ALL RESPONSES TO PHQ QUESTIONS 1-9: 1
1. LITTLE INTEREST OR PLEASURE IN DOING THINGS: SEVERAL DAYS
SUM OF ALL RESPONSES TO PHQ QUESTIONS 1-9: 1
SUM OF ALL RESPONSES TO PHQ QUESTIONS 1-9: 1

## 2025-04-07 ASSESSMENT — LIFESTYLE VARIABLES
HOW MANY STANDARD DRINKS CONTAINING ALCOHOL DO YOU HAVE ON A TYPICAL DAY: 0
HOW OFTEN DO YOU HAVE A DRINK CONTAINING ALCOHOL: NEVER
HOW OFTEN DO YOU HAVE A DRINK CONTAINING ALCOHOL: 1
HOW MANY STANDARD DRINKS CONTAINING ALCOHOL DO YOU HAVE ON A TYPICAL DAY: PATIENT DOES NOT DRINK
HOW OFTEN DO YOU HAVE SIX OR MORE DRINKS ON ONE OCCASION: 1

## 2025-04-07 NOTE — TELEPHONE ENCOUNTER
Contacted patient regarding upcoming Medicare wellness appointment and completion of HRA questionnaire. Will complete via mc per patient.

## 2025-04-08 ENCOUNTER — RESULTS FOLLOW-UP (OUTPATIENT)
Age: 85
End: 2025-04-08

## 2025-04-09 ENCOUNTER — OFFICE VISIT (OUTPATIENT)
Dept: PRIMARY CARE CLINIC | Facility: CLINIC | Age: 85
End: 2025-04-09
Payer: MEDICARE

## 2025-04-09 VITALS
SYSTOLIC BLOOD PRESSURE: 110 MMHG | OXYGEN SATURATION: 97 % | WEIGHT: 181 LBS | HEART RATE: 74 BPM | RESPIRATION RATE: 20 BRPM | TEMPERATURE: 97 F | BODY MASS INDEX: 25.34 KG/M2 | HEIGHT: 71 IN | DIASTOLIC BLOOD PRESSURE: 60 MMHG

## 2025-04-09 DIAGNOSIS — I50.9 OTHER CONGESTIVE HEART FAILURE: ICD-10-CM

## 2025-04-09 DIAGNOSIS — Z00.00 MEDICARE ANNUAL WELLNESS VISIT, SUBSEQUENT: Primary | ICD-10-CM

## 2025-04-09 DIAGNOSIS — E78.2 MIXED HYPERLIPIDEMIA: ICD-10-CM

## 2025-04-09 DIAGNOSIS — D50.8 OTHER IRON DEFICIENCY ANEMIA: ICD-10-CM

## 2025-04-09 DIAGNOSIS — Z29.11 NEED FOR RSV IMMUNIZATION: ICD-10-CM

## 2025-04-09 DIAGNOSIS — Z23 NEED FOR TETANUS BOOSTER: ICD-10-CM

## 2025-04-09 DIAGNOSIS — K59.04 CHRONIC IDIOPATHIC CONSTIPATION: ICD-10-CM

## 2025-04-09 PROCEDURE — 99214 OFFICE O/P EST MOD 30 MIN: CPT | Performed by: INTERNAL MEDICINE

## 2025-04-09 PROCEDURE — G8419 CALC BMI OUT NRM PARAM NOF/U: HCPCS | Performed by: INTERNAL MEDICINE

## 2025-04-09 PROCEDURE — 96381 ADMN RSV MONOC ANTB IM NJX: CPT | Performed by: INTERNAL MEDICINE

## 2025-04-09 PROCEDURE — 1126F AMNT PAIN NOTED NONE PRSNT: CPT | Performed by: INTERNAL MEDICINE

## 2025-04-09 PROCEDURE — 3078F DIAST BP <80 MM HG: CPT | Performed by: INTERNAL MEDICINE

## 2025-04-09 PROCEDURE — G8427 DOCREV CUR MEDS BY ELIG CLIN: HCPCS | Performed by: INTERNAL MEDICINE

## 2025-04-09 PROCEDURE — G0439 PPPS, SUBSEQ VISIT: HCPCS | Performed by: INTERNAL MEDICINE

## 2025-04-09 PROCEDURE — 90678 RSV VACC PREF BIVALENT IM: CPT | Performed by: INTERNAL MEDICINE

## 2025-04-09 PROCEDURE — 1123F ACP DISCUSS/DSCN MKR DOCD: CPT | Performed by: INTERNAL MEDICINE

## 2025-04-09 PROCEDURE — 1159F MED LIST DOCD IN RCRD: CPT | Performed by: INTERNAL MEDICINE

## 2025-04-09 PROCEDURE — 1036F TOBACCO NON-USER: CPT | Performed by: INTERNAL MEDICINE

## 2025-04-09 PROCEDURE — 3074F SYST BP LT 130 MM HG: CPT | Performed by: INTERNAL MEDICINE

## 2025-04-09 RX ORDER — LUBIPROSTONE 8 UG/1
8 CAPSULE ORAL 2 TIMES DAILY WITH MEALS
Qty: 60 CAPSULE | Refills: 3 | Status: SHIPPED | OUTPATIENT
Start: 2025-04-09

## 2025-04-09 RX ORDER — FERROUS SULFATE 325(65) MG
325 TABLET ORAL
Qty: 90 TABLET | Refills: 1 | Status: SHIPPED | OUTPATIENT
Start: 2025-04-09

## 2025-04-09 RX ORDER — FLUOROURACIL 50 MG/G
CREAM TOPICAL 2 TIMES DAILY
COMMUNITY
Start: 2025-03-13

## 2025-04-09 ASSESSMENT — ENCOUNTER SYMPTOMS
BACK PAIN: 0
CONSTIPATION: 0
COUGH: 0
RHINORRHEA: 0
EYE DISCHARGE: 0
SORE THROAT: 0
SHORTNESS OF BREATH: 1
COLOR CHANGE: 0
ABDOMINAL PAIN: 0
CHEST TIGHTNESS: 0
DIARRHEA: 0

## 2025-04-09 NOTE — PROGRESS NOTES
Medicare Annual Wellness Visit    Corinne Marsh is here for Medicare AWV    Assessment & Plan   Medicare annual wellness visit, subsequent  .Age appropriate Health screening and immunization discussed with patient.    Need for RSV immunization  -     RSV, ABRYSVO, (age 60y+ or Preg 32-36w Gest), PF, IM, 0.5mL              Subjective       Patient's complete Health Risk Assessment and screening values have been reviewed and are found in Flowsheets. The following problems were reviewed today and where indicated follow up appointments were made and/or referrals ordered.    Positive Risk Factor Screenings with Interventions:                  Hearing Screen:  Do you or your family notice any trouble with your hearing that hasn't been managed with hearing aids?: (!) (Patient-Rptd) Yes    Interventions:  Patient comments: wears hearing aids     Vision Screen:  Do you have difficulty driving, watching TV, or doing any of your daily activities because of your eyesight?: (!) (Patient-Rptd) Yes  Have you had an eye exam within the past year?: (Patient-Rptd) Yes    Interventions:   Patient comments: goes to [facility] in [place]                      Objective   Vitals:    04/09/25 1014 04/09/25 1047   BP: (!) 108/48 110/60   BP Site: Left Upper Arm Left Upper Arm   Patient Position: Sitting Sitting   BP Cuff Size: Medium Adult    Pulse: 74    Resp: 20    Temp: 97 °F (36.1 °C)    TempSrc: Temporal    SpO2: 97%    Weight: 82.1 kg (181 lb)    Height: 1.803 m (5' 11\")       Body mass index is 25.24 kg/m².                    Allergies   Allergen Reactions    Sulfa Antibiotics Other (See Comments)     confusion    Ciprofloxacin Rash    Codeine Rash    Penicillins Rash     HAD REACTION WILL IN COLLEGE DEVELOPED A RASH , NO HOSPITAL TREATMENT NEEDED PER PATIENT     Prior to Visit Medications    Medication Sig Taking? Authorizing Provider   fluorouracil (EFUDEX) 5 % cream Apply topically 2 times daily Yes

## 2025-04-09 NOTE — PROGRESS NOTES
\"Have you been to the ER, urgent care clinic since your last visit?  Hospitalized since your last visit?\"    NO      “Have you seen or consulted any other health care providers outside our system since your last visit?”    NO      Chief Complaint   Patient presents with    Medicare AWV       Pt is ok with both scribes.     Did have a echo-cardiogram done since his last visit here.     Patient provided with most updated VIS prior to administration. Opportunity given for questions and concerns provided. No concerns at this time    Immunizations administered to patient 4/9/2025 by Romina Grissom LPN with consent.Patient tolerated procedure well. No reactions noted.

## 2025-04-09 NOTE — PROGRESS NOTES
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    Systolic murmur     Thyroid disease     HYPOTHYROID    Urinary incontinence     Vision impairment     R EYE, BLOOD CLOT ON RETINA       Past Surgical History:   Procedure Laterality Date    BACK SURGERY      CARDIAC CATHETERIZATION  1992    WITH BALLOON    CARDIOVERSION ELECTIVE ARRHYTHMIA EXTERNAL N/A 4/13/2020    EP CARDIOVERSION performed by Dino Cleveland MD at [facility] CARDIAC CATH LAB    CATARACT REMOVAL Bilateral 11/2021    CHOLECYSTECTOMY  1970    COLONOSCOPY      due 12    COLONOSCOPY N/A 3/8/2022    COLONOSCOPY -; performed by Fredy King MD at [facility] ENDOSCOPY    CORONARY ARTERY BYPASS GRAFT  03/26/2020    x 5, LIMA to LAD, RSVG to Diag-RI-OM, RSVG to RCA    HERNIA REPAIR  09/15/2021    INGUINAL HERNIA REPAIR      LITHOTRIPSY      x2    MOHS SURGERY      ORTHOPEDIC SURGERY      elbow - fx right arm age 4 yrs    PROSTATE SURGERY      VASECTOMY         Family History   Problem Relation Age of Onset    Heart Disease Brother         CAD    Pulmonary Fibrosis Brother     Heart Disease Father         CAD    Stroke Brother 29        cerebral hemorrhage    Cancer Brother         LUNG    High Cholesterol Daughter     No Known Problems Son     Anesth Problems Neg Hx     Heart Disease Mother         CHF    Lung Disease Sister     Heart Disease Sister         CAD CABG    Heart Disease Brother         CAD       Social History     Socioeconomic History    Marital status:      Spouse name: Not on file    Number of children: Not on file    Years of education: Not on file    Highest education level: Not on file   Occupational History    Not on file   Tobacco Use    Smoking status: Never

## 2025-04-14 ENCOUNTER — RESULTS FOLLOW-UP (OUTPATIENT)
Dept: PRIMARY CARE CLINIC | Facility: CLINIC | Age: 85
End: 2025-04-14

## 2025-04-14 DIAGNOSIS — I50.9 OTHER CONGESTIVE HEART FAILURE (HCC): ICD-10-CM

## 2025-04-14 DIAGNOSIS — D50.8 OTHER IRON DEFICIENCY ANEMIA: ICD-10-CM

## 2025-04-14 DIAGNOSIS — E78.2 MIXED HYPERLIPIDEMIA: ICD-10-CM

## 2025-04-14 LAB
ALBUMIN SERPL-MCNC: 3.7 G/DL (ref 3.5–5)
ALBUMIN/GLOB SERPL: 1.2 (ref 1.1–2.2)
ALP SERPL-CCNC: 100 U/L (ref 45–117)
ALT SERPL-CCNC: 16 U/L (ref 12–78)
ANION GAP SERPL CALC-SCNC: 5 MMOL/L (ref 2–12)
AST SERPL-CCNC: 18 U/L (ref 15–37)
BILIRUB SERPL-MCNC: 0.9 MG/DL (ref 0.2–1)
BUN SERPL-MCNC: 23 MG/DL (ref 6–20)
BUN/CREAT SERPL: 19 (ref 12–20)
CALCIUM SERPL-MCNC: 9.2 MG/DL (ref 8.5–10.1)
CHLORIDE SERPL-SCNC: 104 MMOL/L (ref 97–108)
CHOLEST SERPL-MCNC: 151 MG/DL
CO2 SERPL-SCNC: 31 MMOL/L (ref 21–32)
CREAT SERPL-MCNC: 1.24 MG/DL (ref 0.7–1.3)
ERYTHROCYTE [DISTWIDTH] IN BLOOD BY AUTOMATED COUNT: 15.9 % (ref 11.5–14.5)
GLOBULIN SER CALC-MCNC: 3.1 G/DL (ref 2–4)
GLUCOSE SERPL-MCNC: 99 MG/DL (ref 65–100)
HCT VFR BLD AUTO: 35.1 % (ref 36.6–50.3)
HDLC SERPL-MCNC: 67 MG/DL
HDLC SERPL: 2.3 (ref 0–5)
HGB BLD-MCNC: 10.1 G/DL (ref 12.1–17)
LDLC SERPL CALC-MCNC: 68.6 MG/DL (ref 0–100)
MCH RBC QN AUTO: 22.2 PG (ref 26–34)
MCHC RBC AUTO-ENTMCNC: 28.8 G/DL (ref 30–36.5)
MCV RBC AUTO: 77.1 FL (ref 80–99)
NRBC # BLD: 0 K/UL (ref 0–0.01)
NRBC BLD-RTO: 0 PER 100 WBC
PLATELET # BLD AUTO: 272 K/UL (ref 150–400)
PMV BLD AUTO: 10 FL (ref 8.9–12.9)
POTASSIUM SERPL-SCNC: 4 MMOL/L (ref 3.5–5.1)
PROT SERPL-MCNC: 6.8 G/DL (ref 6.4–8.2)
RBC # BLD AUTO: 4.55 M/UL (ref 4.1–5.7)
SODIUM SERPL-SCNC: 140 MMOL/L (ref 136–145)
TRIGL SERPL-MCNC: 77 MG/DL
VLDLC SERPL CALC-MCNC: 15.4 MG/DL
WBC # BLD AUTO: 5.2 K/UL (ref 4.1–11.1)

## 2025-04-16 ENCOUNTER — OFFICE VISIT (OUTPATIENT)
Age: 85
End: 2025-04-16
Payer: MEDICARE

## 2025-04-16 VITALS
SYSTOLIC BLOOD PRESSURE: 118 MMHG | HEART RATE: 74 BPM | DIASTOLIC BLOOD PRESSURE: 56 MMHG | OXYGEN SATURATION: 96 % | HEIGHT: 71 IN | WEIGHT: 184 LBS | BODY MASS INDEX: 25.76 KG/M2

## 2025-04-16 DIAGNOSIS — I10 ESSENTIAL HYPERTENSION, BENIGN: ICD-10-CM

## 2025-04-16 DIAGNOSIS — I48.0 PAROXYSMAL ATRIAL FIBRILLATION (HCC): ICD-10-CM

## 2025-04-16 DIAGNOSIS — Z95.1 HX OF CABG: ICD-10-CM

## 2025-04-16 DIAGNOSIS — R06.02 SHORTNESS OF BREATH: ICD-10-CM

## 2025-04-16 DIAGNOSIS — E78.2 MIXED HYPERLIPIDEMIA: ICD-10-CM

## 2025-04-16 DIAGNOSIS — I27.20 PULMONARY HTN (HCC): ICD-10-CM

## 2025-04-16 DIAGNOSIS — I34.0 NONRHEUMATIC MITRAL VALVE REGURGITATION: ICD-10-CM

## 2025-04-16 DIAGNOSIS — I25.10 ATHEROSCLEROSIS OF NATIVE CORONARY ARTERY OF NATIVE HEART WITHOUT ANGINA PECTORIS: Primary | ICD-10-CM

## 2025-04-16 PROCEDURE — 1036F TOBACCO NON-USER: CPT | Performed by: SPECIALIST

## 2025-04-16 PROCEDURE — 99214 OFFICE O/P EST MOD 30 MIN: CPT | Performed by: SPECIALIST

## 2025-04-16 PROCEDURE — 1160F RVW MEDS BY RX/DR IN RCRD: CPT | Performed by: SPECIALIST

## 2025-04-16 PROCEDURE — 1159F MED LIST DOCD IN RCRD: CPT | Performed by: SPECIALIST

## 2025-04-16 PROCEDURE — 3078F DIAST BP <80 MM HG: CPT | Performed by: SPECIALIST

## 2025-04-16 PROCEDURE — 3074F SYST BP LT 130 MM HG: CPT | Performed by: SPECIALIST

## 2025-04-16 PROCEDURE — G8427 DOCREV CUR MEDS BY ELIG CLIN: HCPCS | Performed by: SPECIALIST

## 2025-04-16 PROCEDURE — 1123F ACP DISCUSS/DSCN MKR DOCD: CPT | Performed by: SPECIALIST

## 2025-04-16 PROCEDURE — 1126F AMNT PAIN NOTED NONE PRSNT: CPT | Performed by: SPECIALIST

## 2025-04-16 PROCEDURE — G8419 CALC BMI OUT NRM PARAM NOF/U: HCPCS | Performed by: SPECIALIST

## 2025-04-16 RX ORDER — ASPIRIN 81 MG/1
TABLET ORAL
COMMUNITY

## 2025-04-16 RX ORDER — OXYBUTYNIN CHLORIDE 5 MG/1
5 TABLET ORAL
COMMUNITY
Start: 2025-02-12

## 2025-04-16 RX ORDER — IVERMECTIN 3 MG/1
TABLET ORAL
COMMUNITY
Start: 2025-01-09

## 2025-04-16 ASSESSMENT — PATIENT HEALTH QUESTIONNAIRE - PHQ9
SUM OF ALL RESPONSES TO PHQ QUESTIONS 1-9: 0
1. LITTLE INTEREST OR PLEASURE IN DOING THINGS: NOT AT ALL
2. FEELING DOWN, DEPRESSED OR HOPELESS: NOT AT ALL

## 2025-04-16 NOTE — PATIENT INSTRUCTIONS
You will be scheduled for a Nuclear Stress Test after your appointment today.    Nuclear stress testing evaluates blood flow to your heart muscle and assesses cardiac function. There are 2 parts (Rest/Stress) to this procedure and will include either an exercise on a treadmill or an IV administration of a stressing medication called Lexiscan. Your cardiologist will determine which type of testing is best for you. This test can be performed in one day unless it is determined that better quality images will be obtained by performing the test over two days.     *Please arrive 15 minutes prior to your appointment time    Test Duration:    -One day testing will take 4 hours   -Two day testing will take 2 hours each day. Your second day(resting images) will be scheduled after the first day is completed    Day of testing instructions:    NO CAFFEINE (not even decaffeinated products) 24 HOURS PRIOR TO TESTING. This includes coffee, soda, tea, chocolate, multivitamins, and migraine medication, like Excedrin or Fioricet that contains caffeine.  Nothing to eat or drink 4 HOURS prior to testing  NO NICOTINE 12 hours prior to testing  Take all of your meds like normal.       IMPORTANT: This testing involves a cardiac tracer ordered specifically for you. If you are unable to make your appointment, please call to cancel/reschedule AT LEAST 24 hours prior to your appointment so your tracer can be cancelled. [phone].

## 2025-04-16 NOTE — PROGRESS NOTES
1. Have you been to the ER, urgent care clinic since your last visit?  Hospitalized since your last visit?No    2. Have you seen or consulted any other health care providers outside of the [facility] since your last visit?  Include any pap smears or colon screening. No

## 2025-04-16 NOTE — PROGRESS NOTES
HISTORY OF PRESENT ILLNESS  Corinne Marsh is a 84 y.o. male     SUMMARY:   Patient Active Problem List   Diagnosis    Hypothyroidism, acquired, autoimmune    Hx of CABG    Macular degeneration    NSTEMI (non-ST elevated myocardial infarction) ([facility])    RBBB    Peripheral vascular disease    Lumbar foraminal stenosis    Calculus of kidney    Coronary artery disease involving native coronary artery of native heart without angina pectoris    Essential hypertension, benign    Skin cancer    Sustained SVT    BPH with urinary obstruction    Nonrheumatic mitral valve regurgitation    Advance directive discussed with patient    Insomnia, unspecified    IFG (impaired fasting glucose)    Lumbar disc disease    Mixed hyperlipidemia    Intermediate stage nonexudative age-related macular degeneration of both eyes    Paroxysmal atrial fibrillation (HCC)    Pulmonary HTN ([facility])            CARDIOLOGY STUDIES TO DATE:  6/20 echo normal lvef, lvh, lae, leia, sclerotic non stenotic av, mild ai, mod to severe mrmild tr with pa pressure 40 mm, mod pul regurg      3/22  echo normal lvef, calcified av with mild regurg, lae, mod to severe mr  3/23 echo normal lv size and lvef, lvh, chanel, mild tr, mod mr, mild pul regurg, aortic root 3.9cm, ascending aorta 3.9cm    4/25 echo lvef 50-55%, lae, mild aortic regurg with mean grad 13mm, mild mr and tr with pa pressure 56mm, mild pul regurg    Chief Complaint   Patient presents with    Hypertension       HPI :  About 2 months ago or so he started in with prostate surgery and ended up having to have 2 separate surgeries and was hospitalized for almost a week with bleeding complications.  He finally stayed off Eliquis and then restarted it few weeks ago and we stopped the aspirin and has had no further bleeding though his hemoglobin is only up to 10.1.  He still having some shortness of breath and is nowhere near back to where he was prior to the surgery.  He has been on diuretics which have

## 2025-04-18 ENCOUNTER — ANCILLARY PROCEDURE (OUTPATIENT)
Age: 85
End: 2025-04-18
Payer: MEDICARE

## 2025-04-18 VITALS
DIASTOLIC BLOOD PRESSURE: 70 MMHG | WEIGHT: 184 LBS | HEIGHT: 71 IN | BODY MASS INDEX: 25.76 KG/M2 | HEART RATE: 69 BPM | SYSTOLIC BLOOD PRESSURE: 122 MMHG

## 2025-04-18 DIAGNOSIS — R06.02 SHORTNESS OF BREATH: ICD-10-CM

## 2025-04-18 DIAGNOSIS — Z95.1 HX OF CABG: ICD-10-CM

## 2025-04-18 DIAGNOSIS — I25.10 ATHEROSCLEROSIS OF NATIVE CORONARY ARTERY OF NATIVE HEART WITHOUT ANGINA PECTORIS: ICD-10-CM

## 2025-04-18 PROCEDURE — A9500 TC99M SESTAMIBI: HCPCS | Performed by: INTERNAL MEDICINE

## 2025-04-18 PROCEDURE — PBSHW PBB SHADOW CHARGE: Performed by: SPECIALIST

## 2025-04-18 PROCEDURE — 93017 CV STRESS TEST TRACING ONLY: CPT | Performed by: INTERNAL MEDICINE

## 2025-04-18 RX ORDER — TETRAKIS(2-METHOXYISOBUTYLISOCYANIDE)COPPER(I) TETRAFLUOROBORATE 1 MG/ML
26.4 INJECTION, POWDER, LYOPHILIZED, FOR SOLUTION INTRAVENOUS
Status: COMPLETED | OUTPATIENT
Start: 2025-04-18 | End: 2025-04-18

## 2025-04-18 RX ORDER — TETRAKIS(2-METHOXYISOBUTYLISOCYANIDE)COPPER(I) TETRAFLUOROBORATE 1 MG/ML
8.6 INJECTION, POWDER, LYOPHILIZED, FOR SOLUTION INTRAVENOUS
Status: COMPLETED | OUTPATIENT
Start: 2025-04-18 | End: 2025-04-18

## 2025-04-18 RX ORDER — REGADENOSON 0.08 MG/ML
0.4 INJECTION, SOLUTION INTRAVENOUS
Status: COMPLETED | OUTPATIENT
Start: 2025-04-18 | End: 2025-04-18

## 2025-04-18 RX ADMIN — TECHNETIUM TC-99M SESTAMIBI 26.4 MILLICURIE: 1 INJECTION INTRAVENOUS at 10:15

## 2025-04-18 RX ADMIN — REGADENOSON 0.4 MG: 0.08 INJECTION, SOLUTION INTRAVENOUS at 10:15

## 2025-04-18 RX ADMIN — TECHNETIUM TC-99M SESTAMIBI 8.6 MILLICURIE: 1 INJECTION INTRAVENOUS at 09:15

## 2025-04-19 ENCOUNTER — RESULTS FOLLOW-UP (OUTPATIENT)
Age: 85
End: 2025-04-19

## 2025-04-19 LAB
ECHO BSA: 2.04 M2
NUC STRESS EJECTION FRACTION: 53 %
STRESS BASELINE DIAS BP: 70 MMHG
STRESS BASELINE HR: 71 BPM
STRESS BASELINE ST DEPRESSION: 0 MM
STRESS BASELINE SYS BP: 122 MMHG
STRESS O2 SAT PEAK: 98 %
STRESS O2 SAT REST: 97 %
STRESS PEAK DIAS BP: 50 MMHG
STRESS PEAK SYS BP: 110 MMHG
STRESS PERCENT HR ACHIEVED: 72 %
STRESS POST PEAK HR: 98 BPM
STRESS RATE PRESSURE PRODUCT: NORMAL BPM*MMHG
STRESS ST DEPRESSION: 0 MM
STRESS TARGET HR: 136 BPM
TID: 1.17

## 2025-04-19 PROCEDURE — PBSHW PBB SHADOW CHARGE: Performed by: INTERNAL MEDICINE

## 2025-04-19 PROCEDURE — 78452 HT MUSCLE IMAGE SPECT MULT: CPT | Performed by: INTERNAL MEDICINE

## 2025-04-19 PROCEDURE — 93018 CV STRESS TEST I&R ONLY: CPT | Performed by: INTERNAL MEDICINE

## 2025-04-19 PROCEDURE — 93016 CV STRESS TEST SUPVJ ONLY: CPT | Performed by: INTERNAL MEDICINE

## 2025-06-06 ENCOUNTER — TELEPHONE (OUTPATIENT)
Dept: PRIMARY CARE CLINIC | Facility: CLINIC | Age: 85
End: 2025-06-06

## 2025-06-22 DIAGNOSIS — I48.0 PAROXYSMAL ATRIAL FIBRILLATION (HCC): ICD-10-CM

## 2025-06-23 RX ORDER — METOPROLOL SUCCINATE 25 MG/1
12.5 TABLET, EXTENDED RELEASE ORAL DAILY
Qty: 90 TABLET | Refills: 0 | Status: SHIPPED | OUTPATIENT
Start: 2025-06-23

## 2025-06-23 NOTE — TELEPHONE ENCOUNTER
Requested Prescriptions     Signed Prescriptions Disp Refills    metoprolol succinate (TOPROL XL) 25 MG extended release tablet 90 tablet 0     Sig: Take 0.5 tablets by mouth daily     Authorizing Provider: CHIKIS ORONA III     Ordering User: SARAH SCHROEDER      Future Appointments   Date Time Provider Department Center   7/16/2025  9:20 AM Chikis Orona III, MD [facility]      Per Verbal Order by MD/NP

## 2025-07-05 DIAGNOSIS — E06.3 HYPOTHYROIDISM, ACQUIRED, AUTOIMMUNE: ICD-10-CM

## 2025-07-05 RX ORDER — LEVOTHYROXINE SODIUM 50 UG/1
50 TABLET ORAL DAILY
Qty: 90 TABLET | Refills: 0 | Status: SHIPPED | OUTPATIENT
Start: 2025-07-05

## 2025-07-16 ENCOUNTER — OFFICE VISIT (OUTPATIENT)
Age: 85
End: 2025-07-16
Payer: MEDICARE

## 2025-07-16 VITALS
WEIGHT: 181 LBS | OXYGEN SATURATION: 96 % | DIASTOLIC BLOOD PRESSURE: 55 MMHG | HEART RATE: 65 BPM | BODY MASS INDEX: 25.34 KG/M2 | SYSTOLIC BLOOD PRESSURE: 140 MMHG | HEIGHT: 71 IN

## 2025-07-16 DIAGNOSIS — I10 ESSENTIAL HYPERTENSION, BENIGN: ICD-10-CM

## 2025-07-16 DIAGNOSIS — R06.02 SHORTNESS OF BREATH: ICD-10-CM

## 2025-07-16 DIAGNOSIS — I48.0 PAROXYSMAL ATRIAL FIBRILLATION (HCC): ICD-10-CM

## 2025-07-16 DIAGNOSIS — I34.0 NONRHEUMATIC MITRAL VALVE REGURGITATION: ICD-10-CM

## 2025-07-16 DIAGNOSIS — E78.2 MIXED HYPERLIPIDEMIA: ICD-10-CM

## 2025-07-16 DIAGNOSIS — I27.20 PULMONARY HTN (HCC): ICD-10-CM

## 2025-07-16 DIAGNOSIS — Z95.1 HX OF CABG: ICD-10-CM

## 2025-07-16 DIAGNOSIS — I35.0 AORTIC VALVE STENOSIS, ETIOLOGY OF CARDIAC VALVE DISEASE UNSPECIFIED: ICD-10-CM

## 2025-07-16 DIAGNOSIS — I25.10 ATHEROSCLEROSIS OF NATIVE CORONARY ARTERY OF NATIVE HEART WITHOUT ANGINA PECTORIS: ICD-10-CM

## 2025-07-16 DIAGNOSIS — I25.10 ATHEROSCLEROSIS OF NATIVE CORONARY ARTERY OF NATIVE HEART WITHOUT ANGINA PECTORIS: Primary | ICD-10-CM

## 2025-07-16 LAB
ERYTHROCYTE [DISTWIDTH] IN BLOOD BY AUTOMATED COUNT: 19.7 % (ref 11.5–14.5)
HCT VFR BLD AUTO: 45.9 % (ref 36.6–50.3)
HGB BLD-MCNC: 13.9 G/DL (ref 12.1–17)
MCH RBC QN AUTO: 26.9 PG (ref 26–34)
MCHC RBC AUTO-ENTMCNC: 30.3 G/DL (ref 30–36.5)
MCV RBC AUTO: 89 FL (ref 80–99)
NRBC # BLD: 0 K/UL (ref 0–0.01)
NRBC BLD-RTO: 0 PER 100 WBC
PLATELET # BLD AUTO: 188 K/UL (ref 150–400)
PMV BLD AUTO: 10.2 FL (ref 8.9–12.9)
RBC # BLD AUTO: 5.16 M/UL (ref 4.1–5.7)
WBC # BLD AUTO: 5.7 K/UL (ref 4.1–11.1)

## 2025-07-16 PROCEDURE — 3078F DIAST BP <80 MM HG: CPT | Performed by: SPECIALIST

## 2025-07-16 PROCEDURE — 99214 OFFICE O/P EST MOD 30 MIN: CPT | Performed by: SPECIALIST

## 2025-07-16 PROCEDURE — 1160F RVW MEDS BY RX/DR IN RCRD: CPT | Performed by: SPECIALIST

## 2025-07-16 PROCEDURE — 3077F SYST BP >= 140 MM HG: CPT | Performed by: SPECIALIST

## 2025-07-16 PROCEDURE — 1123F ACP DISCUSS/DSCN MKR DOCD: CPT | Performed by: SPECIALIST

## 2025-07-16 PROCEDURE — 1036F TOBACCO NON-USER: CPT | Performed by: SPECIALIST

## 2025-07-16 PROCEDURE — G8419 CALC BMI OUT NRM PARAM NOF/U: HCPCS | Performed by: SPECIALIST

## 2025-07-16 PROCEDURE — 1159F MED LIST DOCD IN RCRD: CPT | Performed by: SPECIALIST

## 2025-07-16 PROCEDURE — G8427 DOCREV CUR MEDS BY ELIG CLIN: HCPCS | Performed by: SPECIALIST

## 2025-07-16 PROCEDURE — 1126F AMNT PAIN NOTED NONE PRSNT: CPT | Performed by: SPECIALIST

## 2025-07-16 ASSESSMENT — PATIENT HEALTH QUESTIONNAIRE - PHQ9
1. LITTLE INTEREST OR PLEASURE IN DOING THINGS: NOT AT ALL
SUM OF ALL RESPONSES TO PHQ QUESTIONS 1-9: 0
2. FEELING DOWN, DEPRESSED OR HOPELESS: NOT AT ALL

## 2025-07-16 NOTE — PROGRESS NOTES
HISTORY OF PRESENT ILLNESS  Corinne Marsh is a 85 y.o. male     SUMMARY:   Patient Active Problem List   Diagnosis    Hypothyroidism, acquired, autoimmune    Hx of CABG    Macular degeneration    NSTEMI (non-ST elevated myocardial infarction) ([facility])    RBBB    Peripheral vascular disease    Lumbar foraminal stenosis    Calculus of kidney    Coronary artery disease involving native coronary artery of native heart without angina pectoris    Essential hypertension, benign    Skin cancer    Sustained SVT    BPH with urinary obstruction    Nonrheumatic mitral valve regurgitation    Advance directive discussed with patient    Insomnia, unspecified    IFG (impaired fasting glucose)    Lumbar disc disease    Mixed hyperlipidemia    Intermediate stage nonexudative age-related macular degeneration of both eyes    Paroxysmal atrial fibrillation (HCC)    Pulmonary HTN ([facility])            CARDIOLOGY STUDIES TO DATE:  6/20 echo normal lvef, lvh, lae, leia, sclerotic non stenotic av, mild ai, mod to severe mrmild tr with pa pressure 40 mm, mod pul regurg      3/22  echo normal lvef, calcified av with mild regurg, lae, mod to severe mr  3/23 echo normal lv size and lvef, lvh, chanel, mild tr, mod mr, mild pul regurg, aortic root 3.9cm, ascending aorta 3.9cm    4/25 echo lvef 50-55%, lae, mild aortic regurg with mean grad 13mm, mild mr and tr with pa pressure 56mm, mild pul regurg    4/25 lexiscan, fixed inf apical defect, infarct vs artifact    Chief Complaint   Patient presents with    Coronary Artery Disease       HPI :  His shortness of breath is markedly improved and he is nearly back to baseline now.  He did see pulmonary and they did not feel that he had enough lung disease to explain his symptoms and all of his cardiac testing was unremarkable.  In retrospect is probably related to his anemia and all the hospitalizations that he had stacked on top of each other.  Most recent lipid profile looked outstanding and blood

## 2025-08-03 DIAGNOSIS — K59.04 CHRONIC IDIOPATHIC CONSTIPATION: ICD-10-CM

## 2025-08-03 RX ORDER — LUBIPROSTONE 8 UG/1
8 CAPSULE ORAL 2 TIMES DAILY WITH MEALS
Qty: 60 CAPSULE | Refills: 3 | Status: SHIPPED | OUTPATIENT
Start: 2025-08-03

## 2025-08-07 ENCOUNTER — TELEPHONE (OUTPATIENT)
Age: 85
End: 2025-08-07

## 2025-08-07 DIAGNOSIS — E78.2 MIXED HYPERLIPIDEMIA: ICD-10-CM

## 2025-08-07 RX ORDER — ROSUVASTATIN CALCIUM 20 MG/1
20 TABLET, COATED ORAL DAILY
Qty: 90 TABLET | Refills: 2 | Status: SHIPPED | OUTPATIENT
Start: 2025-08-07

## (undated) DEVICE — Z INACTIVE NO USAGE TURNOVER KIT RM CLEANOP

## (undated) DEVICE — TRANSFER PK BLD PROD 300ML --

## (undated) DEVICE — STERILE POLYISOPRENE POWDER-FREE SURGICAL GLOVES WITH EMOLLIENT COATING: Brand: PROTEXIS

## (undated) DEVICE — DRAPE PRB US TRNSDCR 6X96IN --

## (undated) DEVICE — DRAPE FLD WRM W44XL66IN C6L FOR INTRATEMP SYS THERMABASIN

## (undated) DEVICE — 6 FOOT DISPOSABLE EXTENSION CABLE WITH SAFE CONNECT / SCREW-DOWN

## (undated) DEVICE — Device

## (undated) DEVICE — DRAPE XR C ARM 41X74IN LF --

## (undated) DEVICE — KIT MED IMAG CNTRST AGNT W/ IOPAMIDOL REUSE

## (undated) DEVICE — Device: Brand: PADPRO

## (undated) DEVICE — SOLUTION IV 1000ML 0.9% SOD CHL

## (undated) DEVICE — TR BAND RADIAL ARTERY COMPRESSION DEVICE: Brand: TR BAND

## (undated) DEVICE — SUTURE MCRYL SZ 4-0 L27IN ABSRB UD L19MM PS-2 1/2 CIR PRIM Y426H

## (undated) DEVICE — NEEDLE HYPO 22GA L1.5IN BLK S STL HUB POLYPR SHLD REG BVL

## (undated) DEVICE — DRAPE SLUSH DISC W44XL66IN ST FOR RND BSIN HUSH SLUSH SYS

## (undated) DEVICE — KIT HEMSTAT MTRX 8ML PORCINE GEL HUM THROM ABSRB FLOWABLE [2993] [JNJ HEALTHCARE]

## (undated) DEVICE — PRESSURE MONITORING SET: Brand: TRUWAVE

## (undated) DEVICE — TEMP PACING WIRE: Brand: MYO/WIRE

## (undated) DEVICE — SEALANT TISS 4 CC FIBRIN VISTASEAL

## (undated) DEVICE — CATHETER,FOLEY,COUDE,LATEX,14FR,10ML: Brand: MEDLINE

## (undated) DEVICE — TUBING, SUCTION, 1/4" X 12', STRAIGHT: Brand: MEDLINE

## (undated) DEVICE — SOLUTION IRRIG 1000ML H2O STRL BLT

## (undated) DEVICE — AVID DUAL STAGE VENOUS DRAINAGE CANNULA: Brand: AVID DUAL STAGE VENOUS DRAINAGE CANNULA

## (undated) DEVICE — 40418 TRENDELENBURG ONE-STEP ARM PROTECTORS LARGE (1 PAIR): Brand: 40418 TRENDELENBURG ONE-STEP ARM PROTECTORS LARGE (1 PAIR)

## (undated) DEVICE — PLEDGET SUT SFT OVL 3 8X5 16IN

## (undated) DEVICE — SUTURE PROL SZ 6-0 L24IN NONABSORBABLE BLU L13MM C-1 3/8 8726H

## (undated) DEVICE — SUT PROL 7-0 24IN BV1 DA BLU --

## (undated) DEVICE — 72" ARTERIAL PRESSURE TUBING: Brand: ICU MEDICAL

## (undated) DEVICE — PAD,ABDOMINAL,5"X9",ST,LF,25/BX: Brand: MEDLINE INDUSTRIES, INC.

## (undated) DEVICE — SOLUTION IRRIG 1000ML 0.9% SOD CHL USP POUR PLAS BTL

## (undated) DEVICE — SUMP INTCARD W/ 1/4INCH CONN 20FRENCH 15INCH DLP

## (undated) DEVICE — BLADELESS OBTURATOR: Brand: WECK VISTA

## (undated) DEVICE — SUTURE DEK POLY GRN BR SZ3 0 T 2 2N 6716

## (undated) DEVICE — SUT SLK 2 60IN TIE MP BLK --

## (undated) DEVICE — LAMINECTOMY RICHMOND-LF: Brand: MEDLINE INDUSTRIES, INC.

## (undated) DEVICE — DRSG BORDR MPLX HEEL 8.7X9.1IN --

## (undated) DEVICE — (D)PREP SKN CHLRAPRP APPL 26ML -- CONVERT TO ITEM 371833

## (undated) DEVICE — SYS VSL HARV HEMOPRO2 VASOVIEW -- HARV SYS MINIMUM ORDER 5/EA

## (undated) DEVICE — BAG RED 3PLY 2MIL 30X40 IN

## (undated) DEVICE — COVER,MAYO STAND,STERILE: Brand: MEDLINE

## (undated) DEVICE — GOWN,SIRUS,NONRNF,SETINSLV,XL,20/CS: Brand: MEDLINE

## (undated) DEVICE — SUTURE PROL SZ 8-0 L24IN NONABSORBABLE BLU L6.5MM BV130-5 8732H

## (undated) DEVICE — 1000ML,PRESSURE INFUSER W/STOPCOCK: Brand: MEDLINE

## (undated) DEVICE — DRESSING SIL W4XL5IN ANTIBACT GELLING FBR CYTOFORM

## (undated) DEVICE — BIPOLAR FORCEPS CORD,BANANA LEADS: Brand: VALLEYLAB

## (undated) DEVICE — BLADE OPHTH 180DEG CUT SURF BLU STR SHRP DBL BVL GRINDLESS

## (undated) DEVICE — COVER MPLR TIP CRV SCIS ACC DA VINCI

## (undated) DEVICE — SUT ETHBND 2-0 36IN SH DA GRN --

## (undated) DEVICE — SUT PROL 4-0 30IN SH1 DA BLU --

## (undated) DEVICE — KENDALL SCD EXPRESS SLEEVES, KNEE LENGTH, MEDIUM: Brand: KENDALL SCD

## (undated) DEVICE — SPECIAL PROCEDURE DRAPE 32" X 34": Brand: SPECIAL PROCEDURE DRAPE

## (undated) DEVICE — KIT HND CTRL 3 W STPCOCK ROT END 54IN PREM HI PRSS TBNG AT

## (undated) DEVICE — SUTURE MCRYL SZ 3-0 L27IN ABSRB UD L24MM PS-1 3/8 CIR PRIM Y936H

## (undated) DEVICE — HANDLE LT SNAP ON ULT DURABLE LENS FOR TRUMPF ALC DISPOSABLE

## (undated) DEVICE — WRAP SURG W1.31XL1.34M CARD FOR PT 165-172CM THERMOWRP

## (undated) DEVICE — SUTURE VCRL SZ 3-0 L27IN ABSRB UD L24MM PS-1 3/8 CIR PRIM J936H

## (undated) DEVICE — CATHETER IV 14GA L2IN POLYUR STR ORNG HUB SFTY RADPQ DISP

## (undated) DEVICE — COVER LT HNDL PLAS RIG 1 PER PK

## (undated) DEVICE — SOLUTION IV 1000ML PH 7.4 INJ NRMSOL R

## (undated) DEVICE — INSERT SUT HLD F/OCTBS RETRCTR --

## (undated) DEVICE — LEAD PCMKR MYOCARDL BPLR TEMP. --

## (undated) DEVICE — CLIP INT SM WIDE RED TI TRNSVRS GRV CHEVRON SHP W PRECIS

## (undated) DEVICE — PACK PROCEDURE SURG HRT CATH

## (undated) DEVICE — KIT BLWR MISTER 5P 15L W/ TBNG SET IRRIG MIST TO IMPROVE

## (undated) DEVICE — TOOL 14MH30 LEGEND 14CM 3MM: Brand: MIDAS REX ™

## (undated) DEVICE — INTENDED FOR TISSUE SEPARATION, AND OTHER PROCEDURES THAT REQUIRE A SHARP SURGICAL BLADE TO PUNCTURE OR CUT.: Brand: BARD-PARKER ® CARBON RIB-BACK BLADES

## (undated) DEVICE — BLADE OPHTH KNF D3MM 15DEG CATRCT BLU MICRO-SHARP

## (undated) DEVICE — SUTURE PROL SZ 4-0 L36IN NONABSORBABLE BLU L26MM SH 1/2 CIR 8521H

## (undated) DEVICE — FLOSEAL MATRIX IS INDICATED IN SURGICAL PROCEDURES (OTHER THAN IN OPHTHALMIC) AS AN ADJUNCT TO HEMOSTASIS WHEN CONTROL OF BLEEDING BY LIGATURE OR CONVENTIONALPROCEDURES IS INEFFECTIVE OR IMPRACTICAL.: Brand: FLOSEAL HEMOSTATIC MATRIX

## (undated) DEVICE — SYR 10ML LUER LOK 1/5ML GRAD --

## (undated) DEVICE — SUTURE VCRL 1 L27IN ABSRB VLT TP-1 L65MM 1/2 CIR TAPERPOINT J650G

## (undated) DEVICE — BLADE ELECTRODE: Brand: EDGE

## (undated) DEVICE — ARM DRAPE

## (undated) DEVICE — SUTURE DEV SZ 3-0 V-LOC 90 L12IN TO L18IN CV-23 VLT VLOCM0844

## (undated) DEVICE — BANDAGE COMPR ELASTIC 5 YDX6 IN

## (undated) DEVICE — BANDAGE COMPR W6INXL10YD ST M E WHITE/BEIGE

## (undated) DEVICE — AGENT HEMSTAT W4XL4IN OXIDIZED REGENERATED CELOS ABSRB SFT

## (undated) DEVICE — SYR 50ML LR LCK 1ML GRAD NSAF --

## (undated) DEVICE — KIT,ANTI FOG,W/SPONGE & FLUID,SOFT PACK: Brand: MEDLINE

## (undated) DEVICE — 1200 GUARD II KIT W/5MM TUBE W/O VAC TUBE: Brand: GUARDIAN

## (undated) DEVICE — NEEDLE HYPO 18GA L1.5IN PNK S STL HUB POLYPR SHLD REG BVL

## (undated) DEVICE — DRAIN,WOUND,ROUND,24FR,5/16",FULL-FLUTED: Brand: MEDLINE

## (undated) DEVICE — INTENDED TO STANDARDIZE OR CAMERAS TO ALLOW FOR THE USE OF THE OR CAMERA COVER: Brand: ASPEN® O.R. CAMERA COVER

## (undated) DEVICE — STERNUM BLADE, OFFSET (31.7 X 0.64 X 6.3MM)

## (undated) DEVICE — SUTURE TICRON DBL ARMED 2 0 CV 305 42IN BLU N ABSRB BRAID 8886303551

## (undated) DEVICE — ANGIOGRAPHY KIT

## (undated) DEVICE — SEAL UNIV 5-8MM DISP BX/10 -- DA VINCI XI - SNGL USE

## (undated) DEVICE — REM POLYHESIVE ADULT PATIENT RETURN ELECTRODE: Brand: VALLEYLAB

## (undated) DEVICE — BONE WAX WHITE: Brand: BONE WAX WHITE

## (undated) DEVICE — SUTURE VCRL SZ 3-0 L27IN ABSRB UD L26MM SH 1/2 CIR J416H

## (undated) DEVICE — SPLINT WR POS F/ARTERIAL ACC -- BX/10

## (undated) DEVICE — SOLUTION IV 500ML 0.9% SOD CHL FLX CONT

## (undated) DEVICE — 3M™ TEGADERM™ TRANSPARENT FILM DRESSING FRAME STYLE, 1626W, 4 IN X 4-3/4 IN (10 CM X 12 CM), 50/CT 4CT/CASE: Brand: 3M™ TEGADERM™

## (undated) DEVICE — TIDISHIELD TRANSPORT, CONTAINMENT COVER FOR BACK TABLE 4'6" (1.37M) TO 8' (2.43M) IN LENGTH: Brand: TIDISHIELD

## (undated) DEVICE — DERMABOND SKIN ADH 0.7ML -- DERMABOND ADVANCED 12/BX

## (undated) DEVICE — STRAP,POSITIONING,KNEE/BODY,FOAM,4X60": Brand: MEDLINE

## (undated) DEVICE — PREP SKN PREVAIL 40ML APPL --

## (undated) DEVICE — PILLOW POS AD L7IN R FOAM HD REST INTUB SLOT DISP

## (undated) DEVICE — TBG INSUFFLATION LUER LOCK: Brand: MEDLINE INDUSTRIES, INC.

## (undated) DEVICE — SUTURE VCRL 2-0 L27IN ABSRB UD CP-2 L26MM 1/2 CIR REV CUT J869H

## (undated) DEVICE — GENERAL LAPAROSCOPY - SMH: Brand: MEDLINE INDUSTRIES, INC.

## (undated) DEVICE — GOWN,SIRUS,NONRNF,SETINSLV,2XL,18/CS: Brand: MEDLINE

## (undated) DEVICE — RESERVOIR,SUCTION,100CC,SILICONE: Brand: MEDLINE

## (undated) DEVICE — NEEDLE HYPO 22GA L1.5IN BLK POLYPR HUB S STL REG BVL STR

## (undated) DEVICE — ANGIOGRAPHIC CATHETER: Brand: IMPULSE™

## (undated) DEVICE — GLIDESHEATH SLENDER STAINLESS STEEL KIT: Brand: GLIDESHEATH SLENDER

## (undated) DEVICE — VISUALIZATION SYSTEM: Brand: CLEARIFY

## (undated) DEVICE — SUTURE SZ 7 L18IN NONABSORBABLE SIL CCS L48MM 1/2 CIR STRNM M655G

## (undated) DEVICE — SUT PROL 4-0 36IN RB1 DA BLU --

## (undated) DEVICE — GLOVE SURG SZ 7.5 L11.2IN THK9.8MIL STRW LTX POLYMER BEAD

## (undated) DEVICE — TRAY URO PREPPING W/ SYN VYN GLV 10CC SYR CATH LUB POVIDONE

## (undated) DEVICE — GARMENT,MEDLINE,DVT,INT,CALF,MED, GEN2: Brand: MEDLINE

## (undated) DEVICE — SYR 3ML LL TIP 1/10ML GRAD --

## (undated) DEVICE — NDL SPNE QNCKE 18GX3.5IN LF --

## (undated) DEVICE — PUNCH AORT L7.75IN DIA4MM STD FULL LEN DMND EDGE CUT FREE